# Patient Record
Sex: FEMALE | Race: BLACK OR AFRICAN AMERICAN | NOT HISPANIC OR LATINO | Employment: OTHER | URBAN - METROPOLITAN AREA
[De-identification: names, ages, dates, MRNs, and addresses within clinical notes are randomized per-mention and may not be internally consistent; named-entity substitution may affect disease eponyms.]

---

## 2017-02-12 DIAGNOSIS — I10 HYPERTENSION: ICD-10-CM

## 2017-02-12 RX ORDER — LISINOPRIL AND HYDROCHLOROTHIAZIDE 12.5; 2 MG/1; MG/1
TABLET ORAL
Qty: 30 TABLET | Refills: 2 | Status: SHIPPED | OUTPATIENT
Start: 2017-02-12 | End: 2017-05-22

## 2017-05-04 ENCOUNTER — PATIENT MESSAGE (OUTPATIENT)
Dept: INTERNAL MEDICINE | Facility: CLINIC | Age: 62
End: 2017-05-04

## 2017-05-04 DIAGNOSIS — Z91.89 AT HIGH RISK FOR RESPIRATORY INFECTION: ICD-10-CM

## 2017-05-04 RX ORDER — IPRATROPIUM BROMIDE AND ALBUTEROL SULFATE 2.5; .5 MG/3ML; MG/3ML
3 SOLUTION RESPIRATORY (INHALATION) EVERY 4 HOURS PRN
Qty: 60 VIAL | Refills: 6 | Status: SHIPPED | OUTPATIENT
Start: 2017-05-04 | End: 2018-07-01 | Stop reason: SDUPTHER

## 2017-05-05 RX ORDER — IPRATROPIUM BROMIDE AND ALBUTEROL SULFATE 2.5; .5 MG/3ML; MG/3ML
3 SOLUTION RESPIRATORY (INHALATION) EVERY 4 HOURS PRN
Qty: 60 VIAL | Refills: 6 | OUTPATIENT
Start: 2017-05-05

## 2017-05-15 ENCOUNTER — PATIENT MESSAGE (OUTPATIENT)
Dept: INTERNAL MEDICINE | Facility: CLINIC | Age: 62
End: 2017-05-15

## 2017-05-20 DIAGNOSIS — I10 HYPERTENSION: ICD-10-CM

## 2017-05-22 RX ORDER — LISINOPRIL AND HYDROCHLOROTHIAZIDE 12.5; 2 MG/1; MG/1
TABLET ORAL
Qty: 30 TABLET | Refills: 9 | Status: SHIPPED | OUTPATIENT
Start: 2017-05-22 | End: 2018-04-10 | Stop reason: SDUPTHER

## 2017-06-27 ENCOUNTER — OFFICE VISIT (OUTPATIENT)
Dept: OBSTETRICS AND GYNECOLOGY | Facility: CLINIC | Age: 62
End: 2017-06-27
Payer: COMMERCIAL

## 2017-06-27 VITALS
HEIGHT: 63 IN | WEIGHT: 121.25 LBS | DIASTOLIC BLOOD PRESSURE: 68 MMHG | SYSTOLIC BLOOD PRESSURE: 110 MMHG | BODY MASS INDEX: 21.48 KG/M2

## 2017-06-27 DIAGNOSIS — Z01.419 VISIT FOR GYNECOLOGIC EXAMINATION: Primary | ICD-10-CM

## 2017-06-27 DIAGNOSIS — N95.9 MENOPAUSAL AND PERIMENOPAUSAL DISORDER: ICD-10-CM

## 2017-06-27 DIAGNOSIS — Z12.39 BREAST CANCER SCREENING: ICD-10-CM

## 2017-06-27 PROCEDURE — 99999 PR PBB SHADOW E&M-EST. PATIENT-LVL III: CPT | Mod: PBBFAC,,, | Performed by: OBSTETRICS & GYNECOLOGY

## 2017-06-27 PROCEDURE — 88175 CYTOPATH C/V AUTO FLUID REDO: CPT

## 2017-06-27 PROCEDURE — 99396 PREV VISIT EST AGE 40-64: CPT | Mod: S$GLB,,, | Performed by: OBSTETRICS & GYNECOLOGY

## 2017-06-27 NOTE — PROGRESS NOTES
HISTORY OF PRESENT ILLNESS:    Rochelle Espinoza is a 61 y.o. female , presents for a routine exam and has no complaints.  PAP DUE AND SUBMITTED.  MAMMO ORDERED.  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  RECENT STAYCATION AT HOTEL IN [place] WITH GRANDKIDS . .     LAST VISIT :  NEW PATIENT TO ME.  PAP SUBMITTED AND DISCUSSED 3YR SCREENING INTERVAL.  MAMMO IS UP TO DATE.  C/O HOT FLUSHES, BUT BETTER THAN PREVIOUSLY.  NO SIGNIF VAGINAL DRYNESS AND NO SANDRA  HX TRICH IN THE PAST WITH LUIS.  NOT SA OVER THE PAST 4 YEARS . .     Past Medical History:   Diagnosis Date    COPD (chronic obstructive pulmonary disease)     Hypertension        Past Surgical History:   Procedure Laterality Date     SECTION          MEDICATIONS AND ALLERGIES:      Current Outpatient Prescriptions:     albuterol (PROAIR HFA) 90 mcg/actuation inhaler, Inhale 2 puffs into the lungs every 6 (six) hours., Disp: 8.5 g, Rfl: 9    albuterol-ipratropium 2.5mg-0.5mg/3mL (DUO-NEB) 0.5 mg-3 mg(2.5 mg base)/3 mL nebulizer solution, Take 3 mLs by nebulization every 4 (four) hours as needed for Wheezing or Shortness of Breath., Disp: 60 vial, Rfl: 6    citalopram (CELEXA) 10 MG tablet, Take 1 tablet (10 mg total) by mouth once daily., Disp: 30 tablet, Rfl: 11    gabapentin (NEURONTIN) 100 MG capsule, 1 tab 3 x a day, increase dose by 1 tab per dose every 3 days to max of 3 tabs, Disp: 120 capsule, Rfl: 0    lisinopril-hydrochlorothiazide (PRINZIDE,ZESTORETIC) 20-12.5 mg per tablet, TAKE 1 TABLET BY MOUTH DAILY, Disp: 30 tablet, Rfl: 9    mometasone-formoterol (DULERA) 200-5 mcg/actuation inhaler, Inhale 2 puffs into the lungs 2 (two) times daily., Disp: 13 g, Rfl: 9    nicotine (NICODERM CQ) 21 mg/24 hr, Place 1 patch onto the skin once daily. NAME BRAND ONLY, Disp: 28 patch, Rfl: 1    nicotine polacrilex (COMMIT) 4 MG Lo, Take 1 lozenge (4 mg total)  by mouth as needed (6-16 pieces daily as needed). Offer patient flavor options, Disp: 81 lozenge, Rfl: 1    nicotine polacrilex (NICORETTE) 4 MG Gum, Take 1 each (4 mg total) by mouth as needed (6-16 pieces daily as needed.). Offer patient flavor options, Disp: 100 each, Rfl: 1    OMEPRAZOLE (PRILOSEC ORAL), Take by mouth., Disp: , Rfl:     triamcinolone acetonide 0.5% (KENALOG) 0.5 % Crea, Apply topically 2 (two) times daily., Disp: 15 g, Rfl: 0    Review of patient's allergies indicates:   Allergen Reactions    No known drug allergies        History reviewed. No pertinent family history.    Social History     Social History    Marital status:      Spouse name: N/A    Number of children: N/A    Years of education: N/A     Occupational History    Not on file.     Social History Main Topics    Smoking status: Current Every Day Smoker     Packs/day: 1.00     Years: 40.00     Types: Cigarettes    Smokeless tobacco: Never Used    Alcohol use Yes      Comment: occ beer    Drug use: No    Sexual activity: Not Currently     Birth control/ protection: None     Other Topics Concern    Not on file     Social History Narrative    No narrative on file       COMPREHENSIVE GYN HISTORY:  PAP History:  Denies abnormal Paps except a noted above.  Infection History: Denies STDs. Denies PID.  Benign History: Denies uterine fibroids. Denies ovarian cysts. Denies endometriosis.  Denies other conditions.  Cancer History: Denies cervical cancer. Denies uterine cancer or hyperplasia. Denies ovarian cancer. Denies vulvar cancer or pre-cancer. Denies vaginal cancer or pre-cancer. Denies breast cancer. Denies colon cancer.    ROS:  GENERAL: No weight changes. No swelling. No fatigue. No fever.  CARDIOVASCULAR: No chest pain. No shortness of breath. No leg cramps.   NEUROLOGICAL: No headaches. No vision changes.  BREASTS: No pain. No lumps. No discharge.  ABDOMEN: No pain. No nausea. No vomiting. No diarrhea. No  "constipation.  REPRODUCTIVE: No abnormal bleeding.   VULVA: No pain. No lesions. No itching.  VAGINA: No relaxation. No itching. No odor. No discharge. No lesions.  URINARY: No incontinence. No nocturia. No frequency. No dysuria.    /68   Ht 5' 3" (1.6 m)   Wt 55 kg (121 lb 4.1 oz)   LMP 11/21/2013   BMI 21.48 kg/m²     PE:  APPEARANCE: Well nourished, well developed, in no acute distress.  AFFECT: WNL, alert and oriented x 3.  SKIN: No hirsutism or acne.  NECK: Neck symmetric without masses or thyromegaly.  NODES: No inguinal, cervical, axillary or femoral lymph node enlargement.  CHEST: Good respiratory effort.   ABDOMEN: Soft. No tenderness or masses. No hepatosplenomegaly. No hernias.  BREASTS: Symmetrical, no skin changes or visible lesions. No palpable masses, nipple discharge bilaterally.  PELVIC: ATROPHIC EXTERNAL FEMALE GENITALIA without lesions. Normal hair distribution. Adequate perineal body, normal urethral meatus. VAGINA DRY without lesions or discharge. CERVIX STENOTIC without lesions, discharge or tenderness. No significant cystocele or rectocele. Bimanual exam shows uterus to be normal size, regular, mobile and nontender. Adnexa without masses or tenderness.  EXTREMITIES: No edema.    PROCEDURES:  Pap    DIAGNOSIS:  1. Visit for gynecologic examination  Liquid-based pap smear, screening   2. Breast cancer screening  Mammo Digital Screening Bilat with Tomosynthesis CAD   3. Menopausal and perimenopausal disorder         PLAN:    LABS AND TESTS ORDERED:  Mammogram    COUNSELING:  The patient was counseled today on osteoporosis prevention, calcium supplementation, and regular weight bearing exercise. The patient was also counseled today on ACS PAP guidelines, with recommendations for yearly pelvic exams unless their uterus, cervix, and ovaries were removed for benign reasons; in that case, examinations every 3-5 years are recommended.  The patient was also counseled regarding monthly breast " self-examination, routine STD screening for at-risk populations, prophylactic immunizations for transmitted infections such as  HPV, Pertussis, or Influenza as appropriate, and yearly mammograms when indicated by ACS guidelines.  She was advised to see her primary care physician for all other health maintenance.    FOLLOW-UP with me annually.

## 2017-06-28 ENCOUNTER — CLINICAL SUPPORT (OUTPATIENT)
Dept: SMOKING CESSATION | Facility: CLINIC | Age: 62
End: 2017-06-28
Payer: COMMERCIAL

## 2017-06-28 DIAGNOSIS — F17.200 NICOTINE DEPENDENCE: Primary | ICD-10-CM

## 2017-06-28 PROCEDURE — 99407 BEHAV CHNG SMOKING > 10 MIN: CPT | Mod: S$GLB,,,

## 2017-07-02 ENCOUNTER — PATIENT MESSAGE (OUTPATIENT)
Dept: OBSTETRICS AND GYNECOLOGY | Facility: HOSPITAL | Age: 62
End: 2017-07-02

## 2017-07-17 DIAGNOSIS — J20.9 ACUTE BRONCHITIS, UNSPECIFIED ORGANISM: ICD-10-CM

## 2017-07-17 RX ORDER — ALBUTEROL SULFATE 90 UG/1
AEROSOL, METERED RESPIRATORY (INHALATION)
Qty: 8.5 G | Refills: 9 | Status: SHIPPED | OUTPATIENT
Start: 2017-07-17 | End: 2017-12-19 | Stop reason: SDUPTHER

## 2017-09-13 ENCOUNTER — TELEPHONE (OUTPATIENT)
Dept: ORTHOPEDICS | Facility: CLINIC | Age: 62
End: 2017-09-13

## 2017-09-13 DIAGNOSIS — M54.2 CERVICAL SPINE PAIN: Primary | ICD-10-CM

## 2017-09-17 ENCOUNTER — PATIENT MESSAGE (OUTPATIENT)
Dept: INTERNAL MEDICINE | Facility: CLINIC | Age: 62
End: 2017-09-17

## 2017-09-21 ENCOUNTER — TELEPHONE (OUTPATIENT)
Dept: INTERNAL MEDICINE | Facility: CLINIC | Age: 62
End: 2017-09-21

## 2017-09-21 NOTE — TELEPHONE ENCOUNTER
----- Message from Jackeline Nixon MA sent at 9/21/2017  4:12 PM CDT -----  Contact: self - [phone]  Type: Returning a call    Who left a message? Chelsea     When did the practice call? 9/21/17    Comments: patient stated she will keep the appt on 10/2/17. Thanks!

## 2017-09-27 ENCOUNTER — TELEPHONE (OUTPATIENT)
Dept: INTERNAL MEDICINE | Facility: CLINIC | Age: 62
End: 2017-09-27

## 2017-09-27 ENCOUNTER — HOSPITAL ENCOUNTER (OUTPATIENT)
Dept: RADIOLOGY | Facility: HOSPITAL | Age: 62
Discharge: HOME | End: 2017-09-27
Attending: ORTHOPAEDIC SURGERY
Payer: COMMERCIAL

## 2017-09-27 ENCOUNTER — OFFICE VISIT (OUTPATIENT)
Dept: ORTHOPEDICS | Facility: CLINIC | Age: 62
End: 2017-09-27
Payer: COMMERCIAL

## 2017-09-27 VITALS — HEIGHT: 63 IN | BODY MASS INDEX: 21.26 KG/M2 | WEIGHT: 120 LBS

## 2017-09-27 DIAGNOSIS — M54.2 CERVICAL SPINE PAIN: ICD-10-CM

## 2017-09-27 DIAGNOSIS — G95.9 CERVICAL MYELOPATHY: ICD-10-CM

## 2017-09-27 DIAGNOSIS — M54.12 CERVICAL RADICULOPATHY: Primary | ICD-10-CM

## 2017-09-27 PROCEDURE — 3008F BODY MASS INDEX DOCD: CPT | Mod: S$GLB,,, | Performed by: ORTHOPAEDIC SURGERY

## 2017-09-27 PROCEDURE — 72050 X-RAY EXAM NECK SPINE 4/5VWS: CPT | Mod: TC

## 2017-09-27 PROCEDURE — 72050 X-RAY EXAM NECK SPINE 4/5VWS: CPT | Mod: 26,,, | Performed by: RADIOLOGY

## 2017-09-27 PROCEDURE — 99999 PR PBB SHADOW E&M-EST. PATIENT-LVL III: CPT | Mod: PBBFAC,,, | Performed by: ORTHOPAEDIC SURGERY

## 2017-09-27 PROCEDURE — 99213 OFFICE O/P EST LOW 20 MIN: CPT | Mod: S$GLB,,, | Performed by: ORTHOPAEDIC SURGERY

## 2017-09-29 ENCOUNTER — OFFICE VISIT (OUTPATIENT)
Dept: INTERNAL MEDICINE | Facility: CLINIC | Age: 62
End: 2017-09-29
Payer: COMMERCIAL

## 2017-09-29 ENCOUNTER — HOSPITAL ENCOUNTER (OUTPATIENT)
Dept: RADIOLOGY | Facility: HOSPITAL | Age: 62
Discharge: HOME | End: 2017-09-29
Attending: INTERNAL MEDICINE
Payer: COMMERCIAL

## 2017-09-29 VITALS — HEIGHT: 63 IN | WEIGHT: 121 LBS | BODY MASS INDEX: 21.44 KG/M2

## 2017-09-29 VITALS
BODY MASS INDEX: 21.56 KG/M2 | DIASTOLIC BLOOD PRESSURE: 80 MMHG | HEART RATE: 87 BPM | SYSTOLIC BLOOD PRESSURE: 114 MMHG | WEIGHT: 121.69 LBS | HEIGHT: 63 IN

## 2017-09-29 DIAGNOSIS — Z12.31 ENCOUNTER FOR SCREENING MAMMOGRAM FOR BREAST CANCER: ICD-10-CM

## 2017-09-29 DIAGNOSIS — R63.4 WEIGHT LOSS: ICD-10-CM

## 2017-09-29 DIAGNOSIS — J20.9 ACUTE BRONCHITIS, UNSPECIFIED ORGANISM: ICD-10-CM

## 2017-09-29 DIAGNOSIS — R63.4 WEIGHT LOSS: Primary | ICD-10-CM

## 2017-09-29 PROCEDURE — 3074F SYST BP LT 130 MM HG: CPT | Mod: S$GLB,,, | Performed by: INTERNAL MEDICINE

## 2017-09-29 PROCEDURE — 77067 SCR MAMMO BI INCL CAD: CPT | Mod: 26,,, | Performed by: RADIOLOGY

## 2017-09-29 PROCEDURE — 3008F BODY MASS INDEX DOCD: CPT | Mod: S$GLB,,, | Performed by: INTERNAL MEDICINE

## 2017-09-29 PROCEDURE — 99999 PR PBB SHADOW E&M-EST. PATIENT-LVL IV: CPT | Mod: PBBFAC,,, | Performed by: INTERNAL MEDICINE

## 2017-09-29 PROCEDURE — 71020 XR CHEST PA AND LATERAL: CPT | Mod: TC

## 2017-09-29 PROCEDURE — 99214 OFFICE O/P EST MOD 30 MIN: CPT | Mod: S$GLB,,, | Performed by: INTERNAL MEDICINE

## 2017-09-29 PROCEDURE — 71020 XR CHEST PA AND LATERAL: CPT | Mod: 26,,, | Performed by: RADIOLOGY

## 2017-09-29 PROCEDURE — 3079F DIAST BP 80-89 MM HG: CPT | Mod: S$GLB,,, | Performed by: INTERNAL MEDICINE

## 2017-09-29 PROCEDURE — 77067 SCR MAMMO BI INCL CAD: CPT | Mod: TC

## 2017-09-29 NOTE — PROGRESS NOTES
She is a 62-year-old female coming in today for followup.            She was seen  in the hospital February 7th through February 13th for COPD exacerbation. She    has had this happen after a three day [place] stay. She developed shortness    of breath and her pulse ox was low. She was put on oxygen. She was sent home    on oxygen. Pulse ox at baseline was 88%, during activity it got down to 82%.    Today her pulse ox was 91%.   She uses her oxygen at night. She    has a history of COPD. She was seen by Dr. Maine Whitney since her last visit with me.  She has  severe COPD,             Answers for HPI/ROS submitted by the patient on 9/28/2017   activity change: Yes  unexpected weight change: Yes  neck pain: No  hearing loss: No  rhinorrhea: Yes  trouble swallowing: No  eye discharge: No  visual disturbance: Yes  chest tightness: Yes  wheezing: Yes  chest pain: No  palpitations: Yes  blood in stool: No  constipation: No  vomiting: No  diarrhea: No  polydipsia: No  polyuria: No  difficulty urinating: No  hematuria: No  menstrual problem: No  dysuria: No  joint swelling: Yes  arthralgias: Yes  headaches: Yes  weakness: Yes  confusion: No  dysphoric mood: Yes       PHYSICAL EXAMINATION:  GENERAL: This is a well-appearing 6`-year-old  female, in no    acute distress.  NECK: Supple. She has no JVD. Thyroid is not enlarged.  CARDIOVASCULAR: S1 and S2, regular rate and rhythm without murmur, gallop or    rub.  ABDOMEN: Soft, nontender, no hepatosplenomegaly, no guarding or rebound    tenderness.  LOWER EXTREMITIES: No edema.     ASSESSMENT: 1.  COPD,   2. hypoxia,   3.hyperglycemia,  4. She has tobacco abuse, which  she is undergoing smoking cessation. She says she has cut down a good bit on    her tobacco,  5. anxiety with withdrawal from this, se tried  Wellbutrin 150 mg twice a day f but this made her shakey-- will try some Celexa.

## 2017-09-29 NOTE — PROGRESS NOTES
The patient returns for follow up.    She is a 62 RHD F who presents for evaluation of R radial FA and Hand paresthesias over the past 6 months.    There was no inciting event and she has never had anything like this before.    This problem is getting worse and is not improved with NSAIDs.    She also endorses difficulty with buttons and small objects.    On examination there are no focal motor or sensory deficits in the BUE/LE, but she does have notable bilateral inverted radial reflexes. Negative Paniagua's bilaterally.    Cervical spine radiographs demonstrate severe C5/6 DDD.    Today we discussed options, I am concerned about cervical myelopathy, I will order a cervical MRI and she will return for f/u.    I spent 15 minutes with the patient of which greater than 1/2 the time was devoted to counciling the patient regarding treatment options.

## 2017-10-02 ENCOUNTER — LAB VISIT (OUTPATIENT)
Dept: LAB | Facility: HOSPITAL | Age: 62
End: 2017-10-02
Attending: INTERNAL MEDICINE
Payer: COMMERCIAL

## 2017-10-02 ENCOUNTER — TELEPHONE (OUTPATIENT)
Dept: INTERNAL MEDICINE | Facility: CLINIC | Age: 62
End: 2017-10-02

## 2017-10-02 DIAGNOSIS — R63.4 WEIGHT LOSS: ICD-10-CM

## 2017-10-02 LAB
ALBUMIN SERPL BCP-MCNC: 3.7 G/DL
ALP SERPL-CCNC: 51 U/L
ALT SERPL W/O P-5'-P-CCNC: 25 U/L
ANION GAP SERPL CALC-SCNC: 11 MMOL/L
AST SERPL-CCNC: 26 U/L
BASOPHILS # BLD AUTO: 0.01 K/UL
BASOPHILS NFR BLD: 0.1 %
BILIRUB SERPL-MCNC: 0.6 MG/DL
BUN SERPL-MCNC: 10 MG/DL
CALCIUM SERPL-MCNC: 9.1 MG/DL
CHLORIDE SERPL-SCNC: 103 MMOL/L
CO2 SERPL-SCNC: 28 MMOL/L
CREAT SERPL-MCNC: 0.7 MG/DL
DIFFERENTIAL METHOD: ABNORMAL
EOSINOPHIL # BLD AUTO: 0 K/UL
EOSINOPHIL NFR BLD: 0.5 %
ERYTHROCYTE [DISTWIDTH] IN BLOOD BY AUTOMATED COUNT: 15.8 %
EST. GFR  (AFRICAN AMERICAN): >60 ML/MIN/1.73 M^2
EST. GFR  (NON AFRICAN AMERICAN): >60 ML/MIN/1.73 M^2
GLUCOSE SERPL-MCNC: 96 MG/DL
HCT VFR BLD AUTO: 49.3 %
HGB BLD-MCNC: 17 G/DL
LYMPHOCYTES # BLD AUTO: 2.3 K/UL
LYMPHOCYTES NFR BLD: 28.3 %
MCH RBC QN AUTO: 29.9 PG
MCHC RBC AUTO-ENTMCNC: 34.5 G/DL
MCV RBC AUTO: 87 FL
MONOCYTES # BLD AUTO: 0.6 K/UL
MONOCYTES NFR BLD: 7.6 %
NEUTROPHILS # BLD AUTO: 5 K/UL
NEUTROPHILS NFR BLD: 63.2 %
PLATELET # BLD AUTO: 199 K/UL
PMV BLD AUTO: 11.8 FL
POTASSIUM SERPL-SCNC: 4.5 MMOL/L
PROT SERPL-MCNC: 6.9 G/DL
RBC # BLD AUTO: 5.68 M/UL
SODIUM SERPL-SCNC: 142 MMOL/L
TSH SERPL DL<=0.005 MIU/L-ACNC: 1.28 UIU/ML
WBC # BLD AUTO: 7.94 K/UL

## 2017-10-02 PROCEDURE — 85025 COMPLETE CBC W/AUTO DIFF WBC: CPT

## 2017-10-02 PROCEDURE — 84165 PROTEIN E-PHORESIS SERUM: CPT | Mod: 26,,, | Performed by: PATHOLOGY

## 2017-10-02 PROCEDURE — 84443 ASSAY THYROID STIM HORMONE: CPT

## 2017-10-02 PROCEDURE — 36415 COLL VENOUS BLD VENIPUNCTURE: CPT

## 2017-10-02 PROCEDURE — 84165 PROTEIN E-PHORESIS SERUM: CPT

## 2017-10-02 PROCEDURE — 80053 COMPREHEN METABOLIC PANEL: CPT

## 2017-10-02 NOTE — TELEPHONE ENCOUNTER
Please call-- recent labs , cxr and mmg look ok.  Have her try to increase calorie intake and I will follow up

## 2017-10-03 LAB
ALBUMIN SERPL ELPH-MCNC: 3.84 G/DL
ALPHA1 GLOB SERPL ELPH-MCNC: 0.29 G/DL
ALPHA2 GLOB SERPL ELPH-MCNC: 0.59 G/DL
B-GLOBULIN SERPL ELPH-MCNC: 0.72 G/DL
GAMMA GLOB SERPL ELPH-MCNC: 0.86 G/DL
PATHOLOGIST INTERPRETATION SPE: NORMAL
PROT SERPL-MCNC: 6.3 G/DL

## 2017-10-05 ENCOUNTER — TELEPHONE (OUTPATIENT)
Dept: INTERNAL MEDICINE | Facility: CLINIC | Age: 62
End: 2017-10-05

## 2017-10-09 ENCOUNTER — OFFICE VISIT (OUTPATIENT)
Dept: ORTHOPEDICS | Facility: CLINIC | Age: 62
End: 2017-10-09
Payer: COMMERCIAL

## 2017-10-09 ENCOUNTER — HOSPITAL ENCOUNTER (OUTPATIENT)
Dept: RADIOLOGY | Facility: HOSPITAL | Age: 62
Discharge: HOME | End: 2017-10-09
Attending: ORTHOPAEDIC SURGERY
Payer: COMMERCIAL

## 2017-10-09 VITALS — BODY MASS INDEX: 21.62 KG/M2 | HEIGHT: 63 IN | WEIGHT: 122 LBS

## 2017-10-09 DIAGNOSIS — M54.12 CERVICAL RADICULOPATHY: ICD-10-CM

## 2017-10-09 DIAGNOSIS — M54.12 CERVICAL RADICULOPATHY: Primary | ICD-10-CM

## 2017-10-09 DIAGNOSIS — G95.9 CERVICAL MYELOPATHY: ICD-10-CM

## 2017-10-09 PROCEDURE — 99999 PR PBB SHADOW E&M-EST. PATIENT-LVL III: CPT | Mod: PBBFAC,,, | Performed by: PHYSICIAN ASSISTANT

## 2017-10-09 PROCEDURE — 72141 MRI NECK SPINE W/O DYE: CPT | Mod: 26,,, | Performed by: RADIOLOGY

## 2017-10-09 PROCEDURE — 72141 MRI NECK SPINE W/O DYE: CPT | Mod: TC

## 2017-10-09 PROCEDURE — 99213 OFFICE O/P EST LOW 20 MIN: CPT | Mod: S$GLB,,, | Performed by: PHYSICIAN ASSISTANT

## 2017-10-09 RX ORDER — DICLOFENAC SODIUM 75 MG/1
75 TABLET, DELAYED RELEASE ORAL 2 TIMES DAILY
Qty: 60 TABLET | Refills: 0 | Status: SHIPPED | OUTPATIENT
Start: 2017-10-09 | End: 2017-11-08 | Stop reason: SDUPTHER

## 2017-10-09 RX ORDER — METHOCARBAMOL 750 MG/1
750 TABLET, FILM COATED ORAL NIGHTLY PRN
Qty: 30 TABLET | Refills: 0 | Status: SHIPPED | OUTPATIENT
Start: 2017-10-09 | End: 2018-02-22

## 2017-10-09 NOTE — PROGRESS NOTES
Ms. Espinoza returns for MRI results. She was seen by Dr. Gu 9/29/2017.  She has right radial forearm and hand paresthesias over the last 6 months.  She reports difficulty with small objections and buttons.      There are no focal motor or sensory deficits in the bilateral upper or lower extremities.  There is notable bilateral invertedd radial reflexes.  Negative gupta's bilaterally.     MRI cervical spine shows mild spinal stenosis at C5/6 and C6/7.     A/P:  Discussed with Dr. Gu.  There is no surgical intervention indicated at this time.  Will obtain an EMG.  Referral for PT placed today.

## 2017-10-25 ENCOUNTER — TELEPHONE (OUTPATIENT)
Dept: NEUROLOGY | Facility: CLINIC | Age: 62
End: 2017-10-25

## 2017-10-25 NOTE — TELEPHONE ENCOUNTER
Left  for pt letting her know that her EMG appt was moved from January to November. I left her with my call back number incase she needed to r/s

## 2017-11-08 RX ORDER — DICLOFENAC SODIUM 75 MG/1
75 TABLET, DELAYED RELEASE ORAL 2 TIMES DAILY
Qty: 60 TABLET | Refills: 0 | Status: SHIPPED | OUTPATIENT
Start: 2017-11-08 | End: 2017-12-08

## 2017-11-27 ENCOUNTER — PROCEDURE VISIT (OUTPATIENT)
Dept: NEUROLOGY | Facility: CLINIC | Age: 62
End: 2017-11-27
Payer: COMMERCIAL

## 2017-11-27 DIAGNOSIS — M54.12 CERVICAL RADICULOPATHY: ICD-10-CM

## 2017-11-27 PROCEDURE — 95886 MUSC TEST DONE W/N TEST COMP: CPT | Mod: S$GLB,,, | Performed by: PSYCHIATRY & NEUROLOGY

## 2017-11-27 PROCEDURE — 95912 NRV CNDJ TEST 11-12 STUDIES: CPT | Mod: S$GLB,,, | Performed by: PSYCHIATRY & NEUROLOGY

## 2017-12-09 ENCOUNTER — PATIENT MESSAGE (OUTPATIENT)
Dept: INTERNAL MEDICINE | Facility: CLINIC | Age: 62
End: 2017-12-09

## 2017-12-09 DIAGNOSIS — M10.9 GOUT, ARTHROPATHY: ICD-10-CM

## 2017-12-11 RX ORDER — COLCHICINE 0.6 MG/1
TABLET ORAL
Qty: 3 TABLET | Refills: 3 | Status: SHIPPED | OUTPATIENT
Start: 2017-12-11 | End: 2018-03-05

## 2017-12-11 NOTE — TELEPHONE ENCOUNTER
Patient states she was prescribed this for gout at urgent care and is having a flare up and would like a refill sent to her pharmacy please advise   
Hemiplegia

## 2017-12-19 ENCOUNTER — OFFICE VISIT (OUTPATIENT)
Dept: URGENT CARE | Facility: CLINIC | Age: 62
End: 2017-12-19
Payer: COMMERCIAL

## 2017-12-19 VITALS
HEIGHT: 63 IN | WEIGHT: 122 LBS | SYSTOLIC BLOOD PRESSURE: 148 MMHG | RESPIRATION RATE: 18 BRPM | TEMPERATURE: 99 F | OXYGEN SATURATION: 83 % | HEART RATE: 85 BPM | DIASTOLIC BLOOD PRESSURE: 96 MMHG | BODY MASS INDEX: 21.62 KG/M2

## 2017-12-19 DIAGNOSIS — J40 BRONCHITIS: Primary | ICD-10-CM

## 2017-12-19 DIAGNOSIS — J98.01 BRONCHOSPASM, ACUTE: ICD-10-CM

## 2017-12-19 DIAGNOSIS — J44.9 CHRONIC OBSTRUCTIVE PULMONARY DISEASE WITH HYPOXIA: ICD-10-CM

## 2017-12-19 PROBLEM — M10.9 GOUT INVOLVING TOE OF LEFT FOOT: Status: ACTIVE | Noted: 2017-12-09

## 2017-12-19 LAB
CTP QC/QA: YES
FLUAV AG NPH QL: NEGATIVE
FLUBV AG NPH QL: NEGATIVE

## 2017-12-19 PROCEDURE — 99215 OFFICE O/P EST HI 40 MIN: CPT | Mod: 25,S$GLB,, | Performed by: EMERGENCY MEDICINE

## 2017-12-19 PROCEDURE — 87804 INFLUENZA ASSAY W/OPTIC: CPT | Mod: QW,S$GLB,, | Performed by: EMERGENCY MEDICINE

## 2017-12-19 RX ORDER — DOXYCYCLINE 100 MG/1
100 CAPSULE ORAL 2 TIMES DAILY
Qty: 14 CAPSULE | Refills: 0 | Status: SHIPPED | OUTPATIENT
Start: 2017-12-19 | End: 2017-12-26

## 2017-12-19 RX ORDER — ALBUTEROL SULFATE 90 UG/1
AEROSOL, METERED RESPIRATORY (INHALATION)
Qty: 8.5 G | Refills: 9 | Status: SHIPPED | OUTPATIENT
Start: 2017-12-19 | End: 2018-11-26 | Stop reason: SDUPTHER

## 2017-12-19 RX ORDER — PREDNISONE 20 MG/1
40 TABLET ORAL DAILY
Qty: 10 TABLET | Refills: 0 | Status: SHIPPED | OUTPATIENT
Start: 2017-12-19 | End: 2017-12-24

## 2017-12-19 NOTE — PROGRESS NOTES
"Subjective:       Patient ID: Rochelle Espinoza is a 62 y.o. female.    Vitals:  height is 5' 3" (1.6 m) and weight is 55.3 kg (122 lb). Her tympanic temperature is 99.1 °F (37.3 °C). Her blood pressure is 148/96 (abnormal) and her pulse is 85. Her respiration is 18 and oxygen saturation is 83% (abnormal).     Chief Complaint: Cough (headache body aches chills watery eyes since yesterday)    Present with cough since yesterday.        Cough   This is a new problem. The current episode started yesterday. The problem has been gradually worsening. The problem occurs hourly. The cough is productive of sputum. Associated symptoms include chills, headaches, myalgias, shortness of breath and wheezing. Pertinent negatives include no chest pain, ear pain, eye redness, fever or sore throat. Nothing aggravates the symptoms. Risk factors for lung disease include smoking/tobacco exposure. Treatments tried: alkaseltzer plus  The treatment provided no relief. Her past medical history is significant for COPD.     Review of Systems   Constitution: Positive for chills. Negative for fever and malaise/fatigue.   HENT: Positive for congestion. Negative for ear pain, hoarse voice and sore throat.    Eyes: Positive for discharge. Negative for redness.   Cardiovascular: Negative for chest pain, dyspnea on exertion and leg swelling.   Respiratory: Positive for cough, shortness of breath, sputum production and wheezing.    Musculoskeletal: Positive for myalgias.   Gastrointestinal: Negative for abdominal pain and nausea.   Neurological: Positive for headaches.       Objective:      Physical Exam   Constitutional: She is oriented to person, place, and time. She appears well-developed and well-nourished. She is cooperative.  Non-toxic appearance. She does not appear ill. No distress.   HENT:   Head: Normocephalic and atraumatic.   Right Ear: Hearing, tympanic membrane, external ear and ear canal normal.   Left Ear: Hearing, tympanic membrane, " external ear and ear canal normal.   Nose: Nose normal. No mucosal edema, rhinorrhea or nasal deformity. No epistaxis. Right sinus exhibits no maxillary sinus tenderness and no frontal sinus tenderness. Left sinus exhibits no maxillary sinus tenderness and no frontal sinus tenderness.   Mouth/Throat: Uvula is midline, oropharynx is clear and moist and mucous membranes are normal. No trismus in the jaw. Normal dentition. No uvula swelling. No posterior oropharyngeal erythema.   Eyes: Conjunctivae and lids are normal. No scleral icterus.   Sclera clear bilat   Neck: Trachea normal, full passive range of motion without pain and phonation normal. Neck supple.   Cardiovascular: Normal rate, regular rhythm, normal heart sounds, intact distal pulses and normal pulses.    Pulmonary/Chest: Effort normal. No respiratory distress. She has wheezes in the right lower field and the left lower field. She has rhonchi in the right lower field and the left lower field.   Abdominal: Soft. Normal appearance and bowel sounds are normal. She exhibits no distension. There is no tenderness.   Musculoskeletal: Normal range of motion. She exhibits no edema or deformity.   Neurological: She is alert and oriented to person, place, and time. She exhibits normal muscle tone. Coordination normal.   Skin: Skin is warm, dry and intact. She is not diaphoretic. No pallor.   Psychiatric: She has a normal mood and affect. Her speech is normal and behavior is normal. Judgment and thought content normal. Cognition and memory are normal.   Nursing note and vitals reviewed.      Assessment:       1. Bronchitis    2. Bronchospasm, acute    3. Chronic obstructive pulmonary disease with hypoxia        Plan:         Bronchitis  -     POCT Influenza A/B  -     doxycycline (VIBRAMYCIN) 100 MG Cap; Take 1 capsule (100 mg total) by mouth 2 (two) times daily.  Dispense: 14 capsule; Refill: 0    Bronchospasm, acute  -     predniSONE (DELTASONE) 20 MG tablet; Take 2  tablets (40 mg total) by mouth once daily.  Dispense: 10 tablet; Refill: 0  -     ipratropium-albuterol (COMBIVENT)  mcg/actuation inhaler; Inhale 1 puff into the lungs 4 (four) times daily. Rescue  Dispense: 1 Package; Refill: 6  -     albuterol (PROAIR HFA) 90 mcg/actuation inhaler; TAKE 2 PUFFS BY MOUTH EVERY 6 HOURS AS NEEDED  Dispense: 8.5 g; Refill: 9    Chronic obstructive pulmonary disease with hypoxia  -     ipratropium-albuterol (COMBIVENT)  mcg/actuation inhaler; Inhale 1 puff into the lungs 4 (four) times daily. Rescue  Dispense: 1 Package; Refill: 6  -     albuterol (PROAIR HFA) 90 mcg/actuation inhaler; TAKE 2 PUFFS BY MOUTH EVERY 6 HOURS AS NEEDED  Dispense: 8.5 g; Refill: 9  -     Ambulatory referral to Pulmonology

## 2017-12-19 NOTE — LETTER
December 19, 2017      [facility]  [address]  [facility] [address]  Phone: [phone]  Fax: [phone]       Patient: Rochelle Espinoza   YOB: 1955  Date of Visit: 12/19/2017    To Whom It May Concern:    KIARA Espinoza  was at [facility] on 12/19/2017. She may return to work/school on 12/21/17 with no restrictions. If you have any questions or concerns, or if I can be of further assistance, please do not hesitate to contact me.    Sincerely,    Laisha Brownlee, RT

## 2017-12-19 NOTE — PATIENT INSTRUCTIONS
Please return here or go to the Emergency Department for any concerns or worsening of condition.  If you were prescribed antibiotics, please take them to completion.  If you were prescribed a narcotic medication, do not drive or operate heavy equipment or machinery while taking these medications.  Please follow up with your primary care doctor or specialist as needed.  If you  smoke, please stop smoking.      Bronchospasm (Adult)    Bronchospasm occurs when the airways (bronchial tubes) go into spasm and contract. This makes it hard to breathe and causes wheezing (a high-pitched whistling sound). Bronchospasm can also cause frequent coughing without wheezing.  Bronchospasm is due to irritation, inflammation, or allergic reaction of the airways. People with asthma get bronchospasm. However, not everyone with bronchospasm has asthma.  Being exposed to harmful fumes, a recent case of bronchitis, exercise, or a flare-up of chronic obstructive pulmonary disease (COPD) may cause the airways to spasm. An episode of bronchospasm may last 7 to 14 days. Medicine may be prescribed to relax the airways and prevent wheezing. Antibiotics will be prescribed only if your healthcare provider thinks there is a bacterial infection. Antibiotics do not help a viral infection.  Home care  · Drink lots of water or other fluids (at least 10 glasses a day) during an attack. This will loosen lung secretions and make it easier to breathe. If you have heart or kidney disease, check with your doctor before you drink extra fluids.  · Take prescribed medicine exactly at the times advised. If you take an inhaled medicine to help with breathing, do not use it more than once every 4 hours, unless told to do so. If prescribed an antibiotic or prednisone, take all of the medicine, even if you are feeling better after a few days.  · Do not smoke. Also avoid being exposed to secondhand smoke.  · If you were given an inhaler, use it exactly as directed.  If you need to use it more often than prescribed, your condition may be getting worse. Contact your healthcare provider.  Follow-up care  Follow up with your healthcare provider, or as advised.  Note: If you are age 65 or older, have a chronic lung disease or condition that affects your immune system, or you smoke, we recommend getting pneumococcal vaccinations, as well as an influenza vaccination (flu shot) every autumn. Ask your healthcare provider about this.  When to seek medical advice  Call your healthcare provider right away if any of these occur:  · You need to use your inhalers more often than usual.  · You develop a fever of 100.4°F (38°C) or higher.  · You are coughing up lots of dark-colored sputum (mucus).  · You do not start to improve within 24 hours.  Call 911, or get immediate medical care  Contact emergency services if any of these occur:  · Coughing up bloody sputum (mucus)  · Chest pain with each breath  · Increased wheezing or shortness of breath  Date Last Reviewed: 9/13/2015  © 9913-7834 [employer] [address] [place] [address], [place] [address]. All rights reserved. This information is not intended as a substitute for professional medical care. Always follow your healthcare professional's instructions.

## 2018-01-02 ENCOUNTER — PATIENT MESSAGE (OUTPATIENT)
Dept: INTERNAL MEDICINE | Facility: CLINIC | Age: 63
End: 2018-01-02

## 2018-01-16 ENCOUNTER — OFFICE VISIT (OUTPATIENT)
Dept: URGENT CARE | Facility: CLINIC | Age: 63
End: 2018-01-16
Payer: COMMERCIAL

## 2018-01-16 VITALS
WEIGHT: 120 LBS | OXYGEN SATURATION: 83 % | HEIGHT: 63 IN | SYSTOLIC BLOOD PRESSURE: 170 MMHG | HEART RATE: 102 BPM | TEMPERATURE: 97 F | BODY MASS INDEX: 21.26 KG/M2 | RESPIRATION RATE: 18 BRPM | DIASTOLIC BLOOD PRESSURE: 101 MMHG

## 2018-01-16 DIAGNOSIS — Z87.09 HISTORY OF COPD: ICD-10-CM

## 2018-01-16 DIAGNOSIS — J02.9 SORE THROAT: ICD-10-CM

## 2018-01-16 DIAGNOSIS — R03.0 ELEVATED BLOOD PRESSURE READING: ICD-10-CM

## 2018-01-16 DIAGNOSIS — J40 BRONCHITIS: Primary | ICD-10-CM

## 2018-01-16 DIAGNOSIS — R09.82 POST-NASAL DRIP: ICD-10-CM

## 2018-01-16 LAB
CTP QC/QA: YES
S PYO RRNA THROAT QL PROBE: NEGATIVE

## 2018-01-16 PROCEDURE — 87880 STREP A ASSAY W/OPTIC: CPT | Mod: QW,S$GLB,, | Performed by: NURSE PRACTITIONER

## 2018-01-16 PROCEDURE — 99214 OFFICE O/P EST MOD 30 MIN: CPT | Mod: S$GLB,,, | Performed by: NURSE PRACTITIONER

## 2018-01-16 RX ORDER — FLUTICASONE PROPIONATE 50 MCG
1 SPRAY, SUSPENSION (ML) NASAL 2 TIMES DAILY
Qty: 1 BOTTLE | Refills: 0 | Status: SHIPPED | OUTPATIENT
Start: 2018-01-16 | End: 2018-02-22

## 2018-01-16 RX ORDER — CODEINE PHOSPHATE AND GUAIFENESIN 10; 100 MG/5ML; MG/5ML
5 SOLUTION ORAL 3 TIMES DAILY PRN
Qty: 118 ML | Refills: 0 | Status: SHIPPED | OUTPATIENT
Start: 2018-01-16 | End: 2018-01-26

## 2018-01-16 NOTE — PATIENT INSTRUCTIONS
Please continue using your rescue inhaler and nebulizer as needed.    What Is Acute Bronchitis?  Acute bronchitis is when the airways in your lungs (bronchial tubes) become red and swollen (inflamed). It is usually caused by a viral infection. But it can also occur because of a bacteria or allergen. Symptoms include a cough that produces yellow or greenish mucus and can last for days or sometimes weeks.  Inside healthy lungs    Air travels in and out of the lungs through the airways. The linings of these airways produce sticky mucus. This mucus traps particles that enter the lungs. Tiny structures called cilia then sweep the particles out of the airways.     Healthy airway: Airways are normally open. Air moves in and out easily.      Healthy cilia: Tiny, hairlike cilia sweep mucus and particles up and out of the airways.   Lungs with bronchitis  Bronchitis often occurs with a cold or the flu virus. The airways become inflamed (red and swollen). There is a deep hacking cough from the extra mucus. Other symptoms may include:  · Wheezing  · Chest discomfort  · Shortness of breath  · Mild fever  A second infection, this time due to bacteria, may then occur. And airways irritated by allergens or smoke are more likely to get infected.        Inflamed airway: Inflammation and extra mucus narrow the airway, causing shortness of breath.      Impaired cilia: Extra mucus impairs cilia, causing congestion and wheezing. Smoking makes the problem worse.   Making a diagnosis  A physical exam, health history, and certain tests help your healthcare provider make the diagnosis.  Health history  Your healthcare provider will ask you about your symptoms.  The exam  Your provider listens to your chest for signs of congestion. He or she may also check your ears, nose, and throat.  Possible tests  · A sputum test for bacteria. This requires a sample of mucus from your lungs.  · A nasal or throat swab. This tests to see if you have a  bacterial infection.  · A chest X-ray. This is done if your healthcare provider thinks you have pneumonia.  · Tests to check for an underlying condition. Other tests may be done to check for things such as allergies, asthma, or COPD (chronic obstructive pulmonary disease). You may need to see a specialist for more lung function testing.  Treating a cough  The main treatment for bronchitis is easing symptoms. Avoiding smoke, allergens, and other things that trigger coughing can often help. If the infection is bacterial, you may be given antibiotics. During the illness, it's important to get plenty of sleep. To ease symptoms:  · Dont smoke. Also avoid secondhand smoke.  · Use a humidifier. Or try breathing in steam from a hot shower. This may help loosen mucus.  · Drink a lot of water and juice. They can soothe the throat and may help thin mucus.  · Sit up or use extra pillows when in bed. This helps to lessen coughing and congestion.  · Ask your provider about using medicine. Ask about using cough medicine, pain and fever medicine, or a decongestant.  Antibiotics  Most cases of bronchitis are caused by cold or flu viruses. They dont need antibiotics to treat them, even if your mucus is thick and green or yellow. Antibiotics dont treat viral illness and antibiotics have not been shown to have any benefit in cases of acute bronchitis. Taking antibiotics when they are not needed increases your risk of getting an infection later that is antibiotic-resistant. Antibiotics can also cause severe cases of diarrhea that require other antibiotics to treat.  It is important that you accept your healthcare provider's opinion to not use antibiotics. Your provider will prescribe antibiotics if the infection is caused by bacteria. If they are prescribed:  · Take all of the medicine. Take the medicine until it is used up, even if symptoms have improved. If you dont, the bronchitis may come back.  · Take the medicines as directed.  For instance, some medicines should be taken with food.  · Ask about side effects. Ask your provider or pharmacist what side effects are common, and what to do about them.  Follow-up care  You should see your provider again in 2 to 3 weeks. By this time, symptoms should have improved. An infection that lasts longer may mean you have a more serious problem.  Prevention  · Avoid tobacco smoke. If you smoke, quit. Stay away from smoky places. Ask friends and family not to smoke around you, or in your home or car.  · Get checked for allergies.  · Ask your provider about getting a yearly flu shot. Also ask about pneumococcal or pneumonia shots.  · Wash your hands often. This helps reduce the chance of picking up viruses that cause colds and flu.  Call your healthcare provider if:  · Symptoms worsen, or you have new symptoms  · Breathing problems worsen or  become severe  · Symptoms dont get better within a week, or within 3 days of taking antibiotics   Date Last Reviewed: 2/1/2017  © 2066-7465 [employer]. [address] [place] [address], [place] [address]. All rights reserved. This information is not intended as a substitute for professional medical care. Always follow your healthcare professional's instructions.

## 2018-01-16 NOTE — PROGRESS NOTES
"Subjective:       Patient ID: Rochelle Espinoza is a 62 y.o. female.    Vitals:  height is 5' 3" (1.6 m) and weight is 54.4 kg (120 lb). Her tympanic temperature is 97.4 °F (36.3 °C). Her blood pressure is 170/101 (abnormal) and her pulse is 102. Her respiration is 18 and oxygen saturation is 83% (abnormal).     Chief Complaint: Sore Throat    Sore throat and cough x 2 days.  Tried NSAID's with limited relief.  No fever or N/V/D.     Has COPD w/ hypoxia.  Rarely has SPO2 of greater than 83%. No respiratory distress, wheezing, coughing or SOB on presentation.      Sore Throat    This is a new problem. The current episode started 1 to 4 weeks ago. The problem has been gradually worsening. Sore throat worse side: both. There has been no fever. Associated symptoms include coughing, shortness of breath and swollen glands. Pertinent negatives include no abdominal pain, congestion, ear pain, headaches or hoarse voice. She has tried gargles and NSAIDs for the symptoms. The treatment provided mild relief.     Review of Systems   Constitution: Negative for chills, fever and malaise/fatigue.   HENT: Positive for sore throat. Negative for congestion, ear pain and hoarse voice.    Eyes: Negative for discharge and redness.   Cardiovascular: Negative for chest pain, dyspnea on exertion and leg swelling.   Respiratory: Positive for cough, shortness of breath and sputum production. Negative for wheezing.    Musculoskeletal: Negative for myalgias.   Gastrointestinal: Negative for abdominal pain and nausea.   Neurological: Negative for headaches.       Objective:      Physical Exam   Constitutional: She is oriented to person, place, and time. She appears well-developed and well-nourished. She is cooperative.  Non-toxic appearance. She does not appear ill. No distress.   HENT:   Head: Normocephalic and atraumatic.   Right Ear: Hearing, tympanic membrane, external ear and ear canal normal.   Left Ear: Hearing, tympanic membrane, external " ear and ear canal normal.   Nose: Rhinorrhea present. No mucosal edema or nasal deformity. No epistaxis. Right sinus exhibits no maxillary sinus tenderness and no frontal sinus tenderness. Left sinus exhibits no maxillary sinus tenderness and no frontal sinus tenderness.   Mouth/Throat: Uvula is midline and mucous membranes are normal. No trismus in the jaw. Normal dentition. No uvula swelling. Posterior oropharyngeal erythema present.   Eyes: Conjunctivae and lids are normal. No scleral icterus.   Sclera clear bilat   Neck: Trachea normal, full passive range of motion without pain and phonation normal. Neck supple.   Cardiovascular: Normal rate, regular rhythm, normal heart sounds, intact distal pulses and normal pulses.    Pulmonary/Chest: Effort normal. No respiratory distress. She has decreased breath sounds. She has no wheezes. She has no rhonchi. She has no rales.   Abdominal: Soft. Normal appearance and bowel sounds are normal. She exhibits no distension. There is no tenderness.   Musculoskeletal: Normal range of motion. She exhibits no edema or deformity.   Neurological: She is alert and oriented to person, place, and time. She exhibits normal muscle tone. Coordination normal.   Skin: Skin is warm, dry and intact. She is not diaphoretic. No pallor.   Psychiatric: She has a normal mood and affect. Her speech is normal and behavior is normal. Judgment and thought content normal. Cognition and memory are normal.   Nursing note and vitals reviewed.      Assessment:       1. Bronchitis    2. Post-nasal drip    3. Sore throat    4. History of COPD    5. Elevated blood pressure reading        Plan:         Bronchitis  -     guaifenesin-codeine 100-10 mg/5 ml (CHERATUSSIN AC)  mg/5 mL syrup; Take 5 mLs by mouth 3 (three) times daily as needed.  Dispense: 118 mL; Refill: 0    Post-nasal drip  -     fluticasone (FLONASE) 50 mcg/actuation nasal spray; 1 spray (50 mcg total) by Each Nare route 2 (two) times daily.   Dispense: 1 Bottle; Refill: 0    Sore throat  -     POCT rapid strep A  -     guaifenesin-codeine 100-10 mg/5 ml (CHERATUSSIN AC)  mg/5 mL syrup; Take 5 mLs by mouth 3 (three) times daily as needed.  Dispense: 118 mL; Refill: 0    History of COPD    Elevated blood pressure reading      Patient Instructions       Please continue using your rescue inhaler and nebulizer as needed.    What Is Acute Bronchitis?  Acute bronchitis is when the airways in your lungs (bronchial tubes) become red and swollen (inflamed). It is usually caused by a viral infection. But it can also occur because of a bacteria or allergen. Symptoms include a cough that produces yellow or greenish mucus and can last for days or sometimes weeks.  Inside healthy lungs    Air travels in and out of the lungs through the airways. The linings of these airways produce sticky mucus. This mucus traps particles that enter the lungs. Tiny structures called cilia then sweep the particles out of the airways.     Healthy airway: Airways are normally open. Air moves in and out easily.      Healthy cilia: Tiny, hairlike cilia sweep mucus and particles up and out of the airways.   Lungs with bronchitis  Bronchitis often occurs with a cold or the flu virus. The airways become inflamed (red and swollen). There is a deep hacking cough from the extra mucus. Other symptoms may include:  · Wheezing  · Chest discomfort  · Shortness of breath  · Mild fever  A second infection, this time due to bacteria, may then occur. And airways irritated by allergens or smoke are more likely to get infected.        Inflamed airway: Inflammation and extra mucus narrow the airway, causing shortness of breath.      Impaired cilia: Extra mucus impairs cilia, causing congestion and wheezing. Smoking makes the problem worse.   Making a diagnosis  A physical exam, health history, and certain tests help your healthcare provider make the diagnosis.  Health history  Your healthcare  provider will ask you about your symptoms.  The exam  Your provider listens to your chest for signs of congestion. He or she may also check your ears, nose, and throat.  Possible tests  · A sputum test for bacteria. This requires a sample of mucus from your lungs.  · A nasal or throat swab. This tests to see if you have a bacterial infection.  · A chest X-ray. This is done if your healthcare provider thinks you have pneumonia.  · Tests to check for an underlying condition. Other tests may be done to check for things such as allergies, asthma, or COPD (chronic obstructive pulmonary disease). You may need to see a specialist for more lung function testing.  Treating a cough  The main treatment for bronchitis is easing symptoms. Avoiding smoke, allergens, and other things that trigger coughing can often help. If the infection is bacterial, you may be given antibiotics. During the illness, it's important to get plenty of sleep. To ease symptoms:  · Dont smoke. Also avoid secondhand smoke.  · Use a humidifier. Or try breathing in steam from a hot shower. This may help loosen mucus.  · Drink a lot of water and juice. They can soothe the throat and may help thin mucus.  · Sit up or use extra pillows when in bed. This helps to lessen coughing and congestion.  · Ask your provider about using medicine. Ask about using cough medicine, pain and fever medicine, or a decongestant.  Antibiotics  Most cases of bronchitis are caused by cold or flu viruses. They dont need antibiotics to treat them, even if your mucus is thick and green or yellow. Antibiotics dont treat viral illness and antibiotics have not been shown to have any benefit in cases of acute bronchitis. Taking antibiotics when they are not needed increases your risk of getting an infection later that is antibiotic-resistant. Antibiotics can also cause severe cases of diarrhea that require other antibiotics to treat.  It is important that you accept your healthcare  provider's opinion to not use antibiotics. Your provider will prescribe antibiotics if the infection is caused by bacteria. If they are prescribed:  · Take all of the medicine. Take the medicine until it is used up, even if symptoms have improved. If you dont, the bronchitis may come back.  · Take the medicines as directed. For instance, some medicines should be taken with food.  · Ask about side effects. Ask your provider or pharmacist what side effects are common, and what to do about them.  Follow-up care  You should see your provider again in 2 to 3 weeks. By this time, symptoms should have improved. An infection that lasts longer may mean you have a more serious problem.  Prevention  · Avoid tobacco smoke. If you smoke, quit. Stay away from smoky places. Ask friends and family not to smoke around you, or in your home or car.  · Get checked for allergies.  · Ask your provider about getting a yearly flu shot. Also ask about pneumococcal or pneumonia shots.  · Wash your hands often. This helps reduce the chance of picking up viruses that cause colds and flu.  Call your healthcare provider if:  · Symptoms worsen, or you have new symptoms  · Breathing problems worsen or  become severe  · Symptoms dont get better within a week, or within 3 days of taking antibiotics   Date Last Reviewed: 2/1/2017  © 5343-7633 [employer]. [address] [place] [address], [place] [address]. All rights reserved. This information is not intended as a substitute for professional medical care. Always follow your healthcare professional's instructions.

## 2018-01-16 NOTE — LETTER
January 16, 2018      [facility]  [address]  [facility] [address]  Phone: [phone]  Fax: [phone]       Patient: Rochelle Espinoza   YOB: 1955  Date of Visit: 01/16/2018    To Whom It May Concern:    KIARA Espinoza  was at [facility] on 01/16/2018. She may return to work/school on 1/17/2018 with no restrictions. If you have any questions or concerns, or if I can be of further assistance, please do not hesitate to contact me.    Sincerely,          Aston Hubbard, NP

## 2018-02-14 ENCOUNTER — HOSPITAL ENCOUNTER (OUTPATIENT)
Dept: RADIOLOGY | Facility: HOSPITAL | Age: 63
Discharge: HOME | End: 2018-02-14
Attending: INTERNAL MEDICINE
Payer: COMMERCIAL

## 2018-02-14 ENCOUNTER — TELEPHONE (OUTPATIENT)
Dept: INTERNAL MEDICINE | Facility: CLINIC | Age: 63
End: 2018-02-14

## 2018-02-14 ENCOUNTER — OFFICE VISIT (OUTPATIENT)
Dept: INTERNAL MEDICINE | Facility: CLINIC | Age: 63
End: 2018-02-14
Payer: COMMERCIAL

## 2018-02-14 VITALS
HEART RATE: 92 BPM | DIASTOLIC BLOOD PRESSURE: 80 MMHG | SYSTOLIC BLOOD PRESSURE: 140 MMHG | HEIGHT: 63 IN | TEMPERATURE: 99 F | BODY MASS INDEX: 23.25 KG/M2 | WEIGHT: 131.19 LBS | OXYGEN SATURATION: 79 %

## 2018-02-14 DIAGNOSIS — R60.9 FLUID RETENTION: ICD-10-CM

## 2018-02-14 DIAGNOSIS — J44.9 CHRONIC OBSTRUCTIVE PULMONARY DISEASE, UNSPECIFIED COPD TYPE: ICD-10-CM

## 2018-02-14 DIAGNOSIS — R16.0 HEPATOMEGALY: ICD-10-CM

## 2018-02-14 DIAGNOSIS — R94.31 ABNORMAL EKG: ICD-10-CM

## 2018-02-14 DIAGNOSIS — I50.9 CONGESTIVE HEART FAILURE, UNSPECIFIED CONGESTIVE HEART FAILURE CHRONICITY, UNSPECIFIED CONGESTIVE HEART FAILURE TYPE: ICD-10-CM

## 2018-02-14 DIAGNOSIS — R60.9 EDEMA, UNSPECIFIED TYPE: Primary | ICD-10-CM

## 2018-02-14 DIAGNOSIS — R60.9 EDEMA, UNSPECIFIED TYPE: ICD-10-CM

## 2018-02-14 LAB
BILIRUB SERPL-MCNC: NORMAL MG/DL
BLOOD URINE, POC: NORMAL
COLOR, POC UA: NORMAL
GLUCOSE UR QL STRIP: NORMAL
KETONES UR QL STRIP: NORMAL
LEUKOCYTE ESTERASE URINE, POC: NORMAL
NITRITE, POC UA: NORMAL
PH, POC UA: 5
PROTEIN, POC: NORMAL
SPECIFIC GRAVITY, POC UA: 1
UROBILINOGEN, POC UA: NORMAL

## 2018-02-14 PROCEDURE — 99214 OFFICE O/P EST MOD 30 MIN: CPT | Mod: 25,S$GLB,, | Performed by: INTERNAL MEDICINE

## 2018-02-14 PROCEDURE — 93010 ELECTROCARDIOGRAM REPORT: CPT | Mod: S$GLB,,, | Performed by: INTERNAL MEDICINE

## 2018-02-14 PROCEDURE — 71046 X-RAY EXAM CHEST 2 VIEWS: CPT | Mod: TC

## 2018-02-14 PROCEDURE — 81002 URINALYSIS NONAUTO W/O SCOPE: CPT | Mod: S$GLB,,, | Performed by: INTERNAL MEDICINE

## 2018-02-14 PROCEDURE — 71046 X-RAY EXAM CHEST 2 VIEWS: CPT | Mod: 26,,, | Performed by: RADIOLOGY

## 2018-02-14 PROCEDURE — 99999 PR PBB SHADOW E&M-EST. PATIENT-LVL IV: CPT | Mod: PBBFAC,,, | Performed by: INTERNAL MEDICINE

## 2018-02-14 PROCEDURE — 93005 ELECTROCARDIOGRAM TRACING: CPT | Mod: S$GLB,,, | Performed by: INTERNAL MEDICINE

## 2018-02-14 PROCEDURE — 94640 AIRWAY INHALATION TREATMENT: CPT | Mod: 59,S$GLB,, | Performed by: INTERNAL MEDICINE

## 2018-02-14 PROCEDURE — 3008F BODY MASS INDEX DOCD: CPT | Mod: S$GLB,,, | Performed by: INTERNAL MEDICINE

## 2018-02-14 RX ORDER — ALBUTEROL SULFATE 0.83 MG/ML
2.5 SOLUTION RESPIRATORY (INHALATION)
Status: COMPLETED | OUTPATIENT
Start: 2018-02-14 | End: 2018-02-14

## 2018-02-14 RX ORDER — FUROSEMIDE 20 MG/1
20 TABLET ORAL 2 TIMES DAILY
Qty: 60 TABLET | Refills: 11 | Status: ON HOLD | OUTPATIENT
Start: 2018-02-14 | End: 2018-04-12

## 2018-02-14 RX ORDER — POTASSIUM CHLORIDE 20 MEQ/1
20 TABLET, EXTENDED RELEASE ORAL DAILY
Qty: 30 TABLET | Refills: 11 | Status: SHIPPED | OUTPATIENT
Start: 2018-02-14 | End: 2019-02-15 | Stop reason: SDUPTHER

## 2018-02-14 RX ADMIN — ALBUTEROL SULFATE 2.5 MG: 0.83 SOLUTION RESPIRATORY (INHALATION) at 01:02

## 2018-02-14 NOTE — PROGRESS NOTES
Subjective:       Patient ID: Rochelle Espinoza is a 62 y.o. female.    Chief Complaint: Joint Swelling    Patient complains of swelling in feel and legs to mid thigh and also increased abdominal girth.  Has gained 11 pounds since 1/16/18.  Has COPD on home O2, but is more SOB than usual      Review of Systems   Constitutional: Positive for unexpected weight change. Negative for activity change, chills, fatigue and fever.   HENT: Negative for congestion, ear pain, nosebleeds, postnasal drip, sinus pressure and sore throat.    Eyes: Negative.  Negative for visual disturbance.   Respiratory: Negative for cough, chest tightness, shortness of breath and wheezing.    Cardiovascular: Negative for chest pain.   Gastrointestinal: Negative for abdominal pain, diarrhea, nausea and vomiting.   Genitourinary: Negative for difficulty urinating, dysuria, frequency and urgency.   Musculoskeletal: Negative for arthralgias and neck stiffness.   Skin: Negative for rash.   Neurological: Negative for dizziness, weakness and headaches.   Psychiatric/Behavioral: Negative for sleep disturbance. The patient is not nervous/anxious.        Objective:      Physical Exam   Constitutional: She is oriented to person, place, and time. She appears well-developed and well-nourished.  Non-toxic appearance. No distress.   HENT:   Head: Normocephalic and atraumatic.   Right Ear: Tympanic membrane, external ear and ear canal normal.   Left Ear: Tympanic membrane, external ear and ear canal normal.   Eyes: EOM are normal. Pupils are equal, round, and reactive to light. No scleral icterus.   Neck: Normal range of motion. Neck supple. JVD present. No thyromegaly present.   Cardiovascular: Normal rate, regular rhythm and normal heart sounds.    Pulses:       Dorsalis pedis pulses are 1+ on the right side, and 1+ on the left side.   2-3+ pitting edema of both feet.  Edema extends to just above knees where it gradually tapers off   Pulmonary/Chest: Effort  normal. She has decreased breath sounds in the right upper field, the right middle field, the right lower field, the left upper field, the left middle field and the left lower field. She has no wheezes. She has no rhonchi. She has no rales.   Abdominal: Soft. Bowel sounds are normal. She exhibits distension. She exhibits no mass. There is hepatomegaly. There is tenderness in the right upper quadrant. There is no rebound. No hernia.   Musculoskeletal: Normal range of motion.   Lymphadenopathy:     She has no cervical adenopathy.   Neurological: She is alert and oriented to person, place, and time. She has normal reflexes. She displays normal reflexes. No cranial nerve deficit. She exhibits normal muscle tone. Coordination normal.   Skin: Skin is warm and dry.   Psychiatric: She has a normal mood and affect. Her behavior is normal.       Assessment:       1. Edema, unspecified type    2. Chronic obstructive pulmonary disease, unspecified COPD type    3. Fluid retention        Plan:   Rochelle was seen today for joint swelling.    Diagnoses and all orders for this visit:    Edema, unspecified type  -     X-Ray Chest PA And Lateral; Future  -     CBC auto differential; Future  -     Comprehensive metabolic panel; Future  -     POCT urine dipstick without microscope    Chronic obstructive pulmonary disease, unspecified COPD type  -     X-Ray Chest PA And Lateral; Future    Fluid retention  -     X-Ray Chest PA And Lateral; Future  -     CBC auto differential; Future  -     Comprehensive metabolic panel; Future  -     POCT urine dipstick without microscope    Other orders  -     albuterol nebulizer solution 2.5 mg; Take 3 mLs (2.5 mg total) by nebulization one time.

## 2018-02-14 NOTE — LETTER
February 14, 2018      [facility]  [address]  [facility] [address]  Phone: [phone]  Fax: [phone]       Patient: Rochelle Espinoza   YOB: 1955  Date of Visit: 02/14/2018    To Whom It May Concern:    KIARA Espinoza  was at [facility] on 02/14/2018. She may return to work on 2/17/2018 with no restrictions. If you have any questions or concerns, or if I can be of further assistance, please do not hesitate to contact me.    Sincerely,        Carmen Colindres M.D.

## 2018-02-14 NOTE — TELEPHONE ENCOUNTER
----- Message from Jeanna Penaloza sent at 2/14/2018  8:15 AM CST -----  Contact: iubenda request  Message     Appointment Request From: KATRIN Espinoza    With Provider: Yosi Samuels MD [-Primary Care Physician-]    Would Accept With:Only the person I've selected    Preferred Date Range: From 2/14/2018 To 2/14/2018    Preferred Times: Wednesday Afternoon    Reason for visit: Request an Appt    Comments:  my legs and feet are swelling I can not wear shoes or barely walk

## 2018-02-15 ENCOUNTER — PATIENT MESSAGE (OUTPATIENT)
Dept: INTERNAL MEDICINE | Facility: CLINIC | Age: 63
End: 2018-02-15

## 2018-02-16 ENCOUNTER — PATIENT MESSAGE (OUTPATIENT)
Dept: INTERNAL MEDICINE | Facility: CLINIC | Age: 63
End: 2018-02-16

## 2018-02-22 ENCOUNTER — OFFICE VISIT (OUTPATIENT)
Dept: CARDIOLOGY | Facility: CLINIC | Age: 63
End: 2018-02-22
Payer: COMMERCIAL

## 2018-02-22 VITALS
HEART RATE: 95 BPM | OXYGEN SATURATION: 77 % | HEIGHT: 63 IN | BODY MASS INDEX: 21.21 KG/M2 | WEIGHT: 119.69 LBS | DIASTOLIC BLOOD PRESSURE: 66 MMHG | SYSTOLIC BLOOD PRESSURE: 117 MMHG

## 2018-02-22 DIAGNOSIS — R60.0 BILATERAL LOWER EXTREMITY EDEMA: Primary | ICD-10-CM

## 2018-02-22 DIAGNOSIS — Z72.0 TOBACCO ABUSE: ICD-10-CM

## 2018-02-22 PROCEDURE — 99999 PR PBB SHADOW E&M-EST. PATIENT-LVL IV: CPT | Mod: PBBFAC,,, | Performed by: STUDENT IN AN ORGANIZED HEALTH CARE EDUCATION/TRAINING PROGRAM

## 2018-02-22 PROCEDURE — 3008F BODY MASS INDEX DOCD: CPT | Mod: S$GLB,,, | Performed by: STUDENT IN AN ORGANIZED HEALTH CARE EDUCATION/TRAINING PROGRAM

## 2018-02-22 PROCEDURE — 99203 OFFICE O/P NEW LOW 30 MIN: CPT | Mod: S$GLB,,, | Performed by: STUDENT IN AN ORGANIZED HEALTH CARE EDUCATION/TRAINING PROGRAM

## 2018-02-22 NOTE — PROGRESS NOTES
"    Cardiology Clinic Note  Reason for Visit: B/L Lower extremity Edema     HPI:   63 y/o F, referred to cardiology clinic for new onset b/l lower extremity edema.  Underlying pertinent pmhx is COPD (on home O2 at night only) and HTN.  Onset 8 days ago, b/l LE edema, extending above the knees and associated with RUQ tenderness on palpation at the time. Denies orthopnea, states that she gets dyspneic at baseline walking one block. States that over the past week her dyspnea "might have been a little worse". Checks weight daily, baseline weight is 121lbs, and with edema it was 131lbs. Was prescribed lasix 20mg PO BID by PMD one week ago and now LE edema resolved, weight down to 119lbs. Denies chest discomfort on ambulation, occupation involves working at a desk. Previous TSH w/in 4 months is normal, recent CMP/CBC normal, not anemic and LFTs unimpressive.  NO previous cardiac hx, no CHF, TTE, ACS/CAD. Family hx, MI in mother.     Atleast 50 pack year hx of smoking and currently smokes 1ppd.  EKG: Left atrial enlargement, normal sinus rhythm.  CXR: mild pulmonary edema     ROS:    Review of Systems   All other systems reviewed and are negative.    PMH:     Past Medical History:   Diagnosis Date    COPD (chronic obstructive pulmonary disease)     Hypertension      Past Surgical History:   Procedure Laterality Date    BREAST BIOPSY Right     core     SECTION       Allergies:     Review of patient's allergies indicates:   Allergen Reactions    No known drug allergies      Medications:     Current Outpatient Prescriptions on File Prior to Visit   Medication Sig Dispense Refill    albuterol (PROAIR HFA) 90 mcg/actuation inhaler TAKE 2 PUFFS BY MOUTH EVERY 6 HOURS AS NEEDED 8.5 g 9    albuterol-ipratropium 2.5mg-0.5mg/3mL (DUO-NEB) 0.5 mg-3 mg(2.5 mg base)/3 mL nebulizer solution Take 3 mLs by nebulization every 4 (four) hours as needed for Wheezing or Shortness of Breath. 60 vial 6    colchicine 0.6 mg tablet " "2 tabs when you get the pills then 1 one hour later 3 tablet 3    furosemide (LASIX) 20 MG tablet Take 1 tablet (20 mg total) by mouth 2 (two) times daily. 60 tablet 11    ipratropium-albuterol (COMBIVENT)  mcg/actuation inhaler Inhale 1 puff into the lungs 4 (four) times daily. Rescue 1 Package 6    lisinopril-hydrochlorothiazide (PRINZIDE,ZESTORETIC) 20-12.5 mg per tablet TAKE 1 TABLET BY MOUTH DAILY 30 tablet 9    OMEPRAZOLE (PRILOSEC ORAL) Take by mouth.      potassium chloride SA (K-DUR,KLOR-CON) 20 MEQ tablet Take 1 tablet (20 mEq total) by mouth once daily. 30 tablet 11    triamcinolone acetonide 0.5% (KENALOG) 0.5 % Crea Apply topically 2 (two) times daily. 15 g 0    [DISCONTINUED] citalopram (CELEXA) 10 MG tablet Take 1 tablet (10 mg total) by mouth once daily. 30 tablet 11    [DISCONTINUED] fluticasone (FLONASE) 50 mcg/actuation nasal spray 1 spray (50 mcg total) by Each Nare route 2 (two) times daily. 1 Bottle 0    [DISCONTINUED] methocarbamol (ROBAXIN) 750 MG Tab Take 1 tablet (750 mg total) by mouth nightly as needed. As needed for muscle spasms 30 tablet 0     No current facility-administered medications on file prior to visit.      Social History:     Social History   Substance Use Topics    Smoking status: Current Every Day Smoker     Packs/day: 1.00     Years: 40.00     Types: Cigarettes    Smokeless tobacco: Current User    Alcohol use Yes      Comment: rosibel lamas     Family History:     Family History   Problem Relation Age of Onset    Heart disease Mother     Heart disease Paternal Uncle      Physical Exam:   /66 (BP Location: Left arm, Patient Position: Sitting, BP Method: Small (Automatic))   Pulse 95   Ht 5' 3" (1.6 m)   Wt 54.3 kg (119 lb 11.4 oz)   LMP 11/21/2013   SpO2 (!) 77%   BMI 21.21 kg/m²      Physical Exam   Constitutional: She is oriented to person, place, and time. She appears well-developed and well-nourished. No distress.   HENT:   Head: " Normocephalic and atraumatic.   Eyes: Right eye exhibits no discharge. Left eye exhibits no discharge. No scleral icterus.   Neck: JVD present.   Cardiovascular: Normal rate, regular rhythm and normal heart sounds.    No murmur heard.  No S3   JVD 10-12cm  Hepatojugular reflux positive   No LE edema      Pulmonary/Chest: She has no wheezes. She has no rales.   Decreased air movement in all lung fields, no rales, no wheezing    Abdominal: Soft. There is no tenderness.   Musculoskeletal: She exhibits no edema.   Neurological: She is alert and oriented to person, place, and time.   Skin: Skin is warm.       Labs:     Lab Results   Component Value Date     02/14/2018    K 4.4 02/14/2018    CL 98 02/14/2018    CO2 36 (H) 02/14/2018    BUN 9 02/14/2018    CREATININE 0.7 02/14/2018    ANIONGAP 8 02/14/2018     Lab Results   Component Value Date    HGBA1C 6.1 02/22/2016     Lab Results   Component Value Date    BNP 18 01/03/2011    Lab Results   Component Value Date    WBC 9.08 02/14/2018    HGB 16.6 (H) 02/14/2018    HCT 49.9 (H) 02/14/2018     02/14/2018    GRAN 5.4 02/14/2018    GRAN 59.6 02/14/2018     Lab Results   Component Value Date    CHOL 178 01/05/2016    HDL 80 (H) 01/05/2016    LDLCALC 73.6 01/05/2016    TRIG 122 01/05/2016          Imaging:         No results found for: EF    EKG: NSR, with left atrial enlargement   Assessment:      1. Bilateral lower extremity edema    2. Tobacco abuse      61 y/o F referred to cardiology to evaluate for heart failure. Symptoms suggestive of heart failure. JVD distention, longstanding HTN and COPD, left atrial enlargement on EKG suggestive of Hf. Differentials include HFpEF, HFrEF, pulmonary HTN/Rv failure. Will get an echo and pursue additional work up thereafter if warranted. If there is depressed EF would pursue ischemia evaluation. Currently appears euvolemic with weight back to baseline.    Plan:   Rochelle was seen today for congestive heart failure, irregular  heart beat and medication management.    Diagnoses and all orders for this visit:    Bilateral lower extremity edema  -     2D Echo w/ Color Flow Doppler; Future  -     B-TYPE NATRIURETIC PEPTIDE; Future    Tobacco abuse    Counselled on tobacco cessation  Continue current dose of Lasix     Follow up in 6 weeks, will bring back at an earlier date if echo findings are concerning.      Signed:  Caridad Rose DO  Cardiology Fellow

## 2018-02-28 ENCOUNTER — HOSPITAL ENCOUNTER (OUTPATIENT)
Dept: CARDIOLOGY | Facility: CLINIC | Age: 63
Discharge: HOME | End: 2018-02-28
Attending: STUDENT IN AN ORGANIZED HEALTH CARE EDUCATION/TRAINING PROGRAM
Payer: COMMERCIAL

## 2018-02-28 DIAGNOSIS — I36.9 NONRHEUMATIC TRICUSPID VALVE DISORDER: ICD-10-CM

## 2018-02-28 DIAGNOSIS — R60.0 BILATERAL LOWER EXTREMITY EDEMA: ICD-10-CM

## 2018-02-28 LAB
DIASTOLIC DYSFUNCTION: YES
ESTIMATED PA SYSTOLIC PRESSURE: 40.72
RETIRED EF AND QEF - SEE NOTES: 60 (ref 55–65)
TRICUSPID VALVE REGURGITATION: ABNORMAL

## 2018-02-28 PROCEDURE — 93306 TTE W/DOPPLER COMPLETE: CPT | Mod: S$GLB,,, | Performed by: INTERNAL MEDICINE

## 2018-02-28 PROCEDURE — 0399T 2D ECHO WITH COLOR FLOW DOPPLER: CPT | Mod: S$GLB,,, | Performed by: INTERNAL MEDICINE

## 2018-03-01 ENCOUNTER — TELEPHONE (OUTPATIENT)
Dept: TRANSPLANT | Facility: CLINIC | Age: 63
End: 2018-03-01

## 2018-03-01 DIAGNOSIS — I50.22 CHRONIC SYSTOLIC CONGESTIVE HEART FAILURE: Primary | ICD-10-CM

## 2018-03-01 DIAGNOSIS — I50.9 CHRONIC HEART FAILURE, UNSPECIFIED HEART FAILURE TYPE: Primary | ICD-10-CM

## 2018-03-01 DIAGNOSIS — I50.9 CHRONIC CONGESTIVE HEART FAILURE, UNSPECIFIED CONGESTIVE HEART FAILURE TYPE: Primary | ICD-10-CM

## 2018-03-05 ENCOUNTER — INITIAL CONSULT (OUTPATIENT)
Dept: TRANSPLANT | Facility: CLINIC | Age: 63
End: 2018-03-05
Payer: COMMERCIAL

## 2018-03-05 VITALS
DIASTOLIC BLOOD PRESSURE: 71 MMHG | HEART RATE: 100 BPM | WEIGHT: 121.69 LBS | SYSTOLIC BLOOD PRESSURE: 106 MMHG | BODY MASS INDEX: 21.56 KG/M2 | HEIGHT: 63 IN

## 2018-03-05 DIAGNOSIS — I27.20 PULMONARY HYPERTENSION: ICD-10-CM

## 2018-03-05 DIAGNOSIS — I10 ESSENTIAL HYPERTENSION: Primary | ICD-10-CM

## 2018-03-05 DIAGNOSIS — Z72.0 TOBACCO ABUSE: ICD-10-CM

## 2018-03-05 PROCEDURE — 99244 OFF/OP CNSLTJ NEW/EST MOD 40: CPT | Mod: S$GLB,,, | Performed by: INTERNAL MEDICINE

## 2018-03-05 PROCEDURE — 99999 PR PBB SHADOW E&M-EST. PATIENT-LVL III: CPT | Mod: PBBFAC,,, | Performed by: INTERNAL MEDICINE

## 2018-03-05 NOTE — PROGRESS NOTES
"Subjective: class 3   Initial evaluation of pulmonary hypertension   HPI:  Ms. Espinoza is a 62 y.o. with a past medical history of COPD ( on home O2 , nightly ) , HTN , Tobacco abuse ( on patch but continues to smoke) here for evaluation . She was seen by the general cardiology clinic due to lower extemity swelling. She states that since starting lasix her lower extremity swelling has improved.  She states that her breathing has improved somewhat . She states that she can walk about a block or two before feeling short of breath . She denies syncope. She sleeps with two pillows , which is stable . She denies PND.         Past Medical History:   Diagnosis Date    COPD (chronic obstructive pulmonary disease)     Hypertension      Past Surgical History:   Procedure Laterality Date    BREAST BIOPSY Right     core     SECTION         Family History   Problem Relation Age of Onset    Heart disease Mother     Heart disease Paternal Uncle        Review of Systems   Constitution: Positive for weight loss. Negative for chills, decreased appetite, fever and weakness.   Cardiovascular: Positive for dyspnea on exertion and leg swelling. Negative for chest pain, irregular heartbeat, orthopnea, palpitations, paroxysmal nocturnal dyspnea and syncope.   Respiratory: Positive for shortness of breath. Negative for cough, sputum production and wheezing.    Skin: Negative for poor wound healing.   Gastrointestinal: Negative for bloating, abdominal pain, constipation, diarrhea, nausea and vomiting.   Psychiatric/Behavioral: Negative for altered mental status.       Objective:   Blood pressure 106/71, pulse 100, height 5' 3" (1.6 m), weight 55.2 kg (121 lb 11.1 oz), last menstrual period 2013.body mass index is 21.56 kg/m².    Physical Exam   Constitutional: She is oriented to person, place, and time. She appears well-developed and well-nourished. No distress.   HENT:   Head: Normocephalic and atraumatic.   Neck: Normal " range of motion. Neck supple. JVD present. No thyromegaly present.   Cardiovascular: Normal rate, regular rhythm and intact distal pulses.  Exam reveals gallop (soft s4). Exam reveals no friction rub.    No murmur heard.  Pulmonary/Chest: Effort normal and breath sounds normal. No respiratory distress. She has no wheezes. She has no rales. She exhibits no tenderness.   Abdominal: Soft. Bowel sounds are normal. She exhibits no distension. There is no tenderness. There is no rebound and no guarding.   Musculoskeletal: She exhibits no edema.   Neurological: She is alert and oriented to person, place, and time. She has normal reflexes.   Skin: Skin is warm and dry. She is not diaphoretic.       Labs:      Chemistry        Component Value Date/Time     02/14/2018 1255    K 4.4 02/14/2018 1255    CL 98 02/14/2018 1255    CO2 36 (H) 02/14/2018 1255    BUN 9 02/14/2018 1255    CREATININE 0.7 02/14/2018 1255    GLU 85 02/14/2018 1255        Component Value Date/Time    CALCIUM 8.6 (L) 02/14/2018 1255    ALKPHOS 51 (L) 02/14/2018 1255    AST 34 02/14/2018 1255    ALT 38 02/14/2018 1255    BILITOT 0.5 02/14/2018 1255    ESTGFRAFRICA >60 02/14/2018 1255    EGFRNONAA >60 02/14/2018 1255          Magnesium   Date Value Ref Range Status   02/13/2016 2.1 1.6 - 2.6 mg/dL Final     Lab Results   Component Value Date    WBC 9.08 02/14/2018    HGB 16.6 (H) 02/14/2018    HCT 49.9 (H) 02/14/2018    MCV 90 02/14/2018     02/14/2018     BNP   Date Value Ref Range Status   02/28/2018 205 (H) 0 - 99 pg/mL Final     Comment:     Values of less than 100 pg/ml are consistent with non-CHF populations.   01/03/2011 18 0 - 99 pg/mL Final     Comment:     Values of less than 100 pg/ml are consistent with non-CHF populations.     No results found for this or any previous visit.    Labs were reviewed with the patient.        TTE:   1 - Right ventricular enlargement with moderately to severely depressed systolic function.     2 - The  quantitative measurements of RV do not reflect the visualized poor RV dysfunction.     3 - Normal left ventricular systolic function (EF 60-65%).     4 - Biatrial enlargement; right >> left.     5 - Mild tricuspid regurgitation.     6 - Pulmonary hypertension. The estimated PA systolic pressure is 41 mmHg.     7 - Intermediate central venous pressure.   Assessment:      1. Essential hypertension    2. Pulmonary hypertension      Plan:     Warm and Dry on exam   Likely WHO Group II , maybe III   PFT  Continue diuresis   6MWT  Labs     RHC with exercise after trip in April if dyspnea persists   SMOKING cessation consult   Follow up in 4 weeks        I have personally taken the history and examined this patient and agree with the resident's note as stated above.    Naomy [email or web address]. MD

## 2018-03-07 ENCOUNTER — HOSPITAL ENCOUNTER (OUTPATIENT)
Dept: PULMONOLOGY | Facility: CLINIC | Age: 63
Discharge: HOME | End: 2018-03-07
Payer: COMMERCIAL

## 2018-03-07 VITALS — HEIGHT: 63 IN | BODY MASS INDEX: 21.26 KG/M2 | WEIGHT: 120 LBS

## 2018-03-07 DIAGNOSIS — I27.20 PULMONARY HYPERTENSION: ICD-10-CM

## 2018-03-07 PROCEDURE — 94618 PULMONARY STRESS TESTING: CPT | Mod: S$GLB,,, | Performed by: INTERNAL MEDICINE

## 2018-03-09 NOTE — PROCEDURES
Rochelle Espinoza is a 62 y.o.  female patient, who presents for a 6 minute walk test ordered by Dhruv Cordova MD.  The diagnosis is Pulmonary Hypertension.  The patient's BMI is 21.3kg/m2. Predicted distance (lower limit of normal) is 383.63 meters.    Test Results:    The test was completed without stopping.  The total time walked was 360 seconds.  During walking, the patient reported:  Dyspnea, Leg pain; Chest tightness.  The patient used supplemental oxygen during repeat testing.     03/07/2018---------Distance: 426.72 meters (1400 feet)     O2 Sat % Supplemental Oxygen Heart Rate Blood Pressure Molina Scale   Pre-exercise  (Resting) 81 % Room Air 98 bpm 107/65 mmHg 0   During Exercise 65 % Room Air 127 bpm 160/84 mmHg 5-6   Post-exercise   82 % Room Air  100 bpm 148/77 mmHg      Recovery Time: 240 seconds    Oxygen Qualification:     O2 Sat % Supplemental Oxygen Heart Rate Blood Pressure Molina Scale   Pre-exercise  (Resting) 93 % 3 L/M  95 bpm  108/78 mmHg 0    During Exercise 93 %  3 L/M  96 bpm  122/76 mmHg 0    Post-exercise   93 %  3 L/M  88 bpm        Performing nurse/tech:  SAKSHI Johnson RRT.      PREVIOUS STUDY:   03/01/2016---------Distance: 426.72 meters (1400 feet)       O2 Sat % Supplemental Oxygen Heart Rate Blood Pressure Molina Scale   Pre-exercise  (Resting) 94 % Room Air 73 bpm 98/67 mmHg 1   During Exercise 93 % Room Air 80 bpm 113/68 mmHg 3   Post-exercise  (Recovery) 95 % Room Air  77 bpm         CLINICAL INTERPRETATION:  Six minute walk distance is 426.72 meters (1400 feet) with heavy dyspnea.  At rest and during exercise, there was significant desaturation while breathing room air.  Both blood pressure and heart rate increased significantly with walking.  The patient reported non-pulmonary symptoms during exercise.  The patient may benefit from using supplemental oxygen.  Since the previous study in March 2016, exercise capacity is unchanged.  Based upon age and body mass index,  exercise capacity is normal.   Oxygen saturation did improve while breathing supplemental oxygen.

## 2018-03-20 ENCOUNTER — CLINICAL SUPPORT (OUTPATIENT)
Dept: SMOKING CESSATION | Facility: CLINIC | Age: 63
End: 2018-03-20
Payer: COMMERCIAL

## 2018-03-20 DIAGNOSIS — F17.210 MODERATE CIGARETTE SMOKER (10-19 PER DAY): Primary | ICD-10-CM

## 2018-03-20 PROCEDURE — 99999 PR PBB SHADOW E&M-EST. PATIENT-LVL I: CPT | Mod: PBBFAC,,,

## 2018-03-20 PROCEDURE — 99404 PREV MED CNSL INDIV APPRX 60: CPT | Mod: S$GLB,,,

## 2018-03-20 RX ORDER — IBUPROFEN 200 MG
1 TABLET ORAL DAILY
Qty: 14 PATCH | Refills: 0 | Status: SHIPPED | OUTPATIENT
Start: 2018-03-20 | End: 2019-01-24

## 2018-03-21 DIAGNOSIS — J20.9 ACUTE BRONCHITIS, UNSPECIFIED ORGANISM: ICD-10-CM

## 2018-03-21 RX ORDER — ALBUTEROL SULFATE 90 UG/1
AEROSOL, METERED RESPIRATORY (INHALATION)
Qty: 8.5 G | Refills: 0 | Status: SHIPPED | OUTPATIENT
Start: 2018-03-21 | End: 2018-04-09 | Stop reason: SDUPTHER

## 2018-04-02 ENCOUNTER — TELEPHONE (OUTPATIENT)
Dept: SMOKING CESSATION | Facility: CLINIC | Age: 63
End: 2018-04-02

## 2018-04-02 NOTE — TELEPHONE ENCOUNTER
Left message about missed group session and about medication regimen. Left my name Argelia Norman and phone number [phone].

## 2018-04-04 ENCOUNTER — OFFICE VISIT (OUTPATIENT)
Dept: CARDIOLOGY | Facility: CLINIC | Age: 63
End: 2018-04-04
Payer: COMMERCIAL

## 2018-04-04 VITALS
HEART RATE: 95 BPM | DIASTOLIC BLOOD PRESSURE: 80 MMHG | BODY MASS INDEX: 21.41 KG/M2 | SYSTOLIC BLOOD PRESSURE: 154 MMHG | HEIGHT: 63 IN | WEIGHT: 120.81 LBS

## 2018-04-04 DIAGNOSIS — I27.20 PULMONARY HYPERTENSION: Primary | ICD-10-CM

## 2018-04-04 DIAGNOSIS — I10 ESSENTIAL HYPERTENSION: ICD-10-CM

## 2018-04-04 PROCEDURE — 99999 PR PBB SHADOW E&M-EST. PATIENT-LVL IV: CPT | Mod: PBBFAC,,, | Performed by: INTERNAL MEDICINE

## 2018-04-04 PROCEDURE — 3079F DIAST BP 80-89 MM HG: CPT | Mod: CPTII,S$GLB,, | Performed by: INTERNAL MEDICINE

## 2018-04-04 PROCEDURE — 99214 OFFICE O/P EST MOD 30 MIN: CPT | Mod: S$GLB,,, | Performed by: INTERNAL MEDICINE

## 2018-04-04 PROCEDURE — 3077F SYST BP >= 140 MM HG: CPT | Mod: CPTII,S$GLB,, | Performed by: INTERNAL MEDICINE

## 2018-04-04 NOTE — PROGRESS NOTES
Cardiology Clinic Note  Reason for Visit: Pulmonary HTN    HPI:   62 year old female with COPD on home O2 at night, recently diagnosed pulmonary hypertension with R heart failure being evaluated by heart failure/PH clinic presents today for follow up. Seen in February with lower extremity swelling, diuresed at home with improved symptoms and had a TTE showing severe RV failure. Referred to PH clinic and is undergoing evaluation including RHC next week. Still feels tired but feels better than before. Has some IRENE but improved. Not taking lasix as she is already on lisinopril-HCTZ. Weight stable.     ROS:    Constitution: Negative for fever, chills, weight loss or gain.   HENT: Negative for sore throat, rhinorrhea, or headache.  Eyes: Negative for blurred or double vision.   Cardiovascular: See above  Pulmonary: Positive for SOB   Gastrointestinal: Negative for abdominal pain, nausea, vomiting, or diarrhea.   : Negative for dysuria.   Neurological: Negative for focal weakness or sensory changes.  PMH:     Past Medical History:   Diagnosis Date    COPD (chronic obstructive pulmonary disease)     Hypertension      Past Surgical History:   Procedure Laterality Date    BREAST BIOPSY Right     core     SECTION       Allergies:     Review of patient's allergies indicates:   Allergen Reactions    No known drug allergies      Medications:     Current Outpatient Prescriptions on File Prior to Visit   Medication Sig Dispense Refill    albuterol (PROAIR HFA) 90 mcg/actuation inhaler TAKE 2 PUFFS BY MOUTH EVERY 6 HOURS AS NEEDED 8.5 g 9    albuterol-ipratropium 2.5mg-0.5mg/3mL (DUO-NEB) 0.5 mg-3 mg(2.5 mg base)/3 mL nebulizer solution Take 3 mLs by nebulization every 4 (four) hours as needed for Wheezing or Shortness of Breath. 60 vial 6    ipratropium-albuterol (COMBIVENT)  mcg/actuation inhaler Inhale 1 puff into the lungs 4 (four) times daily. Rescue 1 Package 6    lisinopril-hydrochlorothiazide  "(PRINZIDE,ZESTORETIC) 20-12.5 mg per tablet TAKE 1 TABLET BY MOUTH DAILY 30 tablet 9    nicotine (NICODERM CQ) 21 mg/24 hr Place 1 patch onto the skin once daily. 14 patch 0    PROAIR HFA 90 mcg/actuation inhaler TAKE 2 PUFFS BY MOUTH EVERY 6 HOURS AS NEEDED 8.5 g 0    furosemide (LASIX) 20 MG tablet Take 1 tablet (20 mg total) by mouth 2 (two) times daily. 60 tablet 11     No current facility-administered medications on file prior to visit.      Social History:     Social History   Substance Use Topics    Smoking status: Current Every Day Smoker     Packs/day: 1.00     Years: 40.00     Types: Cigarettes    Smokeless tobacco: Current User    Alcohol use Yes      Comment: occ beer     Family History:     Family History   Problem Relation Age of Onset    Heart disease Mother     Heart disease Paternal Uncle      Physical Exam:   BP (!) 154/80   Pulse 95   Ht 5' 3" (1.6 m)   Wt 54.8 kg (120 lb 13 oz)   LMP 11/21/2013   BMI 21.40 kg/m²      Constitutional: NAD, conversant  HEENT: Sclera anicteric, PERRLA, EOMI  Neck: + JVD, no carotid bruits  CV: RRR, no murmur, S1/S2 with loud P2 component (+PHTN)  Pulm: CTAB, no wheezes, rales, or ronchi  GI: Abdomen soft, NTND, +BS  Extremities: Trace LE edema, warm and well perfused  Skin: No ecchymosis, erythema, or ulcers  Psych: AOx3, appropriate affect  Neuro: CNII-XII intact, no focal deficits    Labs:     Lab Results   Component Value Date     03/05/2018    K 3.8 03/05/2018    CL 96 03/05/2018    CO2 36 (H) 03/05/2018    BUN 17 03/05/2018    CREATININE 0.8 03/05/2018    ANIONGAP 9 03/05/2018     Lab Results   Component Value Date    HGBA1C 6.1 02/22/2016     Lab Results   Component Value Date     (H) 03/05/2018     (H) 02/28/2018    BNP 18 01/03/2011    Lab Results   Component Value Date    WBC 9.08 02/14/2018    HGB 16.6 (H) 02/14/2018    HCT 49.9 (H) 02/14/2018     02/14/2018    GRAN 5.4 02/14/2018    GRAN 59.6 02/14/2018     Lab " Results   Component Value Date    CHOL 178 01/05/2016    HDL 80 (H) 01/05/2016    LDLCALC 73.6 01/05/2016    TRIG 122 01/05/2016          Imaging:    TTE     1 - Right ventricular enlargement with moderately to severely depressed systolic function.     2 - The quantitative measurements of RV do not reflect the visualized poor RV dysfunction.     3 - Normal left ventricular systolic function (EF 60-65%).     4 - Biatrial enlargement; right >> left.     5 - Mild tricuspid regurgitation.     6 - Pulmonary hypertension. The estimated PA systolic pressure is 41 mmHg.     7 - Intermediate central venous pressure.     EF   Date Value Ref Range Status   02/28/2018 60 55 - 65        Assessment:    Ms. Espinoza is a 62 year old female with COPD on home O2 and newly diagnosed pulmonary HTN being worked up by HTS/PH clinic for advanced therapies who presents today for follow up for her pulmonary hypertension. She is euvolemic on exam today and is due to see PH clinic next week for further workup.      Plan:   #Pulmonary HTN  --continue current therapy  --f/u with HTS/PH clinic next week  --educated to weigh herself daily, if notices weight gain, take lasix until weight returns to normal    #Tobacco abuse  --counseled on cessation  --continue nicotine patch    #HTN  --normally well controlled  --no change today    RTC prn; will undergo RHC next week    Signed:  Aston Ramirez MD  Cardiology Fellow, PGY-5  Pager: [phone]  4/4/2018 2:02 PM

## 2018-04-04 NOTE — PROGRESS NOTES
I have personally interviewed and examined the patient. I have reviewed the notes, assessments and plans performed by Dr Ramirez and I CONCUR with his documentation of Rochelle Espinoza.

## 2018-04-09 ENCOUNTER — OFFICE VISIT (OUTPATIENT)
Dept: TRANSPLANT | Facility: CLINIC | Age: 63
DRG: 190 | End: 2018-04-09
Payer: COMMERCIAL

## 2018-04-09 ENCOUNTER — HOSPITAL ENCOUNTER (OUTPATIENT)
Dept: PULMONOLOGY | Facility: CLINIC | Age: 63
Discharge: HOME | DRG: 190 | End: 2018-04-09
Payer: COMMERCIAL

## 2018-04-09 VITALS
HEIGHT: 63 IN | WEIGHT: 122.81 LBS | SYSTOLIC BLOOD PRESSURE: 179 MMHG | DIASTOLIC BLOOD PRESSURE: 100 MMHG | HEART RATE: 92 BPM | BODY MASS INDEX: 21.76 KG/M2

## 2018-04-09 DIAGNOSIS — I27.20 PULMONARY HYPERTENSION: ICD-10-CM

## 2018-04-09 DIAGNOSIS — J43.1 PANLOBULAR EMPHYSEMA: Primary | ICD-10-CM

## 2018-04-09 DIAGNOSIS — R07.1 CHEST PAIN ON BREATHING: ICD-10-CM

## 2018-04-09 PROBLEM — R60.0 BILATERAL LOWER EXTREMITY EDEMA: Status: RESOLVED | Noted: 2018-02-22 | Resolved: 2018-04-09

## 2018-04-09 LAB
POST FEV1 FVC: 0.57
POST FEV1: 0.54
POST FVC: 0.94
PRE FEV1 FVC: 58
PRE FEV1: 0.52
PRE FVC: 0.89
PREDICTED FEV1 FVC: 80
PREDICTED FEV1: 2.3
PREDICTED FVC: 2.88

## 2018-04-09 PROCEDURE — 94729 DIFFUSING CAPACITY: CPT | Mod: 53,S$GLB,, | Performed by: INTERNAL MEDICINE

## 2018-04-09 PROCEDURE — 3080F DIAST BP >= 90 MM HG: CPT | Mod: CPTII,S$GLB,, | Performed by: INTERNAL MEDICINE

## 2018-04-09 PROCEDURE — 3077F SYST BP >= 140 MM HG: CPT | Mod: CPTII,S$GLB,, | Performed by: INTERNAL MEDICINE

## 2018-04-09 PROCEDURE — 94060 EVALUATION OF WHEEZING: CPT | Mod: S$GLB,,, | Performed by: INTERNAL MEDICINE

## 2018-04-09 PROCEDURE — 99999 PR PBB SHADOW E&M-EST. PATIENT-LVL III: CPT | Mod: PBBFAC,,, | Performed by: INTERNAL MEDICINE

## 2018-04-09 PROCEDURE — 94727 GAS DIL/WSHOT DETER LNG VOL: CPT | Mod: 53,S$GLB,, | Performed by: INTERNAL MEDICINE

## 2018-04-09 PROCEDURE — 99214 OFFICE O/P EST MOD 30 MIN: CPT | Mod: S$GLB,,, | Performed by: INTERNAL MEDICINE

## 2018-04-09 RX ORDER — OMEPRAZOLE 20 MG/1
20 CAPSULE, DELAYED RELEASE ORAL DAILY
Qty: 30 CAPSULE | Refills: 11 | Status: SHIPPED | OUTPATIENT
Start: 2018-04-09 | End: 2018-08-22

## 2018-04-09 NOTE — Clinical Note
HUY reyes mind calling her on Thursday to see if she is any better - plan as follows  Has upcoming trip to [place] next week - would discourage travel if she continues to have chest pain / SOB Consider inpatient evaluation on medicine service  if SOB does not improve in next few days / treatment of COPD

## 2018-04-09 NOTE — PATIENT INSTRUCTIONS
See if resuming zesterotic and using nebulizer as needed help shortness of breath (and chest pain)  IF chest pain occurs at rest or increasing severity / frequency come to ER

## 2018-04-10 ENCOUNTER — HOSPITAL ENCOUNTER (INPATIENT)
Facility: HOSPITAL | Age: 63
LOS: 2 days | Discharge: HOME | DRG: 190 | End: 2018-04-12
Attending: EMERGENCY MEDICINE | Admitting: HOSPITALIST
Payer: COMMERCIAL

## 2018-04-10 ENCOUNTER — TELEPHONE (OUTPATIENT)
Dept: INTERNAL MEDICINE | Facility: CLINIC | Age: 63
End: 2018-04-10

## 2018-04-10 ENCOUNTER — TELEPHONE (OUTPATIENT)
Dept: TRANSPLANT | Facility: CLINIC | Age: 63
End: 2018-04-10

## 2018-04-10 DIAGNOSIS — R09.02 HYPOXIA: ICD-10-CM

## 2018-04-10 DIAGNOSIS — J44.1 OBSTRUCTIVE CHRONIC BRONCHITIS WITH EXACERBATION: ICD-10-CM

## 2018-04-10 DIAGNOSIS — I27.20 PULMONARY HYPERTENSION: ICD-10-CM

## 2018-04-10 DIAGNOSIS — I10 HYPERTENSION: ICD-10-CM

## 2018-04-10 DIAGNOSIS — J44.1 COPD EXACERBATION: Primary | ICD-10-CM

## 2018-04-10 DIAGNOSIS — R07.9 CHEST PAIN: ICD-10-CM

## 2018-04-10 DIAGNOSIS — J42 CHRONIC BRONCHITIS, UNSPECIFIED CHRONIC BRONCHITIS TYPE: ICD-10-CM

## 2018-04-10 DIAGNOSIS — R94.31 PROLONGED Q-T INTERVAL ON ECG: ICD-10-CM

## 2018-04-10 DIAGNOSIS — R06.00 DYSPNEA: ICD-10-CM

## 2018-04-10 PROBLEM — R06.02 SHORTNESS OF BREATH: Status: ACTIVE | Noted: 2018-04-10

## 2018-04-10 LAB
ALBUMIN SERPL BCP-MCNC: 3.3 G/DL
ALLENS TEST: ABNORMAL
ALP SERPL-CCNC: 55 U/L
ALT SERPL W/O P-5'-P-CCNC: 20 U/L
ANION GAP SERPL CALC-SCNC: 8 MMOL/L
AST SERPL-CCNC: 20 U/L
BASOPHILS # BLD AUTO: 0.02 K/UL
BASOPHILS NFR BLD: 0.2 %
BILIRUB SERPL-MCNC: 0.4 MG/DL
BNP SERPL-MCNC: 118 PG/ML
BUN SERPL-MCNC: 8 MG/DL
CALCIUM SERPL-MCNC: 8.9 MG/DL
CHLORIDE SERPL-SCNC: 97 MMOL/L
CO2 SERPL-SCNC: 34 MMOL/L
CREAT SERPL-MCNC: 0.7 MG/DL
DELSYS: ABNORMAL
DIFFERENTIAL METHOD: ABNORMAL
EOSINOPHIL # BLD AUTO: 0.1 K/UL
EOSINOPHIL NFR BLD: 0.7 %
ERYTHROCYTE [DISTWIDTH] IN BLOOD BY AUTOMATED COUNT: 17.7 %
EST. GFR  (AFRICAN AMERICAN): >60 ML/MIN/1.73 M^2
EST. GFR  (NON AFRICAN AMERICAN): >60 ML/MIN/1.73 M^2
GLUCOSE SERPL-MCNC: 93 MG/DL
HCO3 UR-SCNC: 35.1 MMOL/L (ref 24–28)
HCT VFR BLD AUTO: 50.3 %
HGB BLD-MCNC: 16.6 G/DL
IMM GRANULOCYTES # BLD AUTO: 0.02 K/UL
IMM GRANULOCYTES NFR BLD AUTO: 0.2 %
INR PPP: 1.1
LDH SERPL L TO P-CCNC: 0.59 MMOL/L (ref 0.36–1.25)
LYMPHOCYTES # BLD AUTO: 2.6 K/UL
LYMPHOCYTES NFR BLD: 27.8 %
MAGNESIUM SERPL-MCNC: 1.7 MG/DL
MCH RBC QN AUTO: 30.1 PG
MCHC RBC AUTO-ENTMCNC: 33 G/DL
MCV RBC AUTO: 91 FL
MODE: ABNORMAL
MONOCYTES # BLD AUTO: 0.8 K/UL
MONOCYTES NFR BLD: 9 %
NEUTROPHILS # BLD AUTO: 5.7 K/UL
NEUTROPHILS NFR BLD: 62.1 %
NRBC BLD-RTO: 0 /100 WBC
PCO2 BLDA: 62 MMHG (ref 35–45)
PH SMN: 7.36 [PH] (ref 7.35–7.45)
PHOSPHATE SERPL-MCNC: 4.5 MG/DL
PLATELET # BLD AUTO: 171 K/UL
PMV BLD AUTO: 11.7 FL
PO2 BLDA: 43 MMHG (ref 80–100)
POC BE: 10 MMOL/L
POC SATURATED O2: 74 % (ref 95–100)
POC TCO2: 37 MMOL/L (ref 23–27)
POTASSIUM SERPL-SCNC: 3.6 MMOL/L
PROT SERPL-MCNC: 6.1 G/DL
PROTHROMBIN TIME: 11.2 SEC
RBC # BLD AUTO: 5.51 M/UL
SAMPLE: ABNORMAL
SITE: ABNORMAL
SODIUM SERPL-SCNC: 139 MMOL/L
TIME NOTIFIED: 2055
TROPONIN I SERPL DL<=0.01 NG/ML-MCNC: <0.006 NG/ML
WBC # BLD AUTO: 9.21 K/UL

## 2018-04-10 PROCEDURE — 36600 WITHDRAWAL OF ARTERIAL BLOOD: CPT

## 2018-04-10 PROCEDURE — 63600175 PHARM REV CODE 636 W HCPCS: Performed by: EMERGENCY MEDICINE

## 2018-04-10 PROCEDURE — 83880 ASSAY OF NATRIURETIC PEPTIDE: CPT

## 2018-04-10 PROCEDURE — 99900035 HC TECH TIME PER 15 MIN (STAT)

## 2018-04-10 PROCEDURE — 84100 ASSAY OF PHOSPHORUS: CPT

## 2018-04-10 PROCEDURE — 80053 COMPREHEN METABOLIC PANEL: CPT

## 2018-04-10 PROCEDURE — 25000242 PHARM REV CODE 250 ALT 637 W/ HCPCS: Performed by: EMERGENCY MEDICINE

## 2018-04-10 PROCEDURE — 85610 PROTHROMBIN TIME: CPT

## 2018-04-10 PROCEDURE — 94640 AIRWAY INHALATION TREATMENT: CPT

## 2018-04-10 PROCEDURE — 99285 EMERGENCY DEPT VISIT HI MDM: CPT | Mod: 25

## 2018-04-10 PROCEDURE — 84550 ASSAY OF BLOOD/URIC ACID: CPT

## 2018-04-10 PROCEDURE — 84484 ASSAY OF TROPONIN QUANT: CPT

## 2018-04-10 PROCEDURE — 93005 ELECTROCARDIOGRAM TRACING: CPT

## 2018-04-10 PROCEDURE — 96374 THER/PROPH/DIAG INJ IV PUSH: CPT

## 2018-04-10 PROCEDURE — 83605 ASSAY OF LACTIC ACID: CPT

## 2018-04-10 PROCEDURE — 85025 COMPLETE CBC W/AUTO DIFF WBC: CPT

## 2018-04-10 PROCEDURE — 12000002 HC ACUTE/MED SURGE SEMI-PRIVATE ROOM

## 2018-04-10 PROCEDURE — 82803 BLOOD GASES ANY COMBINATION: CPT

## 2018-04-10 PROCEDURE — 93010 ELECTROCARDIOGRAM REPORT: CPT | Mod: ,,, | Performed by: INTERNAL MEDICINE

## 2018-04-10 PROCEDURE — 83735 ASSAY OF MAGNESIUM: CPT

## 2018-04-10 RX ORDER — LISINOPRIL AND HYDROCHLOROTHIAZIDE 12.5; 2 MG/1; MG/1
1 TABLET ORAL DAILY
Qty: 30 TABLET | Refills: 9 | Status: SHIPPED | OUTPATIENT
Start: 2018-04-10 | End: 2018-08-22

## 2018-04-10 RX ORDER — IPRATROPIUM BROMIDE AND ALBUTEROL SULFATE 2.5; .5 MG/3ML; MG/3ML
3 SOLUTION RESPIRATORY (INHALATION)
Status: COMPLETED | OUTPATIENT
Start: 2018-04-10 | End: 2018-04-10

## 2018-04-10 RX ORDER — METHYLPREDNISOLONE SOD SUCC 125 MG
125 VIAL (EA) INJECTION
Status: COMPLETED | OUTPATIENT
Start: 2018-04-10 | End: 2018-04-10

## 2018-04-10 RX ADMIN — IPRATROPIUM BROMIDE AND ALBUTEROL SULFATE 3 ML: .5; 3 SOLUTION RESPIRATORY (INHALATION) at 10:04

## 2018-04-10 RX ADMIN — METHYLPREDNISOLONE SODIUM SUCCINATE 125 MG: 125 INJECTION, POWDER, FOR SOLUTION INTRAMUSCULAR; INTRAVENOUS at 10:04

## 2018-04-10 NOTE — ASSESSMENT & PLAN NOTE
Based on today's exam feel that COPD maybe playing a role  Sounds like she has responded to oral steroids before.  Asked her to see PCP this week (will message and route today's note /  left message with PCP MA)     Curious if she need inhaled steroids

## 2018-04-10 NOTE — PROGRESS NOTES
Subjective: increased SOB since stopping lasix / zestorectic    Patient ID:  Rochelle Espinoza is a 62 y.o. female who presents for follow-up of Pulmonary Hypertension.    HPI  Ms. Espinoza is a 62 y.o. with a past medical history of COPD ( on home O2 , nightly ) , HTN , Tobacco abuse ( on patch but continues to smoke) her for followup on PH workup.  Has had nonchest pain since Jan 2018  Seems to resolve when she uses her nebulizer.   Chest pain is consistently preceded by SOB.  Chest pain never occurs at rest / wakes her up.   Has had some acid reflux of late.  She stopped her lasix late March 2018 and stopped talking her zestorectic last Friday ( about three days ago) Thought she was doing better in terms of [place] last month when I first saw her and she was taking those meds   No edema today with stable clinic weight of 55 kg    CXR Feb 2018  Findings suggesting mild pulmonary edema/CHF pattern, without focal consolidation. Interstitial pneumonia not excluded.    Pulmonary hyperinflation suggesting sequela of underlying COPD.      03/07/2018---------Distance: 426.72 meters (1400 feet)    Will repeat  once chest pain / SOB treated  (See below)     O2 Sat % Supplemental Oxygen Heart Rate Blood Pressure [place]   Scale   Pre-exercise  (Resting) 81 % Room Air 98 bpm 107/65 mmHg 0   During Exercise 65 % Room Air 127 bpm 160/84 mmHg 5-6   Post-exercise   82 % Room Air  100 bpm 148/77 mmHg      PFT's    FEV1 0.52 (23 % predicted)    FVC 0.89 (31% predicted)  FEV1 / FVC 59%   Unable to do lung volumes / DLCO presumable due to dyspnea    Review of Systems   Constitution: Negative for decreased appetite, weight gain and weight loss.   Cardiovascular: Positive for dyspnea on exertion. Negative for chest pain, leg swelling, near-syncope, orthopnea and palpitations.   Respiratory: Positive for shortness of breath. Negative for cough.    Musculoskeletal: Negative for myalgias.   Gastrointestinal: Negative for jaundice.        Objective:   "   Physical Exam   Constitutional: She appears well-developed and well-nourished. No distress.   BP (!) 179/100 (BP Location: Left arm, Patient Position: Sitting, BP Method: Medium (Automatic))   Pulse 92   Ht 5' 3" (1.6 m)   Wt 55.7 kg (122 lb 12.7 oz)   LMP 11/21/2013   BMI 21.75 kg/m²      HENT:   Head: Normocephalic and atraumatic. Head is without abrasion and without contusion.   Right Ear: External ear normal.   Left Ear: External ear normal.   Nose: Nose normal. No epistaxis.   Mouth/Throat: Oropharynx is clear and moist. Mucous membranes are not cyanotic.   Eyes: Conjunctivae and EOM are normal. Pupils are equal, round, and reactive to light.   Neck: Normal range of motion. Neck supple. No tracheal deviation present.   Cardiovascular: Normal rate, regular rhythm, normal heart sounds and normal pulses.  Exam reveals no gallop.    No murmur heard.  Pulmonary/Chest: Effort normal. No stridor. No respiratory distress. She has wheezes (end expiratory wheezing ).           Prolonged expiratory phase   Abdominal: Soft. Normal appearance, normal aorta and bowel sounds are normal. She exhibits no distension. There is no tenderness.   Musculoskeletal: She exhibits no edema or tenderness.   Neurological: She is alert. She has normal strength and normal reflexes. She exhibits normal muscle tone.   Skin: Skin is warm. No rash noted. No erythema.   Psychiatric: She has a normal mood and affect. Her speech is normal and behavior is normal. Judgment and thought content normal. Cognition and memory are normal.           Chemistry        Component Value Date/Time     03/05/2018 1357    K 3.8 03/05/2018 1357    CL 96 03/05/2018 1357    CO2 36 (H) 03/05/2018 1357    BUN 17 03/05/2018 1357    CREATININE 0.8 03/05/2018 1357    GLU 93 03/05/2018 1357        Component Value Date/Time    CALCIUM 9.1 03/05/2018 1357    ALKPHOS 51 (L) 02/14/2018 1255    AST 34 02/14/2018 1255    ALT 38 02/14/2018 1255    BILITOT 0.5 " 02/14/2018 1255    ESTGFRAFRICA >60.0 03/05/2018 1357    EGFRNONAA >60.0 03/05/2018 1357            Magnesium   Date Value Ref Range Status   02/13/2016 2.1 1.6 - 2.6 mg/dL Final       Lab Results   Component Value Date    WBC 9.08 02/14/2018    HGB 16.6 (H) 02/14/2018    HCT 49.9 (H) 02/14/2018    MCV 90 02/14/2018     02/14/2018       No results found for: INR, PROTIME    BNP   Date Value Ref Range Status   03/05/2018 141 (H) 0 - 99 pg/mL Final     Comment:     Values of less than 100 pg/ml are consistent with non-CHF populations.   02/28/2018 205 (H) 0 - 99 pg/mL Final     Comment:     Values of less than 100 pg/ml are consistent with non-CHF populations.   01/03/2011 18 0 - 99 pg/mL Final     Comment:     Values of less than 100 pg/ml are consistent with non-CHF populations.       No results found for: LDH          Assessment:       1. Panlobular emphysema    2. Chest pain on breathing    3. Pulmonary hypertension        WHO Group 3   Functional Class 3     Plan:     Chest pain on breathing  See if resuming zesterotic and using nebulizer as needed help shortness of breath (and chest pain)  IF chest pain occurs at rest or increasing severity / frequency come to ER     Pulmonary hypertension  Will repeat echo after COPD evaluated / treated      COPD (chronic obstructive pulmonary disease)  Based on today's exam feel that COPD maybe playing a role  Sounds like she has responded to oral steroids before.  Asked her to see PCP this week (will message and route today's note /  left message with PCP MA)     Curious if she need inhaled steroids     Has upcoming trip to [place] would discourage travel if she continues to have chest pain / SOB  Consider inpatient evaluation if SOB does not improve in next few days / treatment of COPD       Follow-up in about 3 weeks (around 4/30/2018).  Orders Placed This Encounter    2D echo with color flow doppler    omeprazole (PRILOSEC) 20 MG capsule

## 2018-04-10 NOTE — TELEPHONE ENCOUNTER
----- Message from Maxine Baxter sent at 4/9/2018  4:34 PM CDT -----  Contact: self/[phone]  Patient called in regards needing an early appointment (scheduled for 04/18/18). Please call and advise.       Thank you!!!

## 2018-04-11 ENCOUNTER — TELEPHONE (OUTPATIENT)
Dept: INTERNAL MEDICINE | Facility: CLINIC | Age: 63
End: 2018-04-11

## 2018-04-11 PROBLEM — I27.81 COR PULMONALE, CHRONIC: Status: ACTIVE | Noted: 2018-04-10

## 2018-04-11 LAB
ALBUMIN SERPL BCP-MCNC: 3.3 G/DL
ALP SERPL-CCNC: 56 U/L
ALT SERPL W/O P-5'-P-CCNC: 19 U/L
ANION GAP SERPL CALC-SCNC: 7 MMOL/L
APTT BLDCRRT: 25.7 SEC
AST SERPL-CCNC: 17 U/L
BASOPHILS # BLD AUTO: 0 K/UL
BASOPHILS NFR BLD: 0 %
BILIRUB SERPL-MCNC: 0.4 MG/DL
BUN SERPL-MCNC: 8 MG/DL
CALCIUM SERPL-MCNC: 9 MG/DL
CHLORIDE SERPL-SCNC: 98 MMOL/L
CO2 SERPL-SCNC: 31 MMOL/L
CREAT SERPL-MCNC: 0.7 MG/DL
DIFFERENTIAL METHOD: ABNORMAL
EOSINOPHIL # BLD AUTO: 0 K/UL
EOSINOPHIL NFR BLD: 0 %
ERYTHROCYTE [DISTWIDTH] IN BLOOD BY AUTOMATED COUNT: 17.6 %
EST. GFR  (AFRICAN AMERICAN): >60 ML/MIN/1.73 M^2
EST. GFR  (NON AFRICAN AMERICAN): >60 ML/MIN/1.73 M^2
ESTIMATED AVG GLUCOSE: 103 MG/DL
ESTIMATED PA SYSTOLIC PRESSURE: 36.09
GLUCOSE SERPL-MCNC: 191 MG/DL
HBA1C MFR BLD HPLC: 5.2 %
HCT VFR BLD AUTO: 51.7 %
HGB BLD-MCNC: 16.4 G/DL
IMM GRANULOCYTES # BLD AUTO: 0.02 K/UL
IMM GRANULOCYTES NFR BLD AUTO: 0.3 %
INR PPP: 1
LACTATE SERPL-SCNC: 0.6 MMOL/L
LYMPHOCYTES # BLD AUTO: 0.7 K/UL
LYMPHOCYTES NFR BLD: 10.6 %
MAGNESIUM SERPL-MCNC: 1.7 MG/DL
MCH RBC QN AUTO: 28.8 PG
MCHC RBC AUTO-ENTMCNC: 31.7 G/DL
MCV RBC AUTO: 91 FL
MITRAL VALVE MOBILITY: NORMAL
MITRAL VALVE REGURGITATION: NORMAL
MONOCYTES # BLD AUTO: 0.2 K/UL
MONOCYTES NFR BLD: 2.3 %
NEUTROPHILS # BLD AUTO: 5.7 K/UL
NEUTROPHILS NFR BLD: 86.8 %
NRBC BLD-RTO: 0 /100 WBC
PHOSPHATE SERPL-MCNC: 3.3 MG/DL
PLATELET # BLD AUTO: 193 K/UL
PMV BLD AUTO: 11.1 FL
POTASSIUM SERPL-SCNC: 4.2 MMOL/L
PROCALCITONIN SERPL IA-MCNC: <0.02 NG/ML
PROT SERPL-MCNC: 6.3 G/DL
PROTHROMBIN TIME: 10.6 SEC
RBC # BLD AUTO: 5.69 M/UL
RETIRED EF AND QEF - SEE NOTES: 55 (ref 55–65)
SODIUM SERPL-SCNC: 136 MMOL/L
TRICUSPID VALVE REGURGITATION: NORMAL
URATE SERPL-MCNC: 4.6 MG/DL
WBC # BLD AUTO: 6.58 K/UL

## 2018-04-11 PROCEDURE — 25000003 PHARM REV CODE 250: Performed by: STUDENT IN AN ORGANIZED HEALTH CARE EDUCATION/TRAINING PROGRAM

## 2018-04-11 PROCEDURE — 85730 THROMBOPLASTIN TIME PARTIAL: CPT

## 2018-04-11 PROCEDURE — S4991 NICOTINE PATCH NONLEGEND: HCPCS | Performed by: STUDENT IN AN ORGANIZED HEALTH CARE EDUCATION/TRAINING PROGRAM

## 2018-04-11 PROCEDURE — 99223 1ST HOSP IP/OBS HIGH 75: CPT | Mod: ,,, | Performed by: HOSPITALIST

## 2018-04-11 PROCEDURE — 84145 PROCALCITONIN (PCT): CPT

## 2018-04-11 PROCEDURE — 85025 COMPLETE CBC W/AUTO DIFF WBC: CPT

## 2018-04-11 PROCEDURE — 25000242 PHARM REV CODE 250 ALT 637 W/ HCPCS: Performed by: STUDENT IN AN ORGANIZED HEALTH CARE EDUCATION/TRAINING PROGRAM

## 2018-04-11 PROCEDURE — 83735 ASSAY OF MAGNESIUM: CPT

## 2018-04-11 PROCEDURE — 93306 TTE W/DOPPLER COMPLETE: CPT

## 2018-04-11 PROCEDURE — 36415 COLL VENOUS BLD VENIPUNCTURE: CPT

## 2018-04-11 PROCEDURE — 27000221 HC OXYGEN, UP TO 24 HOURS

## 2018-04-11 PROCEDURE — 94640 AIRWAY INHALATION TREATMENT: CPT

## 2018-04-11 PROCEDURE — 83036 HEMOGLOBIN GLYCOSYLATED A1C: CPT

## 2018-04-11 PROCEDURE — 80053 COMPREHEN METABOLIC PANEL: CPT

## 2018-04-11 PROCEDURE — 83605 ASSAY OF LACTIC ACID: CPT

## 2018-04-11 PROCEDURE — 63600175 PHARM REV CODE 636 W HCPCS: Performed by: STUDENT IN AN ORGANIZED HEALTH CARE EDUCATION/TRAINING PROGRAM

## 2018-04-11 PROCEDURE — 93010 ELECTROCARDIOGRAM REPORT: CPT | Mod: ,,, | Performed by: INTERNAL MEDICINE

## 2018-04-11 PROCEDURE — 85610 PROTHROMBIN TIME: CPT

## 2018-04-11 PROCEDURE — 25000242 PHARM REV CODE 250 ALT 637 W/ HCPCS: Performed by: INTERNAL MEDICINE

## 2018-04-11 PROCEDURE — 94761 N-INVAS EAR/PLS OXIMETRY MLT: CPT

## 2018-04-11 PROCEDURE — 87040 BLOOD CULTURE FOR BACTERIA: CPT | Mod: 59

## 2018-04-11 PROCEDURE — 93306 TTE W/DOPPLER COMPLETE: CPT | Mod: 26,,, | Performed by: INTERNAL MEDICINE

## 2018-04-11 PROCEDURE — 93005 ELECTROCARDIOGRAM TRACING: CPT

## 2018-04-11 PROCEDURE — 11000001 HC ACUTE MED/SURG PRIVATE ROOM

## 2018-04-11 PROCEDURE — 84100 ASSAY OF PHOSPHORUS: CPT

## 2018-04-11 RX ORDER — IBUPROFEN 200 MG
24 TABLET ORAL
Status: DISCONTINUED | OUTPATIENT
Start: 2018-04-11 | End: 2018-04-12 | Stop reason: HOSPADM

## 2018-04-11 RX ORDER — LISINOPRIL AND HYDROCHLOROTHIAZIDE 10; 12.5 MG/1; MG/1
1 TABLET ORAL DAILY
Status: DISCONTINUED | OUTPATIENT
Start: 2018-04-11 | End: 2018-04-12 | Stop reason: HOSPADM

## 2018-04-11 RX ORDER — SODIUM CHLORIDE 0.9 % (FLUSH) 0.9 %
5 SYRINGE (ML) INJECTION
Status: DISCONTINUED | OUTPATIENT
Start: 2018-04-11 | End: 2018-04-12 | Stop reason: HOSPADM

## 2018-04-11 RX ORDER — MOXIFLOXACIN HYDROCHLORIDE 400 MG/1
400 TABLET ORAL DAILY
Status: DISCONTINUED | OUTPATIENT
Start: 2018-04-11 | End: 2018-04-12

## 2018-04-11 RX ORDER — GLUCAGON 1 MG
1 KIT INJECTION
Status: DISCONTINUED | OUTPATIENT
Start: 2018-04-11 | End: 2018-04-12 | Stop reason: HOSPADM

## 2018-04-11 RX ORDER — FLUTICASONE FUROATE AND VILANTEROL 100; 25 UG/1; UG/1
1 POWDER RESPIRATORY (INHALATION) DAILY
Status: DISCONTINUED | OUTPATIENT
Start: 2018-04-11 | End: 2018-04-12 | Stop reason: HOSPADM

## 2018-04-11 RX ORDER — AZITHROMYCIN 250 MG/1
250 TABLET, FILM COATED ORAL DAILY
Status: DISCONTINUED | OUTPATIENT
Start: 2018-04-12 | End: 2018-04-12

## 2018-04-11 RX ORDER — PREDNISONE 20 MG/1
40 TABLET ORAL DAILY
Status: DISCONTINUED | OUTPATIENT
Start: 2018-04-11 | End: 2018-04-12 | Stop reason: HOSPADM

## 2018-04-11 RX ORDER — IPRATROPIUM BROMIDE AND ALBUTEROL SULFATE 2.5; .5 MG/3ML; MG/3ML
3 SOLUTION RESPIRATORY (INHALATION) EVERY 6 HOURS PRN
Status: DISCONTINUED | OUTPATIENT
Start: 2018-04-11 | End: 2018-04-12 | Stop reason: HOSPADM

## 2018-04-11 RX ORDER — AZITHROMYCIN 250 MG/1
500 TABLET, FILM COATED ORAL ONCE
Status: COMPLETED | OUTPATIENT
Start: 2018-04-11 | End: 2018-04-11

## 2018-04-11 RX ORDER — IBUPROFEN 200 MG
1 TABLET ORAL DAILY
Status: DISCONTINUED | OUTPATIENT
Start: 2018-04-11 | End: 2018-04-12 | Stop reason: HOSPADM

## 2018-04-11 RX ORDER — ENOXAPARIN SODIUM 100 MG/ML
40 INJECTION SUBCUTANEOUS EVERY 24 HOURS
Status: DISCONTINUED | OUTPATIENT
Start: 2018-04-11 | End: 2018-04-12 | Stop reason: HOSPADM

## 2018-04-11 RX ORDER — PANTOPRAZOLE SODIUM 40 MG/1
40 TABLET, DELAYED RELEASE ORAL DAILY
Status: DISCONTINUED | OUTPATIENT
Start: 2018-04-11 | End: 2018-04-12 | Stop reason: HOSPADM

## 2018-04-11 RX ORDER — ACETAMINOPHEN 325 MG/1
650 TABLET ORAL EVERY 4 HOURS PRN
Status: DISCONTINUED | OUTPATIENT
Start: 2018-04-11 | End: 2018-04-12 | Stop reason: HOSPADM

## 2018-04-11 RX ORDER — IPRATROPIUM BROMIDE AND ALBUTEROL SULFATE 2.5; .5 MG/3ML; MG/3ML
3 SOLUTION RESPIRATORY (INHALATION)
Status: DISCONTINUED | OUTPATIENT
Start: 2018-04-11 | End: 2018-04-12 | Stop reason: HOSPADM

## 2018-04-11 RX ORDER — IBUPROFEN 200 MG
16 TABLET ORAL
Status: DISCONTINUED | OUTPATIENT
Start: 2018-04-11 | End: 2018-04-12 | Stop reason: HOSPADM

## 2018-04-11 RX ADMIN — PANTOPRAZOLE SODIUM 40 MG: 40 TABLET, DELAYED RELEASE ORAL at 09:04

## 2018-04-11 RX ADMIN — IPRATROPIUM BROMIDE AND ALBUTEROL SULFATE 3 ML: .5; 3 SOLUTION RESPIRATORY (INHALATION) at 03:04

## 2018-04-11 RX ADMIN — AZITHROMYCIN 500 MG: 250 TABLET, FILM COATED ORAL at 03:04

## 2018-04-11 RX ADMIN — PREDNISONE 40 MG: 20 TABLET ORAL at 09:04

## 2018-04-11 RX ADMIN — NICOTINE 1 PATCH: 21 PATCH, EXTENDED RELEASE TRANSDERMAL at 09:04

## 2018-04-11 RX ADMIN — ENOXAPARIN SODIUM 40 MG: 100 INJECTION SUBCUTANEOUS at 09:04

## 2018-04-11 RX ADMIN — FLUTICASONE FUROATE AND VILANTEROL TRIFENATATE 1 PUFF: 100; 25 POWDER RESPIRATORY (INHALATION) at 12:04

## 2018-04-11 RX ADMIN — IPRATROPIUM BROMIDE AND ALBUTEROL SULFATE 3 ML: .5; 3 SOLUTION RESPIRATORY (INHALATION) at 08:04

## 2018-04-11 RX ADMIN — LISINOPRIL AND HYDROCHLOROTHIAZIDE 1 TABLET: 12.5; 1 TABLET ORAL at 09:04

## 2018-04-11 RX ADMIN — IPRATROPIUM BROMIDE AND ALBUTEROL SULFATE 3 ML: .5; 3 SOLUTION RESPIRATORY (INHALATION) at 10:04

## 2018-04-11 NOTE — SUBJECTIVE & OBJECTIVE
Past Medical History:   Diagnosis Date    CHF (congestive heart failure)     COPD (chronic obstructive pulmonary disease)     Hypertension     Leg edema     Pulmonary hypertension        Past Surgical History:   Procedure Laterality Date    BREAST BIOPSY Right     core     SECTION         Review of patient's allergies indicates:   Allergen Reactions    No known drug allergies        No current facility-administered medications on file prior to encounter.      Current Outpatient Prescriptions on File Prior to Encounter   Medication Sig    albuterol (PROAIR HFA) 90 mcg/actuation inhaler TAKE 2 PUFFS BY MOUTH EVERY 6 HOURS AS NEEDED    albuterol-ipratropium 2.5mg-0.5mg/3mL (DUO-NEB) 0.5 mg-3 mg(2.5 mg base)/3 mL nebulizer solution Take 3 mLs by nebulization every 4 (four) hours as needed for Wheezing or Shortness of Breath.    furosemide (LASIX) 20 MG tablet Take 1 tablet (20 mg total) by mouth 2 (two) times daily.    lisinopril-hydrochlorothiazide (PRINZIDE,ZESTORETIC) 20-12.5 mg per tablet Take 1 tablet by mouth once daily.    ipratropium-albuterol (COMBIVENT)  mcg/actuation inhaler Inhale 1 puff into the lungs 4 (four) times daily. Rescue    nicotine (NICODERM CQ) 21 mg/24 hr Place 1 patch onto the skin once daily.    omeprazole (PRILOSEC) 20 MG capsule Take 1 capsule (20 mg total) by mouth once daily.     Family History     Problem Relation (Age of Onset)    Heart disease Mother, Paternal Uncle        Social History Main Topics    Smoking status: Current Every Day Smoker     Packs/day: 1.00     Years: 40.00     Types: Cigarettes    Smokeless tobacco: Current User    Alcohol use Yes      Comment: beer daily 2-3 daily, none today    Drug use: No    Sexual activity: Not Currently     Birth control/ protection: None     Review of Systems   Constitutional: Negative for activity change and appetite change.   HENT: Negative for dental problem.    Respiratory: Positive for cough, shortness  of breath and wheezing. Negative for apnea.    Cardiovascular: Negative for chest pain, palpitations and leg swelling.   Gastrointestinal: Negative for abdominal pain, constipation and diarrhea.   Genitourinary: Negative for difficulty urinating.   Musculoskeletal: Negative for arthralgias and back pain.   Neurological: Negative for weakness.   Psychiatric/Behavioral: Negative for agitation.     Objective:     Vital Signs (Most Recent):  Temp: 98.5 °F (36.9 °C) (04/10/18 1901)  Pulse: 85 (04/10/18 2301)  Resp: 20 (04/10/18 2230)  BP: (!) 152/88 (04/10/18 2301)  SpO2: (!) 92 % (04/10/18 2302) Vital Signs (24h Range):  Temp:  [98.5 °F (36.9 °C)] 98.5 °F (36.9 °C)  Pulse:  [83-93] 85  Resp:  [18-29] 20  SpO2:  [80 %-96 %] 92 %  BP: (152-169)/(87-92) 152/88     Weight: 54.4 kg (120 lb)  Body mass index is 21.26 kg/m².    Physical Exam   Constitutional: She is oriented to person, place, and time. No distress. She is not intubated.   HENT:   Head: Normocephalic.   Eyes: Right eye exhibits no discharge. Left eye exhibits no discharge.   Neck: Normal range of motion. No JVD present.   Cardiovascular: Normal rate and regular rhythm.    Pulmonary/Chest: No accessory muscle usage. No apnea and no tachypnea. She is not intubated. No respiratory distress.           Decrease air entry in bilateral lower lung fields    Abdominal: Soft. She exhibits no distension. There is no tenderness.   Musculoskeletal: Normal range of motion.   Neurological: She is alert and oriented to person, place, and time.   Skin: Skin is warm. She is not diaphoretic.   Vitals reviewed.          Significant Labs:   CBC:   Recent Labs  Lab 04/10/18  2039   WBC 9.21   HGB 16.6*   HCT 50.3*        CMP:   Recent Labs  Lab 04/10/18  2039      K 3.6   CL 97   CO2 34*   GLU 93   BUN 8   CREATININE 0.7   CALCIUM 8.9   PROT 6.1   ALBUMIN 3.3*   BILITOT 0.4   ALKPHOS 55   AST 20   ALT 20   ANIONGAP 8   EGFRNONAA >60.0     Cardiac Markers:   Recent  Labs  Lab 04/10/18  2039   *       Significant Imaging: I have reviewed all pertinent imaging results/findings within the past 24 hours.

## 2018-04-11 NOTE — TELEPHONE ENCOUNTER
----- Message from Naomy Saini MD sent at 4/10/2018  5:52 PM CDT -----  Thanks for helping with her   Looks like u are seeing her 8 days from now on 4/18  Any way u can see her this week? If not can some else see her ?  See my note from this week

## 2018-04-11 NOTE — NURSING
Received call from telemetry that stated pt sats were sustained at 82%  Told them pt was in echo, staff stated that I was responsible to notify echo.  Ingrid in echo notified

## 2018-04-11 NOTE — PLAN OF CARE
Problem: Fall Risk (Adult)  Goal: Identify Related Risk Factors and Signs and Symptoms  Related risk factors and signs and symptoms are identified upon initiation of Human Response Clinical Practice Guideline (CPG)   Outcome: Ongoing (interventions implemented as appropriate)   04/11/18 1401   Fall Risk   Related Risk Factors (Fall Risk) depression/anxiety;fear of falling;objects hard to reach;inadequate lighting;environment unfamiliar   Signs and Symptoms (Fall Risk) presence of risk factors

## 2018-04-11 NOTE — ED NOTES
Patient identifiers verified and correct for MS Espinoza  C/C: SOB CP  APPEARANCE: awake and alert in NAD.  SKIN: warm, dry and intact. No breakdown or bruising.  MUSCULOSKELETAL: Patient moving all extremities spontaneously, no obvious swelling or deformities noted. Ambulates with assist  RESPIRATORY: Positive SOB, positive cough with clear secretions, on 3 lpm nc, Breaths Sounds decr t/o coarse right  CARDIAC: Positive mid sternal  CP, 2+ distal pulses;1-2+ pedal edema  ABDOMEN: S/ND/NT, Denies nausea  : voids spontaneously, denies difficulty  Neurologic: AAO x 4; follows commands equal strength in all extremities; denies numbness/tingling. Positive lightheaded roslyn upon bending over, positive weakness

## 2018-04-11 NOTE — ED TRIAGE NOTES
Patient states she went to cardiologist yesterday with CP and SOB, states h/o COPD, on home O2 at 3 Lpm, new diagnosis of CHF. Concerned today about SOB. Denies fever, positive cough. Has not taken Lasix in 1 week because she is on HCTZ

## 2018-04-11 NOTE — PROVIDER PROGRESS NOTES - EMERGENCY DEPT.
Encounter Date: 4/10/2018    ED Physician Progress Notes        Physician Note:   62 y.o. female presents because of increasing shortness of breath to the point today of being bed bound on oxygen. Pt attempts to work but has been having more difficulty recently. Pt is followed by Dr. Saini in the heart failure clinic and was seen by him yesterday. Pt is concerned about her increasing need for oxygen. She does smoke and has diagnosis of COPD but also has diagnosis of pulmonary HTN. Pt will need evaluation in the ED with blood work and ABG on room air and will need cardiology to see the pt.     I initially evaluated this patient and ordered workup while in intake.  The patient will receive a full evaluation in an ED pod when space is available.  All results from tests ordered in intake will not be followed by the intake team, including myself. All results will be followed by the ED Pod team.    I, Ryan Green, am scribing for, and in the presence of, Dr. Reynolds. I performed the above scribed service and the documentation accurately describes the services I performed. I attest to the accuracy of the note.

## 2018-04-11 NOTE — ASSESSMENT & PLAN NOTE
- She is hypertensive currently, and she is taking lisinopril-hydrochlorothiazide at home   - Stable problem, no acute change, continue current medication.

## 2018-04-11 NOTE — ASSESSMENT & PLAN NOTE
- Known to have chronic history of COPD/Chronic bronchitis and she chronic CO2 retainer (her last pCO2 before this admission 51)  - pH is normal 7.36 and she is compensating appropriately with HCO3 elevation, no indication for advancing breathing for oxygen delivery   - Stable on 3 L Nasal canula and O2 sat on target (>88% for known long standing COPD)

## 2018-04-11 NOTE — ED NOTES
Patient placed on portable telemetry and pulse oximetry monitoring. [facility] room notified and update on room destination. [facility] room verified they can see pt on monitor.

## 2018-04-11 NOTE — ED PROVIDER NOTES
Encounter Date: 4/10/2018    SCRIBE #1 NOTE: I, Mini Gibbs, am scribing for, and in the presence of,  Dr. Henley. I have scribed the following portions of the note - Other sections scribed: HPI, ROS, PE.       History     Chief Complaint   Patient presents with    Shortness of Breath     Pt states that she has a hx of COPD and has been SOB for the past week with productive cough with clear sputum and congestion. Denies fevers.     Chest Pain     Pt states intermittent chest pain starting last week also. She states she seen her cardiologist yesterday. Pt has hx of heart failure. Pt also c/o generalized weakness.      Time seen by provider: 9:15 PM    This is a 62 y.o. female with medical problems including HTN, COPD, CHF, and HTN who presents with complaint of progressively worsening SOB for 1 month. SOB has been getting worse after being diagnosed with CHF 1 month ago and became more severe over the past week. She also reports leg swelling up into the abdomen for which she has already been given diuretics. No abdominal pain. She admits to eating processed and fried foods in the past couple of days.       The history is provided by the patient and medical records.     Review of patient's allergies indicates:   Allergen Reactions    No known drug allergies      Past Medical History:   Diagnosis Date    CHF (congestive heart failure)     COPD (chronic obstructive pulmonary disease)     Hypertension     Leg edema     Pulmonary hypertension      Past Surgical History:   Procedure Laterality Date    BREAST BIOPSY Right     core     SECTION       Family History   Problem Relation Age of Onset    Heart disease Mother     Heart disease Paternal Uncle      Social History   Substance Use Topics    Smoking status: Current Every Day Smoker     Packs/day: 1.00     Years: 40.00     Types: Cigarettes    Smokeless tobacco: Current User    Alcohol use Yes      Comment: beer daily 2-3 daily, none today      Review of Systems   Respiratory: Positive for shortness of breath.    Cardiovascular: Positive for leg swelling.   Gastrointestinal: Negative for abdominal pain.   All other systems reviewed and are negative.      Physical Exam     Initial Vitals [04/10/18 1901]   BP Pulse Resp Temp SpO2   (!) 156/92 93 (!) 22 98.5 °F (36.9 °C) (!) 80 %      MAP       113.33         Physical Exam    Nursing note and vitals reviewed.  Constitutional: She appears well-developed and well-nourished.   HENT:   Head: Normocephalic and atraumatic.   Mouth/Throat: Oropharynx is clear and moist.   Eyes: EOM are normal.   Neck: Normal range of motion. Neck supple. No JVD present.   Cardiovascular: Regular rhythm and normal heart sounds.   Trace le edema.    Pulmonary/Chest:   Wheezes and rales bilaterally.    Abdominal: Soft. Bowel sounds are normal.   Musculoskeletal: Normal range of motion.   Neurological: She is alert and oriented to person, place, and time. She has normal strength.   Skin: Skin is warm.         ED Course   Procedures  Labs Reviewed   CBC W/ AUTO DIFFERENTIAL - Abnormal; Notable for the following:        Result Value    RBC 5.51 (*)     Hemoglobin 16.6 (*)     Hematocrit 50.3 (*)     RDW 17.7 (*)     All other components within normal limits   COMPREHENSIVE METABOLIC PANEL - Abnormal; Notable for the following:     CO2 34 (*)     Albumin 3.3 (*)     All other components within normal limits   B-TYPE NATRIURETIC PEPTIDE - Abnormal; Notable for the following:      (*)     All other components within normal limits   ISTAT PROCEDURE - Abnormal; Notable for the following:     POC PCO2 62.0 (*)     POC PO2 43 (*)     POC HCO3 35.1 (*)     POC SATURATED O2 74 (*)     POC TCO2 37 (*)     All other components within normal limits   TROPONIN I   PROTIME-INR   MAGNESIUM   PHOSPHORUS             Medical Decision Making:   History:   Old Medical Records: I decided to obtain old medical records.  Clinical Tests:   Lab Tests:  Ordered and Reviewed  Radiological Study: Ordered and Reviewed  Medical Tests: Reviewed and Ordered  Other:   I have discussed this case with another health care provider.       <> Summary of the Discussion: Cardiology   Suspect dietary noncompliance. Had been eating hot dogs and fried foods - hidden salt. Had normal bnp. Will check cxr.     Eval by cardiology. Less c/w chf, more likely copd exacerbation. Will treat with nebs.     Saw pulmonary yesterday and given nebs, but failed outpatient therapy. Had been using o2 during day. Will admit.             Scribe Attestation:   Scribe #1: I performed the above scribed service and the documentation accurately describes the services I performed. I attest to the accuracy of the note.               Clinical Impression:   Diagnoses of Chest pain and Dyspnea were pertinent to this visit.                           Zeb Henley MD  04/10/18 5969

## 2018-04-11 NOTE — PLAN OF CARE
Problem: Patient Care Overview  Goal: Plan of Care Review  Outcome: Ongoing (interventions implemented as appropriate)  Reviewed plan of care with patient, patient with O2 sats 88-94% on 5LPM O2. When patient off oxygen, patient quickly desats to 77%. Provider aware, PRN breathing treatments ordered, minimally effective. Otherwise no acute events thus far, see flowsheets for detailed assessment information, will continue to monitor.

## 2018-04-11 NOTE — ASSESSMENT & PLAN NOTE
Based on history and physical, appears most consistent with COPD exacerbation, ABG with chronic CO2 retention  Not significantly overloaded on exam   HTN(+) may also be responsible for some of her symptoms    Admit to Internal Medicine

## 2018-04-11 NOTE — ASSESSMENT & PLAN NOTE
RV dysfunction is known and currently undergoing PH workup  Does not look overloaded on exam  Once respiratory status has improved and COPD exacerbation treated, consider repeat echo and HTS consult as needed

## 2018-04-11 NOTE — HPI
This is Ms. Rochelle Espinoza, 62 year old female with PMHx significant for Hypertension, COPD Chronic Bronchitis on home oxygen, active smoking status, Gout involving toe of left foot who presented with shortness of breath and acute on chronic respiratory failure with hypoxia and hypercapnia. Her shortness of breath was long standing; however for the last two weeks her SOB was progressively worsen. Her baseline WHO Functional Assessment for Pulmonary Hypertension is around Class II (Patients with pulmonary hypertension resulting in a slight limitation of physical activity). However for the last couple of weeks, her functional status worsen to Class III and occasionally class IV, she also endorsed Orthopnea (two pillows minimum) and occasional PND once or twice weekly. Prior her presentation, she had complain of chest pain, located in the center of her heart, 4/10 and was aching in nature, and the pain resolved after she took her Nebs (albuterol). She also has chronic long standing cough, mostly dry in nature, however, since her SOB worsen, her cough became productive and associated with whitish sputum. She denies fever, sick contact, recent travel (she plan to go to [facility] for her job, but she did not go). Of note, no previous right heart cath to identify the etiology of her pulmonary hypertension and suppose to have repeated 2D echo per Dr. Parveen khan.

## 2018-04-11 NOTE — ASSESSMENT & PLAN NOTE
- Long standing COPD/Chronic Bronchitis most likey due to her long history of smoking and she still actively smoking   - PFT's showed FEV1 / FVC 59% / FEV1 0.52 (23 % predicted) which reflect severe disease   - On home oxygen use it mostly on night, and her presentation is worsening SOB and found to be profoundly hypoxic on presentation.   - ABG was done during ambulation and that's why her pO2 is low, currently she endorsed better symptomatology  - Continue Duo Nebs, Prednisone 40 mg PO for the next 4 days and no acute indication for antibiotics at his point   - She needs a lokesh step for smoking cessation otherwise her underlying COPD/Chronic bronchitis and pulmonary hypertension will worsened

## 2018-04-11 NOTE — SUBJECTIVE & OBJECTIVE
Past Medical History:   Diagnosis Date    CHF (congestive heart failure)     COPD (chronic obstructive pulmonary disease)     Hypertension     Leg edema     Pulmonary hypertension        Past Surgical History:   Procedure Laterality Date    BREAST BIOPSY Right     core     SECTION         Review of patient's allergies indicates:   Allergen Reactions    No known drug allergies        No current facility-administered medications on file prior to encounter.      Current Outpatient Prescriptions on File Prior to Encounter   Medication Sig    albuterol (PROAIR HFA) 90 mcg/actuation inhaler TAKE 2 PUFFS BY MOUTH EVERY 6 HOURS AS NEEDED    albuterol-ipratropium 2.5mg-0.5mg/3mL (DUO-NEB) 0.5 mg-3 mg(2.5 mg base)/3 mL nebulizer solution Take 3 mLs by nebulization every 4 (four) hours as needed for Wheezing or Shortness of Breath.    furosemide (LASIX) 20 MG tablet Take 1 tablet (20 mg total) by mouth 2 (two) times daily.    lisinopril-hydrochlorothiazide (PRINZIDE,ZESTORETIC) 20-12.5 mg per tablet Take 1 tablet by mouth once daily.    ipratropium-albuterol (COMBIVENT)  mcg/actuation inhaler Inhale 1 puff into the lungs 4 (four) times daily. Rescue    nicotine (NICODERM CQ) 21 mg/24 hr Place 1 patch onto the skin once daily.    omeprazole (PRILOSEC) 20 MG capsule Take 1 capsule (20 mg total) by mouth once daily.    [DISCONTINUED] lisinopril-hydrochlorothiazide (PRINZIDE,ZESTORETIC) 20-12.5 mg per tablet TAKE 1 TABLET BY MOUTH DAILY     Family History     Problem Relation (Age of Onset)    Heart disease Mother, Paternal Uncle        Social History Main Topics    Smoking status: Current Every Day Smoker     Packs/day: 1.00     Years: 40.00     Types: Cigarettes    Smokeless tobacco: Current User    Alcohol use Yes      Comment: beer daily 2-3 daily, none today    Drug use: No    Sexual activity: Not Currently     Birth control/ protection: None     Review of Systems   Constitution: Negative  for chills, diaphoresis, fever and weight loss.   HENT: Negative for congestion, hoarse voice and sore throat.    Eyes: Negative for blurred vision and double vision.   Cardiovascular: Positive for chest pain, dyspnea on exertion and orthopnea. Negative for leg swelling.   Respiratory: Positive for cough and shortness of breath.    Endocrine: Negative for cold intolerance and heat intolerance.   Hematologic/Lymphatic: Does not bruise/bleed easily.   Gastrointestinal: Negative for abdominal pain, constipation, diarrhea, nausea and vomiting.   Genitourinary: Negative for dysuria.   Neurological: Negative for focal weakness and sensory change.     Objective:     Vital Signs (Most Recent):  Temp: 98.5 °F (36.9 °C) (04/10/18 1901)  Pulse: 85 (04/10/18 2102)  Resp: 20 (04/10/18 2125)  BP: (!) 163/89 (04/10/18 2102)  SpO2: 95 % (04/10/18 2102) Vital Signs (24h Range):  Temp:  [98.5 °F (36.9 °C)] 98.5 °F (36.9 °C)  Pulse:  [83-93] 85  Resp:  [20-29] 20  SpO2:  [80 %-95 %] 95 %  BP: (156-169)/(89-92) 163/89     Weight: 54.4 kg (120 lb)  Body mass index is 21.26 kg/m².    SpO2: 95 %  O2 Device (Oxygen Therapy): nasal cannula    No intake or output data in the 24 hours ending 04/10/18 2200    Lines/Drains/Airways     Peripheral Intravenous Line                 Peripheral IV - Single Lumen 04/10/18 1959 Left Antecubital less than 1 day                Physical Exam   Constitutional: She is oriented to person, place, and time. She appears well-developed and well-nourished.   HENT:   Head: Normocephalic and atraumatic.   Eyes: EOM are normal.   Neck: Hepatojugular reflux present. No JVD present.   Cardiovascular: Normal rate, regular rhythm and normal heart sounds.  Exam reveals no gallop and no friction rub.    No murmur heard.  Pulses:       Radial pulses are 2+ on the right side, and 2+ on the left side.        Femoral pulses are 2+ on the right side, and 2+ on the left side.       Popliteal pulses are 2+ on the right side, and  2+ on the left side.        Dorsalis pedis pulses are 2+ on the right side, and 2+ on the left side.        Posterior tibial pulses are 2+ on the right side, and 2+ on the left side.   Pulmonary/Chest: Effort normal. No respiratory distress. She has wheezes in the right upper field, the right lower field, the left upper field and the left lower field.   Abdominal: Soft. Bowel sounds are normal. There is no tenderness.   Musculoskeletal: Normal range of motion. She exhibits no edema.   Neurological: She is oriented to person, place, and time.   Skin: Skin is warm and dry.       Significant Labs:   Recent Lab Results       04/10/18  2057 04/10/18  2039      Immature Granulocytes  0.2     Immature Grans (Abs)  0.02  Comment:  Mild elevation in immature granulocytes is non specific and   can be seen in a variety of conditions including stress response,   acute inflammation, trauma and pregnancy. Correlation with other   laboratory and clinical findings is essential.       Time Notifed: 2055      Albumin  3.3(L)     Alkaline Phosphatase  55     Allens Test Pass      ALT  20     Anion Gap  8     AST  20     Baso #  0.02     Basophil%  0.2     Total Bilirubin  0.4  Comment:  For infants and newborns, interpretation of results should be based  on gestational age, weight and in agreement with clinical  observations.  Premature Infant recommended reference ranges:  Up to 24 hours.............<8.0 mg/dL  Up to 48 hours............<12.0 mg/dL  3-5 days..................<15.0 mg/dL  6-29 days.................<15.0 mg/dL       BNP  118  Comment:  Values of less than 100 pg/ml are consistent with non-CHF populations.(H)     Site LR      BUN, Bld  8     Calcium  8.9     Chloride  97     CO2  34(H)     Creatinine  0.7     DelSys Room Air      Differential Method  Automated     eGFR if African American  >60.0     eGFR if non   >60.0  Comment:  Calculation used to obtain the estimated glomerular filtration  rate (eGFR)  is the CKD-EPI equation.        Eos #  0.1     Eosinophil%  0.7     Glucose  93     Gran # (ANC)  5.7     Gran%  62.1     Hematocrit  50.3(H)     Hemoglobin  16.6(H)     Coumadin Monitoring INR  1.1  Comment:  Coumadin Therapy:  2.0 - 3.0 for INR for all indicators except mechanical heart valves  and antiphospholipid syndromes which should use 2.5 - 3.5.       Lymph #  2.6     Lymph%  27.8     Magnesium  1.7     MCH  30.1     MCHC  33.0     MCV  91     Mode SPONT      Mono #  0.8     Mono%  9.0     MPV  11.7     nRBC  0     Phosphorus  4.5     Platelets  171     POC BE 10      POC HCO3 35.1(H)      POC Lactate 0.59      POC PCO2 62.0(HH)      POC PH 7.362      POC PO2 43(LL)      POC SATURATED O2 74(L)      POC TCO2 37(H)      Potassium  3.6     Total Protein  6.1     Protime  11.2     RBC  5.51(H)     RDW  17.7(H)     Sample ARTERIAL      Sodium  139     Troponin I  <0.006  Comment:  The reference interval for Troponin I represents the 99th percentile   cutoff   for our facility and is consistent with 3rd generation assay   performance.       WBC  9.21           Significant Imaging: Echocardiogram:   2D echo with color flow doppler:   Results for orders placed or performed during the hospital encounter of 02/28/18   2D Echo w/ Color Flow Doppler   Result Value Ref Range    EF 60 55 - 65    Diastolic Dysfunction Yes (A)     Est. PA Systolic Pressure 40.72 (A)     Tricuspid Valve Regurgitation MILD

## 2018-04-11 NOTE — H&P
[facility] Hospital Medicine  History & Physical    Patient Name: Rochelle Espinoza  MRN: [id number]  Admission Date: 4/10/2018  Attending Physician: Zeb Henley MD   Primary Care Provider: Yosi Samuels MD    [facility] Medicine Team: [facility] HOSP MED 3 Shahid Bird MD     Patient information was obtained from patient, EMS personnel and ER records.     Subjective:     Principal Problem:Obstructive chronic bronchitis with exacerbation    Chief Complaint:   Chief Complaint   Patient presents with    Shortness of Breath     Pt states that she has a hx of COPD and has been SOB for the past week with productive cough with clear sputum and congestion. Denies fevers.     Chest Pain     Pt states intermittent chest pain starting last week also. She states she seen her cardiologist yesterday. Pt has hx of heart failure. Pt also c/o generalized weakness.         HPI: This is Ms. Rochelle Espinoza, 62 year old female with PMHx significant for Hypertension, COPD Chronic Bronchitis on home oxygen, active smoking status, Gout involving toe of left foot who presented with shortness of breath and acute on chronic respiratory failure with hypoxia and hypercapnia. Her shortness of breath was long standing; however for the last two weeks her SOB was progressively worsen. Her baseline WHO Functional Assessment for Pulmonary Hypertension is around Class II (Patients with pulmonary hypertension resulting in a slight limitation of physical activity). However for the last couple of weeks, her functional status worsen to Class III and occasionally class IV, she also endorsed Orthopnea (two pillows minimum) and occasional PND once or twice weekly. Prior her presentation, she had complain of chest pain, located in the center of her heart, 4/10 and was aching in nature, and the pain resolved after she took her Nebs (albuterol). She also has chronic long standing cough, mostly dry in nature, however, since her SOB worsen,  her cough became productive and associated with whitish sputum. She denies fever, sick contact, recent travel (she plan to go to [facility] for her job, but she did not go). Of note, no previous right heart cath to identify the etiology of her pulmonary hypertension and suppose to have repeated 2D echo per Dr. Saini plan.     Past Medical History:   Diagnosis Date    CHF (congestive heart failure)     COPD (chronic obstructive pulmonary disease)     Hypertension     Leg edema     Pulmonary hypertension        Past Surgical History:   Procedure Laterality Date    BREAST BIOPSY Right     core     SECTION         Review of patient's allergies indicates:   Allergen Reactions    No known drug allergies        No current facility-administered medications on file prior to encounter.      Current Outpatient Prescriptions on File Prior to Encounter   Medication Sig    albuterol (PROAIR HFA) 90 mcg/actuation inhaler TAKE 2 PUFFS BY MOUTH EVERY 6 HOURS AS NEEDED    albuterol-ipratropium 2.5mg-0.5mg/3mL (DUO-NEB) 0.5 mg-3 mg(2.5 mg base)/3 mL nebulizer solution Take 3 mLs by nebulization every 4 (four) hours as needed for Wheezing or Shortness of Breath.    furosemide (LASIX) 20 MG tablet Take 1 tablet (20 mg total) by mouth 2 (two) times daily.    lisinopril-hydrochlorothiazide (PRINZIDE,ZESTORETIC) 20-12.5 mg per tablet Take 1 tablet by mouth once daily.    ipratropium-albuterol (COMBIVENT)  mcg/actuation inhaler Inhale 1 puff into the lungs 4 (four) times daily. Rescue    nicotine (NICODERM CQ) 21 mg/24 hr Place 1 patch onto the skin once daily.    omeprazole (PRILOSEC) 20 MG capsule Take 1 capsule (20 mg total) by mouth once daily.     Family History     Problem Relation (Age of Onset)    Heart disease Mother, Paternal Uncle        Social History Main Topics    Smoking status: Current Every Day Smoker     Packs/day: 1.00     Years: 40.00     Types: Cigarettes    Smokeless tobacco: Current  User    Alcohol use Yes      Comment: beer daily 2-3 daily, none today    Drug use: No    Sexual activity: Not Currently     Birth control/ protection: None     Review of Systems   Constitutional: Negative for activity change and appetite change.   HENT: Negative for dental problem.    Respiratory: Positive for cough, shortness of breath and wheezing. Negative for apnea.    Cardiovascular: Negative for chest pain, palpitations and leg swelling.   Gastrointestinal: Negative for abdominal pain, constipation and diarrhea.   Genitourinary: Negative for difficulty urinating.   Musculoskeletal: Negative for arthralgias and back pain.   Neurological: Negative for weakness.   Psychiatric/Behavioral: Negative for agitation.     Objective:     Vital Signs (Most Recent):  Temp: 98.5 °F (36.9 °C) (04/10/18 1901)  Pulse: 85 (04/10/18 2301)  Resp: 20 (04/10/18 2230)  BP: (!) 152/88 (04/10/18 2301)  SpO2: (!) 92 % (04/10/18 2302) Vital Signs (24h Range):  Temp:  [98.5 °F (36.9 °C)] 98.5 °F (36.9 °C)  Pulse:  [83-93] 85  Resp:  [18-29] 20  SpO2:  [80 %-96 %] 92 %  BP: (152-169)/(87-92) 152/88     Weight: 54.4 kg (120 lb)  Body mass index is 21.26 kg/m².    Physical Exam   Constitutional: She is oriented to person, place, and time. No distress. She is not intubated.   HENT:   Head: Normocephalic.   Eyes: Right eye exhibits no discharge. Left eye exhibits no discharge.   Neck: Normal range of motion. No JVD present.   Cardiovascular: Normal rate and regular rhythm.    Pulmonary/Chest: No accessory muscle usage. No apnea and no tachypnea. She is not intubated. No respiratory distress.           Decrease air entry in bilateral lower lung fields    Abdominal: Soft. She exhibits no distension. There is no tenderness.   Musculoskeletal: Normal range of motion.   Neurological: She is alert and oriented to person, place, and time.   Skin: Skin is warm. She is not diaphoretic.   Vitals reviewed.          Significant Labs:   CBC:   Recent  Labs  Lab 04/10/18  2039   WBC 9.21   HGB 16.6*   HCT 50.3*        CMP:   Recent Labs  Lab 04/10/18  2039      K 3.6   CL 97   CO2 34*   GLU 93   BUN 8   CREATININE 0.7   CALCIUM 8.9   PROT 6.1   ALBUMIN 3.3*   BILITOT 0.4   ALKPHOS 55   AST 20   ALT 20   ANIONGAP 8   EGFRNONAA >60.0     Cardiac Markers:   Recent Labs  Lab 04/10/18  2039   *       Significant Imaging: I have reviewed all pertinent imaging results/findings within the past 24 hours.    Assessment/Plan:     * Obstructive chronic bronchitis with exacerbation    - Long standing COPD/Chronic Bronchitis most likey due to her long history of smoking and she still actively smoking   - PFT's showed FEV1 / FVC 59% / FEV1 0.52 (23 % predicted) which reflect severe disease   - On home oxygen use it mostly on night, and her presentation is worsening SOB and found to be profoundly hypoxic on presentation.   - ABG was done during ambulation and that's why her pO2 is low, currently she endorsed better symptomatology  - Continue Duo Nebs, Prednisone 40 mg PO for the next 4 days   - Moxiflocacin PO for the next 5 days (meet two of three criteria to start ABx).    - She needs a lokesh step for smoking cessation otherwise her underlying COPD/Chronic bronchitis and pulmonary hypertension will worsened         Pulmonary hypertension    - Most likely Pulmonary Hypertension WHO Class III (secoandry to pulmonary disease)   - Patient of Dr. Saini with last visit coupe of days prior this presentation   - TTE on 2/2018 showed severe RV enlargement and severely depressed systolic function with PA pressure 51 mmHg.  - This reflect advance disease, and she never had Right heart cath to identify the etiology of her Pulmonary HTN (most likely Class III).  - Stable problem, ensure good oxygenation and treat underlying COPD exacerbation   - Repeat 2D echo with color flow doppler to ascertain any change in her PA pressure       Acute on chronic respiratory failure  with hypoxia and hypercapnia    - Known to have chronic history of COPD/Chronic bronchitis and she chronic CO2 retainer (her last pCO2 before this admission 51)  - pH is normal 7.36 and she is compensating appropriately with HCO3 elevation, no indication for advancing breathing for oxygen delivery   - Stable on 3 L Nasal canula and O2 sat on target (>88% for known long standing COPD)         Hypertension    - She is hypertensive currently, and she is taking lisinopril-hydrochlorothiazide at home   - Stable problem, no acute change, continue current medication.         SOB (shortness of breath)    - See principal problem for more elaboration.           VTE Risk Mitigation         Ordered     enoxaparin injection 40 mg  Daily     Route:  Subcutaneous        04/11/18 0014     IP VTE HIGH RISK PATIENT  Once      04/11/18 0014             Shahid Bird MD  Department of Hospital Medicine   [facility]

## 2018-04-11 NOTE — CONSULTS
[facility]  Cardiology  Consult Note    Patient Name: Rochelle Espinoza  MRN: [id number]  Admission Date: 4/10/2018  Hospital Length of Stay: 0 days  Code Status: Prior   Attending Provider: Zeb Henley MD   Consulting Provider: Bryon Chadwick MD  Primary Care Physician: Yosi Samuels MD  Principal Problem:Shortness of breath    Patient information was obtained from patient and ER records.     Inpatient consult to Cardiology  Consult performed by: BRYON CHADWICK  Consult ordered by: PRASHANT GERARDO        Subjective:     Chief Complaint:  Shortness of breath and Chest Pain    HPI:   62 y.o. 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 She was recently seen by Dr. Saini in HTS clinic with similar symptoms that were thought to be related to her underlying COPD due to improvement with nebs. 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 Over the last 2 weeks she notes intermittent chest pain that improves with nebulizer treatment, productive cough and increased mucus production. She denies fever. From a volume standpoint, she notes she has not been taking her combination blood pressure/diuretic pill. Not good about daily weights however notes her baseline two pillow orthopnea to three pillows. She denies leg swelling. She has been admitted for COPD exacerbations in the past however states this feels different some how.    Past Medical History:   Diagnosis Date    CHF (congestive heart failure)     COPD (chronic obstructive pulmonary disease)     Hypertension     Leg edema     Pulmonary hypertension        Past Surgical History:   Procedure Laterality Date    BREAST BIOPSY Right     core     SECTION          Review of patient's allergies indicates:   Allergen Reactions    No known drug allergies        No current facility-administered medications on file prior to encounter.      Current Outpatient Prescriptions on File Prior to Encounter   Medication Sig    albuterol (PROAIR HFA) 90 mcg/actuation inhaler TAKE 2 PUFFS BY MOUTH EVERY 6 HOURS AS NEEDED    albuterol-ipratropium 2.5mg-0.5mg/3mL (DUO-NEB) 0.5 mg-3 mg(2.5 mg base)/3 mL nebulizer solution Take 3 mLs by nebulization every 4 (four) hours as needed for Wheezing or Shortness of Breath.    furosemide (LASIX) 20 MG tablet Take 1 tablet (20 mg total) by mouth 2 (two) times daily.    lisinopril-hydrochlorothiazide (PRINZIDE,ZESTORETIC) 20-12.5 mg per tablet Take 1 tablet by mouth once daily.    ipratropium-albuterol (COMBIVENT)  mcg/actuation inhaler Inhale 1 puff into the lungs 4 (four) times daily. Rescue    nicotine (NICODERM CQ) 21 mg/24 hr Place 1 patch onto the skin once daily.    omeprazole (PRILOSEC) 20 MG capsule Take 1 capsule (20 mg total) by mouth once daily.    [DISCONTINUED] lisinopril-hydrochlorothiazide (PRINZIDE,ZESTORETIC) 20-12.5 mg per tablet TAKE 1 TABLET BY MOUTH DAILY     Family History     Problem Relation (Age of Onset)    Heart disease Mother, Paternal Uncle        Social History Main Topics    Smoking status: Current Every Day Smoker     Packs/day: 1.00     Years: 40.00     Types: Cigarettes    Smokeless tobacco: Current User    Alcohol use Yes      Comment: beer daily 2-3 daily, none today    Drug use: No    Sexual activity: Not Currently     Birth control/ protection: None     Review of Systems   Constitution: Negative for chills, diaphoresis, fever and weight loss.   HENT: Negative for congestion, hoarse voice and sore throat.    Eyes: Negative for blurred vision and double vision.   Cardiovascular: Positive for chest pain, dyspnea on exertion and orthopnea. Negative for leg swelling.   Respiratory: Positive for  cough and shortness of breath.    Endocrine: Negative for cold intolerance and heat intolerance.   Hematologic/Lymphatic: Does not bruise/bleed easily.   Gastrointestinal: Negative for abdominal pain, constipation, diarrhea, nausea and vomiting.   Genitourinary: Negative for dysuria.   Neurological: Negative for focal weakness and sensory change.     Objective:     Vital Signs (Most Recent):  Temp: 98.5 °F (36.9 °C) (04/10/18 1901)  Pulse: 85 (04/10/18 2102)  Resp: 20 (04/10/18 2125)  BP: (!) 163/89 (04/10/18 2102)  SpO2: 95 % (04/10/18 2102) Vital Signs (24h Range):  Temp:  [98.5 °F (36.9 °C)] 98.5 °F (36.9 °C)  Pulse:  [83-93] 85  Resp:  [20-29] 20  SpO2:  [80 %-95 %] 95 %  BP: (156-169)/(89-92) 163/89     Weight: 54.4 kg (120 lb)  Body mass index is 21.26 kg/m².    SpO2: 95 %  O2 Device (Oxygen Therapy): nasal cannula    No intake or output data in the 24 hours ending 04/10/18 2200    Lines/Drains/Airways     Peripheral Intravenous Line                 Peripheral IV - Single Lumen 04/10/18 1959 Left Antecubital less than 1 day                Physical Exam   Constitutional: She is oriented to person, place, and time. She appears well-developed and well-nourished.   HENT:   Head: Normocephalic and atraumatic.   Eyes: EOM are normal.   Neck: Hepatojugular reflux present. No JVD present.   Cardiovascular: Normal rate, regular rhythm and normal heart sounds.  Exam reveals no gallop and no friction rub.    No murmur heard.  Pulses:       Radial pulses are 2+ on the right side, and 2+ on the left side.        Femoral pulses are 2+ on the right side, and 2+ on the left side.       Popliteal pulses are 2+ on the right side, and 2+ on the left side.        Dorsalis pedis pulses are 2+ on the right side, and 2+ on the left side.        Posterior tibial pulses are 2+ on the right side, and 2+ on the left side.   Pulmonary/Chest: Effort normal. No respiratory distress. She has wheezes in the right upper field, the right lower  field, the left upper field and the left lower field.   Abdominal: Soft. Bowel sounds are normal. There is no tenderness.   Musculoskeletal: Normal range of motion. She exhibits no edema.   Neurological: She is oriented to person, place, and time.   Skin: Skin is warm and dry.       Significant Labs:   Recent Lab Results       04/10/18  2057 04/10/18  2039      Immature Granulocytes  0.2     Immature Grans (Abs)  0.02  Comment:  Mild elevation in immature granulocytes is non specific and   can be seen in a variety of conditions including stress response,   acute inflammation, trauma and pregnancy. Correlation with other   laboratory and clinical findings is essential.       Time Notifed: 2055      Albumin  3.3(L)     Alkaline Phosphatase  55     Allens Test Pass      ALT  20     Anion Gap  8     AST  20     Baso #  0.02     Basophil%  0.2     Total Bilirubin  0.4  Comment:  For infants and newborns, interpretation of results should be based  on gestational age, weight and in agreement with clinical  observations.  Premature Infant recommended reference ranges:  Up to 24 hours.............<8.0 mg/dL  Up to 48 hours............<12.0 mg/dL  3-5 days..................<15.0 mg/dL  6-29 days.................<15.0 mg/dL       BNP  118  Comment:  Values of less than 100 pg/ml are consistent with non-CHF populations.(H)     Site LR      BUN, Bld  8     Calcium  8.9     Chloride  97     CO2  34(H)     Creatinine  0.7     Dels Room Air      Differential Method  Automated     eGFR if African American  >60.0     eGFR if non   >60.0  Comment:  Calculation used to obtain the estimated glomerular filtration  rate (eGFR) is the CKD-EPI equation.        Eos #  0.1     Eosinophil%  0.7     Glucose  93     Gran # (ANC)  5.7     Gran%  62.1     Hematocrit  50.3(H)     Hemoglobin  16.6(H)     Coumadin Monitoring INR  1.1  Comment:  Coumadin Therapy:  2.0 - 3.0 for INR for all indicators except mechanical heart  valves  and antiphospholipid syndromes which should use 2.5 - 3.5.       Lymph #  2.6     Lymph%  27.8     Magnesium  1.7     MCH  30.1     MCHC  33.0     MCV  91     Mode SPONT      Mono #  0.8     Mono%  9.0     MPV  11.7     nRBC  0     Phosphorus  4.5     Platelets  171     POC BE 10      POC HCO3 35.1(H)      POC Lactate 0.59      POC PCO2 62.0(HH)      POC PH 7.362      POC PO2 43(LL)      POC SATURATED O2 74(L)      POC TCO2 37(H)      Potassium  3.6     Total Protein  6.1     Protime  11.2     RBC  5.51(H)     RDW  17.7(H)     Sample ARTERIAL      Sodium  139     Troponin I  <0.006  Comment:  The reference interval for Troponin I represents the 99th percentile   cutoff   for our facility and is consistent with 3rd generation assay   performance.       WBC  9.21           Significant Imaging: Echocardiogram:   2D echo with color flow doppler:   Results for orders placed or performed during the hospital encounter of 02/28/18   2D Echo w/ Color Flow Doppler   Result Value Ref Range    EF 60 55 - 65    Diastolic Dysfunction Yes (A)     Est. PA Systolic Pressure 40.72 (A)     Tricuspid Valve Regurgitation MILD      Assessment and Plan:     * Shortness of breath    Based on history and physical, appears most consistent with COPD exacerbation, ABG with chronic CO2 retention  Not significantly overloaded on exam   HTN(+) may also be responsible for some of her symptoms    Admit to Internal Medicine        Pulmonary hypertension    RV dysfunction is known and currently undergoing PH workup  Does not look overloaded on exam  Once respiratory status has improved and COPD exacerbation treated, consider repeat echo and HTS consult as needed        Hypertension    Would re-instate BP meds for goal BP of SBP<130          Admit to Internal Medicine, does not require Co-Managed Service    VTE Risk Mitigation     None          Thank you for your consult.     Bryon Chadwick DO  Cardiology Fellow    Cardiology   [facility]

## 2018-04-11 NOTE — PLAN OF CARE
04/11/18 1047   Discharge Assessment   Assessment Type Discharge Planning Assessment   Confirmed/corrected address and phone number on facesheet? Yes   Assessment information obtained from? Patient;Medical Record   Expected Length of Stay (days) 3   Prior to hospitilization cognitive status: Alert/Oriented   Prior to hospitalization functional status: Assistive Equipment   Current cognitive status: Alert/Oriented   Current Functional Status: Assistive Equipment   Lives With child(marylu), adult  (adult son and daughter have moved back in with patient )   Able to Return to Prior Arrangements yes   Is patient able to care for self after discharge? Yes   Who are your caregiver(s) and their phone number(s)? self care   Patient's perception of discharge disposition home or selfcare   Readmission Within The Last 30 Days no previous admission in last 30 days   Patient currently being followed by outpatient case management? No   Patient currently receives any other outside agency services? No   Equipment Currently Used at Home cane, straight;oxygen  (just has O2 concentrator (no portable))   Do you have any problems affording any of your prescribed medications? No   Is the patient taking medications as prescribed? yes   Does the patient have transportation home? Yes   Transportation Available family or friend will provide   Does the patient receive services at the Coumadin Clinic? No   Discharge Plan A Home with family   Discharge Plan B Home Health   Patient/Family In Agreement With Plan yes

## 2018-04-11 NOTE — HPI
62 y.o. 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 She was recently seen by Dr. Saini in HTS clinic with similar symptoms that were thought to be related to her underlying COPD due to improvement with nebs. 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 Over the last 2 weeks she notes intermittent chest pain that improves with nebulizer treatment, productive cough and increased mucus production. She denies fever. From a volume standpoint, she notes she has not been taking her combination blood pressure/diuretic pill. Not good about daily weights however notes her baseline two pillow orthopnea to three pillows. She denies leg swelling. She has been admitted for COPD exacerbations in the past however states this feels different some how.

## 2018-04-11 NOTE — ASSESSMENT & PLAN NOTE
- Most likely Pulmonary Hypertension WHO Class III (secoandry to pulmonary disease)   - Patient of Dr. Saini with last visit coupe of days prior this presentation   - TTE on 2/2018 showed severe RV enlargement and severely depressed systolic function with PA pressure 51 mmHg.  - This reflect advance disease, and she never had Right heart cath to identify the etiology of her Pulmonary HTN (most likely Class III).  - Stable problem, ensure good oxygenation and treat underlying COPD exacerbation

## 2018-04-11 NOTE — PLAN OF CARE
Problem: Patient Care Overview  Goal: Plan of Care Review  Outcome: Ongoing (interventions implemented as appropriate)   04/11/18 4594   Coping/Psychosocial   Plan Of Care Reviewed With patient       Comments: AA&Ox4 updated on plan of care, remains free from falls, no significant events.  Pt does report dyspnea on exertion, O2 sat do drop if she takes off supplemental O2.  Denies pain,  Progressing towards goals, will monitor

## 2018-04-12 ENCOUNTER — TELEPHONE (OUTPATIENT)
Dept: SMOKING CESSATION | Facility: CLINIC | Age: 63
End: 2018-04-12

## 2018-04-12 VITALS
OXYGEN SATURATION: 92 % | WEIGHT: 122.13 LBS | RESPIRATION RATE: 18 BRPM | DIASTOLIC BLOOD PRESSURE: 68 MMHG | TEMPERATURE: 98 F | HEART RATE: 72 BPM | SYSTOLIC BLOOD PRESSURE: 113 MMHG | HEIGHT: 63 IN | BODY MASS INDEX: 21.64 KG/M2

## 2018-04-12 LAB
ALBUMIN SERPL BCP-MCNC: 3.1 G/DL
ALP SERPL-CCNC: 49 U/L
ALT SERPL W/O P-5'-P-CCNC: 15 U/L
ANION GAP SERPL CALC-SCNC: 7 MMOL/L
AST SERPL-CCNC: 13 U/L
BASOPHILS # BLD AUTO: 0.01 K/UL
BASOPHILS NFR BLD: 0.1 %
BILIRUB SERPL-MCNC: 0.4 MG/DL
BUN SERPL-MCNC: 9 MG/DL
CALCIUM SERPL-MCNC: 8.5 MG/DL
CHLORIDE SERPL-SCNC: 99 MMOL/L
CO2 SERPL-SCNC: 35 MMOL/L
CREAT SERPL-MCNC: 0.6 MG/DL
DIFFERENTIAL METHOD: ABNORMAL
EOSINOPHIL # BLD AUTO: 0 K/UL
EOSINOPHIL NFR BLD: 0.2 %
ERYTHROCYTE [DISTWIDTH] IN BLOOD BY AUTOMATED COUNT: 17 %
EST. GFR  (AFRICAN AMERICAN): >60 ML/MIN/1.73 M^2
EST. GFR  (NON AFRICAN AMERICAN): >60 ML/MIN/1.73 M^2
GLUCOSE SERPL-MCNC: 83 MG/DL
HCT VFR BLD AUTO: 48.7 %
HGB BLD-MCNC: 15.9 G/DL
IMM GRANULOCYTES # BLD AUTO: 0.03 K/UL
IMM GRANULOCYTES NFR BLD AUTO: 0.2 %
LYMPHOCYTES # BLD AUTO: 2.6 K/UL
LYMPHOCYTES NFR BLD: 20.6 %
MAGNESIUM SERPL-MCNC: 1.8 MG/DL
MCH RBC QN AUTO: 29.4 PG
MCHC RBC AUTO-ENTMCNC: 32.6 G/DL
MCV RBC AUTO: 90 FL
MONOCYTES # BLD AUTO: 1.1 K/UL
MONOCYTES NFR BLD: 9.1 %
NEUTROPHILS # BLD AUTO: 8.7 K/UL
NEUTROPHILS NFR BLD: 69.8 %
NRBC BLD-RTO: 0 /100 WBC
PHOSPHATE SERPL-MCNC: 4.3 MG/DL
PLATELET # BLD AUTO: 187 K/UL
PMV BLD AUTO: 11.9 FL
POTASSIUM SERPL-SCNC: 3.7 MMOL/L
PROT SERPL-MCNC: 5.8 G/DL
RBC # BLD AUTO: 5.4 M/UL
SODIUM SERPL-SCNC: 141 MMOL/L
WBC # BLD AUTO: 12.4 K/UL

## 2018-04-12 PROCEDURE — 99239 HOSP IP/OBS DSCHRG MGMT >30: CPT | Mod: ,,, | Performed by: HOSPITALIST

## 2018-04-12 PROCEDURE — 83735 ASSAY OF MAGNESIUM: CPT

## 2018-04-12 PROCEDURE — 94761 N-INVAS EAR/PLS OXIMETRY MLT: CPT

## 2018-04-12 PROCEDURE — 80053 COMPREHEN METABOLIC PANEL: CPT

## 2018-04-12 PROCEDURE — 25000242 PHARM REV CODE 250 ALT 637 W/ HCPCS: Performed by: STUDENT IN AN ORGANIZED HEALTH CARE EDUCATION/TRAINING PROGRAM

## 2018-04-12 PROCEDURE — 94640 AIRWAY INHALATION TREATMENT: CPT

## 2018-04-12 PROCEDURE — 84100 ASSAY OF PHOSPHORUS: CPT

## 2018-04-12 PROCEDURE — 85025 COMPLETE CBC W/AUTO DIFF WBC: CPT

## 2018-04-12 PROCEDURE — 25000003 PHARM REV CODE 250: Performed by: HOSPITALIST

## 2018-04-12 PROCEDURE — S4991 NICOTINE PATCH NONLEGEND: HCPCS | Performed by: STUDENT IN AN ORGANIZED HEALTH CARE EDUCATION/TRAINING PROGRAM

## 2018-04-12 PROCEDURE — 25000003 PHARM REV CODE 250: Performed by: STUDENT IN AN ORGANIZED HEALTH CARE EDUCATION/TRAINING PROGRAM

## 2018-04-12 PROCEDURE — 27000221 HC OXYGEN, UP TO 24 HOURS

## 2018-04-12 PROCEDURE — 36415 COLL VENOUS BLD VENIPUNCTURE: CPT

## 2018-04-12 PROCEDURE — 63600175 PHARM REV CODE 636 W HCPCS: Performed by: STUDENT IN AN ORGANIZED HEALTH CARE EDUCATION/TRAINING PROGRAM

## 2018-04-12 RX ORDER — GLYCERIN 1 G/1
1 SUPPOSITORY RECTAL
COMMUNITY

## 2018-04-12 RX ORDER — FUROSEMIDE 20 MG/1
20 TABLET ORAL DAILY PRN
Qty: 60 TABLET | Refills: 11 | Status: SHIPPED | OUTPATIENT
Start: 2018-04-12 | End: 2018-05-12

## 2018-04-12 RX ORDER — IBUPROFEN 600 MG/1
600 TABLET ORAL DAILY PRN
COMMUNITY
End: 2018-04-30

## 2018-04-12 RX ORDER — AZITHROMYCIN 500 MG/1
500 TABLET, FILM COATED ORAL DAILY
Qty: 3 TABLET | Refills: 0 | Status: SHIPPED | OUTPATIENT
Start: 2018-04-13 | End: 2018-04-17

## 2018-04-12 RX ORDER — FUROSEMIDE 20 MG/1
20 TABLET ORAL 2 TIMES DAILY
Status: DISCONTINUED | OUTPATIENT
Start: 2018-04-12 | End: 2018-04-12 | Stop reason: HOSPADM

## 2018-04-12 RX ORDER — PREDNISONE 20 MG/1
40 TABLET ORAL DAILY
Qty: 6 TABLET | Refills: 0 | Status: SHIPPED | OUTPATIENT
Start: 2018-04-13 | End: 2018-04-16

## 2018-04-12 RX ORDER — AZITHROMYCIN 250 MG/1
500 TABLET, FILM COATED ORAL DAILY
Status: DISCONTINUED | OUTPATIENT
Start: 2018-04-12 | End: 2018-04-12 | Stop reason: HOSPADM

## 2018-04-12 RX ORDER — FLUTICASONE FUROATE AND VILANTEROL 100; 25 UG/1; UG/1
1 POWDER RESPIRATORY (INHALATION) DAILY
Qty: 1 EACH | Refills: 3 | Status: SHIPPED | OUTPATIENT
Start: 2018-04-13 | End: 2019-01-24

## 2018-04-12 RX ADMIN — IPRATROPIUM BROMIDE AND ALBUTEROL SULFATE 3 ML: .5; 3 SOLUTION RESPIRATORY (INHALATION) at 08:04

## 2018-04-12 RX ADMIN — PREDNISONE 40 MG: 20 TABLET ORAL at 09:04

## 2018-04-12 RX ADMIN — LISINOPRIL AND HYDROCHLOROTHIAZIDE 1 TABLET: 12.5; 1 TABLET ORAL at 09:04

## 2018-04-12 RX ADMIN — FUROSEMIDE 20 MG: 20 TABLET ORAL at 07:04

## 2018-04-12 RX ADMIN — PANTOPRAZOLE SODIUM 40 MG: 40 TABLET, DELAYED RELEASE ORAL at 09:04

## 2018-04-12 RX ADMIN — NICOTINE 1 PATCH: 21 PATCH, EXTENDED RELEASE TRANSDERMAL at 09:04

## 2018-04-12 RX ADMIN — ENOXAPARIN SODIUM 40 MG: 100 INJECTION SUBCUTANEOUS at 09:04

## 2018-04-12 RX ADMIN — IPRATROPIUM BROMIDE AND ALBUTEROL SULFATE 3 ML: .5; 3 SOLUTION RESPIRATORY (INHALATION) at 04:04

## 2018-04-12 RX ADMIN — FUROSEMIDE 20 MG: 20 TABLET ORAL at 10:04

## 2018-04-12 RX ADMIN — AZITHROMYCIN 500 MG: 250 TABLET, FILM COATED ORAL at 09:04

## 2018-04-12 RX ADMIN — FLUTICASONE FUROATE AND VILANTEROL TRIFENATATE 1 PUFF: 100; 25 POWDER RESPIRATORY (INHALATION) at 09:04

## 2018-04-12 NOTE — PLAN OF CARE
Problem: Patient Care Overview  Goal: Plan of Care Review  Outcome: Ongoing (interventions implemented as appropriate)  Pt remains free of falls and injury. Pt O2 sat by dynomap was 88 at times when cont pulsox was 92%. Pt makes statement of no pain. Bed low and locked, Call light within reach.

## 2018-04-12 NOTE — PROGRESS NOTES
AVS d/c instructions given to patient, voices understanding. Telemetry d/emeka, PIV d/emeka. Waiting on portable home oxygen delivery.

## 2018-04-12 NOTE — PLAN OF CARE
04/12/18 1412   Final Note   Assessment Type Final Discharge Note   Discharge Disposition Home   Hospital Follow Up  Appt(s) scheduled? Yes

## 2018-04-12 NOTE — PLAN OF CARE
Home Oxygen Evaluation    Date Performed: 4/12/2018    1) Patient's Home O2 Sat on room air, while at rest: 82%        If O2 sats on room air at rest are 88% or below, patient qualifies. No additional testing needed. Document N/A in steps 2 and 3. If 89% or above, complete steps 2.      2) Patient's O2 Sat on room air while exercising: na        If O2 sats on room air while exercising remain 89% or above patient does not qualify, no further testing needed Document N/A in step 3. If O2 sats on room air while exercising are 88% or below, continue to step 3.      3) Patient's O2 Sat while exercising on O2: na at na LPM         (Must show improvement from #2 for patients to qualify)    If O2 sats improve on oxygen, patient qualifies for portable oxygen. If not, the patient does not qualify.

## 2018-04-12 NOTE — DISCHARGE SUMMARY
[facility] Hospital Medicine  Discharge Summary      Patient Name: Rochelle Espinoza  MRN: [id number]  Admission Date: 4/10/2018  Hospital Length of Stay: 2 days  Discharge Date and Time:  04/12/2018 3:03 PM  Attending Physician: Syl Fitzpatrick MD   Discharging Provider: Ralph Reid MD  Primary Care Provider: Yosi Samuels MD  [facility] Medicine Team: [facility] [place] HOSP MED 3 Ralph Reid MD    HPI:   This is MsAntonio Espinoza, 62 year old female with PMHx significant for Hypertension, COPD Chronic Bronchitis on home oxygen, active smoking status, Gout involving toe of left foot who presented with shortness of breath and acute on chronic respiratory failure with hypoxia and hypercapnia. Her shortness of breath was long standing; however for the last two weeks her SOB was progressively worsen. Her baseline WHO Functional Assessment for Pulmonary Hypertension is around Class II (Patients with pulmonary hypertension resulting in a slight limitation of physical activity). However for the last couple of weeks, her functional status worsen to Class III and occasionally class IV, she also endorsed Orthopnea (two pillows minimum) and occasional PND once or twice weekly. Prior her presentation, she had complain of chest pain, located in the center of her heart, 4/10 and was aching in nature, and the pain resolved after she took her Nebs (albuterol). She also has chronic long standing cough, mostly dry in nature, however, since her SOB worsen, her cough became productive and associated with whitish sputum. She denies fever, sick contact, recent travel (she plan to go to [facility] for her job, but she did not go). Of note, no previous right heart cath to identify the etiology of her pulmonary hypertension and suppose to have repeated 2D echo per Dr. Saini plan.     * No surgery found *      Hospital Course:   Pt admitted for acute on chronic mixed respiratory failure due to COPD exacerbation and cor  "pulmonale.   She improved with nebs, steroids, and PO Abx. Pt has been medically optimized and is ready for discharge home. She will need home O2 throughout the day, as her resting SpO2 is 82%. Additionally, she will finish her course of PO antibiotics.     Consults:   Consults         Status Ordering Provider     Inpatient consult to Cardiology  Once     Provider:  (Not yet assigned)    Completed PRASHANT GERARDO          No new Assessment & Plan notes have been filed under this hospital service since the last note was generated.  Service: Hospital Medicine    Final Active Diagnoses:    Diagnosis Date Noted POA    PRINCIPAL PROBLEM:  Obstructive chronic bronchitis with exacerbation [J44.1] 01/02/2011 Yes    Cor pulmonale, chronic [I27.81] 04/10/2018 Yes    Pulmonary hypertension [I27.20] 03/05/2018 Yes    Acute on chronic respiratory failure with hypoxia and hypercapnia [J96.21, J96.22] 02/07/2016 Yes    Essential hypertension [I10]  Yes      Problems Resolved During this Admission:    Diagnosis Date Noted Date Resolved POA       Discharged Condition: good    Disposition: Home or Self Care    Follow Up:  Follow-up Information     Yosi Samuels MD On 4/17/2018.    Specialty:  Internal Medicine  Why:  9:00am  Contact information:  [address]ER[address]  [facility] [id number] [phone]                 Patient Instructions:     OXYGEN FOR HOME USE   Order Specific Question Answer Comments   Liter Flow 2    Duration Continuous    Qualifying SpO2: 82% RA   Testing done at: Rest    Route nasal cannula    Portable mode: pulse dose acceptable    Device home concentrator with portable unit    Length of need (in months): 99 mos    Patient condition with qualifying saturation COPD    Height: 5' 3" (1.6 m)    Weight: 55.4 kg (122 lb 2.2 oz)    Does patient have medical equipment at home? cane, straight just has O2 concentrator (no portable) / just has O2 concentrator (no portable)   Does patient have medical " equipment at home? oxygen    Alternative treatment measures have been tried or considered and deemed clinically ineffective. Yes      Activity as tolerated     Notify your health care provider if you experience any of the following:  temperature >100.4     Notify your health care provider if you experience any of the following:  persistent nausea and vomiting or diarrhea     Notify your health care provider if you experience any of the following:  severe uncontrolled pain     Notify your health care provider if you experience any of the following:  redness, tenderness, or signs of infection (pain, swelling, redness, odor or green/yellow discharge around incision site)     Notify your health care provider if you experience any of the following:  difficulty breathing or increased cough     Notify your health care provider if you experience any of the following:  severe persistent headache     Notify your health care provider if you experience any of the following:  worsening rash     Notify your health care provider if you experience any of the following:  persistent dizziness, light-headedness, or visual disturbances     Notify your health care provider if you experience any of the following:  increased confusion or weakness     Notify your health care provider if you experience any of the following:       Vitals:    04/12/18 1144   BP:    Pulse: 89   Resp:    Temp:      Physical Exam  Constitutional: She is oriented to person, place, and time. No distress. She is not intubated.   HENT:   Head: Normocephalic.   Eyes: Right eye exhibits no discharge. Left eye exhibits no discharge.   Neck: Normal range of motion. No JVD present.   Cardiovascular: Normal rate and regular rhythm.    Pulmonary/Chest: Decreased breath sounds throughout. No accessory muscle usage. No apnea and no tachypnea. She is not intubated. No respiratory distress  Abdominal: Soft. She exhibits no distension. There is no tenderness.   Musculoskeletal:  Normal range of motion.   Neurological: She is alert and oriented to person, place, and time.   Skin: Trace pitting edema bilateral lower extremities. Skin is warm. She is not diaphoretic.     Significant Diagnostic Studies:     CBC:   Recent Labs  Lab 04/10/18  2039 04/11/18  0706 04/12/18  0405   WBC 9.21 6.58 12.40   RBC 5.51* 5.69* 5.40   HGB 16.6* 16.4* 15.9   HCT 50.3* 51.7* 48.7*    193 187   MCV 91 91 90   MCH 30.1 28.8 29.4   MCHC 33.0 31.7* 32.6     BMP:   Recent Labs  Lab 04/10/18  2039 04/11/18  0706 04/12/18  0405   GLU 93 191* 83    136 141   K 3.6 4.2 3.7   CL 97 98 99   CO2 34* 31* 35*   BUN 8 8 9   CREATININE 0.7 0.7 0.6   CALCIUM 8.9 9.0 8.5*   MG 1.7 1.7 1.8     Coagulation:   Recent Labs  Lab 04/11/18 0041   INR 1.0   APTT 25.7     Microbiology Results (last 7 days)     Procedure Component Value Units Date/Time    Blood culture (site 1) [[id number]] Collected:  04/11/18 0041    Order Status:  Completed Specimen:  Blood from Peripheral, Antecubital, Left Updated:  04/12/18 0613     Blood Culture, Routine No Growth to date     Blood Culture, Routine No Growth to date    Narrative:       Site # 1, aerobic and anaerobic    Blood culture (site 2) [[id number]] Collected:  04/11/18 0041    Order Status:  Completed Specimen:  Blood from Peripheral, Forearm, Left Updated:  04/12/18 0613     Blood Culture, Routine No Growth to date     Blood Culture, Routine No Growth to date    Narrative:       Site # 2, aerobic only    Culture, Respiratory with Gram Stain [[id number]]     Order Status:  No result Specimen:  Respiratory from Sputum, Expectorated     Culture, Respiratory with Gram Stain [[id number]]     Order Status:  Canceled Specimen:  Respiratory from Sputum, Expectorated             Pending Diagnostic Studies:     None         Medications:  Reconciled Home Medications:      Medication List      START taking these medications    azithromycin 500 MG tablet  Commonly known as:  ZITHROMAX  Take 1  tablet (500 mg total) by mouth once daily.  Start taking on:  4/13/2018     fluticasone-vilanterol 100-25 mcg/dose diskus inhaler  Commonly known as:  BREO  Inhale 1 puff into the lungs once daily. Controller  Start taking on:  4/13/2018     predniSONE 20 MG tablet  Commonly known as:  DELTASONE  Take 2 tablets (40 mg total) by mouth once daily. Take with food or snack.  Start taking on:  4/13/2018        CHANGE how you take these medications    furosemide 20 MG tablet  Commonly known as:  LASIX  Take 1 tablet (20 mg total) by mouth daily as needed. For weight gain of 2-3 pounds over 24 hours or 5 pounds in one week.  What changed:  · when to take this  · reasons to take this  · additional instructions        CONTINUE taking these medications    albuterol 90 mcg/actuation inhaler  Commonly known as:  PROAIR HFA  TAKE 2 PUFFS BY MOUTH EVERY 6 HOURS AS NEEDED     * albuterol-ipratropium 2.5mg-0.5mg/3mL 0.5 mg-3 mg(2.5 mg base)/3 mL nebulizer solution  Commonly known as:  DUO-NEB  Take 3 mLs by nebulization every 4 (four) hours as needed for Wheezing or Shortness of Breath.     * ipratropium-albuterol  mcg/actuation inhaler  Commonly known as:  COMBIVENT  Inhale 1 puff into the lungs 4 (four) times daily. Rescue     glycerin adult suppository  Place 1 suppository rectally as needed for Constipation.     ibuprofen 600 MG tablet  Commonly known as:  ADVIL,MOTRIN  Take 600 mg by mouth daily as needed (headache).     lisinopril-hydrochlorothiazide 20-12.5 mg per tablet  Commonly known as:  PRINZIDE,ZESTORETIC  Take 1 tablet by mouth once daily.     multivitamin capsule  Take 1 capsule by mouth once daily.     nicotine 21 mg/24 hr  Commonly known as:  NICODERM CQ  Place 1 patch onto the skin once daily.     omeprazole 20 MG capsule  Commonly known as:  PRILOSEC  Take 1 capsule (20 mg total) by mouth once daily.        * This list has 2 medication(s) that are the same as other medications prescribed for you. Read the  directions carefully, and ask your doctor or other care provider to review them with you.                Indwelling Lines/Drains at time of discharge:   Lines/Drains/Airways          No matching active lines, drains, or airways          Time spent on the discharge of patient: 30 minutes  Patient was seen and examined on the date of discharge and determined to be suitable for discharge.         Ralph Reid MD  Department of Hospital Medicine  [facility]

## 2018-04-12 NOTE — HOSPITAL COURSE
Pt admitted for acute on chronic mixed respiratory failure due to COPD exacerbation and cor pulmonale.  She improved with nebs, steroids, and PO Abx. Pt has been medically optimized and is ready for discharge home. She will need home O2 throughout the day, as her resting SpO2 is 82%. Additionally, she will finish her course of PO antibiotics.

## 2018-04-12 NOTE — TELEPHONE ENCOUNTER
Patient is currently in the hospital and left message about wishing the patient feels better with my name Argelia Norman and phone number of [phone].

## 2018-04-12 NOTE — PLAN OF CARE
On Call SW:    Pg'd by nurse Tracy [phone], regarding status of pt's portable oxygen tank. SUSANA contacted [facility], as pt reports this is the co who provides her regular home O2.  Per Fam, pt is not on list for portable tank delivery but will check w/office personnel and call SW back.     7:02pm - Received a return call from [facility] ph# [phone], who   states that they are no orders found.      7:10pm -  SUSANA faxed Order along w/documented sats and clinicals to [phone].      SUSANA has notified Tracy nurse, of above.    Markos Garcia, [facility]  Ext. [phone]

## 2018-04-13 NOTE — PROGRESS NOTES
[facility] to deliver portable oxygen tank in about 10 minutes. Once tank delivered, pt cleared for discharge. Pt to be connected to home o2 and will re-contact daughter for transportation.

## 2018-04-13 NOTE — PHYSICIAN QUERY
"PT Name: Rochelle Espinoza  MR #: [id number]    Physician Query Form - Heart  Condition Clarification     CDS/: Sully Reed RN CCDS           Contact information: [email or web address]  This form is a permanent document in the medical record.     Query Date: April 13, 2018    By submitting this query, we are merely seeking further clarification of documentation. Please utilize your independent clinical judgment when addressing the question(s) below.    The medical record contains the following   Indicators     Supporting Clinical Findings Location in Medical Record   x BNP IBC=676 Lab 4/10   x EF EF 60 Echo 4/11   x Radiology findings Linear platelike atelectasis lateral left base.    No significant change from prior study. CXR 4/9   x Echo Results CONCLUSIONS     1 - Normal left ventricular systolic function (EF 55-60%).     2 - No wall motion abnormalities.     3 - Indeterminate LV diastolic function.     4 - Right atrial enlargement.     5 - Mildly depressed right ventricular systolic function .     6 - The estimated PA systolic pressure is 36 mmHg.     7 - Intermediate central venous pressure.     8 - Mild tricuspid regurgitation.  Echo 4/11    "Ascites" documented      "SOB" or "JACKSON" documented      "Hypoxia" documented     x Heart Failure documented CHF H&P   x "Edema" documented Trace pitting edema bilateral lower extremities DS   x Diuretics/Meds Lasix 20mg BID QD MAR    Treatment:     x Other:  Severe pulmonary hypertension DS       Please further clarify the type and acuity of CHF. Thank you.                                 [  ] Acute Systolic Heart Failure ( EF < 40)*  [  ] Acute on Chronic Systolic Heart Failure ( EF < 40)  [  ] Chronic Systolic Heart Failure (EF < 40)*    [  ] Acute Diastolic Heart Failure ( EF > 40)*  [  ] Acute on Chronic Diastolic Heart Failure( EF > 40)*  [  ] Chronic Diastolic Heart Failure (EF > 40)*    [  ] Acute Combined Systolic and Diastolic Heart Failure  [  ] Acute on " Chronic Combined Systolic and Diastolic Heart Failure  [  ] Chronic Combined Systolic and Diastolic Heart Failure    [  ] Other Type of Heart Failure (please specify type): _________________________    [x  ] Heart Failure Ruled Out    [  ] Other (please specify): ___________________________________  [  ] Clinically Undetermined            *American Heart Association                                                                                                          Please document in your progress notes daily for the duration of treatment until resolved and include in your discharge summary.

## 2018-04-16 ENCOUNTER — PATIENT OUTREACH (OUTPATIENT)
Dept: ADMINISTRATIVE | Facility: CLINIC | Age: 63
End: 2018-04-16

## 2018-04-16 LAB
BACTERIA BLD CULT: NORMAL
BACTERIA BLD CULT: NORMAL

## 2018-04-16 NOTE — PATIENT INSTRUCTIONS
COPD Flare    You have had a flare-up of your COPD.  COPD, or chronic obstructive pulmonary disease, is a common lung disease. It causes your airways to become irritated and narrower. This makes it harder for you to breathe. Emphysema and chronic bronchitis are both types of COPD. This is a chronic condition, which means you always have it. Sometimes it gets worse. When this happens, it is called a flare-up.  Symptoms of COPD  People with COPD may have symptoms most of the time. In a flare-up, your symptoms get worse. These symptoms may mean you are having a flare-up:  · Shortness of breath, shallow or rapid breathing, or wheezing that gets worse  · Lung infection  · Cough that gets worse  · More mucus, thicker mucus or mucus of a different color  · Tiredness, decreased energy, or trouble doing your usual activities  · Fever  · Chest tightness  · Your symptoms dont get better even when you use your usual medicines, inhalers, and nebulizer  · Trouble talking  · You feel confused  Causes of flare-ups  Unfortunately, a flare-up can happen even though you did everything right, and you followed your doctors instructions. Some causes of flare-ups are:  · Smoking or secondhand smoke  · Colds, the flu, or respiratory infections  · Air pollution  · Sudden change in the weather  · Dust, irritating chemicals, or strong fumes  · Not taking your medicines as prescribed  Home care  Here are some things you can do at home to treat a flare-up:  · Try not to panic. This makes it harder to breathe, and keeps you from doing the right things.  · Dont smoke or be around others who are smoking.  · Try to drink more fluids than usual during a flare-up, unless your doctor has told you not to because of heart and kidney problems. More fluids can help loosen the mucus.  · Use your inhalers and nebulizer, if you have one, as you have been told to.  · If you were given antibiotics, take them until they are used up or your doctor tells you  to stop. Its important to finish the antibiotics, even though you feel better. This will make sure the infection has cleared.  · If you were given prednisone or another steroid, finish it even if you feel better.  Preventing a flare-up  Even though flare-ups happen, the best way to treat one is to prevent it before it starts. Here are some pointers:  · Dont smoke or be around others who are smoking.  · Take your medicines as you have been told.  · Talk with your doctor about getting a flu shot every year. Also find out if you need a pneumonia shot.  · If there is a weather advisory warning to stay indoors, try to stay inside when possible.  · Try to eat healthy and get plenty of sleep.  · Try to avoid things that usually set you off, like dust, chemical fumes, hairsprays, or strong perfumes.  Follow-up care  Follow up with your healthcare provider, or as advised.  If a culture was done, you will be told if your treatment needs to be changed. You can call as directed for the results.  If X-rays were done, you will be notified of any new findings that may affect your care.  Call 911  Call 911 if any of these occur:  · You have trouble breathing  · You feel confused or its difficult to wake you up  · You faint or lose consciousness  · You have a rapid heart rate  · You have new pain in your chest, arm, shoulder, neck or upper back  When to seek medical advice  Call your healthcare provider right away if any of these occur:  · Wheezing or shortness of breath gets worse  · You need to use your inhalers more often than usual without relief  · Fever of 100.4°F (38ºC) or higher, or as directed by your healthcare provider  · Coughing up lots of dark-colored or bloody mucus (sputum)  · Chest pain with each breath  · You do not start to get better within 24 hours  · Swelling of your ankles gets worse  · Dizziness or weakness  Date Last Reviewed: 9/1/2016  © 9493-4367 [employer]. [address],  [place] [address]. All rights reserved. This information is not intended as a substitute for professional medical care. Always follow your healthcare professional's instructions.

## 2018-04-17 ENCOUNTER — OFFICE VISIT (OUTPATIENT)
Dept: INTERNAL MEDICINE | Facility: CLINIC | Age: 63
End: 2018-04-17
Payer: COMMERCIAL

## 2018-04-17 VITALS
HEIGHT: 63 IN | WEIGHT: 119.5 LBS | SYSTOLIC BLOOD PRESSURE: 100 MMHG | BODY MASS INDEX: 21.17 KG/M2 | DIASTOLIC BLOOD PRESSURE: 76 MMHG | HEART RATE: 89 BPM

## 2018-04-17 DIAGNOSIS — J44.1 OBSTRUCTIVE CHRONIC BRONCHITIS WITH EXACERBATION: ICD-10-CM

## 2018-04-17 DIAGNOSIS — I27.81 COR PULMONALE, CHRONIC: ICD-10-CM

## 2018-04-17 DIAGNOSIS — I27.20 PULMONARY HYPERTENSION: ICD-10-CM

## 2018-04-17 DIAGNOSIS — I10 ESSENTIAL HYPERTENSION: ICD-10-CM

## 2018-04-17 DIAGNOSIS — J42 CHRONIC BRONCHITIS, UNSPECIFIED CHRONIC BRONCHITIS TYPE: ICD-10-CM

## 2018-04-17 DIAGNOSIS — Z72.0 TOBACCO ABUSE: Primary | ICD-10-CM

## 2018-04-17 PROBLEM — R07.1 CHEST PAIN ON BREATHING: Status: RESOLVED | Noted: 2018-04-09 | Resolved: 2018-04-17

## 2018-04-17 PROCEDURE — 3078F DIAST BP <80 MM HG: CPT | Mod: CPTII,S$GLB,, | Performed by: INTERNAL MEDICINE

## 2018-04-17 PROCEDURE — 99214 OFFICE O/P EST MOD 30 MIN: CPT | Mod: S$GLB,,, | Performed by: INTERNAL MEDICINE

## 2018-04-17 PROCEDURE — 3074F SYST BP LT 130 MM HG: CPT | Mod: CPTII,S$GLB,, | Performed by: INTERNAL MEDICINE

## 2018-04-17 PROCEDURE — 99999 PR PBB SHADOW E&M-EST. PATIENT-LVL III: CPT | Mod: PBBFAC,,, | Performed by: INTERNAL MEDICINE

## 2018-04-17 NOTE — PROGRESS NOTES
Transitional Care Note  Subjective:       Patient ID: Rochelle Espinoza is a 62 y.o. female.  Chief Complaint: Hospital Follow Up    Family and/or Caretaker present at visit?  No.  Diagnostic tests reviewed/disposition: I have reviewed all completed as well as pending diagnostic tests at the time of discharge.  Disease/illness education: COPD and Pulmonary HTN-- discussed needing to wear oxygen 24/7  Home health/community services discussion/referrals: Patient does not have home health established from hospital visit.  They do not need home health.  If needed, we will set up home health for the patient.   Establishment or re-establishment of referral orders for community resources: No other necessary community resources.   Discussion with other health care providers: No discussion with other health care providers necessary.   HPI  Review of Systems    Objective:      Physical Exam    Assessment:       No diagnosis found.    Plan:

## 2018-04-17 NOTE — PROGRESS NOTES
"She is a 62-year-old female coming in today for followup. She was hospitalized last week for COPD/ Cor pulmonale, and Pulmonary htn.  She has been seen by Dr Saini in Heart failure recently.  She was released on Azithromycin, Breo, she has been on steroids and she was diuresis ed.  Since discharge she has been feeling very good- she has been using her oxygen 24/7 2 liters a day.  Her fingers are no longer blue and she is not longer tired all the time.  Lat year in Feb she was seen for COPD exacerbation.      She  has had this happen after a three day [place] stay. She developed shortness    of breath and her pulse ox was low. She was put on oxygen. She was sent home    on oxygen. Pulse ox at baseline was 88%, during activity it got down to 82%.    Today her pulse ox was 92%- on oxygen .   She uses her oxygen at night. She    has a history of COPD.   She has  severe COPD with a peak flow in the hospital of about 23 % of predicted.   She has smoke about 5 cigarettes since discharge and is working on stopping.                PHYSICAL EXAMINATION: /76 (BP Location: Right arm, Patient Position: Sitting, BP Method: Large (Manual))   Pulse 89   Ht 5' 3" (1.6 m)   Wt 54.2 kg (119 lb 7.8 oz)   LMP 11/21/2013   BMI 21.17 kg/m²  pulse ox on 2L/min via NC is 92 %     GENERAL: This is a well-appearing 62-year-old  female, in no    acute distress. Wearing oxygen- talking without getting out of breath.  Walking without assistance.    NECK: Supple. She has no JVD. Thyroid is not enlarged.  CARDIOVASCULAR: S1 and S2, regular rate and rhythm without murmur, gallop or    rub.  ABDOMEN: Soft, nontender, no hepatosplenomegaly, no guarding or rebound    tenderness.  LOWER EXTREMITIES: No edema.( she had a little int  hospital)      ASSESSMENT: 1.  COPD,   2. hypoxia,   3.Pulmonary HTN / Cor pulmonale   4. She has tobacco abuse, which  she is undergoing smoking cessation. She says she has cut down a good " bit on    her tobacco,      Doing well on O2- and Breo-- has not had to use other inhalers.    Discussed importance of using O2 24/7 even if it means she needs to go on disability if she can't work with O 2 on.

## 2018-04-24 ENCOUNTER — PATIENT MESSAGE (OUTPATIENT)
Dept: INTERNAL MEDICINE | Facility: CLINIC | Age: 63
End: 2018-04-24

## 2018-04-26 ENCOUNTER — TELEPHONE (OUTPATIENT)
Dept: INTERNAL MEDICINE | Facility: CLINIC | Age: 63
End: 2018-04-26

## 2018-04-26 NOTE — TELEPHONE ENCOUNTER
Spoke with pt informed her that we do have the paperwork but he hasn't been signed just yet, pt says he needs paperwork turned in by Thursday May 3/2018.

## 2018-04-26 NOTE — TELEPHONE ENCOUNTER
----- Message from Aleena Bermudez sent at 4/26/2018  2:27 PM CDT -----  Contact: Patient [phone]  Patient is returning a phone call.  Who left a message for the patient: She don't know   Does patient know what this is regarding:  She thinks it's about paper work she dropped off

## 2018-04-30 ENCOUNTER — HOSPITAL ENCOUNTER (OUTPATIENT)
Dept: CARDIOLOGY | Facility: CLINIC | Age: 63
Discharge: HOME | End: 2018-04-30
Attending: INTERNAL MEDICINE
Payer: COMMERCIAL

## 2018-04-30 ENCOUNTER — TELEPHONE (OUTPATIENT)
Dept: INTERNAL MEDICINE | Facility: CLINIC | Age: 63
End: 2018-04-30

## 2018-04-30 ENCOUNTER — OFFICE VISIT (OUTPATIENT)
Dept: TRANSPLANT | Facility: CLINIC | Age: 63
End: 2018-04-30
Payer: COMMERCIAL

## 2018-04-30 VITALS
DIASTOLIC BLOOD PRESSURE: 67 MMHG | HEART RATE: 66 BPM | HEIGHT: 63 IN | OXYGEN SATURATION: 93 % | WEIGHT: 116.88 LBS | BODY MASS INDEX: 20.71 KG/M2 | SYSTOLIC BLOOD PRESSURE: 103 MMHG

## 2018-04-30 DIAGNOSIS — J43.1 PANLOBULAR EMPHYSEMA: ICD-10-CM

## 2018-04-30 DIAGNOSIS — I27.81 COR PULMONALE, CHRONIC: Primary | ICD-10-CM

## 2018-04-30 DIAGNOSIS — I27.20 PULMONARY HYPERTENSION: ICD-10-CM

## 2018-04-30 DIAGNOSIS — J42 CHRONIC BRONCHITIS, UNSPECIFIED CHRONIC BRONCHITIS TYPE: ICD-10-CM

## 2018-04-30 DIAGNOSIS — I51.7 CARDIOMEGALY: ICD-10-CM

## 2018-04-30 LAB
DIASTOLIC DYSFUNCTION: NO
RETIRED EF AND QEF - SEE NOTES: 50 (ref 55–65)

## 2018-04-30 PROCEDURE — 3078F DIAST BP <80 MM HG: CPT | Mod: CPTII,S$GLB,, | Performed by: INTERNAL MEDICINE

## 2018-04-30 PROCEDURE — 99999 PR PBB SHADOW E&M-EST. PATIENT-LVL III: CPT | Mod: PBBFAC,,, | Performed by: INTERNAL MEDICINE

## 2018-04-30 PROCEDURE — 99213 OFFICE O/P EST LOW 20 MIN: CPT | Mod: S$GLB,,, | Performed by: INTERNAL MEDICINE

## 2018-04-30 PROCEDURE — 3074F SYST BP LT 130 MM HG: CPT | Mod: CPTII,S$GLB,, | Performed by: INTERNAL MEDICINE

## 2018-04-30 PROCEDURE — 93306 TTE W/DOPPLER COMPLETE: CPT | Mod: S$GLB,,, | Performed by: INTERNAL MEDICINE

## 2018-04-30 NOTE — TELEPHONE ENCOUNTER
----- Message from Batsheva Miles sent at 4/30/2018  3:28 PM CDT -----  Good afternoon,            Pt is requesting to have her PFT and other testing scheduled before her visit. Please contact her at ph [phone].                                                                 Thank you

## 2018-04-30 NOTE — TELEPHONE ENCOUNTER
Paperwork done- in duplicate. Ready for -- PFT's ordered and I put a referral in for her to follow up with a pulmonary doctor after her PFT's-- I trd to call but got her answering machine-- please help her get the pFT's and Pulmonary appointment set up.

## 2018-04-30 NOTE — ASSESSMENT & PLAN NOTE
As noted above I believe her PA pressure elevated is related to her lung disease  PFt's and a pulmonary consult would be helpful

## 2018-04-30 NOTE — PROGRESS NOTES
"Subjective: SOB  improving     Patient ID:  Rochelle Espinoza is a 62 y.o. female who presents for post discharge follow-up of Pulmonary Hypertension.    HPI   past medical history of COPD ( on home O2 , nightly ) , HTN , Tobacco abuse ( on patch but continues to smoke) her for followup on PH workup At her last visit, she was using nebulizer few times a day for SOB.  Additionally she was having chest pain. Few days after my visit, she was admitted in mid April 2018 for COPD exacerbation and cor pulmonale (See discharge summary)  Today feels that using oxygen 24 hrs a day has made a significant difference.  She uses her PRN albuterol once a week now.  Chest pain has improved after she started using antiacids.  No edema with stable home weight of 120 lbs.      Six Minute Walk Test:  Will do next month   Echo today    1 - Concentric remodeling.     2 - Low normal to mildly depressed left ventricular systolic function (EF 50-55%).     3 - Right ventricular enlargement with normal systolic function.     4 - Normal left ventricular diastolic function.     5 - Right atrial enlargement.     6 - There is not sufficient tricuspid regurgitation to provide an estimation of RV/PA systolic pressure.  The RVOT acceleration time, however, is > 100 msec, which is inconsistent with signficant pulmonary hypertension.       Review of Systems   Constitution: Negative for decreased appetite, weight gain and weight loss.   Cardiovascular: Negative for chest pain, dyspnea on exertion, leg swelling, near-syncope, orthopnea and palpitations.   Respiratory: Negative for cough and shortness of breath.    Musculoskeletal: Negative for myalgias.   Gastrointestinal: Negative for jaundice.        Objective:      Physical Exam   Constitutional: She appears well-developed and well-nourished. No distress.   /67 (BP Location: Right arm, Patient Position: Sitting, BP Method: Large (Automatic))   Pulse 66   Ht 5' 3" (1.6 m)   Wt 53 kg (116 lb 13.5 " oz)   LMP 11/21/2013   SpO2 (!) 93%   BMI 20.70 kg/m²      HENT:   Head: Normocephalic and atraumatic. Head is without abrasion and without contusion.   Right Ear: External ear normal.   Left Ear: External ear normal.   Nose: Nose normal. No epistaxis.   Mouth/Throat: Oropharynx is clear and moist. Mucous membranes are not cyanotic.   Eyes: Conjunctivae and EOM are normal. Pupils are equal, round, and reactive to light.   Neck: Normal range of motion. Neck supple. No tracheal deviation present.   Cardiovascular: Normal rate, regular rhythm, normal heart sounds and normal pulses.  Exam reveals no gallop.    No murmur heard.  Pulmonary/Chest: Effort normal and breath sounds normal. No stridor. No respiratory distress. She has no wheezes.   Abdominal: Soft. Normal appearance, normal aorta and bowel sounds are normal. She exhibits no distension. There is no tenderness.   Musculoskeletal: She exhibits no edema or tenderness.   Neurological: She is alert. She has normal strength and normal reflexes. She exhibits normal muscle tone.   Skin: Skin is warm. No rash noted. No erythema.   Psychiatric: She has a normal mood and affect. Her speech is normal and behavior is normal. Judgment and thought content normal. Cognition and memory are normal.           Chemistry        Component Value Date/Time     04/12/2018 0405    K 3.7 04/12/2018 0405    CL 99 04/12/2018 0405    CO2 35 (H) 04/12/2018 0405    BUN 9 04/12/2018 0405    CREATININE 0.6 04/12/2018 0405    GLU 83 04/12/2018 0405        Component Value Date/Time    CALCIUM 8.5 (L) 04/12/2018 0405    ALKPHOS 49 (L) 04/12/2018 0405    AST 13 04/12/2018 0405    ALT 15 04/12/2018 0405    BILITOT 0.4 04/12/2018 0405    ESTGFRAFRICA >60.0 04/12/2018 0405    EGFRNONAA >60.0 04/12/2018 0405            Magnesium   Date Value Ref Range Status   04/12/2018 1.8 1.6 - 2.6 mg/dL Final       Lab Results   Component Value Date    WBC 12.40 04/12/2018    HGB 15.9 04/12/2018    HCT  48.7 (H) 04/12/2018    MCV 90 04/12/2018     04/12/2018       Lab Results   Component Value Date    INR 1.0 04/11/2018    INR 1.1 04/10/2018       BNP   Date Value Ref Range Status   04/10/2018 118 (H) 0 - 99 pg/mL Final     Comment:     Values of less than 100 pg/ml are consistent with non-CHF populations.   03/05/2018 141 (H) 0 - 99 pg/mL Final     Comment:     Values of less than 100 pg/ml are consistent with non-CHF populations.   02/28/2018 205 (H) 0 - 99 pg/mL Final     Comment:     Values of less than 100 pg/ml are consistent with non-CHF populations.         Assessment:       1. Pulmonary hypertension        WHO Group 3   Functional Class 3     Plan:     Pulmonary hypertension  As noted above I believe her PA pressure elevated is related to her lung disease  PFt's and a pulmonary consult would be helpful        Follow-up if symptoms worsen or fail to improve.

## 2018-05-02 NOTE — TELEPHONE ENCOUNTER
Paperwork picked up faxed over yesterday. Pulmonary appointment scheduled at check out when she came and picked up paperwork.

## 2018-05-03 ENCOUNTER — TELEPHONE (OUTPATIENT)
Dept: TRANSPLANT | Facility: CLINIC | Age: 63
End: 2018-05-03

## 2018-05-03 ENCOUNTER — HOSPITAL ENCOUNTER (OUTPATIENT)
Dept: PULMONOLOGY | Facility: CLINIC | Age: 63
Discharge: HOME | End: 2018-05-03
Payer: COMMERCIAL

## 2018-05-03 DIAGNOSIS — I27.81 COR PULMONALE, CHRONIC: ICD-10-CM

## 2018-05-03 DIAGNOSIS — J42 CHRONIC BRONCHITIS, UNSPECIFIED CHRONIC BRONCHITIS TYPE: ICD-10-CM

## 2018-05-03 LAB
POST FEV1 FVC: 0.56
POST FEV1: 0.71
POST FVC: 1.26
PRE FEV1 FVC: 57
PRE FEV1: 0.76
PRE FVC: 1.33
PREDICTED FEV1 FVC: 80
PREDICTED FEV1: 2.3
PREDICTED FVC: 2.88

## 2018-05-03 PROCEDURE — 94729 DIFFUSING CAPACITY: CPT | Mod: S$GLB,,, | Performed by: INTERNAL MEDICINE

## 2018-05-03 PROCEDURE — 94060 EVALUATION OF WHEEZING: CPT | Mod: S$GLB,,, | Performed by: INTERNAL MEDICINE

## 2018-05-03 NOTE — TELEPHONE ENCOUNTER
----- Message from Brigitte Capellan sent at 5/3/2018 10:18 AM CDT -----  Contact: Nidia with [facility] [phone] ext. [phone]  Nidia says she will now be the  for this pt and she would like a call from her nurse at the number listed.    Thanks

## 2018-05-04 ENCOUNTER — OFFICE VISIT (OUTPATIENT)
Dept: PULMONOLOGY | Facility: CLINIC | Age: 63
End: 2018-05-04
Payer: COMMERCIAL

## 2018-05-04 VITALS
DIASTOLIC BLOOD PRESSURE: 68 MMHG | OXYGEN SATURATION: 94 % | HEIGHT: 63 IN | WEIGHT: 116 LBS | HEART RATE: 64 BPM | BODY MASS INDEX: 20.55 KG/M2 | SYSTOLIC BLOOD PRESSURE: 128 MMHG

## 2018-05-04 DIAGNOSIS — J44.9 CHRONIC OBSTRUCTIVE PULMONARY DISEASE, UNSPECIFIED COPD TYPE: Primary | ICD-10-CM

## 2018-05-04 DIAGNOSIS — Z12.9 SCREENING FOR CANCER: ICD-10-CM

## 2018-05-04 PROCEDURE — 99999 PR PBB SHADOW E&M-EST. PATIENT-LVL IV: CPT | Mod: PBBFAC,,, | Performed by: STUDENT IN AN ORGANIZED HEALTH CARE EDUCATION/TRAINING PROGRAM

## 2018-05-04 PROCEDURE — 3074F SYST BP LT 130 MM HG: CPT | Mod: CPTII,S$GLB,, | Performed by: STUDENT IN AN ORGANIZED HEALTH CARE EDUCATION/TRAINING PROGRAM

## 2018-05-04 PROCEDURE — 3078F DIAST BP <80 MM HG: CPT | Mod: CPTII,S$GLB,, | Performed by: STUDENT IN AN ORGANIZED HEALTH CARE EDUCATION/TRAINING PROGRAM

## 2018-05-04 PROCEDURE — 3008F BODY MASS INDEX DOCD: CPT | Mod: CPTII,S$GLB,, | Performed by: STUDENT IN AN ORGANIZED HEALTH CARE EDUCATION/TRAINING PROGRAM

## 2018-05-04 PROCEDURE — 99213 OFFICE O/P EST LOW 20 MIN: CPT | Mod: S$GLB,,, | Performed by: STUDENT IN AN ORGANIZED HEALTH CARE EDUCATION/TRAINING PROGRAM

## 2018-05-04 NOTE — PROGRESS NOTES
Pulmonary Clinic: Initial Consultation        HPI     63 y/o female with a 100 pack year history of tobacco (actively smoking)/COPD (on 2L of oxygen at baseline), Diastolic Heart Failure (EF-50%), HTN, and GERD who presents as a referral by her PCP + HF Specialist for optimization of COPD in the setting of a recent ECHO showing evidence of pulmonary hypertension.    Of note, the patient was previously seen by Dr. Patterson several years ago and in [facility] by Dr. Newsome/Dr. Cespedes back in 2016. She recently had an ECHO in February of this year with a PASP-41 + RV enlargement/systolic dysfunction.  She was subsequently seen by HF/PHTN Specialist (Dr. Saini), who was considering WHO GROUP 2 vs. GROUP 3 PH.  She was labeled as having heart failure (started on lasix/lisinopril).  There were talks of proceedings with a RHC for further elucidation.  In the interim, she was admitted for a COPD exacerbation in April: received antibiotics/steroids and discharged on ICS/LABA (BREO) + RACHEL (PRN) + Continuous Oxygen (prior to this only using at night).  Prior to this hosiptializaiton she was only taking Combivent/Albuterol.  She had a repeat ECHO 4/30/18 with RV enlargement/nmormal systolic function and EF-50%.  PFTS with obstruction, restriction and decreased DLCO (5/3/18).  6MW from 3/18: with significant desaturations during rests and with exercise). Last X-Ray 4/18: hyperlucent lungs with flattened diaphragms.  She was seen shortly after by Dr. Saini who believed her PH was most likely 2/2 WHO GROUP 3 with decision to hold off on proceeding with a RHC.  He recommended focus on COPD with a pulmonary consult.    She is in clinic today not endorsing any fevers, chills, cough, sputum production or wheezing.  Currently, she is using BREO (ICS/LABA) once/day + Albuterol inhaler PRN (1-2x/week).  She received her pneumonia vaccine, didn't get influenza shot this year.  She has been wearing oxygen continuously since hospital  "discharge.  In regards to her heart failure regimen, she is down four pounds and taking lasix/lisionpril daily.    Past Surgical History:   Procedure Laterality Date    BREAST BIOPSY Right     core     SECTION       Review of patient's allergies indicates:   Allergen Reactions    No known drug allergies        Review of Systems      Negative, except per HPI    Objective:       Vitals:    18 1411   BP: 128/68   Pulse: 64   SpO2: (!) 94%  Comment: 2 liters   Weight: 52.6 kg (116 lb)   Height: 5' 3" (1.6 m)       Physical Exam     Gen: AAOx3, NAD  Head: NC/AT  Eyes: PERRLA, EOMI  Ears: no discharge  Nose: no nasal polyps  Throat: no tonsillar exudate  Neck: no masses  Lungs: decreased breath sounds bilaterally, no wheezing  Cardiac: S1/S2=normal, no murmurs, rubs or gallops  Abdomen: soft, nontender, nondistended, BS+  Extremities: no edema  Skin: no rashes        Assessment/Plan:     61 y/o female with a 100 pack year history of tobacco (actively smoking)/COPD (on 2L of oxygen at baseline), Diastolic Heart Failure (EF-50%), HTN, and GERD who presents as a referral by her PCP + HF Specialist for optimization of COPD in the setting of a recent ECHO showing evidence of pulmonary hypertension.    COPD, in the setting of elevated PASP on ECHO with new onset heart failure    -Of note, the patient was previously seen by Dr. Patterson several years ago and in [facility] by Dr. Newsome/Dr. Coy pate in 2016. She recently had an ECHO in February of this year with a PASP-41 + RV enlargement/systolic dysfunction.  She was subsequently seen by HF/PHTN Specialist (Dr. Saini), who was considering WHO GROUP 2 vs. GROUP 3 PH.  She was labeled as having heart failure (started on lasix/lisinopril).  There were talks of proceedings with a RHC for further elucidation.  In the interim, she was admitted for a COPD exacerbation in April: received antibiotics/steroids and discharged on ICS/LABA (BREO) + RACHEL (PRN) + " Continuous Oxygen (prior to this only using at night).  Prior to this hosiptializaiton she was only taking Combivent/Albuterol.  She had a repeat ECHO 4/30/18 with RV enlargement/nmormal systolic function and EF-50%.  PFTS with obstruction, restriction and decreased DLCO (5/3/18).  6MW from 3/18: with significant desaturations during rests and with exercise. Last X-Ray 4/18: hyperlucent lungs with flattened diaphragms.  She was seen shortly after by Dr. Saini who believed her PH was most likely 2/2 WHO GROUP 3 with decision to hold off on proceeding with a RHC.  He recommended focus on COPD with a pulmonary consult.    -Not in a COPD or Heart Failure exascerbation in clinic.  Resting Pulse Oxmietry with SpO2 of 89% (on room air), with exercise down to 86%.      Compliant with ICS/LABA (BREO)+ Albuterol Inhaler PRN.  Up to date on pneumonia vaccine.      -Agree with holding off on RHC as PH most likely combination of heart failure and COPD (has made drastic improvements with diuresis/afterload reduction and current inhaler regimen)    -Encouraged patient to stop smoking (enrolled in smoking cessation class, hasn't attended) and placed orders for Pulmonary Rehab and Low Dose CT      RTC in 3 months with repeat PFTs and 6MW Test (possibility of lung transplant referral discussed with patient, needs to STOP smoking)      Boogie Abdullahi MD, MSc  Pulmonary/Critical Care Fellow

## 2018-05-07 ENCOUNTER — TELEPHONE (OUTPATIENT)
Dept: SMOKING CESSATION | Facility: CLINIC | Age: 63
End: 2018-05-07

## 2018-05-07 NOTE — TELEPHONE ENCOUNTER
Left a message with the patient that this is a follow up on reduction strategy and medication use. Left my name Argelia Norman and phone number [phone].

## 2018-06-19 ENCOUNTER — OFFICE VISIT (OUTPATIENT)
Dept: INTERNAL MEDICINE | Facility: CLINIC | Age: 63
End: 2018-06-19
Payer: COMMERCIAL

## 2018-06-19 VITALS
WEIGHT: 121.06 LBS | HEIGHT: 63 IN | SYSTOLIC BLOOD PRESSURE: 90 MMHG | BODY MASS INDEX: 21.45 KG/M2 | HEART RATE: 73 BPM | DIASTOLIC BLOOD PRESSURE: 64 MMHG | OXYGEN SATURATION: 92 %

## 2018-06-19 DIAGNOSIS — K63.5 POLYP OF COLON, UNSPECIFIED PART OF COLON, UNSPECIFIED TYPE: ICD-10-CM

## 2018-06-19 DIAGNOSIS — I27.20 PULMONARY HYPERTENSION: ICD-10-CM

## 2018-06-19 DIAGNOSIS — J42 CHRONIC BRONCHITIS, UNSPECIFIED CHRONIC BRONCHITIS TYPE: ICD-10-CM

## 2018-06-19 DIAGNOSIS — Z72.0 TOBACCO ABUSE: Primary | ICD-10-CM

## 2018-06-19 DIAGNOSIS — I27.81 COR PULMONALE, CHRONIC: ICD-10-CM

## 2018-06-19 PROCEDURE — 99214 OFFICE O/P EST MOD 30 MIN: CPT | Mod: S$GLB,,, | Performed by: INTERNAL MEDICINE

## 2018-06-19 PROCEDURE — 99999 PR PBB SHADOW E&M-EST. PATIENT-LVL III: CPT | Mod: PBBFAC,,, | Performed by: INTERNAL MEDICINE

## 2018-06-19 PROCEDURE — 3074F SYST BP LT 130 MM HG: CPT | Mod: CPTII,S$GLB,, | Performed by: INTERNAL MEDICINE

## 2018-06-19 PROCEDURE — 3078F DIAST BP <80 MM HG: CPT | Mod: CPTII,S$GLB,, | Performed by: INTERNAL MEDICINE

## 2018-06-19 NOTE — PROGRESS NOTES
Answers for HPI/ROS submitted by the patient on 6/18/2018   activity change: No  unexpected weight change: No  neck pain: No  hearing loss: No  rhinorrhea: No  trouble swallowing: No  eye discharge: No  visual disturbance: No  chest tightness: Yes  wheezing: No  chest pain: No  palpitations: Yes  blood in stool: No  constipation: No  vomiting: No  diarrhea: No  polydipsia: No  polyuria: No  difficulty urinating: No  hematuria: No  menstrual problem: No  dysuria: No  joint swelling: Yes  arthralgias: No  headaches: No  weakness: No  confusion: No  dysphoric mood: Yes

## 2018-06-20 NOTE — PROGRESS NOTES
HISTORY OF PRESENT ILLNESS:  She is a 62-year-old female with pulmonary   hypertension and COPD and unfortunately continued tobacco abuse.  She was   hospitalized in April for COPD exacerbation.  The echo show significant   pulmonary hypertension with PA pressure over 41.  She has been seen by both   Pulmonary Hypertension and Pulmonary Clinic.  She is on a LABA, an inhaled   corticosteroid and RACHEL rescue inhaler.  She is tolerating it well.  She is on   24-hour oxygen 2 liters per nasal cannula.  She also has Lasix on a p.r.n. basis   depending on her weight gain and she is doing pretty well.  She has some   paperwork for me to fill out for her disability.  She still gets short of breath   when she goes downstairs.  She is wearing oxygen today.  Pulse ox 93% on 2 L.    She does report that she is still occasionally smoking.  She is working with   smoking cessation clinic and she needs to try to quit smoking.  ____ last in   May, but she is working to stop this.  She has colon polyps, had a colonoscopy   in 2011 showing one polyp, which was adenomatous.  She is due for a colonoscopy   in 2016, got pushed back and she has not had a colonoscopy yet.  No GI   complaints today.    PHYSICAL EXAMINATION:  GENERAL:  She is a well-appearing, but looks older than stated age, 62-year-old   female.  She has a granddaughter in room today.  NECK:  Supple.  She has no JVD.  Thyroid is not enlarged.  CARDIOVASCULAR:  S1 and S2, regular rate and rhythm.  LUNGS:  Sounds are decreased bilaterally.  ABDOMEN:  Soft, nontender.  LOWER EXTREMITIES:  No edema.    On her review of systems she mentioned chest tightness.  We asked about this and   just when she climbs stairs, she gets a little short of breath.  She does not   wear oxygen, and at times she does feel a little depressed, but that is getting   better.  No actual chest pain.  No PND.    ASSESSMENT:  Tobacco abuse, pulmonary hypertension, cor pulmonale, chronic   bronchitis, COPD  and colon polyp.    PLAN:  We are going to go ahead and get her set up for colonoscopy.  Her forms   were filled out.  She is going to continue on albuterol p.r.n., her Breo and   DuoNeb as needed.  We will set her up for colonoscopy.  Her blood pressure is   90/64, but she is doing well.  Watch out for dizziness.  Continue lisinopril,   hydrochlorothiazide and Lasix on a p.r.n. depending on her weight.  We discussed   smoking that needs to be stopped and what else we can do to help stop that.  I   will see her back in four months otherwise.      MIKE/NICKY  dd: 06/19/2018 08:59:18 (CDT)  td: 06/20/2018 00:47:13 (CDT)  Doc ID   #[id number]  Job ID #[id number]    CC:

## 2018-06-28 ENCOUNTER — PATIENT MESSAGE (OUTPATIENT)
Dept: INTERNAL MEDICINE | Facility: CLINIC | Age: 63
End: 2018-06-28

## 2018-06-29 ENCOUNTER — OFFICE VISIT (OUTPATIENT)
Dept: INTERNAL MEDICINE | Facility: CLINIC | Age: 63
End: 2018-06-29
Payer: COMMERCIAL

## 2018-06-29 VITALS
HEART RATE: 86 BPM | DIASTOLIC BLOOD PRESSURE: 86 MMHG | BODY MASS INDEX: 21.71 KG/M2 | SYSTOLIC BLOOD PRESSURE: 120 MMHG | WEIGHT: 122.56 LBS | HEIGHT: 63 IN | OXYGEN SATURATION: 88 %

## 2018-06-29 DIAGNOSIS — I27.20 PULMONARY HYPERTENSION: ICD-10-CM

## 2018-06-29 DIAGNOSIS — J42 CHRONIC BRONCHITIS, UNSPECIFIED CHRONIC BRONCHITIS TYPE: ICD-10-CM

## 2018-06-29 DIAGNOSIS — J44.89 OBSTRUCTIVE CHRONIC BRONCHITIS WITHOUT EXACERBATION: ICD-10-CM

## 2018-06-29 DIAGNOSIS — J44.1 OBSTRUCTIVE CHRONIC BRONCHITIS WITH EXACERBATION: ICD-10-CM

## 2018-06-29 DIAGNOSIS — Z72.0 TOBACCO ABUSE: Primary | ICD-10-CM

## 2018-06-29 PROCEDURE — 3074F SYST BP LT 130 MM HG: CPT | Mod: CPTII,S$GLB,, | Performed by: INTERNAL MEDICINE

## 2018-06-29 PROCEDURE — 99213 OFFICE O/P EST LOW 20 MIN: CPT | Mod: S$GLB,,, | Performed by: INTERNAL MEDICINE

## 2018-06-29 PROCEDURE — 3079F DIAST BP 80-89 MM HG: CPT | Mod: CPTII,S$GLB,, | Performed by: INTERNAL MEDICINE

## 2018-06-29 PROCEDURE — 99999 PR PBB SHADOW E&M-EST. PATIENT-LVL III: CPT | Mod: PBBFAC,,, | Performed by: INTERNAL MEDICINE

## 2018-06-29 PROCEDURE — 3008F BODY MASS INDEX DOCD: CPT | Mod: CPTII,S$GLB,, | Performed by: INTERNAL MEDICINE

## 2018-07-01 DIAGNOSIS — Z91.89 AT HIGH RISK FOR RESPIRATORY INFECTION: ICD-10-CM

## 2018-07-02 RX ORDER — IPRATROPIUM BROMIDE AND ALBUTEROL SULFATE 2.5; .5 MG/3ML; MG/3ML
SOLUTION RESPIRATORY (INHALATION)
Qty: 180 ML | Refills: 9 | Status: SHIPPED | OUTPATIENT
Start: 2018-07-02 | End: 2019-01-24 | Stop reason: SDUPTHER

## 2018-07-05 NOTE — PROGRESS NOTES
HISTORY OF PRESENT ILLNESS:  The notes says preop, which ___, but Ms. Byrd is   not getting surgery.  She has tobacco abuse issues and COPD with some pulmonary   hypertensions.  PA pressure is over 41.  She was hospitalized and being followed   in Pulmonary Clinic.  She is on LABA inhaled corticosteroid and her RACHEL rescue   inhaler.  She has been doing pretty well.  She is on 2 liters of oxygen 24/7   and she actually brings this more paperwork for me to fill out for her job.  She   was hospitalized in April with acute respiratory failure.  Other medical   problems include colon polyps.    PHYSICAL EXAMINATION:  GENERAL:  Today, she looks well.  VITAL SIGNS:  Blood pressure is stable.  NECK:  Supple.  She has no JVD.  Thyroid is not enlarged.  CARDIOVASCULAR:  S1, S2.  ABDOMEN:  Soft.  LUNGS:  Clear, decreased breath sounds bilaterally.  LOWER EXTREMITIES:  Trace edema.    She is still smoking.  We discussed this.  She is trying to quit.  She cut down   to about 3 cigarettes a day and never smoking ___ and she understands that this   may cause a very painful horrible death.    On her review of systems she mentioned chest tightness.  We asked about this and   just when she climbs stairs, she gets a little short of breath.  She does not   wear oxygen, and at times she does feel a little depressed, but that is getting   better.  No actual chest pain.  No PND.    ASSESSMENT:  Tobacco use, pulmonary hypertension, COPD, cor pulmonale and colon   polyps.    PLAN:  Forms are filled out today for her.  We are going to try to get her back   into the Smoking Cessation Clinic to stop smoking and will have her follow up   again in about four months or sooner if she needs anything.      MIKE/IN  dd: 07/04/2018 22:30:40 (CDT)  td: 07/05/2018 13:31:53 (CDT)  Doc ID   #[id number]  Job ID #[id number]    CC:       Answers for HPI/ROS submitted by the patient on 6/28/2018   activity change: No  unexpected weight change: No  neck pain:  No  hearing loss: No  rhinorrhea: No  trouble swallowing: No  eye discharge: Yes  visual disturbance: No  chest tightness: Yes  wheezing: No  chest pain: No  palpitations: Yes  blood in stool: No  constipation: No  vomiting: No  diarrhea: No  polydipsia: No  polyuria: No  difficulty urinating: No  hematuria: No  menstrual problem: No  dysuria: No  joint swelling: No  arthralgias: No  headaches: No  weakness: No  confusion: No  dysphoric mood: Yes

## 2018-07-09 ENCOUNTER — TELEPHONE (OUTPATIENT)
Dept: SMOKING CESSATION | Facility: CLINIC | Age: 63
End: 2018-07-09

## 2018-07-11 ENCOUNTER — PATIENT MESSAGE (OUTPATIENT)
Dept: INTERNAL MEDICINE | Facility: CLINIC | Age: 63
End: 2018-07-11

## 2018-07-11 ENCOUNTER — TELEPHONE (OUTPATIENT)
Dept: ENDOSCOPY | Facility: HOSPITAL | Age: 63
End: 2018-07-11

## 2018-07-11 NOTE — TELEPHONE ENCOUNTER
Contacted Pt to schedule Colonoscopy, no answer, left message for Pt to call back to schedule, number provided [phone].

## 2018-07-12 ENCOUNTER — TELEPHONE (OUTPATIENT)
Dept: SMOKING CESSATION | Facility: CLINIC | Age: 63
End: 2018-07-12

## 2018-07-16 ENCOUNTER — PATIENT MESSAGE (OUTPATIENT)
Dept: ENDOSCOPY | Facility: HOSPITAL | Age: 63
End: 2018-07-16

## 2018-07-17 ENCOUNTER — TELEPHONE (OUTPATIENT)
Dept: INTERNAL MEDICINE | Facility: CLINIC | Age: 63
End: 2018-07-17

## 2018-07-17 NOTE — TELEPHONE ENCOUNTER
Message left for pt to call office SHAQUILLE Rowe got a noticed that patient cancelled her appointment.  Please see if she is feeling better or if she needs to reschedule.

## 2018-07-17 NOTE — TELEPHONE ENCOUNTER
I got a noticed that patient cancelled her appointment.  Please call to see if she is feeling better or if she needs to reschedule.

## 2018-08-09 ENCOUNTER — PATIENT MESSAGE (OUTPATIENT)
Dept: INTERNAL MEDICINE | Facility: CLINIC | Age: 63
End: 2018-08-09

## 2018-08-15 ENCOUNTER — TELEPHONE (OUTPATIENT)
Dept: INTERNAL MEDICINE | Facility: CLINIC | Age: 63
End: 2018-08-15

## 2018-08-15 NOTE — TELEPHONE ENCOUNTER
----- Message from Chantale Fischer sent at 8/15/2018  2:06 PM CDT -----  Contact: [facility] [phone]  Prior Authorization Needed    Medication: ipratropium-albuterol (COMBIVENT)  mcg/actuation inhaler    Pharmacy Info: [facility] [address] [place] [address] [facility] AT [facility]    Plan does not cover this medication. Please call plan at [phone] to initiate prior authorization or call/fax pharmacy to change medication. Patient ID#[id number]    Note chart when prior authorization has been submitted.    Please notify pharmacy when prior authorization has been approved.    Thank You

## 2018-08-16 ENCOUNTER — TELEPHONE (OUTPATIENT)
Dept: SMOKING CESSATION | Facility: CLINIC | Age: 63
End: 2018-08-16

## 2018-08-16 NOTE — TELEPHONE ENCOUNTER
3rd attempt left message regarding smoking cessation quit 2 episode phone follow up. Will complete and resolve smart form for 3 attempts.

## 2018-08-21 ENCOUNTER — TELEPHONE (OUTPATIENT)
Dept: INTERNAL MEDICINE | Facility: CLINIC | Age: 63
End: 2018-08-21

## 2018-08-21 NOTE — TELEPHONE ENCOUNTER
----- Message from Rosamaria KAYLEEN Bashir sent at 8/20/2018  4:39 PM CDT -----  Contact: PT Portal Request  Appointment Request From: Rochelle Espinoza    With Provider: Yosi Samuels MD [[facility] - Internal Medicine]    Preferred Date Range: Any    Preferred Times: Any time    Reason for visit: Existing Patient    Comments:  I'm having pains in my chest and under my arms.

## 2018-08-22 ENCOUNTER — OFFICE VISIT (OUTPATIENT)
Dept: INTERNAL MEDICINE | Facility: CLINIC | Age: 63
End: 2018-08-22
Payer: MEDICAID

## 2018-08-22 VITALS
OXYGEN SATURATION: 94 % | HEART RATE: 91 BPM | BODY MASS INDEX: 22.07 KG/M2 | HEIGHT: 63 IN | WEIGHT: 124.56 LBS | DIASTOLIC BLOOD PRESSURE: 86 MMHG | SYSTOLIC BLOOD PRESSURE: 120 MMHG

## 2018-08-22 DIAGNOSIS — Z72.0 TOBACCO ABUSE: Primary | ICD-10-CM

## 2018-08-22 PROCEDURE — 99214 OFFICE O/P EST MOD 30 MIN: CPT | Mod: S$PBB,,, | Performed by: INTERNAL MEDICINE

## 2018-08-22 PROCEDURE — 99214 OFFICE O/P EST MOD 30 MIN: CPT | Mod: PBBFAC | Performed by: INTERNAL MEDICINE

## 2018-08-22 PROCEDURE — 99999 PR PBB SHADOW E&M-EST. PATIENT-LVL IV: CPT | Mod: PBBFAC,,, | Performed by: INTERNAL MEDICINE

## 2018-08-22 RX ORDER — LOSARTAN POTASSIUM AND HYDROCHLOROTHIAZIDE 12.5; 5 MG/1; MG/1
1 TABLET ORAL DAILY
Qty: 90 TABLET | Refills: 3 | Status: SHIPPED | OUTPATIENT
Start: 2018-08-22 | End: 2019-03-16 | Stop reason: SDUPTHER

## 2018-08-22 RX ORDER — OMEPRAZOLE 40 MG/1
40 CAPSULE, DELAYED RELEASE ORAL DAILY
Qty: 90 CAPSULE | Refills: 3 | Status: SHIPPED | OUTPATIENT
Start: 2018-08-22 | End: 2019-03-16 | Stop reason: SDUPTHER

## 2018-08-22 NOTE — PROGRESS NOTES
Rochelle Espinoza  1955        Subjective     Chief Complaint: Chest Pain    History of Present Illness:  Ms. Rochelle Espinoza is a 63 y.o. female with a history of CHF, COPD (on home oxygen), GERD, and HTN who presents to clinic today for evaluation of chest pain. The patient states that she first noticed chest pain 1 week ago while laying in bed. She characterized it as a chest tightness over her left anterior chest and a twinging, burning sensation below her left breast, laterally. The pain subsided on it's own within minutes. The pain is not reproducible and is not associated with exercise. She is able to walk up and down stairs without CP or acute onset of shortness of breath. Today she was able to walk from the parking lot to the clinic today without any chest discomfort or SOB. The pain is not associated with inspiration. She is endorsing frequent belching which improves the pain. She has been experiencing the pain 1x per day for the past week. She is on prilosec 20mg per day for reflux and she began taking it twice a day since the pain started and she reports the pain goes away with the increased reflux medication. When she takes more reflux medication she states she does not have the pain any more that day. She has recently been going off her diet, last week having beef for the first time in years and having some heavy meals of pasta lately as well. The pain is associated with laying flat in bed. She denies metallic taste in her mouth and she has a chronic cough productive of clear sputum 2/2 COPD which has not acutely changed.    She is also endorsing some minor lower lip swelling and believes it to be associated with her lisinopril. She denies wheezing, itching, or acute shortness of breath outside of her baseline COPD requiring O2    Review of Systems   Constitutional: Negative for chills, diaphoresis, fever and weight loss.   HENT: Negative for hearing loss and sore throat.    Eyes: Negative for blurred  vision and discharge.   Respiratory: Negative for cough, shortness of breath, wheezing and stridor.    Cardiovascular: Positive for chest pain and palpitations. Negative for leg swelling.   Gastrointestinal: Negative for abdominal pain, blood in stool, constipation, diarrhea and vomiting.   Genitourinary: Negative for dysuria, flank pain, hematuria and urgency.   Musculoskeletal: Negative for myalgias and neck pain.   Skin: Negative for itching and rash.   Neurological: Negative for dizziness, weakness and headaches.   Endo/Heme/Allergies: Negative for polydipsia. Does not bruise/bleed easily.   Psychiatric/Behavioral: Negative for depression.       PAST HISTORY:     Past Medical History:   Diagnosis Date    CHF (congestive heart failure)     COPD (chronic obstructive pulmonary disease)     Herpes zoster without mention of complication 2011    Hypertension     Leg edema     Pulmonary hypertension        Past Surgical History:   Procedure Laterality Date    BREAST BIOPSY Right     core     SECTION         Family History   Problem Relation Age of Onset    Heart disease Mother     Heart disease Paternal Uncle        Social History     Socioeconomic History    Marital status:      Spouse name: None    Number of children: None    Years of education: None    Highest education level: None   Social Needs    Financial resource strain: None    Food insecurity - worry: None    Food insecurity - inability: None    Transportation needs - medical: None    Transportation needs - non-medical: None   Occupational History    Occupation:    Tobacco Use    Smoking status: Current Every Day Smoker     Packs/day: 1.00     Years: 40.00     Pack years: 40.00     Types: Cigarettes    Smokeless tobacco: Never Used   Substance and Sexual Activity    Alcohol use: Yes     Comment: beer daily 2-3 daily, none today    Drug use: No    Sexual activity: Not Currently     Birth  "control/protection: None   Other Topics Concern    None   Social History Narrative    None       MEDICATIONS & ALLERGIES:     Current Outpatient Medications on File Prior to Visit   Medication Sig    albuterol (PROAIR HFA) 90 mcg/actuation inhaler TAKE 2 PUFFS BY MOUTH EVERY 6 HOURS AS NEEDED    albuterol-ipratropium (DUO-NEB) 2.5 mg-0.5 mg/3 mL nebulizer solution USE 1 VIAL VIA NEBULIZER EVERY 4 HOURS AS NEEDED FOR WHEEZING OR SHORTNESS OF BREATH    fluticasone-vilanterol (BREO) 100-25 mcg/dose diskus inhaler Inhale 1 puff into the lungs once daily. Controller    glycerin adult suppository Place 1 suppository rectally as needed for Constipation.    ipratropium-albuterol (COMBIVENT)  mcg/actuation inhaler Inhale 1 puff into the lungs 4 (four) times daily. Rescue    multivitamin capsule Take 1 capsule by mouth once daily.    nicotine (NICODERM CQ) 21 mg/24 hr Place 1 patch onto the skin once daily.    [DISCONTINUED] lisinopril-hydrochlorothiazide (PRINZIDE,ZESTORETIC) 20-12.5 mg per tablet Take 1 tablet by mouth once daily.    [DISCONTINUED] omeprazole (PRILOSEC) 20 MG capsule Take 1 capsule (20 mg total) by mouth once daily.    furosemide (LASIX) 20 MG tablet Take 1 tablet (20 mg total) by mouth daily as needed. For weight gain of 2-3 pounds over 24 hours or 5 pounds in one week.     No current facility-administered medications on file prior to visit.        Review of patient's allergies indicates:   Allergen Reactions    No known drug allergies        OBJECTIVE:     Vital Signs:  Vitals:    08/22/18 1058   BP: 120/86   BP Location: Left arm   Patient Position: Sitting   BP Method: Large (Manual)   Pulse: 91   SpO2: (!) 94%   Weight: 56.5 kg (124 lb 9 oz)   Height: 5' 3" (1.6 m)       Body mass index is 22.06 kg/m².     Physical Exam:  General:  Well developed, well nourished, no acute distress  Head: Normocephalic, atraumatic  Eyes: PERRL, EOMI, clear sclera  Neck: supple, normal ROM, no " thyromegaly   CVS:  RRR, S1 and S2 normal, no murmurs, rubs, gallops, 2+ peripheral pulses  Resp:  Lungs clear to auscultation, no wheezes, rales, rhonchi, diffusely decreased breath sounds  GI:  Abdomen soft, non-tender, non-distended, normoactive bowel sounds  MSK:  No muscle atrophy, cyanosis, peripheral edema   Skin:  No rashes, ulcers, erythema      Laboratory  Lab Results   Component Value Date    WBC 12.40 04/12/2018    HGB 15.9 04/12/2018    HCT 48.7 (H) 04/12/2018    MCV 90 04/12/2018     04/12/2018     Lab Results   Component Value Date    GLU 83 04/12/2018     04/12/2018    K 3.7 04/12/2018    CL 99 04/12/2018    CO2 35 (H) 04/12/2018    BUN 9 04/12/2018    CREATININE 0.6 04/12/2018    CALCIUM 8.5 (L) 04/12/2018    MG 1.8 04/12/2018     Lab Results   Component Value Date    INR 1.0 04/11/2018    INR 1.1 04/10/2018     Lab Results   Component Value Date    HGBA1C 5.2 04/11/2018     No results for input(s): POCTGLUCOSE in the last 72 hours.    Diagnostic Results:  Labs: Reviewed    ASSESSMENT & PLAN:   Ms. Rochelle Espinoza is a 63 y.o. female with a history of reflux, CHF, and COPD requiring home oxygen that presents to clinic today for evaluation of chest pain.    Chest Pain  The patient's chest pain is atypical. It is non-exertional and nonreproducible. Further it is not pleuritic. Per history it is worst at night and improved with prilosec. Given patient's recent noncompliance with GERD diet and history the patient's pain is most likely noncardio-pulmonary and is 2/2 reflux.        - Increased omeprazole to 40mg once daily, will reassess if symptoms do not improve        - Lisinopril-HCTZ switched to losartan-HCTZ 50-12.5mg given patient's concern about adverse affect of ACEi.    Tobacco abuse        - Patient is currently still smoking despite previous attempts to quit and counseling. Today asking for referral to smoking cessation class.  -     Ambulatory referral to Smoking Cessation  Program      RTC as needed    Blaine Villanueva MD  Internal Medicine PGY1  [address] [place], [place] [id number] [phone]  Attending Physician: Dr. Samuels

## 2018-08-23 NOTE — PROGRESS NOTES
"I have personally taken the history and examined this patient and agree with the resident's note as stated above with the following thoughts:  /86 (BP Location: Left arm, Patient Position: Sitting, BP Method: Large (Manual))   Pulse 91   Ht 5' 3" (1.6 m)   Wt 56.5 kg (124 lb 9 oz)   LMP 11/21/2013   SpO2 (!) 94%   BMI 22.06 kg/m²     Discussed changing diet to avoid foods that cause reflux. CP is very atypical.  WIll monitor and we discussed symptoms that would be more worrisome and that she should let us know if she has them      Answers for HPI/ROS submitted by the patient on 8/21/2018   activity change: No  unexpected weight change: Yes  neck pain: No  hearing loss: No  rhinorrhea: No  trouble swallowing: No  eye discharge: No  visual disturbance: No  chest tightness: Yes  wheezing: No  chest pain: Yes  palpitations: Yes  blood in stool: No  constipation: No  vomiting: No  diarrhea: No  polydipsia: No  polyuria: No  difficulty urinating: No  hematuria: No  menstrual problem: No  dysuria: No  joint swelling: Yes  arthralgias: No  headaches: No  weakness: No  confusion: No  dysphoric mood: Yes    "

## 2018-09-04 ENCOUNTER — TELEPHONE (OUTPATIENT)
Dept: INTERNAL MEDICINE | Facility: CLINIC | Age: 63
End: 2018-09-04

## 2018-09-04 NOTE — TELEPHONE ENCOUNTER
----- Message from Chantale Fischer sent at 9/4/2018 11:43 AM CDT -----  Contact: Ke [phone]  Prior Authorization Needed    Medication: colchicine 0.6 mg tablet     Pharmacy Info: [facility] [address] [place] [address] [address] [facility] [address]    Plan does not cover this medication. Please call plan at [phone] to initiate prior authorization or call/fax pharmacy to change medication. Patient ID#[id number]    Note chart when prior authorization has been submitted.    Please notify pharmacy when prior authorization has been approved.    Thank You

## 2018-09-10 ENCOUNTER — TELEPHONE (OUTPATIENT)
Dept: SMOKING CESSATION | Facility: CLINIC | Age: 63
End: 2018-09-10

## 2018-09-10 NOTE — TELEPHONE ENCOUNTER
Left message with my name Argelia Norman and phone number [phone] about missed appointment to start the tobacco cessation program.

## 2018-09-26 ENCOUNTER — TELEPHONE (OUTPATIENT)
Dept: SMOKING CESSATION | Facility: CLINIC | Age: 63
End: 2018-09-26

## 2018-09-26 NOTE — TELEPHONE ENCOUNTER
Left message with my name Argelia Norman and phone number [phone] about missed appointment to start the tobacco cessation program.

## 2018-10-10 ENCOUNTER — TELEPHONE (OUTPATIENT)
Dept: SMOKING CESSATION | Facility: CLINIC | Age: 63
End: 2018-10-10

## 2018-10-10 NOTE — TELEPHONE ENCOUNTER
Left message with my name Argelia Norman and phone number [phone] about missed appointment to start the tobacco cessation program.

## 2018-10-18 RX ORDER — IPRATROPIUM BROMIDE AND ALBUTEROL 20; 100 UG/1; UG/1
SPRAY, METERED RESPIRATORY (INHALATION)
Qty: 4 G | Refills: 0 | Status: SHIPPED | OUTPATIENT
Start: 2018-10-18 | End: 2018-11-10 | Stop reason: SDUPTHER

## 2018-10-21 DIAGNOSIS — M10.9 GOUT, ARTHROPATHY: ICD-10-CM

## 2018-10-21 RX ORDER — COLCHICINE 0.6 MG/1
TABLET ORAL
Qty: 3 TABLET | Refills: 0 | Status: SHIPPED | OUTPATIENT
Start: 2018-10-21 | End: 2018-10-22 | Stop reason: SDUPTHER

## 2018-10-22 DIAGNOSIS — M10.9 GOUT, ARTHROPATHY: ICD-10-CM

## 2018-10-22 RX ORDER — COLCHICINE 0.6 MG/1
TABLET ORAL
Qty: 12 TABLET | Refills: 2 | Status: SHIPPED | OUTPATIENT
Start: 2018-10-22 | End: 2019-03-16 | Stop reason: SDUPTHER

## 2018-10-26 DIAGNOSIS — J20.9 ACUTE BRONCHITIS, UNSPECIFIED ORGANISM: ICD-10-CM

## 2018-10-26 RX ORDER — ALBUTEROL SULFATE 90 UG/1
AEROSOL, METERED RESPIRATORY (INHALATION)
Qty: 18 G | Refills: 0 | Status: SHIPPED | OUTPATIENT
Start: 2018-10-26 | End: 2019-01-24

## 2018-11-07 ENCOUNTER — TELEPHONE (OUTPATIENT)
Dept: SMOKING CESSATION | Facility: CLINIC | Age: 63
End: 2018-11-07

## 2018-11-11 RX ORDER — IPRATROPIUM BROMIDE AND ALBUTEROL 20; 100 UG/1; UG/1
SPRAY, METERED RESPIRATORY (INHALATION)
Qty: 4 G | Refills: 0 | Status: SHIPPED | OUTPATIENT
Start: 2018-11-11 | End: 2018-12-17 | Stop reason: SDUPTHER

## 2018-11-21 DIAGNOSIS — J20.9 ACUTE BRONCHITIS, UNSPECIFIED ORGANISM: ICD-10-CM

## 2018-11-21 RX ORDER — ALBUTEROL SULFATE 90 UG/1
AEROSOL, METERED RESPIRATORY (INHALATION)
Qty: 18 G | Refills: 0 | OUTPATIENT
Start: 2018-11-21

## 2018-11-21 NOTE — TELEPHONE ENCOUNTER
Not a patient of [facility]; will forward to Dr. Samuels

## 2018-11-24 DIAGNOSIS — J98.01 BRONCHOSPASM, ACUTE: ICD-10-CM

## 2018-11-24 DIAGNOSIS — J44.9 CHRONIC OBSTRUCTIVE PULMONARY DISEASE WITH HYPOXIA: ICD-10-CM

## 2018-11-24 DIAGNOSIS — J20.9 ACUTE BRONCHITIS, UNSPECIFIED ORGANISM: ICD-10-CM

## 2018-11-25 RX ORDER — ALBUTEROL SULFATE 90 UG/1
AEROSOL, METERED RESPIRATORY (INHALATION)
Qty: 18 G | Refills: 0 | OUTPATIENT
Start: 2018-11-25

## 2018-11-26 RX ORDER — ALBUTEROL SULFATE 90 UG/1
AEROSOL, METERED RESPIRATORY (INHALATION)
Qty: 8.5 G | Refills: 9 | Status: SHIPPED | OUTPATIENT
Start: 2018-11-26 | End: 2019-03-16 | Stop reason: SDUPTHER

## 2018-11-28 ENCOUNTER — TELEPHONE (OUTPATIENT)
Dept: SMOKING CESSATION | Facility: CLINIC | Age: 63
End: 2018-11-28

## 2018-12-11 ENCOUNTER — TELEPHONE (OUTPATIENT)
Dept: SMOKING CESSATION | Facility: CLINIC | Age: 63
End: 2018-12-11

## 2018-12-17 RX ORDER — IPRATROPIUM BROMIDE AND ALBUTEROL 20; 100 UG/1; UG/1
SPRAY, METERED RESPIRATORY (INHALATION)
Qty: 4 G | Refills: 4 | Status: SHIPPED | OUTPATIENT
Start: 2018-12-17 | End: 2019-03-16 | Stop reason: SDUPTHER

## 2019-01-24 ENCOUNTER — OFFICE VISIT (OUTPATIENT)
Dept: URGENT CARE | Facility: CLINIC | Age: 64
End: 2019-01-24

## 2019-01-24 VITALS
HEIGHT: 63 IN | HEART RATE: 92 BPM | BODY MASS INDEX: 21.26 KG/M2 | WEIGHT: 120 LBS | OXYGEN SATURATION: 90 % | TEMPERATURE: 99 F | SYSTOLIC BLOOD PRESSURE: 120 MMHG | DIASTOLIC BLOOD PRESSURE: 82 MMHG | RESPIRATION RATE: 18 BRPM

## 2019-01-24 DIAGNOSIS — J44.9 CHRONIC OBSTRUCTIVE PULMONARY DISEASE, UNSPECIFIED COPD TYPE: Primary | ICD-10-CM

## 2019-01-24 PROCEDURE — 94640 AIRWAY INHALATION TREATMENT: CPT | Mod: 59,S$GLB,, | Performed by: FAMILY MEDICINE

## 2019-01-24 PROCEDURE — 96372 PR INJECTION,THERAP/PROPH/DIAG2ST, IM OR SUBCUT: ICD-10-PCS | Mod: 59,S$GLB,, | Performed by: FAMILY MEDICINE

## 2019-01-24 PROCEDURE — 99214 OFFICE O/P EST MOD 30 MIN: CPT | Mod: 25,S$GLB,, | Performed by: FAMILY MEDICINE

## 2019-01-24 PROCEDURE — 94640 PR INHAL RX, AIRWAY OBST/DX SPUTUM INDUCT: ICD-10-PCS | Mod: 59,S$GLB,, | Performed by: FAMILY MEDICINE

## 2019-01-24 PROCEDURE — 90471 FLU VACCINE (QUAD) GREATER THAN OR EQUAL TO 3YO PRESERVATIVE FREE IM: ICD-10-PCS | Mod: 59,S$GLB,VFC, | Performed by: EMERGENCY MEDICINE

## 2019-01-24 PROCEDURE — 90471 IMMUNIZATION ADMIN: CPT | Mod: 59,S$GLB,VFC, | Performed by: EMERGENCY MEDICINE

## 2019-01-24 PROCEDURE — 99214 PR OFFICE/OUTPT VISIT, EST, LEVL IV, 30-39 MIN: ICD-10-PCS | Mod: 25,S$GLB,, | Performed by: FAMILY MEDICINE

## 2019-01-24 PROCEDURE — 90686 FLU VACCINE (QUAD) GREATER THAN OR EQUAL TO 3YO PRESERVATIVE FREE IM: ICD-10-PCS | Mod: SL,S$GLB,, | Performed by: EMERGENCY MEDICINE

## 2019-01-24 PROCEDURE — 90686 IIV4 VACC NO PRSV 0.5 ML IM: CPT | Mod: SL,S$GLB,, | Performed by: EMERGENCY MEDICINE

## 2019-01-24 PROCEDURE — 96372 THER/PROPH/DIAG INJ SC/IM: CPT | Mod: 59,S$GLB,, | Performed by: FAMILY MEDICINE

## 2019-01-24 RX ORDER — POTASSIUM CHLORIDE 20 MEQ/1
TABLET, EXTENDED RELEASE ORAL
Refills: 11 | COMMUNITY
Start: 2019-01-17 | End: 2019-03-16

## 2019-01-24 RX ORDER — ALBUTEROL SULFATE 0.83 MG/ML
2.5 SOLUTION RESPIRATORY (INHALATION)
Status: COMPLETED | OUTPATIENT
Start: 2019-01-24 | End: 2019-01-24

## 2019-01-24 RX ORDER — PREDNISONE 20 MG/1
40 TABLET ORAL DAILY
Qty: 10 TABLET | Refills: 0 | Status: SHIPPED | OUTPATIENT
Start: 2019-01-24 | End: 2019-01-29

## 2019-01-24 RX ORDER — IPRATROPIUM BROMIDE 0.5 MG/2.5ML
0.5 SOLUTION RESPIRATORY (INHALATION)
Status: COMPLETED | OUTPATIENT
Start: 2019-01-24 | End: 2019-01-24

## 2019-01-24 RX ORDER — DOXYCYCLINE 100 MG/1
100 CAPSULE ORAL 2 TIMES DAILY
Qty: 14 CAPSULE | Refills: 0 | Status: SHIPPED | OUTPATIENT
Start: 2019-01-24 | End: 2019-01-31

## 2019-01-24 RX ORDER — BETAMETHASONE SODIUM PHOSPHATE AND BETAMETHASONE ACETATE 3; 3 MG/ML; MG/ML
6 INJECTION, SUSPENSION INTRA-ARTICULAR; INTRALESIONAL; INTRAMUSCULAR; SOFT TISSUE
Status: COMPLETED | OUTPATIENT
Start: 2019-01-24 | End: 2019-01-24

## 2019-01-24 RX ADMIN — ALBUTEROL SULFATE 2.5 MG: 0.83 SOLUTION RESPIRATORY (INHALATION) at 08:01

## 2019-01-24 RX ADMIN — IPRATROPIUM BROMIDE 0.5 MG: 0.5 SOLUTION RESPIRATORY (INHALATION) at 08:01

## 2019-01-24 RX ADMIN — BETAMETHASONE SODIUM PHOSPHATE AND BETAMETHASONE ACETATE 6 MG: 3; 3 INJECTION, SUSPENSION INTRA-ARTICULAR; INTRALESIONAL; INTRAMUSCULAR; SOFT TISSUE at 08:01

## 2019-01-24 NOTE — PROGRESS NOTES
"Subjective:       Patient ID: Rochelle Espinoza is a 63 y.o. female.    Vitals:  height is 5' 3" (1.6 m) and weight is 54.4 kg (120 lb). Her oral temperature is 98.8 °F (37.1 °C). Her blood pressure is 120/82 and her pulse is 92. Her respiration is 18 and oxygen saturation is 90 (abnormal)%.     Chief Complaint: Cough    This is a 63 y.o. female who presents today with a chief complaint of   Cough since Tuesday. Chest tightness, back  Pain, hx of COPD. Pt took one pill of Doxycycline early this morning. And has been on Robitussin DM for cough with no relief       Cough   This is a new problem. The current episode started in the past 7 days. The problem has been gradually worsening. The problem occurs every few minutes. The cough is productive of sputum. Associated symptoms include headaches, postnasal drip, shortness of breath and wheezing. Pertinent negatives include no chills, ear pain, eye redness, fever, hemoptysis, myalgias, rash or sore throat. She has tried OTC cough suppressant (robituss dm) for the symptoms. The treatment provided no relief.       Constitution: Negative for chills, sweating, fatigue and fever.   HENT: Positive for congestion and postnasal drip. Negative for ear pain, sinus pain, sore throat and voice change.    Neck: Negative for painful lymph nodes.   Eyes: Negative for eye redness.   Respiratory: Positive for chest tightness, cough, sputum production, COPD, shortness of breath and wheezing. Negative for bloody sputum, stridor and asthma.    Gastrointestinal: Negative for nausea and vomiting.   Musculoskeletal: Negative for muscle ache.   Skin: Negative for rash.   Allergic/Immunologic: Negative for seasonal allergies and asthma.   Neurological: Positive for headaches.   Hematologic/Lymphatic: Negative for swollen lymph nodes.       Objective:      Physical Exam   Constitutional: She appears well-developed and well-nourished.   HENT:   Head: Normocephalic and atraumatic.   Eyes: EOM are " normal. Pupils are equal, round, and reactive to light.   Neck: Normal range of motion. Neck supple.   Cardiovascular: Normal rate, regular rhythm and normal heart sounds.   Pulmonary/Chest: Effort normal. No respiratory distress. She has no wheezes. She has no rales (poor airway sounds). She exhibits no tenderness.   Abdominal: Soft.   Nursing note and vitals reviewed.      Assessment:       1. Chronic obstructive pulmonary disease, unspecified COPD type        Plan:         Chronic obstructive pulmonary disease, unspecified COPD type  -     albuterol nebulizer solution 2.5 mg  -     ipratropium 0.02 % nebulizer solution 0.5 mg  -     Influenza - Quadrivalent (3 years & older) (PF)  -     betamethasone acetate-betamethasone sodium phosphate injection 6 mg  -     doxycycline (VIBRAMYCIN) 100 MG Cap; Take 1 capsule (100 mg total) by mouth 2 (two) times daily. for 7 days  Dispense: 14 capsule; Refill: 0  -     Ambulatory referral to Smoking Cessation Program  -     predniSONE (DELTASONE) 20 MG tablet; Take 2 tablets (40 mg total) by mouth once daily. for 5 days  Dispense: 10 tablet; Refill: 0    To continue albuterol neb treatment at home.   Prednisone

## 2019-01-24 NOTE — PATIENT INSTRUCTIONS
COPD Flare    You have had a flare-up of your COPD.  COPD, or chronic obstructive pulmonary disease, is a common lung disease. It causes your airways to become irritated and narrower. This makes it harder for you to breathe. Emphysema and chronic bronchitis are both types of COPD. This is a chronic condition, which means you always have it. Sometimes it gets worse. When this happens, it is called a flare-up.  Symptoms of COPD  People with COPD may have symptoms most of the time. In a flare-up, your symptoms get worse. These symptoms may mean you are having a flare-up:  · Shortness of breath, shallow or rapid breathing, or wheezing that gets worse  · Lung infection  · Cough that gets worse  · More mucus, thicker mucus or mucus of a different color  · Tiredness, decreased energy, or trouble doing your usual activities  · Fever  · Chest tightness  · Your symptoms dont get better even when you use your usual medicines, inhalers, and nebulizer  · Trouble talking  · You feel confused  Causes of flare-ups  Unfortunately, a flare-up can happen even though you did everything right, and you followed your doctors instructions. Some causes of flare-ups are:  · Smoking or secondhand smoke  · Colds, the flu, or respiratory infections  · Air pollution  · Sudden change in the weather  · Dust, irritating chemicals, or strong fumes  · Not taking your medicines as prescribed  Home care  Here are some things you can do at home to treat a flare-up:  · Try not to panic. This makes it harder to breathe, and keeps you from doing the right things.  · Dont smoke or be around others who are smoking.  · Try to drink more fluids than usual during a flare-up, unless your doctor has told you not to because of heart and kidney problems. More fluids can help loosen the mucus.  · Use your inhalers and nebulizer, if you have one, as you have been told to.  · If you were given antibiotics, take them until they are used up or your doctor tells you  to stop. Its important to finish the antibiotics, even though you feel better. This will make sure the infection has cleared.  · If you were given prednisone or another steroid, finish it even if you feel better.  Preventing a flare-up  Even though flare-ups happen, the best way to treat one is to prevent it before it starts. Here are some pointers:  · Dont smoke or be around others who are smoking.  · Take your medicines as you have been told.  · Talk with your doctor about getting a flu shot every year. Also find out if you need a pneumonia shot.  · If there is a weather advisory warning to stay indoors, try to stay inside when possible.  · Try to eat healthy and get plenty of sleep.  · Try to avoid things that usually set you off, like dust, chemical fumes, hairsprays, or strong perfumes.  Follow-up care  Follow up with your healthcare provider, or as advised.  If a culture was done, you will be told if your treatment needs to be changed. You can call as directed for the results.  If X-rays were done, you will be notified of any new findings that may affect your care.  Call 911  Call 911 if any of these occur:  · You have trouble breathing  · You feel confused or its difficult to wake you up  · You faint or lose consciousness  · You have a rapid heart rate  · You have new pain in your chest, arm, shoulder, neck or upper back  When to seek medical advice  Call your healthcare provider right away if any of these occur:  · Wheezing or shortness of breath gets worse  · You need to use your inhalers more often than usual without relief  · Fever of 100.4°F (38ºC) or higher, or as directed by your healthcare provider  · Coughing up lots of dark-colored or bloody mucus (sputum)  · Chest pain with each breath  · You do not start to get better within 24 hours  · Swelling of your ankles gets worse  · Dizziness or weakness  Date Last Reviewed: 9/1/2016  © 9187-6672 [employer]. [address] [facility],  [place] [address]. All rights reserved. This information is not intended as a substitute for professional medical care. Always follow your healthcare professional's instructions.

## 2019-02-05 ENCOUNTER — PATIENT MESSAGE (OUTPATIENT)
Dept: INTERNAL MEDICINE | Facility: CLINIC | Age: 64
End: 2019-02-05

## 2019-02-17 RX ORDER — POTASSIUM CHLORIDE 20 MEQ/1
TABLET, EXTENDED RELEASE ORAL
Qty: 30 TABLET | Refills: 7 | Status: SHIPPED | OUTPATIENT
Start: 2019-02-17 | End: 2019-03-16 | Stop reason: SDUPTHER

## 2019-02-17 RX ORDER — FUROSEMIDE 20 MG/1
TABLET ORAL
Qty: 60 TABLET | Refills: 7 | Status: SHIPPED | OUTPATIENT
Start: 2019-02-17 | End: 2019-03-16 | Stop reason: SDUPTHER

## 2019-02-19 ENCOUNTER — TELEPHONE (OUTPATIENT)
Dept: INTERNAL MEDICINE | Facility: CLINIC | Age: 64
End: 2019-02-19

## 2019-02-19 NOTE — TELEPHONE ENCOUNTER
I have a fax over in our practice for a prescription refill for this Pt. Dr. Colindres has filled the prescription but only once ad had advised to Pt to contact PCP once another refill is needed. Is there anyway you can provide a fax number.

## 2019-02-22 ENCOUNTER — TELEPHONE (OUTPATIENT)
Dept: INTERNAL MEDICINE | Facility: CLINIC | Age: 64
End: 2019-02-22

## 2019-02-22 NOTE — TELEPHONE ENCOUNTER
----- Message from Mela Rodrigues sent at 2019 12:07 PM CST -----  Contact: my chart  Rochelle Espinoza, 63 yrs, None  MRN:   [id number]  ::   1955  Lang:   English  Last BMI:   None  Pt Alan Status:   Alan  Prim Cvg::   [facility]  Cvg Last Verified Date:   2018  Patient Types:   None  PCP:   Yosi Samuels Jr., MD  Care Team:     Allergies:   No Known Drug Allergies (Reaction: Not Documented)  Patient Portal:   Active  FYI  General  SC Verified 2018  Smoking Cessation Trust Member   More Detail >>  Appointment Request  Rochelle P Avrilmacy Espinoza  MRN: [id number] : 1955  Pt Home: [phone]    Entered: [phone]      Sent: Thu 2019 11:11 PM  To: [facility]  Message     ----- Message from [facility], System Message sent at 2019 11:11 PM CST -----    Appointment Request From: Rochelle Espinoza    With Provider: Yosi Samuels MD [[facility] - Internal Medicine]    Preferred Date Range: 2019 - 3/1/2019    Preferred Times: Any time    Reason for visit: Existing Patient    Comments:  I'm in need of an annual check up

## 2019-02-22 NOTE — TELEPHONE ENCOUNTER
----- Message from Laura Santa sent at 2/22/2019  4:18 PM CST -----  Contact: Patient [phone]  Type: Returning a call    Who left a message?Xiomara Cabrales MA    When did the practice call?Today    Comments:Please call back.      Thanks

## 2019-03-16 ENCOUNTER — LAB VISIT (OUTPATIENT)
Dept: LAB | Facility: HOSPITAL | Age: 64
End: 2019-03-16
Attending: INTERNAL MEDICINE
Payer: MEDICAID

## 2019-03-16 ENCOUNTER — OFFICE VISIT (OUTPATIENT)
Dept: INTERNAL MEDICINE | Facility: CLINIC | Age: 64
End: 2019-03-16
Payer: MEDICAID

## 2019-03-16 VITALS
WEIGHT: 127.19 LBS | HEART RATE: 87 BPM | DIASTOLIC BLOOD PRESSURE: 88 MMHG | OXYGEN SATURATION: 96 % | TEMPERATURE: 99 F | HEIGHT: 63 IN | SYSTOLIC BLOOD PRESSURE: 138 MMHG | BODY MASS INDEX: 22.54 KG/M2

## 2019-03-16 DIAGNOSIS — I27.20 PULMONARY HYPERTENSION: ICD-10-CM

## 2019-03-16 DIAGNOSIS — Z99.81 OXYGEN DEPENDENT: ICD-10-CM

## 2019-03-16 DIAGNOSIS — Z72.0 TOBACCO ABUSE: ICD-10-CM

## 2019-03-16 DIAGNOSIS — M10.9 GOUT, ARTHROPATHY: ICD-10-CM

## 2019-03-16 DIAGNOSIS — Z12.31 ENCOUNTER FOR SCREENING MAMMOGRAM FOR BREAST CANCER: ICD-10-CM

## 2019-03-16 DIAGNOSIS — J44.9 CHRONIC OBSTRUCTIVE PULMONARY DISEASE WITH HYPOXIA: ICD-10-CM

## 2019-03-16 DIAGNOSIS — Z12.9 SCREENING FOR CANCER: ICD-10-CM

## 2019-03-16 DIAGNOSIS — J44.89 OBSTRUCTIVE CHRONIC BRONCHITIS WITHOUT EXACERBATION: ICD-10-CM

## 2019-03-16 DIAGNOSIS — D12.6 ADENOMATOUS POLYP OF COLON, UNSPECIFIED PART OF COLON: ICD-10-CM

## 2019-03-16 DIAGNOSIS — I10 ESSENTIAL HYPERTENSION: ICD-10-CM

## 2019-03-16 DIAGNOSIS — J42 CHRONIC BRONCHITIS, UNSPECIFIED CHRONIC BRONCHITIS TYPE: ICD-10-CM

## 2019-03-16 DIAGNOSIS — I10 ESSENTIAL HYPERTENSION: Primary | ICD-10-CM

## 2019-03-16 DIAGNOSIS — J98.01 BRONCHOSPASM, ACUTE: ICD-10-CM

## 2019-03-16 LAB
ALBUMIN SERPL BCP-MCNC: 4.2 G/DL
ALP SERPL-CCNC: 42 U/L
ALT SERPL W/O P-5'-P-CCNC: 16 U/L
ANION GAP SERPL CALC-SCNC: 10 MMOL/L
AST SERPL-CCNC: 20 U/L
BASOPHILS # BLD AUTO: 0.02 K/UL
BASOPHILS NFR BLD: 0.2 %
BILIRUB SERPL-MCNC: 0.3 MG/DL
BUN SERPL-MCNC: 14 MG/DL
CALCIUM SERPL-MCNC: 9.7 MG/DL
CHLORIDE SERPL-SCNC: 102 MMOL/L
CHOLEST SERPL-MCNC: 189 MG/DL
CHOLEST/HDLC SERPL: 2.3 {RATIO}
CO2 SERPL-SCNC: 30 MMOL/L
CREAT SERPL-MCNC: 0.7 MG/DL
DIFFERENTIAL METHOD: ABNORMAL
EOSINOPHIL # BLD AUTO: 0 K/UL
EOSINOPHIL NFR BLD: 0.4 %
ERYTHROCYTE [DISTWIDTH] IN BLOOD BY AUTOMATED COUNT: 14.9 %
EST. GFR  (AFRICAN AMERICAN): >60 ML/MIN/1.73 M^2
EST. GFR  (NON AFRICAN AMERICAN): >60 ML/MIN/1.73 M^2
GLUCOSE SERPL-MCNC: 80 MG/DL
HCT VFR BLD AUTO: 44.9 %
HDLC SERPL-MCNC: 84 MG/DL
HDLC SERPL: 44.4 %
HGB BLD-MCNC: 14.3 G/DL
LDLC SERPL CALC-MCNC: 94.4 MG/DL
LYMPHOCYTES # BLD AUTO: 2.6 K/UL
LYMPHOCYTES NFR BLD: 29.1 %
MCH RBC QN AUTO: 28 PG
MCHC RBC AUTO-ENTMCNC: 31.8 G/DL
MCV RBC AUTO: 88 FL
MONOCYTES # BLD AUTO: 0.6 K/UL
MONOCYTES NFR BLD: 6.6 %
NEUTROPHILS # BLD AUTO: 5.7 K/UL
NEUTROPHILS NFR BLD: 63.6 %
NONHDLC SERPL-MCNC: 105 MG/DL
PLATELET # BLD AUTO: 195 K/UL
PMV BLD AUTO: 12.2 FL
POTASSIUM SERPL-SCNC: 4.2 MMOL/L
PROT SERPL-MCNC: 7.2 G/DL
RBC # BLD AUTO: 5.11 M/UL
SODIUM SERPL-SCNC: 142 MMOL/L
TRIGL SERPL-MCNC: 53 MG/DL
WBC # BLD AUTO: 8.92 K/UL

## 2019-03-16 PROCEDURE — 99214 PR OFFICE/OUTPT VISIT, EST, LEVL IV, 30-39 MIN: ICD-10-PCS | Mod: S$PBB,,, | Performed by: INTERNAL MEDICINE

## 2019-03-16 PROCEDURE — 36415 COLL VENOUS BLD VENIPUNCTURE: CPT

## 2019-03-16 PROCEDURE — 85025 COMPLETE CBC W/AUTO DIFF WBC: CPT

## 2019-03-16 PROCEDURE — 80061 LIPID PANEL: CPT

## 2019-03-16 PROCEDURE — 99999 PR PBB SHADOW E&M-EST. PATIENT-LVL IV: ICD-10-PCS | Mod: PBBFAC,,, | Performed by: INTERNAL MEDICINE

## 2019-03-16 PROCEDURE — 80053 COMPREHEN METABOLIC PANEL: CPT

## 2019-03-16 PROCEDURE — 99999 PR PBB SHADOW E&M-EST. PATIENT-LVL IV: CPT | Mod: PBBFAC,,, | Performed by: INTERNAL MEDICINE

## 2019-03-16 PROCEDURE — 99214 OFFICE O/P EST MOD 30 MIN: CPT | Mod: S$PBB,,, | Performed by: INTERNAL MEDICINE

## 2019-03-16 PROCEDURE — 90471 IMMUNIZATION ADMIN: CPT | Mod: PBBFAC

## 2019-03-16 PROCEDURE — 99214 OFFICE O/P EST MOD 30 MIN: CPT | Mod: PBBFAC | Performed by: INTERNAL MEDICINE

## 2019-03-16 RX ORDER — ALBUTEROL SULFATE 90 UG/1
AEROSOL, METERED RESPIRATORY (INHALATION)
Qty: 8.5 G | Refills: 9 | Status: SHIPPED | OUTPATIENT
Start: 2019-03-16 | End: 2019-12-11 | Stop reason: SDUPTHER

## 2019-03-16 RX ORDER — POTASSIUM CHLORIDE 20 MEQ/1
TABLET, EXTENDED RELEASE ORAL
Qty: 30 TABLET | Refills: 7 | Status: SHIPPED | OUTPATIENT
Start: 2019-03-16 | End: 2020-07-14 | Stop reason: SDUPTHER

## 2019-03-16 RX ORDER — OMEPRAZOLE 40 MG/1
40 CAPSULE, DELAYED RELEASE ORAL DAILY
Qty: 90 CAPSULE | Refills: 3 | Status: SHIPPED | OUTPATIENT
Start: 2019-03-16 | End: 2020-02-04 | Stop reason: SDUPTHER

## 2019-03-16 RX ORDER — LOSARTAN POTASSIUM AND HYDROCHLOROTHIAZIDE 12.5; 5 MG/1; MG/1
1 TABLET ORAL DAILY
Qty: 90 TABLET | Refills: 3 | Status: SHIPPED | OUTPATIENT
Start: 2019-03-16 | End: 2019-09-12

## 2019-03-16 RX ORDER — COLCHICINE 0.6 MG/1
TABLET ORAL
Qty: 30 TABLET | Refills: 2 | Status: SHIPPED | OUTPATIENT
Start: 2019-03-16 | End: 2021-03-22

## 2019-03-16 RX ORDER — FUROSEMIDE 20 MG/1
TABLET ORAL
Qty: 60 TABLET | Refills: 7 | Status: SHIPPED | OUTPATIENT
Start: 2019-03-16 | End: 2020-07-18 | Stop reason: CLARIF

## 2019-03-19 ENCOUNTER — PATIENT MESSAGE (OUTPATIENT)
Dept: INTERNAL MEDICINE | Facility: CLINIC | Age: 64
End: 2019-03-19

## 2019-03-19 ENCOUNTER — HOSPITAL ENCOUNTER (OUTPATIENT)
Dept: RADIOLOGY | Facility: HOSPITAL | Age: 64
Discharge: HOME | End: 2019-03-19
Attending: INTERNAL MEDICINE
Payer: MEDICAID

## 2019-03-19 DIAGNOSIS — Z12.31 ENCOUNTER FOR SCREENING MAMMOGRAM FOR BREAST CANCER: ICD-10-CM

## 2019-03-19 PROCEDURE — 77067 SCR MAMMO BI INCL CAD: CPT | Mod: 26,,, | Performed by: RADIOLOGY

## 2019-03-19 PROCEDURE — 77067 SCR MAMMO BI INCL CAD: CPT | Mod: TC

## 2019-03-19 PROCEDURE — 77067 MAMMO DIGITAL SCREENING BILAT WITH CAD: ICD-10-PCS | Mod: 26,,, | Performed by: RADIOLOGY

## 2019-03-23 ENCOUNTER — HOSPITAL ENCOUNTER (OUTPATIENT)
Dept: RADIOLOGY | Facility: HOSPITAL | Age: 64
Discharge: HOME | End: 2019-03-23
Attending: INTERNAL MEDICINE
Payer: MEDICAID

## 2019-03-23 DIAGNOSIS — Z12.9 SCREENING FOR CANCER: ICD-10-CM

## 2019-03-23 PROCEDURE — G0297 LDCT FOR LUNG CA SCREEN: HCPCS | Mod: 26,,, | Performed by: RADIOLOGY

## 2019-03-23 PROCEDURE — G0297 CT CHEST LUNG SCREENING LOW DOSE: ICD-10-PCS | Mod: 26,,, | Performed by: RADIOLOGY

## 2019-03-23 PROCEDURE — G0297 LDCT FOR LUNG CA SCREEN: HCPCS | Mod: TC

## 2019-04-01 ENCOUNTER — TELEPHONE (OUTPATIENT)
Dept: INTERNAL MEDICINE | Facility: CLINIC | Age: 64
End: 2019-04-01

## 2019-04-01 NOTE — TELEPHONE ENCOUNTER
----- Message from Chantale Fischer sent at 4/1/2019  8:50 AM CDT -----  Contact: Ke [phone]  Prior Authorization Needed    Medication: ipratropium-albuterol (COMBIVENT RESPIMAT)  mcg/actuation inhaler    Pharmacy Info: [facility] [address] [place] [address] [address] [facility] [address]    Plan does not cover this medication. Please call plan at [phone] to initiate prior authorization or call/fax pharmacy to change medication. Patient ID#[id number]    Note chart when prior authorization has been submitted.    Please notify pharmacy when prior authorization has been approved.    Thank You

## 2019-04-04 ENCOUNTER — TELEPHONE (OUTPATIENT)
Dept: SMOKING CESSATION | Facility: CLINIC | Age: 64
End: 2019-04-04

## 2019-04-28 NOTE — PROGRESS NOTES
HISTORY OF PRESENT ILLNESS:  She is a 63-year-old lady coming in today for her   annual exam and follow up of several issues.  1.  She is a smoker.  She has COPD.  She has some pulmonary hypertension.  She   has been followed in Pulmonary Clinic.  She wishes to quit smoking.  She is   currently on LABA, an inhaled corticosteroid and a RACHEL, her rescue inhaler.    She is on 2 liters oxygen 24/7 and had to quit her job last year after having a   hospitalization in April for acute-on-chronic respiratory failure.  She says she   is doing better.  She has not had a screening CT.  She is down to about two   cigarettes a day, never smoking with oxygen on because she has been warned about   that.  She also has colon polyps.  We put off a colonoscopy last year, but she   is due for colonoscopy.  She has not had any bowel symptoms.  She has   hypertension for which is losartan and hydrochlorothiazide.  Blood pressure   today is 130/88 which he is on the high side for her, has been lower in the   past.  She has some chest pains in August when I saw her last time.  She is not   having any of those anymore.  She also has some history of hyperglycemia, which   we do not have recent labs on her.  She has a history of anxiety.  She was on   Wellbutrin in the past, which has been stopped and now she is not on anything.    She is also due for her mammogram and she is due for a Tdap vaccination.    REVIEW OF SYSTEMS:  As per chart.  She still has occasional chest tightness that   is very minor, rhinorrhea and dysphoric mood at times.    PHYSICAL EXAMINATION:  GENERAL:  She is a well-appearing 63-year-old -American female.  She is   wearing oxygen.  NECK:  Supple.  CHEST:  Clear without wheeze.  CARDIOVASCULAR:  S1 and S2, regular rate and rhythm.  No chest wall tenderness.    I do not hear any murmur.  ABDOMEN:  Soft, nonobese, nontender.  LOWER EXTREMITIES:  No edema.  She has not had any swelling in legs.  She has   Lasix  when she has swelling but has not had any of this recently.  She is   wearing oxygen.  Her mood is good.    ASSESSMENT:  A 63-year-old lady with over 40-pack-year history of smoking with   COPD, pulmonary hypertension, cor pulmonale, colon polyps, last one was   adenomas, unfortunately continued tobacco abuse.  She is oxygen-dependent.  She   has a history of gout also, need for a mammogram.    PLAN:  We will get a mammogram, colonoscopy, Tdap, low-dose screening CT of her   chest.  We are going to continue working on stopping smoking.  Blood pressure is   a little elevated today.  We will recheck that next time.  We are going to get   her labs including chemistry, CBC and lipid panel.  Refill her medications.  She   is once again warned about smoking, especially if she has oxygen on due to the   risk of fire, which as we told her before would be a horribly painful death and   we will follow her up again in six months.      RORY  dd: 04/28/2019 09:42:01 (CDT)  td: 04/29/2019 09:06:57 (CDT)  Doc ID   #[id number]  Job ID #[id number]    CC:       Answers for HPI/ROS submitted by the patient on 3/13/2019   activity change: No  unexpected weight change: No  neck pain: No  hearing loss: No  rhinorrhea: Yes  trouble swallowing: No  eye discharge: No  visual disturbance: No  chest tightness: Yes  wheezing: Yes  chest pain: No  palpitations: No  blood in stool: No  constipation: No  vomiting: No  diarrhea: No  polydipsia: No  polyuria: No  difficulty urinating: No  hematuria: No  menstrual problem: No  dysuria: No  joint swelling: No  arthralgias: No  headaches: No  weakness: No  confusion: No  dysphoric mood: Yes

## 2019-05-08 ENCOUNTER — TELEPHONE (OUTPATIENT)
Dept: SMOKING CESSATION | Facility: CLINIC | Age: 64
End: 2019-05-08

## 2019-05-09 ENCOUNTER — PATIENT MESSAGE (OUTPATIENT)
Dept: INTERNAL MEDICINE | Facility: CLINIC | Age: 64
End: 2019-05-09

## 2019-05-09 RX ORDER — IPRATROPIUM BROMIDE AND ALBUTEROL 20; 100 UG/1; UG/1
SPRAY, METERED RESPIRATORY (INHALATION)
Qty: 4 G | Refills: 0 | Status: SHIPPED | OUTPATIENT
Start: 2019-05-09 | End: 2020-08-02

## 2019-05-15 ENCOUNTER — PATIENT MESSAGE (OUTPATIENT)
Dept: INTERNAL MEDICINE | Facility: CLINIC | Age: 64
End: 2019-05-15

## 2019-05-15 RX ORDER — TRAZODONE HYDROCHLORIDE 50 MG/1
50 TABLET ORAL NIGHTLY PRN
Qty: 30 TABLET | Refills: 1 | Status: SHIPPED | OUTPATIENT
Start: 2019-05-15 | End: 2019-11-11 | Stop reason: SDUPTHER

## 2019-05-21 ENCOUNTER — TELEPHONE (OUTPATIENT)
Dept: INTERNAL MEDICINE | Facility: CLINIC | Age: 64
End: 2019-05-21

## 2019-05-21 NOTE — TELEPHONE ENCOUNTER
----- Message from Juvenal Pleitez MA sent at 5/21/2019 12:28 PM CDT -----  Contact: Pt  Appointment Request From: Rochelle Espinoza    With Provider: Carmen Colindres MD [[facility]]    Preferred Date Range: 5/21/2019 - 5/22/2019    Preferred Times: Any time    Reason for visit: Existing Patient    Comments:  Neck pain along with burning and tingling in my right arm, hand, and face.

## 2019-06-06 RX ORDER — IPRATROPIUM BROMIDE AND ALBUTEROL 20; 100 UG/1; UG/1
SPRAY, METERED RESPIRATORY (INHALATION)
Qty: 4 G | Refills: 0 | Status: SHIPPED | OUTPATIENT
Start: 2019-06-06 | End: 2020-04-13

## 2019-06-11 ENCOUNTER — CLINICAL SUPPORT (OUTPATIENT)
Dept: SMOKING CESSATION | Facility: CLINIC | Age: 64
End: 2019-06-11
Payer: COMMERCIAL

## 2019-06-11 DIAGNOSIS — F17.210 MODERATE CIGARETTE SMOKER (10-19 PER DAY): Primary | ICD-10-CM

## 2019-06-11 PROCEDURE — 99999 PR PBB SHADOW E&M-EST. PATIENT-LVL I: CPT | Mod: PBBFAC,,,

## 2019-06-11 PROCEDURE — 99999 PR PBB SHADOW E&M-EST. PATIENT-LVL I: ICD-10-PCS | Mod: PBBFAC,,,

## 2019-06-11 PROCEDURE — 90853 GROUP PSYCHOTHERAPY: CPT | Mod: S$GLB,,,

## 2019-06-11 PROCEDURE — 99404 PR PREVENT COUNSEL,INDIV,60 MIN: ICD-10-PCS | Mod: S$GLB,,,

## 2019-06-11 PROCEDURE — 90853 PR GROUP PSYCHOTHERAPY: ICD-10-PCS | Mod: S$GLB,,,

## 2019-06-11 PROCEDURE — 99999 PR PBB SHADOW E&M-EST. PATIENT-LVL II: CPT | Mod: PBBFAC,,,

## 2019-06-11 PROCEDURE — 99404 PREV MED CNSL INDIV APPRX 60: CPT | Mod: S$GLB,,,

## 2019-06-11 PROCEDURE — 99999 PR PBB SHADOW E&M-EST. PATIENT-LVL II: ICD-10-PCS | Mod: PBBFAC,,,

## 2019-06-11 RX ORDER — IBUPROFEN 200 MG
1 TABLET ORAL DAILY
Qty: 14 PATCH | Refills: 0 | Status: SHIPPED | OUTPATIENT
Start: 2019-06-11 | End: 2019-06-25 | Stop reason: SDUPTHER

## 2019-06-12 NOTE — PROGRESS NOTES
Site: [facility]  Date:  6/11/19  Clinical Status of Patient: Outpatient   Length of Service and Code: 60 minutes - 87111   Number in Attendance: 2  Group Activities/Focus of Group:  orientation, client introductions, completion of TCRS (Tobacco Cessation Rating Scale) learned addiction model, cues/triggers, personal reasons for quitting, medications, goals, quit date    Target symptoms:  withdrawal and medication side effects             The following were rated moderate (3) to severe (4) on TCRS:       Moderate 3: none     Severe 4:   none  Patient's Response to Intervention: The patient is smoking 20 cigarettes per day and will begin the habit modification techniques on 6/14/19. The patient will be using the prescribed tobacco cessation medication regimen of 21 mg nicotine patches on 6/12/19.   Progress Toward Goals and Other Mental Status Changes: The patient denies any abnormal behavioral or mental changes at this time.     Diagnosis: Z72.0  Plan: The patient will continue with group therapy sessions and medication monitoring by CTTS. Prescribed medication management will be by physician.   Return to Clinic: 1 week

## 2019-06-25 ENCOUNTER — CLINICAL SUPPORT (OUTPATIENT)
Dept: SMOKING CESSATION | Facility: CLINIC | Age: 64
End: 2019-06-25
Payer: COMMERCIAL

## 2019-06-25 DIAGNOSIS — F17.210 MODERATE CIGARETTE SMOKER (10-19 PER DAY): ICD-10-CM

## 2019-06-25 PROCEDURE — 90853 GROUP PSYCHOTHERAPY: CPT | Mod: S$GLB,,,

## 2019-06-25 PROCEDURE — 90853 PR GROUP PSYCHOTHERAPY: ICD-10-PCS | Mod: S$GLB,,,

## 2019-06-25 PROCEDURE — 99999 PR PBB SHADOW E&M-EST. PATIENT-LVL I: CPT | Mod: PBBFAC,,,

## 2019-06-25 PROCEDURE — 99999 PR PBB SHADOW E&M-EST. PATIENT-LVL I: ICD-10-PCS | Mod: PBBFAC,,,

## 2019-06-25 RX ORDER — IBUPROFEN 200 MG
1 TABLET ORAL DAILY
Qty: 14 PATCH | Refills: 0 | Status: SHIPPED | OUTPATIENT
Start: 2019-06-25 | End: 2020-07-18 | Stop reason: CLARIF

## 2019-06-27 ENCOUNTER — PATIENT MESSAGE (OUTPATIENT)
Dept: INTERNAL MEDICINE | Facility: CLINIC | Age: 64
End: 2019-06-27

## 2019-06-28 ENCOUNTER — PATIENT MESSAGE (OUTPATIENT)
Dept: INTERNAL MEDICINE | Facility: CLINIC | Age: 64
End: 2019-06-28

## 2019-07-03 DIAGNOSIS — Z91.89 AT HIGH RISK FOR RESPIRATORY INFECTION: ICD-10-CM

## 2019-07-05 RX ORDER — IPRATROPIUM BROMIDE AND ALBUTEROL SULFATE 2.5; .5 MG/3ML; MG/3ML
SOLUTION RESPIRATORY (INHALATION)
Qty: 180 ML | Refills: 3 | Status: SHIPPED | OUTPATIENT
Start: 2019-07-05 | End: 2020-04-12 | Stop reason: SDUPTHER

## 2019-07-09 ENCOUNTER — CLINICAL SUPPORT (OUTPATIENT)
Dept: SMOKING CESSATION | Facility: CLINIC | Age: 64
End: 2019-07-09
Payer: COMMERCIAL

## 2019-07-09 DIAGNOSIS — F17.210 MODERATE CIGARETTE SMOKER (10-19 PER DAY): Primary | ICD-10-CM

## 2019-07-09 PROCEDURE — 99999 PR PBB SHADOW E&M-EST. PATIENT-LVL II: ICD-10-PCS | Mod: PBBFAC,,,

## 2019-07-09 PROCEDURE — 99999 PR PBB SHADOW E&M-EST. PATIENT-LVL II: CPT | Mod: PBBFAC,,,

## 2019-07-09 RX ORDER — VARENICLINE TARTRATE 0.5 (11)-1
KIT ORAL
Qty: 53 TABLET | Refills: 0 | Status: SHIPPED | OUTPATIENT
Start: 2019-07-09 | End: 2019-09-23 | Stop reason: SDUPTHER

## 2019-07-09 NOTE — Clinical Note
Just a note to advise how the patient is progressing in the tobacco cessation program. The patient is smoking 15- 20 cigarettes per day and set a goal for 10-15 by next group session. Discussed with the patient adding the Chantix to aid the patient and reviewed all side effects, route, as well as dosage. The patient will remain on the prescribed tobacco cessation medication regimen of 21 mg nicotine patches for the next week to 2 weeks to allow for the Chantix to get to a therapeutic level to keep the patient from experiencing any additional nicotine withdrawal.

## 2019-07-10 NOTE — PROGRESS NOTES
Smoking Cessation Group Session #2    Site: [facility]  Date:  7/9/19  Clinical Status of Patient: Outpatient   Length of Service and Code: 90 minutes - 04711   Number in Attendance: 4  Group Activities/Focus of Group:  completion of TCRS (Tobacco Cessation Rating Scale) reviewed strategies, cues, and triggers. Introduced the negative impact of tobacco on health, the health advantages of discontinuing the use of tobacco, time line improved health changes after a quit, withdrawal issues to expect from nicotine and habit, and ways to achieve the goal of a quit.    Target symptoms:  withdrawal and medication side effects             The following were rated moderate (3) to severe (4) on TCRS:       Moderate 3: none     Severe 4:   none  Patient's Response to Intervention: The patient is smoking 15- 20 cigarettes per day and set a goal for 10-15 by next group session. Discussed with the patient adding the Chantix to aid the patient and reviewed all side effects, route, as well as dosage. The patient will remain on the prescribed tobacco cessation medication regimen of 21 mg nicotine patches for the next week to 2 weeks to allow for the Chantix to get to a therapeutic level to keep the patient from experiencing any additional nicotine withdrawal.   Progress Toward Goals and Other Mental Status Changes: The patient denies any abnormal behavioral or mental changes at this time.     Diagnosis: Z72.0  Plan: The patient will continue with group therapy sessions and medication monitoring by CTTS. Prescribed medication management will be by physician.   Return to Clinic: 1 week    Quit Date:    Planned Quit Date:

## 2019-07-11 ENCOUNTER — PATIENT MESSAGE (OUTPATIENT)
Dept: INTERNAL MEDICINE | Facility: CLINIC | Age: 64
End: 2019-07-11

## 2019-07-13 ENCOUNTER — HOSPITAL ENCOUNTER (EMERGENCY)
Facility: HOSPITAL | Age: 64
Discharge: HOME | End: 2019-07-13
Attending: EMERGENCY MEDICINE

## 2019-07-13 VITALS
RESPIRATION RATE: 24 BRPM | SYSTOLIC BLOOD PRESSURE: 130 MMHG | OXYGEN SATURATION: 100 % | BODY MASS INDEX: 22.08 KG/M2 | TEMPERATURE: 99 F | HEIGHT: 62 IN | WEIGHT: 120 LBS | DIASTOLIC BLOOD PRESSURE: 75 MMHG | HEART RATE: 72 BPM

## 2019-07-13 DIAGNOSIS — R05.9 COUGH: ICD-10-CM

## 2019-07-13 DIAGNOSIS — R59.0 LYMPHADENOPATHY, ANTERIOR CERVICAL: Primary | ICD-10-CM

## 2019-07-13 LAB
ALBUMIN SERPL BCP-MCNC: 3.8 G/DL (ref 3.5–5.2)
ALP SERPL-CCNC: 49 U/L (ref 55–135)
ALT SERPL W/O P-5'-P-CCNC: 15 U/L (ref 10–44)
ANION GAP SERPL CALC-SCNC: 12 MMOL/L (ref 8–16)
AST SERPL-CCNC: 22 U/L (ref 10–40)
BASOPHILS # BLD AUTO: 0.03 K/UL (ref 0–0.2)
BASOPHILS NFR BLD: 0.3 % (ref 0–1.9)
BILIRUB SERPL-MCNC: 0.3 MG/DL (ref 0.1–1)
BNP SERPL-MCNC: <10 PG/ML (ref 0–99)
BUN SERPL-MCNC: 14 MG/DL (ref 8–23)
CALCIUM SERPL-MCNC: 9.2 MG/DL (ref 8.7–10.5)
CHLORIDE SERPL-SCNC: 102 MMOL/L (ref 95–110)
CO2 SERPL-SCNC: 28 MMOL/L (ref 23–29)
CREAT SERPL-MCNC: 0.7 MG/DL (ref 0.5–1.4)
DEPRECATED S PYO AG THROAT QL EIA: NEGATIVE
DIFFERENTIAL METHOD: ABNORMAL
EOSINOPHIL # BLD AUTO: 0.1 K/UL (ref 0–0.5)
EOSINOPHIL NFR BLD: 1.1 % (ref 0–8)
ERYTHROCYTE [DISTWIDTH] IN BLOOD BY AUTOMATED COUNT: 15 % (ref 11.5–14.5)
EST. GFR  (AFRICAN AMERICAN): >60 ML/MIN/1.73 M^2
EST. GFR  (NON AFRICAN AMERICAN): >60 ML/MIN/1.73 M^2
GLUCOSE SERPL-MCNC: 93 MG/DL (ref 70–110)
HCT VFR BLD AUTO: 39.1 % (ref 37–48.5)
HETEROPH AB SERPL QL IA: NEGATIVE
HGB BLD-MCNC: 12.9 G/DL (ref 12–16)
IMM GRANULOCYTES # BLD AUTO: 0.02 K/UL (ref 0–0.04)
IMM GRANULOCYTES NFR BLD AUTO: 0.2 % (ref 0–0.5)
LYMPHOCYTES # BLD AUTO: 4.4 K/UL (ref 1–4.8)
LYMPHOCYTES NFR BLD: 43.1 % (ref 18–48)
MCH RBC QN AUTO: 28.7 PG (ref 27–31)
MCHC RBC AUTO-ENTMCNC: 33 G/DL (ref 32–36)
MCV RBC AUTO: 87 FL (ref 82–98)
MONOCYTES # BLD AUTO: 0.7 K/UL (ref 0.3–1)
MONOCYTES NFR BLD: 7.3 % (ref 4–15)
NEUTROPHILS # BLD AUTO: 4.9 K/UL (ref 1.8–7.7)
NEUTROPHILS NFR BLD: 48 % (ref 38–73)
NRBC BLD-RTO: 0 /100 WBC
PLATELET # BLD AUTO: 228 K/UL (ref 150–350)
PMV BLD AUTO: 11.1 FL (ref 9.2–12.9)
POTASSIUM SERPL-SCNC: 3.3 MMOL/L (ref 3.5–5.1)
PROT SERPL-MCNC: 6.6 G/DL (ref 6–8.4)
RBC # BLD AUTO: 4.5 M/UL (ref 4–5.4)
SODIUM SERPL-SCNC: 142 MMOL/L (ref 136–145)
WBC # BLD AUTO: 10.16 K/UL (ref 3.9–12.7)

## 2019-07-13 PROCEDURE — 80053 COMPREHEN METABOLIC PANEL: CPT

## 2019-07-13 PROCEDURE — 87880 STREP A ASSAY W/OPTIC: CPT

## 2019-07-13 PROCEDURE — 63600175 PHARM REV CODE 636 W HCPCS: Performed by: EMERGENCY MEDICINE

## 2019-07-13 PROCEDURE — 96375 TX/PRO/DX INJ NEW DRUG ADDON: CPT

## 2019-07-13 PROCEDURE — 25500020 PHARM REV CODE 255: Performed by: EMERGENCY MEDICINE

## 2019-07-13 PROCEDURE — 86308 HETEROPHILE ANTIBODY SCREEN: CPT

## 2019-07-13 PROCEDURE — 85025 COMPLETE CBC W/AUTO DIFF WBC: CPT

## 2019-07-13 PROCEDURE — 99284 EMERGENCY DEPT VISIT MOD MDM: CPT | Mod: 25

## 2019-07-13 PROCEDURE — 83880 ASSAY OF NATRIURETIC PEPTIDE: CPT

## 2019-07-13 PROCEDURE — 25000003 PHARM REV CODE 250: Performed by: EMERGENCY MEDICINE

## 2019-07-13 PROCEDURE — 99285 PR EMERGENCY DEPT VISIT,LEVEL V: ICD-10-PCS | Mod: ,,, | Performed by: EMERGENCY MEDICINE

## 2019-07-13 PROCEDURE — 99285 EMERGENCY DEPT VISIT HI MDM: CPT | Mod: ,,, | Performed by: EMERGENCY MEDICINE

## 2019-07-13 PROCEDURE — 96374 THER/PROPH/DIAG INJ IV PUSH: CPT

## 2019-07-13 PROCEDURE — 87081 CULTURE SCREEN ONLY: CPT

## 2019-07-13 RX ORDER — KETOROLAC TROMETHAMINE 30 MG/ML
15 INJECTION, SOLUTION INTRAMUSCULAR; INTRAVENOUS
Status: COMPLETED | OUTPATIENT
Start: 2019-07-13 | End: 2019-07-13

## 2019-07-13 RX ORDER — POTASSIUM CHLORIDE 20 MEQ/1
40 TABLET, EXTENDED RELEASE ORAL
Status: COMPLETED | OUTPATIENT
Start: 2019-07-13 | End: 2019-07-13

## 2019-07-13 RX ORDER — DIPHENHYDRAMINE HYDROCHLORIDE 50 MG/ML
12.5 INJECTION INTRAMUSCULAR; INTRAVENOUS
Status: COMPLETED | OUTPATIENT
Start: 2019-07-13 | End: 2019-07-13

## 2019-07-13 RX ORDER — METHYLPREDNISOLONE SOD SUCC 125 MG
125 VIAL (EA) INJECTION
Status: COMPLETED | OUTPATIENT
Start: 2019-07-13 | End: 2019-07-13

## 2019-07-13 RX ADMIN — DIPHENHYDRAMINE HYDROCHLORIDE 12.5 MG: 50 INJECTION, SOLUTION INTRAMUSCULAR; INTRAVENOUS at 07:07

## 2019-07-13 RX ADMIN — METHYLPREDNISOLONE SODIUM SUCCINATE 125 MG: 125 INJECTION, POWDER, FOR SOLUTION INTRAMUSCULAR; INTRAVENOUS at 07:07

## 2019-07-13 RX ADMIN — KETOROLAC TROMETHAMINE 15 MG: 30 INJECTION, SOLUTION INTRAMUSCULAR at 07:07

## 2019-07-13 RX ADMIN — POTASSIUM CHLORIDE 40 MEQ: 1500 TABLET, EXTENDED RELEASE ORAL at 09:07

## 2019-07-13 RX ADMIN — IOHEXOL 75 ML: 350 INJECTION, SOLUTION INTRAVENOUS at 07:07

## 2019-07-13 NOTE — ED PROVIDER NOTES
Encounter Date: 2019       History     Chief Complaint   Patient presents with    Shortness of Breath     Patient is a 63-year-old female with past medical history of hypertension, COPD on 2 L nasal cannula O2 at home, CHF, pumonary hypertension who presents with feeling like her throat is swollen for the past 3 days.  Patient states it is painful and feels like it is difficult to swallow.  She noticed more pain to the right side of her neck.  She reports she started taking doxycycline yesterday thinking maybe she had an infection on her tonsils.  She reports a dry cough as well.      Patient reports she has been off of lisinopril for the past 6 months and takes losartan instead. She is currently in smoking cessation and has not quit smoking yet but is cutting back.        Review of patient's allergies indicates:   Allergen Reactions    No known drug allergies      Past Medical History:   Diagnosis Date    CHF (congestive heart failure)     COPD (chronic obstructive pulmonary disease)     Herpes zoster without mention of complication 2011    Hypertension     Leg edema     Pulmonary hypertension      Past Surgical History:   Procedure Laterality Date    BREAST BIOPSY Right     core     SECTION      BLAKE-TRANSFORAMINAL Right 3/10/2015    Performed by Yajaira Hastings MD at [facility]     Family History   Problem Relation Age of Onset    Heart disease Mother     Heart disease Paternal Uncle      Social History     Tobacco Use    Smoking status: Current Every Day Smoker     Packs/day: 1.00     Years: 40.00     Pack years: 40.00     Types: Cigarettes    Smokeless tobacco: Never Used   Substance Use Topics    Alcohol use: Yes     Comment: beer daily 2-3 daily, none today    Drug use: No     Review of Systems   Constitutional: Negative for chills and fever.   HENT: Positive for sore throat and trouble swallowing. Negative for facial swelling, postnasal drip, sinus pain and voice  change.    Eyes: Negative.    Respiratory: Positive for cough. Negative for choking, chest tightness and shortness of breath.    Cardiovascular: Negative.    Gastrointestinal: Negative.    Genitourinary: Negative.    Musculoskeletal: Negative.    Skin: Negative.    Allergic/Immunologic: Negative.    Neurological: Negative.    Hematological: Negative.    Psychiatric/Behavioral: Negative.    All other systems reviewed and are negative.      Physical Exam     Initial Vitals [07/13/19 1758]   BP Pulse Resp Temp SpO2   127/60 92 18 98.9 °F (37.2 °C) (!) 89 %      MAP       --         Physical Exam    Vitals reviewed.  Constitutional: She appears well-developed and well-nourished. No distress.   HENT:   Head: Normocephalic and atraumatic.   Right Ear: External ear normal.   Left Ear: External ear normal.   Mouth/Throat: Oropharynx is clear and moist. No oropharyngeal exudate.   Eyes: EOM are normal. Pupils are equal, round, and reactive to light.   Neck: Normal range of motion. Neck supple.   Cardiovascular: Normal rate and regular rhythm.   Pulmonary/Chest: Breath sounds normal.   Abdominal: Soft. Bowel sounds are normal.   Musculoskeletal: Normal range of motion.   Lymphadenopathy:     She has cervical adenopathy (right ).   Neurological: She is alert and oriented to person, place, and time.   Skin: Skin is warm and dry. Capillary refill takes less than 2 seconds.   Psychiatric: She has a normal mood and affect.         ED Course   Procedures  Labs Reviewed   COMPREHENSIVE METABOLIC PANEL - Abnormal; Notable for the following components:       Result Value    Potassium 3.3 (*)     Alkaline Phosphatase 49 (*)     All other components within normal limits   CBC W/ AUTO DIFFERENTIAL - Abnormal; Notable for the following components:    RDW 15.0 (*)     All other components within normal limits   THROAT SCREEN, RAPID   CULTURE, STREP A,  THROAT   HETEROPHILE AB SCREEN   B-TYPE NATRIURETIC PEPTIDE     EKG Readings:  (Independently Interpreted)   Initial Reading: No STEMI. Previous EKG: Compared with most recent EKG Previous EKG Date: 4/11/8. Rhythm: Normal Sinus Rhythm. Heart Rate: 74. Ectopy: No Ectopy. Conduction: Normal. Axis: Normal. Q Waves: V1 and V2.   Nonspecific T wave abnormality       Imaging Results          CT Soft Tissue Neck With Contrast (Final result)  Result time 07/13/19 20:58:51    Final result by Mikal Boyle MD (07/13/19 20:58:51)                 Impression:      1. No acute abnormality identified.  2. Bilateral tonsillar hypertrophy without significant edema or organized fluid collection.  3. Right frontal sinus disease.  4. Additional findings as above.    Electronically signed by resident: Juan Carlos Meyers  Date:    07/13/2019  Time:    20:00    Electronically signed by: Mikal Boyle MD  Date:    07/13/2019  Time:    20:58             Narrative:    EXAMINATION:  CT SOFT TISSUE NECK WITH CONTRAST    CLINICAL HISTORY:  Shortness of breath a sore throat.    TECHNIQUE:  Low dose axial images as well as sagittal and coronal reconstructions were performed from the skull base to the clavicles after the administration of 75 cc Omnipaque 350.    COMPARISON:  MRI 10/09/2017 and CT chest 03/23/2019.    FINDINGS:  Skull Base and Brain (limited evaluation): Unremarkable.    Sinuses and Mastoid Air Cells: Patchy opacification of the right frontal air cells    Salivary Glands: Bilateral tonsillar hypertrophy.  No edema or organized fluid collection.  Bilateral parotid and submandibular glands are unremarkable..    Thyroid: Normal.    Cervical Lymph Nodes: Several normal-sized bilateral cervical nodes identified.    Pharynx/Larynx: Normal, with preserved fat planes.    Vasculature (limited evaluation): Calcific atherosclerosis of the right carotid bifurcation.  No aneurysm.  Prominent bilateral cervical venous drainage to the subclavian veins.  Large proximal right internal jugular vein with motion artifact limiting  evaluation.    Upper Airways and Lungs: Mild right apical paraseptal emphysema.    Bones: Age-appropriate degenerative changes of the spine.  No acute fracture or destructive osseous lesion.                               X-Ray Chest PA And Lateral (Final result)  Result time 07/13/19 18:53:00    Final result by Ben Dietz MD (07/13/19 18:53:00)                 Impression:      1. No acute cardiopulmonary process, interstitial findings are nonspecific, correlation with any history of COPD/emphysema.      Electronically signed by: Ben Dietz MD  Date:    07/13/2019  Time:    18:53             Narrative:    EXAMINATION:  XR CHEST PA AND LATERAL    CLINICAL HISTORY:  Cough    TECHNIQUE:  PA and lateral views of the chest were performed.    COMPARISON:  04/10/2018    FINDINGS:  The cardiomediastinal silhouette is not enlarged.  There is no pleural effusion.  The trachea is midline.  The lungs are symmetrically expanded bilaterally with mildly coarse interstitial attenuation.  There is mild left basilar subsegmental atelectasis..  No large focal consolidation seen.  There is no pneumothorax.  The osseous structures are remarkable for degenerative changes noting osteopenia..                                 Medical Decision Making:   Initial Assessment:   Tender anterior cervical lymph node  Sensation of swelling  Sore throat  Differential Diagnosis:   Retropharyngeal abscess  Strep    ED Management:  CT neg, pt feeling much better, strep is negative  No stridor, breathing well, no signs of airway compromise on CT  Think likely a reactive lymph node  No fever or leukocytosis  Will have pt follow up with pcp if not improved in several days, will return if worse and patient is in agreement with this plan                   ED Course as of Jul 14 1447   Sat Jul 13, 2019 2006 Comprehensive metabolic panel(!) [GS]      ED Course User Index  [GS] Tiffany Back MD     Clinical Impression:       ICD-10-CM  ICD-9-CM   1. Lymphadenopathy, anterior cervical R59.0 785.6   2. Cough R05 786.2         Disposition:   Disposition: Discharged  Condition: Stable                        Tiffany Back MD  07/14/19 1788

## 2019-07-13 NOTE — ED TRIAGE NOTES
Pt reports SOB starting today and sore throat since Wednesday, uses home O2 2L/NC has portable tank at bedside, is currently daily smoker, states she doing smoking cessation but not successful, denies weakness, fever, chills, N/V/D or chest pain.

## 2019-07-14 NOTE — DISCHARGE INSTRUCTIONS
You have swelling to a lymph node on your right neck.  This will likely improve on its own.  However, you should follow up with your primary doctor if you continue to have pain and swelling.      Your CT scan and labs including strep, complete blood count, comprehensive metabolic pain, mononucleosis were reassuring today.    Seek immediate medical attention if you have fever, worsening of your symptoms, difficulty breathing, sensation of worsening swelling, or for any other concern.

## 2019-07-14 NOTE — ED NOTES
LOC: The patient is awake, alert, and oriented to place, time, situation. Affect is appropriate.  Speech is appropriate and clear.     APPEARANCE: Patient resting comfortably in no acute distress.  Patient is clean and well groomed.    SKIN: The skin is warm and dry; color consistent with ethnicity.  Patient has normal skin turgor and moist mucus membranes.  Skin intact; no breakdown or bruising noted.     MUSCULOSKELETAL: Patient moving upper and lower extremities without difficulty.  Denies weakness.     RESPIRATORY: Airway is open and patent. Respirations spontaneous, even, easy, and non-labored.  Patient has a normal effort and rate.  No accessory muscle use noted. Denies cough, Lungs clear and diminished, O2 2L/NC.  CARDIAC:  Normal rhythm and rate noted.  No peripheral edema noted. No complaints of chest pain.      ABDOMEN: Soft and non tender to palpation.  No distention noted.     NEUROLOGIC: Eyes open spontaneously.  Behavior appropriate to situation.  Follows commands; facial expression symmetrical.  Purposeful motor response noted; normal sensation in all extremities.

## 2019-07-16 LAB — BACTERIA THROAT CULT: NORMAL

## 2019-07-24 ENCOUNTER — TELEPHONE (OUTPATIENT)
Dept: SMOKING CESSATION | Facility: CLINIC | Age: 64
End: 2019-07-24

## 2019-07-24 NOTE — TELEPHONE ENCOUNTER
Left a message that this is a follow up on reduction strategy and medication use. Left my name Argelia Norman and phone number [phone].

## 2019-08-13 ENCOUNTER — PATIENT MESSAGE (OUTPATIENT)
Dept: INTERNAL MEDICINE | Facility: CLINIC | Age: 64
End: 2019-08-13

## 2019-08-27 ENCOUNTER — HOSPITAL ENCOUNTER (EMERGENCY)
Facility: HOSPITAL | Age: 64
Discharge: HOME | End: 2019-08-27
Attending: EMERGENCY MEDICINE

## 2019-08-27 VITALS
DIASTOLIC BLOOD PRESSURE: 91 MMHG | TEMPERATURE: 98 F | BODY MASS INDEX: 21.26 KG/M2 | SYSTOLIC BLOOD PRESSURE: 129 MMHG | WEIGHT: 120 LBS | RESPIRATION RATE: 21 BRPM | OXYGEN SATURATION: 100 % | HEART RATE: 74 BPM | HEIGHT: 63 IN

## 2019-08-27 DIAGNOSIS — R19.8 ABDOMINAL FULLNESS: ICD-10-CM

## 2019-08-27 DIAGNOSIS — R74.8 ELEVATED LIPASE: ICD-10-CM

## 2019-08-27 DIAGNOSIS — J44.1 COPD EXACERBATION: Primary | ICD-10-CM

## 2019-08-27 LAB
ALBUMIN SERPL BCP-MCNC: 4.2 G/DL (ref 3.5–5.2)
ALP SERPL-CCNC: 46 U/L (ref 55–135)
ALT SERPL W/O P-5'-P-CCNC: 17 U/L (ref 10–44)
ANION GAP SERPL CALC-SCNC: 9 MMOL/L (ref 8–16)
AST SERPL-CCNC: 23 U/L (ref 10–40)
BASOPHILS # BLD AUTO: 0.03 K/UL (ref 0–0.2)
BASOPHILS NFR BLD: 0.3 % (ref 0–1.9)
BILIRUB SERPL-MCNC: 0.3 MG/DL (ref 0.1–1)
BNP SERPL-MCNC: <10 PG/ML (ref 0–99)
BUN SERPL-MCNC: 22 MG/DL (ref 8–23)
CALCIUM SERPL-MCNC: 9.5 MG/DL (ref 8.7–10.5)
CHLORIDE SERPL-SCNC: 98 MMOL/L (ref 95–110)
CO2 SERPL-SCNC: 29 MMOL/L (ref 23–29)
CREAT SERPL-MCNC: 1 MG/DL (ref 0.5–1.4)
DIFFERENTIAL METHOD: ABNORMAL
EOSINOPHIL # BLD AUTO: 0.1 K/UL (ref 0–0.5)
EOSINOPHIL NFR BLD: 1.3 % (ref 0–8)
ERYTHROCYTE [DISTWIDTH] IN BLOOD BY AUTOMATED COUNT: 15.1 % (ref 11.5–14.5)
EST. GFR  (AFRICAN AMERICAN): >60 ML/MIN/1.73 M^2
EST. GFR  (NON AFRICAN AMERICAN): 59.7 ML/MIN/1.73 M^2
GLUCOSE SERPL-MCNC: 80 MG/DL (ref 70–110)
HCT VFR BLD AUTO: 44.6 % (ref 37–48.5)
HGB BLD-MCNC: 14.6 G/DL (ref 12–16)
IMM GRANULOCYTES # BLD AUTO: 0.03 K/UL (ref 0–0.04)
IMM GRANULOCYTES NFR BLD AUTO: 0.3 % (ref 0–0.5)
LIPASE SERPL-CCNC: 65 U/L (ref 4–60)
LYMPHOCYTES # BLD AUTO: 4.2 K/UL (ref 1–4.8)
LYMPHOCYTES NFR BLD: 42.1 % (ref 18–48)
MCH RBC QN AUTO: 29.1 PG (ref 27–31)
MCHC RBC AUTO-ENTMCNC: 32.7 G/DL (ref 32–36)
MCV RBC AUTO: 89 FL (ref 82–98)
MONOCYTES # BLD AUTO: 0.8 K/UL (ref 0.3–1)
MONOCYTES NFR BLD: 8.1 % (ref 4–15)
NEUTROPHILS # BLD AUTO: 4.7 K/UL (ref 1.8–7.7)
NEUTROPHILS NFR BLD: 47.9 % (ref 38–73)
NRBC BLD-RTO: 0 /100 WBC
PLATELET # BLD AUTO: 266 K/UL (ref 150–350)
PMV BLD AUTO: 11.4 FL (ref 9.2–12.9)
POTASSIUM SERPL-SCNC: 4.1 MMOL/L (ref 3.5–5.1)
PROT SERPL-MCNC: 7.5 G/DL (ref 6–8.4)
RBC # BLD AUTO: 5.01 M/UL (ref 4–5.4)
SODIUM SERPL-SCNC: 136 MMOL/L (ref 136–145)
TROPONIN I SERPL DL<=0.01 NG/ML-MCNC: <0.006 NG/ML (ref 0–0.03)
WBC # BLD AUTO: 9.89 K/UL (ref 3.9–12.7)

## 2019-08-27 PROCEDURE — 93010 EKG 12-LEAD: ICD-10-PCS | Mod: ,,, | Performed by: INTERNAL MEDICINE

## 2019-08-27 PROCEDURE — 25000003 PHARM REV CODE 250: Performed by: PHYSICIAN ASSISTANT

## 2019-08-27 PROCEDURE — 93005 ELECTROCARDIOGRAM TRACING: CPT

## 2019-08-27 PROCEDURE — 99285 EMERGENCY DEPT VISIT HI MDM: CPT | Mod: ,,, | Performed by: PHYSICIAN ASSISTANT

## 2019-08-27 PROCEDURE — 84484 ASSAY OF TROPONIN QUANT: CPT

## 2019-08-27 PROCEDURE — 80053 COMPREHEN METABOLIC PANEL: CPT

## 2019-08-27 PROCEDURE — 94640 AIRWAY INHALATION TREATMENT: CPT

## 2019-08-27 PROCEDURE — 83880 ASSAY OF NATRIURETIC PEPTIDE: CPT

## 2019-08-27 PROCEDURE — 63600175 PHARM REV CODE 636 W HCPCS: Performed by: PHYSICIAN ASSISTANT

## 2019-08-27 PROCEDURE — 25000242 PHARM REV CODE 250 ALT 637 W/ HCPCS: Performed by: PHYSICIAN ASSISTANT

## 2019-08-27 PROCEDURE — 85025 COMPLETE CBC W/AUTO DIFF WBC: CPT

## 2019-08-27 PROCEDURE — 99285 PR EMERGENCY DEPT VISIT,LEVEL V: ICD-10-PCS | Mod: ,,, | Performed by: PHYSICIAN ASSISTANT

## 2019-08-27 PROCEDURE — 94761 N-INVAS EAR/PLS OXIMETRY MLT: CPT

## 2019-08-27 PROCEDURE — 83690 ASSAY OF LIPASE: CPT

## 2019-08-27 PROCEDURE — 93010 ELECTROCARDIOGRAM REPORT: CPT | Mod: ,,, | Performed by: INTERNAL MEDICINE

## 2019-08-27 PROCEDURE — 99285 EMERGENCY DEPT VISIT HI MDM: CPT | Mod: 25

## 2019-08-27 RX ORDER — PREDNISONE 20 MG/1
40 TABLET ORAL DAILY
Qty: 10 TABLET | Refills: 0 | Status: SHIPPED | OUTPATIENT
Start: 2019-08-27 | End: 2019-09-01

## 2019-08-27 RX ORDER — IPRATROPIUM BROMIDE AND ALBUTEROL SULFATE 2.5; .5 MG/3ML; MG/3ML
3 SOLUTION RESPIRATORY (INHALATION)
Status: COMPLETED | OUTPATIENT
Start: 2019-08-27 | End: 2019-08-27

## 2019-08-27 RX ORDER — PREDNISONE 20 MG/1
60 TABLET ORAL
Status: COMPLETED | OUTPATIENT
Start: 2019-08-27 | End: 2019-08-27

## 2019-08-27 RX ORDER — FAMOTIDINE 20 MG/1
20 TABLET, FILM COATED ORAL
Status: COMPLETED | OUTPATIENT
Start: 2019-08-27 | End: 2019-08-27

## 2019-08-27 RX ORDER — BENZONATATE 100 MG/1
100 CAPSULE ORAL
Status: COMPLETED | OUTPATIENT
Start: 2019-08-27 | End: 2019-08-27

## 2019-08-27 RX ORDER — IBUPROFEN 600 MG/1
600 TABLET ORAL
Status: COMPLETED | OUTPATIENT
Start: 2019-08-27 | End: 2019-08-27

## 2019-08-27 RX ADMIN — FAMOTIDINE 20 MG: 20 TABLET, FILM COATED ORAL at 05:08

## 2019-08-27 RX ADMIN — BENZONATATE 100 MG: 100 CAPSULE ORAL at 09:08

## 2019-08-27 RX ADMIN — IBUPROFEN 600 MG: 600 TABLET ORAL at 05:08

## 2019-08-27 RX ADMIN — IPRATROPIUM BROMIDE AND ALBUTEROL SULFATE 3 ML: .5; 3 SOLUTION RESPIRATORY (INHALATION) at 10:08

## 2019-08-27 RX ADMIN — PREDNISONE 60 MG: 20 TABLET ORAL at 10:08

## 2019-08-27 NOTE — ED PROVIDER NOTES
"Encounter Date: 2019       History     Chief Complaint   Patient presents with    Shortness of Breath     dizzy spells started since friday with sob, home oxygen 2l     Patient is a 64-yo -American female with a PMHx of CHF (EF 50-55%), COPD (2L [place] home O2), HTN, and pulmonary hypertension who presents to the ED for urgent evaluation of shortness of breath. Patient reports worsening SOB since Friday (4 days ago). She states she typically walks around her house without oxygen and her sats stay around 88%; however, over the past few days her sats have dropped as low as 75%. She notes new "dizzy spells" that occurred over the past 2 days. She states today around 1PM she was sitting down talking to a friend on the phone when the dizzy spell occurred and she felt like she was going to pass out. This prompted her ED visit. She denies loss of consciousness. She endorses a HA that started today. She also notes abdominal fullness and eructation that she feels may be causing the SOB. She recalls a mechanical fall last week onto her left side, noting a bruise overlying her left shin. She denies fever/chills, cough, chest pain, abdominal pain, nausea/vomiting, diarrhea, constipation, bloody stools, dysuria or focal weakness. Patient endorses cigarette use (<10 cigarettes per day).    The history is provided by the patient.     Review of patient's allergies indicates:   Allergen Reactions    No known drug allergies      Past Medical History:   Diagnosis Date    CHF (congestive heart failure)     COPD (chronic obstructive pulmonary disease)     Herpes zoster without mention of complication 2011    Hypertension     Leg edema     Pulmonary hypertension      Past Surgical History:   Procedure Laterality Date    BREAST BIOPSY Right     core     SECTION      BLAKE-TRANSFORAMINAL Right 3/10/2015    Performed by Yajaira Hastings MD at [facility]     Family History   Problem Relation Age of Onset "    Heart disease Mother     Heart disease Paternal Uncle      Social History     Tobacco Use    Smoking status: Current Every Day Smoker     Packs/day: 1.00     Years: 40.00     Pack years: 40.00     Types: Cigarettes    Smokeless tobacco: Never Used   Substance Use Topics    Alcohol use: Yes     Comment: beer daily 2-3 daily, none today    Drug use: No     Review of Systems   Constitutional: Negative for chills and fever.   HENT: Negative for sore throat.    Eyes: Negative for visual disturbance.   Respiratory: Positive for shortness of breath.    Cardiovascular: Negative for chest pain and leg swelling.   Gastrointestinal: Negative for abdominal pain, constipation, diarrhea, nausea and vomiting.   Genitourinary: Negative for dysuria.   Musculoskeletal: Negative for back pain.   Skin: Negative for wound.   Neurological: Positive for dizziness and headaches. Negative for numbness.   Psychiatric/Behavioral: Negative for dysphoric mood.       Physical Exam     Initial Vitals [08/27/19 1403]   BP Pulse Resp Temp SpO2   123/80 93 20 99.8 °F (37.7 °C) (!) 91 %      MAP       --         Physical Exam    Nursing note and vitals reviewed.  Constitutional: She appears well-developed and well-nourished. She is not diaphoretic. No distress.   HENT:   Head: Normocephalic and atraumatic.   Mouth/Throat: Oropharynx is clear and moist. No oropharyngeal exudate.   Eyes: Conjunctivae and EOM are normal. Pupils are equal, round, and reactive to light.   Neck: Normal range of motion. Neck supple.   Cardiovascular: Normal rate, regular rhythm, normal heart sounds and intact distal pulses.   Pulmonary/Chest: No respiratory distress. She has no wheezes. She has no rhonchi. She has no rales.   Diminished breath sounds throughout; no wheezes, rales, or rhonchi. She is speaking in clear and full sentences. No evidence of respiratory distress. +TTP overlying the left lower ribs without overlying contusion.   Abdominal: Soft. Bowel  sounds are normal. There is no tenderness. There is no rebound and no guarding.   Musculoskeletal: Normal range of motion.   +Small contusion noted to the left anterior lower leg.   Neurological: She is alert and oriented to person, place, and time.   Skin: Skin is warm and dry.   Psychiatric: She has a normal mood and affect. Thought content normal.         ED Course   Procedures  Labs Reviewed   CBC W/ AUTO DIFFERENTIAL - Abnormal; Notable for the following components:       Result Value    RDW 15.1 (*)     All other components within normal limits   COMPREHENSIVE METABOLIC PANEL - Abnormal; Notable for the following components:    Alkaline Phosphatase 46 (*)     eGFR if non  59.7 (*)     All other components within normal limits   LIPASE - Abnormal; Notable for the following components:    Lipase 65 (*)     All other components within normal limits   B-TYPE NATRIURETIC PEPTIDE   TROPONIN I        ECG Results          EKG 12-lead (Final result)  Result time 08/27/19 16:21:05    Final result by Interface, Lab In [facility] (08/27/19 16:21:05)                 Narrative:    Test Reason : R06.02,    Vent. Rate : 086 BPM     Atrial Rate : 086 BPM     P-R Int : 158 ms          QRS Dur : 072 ms      QT Int : 372 ms       P-R-T Axes : 077 044 034 degrees     QTc Int : 445 ms    Normal sinus rhythm  Possible Left atrial enlargement  Septal infarct (cited on or before 07-FEB-2016)  Abnormal ECG  When compared with ECG of 13-JUL-2019 18:11,  Nonspecific T wave abnormality no longer evident in Inferior leads  T wave inversion no longer evident in Anterior leads  Confirmed by MARIA ELENA JONES MD ([id number]) on 8/27/2019 4:20:57 PM    Referred By: [email or web address]   SELF           Confirmed By:MARIA ELENA JONES MD                            Imaging Results          US Abdomen Complete (Final result)  Result time 08/27/19 21:34:11    Final result by Tremaine Ram MD (08/27/19 21:34:11)                 Impression:      No  significant sonographic abnormality.    Electronically signed by resident: Edi Messina  Date:    08/27/2019  Time:    21:24    Electronically signed by: Tremaine Ram MD  Date:    08/27/2019  Time:    21:34             Narrative:    EXAMINATION:  US ABDOMEN COMPLETE    CLINICAL HISTORY:  Other specified symptoms and signs involving the digestive system and abdomen    TECHNIQUE:  Complete abdominal ultrasound (including pancreas, liver, gallbladder, common bile duct, spleen, aorta, IVC, and kidneys) was performed.    COMPARISON:  None.    FINDINGS:  Liver: Normal in size, measuring 15.9 cm. Homogeneous echotexture.  No focal hepatic lesions.    Gallbladder: No calculi, wall thickening, or pericholecystic fluid.  No sonographic Goldberg's sign.    Biliary system: The common duct is not dilated, measuring 4 mm.  No intrahepatic ductal dilatation.    Spleen: Normal in size with a homogeneous echotexture, measuring 6.0 x 2.5 cm.    Pancreas: The visualized portions of pancreas appear normal.    Right kidney: Normal in size with no hydronephrosis, measuring 9.7 cm.    Left kidney:  Normal in size with no hydronephrosis, measuring 10.2 cm.    Aorta: No aneurysm.    Inferior vena cava: Normal in appearance.    Miscellaneous: No ascites.                               X-Ray Chest PA And Lateral (Final result)  Result time 08/27/19 16:41:53    Final result by Erwin Cuevas MD (08/27/19 16:41:53)                 Impression:      Pulmonary hyperinflation with suggesting sequela of underlying COPD, without detrimental change or radiographic acute intrathoracic process seen.  Specifically, no focal consolidation.      Electronically signed by: Erwin Cuevas MD  Date:    08/27/2019  Time:    16:41             Narrative:    EXAMINATION:  XR CHEST PA AND LATERAL    CLINICAL HISTORY:  Shortness of breath    TECHNIQUE:  PA and lateral views of the chest were performed.    COMPARISON:  Chest radiograph most recent 07/13/2019 and CT  thorax 02/07/2017    FINDINGS:  Monitoring leads overlie the chest.  No detrimental change.  Cardiomediastinal silhouette is midline and stable without evidence of failure.  Pulmonary vasculature and hilar regions are within normal limits.  The lungs are symmetrically hyperexpanded with increased lucency of the upper zones suggesting sequela of underlying COPD.  Left basilar platelike scarring versus atelectasis.  No focal consolidation, pleural effusion or pneumothorax.  Osseous structures appear intact.                                 Medical Decision Making:   History:   Old Medical Records: I decided to obtain old medical records.  Initial Assessment:   Patient is a 64-yo -American female with a PMHx of CHF (EF 50-55%), COPD (2L [place] home O2), HTN, and pulmonary hypertension who presents to the ED for urgent evaluation of shortness of breath. Patient reports worsening SOB since Friday (4 days ago). PE reveals a non-toxic, afebrile, well-appearing female in NAD. Vital signs are stable. SpO2 96% on 2L NC.   Differential Diagnosis:   DDx includes but is not limited to: COPD exacerbation, oxygen noncompliance, PNA, PE, symptomatic anemia.  Clinical Tests:   Lab Tests: Ordered and Reviewed  Radiological Study: Ordered and Reviewed  Medical Tests: Ordered and Reviewed  ED Management:  Will obtain basic labs, cardiac markers, lipase, trop, CXR. Patient given Ibuprofen 600 mg for HA.    CBC without leukocytosis, normal H&H.  CMP unremarkable. Troponin BNP within normal limits.  Lipase slightly elevated at 65.  Chest x-ray reveals pulmonary hyperinflation consistent with underlying COPD with no acute process seen.    Given symptoms of abdominal fullness in setting of elevated lipase, will obtain an abdominal ultrasound.    Patient signed out to Yeni Avalos PA-C, who will determine final disposition pending abdominal ultrasound results. Anticipate discharge for this patient. There is no focal consolidation on CXR  to suggest PNA. Low suspicion for PE given Well's Criteria score of 0. Low suspicion for cardiac etiology as Trop & BNP WNL, no fluid overload on exam. Suspect dizzy spells brought on by hypoxia when pt goes without O2; she states she climbs stairs in her home to get to her O2 in her bedroom. Will give short-course of steroids for possible COPD exacerbation. Will defer mgmt of US results to oncoming PA. I have discussed patient case with my supervisory physician, who is in agreement with my assessment and plan.      Additional MDM:     Well's Criteria Score:  -Clinical symptoms of DVT (leg swelling, pain with palpation) = 0.0  -Other diagnosis less likely than pulmonary embolism =            0.0  -Heart Rate >100 =   0.0  -Immobilization (= or > than 3 days) or surgery in the previous 4 weeks = 0.0  -Previous DVT/PE = 0.0  -Hemoptysis =          0.0  -Malignancy =           0.0  Well's Probability Score =    0                 Attending Attestation:     Physician Attestation Statement for NP/PA:   I discussed this assessment and plan of this patient with the NP/PA, but I did not personally examine the patient. The face to face encounter was performed by the NP/PA.    Other NP/PA Attestation Additions:    History of Present Illness: 64-year-old woman with comorbidities of emphysema presents for evaluation of dizziness and hypoxia noted earlier today while off of her prescribed supplemental oxygen.                      Clinical Impression:       ICD-10-CM ICD-9-CM   1. Shortness of breath R06.02 786.05   2. Abdominal fullness R19.8 789.9                                KEITH ShanksC  08/27/19 [id number]

## 2019-08-27 NOTE — ED NOTES
Patient identifiers verified and correct for Rochelle Espinoza.    HEENT: Pt c/o a headache and dizziness.   LOC: The patient is awake, alert and aware of environment with an appropriate affect, the patient is oriented x3 and speaking appropriately.  APPEARANCE: Patient resting comfortably and in no acute distress, patient is clean and well groomed, patient's clothing is properly fastened.  SKIN: The skin is warm and dry, color consistent with ethnicity, patient has normal skin turgor and moist mucus membranes, skin intact, no breakdown or bruising noted.  MUSCULOSKELETAL: Patient moving all extremities spontaneously.  RESPIRATORY: Patient c/o SOB worsening with exertion starting Friday and now while resting she is still SOB. Pt wears 2L NC at home. o2 applied to pt.   CARDIAC: Patient has a normal rate, no periphreal edema noted, capillary refill < 3 seconds.  ABDOMEN: Soft and non tender to palpation.

## 2019-08-27 NOTE — PROVIDER PROGRESS NOTES - EMERGENCY DEPT.
Encounter Date: 8/27/2019    ED Physician Progress Notes         EKG - STEMI Decision  Initial Reading: No STEMI present.

## 2019-08-27 NOTE — ED NOTES
Patient back from xray. Resting in bed with side rails up x2, bed wheels locked and call light within reach. No needs at this time. VSS. Will continue to monitor.

## 2019-08-27 NOTE — ED NOTES
Patient sitting in bed at 90 degree angle. Call bell within reach. Patient instructed to call for assistance so portable O2 can be applied. Family at bedside. Will continue to monitor.

## 2019-08-27 NOTE — ED TRIAGE NOTES
Patient presents to the ED with SOB with exertion since Friday. She states her O2 sats dropped to 75% on RA. C/o HA and dizziness.

## 2019-08-27 NOTE — ED NOTES
Patient needed to use RR and I informed her I was going to grab a portable O2 tank. Pt didn't wait for RN to return and got up to the RR by herself. O2 sats decreased to 85% on RA. SHAQUILLE Singh notified.

## 2019-08-28 NOTE — DISCHARGE INSTRUCTIONS
Be sure you are wearing your oxygen at all times.  Avoid foods that cause bloating (ex: Broccoli, Cauliflower, [place] sprouts, Cabbage, Cooked dried beans, Fizzy (carbonated) drinks, such as sparkling water, soda, beer, and champagne.  Stop smoking.  Follow-up with your pulmonologist. Schedule an appt with GI clinic should your abdominal symptoms persist.  Return to the ED for any concerning symptoms.    Please return to the Emergency Department for any concerns or worsening of condition. If you were prescribed antibiotics, please take them to completion. If you were prescribed a narcotic medication, do not drive or operate heavy equipment or machinery, while taking these medications.  Please follow up with your primary care doctor or specialist as needed. If you smoke, please stop smoking.    Our goal in the emergency department is to always give you outstanding care and exceptional service. You may receive a survey by mail or e-mail in the next week regarding your experience in our ED. We would greatly appreciate your completing and returning the survey. Your feedback provides us with a way to recognize our staff who give very good care and it helps us learn how to improve when your experience was below our aspiration of excellence.

## 2019-08-28 NOTE — ED NOTES
Report received from ALEM Finnegan. Assume care of pt. Pt resting comfortably and independently repositioned in stretcher with bed locked in lowest position for safety. NAD noted at this time. Respirations even and unlabored and visible chest rise noted.  Patient offered bathroom assistance and denies need at this time. Pt instructed to call if assistance is needed. Pt on continuous cardiac, BP, and O2 monitoring. Call light within reach. Family at bedside. No needs at this time. Will continue to monitor.

## 2019-08-28 NOTE — PROVIDER PROGRESS NOTES - EMERGENCY DEPT.
Encounter Date: 8/27/2019 9:26 PM    ED Physician Progress Notes           ED Course: I, Leeanne Avalos PA-C, have assumed care of this patient from Samantha Rod PA-C. Patient is a 64 year old female with PMHx of HTN and COPD. She presents to the ED for SOB. She reports having increased SOB for approximately 6 days. Reports SOB worse with exertion. Reports non compliance with home oxygen of 2 L via NC. She denies fever,chills, nausea, vomiting, chest pain, abd pain, dysuria, diarrhea, or constipation. She is a current everyday smoker and reports alcohol use.     At the time of signout plan was pending US abdomen. Anticipate discharge.     Medications given in the ED:    Medications   ibuprofen tablet 600 mg (600 mg Oral Given 8/27/19 1712)   famotidine tablet 20 mg (20 mg Oral Given 8/27/19 1754)   benzonatate capsule 100 mg (100 mg Oral Given 8/27/19 2134)   albuterol-ipratropium 2.5 mg-0.5 mg/3 mL nebulizer solution 3 mL (3 mLs Nebulization Given 8/27/19 2212)   predniSONE tablet 60 mg (60 mg Oral Given 8/27/19 2208)     US abdomen found to have No significant sonographic abnormality.    Patient reassessed, reports symptoms improved with ED management. I have discussed emergency department findings, and plan with the patient. Will discharge home with steroid and F/U with PCP. Patient verbalizes understanding of plan and agrees. Return precautions given.     Disposition: Discharged.     Impression: COPD exacerbation.     I have discussed and reviewed with my supervising physician.

## 2019-08-29 ENCOUNTER — TELEPHONE (OUTPATIENT)
Dept: GASTROENTEROLOGY | Facility: CLINIC | Age: 64
End: 2019-08-29

## 2019-08-29 NOTE — TELEPHONE ENCOUNTER
----- Message from Dee Gongora sent at 8/29/2019  8:38 AM CDT -----  Contact: pt: [phone]  Pt called to get a appt in the dept for pancreatic pain, unable to schedule     Please contact pt to schedule pt: [phone]

## 2019-08-29 NOTE — TELEPHONE ENCOUNTER
Pt is requesting an appointment next week , pt states she went to ER and her labs came back elevated for her pancreas , pt states she had an ultrasound and did not find anything from ultrasound  Pt states she's having discomfort on left side along with gas and burning sensation in her stomach   Pt also states she has a history of COPD   Please advise pt on appt

## 2019-08-30 ENCOUNTER — TELEPHONE (OUTPATIENT)
Dept: PULMONOLOGY | Facility: CLINIC | Age: 64
End: 2019-08-30

## 2019-08-30 ENCOUNTER — TELEPHONE (OUTPATIENT)
Dept: ENDOSCOPY | Facility: HOSPITAL | Age: 64
End: 2019-08-30

## 2019-08-30 DIAGNOSIS — J44.9 CHRONIC OBSTRUCTIVE PULMONARY DISEASE, UNSPECIFIED COPD TYPE: Primary | ICD-10-CM

## 2019-08-30 NOTE — TELEPHONE ENCOUNTER
Bonny,  Please advise pt on appointment next week  I offered her an appt with Dr. Keys today at 2pm  Pt decline because she doesn't have transportation

## 2019-08-30 NOTE — TELEPHONE ENCOUNTER
Spoke with patient. Offered appointment for 9/10/19 at 1 pm with ANA MARIA Copeland. Patient accepted appointment and said that would give her to set up transportation.

## 2019-08-30 NOTE — TELEPHONE ENCOUNTER
----- Message from Janak Mosqueda MD sent at 8/30/2019 11:17 AM CDT -----  General GI.  R  ----- Message -----  From: Devorah Barry MA  Sent: 8/30/2019  10:12 AM  To: Janak Mosqueda MD    Please review and advise if AES or General GI

## 2019-09-04 ENCOUNTER — TELEPHONE (OUTPATIENT)
Dept: INTERNAL MEDICINE | Facility: CLINIC | Age: 64
End: 2019-09-04

## 2019-09-04 NOTE — TELEPHONE ENCOUNTER
----- Message from Carole Vail sent at 9/4/2019 11:43 AM CDT -----  Contact: [place] [phone]  Pharmacy is calling to clarify an RX.  RX name:  losartan-hydrochlorothiazide 50-12.5 mg (HYZAAR) 50-12.5 mg   What do they need to clarify:  Pharmacy is out of this medication.   Comments: Requesting to have medication .  New Rx or okay to separate.    Please call and advise  Thank you

## 2019-09-05 ENCOUNTER — TELEPHONE (OUTPATIENT)
Dept: INTERNAL MEDICINE | Facility: CLINIC | Age: 64
End: 2019-09-05

## 2019-09-05 NOTE — TELEPHONE ENCOUNTER
Spoke with pharmacy and medication losartan-hydro 50-12.5mg  Or alternative the combination is on back order.

## 2019-09-05 NOTE — TELEPHONE ENCOUNTER
----- Message from Maxine Baxter sent at 9/5/2019 10:57 AM CDT -----  Contact: Ke/[phone]  Message to the provider. Drug is on backorder, is it okay to separate if so please send new scripts thank you.

## 2019-09-06 ENCOUNTER — HOSPITAL ENCOUNTER (OUTPATIENT)
Dept: PULMONOLOGY | Facility: CLINIC | Age: 64
Discharge: HOME | End: 2019-09-06
Payer: COMMERCIAL

## 2019-09-06 ENCOUNTER — OFFICE VISIT (OUTPATIENT)
Dept: PULMONOLOGY | Facility: CLINIC | Age: 64
End: 2019-09-06
Payer: COMMERCIAL

## 2019-09-06 VITALS
DIASTOLIC BLOOD PRESSURE: 81 MMHG | HEIGHT: 63 IN | SYSTOLIC BLOOD PRESSURE: 132 MMHG | WEIGHT: 132 LBS | OXYGEN SATURATION: 93 % | BODY MASS INDEX: 23.39 KG/M2 | HEART RATE: 80 BPM

## 2019-09-06 DIAGNOSIS — R91.1 LUNG NODULE: ICD-10-CM

## 2019-09-06 DIAGNOSIS — R91.8 ABNORMAL FINDINGS ON DIAGNOSTIC IMAGING OF LUNG: ICD-10-CM

## 2019-09-06 DIAGNOSIS — J44.9 COPD, SEVERE: ICD-10-CM

## 2019-09-06 DIAGNOSIS — J44.9 CHRONIC OBSTRUCTIVE PULMONARY DISEASE, UNSPECIFIED COPD TYPE: ICD-10-CM

## 2019-09-06 DIAGNOSIS — J44.1 COPD WITH ACUTE EXACERBATION: Primary | ICD-10-CM

## 2019-09-06 PROCEDURE — 99999 PR PBB SHADOW E&M-EST. PATIENT-LVL IV: ICD-10-PCS | Mod: PBBFAC,,,

## 2019-09-06 PROCEDURE — 99214 PR OFFICE/OUTPT VISIT, EST, LEVL IV, 30-39 MIN: ICD-10-PCS | Mod: 25,S$GLB,,

## 2019-09-06 PROCEDURE — 99214 OFFICE O/P EST MOD 30 MIN: CPT | Mod: 25,S$GLB,,

## 2019-09-06 PROCEDURE — 94010 BREATHING CAPACITY TEST: CPT | Mod: S$GLB,,, | Performed by: INTERNAL MEDICINE

## 2019-09-06 PROCEDURE — 3075F SYST BP GE 130 - 139MM HG: CPT | Mod: CPTII,S$GLB,,

## 2019-09-06 PROCEDURE — 3008F PR BODY MASS INDEX (BMI) DOCUMENTED: ICD-10-PCS | Mod: CPTII,S$GLB,,

## 2019-09-06 PROCEDURE — 94010 BREATHING CAPACITY TEST: ICD-10-PCS | Mod: S$GLB,,, | Performed by: INTERNAL MEDICINE

## 2019-09-06 PROCEDURE — 3075F PR MOST RECENT SYSTOLIC BLOOD PRESS GE 130-139MM HG: ICD-10-PCS | Mod: CPTII,S$GLB,,

## 2019-09-06 PROCEDURE — 94727 GAS DIL/WSHOT DETER LNG VOL: CPT | Mod: S$GLB,,, | Performed by: INTERNAL MEDICINE

## 2019-09-06 PROCEDURE — 3008F BODY MASS INDEX DOCD: CPT | Mod: CPTII,S$GLB,,

## 2019-09-06 PROCEDURE — 99999 PR PBB SHADOW E&M-EST. PATIENT-LVL IV: CPT | Mod: PBBFAC,,,

## 2019-09-06 PROCEDURE — 3079F DIAST BP 80-89 MM HG: CPT | Mod: CPTII,S$GLB,,

## 2019-09-06 PROCEDURE — 94729 PR C02/MEMBANE DIFFUSE CAPACITY: ICD-10-PCS | Mod: S$GLB,,, | Performed by: INTERNAL MEDICINE

## 2019-09-06 PROCEDURE — 94729 DIFFUSING CAPACITY: CPT | Mod: S$GLB,,, | Performed by: INTERNAL MEDICINE

## 2019-09-06 PROCEDURE — 94727 PR PULM FUNCTION TEST BY GAS: ICD-10-PCS | Mod: S$GLB,,, | Performed by: INTERNAL MEDICINE

## 2019-09-06 PROCEDURE — 3079F PR MOST RECENT DIASTOLIC BLOOD PRESSURE 80-89 MM HG: ICD-10-PCS | Mod: CPTII,S$GLB,,

## 2019-09-06 RX ORDER — AZITHROMYCIN 250 MG/1
500 TABLET, FILM COATED ORAL DAILY
Qty: 5 TABLET | Refills: 0 | Status: SHIPPED | OUTPATIENT
Start: 2019-09-06 | End: 2019-09-23

## 2019-09-06 RX ORDER — PREDNISONE 20 MG/1
TABLET ORAL
Qty: 20 TABLET | Refills: 0 | Status: SHIPPED | OUTPATIENT
Start: 2019-09-06 | End: 2019-09-23

## 2019-09-06 NOTE — PROGRESS NOTES
64 year old woman with a history of severe COPD, currently on albuterol prn.  Current smoker; 100 pack year history.  Chronic hypoxemic respiratory failure on home O2.   CT Chest in March showed several pulmonary nodules that will require follow up.  Today she is reporting symptoms consistent with an AECOPD.  · Steroids, Azithromycin  · Will prescribe LABA-LAMA for maintenance.  · Repeat Chest CT this month.

## 2019-09-06 NOTE — PROGRESS NOTES
"Subjective:       Patient ID: Rochelle Espinoza is a 64 y.o. female.    Chief Complaint: COPD    HPI   64 year old female with past medical history of COPD (on 2L at baseline), tobacco abuse (active smoker with 100 pack year history), diastolic heart failure (EF 50%), HTN, GERD, pHTN, who presents to the clinic as a follow up for her COPD and symptoms of acute URI.  She recently visited the ED where she presented for shortness of breath.  She admitted to being non-compliant with her O2 use and her O2 sats dropped to the 70s at home.  She responded to oxygen therapy, and was discharged.  She was given a short course of steroids at the time for suspected component of COPD exacerbation.  She had a wells score of 0 at that time.      Current pulmonary meds:  PRN alubterol  combivent PRN    Today she presents with chills, increasing cough, sputum production, worsening shortness of breath, sore throat, and myalgias concerning for viral illness.  She denies fever, diarrhea.      Review of Systems   Constitutional: Positive for chills, activity change, fatigue and weakness. Negative for fever, weight loss and appetite change.   HENT: Positive for postnasal drip, rhinorrhea, sore throat and congestion.    Respiratory: Positive for cough, sputum production, shortness of breath, wheezing, previous hospitalization due to pulmonary problems, pleurisy, dyspnea on extertion and use of rescue inhaler. Negative for apnea, snoring, hemoptysis, choking, chest tightness, orthopnea, asthma nighttime symptoms, somnolence and Paroxysmal Nocturnal Dyspnea.    Musculoskeletal: Negative for arthralgias.   Gastrointestinal: Negative for abdominal pain and abdominal distention.   Neurological: Negative for dizziness.   Psychiatric/Behavioral: Negative for confusion.       Objective:       Vitals:    09/06/19 1606   BP: 132/81   BP Location: Right arm   Patient Position: Sitting   Pulse: 80   SpO2: (!) 93%   Weight: 59.9 kg (132 lb)   Height: 5' 3" " (1.6 m)         Physical Exam   Constitutional: She is oriented to person, place, and time. She appears well-developed and well-nourished. No distress.   HENT:   Head: Normocephalic.   Mouth/Throat: No oropharyngeal exudate. Mallampati Score: II.   Erythematous pharynx   Neck: Normal range of motion. No JVD present.   Pulmonary/Chest: Hyperinflation, symmetric chest wall expansion and effort normal. No respiratory distress. She has no decreased breath sounds. She has wheezes. She has no rhonchi. Chest wall is not dull to percussion. She exhibits no tenderness.   End expiratory wheezing L>R, scattered crackles throughout.     Abdominal: Soft. Bowel sounds are normal. She exhibits no distension. There is no hepatosplenomegaly. There is tenderness.   Musculoskeletal: Normal range of motion.   Lymphadenopathy:     She has no cervical adenopathy.   Neurological: She is alert and oriented to person, place, and time.   Skin: Skin is warm and dry.   Psychiatric: She has a normal mood and affect. Her behavior is normal. Judgment and thought content normal.     Personal Diagnostic Review  Spirometry 9/6/2019:   FEV1/FVC 58, FEV1 33, FVC 45, TLC 75, DLCO 34    No flowsheet data found.      Assessment:       No diagnosis found.    Outpatient Encounter Medications as of 9/6/2019   Medication Sig Dispense Refill    albuterol (PROAIR HFA) 90 mcg/actuation inhaler TAKE 2 PUFFS BY MOUTH EVERY 6 HOURS AS NEEDED 8.5 g 9    albuterol-ipratropium (DUO-NEB) 2.5 mg-0.5 mg/3 mL nebulizer solution USE 1 VIAL VIA NEBULIZER EVERY 4 HOURS AS NEEDED FOR WHEEZING OR SHORTNESS OF BREATH 180 mL 3    colchicine (COLCRYS) 0.6 mg tablet TAKE 2 TABLETS BY MOUTH NOW, TAKE 1 TABLET BY MOUTH 1 HOUR LATER 30 tablet 2    COMBIVENT RESPIMAT  mcg/actuation inhaler INHALE 1 PUFF BY MOUTH FOUR TIMES DAILY FOR RESCUE 4 g 0    COMBIVENT RESPIMAT  mcg/actuation inhaler INHALE 1 PUFF BY MOUTH INTO THE LUNGS FOUR TIMES DAILY FOR RESCUE 4 g 0     furosemide (LASIX) 20 MG tablet TAKE 1 TABLET(20 MG) BY MOUTH TWICE DAILY 60 tablet 7    glycerin adult suppository Place 1 suppository rectally as needed for Constipation.      losartan-hydrochlorothiazide 50-12.5 mg (HYZAAR) 50-12.5 mg per tablet Take 1 tablet by mouth once daily. 90 tablet 3    multivitamin capsule Take 1 capsule by mouth once daily.      nicotine (NICODERM CQ) 21 mg/24 hr Place 1 patch onto the skin once daily. 14 patch 0    omeprazole (PRILOSEC) 40 MG capsule Take 1 capsule (40 mg total) by mouth once daily. 90 capsule 3    potassium chloride SA (K-DUR,KLOR-CON) 20 MEQ tablet TAKE 1 TABLET(20 MEQ) BY MOUTH EVERY DAY 30 tablet 7    traZODone (DESYREL) 50 MG tablet Take 1 tablet (50 mg total) by mouth nightly as needed (anxiety). 30 tablet 1    varenicline (CHANTIX STARTING MONTH BOX) 0.5 mg (11)- 1 mg (42) tablet Take one 0.5mg tab by mouth once daily X3 days,then increase to one 0.5mg tab twice daily X4 days,then increase to one 1mg tab twice daily 53 tablet 0     No facility-administered encounter medications on file as of 9/6/2019.      No orders of the defined types were placed in this encounter.      Plan:       1) COPD (severe)  2) mild restrictive disease  3) severe reduction in DLCO  4) COPD exacerbation (likely viral)  5) active tobacco abuse  6) bilateral pulmonary nodules, last CT thorax 3/2019.    - will provide treatment of COPD exacerbation with prednisone taper and azithromycin 5 day therapy  - continue PRN albuterol, however this is all she takes.  She used to take Breo, but stopped when she ran out of medication about a year ago.  - will add anoro for LAMA/LABA therapy.  Consider addition of ICS if her symptoms remain uncontrolled.  - patient counseled extensively on tobacco abuse and encouragement was given for cessation.    - repeat CT thorax ordered today as it is now 6 months following previous study.  Will schedule when she has recovered from her illness.    Kal  MD Daniel  [facility] PGY5  [phone]

## 2019-09-09 LAB
DLCO ADJ PRE: 7.16 ML/(MIN*MMHG) (ref 15.8–27.27)
DLCO SINGLE BREATH LLN: 15.8
DLCO SINGLE BREATH PRE REF: 34.4 %
DLCO SINGLE BREATH REF: 21.54
DLCOC SBVA LLN: 2.99
DLCOC SBVA PRE REF: 64.2 %
DLCOC SBVA REF: 4.51
DLCOC SINGLE BREATH LLN: 15.8
DLCOC SINGLE BREATH PRE REF: 33.2 %
DLCOC SINGLE BREATH REF: 21.54
DLCOCSBVAULN: 6.04
DLCOCSINGLEBREATHULN: 27.27
DLCOSINGLEBREATHULN: 27.27
DLCOVA LLN: 2.99
DLCOVA PRE REF: 66.5 %
DLCOVA PRE: 3 ML/(MIN*MMHG*L) (ref 2.99–6.04)
DLCOVA REF: 4.51
DLCOVAULN: 6.04
DLVAADJ PRE: 2.9 ML/(MIN*MMHG*L) (ref 2.99–6.04)
ERV LLN: 0.73
ERV REF: 0.73
ERVN2 LLN: 0.73
ERVN2 PRE REF: 34.3 %
ERVN2 PRE: 0.25 L (ref 0.73–0.73)
ERVN2 REF: 0.73
ERVN2ULN: 0.73
ERVULN: 0.73
FEF 25 75 LLN: 0.75
FEF 25 75 PRE REF: 16 %
FEF 25 75 REF: 1.81
FEV05 LLN: 0.88
FEV05 REF: 1.74
FEV1 FVC LLN: 67
FEV1 FVC PRE REF: 72.6 %
FEV1 FVC REF: 79
FEV1 LLN: 1.42
FEV1 PRE REF: 32.6 %
FEV1 REF: 1.99
FRCN2 LLN: 1.83
FRCN2 PRE REF: 101.2 %
FRCN2 REF: 2.65
FRCN2ULN: 3.47
FRCPLETH LLN: 1.83
FRCPLETH REF: 2.65
FRCPLETHULN: 3.47
FVC LLN: 1.82
FVC PRE REF: 44.7 %
FVC REF: 2.51
IVC PRE: 0.93 L (ref 1.82–3.2)
IVC SINGLE BREATH LLN: 1.82
IVC SINGLE BREATH PRE REF: 36.8 %
IVC SINGLE BREATH REF: 2.51
IVCSINGLEBREATHULN: 3.2
MVV LLN: 72
MVV REF: 85
PEF LLN: 3.22
PEF PRE REF: 50.7 %
PEF REF: 5.16
PRE DLCO: 7.41 ML/(MIN*MMHG) (ref 15.8–27.27)
PRE FEF 25 75: 0.29 L/S (ref 0.75–2.88)
PRE FET 100: 6.71 SEC
PRE FEV05 REF: 27.8 %
PRE FEV1 FVC: 57.7 % (ref 67.26–91.6)
PRE FEV1: 0.65 L (ref 1.42–2.55)
PRE FEV5: 0.48 L (ref 0.88–2.59)
PRE FRC N2: 2.68 L
PRE FVC: 1.12 L (ref 1.82–3.2)
PRE PEF: 2.62 L/S (ref 3.22–7.1)
RV LLN: 1.34
RV REF: 1.92
RVN2 LLN: 1.34
RVN2 PRE REF: 126.6 %
RVN2 PRE: 2.43 L (ref 1.34–2.5)
RVN2 REF: 1.92
RVN2TLCN2 LLN: 31.13
RVN2TLCN2 PRE REF: 167.2 %
RVN2TLCN2 PRE: 68.07 % (ref 31.13–50.31)
RVN2TLCN2 REF: 40.72
RVN2TLCN2ULN: 50.31
RVN2ULN: 2.5
RVTLC LLN: 31
RVTLC REF: 41
RVTLCULN: 50
RVULN: 2.5
TLC LLN: 3.78
TLC REF: 4.77
TLC ULN: 5.76
TLCN2 LLN: 3.78
TLCN2 PRE REF: 74.8 %
TLCN2 PRE: 3.57 L (ref 3.78–5.76)
TLCN2 REF: 4.77
TLCN2ULN: 5.76
VA PRE: 2.47 L (ref 4.62–4.62)
VA SINGLE BREATH LLN: 4.62
VA SINGLE BREATH PRE REF: 53.4 %
VA SINGLE BREATH REF: 4.62
VASINGLEBREATHULN: 4.62
VC LLN: 1.82
VC REF: 2.51
VC ULN: 3.2
VCMAXN2 LLN: 1.82
VCMAXN2 PRE REF: 45.4 %
VCMAXN2 PRE: 1.14 L (ref 1.82–3.2)
VCMAXN2 REF: 2.51
VCMAXN2ULN: 3.2

## 2019-09-12 RX ORDER — LOSARTAN POTASSIUM 50 MG/1
50 TABLET ORAL DAILY
Qty: 90 TABLET | Refills: 3 | Status: SHIPPED | OUTPATIENT
Start: 2019-09-12 | End: 2019-11-11

## 2019-09-12 RX ORDER — HYDROCHLOROTHIAZIDE 12.5 MG/1
12.5 TABLET ORAL DAILY
Qty: 90 TABLET | Refills: 3 | Status: SHIPPED | OUTPATIENT
Start: 2019-09-12 | End: 2019-09-23

## 2019-09-23 ENCOUNTER — IMMUNIZATION (OUTPATIENT)
Dept: PHARMACY | Facility: CLINIC | Age: 64
End: 2019-09-23
Payer: COMMERCIAL

## 2019-09-23 ENCOUNTER — OFFICE VISIT (OUTPATIENT)
Dept: INTERNAL MEDICINE | Facility: CLINIC | Age: 64
End: 2019-09-23
Payer: COMMERCIAL

## 2019-09-23 ENCOUNTER — IMMUNIZATION (OUTPATIENT)
Dept: PHARMACY | Facility: CLINIC | Age: 64
End: 2019-09-23

## 2019-09-23 VITALS
SYSTOLIC BLOOD PRESSURE: 100 MMHG | DIASTOLIC BLOOD PRESSURE: 60 MMHG | WEIGHT: 128.5 LBS | HEIGHT: 63 IN | OXYGEN SATURATION: 98 % | HEART RATE: 75 BPM | BODY MASS INDEX: 22.77 KG/M2

## 2019-09-23 DIAGNOSIS — J42 CHRONIC BRONCHITIS, UNSPECIFIED CHRONIC BRONCHITIS TYPE: ICD-10-CM

## 2019-09-23 DIAGNOSIS — I27.81 COR PULMONALE, CHRONIC: ICD-10-CM

## 2019-09-23 DIAGNOSIS — J44.89 OBSTRUCTIVE CHRONIC BRONCHITIS WITHOUT EXACERBATION: ICD-10-CM

## 2019-09-23 DIAGNOSIS — I27.20 PULMONARY HYPERTENSION: ICD-10-CM

## 2019-09-23 DIAGNOSIS — Z99.81 OXYGEN DEPENDENT: ICD-10-CM

## 2019-09-23 DIAGNOSIS — F17.210 MODERATE CIGARETTE SMOKER (10-19 PER DAY): ICD-10-CM

## 2019-09-23 DIAGNOSIS — Z72.0 TOBACCO ABUSE: Primary | ICD-10-CM

## 2019-09-23 DIAGNOSIS — D12.6 ADENOMATOUS POLYP OF COLON, UNSPECIFIED PART OF COLON: ICD-10-CM

## 2019-09-23 DIAGNOSIS — I10 ESSENTIAL HYPERTENSION: ICD-10-CM

## 2019-09-23 PROCEDURE — 3074F SYST BP LT 130 MM HG: CPT | Mod: CPTII,S$GLB,, | Performed by: INTERNAL MEDICINE

## 2019-09-23 PROCEDURE — 99214 OFFICE O/P EST MOD 30 MIN: CPT | Mod: S$GLB,,, | Performed by: INTERNAL MEDICINE

## 2019-09-23 PROCEDURE — 3008F BODY MASS INDEX DOCD: CPT | Mod: CPTII,S$GLB,, | Performed by: INTERNAL MEDICINE

## 2019-09-23 PROCEDURE — 3008F PR BODY MASS INDEX (BMI) DOCUMENTED: ICD-10-PCS | Mod: CPTII,S$GLB,, | Performed by: INTERNAL MEDICINE

## 2019-09-23 PROCEDURE — 99999 PR PBB SHADOW E&M-EST. PATIENT-LVL IV: CPT | Mod: PBBFAC,,, | Performed by: INTERNAL MEDICINE

## 2019-09-23 PROCEDURE — 3074F PR MOST RECENT SYSTOLIC BLOOD PRESSURE < 130 MM HG: ICD-10-PCS | Mod: CPTII,S$GLB,, | Performed by: INTERNAL MEDICINE

## 2019-09-23 PROCEDURE — 3078F DIAST BP <80 MM HG: CPT | Mod: CPTII,S$GLB,, | Performed by: INTERNAL MEDICINE

## 2019-09-23 PROCEDURE — 99999 PR PBB SHADOW E&M-EST. PATIENT-LVL IV: ICD-10-PCS | Mod: PBBFAC,,, | Performed by: INTERNAL MEDICINE

## 2019-09-23 PROCEDURE — 3078F PR MOST RECENT DIASTOLIC BLOOD PRESSURE < 80 MM HG: ICD-10-PCS | Mod: CPTII,S$GLB,, | Performed by: INTERNAL MEDICINE

## 2019-09-23 PROCEDURE — 99214 PR OFFICE/OUTPT VISIT, EST, LEVL IV, 30-39 MIN: ICD-10-PCS | Mod: S$GLB,,, | Performed by: INTERNAL MEDICINE

## 2019-09-23 RX ORDER — VARENICLINE TARTRATE 0.5 (11)-1
KIT ORAL
Qty: 53 TABLET | Refills: 0 | Status: SHIPPED | OUTPATIENT
Start: 2019-09-23 | End: 2020-02-04

## 2019-09-23 NOTE — PROGRESS NOTES
"HISTORY OF PRESENT ILLNESS:  She is a 64-year-old lady coming in today for her     Several medical  issues.  1.  She is a smoker.  She has COPD.  She has some pulmonary hypertension.  She   has been followed in Pulmonary Clinic.  She wishes to quit smoking.  SHe had stopped   and then she restarted when her sister  of cancer.  She is   currently on LABA, an inhaled corticosteroid and a RACHEL, her rescue inhaler.    She is on 2 liters oxygen  and had to quit her job last year after having a   hospitalization in 2019 for acute-on-chronic respiratory failure.  She says she   is doing better.  She has   had a screening CT 3/2019 .  She is  Smoking 15  cigarettes a day, never smoking with oxygen on because she has been warned about   that.  last ER visit for COPD was in 2019.      She also has colon polyps.  We put off a colonoscopy last year, but she   is due for colonoscopy.  She has not had any bowel symptoms.        She has hypertension for which is losartan (daily) and hydrochlorothiazide ( 1 time a week) .  Blood pressure   today is 100/60 which he is on the high side for her, has been lower in the   past.          She also has some history of hyperglycemia,            REVIEW OF SYSTEMS:  As per chart.  She still has occasional chest tightness that   is very minor, rhinorrhea and dysphoric mood at times.     PHYSICAL EXAMINATION: /60 (BP Location: Right arm, Patient Position: Sitting, BP Method: Medium (Manual))   Pulse 75   Ht 5' 3" (1.6 m)   Wt 58.3 kg (128 lb 8.5 oz)   LMP 2013   SpO2 98%   BMI 22.77 kg/m²     GENERAL:  She is a well-appearing 64-year-old -American female.  She is   wearing oxygen.  NECK:  Supple.  CHEST:  Clear without wheeze.  CARDIOVASCULAR:  S1 and S2, regular rate and rhythm.  No chest wall tenderness.    I do not hear any murmur.  ABDOMEN:  Soft, nonobese, nontender.  LOWER EXTREMITIES:  No edema.  She has not had any swelling in legs.  She has "   Lasix when she has swelling but has not had any of this recently.  She is   wearing oxygen.  Her mood is good.     ASSESSMENT:  A 64-year-old lady with over 40-pack-year history of smoking with   COPD, pulmonary hypertension, cor pulmonale, colon polyps, last one was   adenomas, unfortunately continued tobacco abuse.  She is oxygen-dependent.  She   has a history of gout also, Recent ED visit- with mildly elevated lipase-          PLAN:  Needs a  Colonoscopy- but with ongoing medical issues she is a poor candidate --Discussed    We are going to continue working on stopping smoking-- chantix reordered. .  Blood pressure is    low  Today- will d/c the hctz.  We will recheck that next time.  She has a follow up with pulmonary.      She   is once again warned about smoking, especially if she has oxygen on due to the   risk of fire,  Will follow up in  six months.    Flu shot today   Answers for HPI/ROS submitted by the patient on 9/22/2019   activity change: No  unexpected weight change: No  neck pain: No  hearing loss: No  rhinorrhea: Yes  trouble swallowing: Yes  eye discharge: No  visual disturbance: No  chest tightness: Yes  wheezing: Yes  chest pain: No  palpitations: No  blood in stool: No  constipation: No  vomiting: No  diarrhea: No  polydipsia: No  polyuria: No  difficulty urinating: No  hematuria: No  menstrual problem: No  dysuria: No  joint swelling: Yes  arthralgias: No  headaches: Yes  weakness: Yes  confusion: No  dysphoric mood: Yes

## 2019-10-04 ENCOUNTER — CLINICAL SUPPORT (OUTPATIENT)
Dept: SMOKING CESSATION | Facility: CLINIC | Age: 64
End: 2019-10-04
Payer: COMMERCIAL

## 2019-10-04 DIAGNOSIS — F17.200 NICOTINE DEPENDENCE: Primary | ICD-10-CM

## 2019-10-04 PROCEDURE — 99407 PR TOBACCO USE CESSATION INTENSIVE >10 MINUTES: ICD-10-PCS | Mod: S$GLB,,,

## 2019-10-04 PROCEDURE — 99407 BEHAV CHNG SMOKING > 10 MIN: CPT | Mod: S$GLB,,,

## 2019-10-04 NOTE — PROGRESS NOTES
Called pt to f/u on her 3 month smoking cessation quit status. Pt stated she was tobacco free for a few weeks, but recently relapsed and is back on track. Stated she started taking Chantix again. Informed her she has benefits available and is able to rejoin. Pt not ready to make appointment, because she has no transportation at this time. She will call back when ready. Informed her of benefit period, phone follow ups, and contact information. Will complete smart form and resolve quit #2 episode. Completed 3 month for quit #3 and will continue to follow up on quit episode.

## 2019-10-30 ENCOUNTER — LAB VISIT (OUTPATIENT)
Dept: LAB | Facility: HOSPITAL | Age: 64
End: 2019-10-30
Payer: COMMERCIAL

## 2019-10-30 ENCOUNTER — OFFICE VISIT (OUTPATIENT)
Dept: GASTROENTEROLOGY | Facility: CLINIC | Age: 64
End: 2019-10-30
Payer: COMMERCIAL

## 2019-10-30 VITALS
HEIGHT: 63 IN | BODY MASS INDEX: 23.71 KG/M2 | SYSTOLIC BLOOD PRESSURE: 111 MMHG | HEART RATE: 64 BPM | DIASTOLIC BLOOD PRESSURE: 77 MMHG | WEIGHT: 133.81 LBS

## 2019-10-30 DIAGNOSIS — R10.12 LUQ ABDOMINAL PAIN: ICD-10-CM

## 2019-10-30 DIAGNOSIS — K21.9 GASTROESOPHAGEAL REFLUX DISEASE, ESOPHAGITIS PRESENCE NOT SPECIFIED: Primary | ICD-10-CM

## 2019-10-30 DIAGNOSIS — Z12.11 COLON CANCER SCREENING: ICD-10-CM

## 2019-10-30 DIAGNOSIS — Z86.010 HX OF ADENOMATOUS COLONIC POLYPS: ICD-10-CM

## 2019-10-30 PROCEDURE — 99205 PR OFFICE/OUTPT VISIT, NEW, LEVL V, 60-74 MIN: ICD-10-PCS | Mod: S$GLB,,, | Performed by: NURSE PRACTITIONER

## 2019-10-30 PROCEDURE — 3078F PR MOST RECENT DIASTOLIC BLOOD PRESSURE < 80 MM HG: ICD-10-PCS | Mod: CPTII,S$GLB,, | Performed by: NURSE PRACTITIONER

## 2019-10-30 PROCEDURE — 3078F DIAST BP <80 MM HG: CPT | Mod: CPTII,S$GLB,, | Performed by: NURSE PRACTITIONER

## 2019-10-30 PROCEDURE — 99999 PR PBB SHADOW E&M-EST. PATIENT-LVL III: ICD-10-PCS | Mod: PBBFAC,,, | Performed by: NURSE PRACTITIONER

## 2019-10-30 PROCEDURE — 3074F SYST BP LT 130 MM HG: CPT | Mod: CPTII,S$GLB,, | Performed by: NURSE PRACTITIONER

## 2019-10-30 PROCEDURE — 3008F BODY MASS INDEX DOCD: CPT | Mod: CPTII,S$GLB,, | Performed by: NURSE PRACTITIONER

## 2019-10-30 PROCEDURE — 99205 OFFICE O/P NEW HI 60 MIN: CPT | Mod: S$GLB,,, | Performed by: NURSE PRACTITIONER

## 2019-10-30 PROCEDURE — 3074F PR MOST RECENT SYSTOLIC BLOOD PRESSURE < 130 MM HG: ICD-10-PCS | Mod: CPTII,S$GLB,, | Performed by: NURSE PRACTITIONER

## 2019-10-30 PROCEDURE — 86677 HELICOBACTER PYLORI ANTIBODY: CPT

## 2019-10-30 PROCEDURE — 36415 COLL VENOUS BLD VENIPUNCTURE: CPT

## 2019-10-30 PROCEDURE — 3008F PR BODY MASS INDEX (BMI) DOCUMENTED: ICD-10-PCS | Mod: CPTII,S$GLB,, | Performed by: NURSE PRACTITIONER

## 2019-10-30 PROCEDURE — 99999 PR PBB SHADOW E&M-EST. PATIENT-LVL III: CPT | Mod: PBBFAC,,, | Performed by: NURSE PRACTITIONER

## 2019-10-30 NOTE — PROGRESS NOTES
"    [facility] Consultation Note    Reason for Consult:  The primary encounter diagnosis was Gastroesophageal reflux disease, esophagitis presence not specified. Diagnoses of LUQ abdominal pain, Hx of adenomatous colonic polyps, and Colon cancer screening were also pertinent to this visit.    PCP:   Yosi Samuels   No address on file    Referring MD:  Aaareferral Self  No address on file    HPI:  This is a 64 y.o. female here for evaluation of GERD and left upper quadrant abdominal pain.  GERD started about last year. Prescribe omeprazole, took PRN.  Now taking omeprazole daily since the summer. Not much relief with daily use. Takes baking soda mixed with water, with good relief, but has not needed recently.  Eats, feels like she digests, but then feels an "air pocket"  Symptoms are resolved right now, still taking omeprazole daily.  No dysphagia now, but had one episode in the summer. No melena.  Gets upper abd pain, maybe a couple times a week. Does not wake her up at night.  +COPD    Takes aleve for aleve "not that often". Could not give a numerical number of frequency.    ROS:  Constitutional: No fevers, chills, No weight loss  ENT: No allergies  CV: No chest pain  Pulm: +COPD  Ophtho: No vision changes  GI: see HPI  Derm: No rash  Heme:  No bruising  MSK: + arthritis  : No dysuria, No hematuria  Neuro: No syncope, No seizure  Psych: + anxiety, No depression    Medical History:  has a past medical history of CHF (congestive heart failure), COPD (chronic obstructive pulmonary disease), Herpes zoster without mention of complication (2011), Hypertension, Leg edema, and Pulmonary hypertension.    Surgical History:  has a past surgical history that includes  section and Breast biopsy (Right).    Family History: family history includes Heart disease in her mother and paternal uncle..     Social History:  reports that she has been smoking cigarettes. She has a 40.00 pack-year smoking " history. She has never used smokeless tobacco. She reports that she drinks alcohol. She reports that she does not use drugs.    Review of patient's allergies indicates:   Allergen Reactions    Orange juice      Swelling in pt tongue         Current Outpatient Medications on File Prior to Visit   Medication Sig Dispense Refill    albuterol (PROAIR HFA) 90 mcg/actuation inhaler TAKE 2 PUFFS BY MOUTH EVERY 6 HOURS AS NEEDED 8.5 g 9    albuterol-ipratropium (DUO-NEB) 2.5 mg-0.5 mg/3 mL nebulizer solution USE 1 VIAL VIA NEBULIZER EVERY 4 HOURS AS NEEDED FOR WHEEZING OR SHORTNESS OF BREATH 180 mL 3    colchicine (COLCRYS) 0.6 mg tablet TAKE 2 TABLETS BY MOUTH NOW, TAKE 1 TABLET BY MOUTH 1 HOUR LATER 30 tablet 2    flu vacc [id number] 6mos up,PF, 60 mcg (15 mcg x 4)/0.5 mL Syrg Inject 0.5 mLs into the muscle once. For one dose. for 1 dose 0.5 mL 0    glycerin adult suppository Place 1 suppository rectally as needed for Constipation.      losartan (COZAAR) 50 MG tablet Take 1 tablet (50 mg total) by mouth once daily. 90 tablet 3    multivitamin capsule Take 1 capsule by mouth once daily.      omeprazole (PRILOSEC) 40 MG capsule Take 1 capsule (40 mg total) by mouth once daily. 90 capsule 3    potassium chloride SA (K-DUR,KLOR-CON) 20 MEQ tablet TAKE 1 TABLET(20 MEQ) BY MOUTH EVERY DAY 30 tablet 7    umeclidinium-vilanterol (ANORO ELLIPTA) 62.5-25 mcg/actuation DsDv Inhale 1 puff into the lungs once daily. Controller 1 each 5    varenicline (CHANTIX STARTING MONTH BOX) 0.5 mg (11)- 1 mg (42) tablet Take one 0.5mg tab by mouth once daily X3 days,then increase to one 0.5mg tab twice daily X4 days,then increase to one 1mg tab twice daily 53 tablet 0    COMBIVENT RESPIMAT  mcg/actuation inhaler INHALE 1 PUFF BY MOUTH FOUR TIMES DAILY FOR RESCUE (Patient not taking: Reported on 10/30/2019) 4 g 0    COMBIVENT RESPIMAT  mcg/actuation inhaler INHALE 1 PUFF BY MOUTH INTO THE LUNGS FOUR TIMES DAILY FOR RESCUE  "(Patient not taking: Reported on 10/30/2019) 4 g 0    furosemide (LASIX) 20 MG tablet TAKE 1 TABLET(20 MG) BY MOUTH TWICE DAILY (Patient not taking: Reported on 10/30/2019) 60 tablet 7    nicotine (NICODERM CQ) 21 mg/24 hr Place 1 patch onto the skin once daily. (Patient not taking: Reported on 10/30/2019) 14 patch 0    traZODone (DESYREL) 50 MG tablet Take 1 tablet (50 mg total) by mouth nightly as needed (anxiety). (Patient not taking: Reported on 10/30/2019) 30 tablet 1     No current facility-administered medications on file prior to visit.          Objective Findings:    Vital Signs:  /77 (BP Location: Left arm)   Pulse 64   Ht 5' 3" (1.6 m)   Wt 60.7 kg (133 lb 13.1 oz)   LMP 2013   BMI 23.71 kg/m²   Body mass index is 23.71 kg/m².    Physical Exam:  General Appearance: Well appearing in no acute distress  Head:   Normocephalic, without obvious abnormality  Eyes:    No scleral icterus  ENT: Neck supple  Lungs: CTA bilaterally but mildly diminished.  Wearing at home continuous oxygen.  Heart:  Regular rate and rhythm, S1, S2 normal, no murmurs heard  Abdomen: Soft, non tender, non distended with positive bowel sounds in all four quadrants. No hepatosplenomegaly, ascites, or mass  Extremities: No edema  Skin: No rash  Neurologic: AAO x 3      Labs:  Lab Results   Component Value Date    WBC 9.89 2019    HGB 14.6 2019    HCT 44.6 2019     2019    CHOL 189 2019    TRIG 53 2019    HDL 84 (H) 2019    ALT 17 2019    AST 23 2019     2019    K 4.1 2019    CL 98 2019    CREATININE 1.0 2019    BUN 22 2019    CO2 29 2019    TSH 1.180 2018    INR 1.0 2018    HGBA1C 5.2 2018       Imagin2019 Abd US normal    Endoscopy:    Screening colonoscopy in .  Good prep.  8 mm polyp sigmoid colon removed (TA).  Recommend repeat in 5 years.    Assessment:  Ms. Espinoza is a 64 y.o. BF " with:    1. Gastroesophageal reflux disease, esophagitis presence not specified    2. LUQ abdominal pain    3. Hx of adenomatous colonic polyps    4. Colon cancer screening       She had a reflux flare this summer that has resolved on daily PPI therapy.  However she has continued to have a lingering upper abdominal pain.  Abdominal ultrasound normal.  Will get EGD along with her screening colonoscopy.  She is a high risk patient for anesthesia due to her COPD and continuous at home oxygen use, she should be scheduled with the 2nd floor.    Recommendations:  1.  Continue PPI therapy  2.  Labs  3.  EGD and colonoscopy    No follow-ups on file.      Order summary:  Orders Placed This Encounter    H. PYLORI ANTIBODY, IGG    Case request GI: EGD (ESOPHAGOGASTRODUODENOSCOPY), COLONOSCOPY         Thank you so much for allowing me to participate in the care of SERA Marquez

## 2019-10-31 LAB — H PYLORI IGG SERPL QL IA: NEGATIVE

## 2019-11-01 ENCOUNTER — TELEPHONE (OUTPATIENT)
Dept: GASTROENTEROLOGY | Facility: CLINIC | Age: 64
End: 2019-11-01

## 2019-11-01 DIAGNOSIS — Z12.11 SPECIAL SCREENING FOR MALIGNANT NEOPLASMS, COLON: Primary | ICD-10-CM

## 2019-11-01 RX ORDER — POLYETHYLENE GLYCOL 3350, SODIUM SULFATE ANHYDROUS, SODIUM BICARBONATE, SODIUM CHLORIDE, POTASSIUM CHLORIDE 236; 22.74; 6.74; 5.86; 2.97 G/4L; G/4L; G/4L; G/4L; G/4L
4 POWDER, FOR SOLUTION ORAL ONCE
Qty: 4000 ML | Refills: 0 | Status: SHIPPED | OUTPATIENT
Start: 2019-11-01 | End: 2019-11-01

## 2019-11-01 NOTE — TELEPHONE ENCOUNTER
MA contacted pt to give test per Nicole . Pt verbalzied understanding.         ----- Message from Nicole Copeland NP sent at 10/31/2019  6:44 PM CDT -----  H. Pylori Negative

## 2019-11-11 RX ORDER — LOSARTAN POTASSIUM AND HYDROCHLOROTHIAZIDE 12.5; 5 MG/1; MG/1
TABLET ORAL
Qty: 90 TABLET | Refills: 3 | Status: SHIPPED | OUTPATIENT
Start: 2019-11-11 | End: 2020-02-04

## 2019-11-11 RX ORDER — TRAZODONE HYDROCHLORIDE 50 MG/1
TABLET ORAL
Qty: 90 TABLET | Refills: 3 | Status: SHIPPED | OUTPATIENT
Start: 2019-11-11 | End: 2020-05-25

## 2019-11-14 ENCOUNTER — PATIENT MESSAGE (OUTPATIENT)
Dept: INTERNAL MEDICINE | Facility: CLINIC | Age: 64
End: 2019-11-14

## 2019-11-14 ENCOUNTER — OFFICE VISIT (OUTPATIENT)
Dept: INTERNAL MEDICINE | Facility: CLINIC | Age: 64
End: 2019-11-14
Payer: COMMERCIAL

## 2019-11-14 VITALS
SYSTOLIC BLOOD PRESSURE: 128 MMHG | OXYGEN SATURATION: 93 % | BODY MASS INDEX: 23.6 KG/M2 | HEART RATE: 86 BPM | WEIGHT: 133.19 LBS | DIASTOLIC BLOOD PRESSURE: 73 MMHG | HEIGHT: 63 IN

## 2019-11-14 DIAGNOSIS — M54.12 CERVICAL RADICULOPATHY: Primary | ICD-10-CM

## 2019-11-14 PROCEDURE — 96372 PR INJECTION,THERAP/PROPH/DIAG2ST, IM OR SUBCUT: ICD-10-PCS | Mod: S$GLB,,, | Performed by: INTERNAL MEDICINE

## 2019-11-14 PROCEDURE — 99999 PR PBB SHADOW E&M-EST. PATIENT-LVL IV: ICD-10-PCS | Mod: PBBFAC,,, | Performed by: INTERNAL MEDICINE

## 2019-11-14 PROCEDURE — 3008F BODY MASS INDEX DOCD: CPT | Mod: CPTII,S$GLB,, | Performed by: INTERNAL MEDICINE

## 2019-11-14 PROCEDURE — 3074F PR MOST RECENT SYSTOLIC BLOOD PRESSURE < 130 MM HG: ICD-10-PCS | Mod: CPTII,S$GLB,, | Performed by: INTERNAL MEDICINE

## 2019-11-14 PROCEDURE — 3078F PR MOST RECENT DIASTOLIC BLOOD PRESSURE < 80 MM HG: ICD-10-PCS | Mod: CPTII,S$GLB,, | Performed by: INTERNAL MEDICINE

## 2019-11-14 PROCEDURE — 99213 PR OFFICE/OUTPT VISIT, EST, LEVL III, 20-29 MIN: ICD-10-PCS | Mod: 25,S$GLB,, | Performed by: INTERNAL MEDICINE

## 2019-11-14 PROCEDURE — 99999 PR PBB SHADOW E&M-EST. PATIENT-LVL IV: CPT | Mod: PBBFAC,,, | Performed by: INTERNAL MEDICINE

## 2019-11-14 PROCEDURE — 99213 OFFICE O/P EST LOW 20 MIN: CPT | Mod: 25,S$GLB,, | Performed by: INTERNAL MEDICINE

## 2019-11-14 PROCEDURE — 3008F PR BODY MASS INDEX (BMI) DOCUMENTED: ICD-10-PCS | Mod: CPTII,S$GLB,, | Performed by: INTERNAL MEDICINE

## 2019-11-14 PROCEDURE — 3074F SYST BP LT 130 MM HG: CPT | Mod: CPTII,S$GLB,, | Performed by: INTERNAL MEDICINE

## 2019-11-14 PROCEDURE — 96372 THER/PROPH/DIAG INJ SC/IM: CPT | Mod: S$GLB,,, | Performed by: INTERNAL MEDICINE

## 2019-11-14 PROCEDURE — 3078F DIAST BP <80 MM HG: CPT | Mod: CPTII,S$GLB,, | Performed by: INTERNAL MEDICINE

## 2019-11-14 RX ORDER — KETOROLAC TROMETHAMINE 30 MG/ML
30 INJECTION, SOLUTION INTRAMUSCULAR; INTRAVENOUS
Status: COMPLETED | OUTPATIENT
Start: 2019-11-14 | End: 2019-11-14

## 2019-11-14 RX ORDER — METHYLPREDNISOLONE 4 MG/1
TABLET ORAL
Qty: 1 PACKAGE | Refills: 0 | Status: SHIPPED | OUTPATIENT
Start: 2019-11-14 | End: 2019-12-05

## 2019-11-14 RX ADMIN — KETOROLAC TROMETHAMINE 30 MG: 30 INJECTION, SOLUTION INTRAMUSCULAR; INTRAVENOUS at 05:11

## 2019-11-14 NOTE — PROGRESS NOTES
Subjective:       Patient ID: Rochelle Espinoza is a 64 y.o. female.    Chief Complaint: Arm Pain    64 year old lady complains of pain and tingling in right arm and hand..  Has been told she has a pinched nerve in her neck.    Review of Systems   Constitutional: Negative for activity change, chills, fatigue and fever.   HENT: Negative for congestion, ear pain, nosebleeds, postnasal drip, sinus pressure and sore throat.    Eyes: Negative.  Negative for visual disturbance.   Respiratory: Negative for cough, chest tightness, shortness of breath and wheezing.    Cardiovascular: Negative for chest pain.   Gastrointestinal: Negative for abdominal pain, diarrhea, nausea and vomiting.   Genitourinary: Negative for difficulty urinating, dysuria, frequency and urgency.   Musculoskeletal: Negative for arthralgias and neck stiffness.   Skin: Negative for rash.   Neurological: Negative for dizziness, weakness and headaches.   Psychiatric/Behavioral: Negative for sleep disturbance. The patient is not nervous/anxious.        Objective:      Physical Exam   Musculoskeletal:        Arms:      Assessment:       1. Cervical radiculopathy        Plan:   Rochelle was seen today for arm pain.    Diagnoses and all orders for this visit:    Cervical radiculopathy  -     Cancel: Ambulatory [facility]  -     Ambulatory referral to Orthopedics; Future    Other orders  -     methylPREDNISolone (MEDROL DOSEPACK) 4 mg tablet; use as directed  -     ketorolac injection 30 mg

## 2019-11-17 ENCOUNTER — PATIENT MESSAGE (OUTPATIENT)
Dept: INTERNAL MEDICINE | Facility: CLINIC | Age: 64
End: 2019-11-17

## 2019-11-19 RX ORDER — TRAMADOL HYDROCHLORIDE 50 MG/1
50 TABLET ORAL EVERY 6 HOURS
Qty: 30 TABLET | Refills: 0 | Status: SHIPPED | OUTPATIENT
Start: 2019-11-19 | End: 2020-05-25

## 2019-11-20 ENCOUNTER — PATIENT MESSAGE (OUTPATIENT)
Dept: PHARMACY | Facility: CLINIC | Age: 64
End: 2019-11-20

## 2019-11-20 ENCOUNTER — PATIENT MESSAGE (OUTPATIENT)
Dept: INTERNAL MEDICINE | Facility: CLINIC | Age: 64
End: 2019-11-20

## 2019-11-21 ENCOUNTER — TELEPHONE (OUTPATIENT)
Dept: ORTHOPEDICS | Facility: CLINIC | Age: 64
End: 2019-11-21

## 2019-11-21 DIAGNOSIS — M50.30 DDD (DEGENERATIVE DISC DISEASE), CERVICAL: Primary | ICD-10-CM

## 2019-11-29 ENCOUNTER — IMMUNIZATION (OUTPATIENT)
Dept: PHARMACY | Facility: CLINIC | Age: 64
End: 2019-11-29
Payer: COMMERCIAL

## 2019-11-29 ENCOUNTER — HOSPITAL ENCOUNTER (OUTPATIENT)
Dept: RADIOLOGY | Facility: HOSPITAL | Age: 64
Discharge: HOME | End: 2019-11-29
Payer: COMMERCIAL

## 2019-11-29 DIAGNOSIS — R91.1 LUNG NODULE: ICD-10-CM

## 2019-11-29 DIAGNOSIS — R91.8 ABNORMAL FINDINGS ON DIAGNOSTIC IMAGING OF LUNG: ICD-10-CM

## 2019-11-29 PROCEDURE — 71250 CT THORAX DX C-: CPT | Mod: 26,,, | Performed by: RADIOLOGY

## 2019-11-29 PROCEDURE — 71250 CT THORAX DX C-: CPT | Mod: TC

## 2019-11-29 PROCEDURE — 71250 CT CHEST WITHOUT CONTRAST: ICD-10-PCS | Mod: 26,,, | Performed by: RADIOLOGY

## 2019-12-03 ENCOUNTER — TELEPHONE (OUTPATIENT)
Dept: PULMONOLOGY | Facility: CLINIC | Age: 64
End: 2019-12-03

## 2019-12-03 DIAGNOSIS — R91.8 ABNORMAL FINDINGS ON DIAGNOSTIC IMAGING OF LUNG: ICD-10-CM

## 2019-12-03 NOTE — TELEPHONE ENCOUNTER
I called Ms. Espinoza today to inform her of her CT scan results.  She did not answer, but I will try again.  I have re-ordered a low dose annual screening exam for next November to monitor her pulmonary nodules.     Ryan Keen, MD [facility] PGY5  [phone]

## 2019-12-04 ENCOUNTER — OFFICE VISIT (OUTPATIENT)
Dept: ORTHOPEDICS | Facility: CLINIC | Age: 64
End: 2019-12-04
Payer: COMMERCIAL

## 2019-12-04 ENCOUNTER — HOSPITAL ENCOUNTER (OUTPATIENT)
Dept: RADIOLOGY | Facility: HOSPITAL | Age: 64
Discharge: HOME | End: 2019-12-04
Attending: PHYSICIAN ASSISTANT
Payer: COMMERCIAL

## 2019-12-04 VITALS — BODY MASS INDEX: 23.12 KG/M2 | HEIGHT: 63 IN | WEIGHT: 130.5 LBS

## 2019-12-04 DIAGNOSIS — M54.12 CERVICAL RADICULOPATHY: ICD-10-CM

## 2019-12-04 DIAGNOSIS — M50.30 DDD (DEGENERATIVE DISC DISEASE), CERVICAL: ICD-10-CM

## 2019-12-04 PROCEDURE — 72050 X-RAY EXAM NECK SPINE 4/5VWS: CPT | Mod: TC

## 2019-12-04 PROCEDURE — 3008F PR BODY MASS INDEX (BMI) DOCUMENTED: ICD-10-PCS | Mod: CPTII,S$GLB,, | Performed by: PHYSICIAN ASSISTANT

## 2019-12-04 PROCEDURE — 72050 X-RAY EXAM NECK SPINE 4/5VWS: CPT | Mod: 26,,, | Performed by: RADIOLOGY

## 2019-12-04 PROCEDURE — 3008F BODY MASS INDEX DOCD: CPT | Mod: CPTII,S$GLB,, | Performed by: PHYSICIAN ASSISTANT

## 2019-12-04 PROCEDURE — 99999 PR PBB SHADOW E&M-EST. PATIENT-LVL IV: CPT | Mod: PBBFAC,,, | Performed by: PHYSICIAN ASSISTANT

## 2019-12-04 PROCEDURE — 99999 PR PBB SHADOW E&M-EST. PATIENT-LVL IV: ICD-10-PCS | Mod: PBBFAC,,, | Performed by: PHYSICIAN ASSISTANT

## 2019-12-04 PROCEDURE — 99214 PR OFFICE/OUTPT VISIT, EST, LEVL IV, 30-39 MIN: ICD-10-PCS | Mod: S$GLB,,, | Performed by: PHYSICIAN ASSISTANT

## 2019-12-04 PROCEDURE — 99214 OFFICE O/P EST MOD 30 MIN: CPT | Mod: S$GLB,,, | Performed by: PHYSICIAN ASSISTANT

## 2019-12-04 PROCEDURE — 72050 XR CERVICAL SPINE AP LAT WITH FLEX EXTEN: ICD-10-PCS | Mod: 26,,, | Performed by: RADIOLOGY

## 2019-12-04 RX ORDER — GABAPENTIN 100 MG/1
100 CAPSULE ORAL 3 TIMES DAILY
Qty: 90 CAPSULE | Refills: 0 | Status: SHIPPED | OUTPATIENT
Start: 2019-12-04 | End: 2019-12-31

## 2019-12-04 NOTE — LETTER
December 4, 2019      Carmen Colindres MD  [address]  [place] [address]           [facility] [place]  [address]  [place] [address]  Phone: [phone]          Patient: Rochelle Espinoza   MR Number: [id number]   YOB: 1955   Date of Visit: 12/4/2019       Dear Dr. Carmen Colindres:    Thank you for referring Rochelle Espinoza to me for evaluation. Attached you will find relevant portions of my assessment and plan of care.    If you have questions, please do not hesitate to call me. I look forward to following Rochelle Espinoza along with you.    Sincerely,    Rosamaria Cantor PA-C    Enclosure  CC:  No Recipients    If you would like to receive this communication electronically, please contact [email or web address] or [phone] to request more information on [employer] access.    For providers and/or their staff who would like to refer a patient to [facility], please contact us through our one-stop-shop provider referral line, [facility], at [phone].    If you feel you have received this communication in error or would no longer like to receive these types of communications, please e-mail [email or web address]

## 2019-12-04 NOTE — PROGRESS NOTES
"DATE: 12/4/2019  PATIENT: Rochelle Espinoza    Attending Physician: Kam Gu M.D.    HISTORY:  Rochelle Espinoza is a 64 y.o. female who returns to me today for right arm pain.  She was last seen by me 10/9/2017.  Today she is doing well but notes she was going ok and then a few months ago she started having pain in the hand again.  About three weeks ago her pain significantly worsened and now it is radiating down her entire arm.  She went to her PCP who prescribed steroids and tramadol which provided little relief.     The Patient denies myelopathic symptoms such as handwriting changes or difficulty with buttons/coins/keys. Denies perineal paresthesias, bowel/bladder dysfunction.    PMH/PSH/FamHx/SocHx:  Unchanged from prior visit    ROS:  REVIEW OF SYSTEMS:  Constitution: Negative. Negative for chills, fever and night sweats.   HENT: Negative for congestion and headaches.    Eyes: Negative for blurred vision, left vision loss and right vision loss.   Cardiovascular: Negative for chest pain and syncope.   Respiratory: Negative for cough and shortness of breath.    Endocrine: Negative for polydipsia, polyphagia and polyuria.   Hematologic/Lymphatic: Negative for bleeding problem. Does not bruise/bleed easily.   Skin: Negative for dry skin, itching and rash.   Musculoskeletal: Negative for falls and muscle weakness.   Gastrointestinal: Negative for abdominal pain and bowel incontinence.   Allergic/Immunologic: Negative for hives and persistent infections.  Genitourinary: Negative for urinary retention/incontinence and nocturia.   Neurological: Negative for disturbances in coordination, no myelopathic symptoms such as handwriting changes or difficulty with buttons, coins, keys or small objects. No loss of balance and seizures.   Psychiatric/Behavioral: Negative for depression. The patient does not have insomnia.   Denies myelopathic symptoms, perineal paresthesias, bowel or bladder incontinence    EXAM:  Ht 5' 3" " (1.6 m)   Wt 59.2 kg (130 lb 8.2 oz)   LMP 11/21/2013   BMI 23.12 kg/m²     My physical examination was notable for the following findings:     Normal station and gait, no difficulty with toe or heel walk.   Cervical skin negative for rashes, lesions, hairy patches and surgical scars.   Cervical range of motion is acceptable.  There is mild tenderness to palpation.  No pain with range of motion of the bilateral shoulders and elbows.  Normal bulk and contour of the bilateral hands.    Bilateral hands warm and well perfused, radial pulses 2+ bilaterally.   Normal strength and tone in all major motor groups in the bilateral upper and lower extremities with the exception of decreased triceps strength on the right. Normal sensation to light touch in the C5-T1 and L2-S1 dermatomes bilaterally.   Deep tendon reflexes symmetric 2+ in the bilateral upper and lower extremities.  Positive Inverted Radial Reflex bilaterally and negative Paniagua's bilaterally. Negative Babinski bilaterally.     IMAGING:    Today I personally reviewed AP, Lat and Flex/Ex  upright C-spine films that demonstrate severe C5/6 disc degeneration and retrolisthesis.       Body mass index is 23.12 kg/m².    Hemoglobin A1C   Date Value Ref Range Status   04/11/2018 5.2 4.0 - 5.6 % Final     Comment:     According to ADA guidelines, hemoglobin A1c <7.0% represents  optimal control in non-pregnant diabetic patients. Different  metrics may apply to specific patient populations.   Standards of Medical Care in Diabetes-2016.  For the purpose of screening for the presence of diabetes:  <5.7%     Consistent with the absence of diabetes  5.7-6.4%  Consistent with increasing risk for diabetes   (prediabetes)  >or=6.5%  Consistent with diabetes  Currently, no consensus exists for use of hemoglobin A1c  for diagnosis of diabetes for children.  This Hemoglobin A1c assay has significant interference with fetal   hemoglobin   (HbF). The results are invalid for  patients with abnormal amounts of   HbF,   including those with known Hereditary Persistence   of Fetal Hemoglobin. Heterozygous hemoglobin variants (HbAS, HbAC,   HbAD, HbAE, HbA2) do not significantly interfere with this assay;   however, presence of multiple variants in a sample may impact the %   interference.     02/22/2016 6.1 4.5 - 6.2 % Final         ASSESSMENT/PLAN:    Rochelle was seen today for arm pain.    Diagnoses and all orders for this visit:    Cervical radiculopathy  -     Ambulatory referral to Orthopedics  -     gabapentin (NEURONTIN) 100 MG capsule; Take 1 capsule (100 mg total) by mouth 3 (three) times daily.  -     MRI Cervical Spine Without Contrast; Future      The patient is having right upper extremity radiculopathy.  This has been present for several years but recently acutely progressed.     Today we discussed at length all of the different treatment options including anti-inflammatories, acetaminophen, rest, ice, heat, physical therapy including strengthening and stretching exercises, home exercises, ROM, aerobic conditioning, aqua therapy, other modalities including ultrasound, massage, and dry needling, epidural steroid injections and finally surgical intervention.      We will get a new MRI cervical spine.  I will see her back after the MRI to discuss results and further treatment including possibly ESIs vs surgical intervention if indicated.     Follow up if symptoms worsen or fail to improve.

## 2019-12-05 ENCOUNTER — TELEPHONE (OUTPATIENT)
Dept: CRITICAL CARE MEDICINE | Facility: HOSPITAL | Age: 64
End: 2019-12-05

## 2019-12-05 NOTE — TELEPHONE ENCOUNTER
I spoke with Mrs. Espinoza this AM and discussed her CT results.  I told her we wanted to repeat the exam in 1 year.  All questions were answered.      Kal Sanchez MD  [facility] PGY5  [phone]

## 2019-12-11 DIAGNOSIS — J98.01 BRONCHOSPASM, ACUTE: ICD-10-CM

## 2019-12-11 DIAGNOSIS — J44.9 CHRONIC OBSTRUCTIVE PULMONARY DISEASE WITH HYPOXIA: ICD-10-CM

## 2019-12-11 RX ORDER — ALBUTEROL SULFATE 90 UG/1
AEROSOL, METERED RESPIRATORY (INHALATION)
Qty: 18 G | Refills: 4 | Status: SHIPPED | OUTPATIENT
Start: 2019-12-11 | End: 2020-02-04 | Stop reason: SDUPTHER

## 2019-12-13 ENCOUNTER — HOSPITAL ENCOUNTER (OUTPATIENT)
Dept: RADIOLOGY | Facility: HOSPITAL | Age: 64
Discharge: HOME | End: 2019-12-13
Attending: PHYSICIAN ASSISTANT
Payer: COMMERCIAL

## 2019-12-13 DIAGNOSIS — M54.12 CERVICAL RADICULOPATHY: ICD-10-CM

## 2019-12-13 PROCEDURE — 72141 MRI NECK SPINE W/O DYE: CPT | Mod: 26,,, | Performed by: RADIOLOGY

## 2019-12-13 PROCEDURE — 72141 MRI NECK SPINE W/O DYE: CPT | Mod: TC

## 2019-12-13 PROCEDURE — 72141 MRI CERVICAL SPINE WITHOUT CONTRAST: ICD-10-PCS | Mod: 26,,, | Performed by: RADIOLOGY

## 2019-12-16 ENCOUNTER — OFFICE VISIT (OUTPATIENT)
Dept: ORTHOPEDICS | Facility: CLINIC | Age: 64
End: 2019-12-16
Payer: COMMERCIAL

## 2019-12-16 VITALS — WEIGHT: 133.38 LBS | HEIGHT: 63 IN | BODY MASS INDEX: 23.63 KG/M2

## 2019-12-16 DIAGNOSIS — M54.12 CERVICAL RADICULOPATHY: Primary | ICD-10-CM

## 2019-12-16 PROCEDURE — 99213 PR OFFICE/OUTPT VISIT, EST, LEVL III, 20-29 MIN: ICD-10-PCS | Mod: S$GLB,,, | Performed by: PHYSICIAN ASSISTANT

## 2019-12-16 PROCEDURE — 99999 PR PBB SHADOW E&M-EST. PATIENT-LVL IV: CPT | Mod: PBBFAC,,, | Performed by: PHYSICIAN ASSISTANT

## 2019-12-16 PROCEDURE — 99999 PR PBB SHADOW E&M-EST. PATIENT-LVL IV: ICD-10-PCS | Mod: PBBFAC,,, | Performed by: PHYSICIAN ASSISTANT

## 2019-12-16 PROCEDURE — 99213 OFFICE O/P EST LOW 20 MIN: CPT | Mod: S$GLB,,, | Performed by: PHYSICIAN ASSISTANT

## 2019-12-16 PROCEDURE — 3008F BODY MASS INDEX DOCD: CPT | Mod: CPTII,S$GLB,, | Performed by: PHYSICIAN ASSISTANT

## 2019-12-16 PROCEDURE — 3008F PR BODY MASS INDEX (BMI) DOCUMENTED: ICD-10-PCS | Mod: CPTII,S$GLB,, | Performed by: PHYSICIAN ASSISTANT

## 2019-12-16 NOTE — PROGRESS NOTES
"DATE: 12/16/2019  PATIENT: Rochelle Espinoza    Attending Physician: Kam Gu M.D.    HISTORY:  Rochelle Espinoza is a 64 y.o. female who returns to me today for MRI results.  She was last seen by me 12/4/2019.  Today she is doing well but notes the gabapentin has helped some but she still has a lot of pain in the right hand.    The Patient denies myelopathic symptoms such as handwriting changes or difficulty with buttons/coins/keys. Denies perineal paresthesias, bowel/bladder dysfunction.    PMH/PSH/FamHx/SocHx:  Unchanged from prior visit    ROS:  REVIEW OF SYSTEMS:  Constitution: Negative. Negative for chills, fever and night sweats.   HENT: Negative for congestion and headaches.    Eyes: Negative for blurred vision, left vision loss and right vision loss.   Cardiovascular: Negative for chest pain and syncope.   Respiratory: Negative for cough and shortness of breath.    Endocrine: Negative for polydipsia, polyphagia and polyuria.   Hematologic/Lymphatic: Negative for bleeding problem. Does not bruise/bleed easily.   Skin: Negative for dry skin, itching and rash.   Musculoskeletal: Negative for falls and muscle weakness.   Gastrointestinal: Negative for abdominal pain and bowel incontinence.   Allergic/Immunologic: Negative for hives and persistent infections.  Genitourinary: Negative for urinary retention/incontinence and nocturia.   Neurological: Negative for disturbances in coordination, no myelopathic symptoms such as handwriting changes or difficulty with buttons, coins, keys or small objects. No loss of balance and seizures.   Psychiatric/Behavioral: Negative for depression. The patient does not have insomnia.   Denies myelopathic symptoms, perineal paresthesias, bowel or bladder incontinence    EXAM:  Ht 5' 3" (1.6 m)   Wt 60.5 kg (133 lb 6.1 oz)   LMP 11/21/2013   BMI 23.63 kg/m²     Physical exam stable. Neuro exam stable.       IMAGING:  No new imaging today.    Today I personally re-reviewed AP, " Lat and Flex/Ex  upright C-spine films that demonstrate severe C5/6 disc degeneration and retrolisthesis.     MRI lumbar spine demonstrates right neural foraminal narrowing at C4/5, C5/6 and C6/7.      Body mass index is 23.63 kg/m².    Hemoglobin A1C   Date Value Ref Range Status   04/11/2018 5.2 4.0 - 5.6 % Final     Comment:     According to ADA guidelines, hemoglobin A1c <7.0% represents  optimal control in non-pregnant diabetic patients. Different  metrics may apply to specific patient populations.   Standards of Medical Care in Diabetes-2016.  For the purpose of screening for the presence of diabetes:  <5.7%     Consistent with the absence of diabetes  5.7-6.4%  Consistent with increasing risk for diabetes   (prediabetes)  >or=6.5%  Consistent with diabetes  Currently, no consensus exists for use of hemoglobin A1c  for diagnosis of diabetes for children.  This Hemoglobin A1c assay has significant interference with fetal   hemoglobin   (HbF). The results are invalid for patients with abnormal amounts of   HbF,   including those with known Hereditary Persistence   of Fetal Hemoglobin. Heterozygous hemoglobin variants (HbAS, HbAC,   HbAD, HbAE, HbA2) do not significantly interfere with this assay;   however, presence of multiple variants in a sample may impact the %   interference.     02/22/2016 6.1 4.5 - 6.2 % Final         ASSESSMENT/PLAN:    Diagnoses and all orders for this visit:    Cervical radiculopathy  -     Procedure Order to [facility]; Future        Today we discussed at length all of the different treatment options including anti-inflammatories, acetaminophen, rest, ice, heat, physical therapy including strengthening and stretching exercises, home exercises, ROM, aerobic conditioning, aqua therapy, other modalities including ultrasound, massage, and dry needling, epidural steroid injections and finally surgical intervention.      We will get her scheduled for a cervical BLAKE.  Follow up  2 weeks after injection.    Follow up if symptoms worsen or fail to improve.

## 2019-12-17 ENCOUNTER — TELEPHONE (OUTPATIENT)
Dept: PAIN MEDICINE | Facility: CLINIC | Age: 64
End: 2019-12-17

## 2019-12-17 DIAGNOSIS — M54.12 CERVICAL RADICULOPATHY: Primary | ICD-10-CM

## 2019-12-19 ENCOUNTER — HOSPITAL ENCOUNTER (OUTPATIENT)
Facility: HOSPITAL | Age: 64
Discharge: HOME | End: 2019-12-19
Attending: INTERNAL MEDICINE | Admitting: INTERNAL MEDICINE
Payer: COMMERCIAL

## 2019-12-19 ENCOUNTER — ANESTHESIA EVENT (OUTPATIENT)
Dept: ENDOSCOPY | Facility: HOSPITAL | Age: 64
End: 2019-12-19
Payer: COMMERCIAL

## 2019-12-19 ENCOUNTER — ANESTHESIA (OUTPATIENT)
Dept: ENDOSCOPY | Facility: HOSPITAL | Age: 64
End: 2019-12-19
Payer: COMMERCIAL

## 2019-12-19 VITALS
OXYGEN SATURATION: 96 % | DIASTOLIC BLOOD PRESSURE: 88 MMHG | TEMPERATURE: 98 F | WEIGHT: 132 LBS | BODY MASS INDEX: 23.39 KG/M2 | HEART RATE: 92 BPM | HEIGHT: 63 IN | RESPIRATION RATE: 59 BRPM | SYSTOLIC BLOOD PRESSURE: 145 MMHG

## 2019-12-19 DIAGNOSIS — Z08 ENCOUNTER FOR FOLLOW-UP SURVEILLANCE OF COLON CANCER: ICD-10-CM

## 2019-12-19 DIAGNOSIS — Z85.038 ENCOUNTER FOR FOLLOW-UP SURVEILLANCE OF COLON CANCER: ICD-10-CM

## 2019-12-19 PROCEDURE — 25000003 PHARM REV CODE 250: Performed by: NURSE ANESTHETIST, CERTIFIED REGISTERED

## 2019-12-19 PROCEDURE — D9220A PRA ANESTHESIA: ICD-10-PCS | Mod: 33,ANES,, | Performed by: ANESTHESIOLOGY

## 2019-12-19 PROCEDURE — G0121 COLON CA SCRN NOT HI RSK IND: HCPCS | Performed by: INTERNAL MEDICINE

## 2019-12-19 PROCEDURE — 43235 PR EGD, FLEX, DIAGNOSTIC: ICD-10-PCS | Mod: 51,,, | Performed by: INTERNAL MEDICINE

## 2019-12-19 PROCEDURE — 63600175 PHARM REV CODE 636 W HCPCS: Performed by: NURSE ANESTHETIST, CERTIFIED REGISTERED

## 2019-12-19 PROCEDURE — 37000008 HC ANESTHESIA 1ST 15 MINUTES: Performed by: INTERNAL MEDICINE

## 2019-12-19 PROCEDURE — 63600175 PHARM REV CODE 636 W HCPCS: Performed by: INTERNAL MEDICINE

## 2019-12-19 PROCEDURE — 43235 EGD DIAGNOSTIC BRUSH WASH: CPT | Mod: 51,,, | Performed by: INTERNAL MEDICINE

## 2019-12-19 PROCEDURE — D9220A PRA ANESTHESIA: ICD-10-PCS | Mod: 33,CRNA,, | Performed by: NURSE ANESTHETIST, CERTIFIED REGISTERED

## 2019-12-19 PROCEDURE — G0121 COLON CA SCRN NOT HI RSK IND: HCPCS | Mod: ,,, | Performed by: INTERNAL MEDICINE

## 2019-12-19 PROCEDURE — 43235 EGD DIAGNOSTIC BRUSH WASH: CPT | Performed by: INTERNAL MEDICINE

## 2019-12-19 PROCEDURE — G0121 COLON CA SCRN NOT HI RSK IND: ICD-10-PCS | Mod: ,,, | Performed by: INTERNAL MEDICINE

## 2019-12-19 PROCEDURE — D9220A PRA ANESTHESIA: Mod: 33,ANES,, | Performed by: ANESTHESIOLOGY

## 2019-12-19 PROCEDURE — D9220A PRA ANESTHESIA: Mod: 33,CRNA,, | Performed by: NURSE ANESTHETIST, CERTIFIED REGISTERED

## 2019-12-19 PROCEDURE — 37000009 HC ANESTHESIA EA ADD 15 MINS: Performed by: INTERNAL MEDICINE

## 2019-12-19 RX ORDER — GLYCOPYRROLATE 0.2 MG/ML
INJECTION INTRAMUSCULAR; INTRAVENOUS
Status: DISCONTINUED | OUTPATIENT
Start: 2019-12-19 | End: 2019-12-19

## 2019-12-19 RX ORDER — KETAMINE HCL IN 0.9 % NACL 50 MG/5 ML
SYRINGE (ML) INTRAVENOUS
Status: DISCONTINUED | OUTPATIENT
Start: 2019-12-19 | End: 2019-12-19

## 2019-12-19 RX ORDER — MIDAZOLAM HYDROCHLORIDE 1 MG/ML
INJECTION, SOLUTION INTRAMUSCULAR; INTRAVENOUS
Status: DISCONTINUED | OUTPATIENT
Start: 2019-12-19 | End: 2019-12-19

## 2019-12-19 RX ORDER — PROPOFOL 10 MG/ML
VIAL (ML) INTRAVENOUS CONTINUOUS PRN
Status: DISCONTINUED | OUTPATIENT
Start: 2019-12-19 | End: 2019-12-19

## 2019-12-19 RX ORDER — IPRATROPIUM BROMIDE AND ALBUTEROL SULFATE 2.5; .5 MG/3ML; MG/3ML
3 SOLUTION RESPIRATORY (INHALATION) ONCE AS NEEDED
Status: DISCONTINUED | OUTPATIENT
Start: 2019-12-19 | End: 2019-12-19 | Stop reason: HOSPADM

## 2019-12-19 RX ORDER — LIDOCAINE HCL/PF 100 MG/5ML
SYRINGE (ML) INTRAVENOUS
Status: DISCONTINUED | OUTPATIENT
Start: 2019-12-19 | End: 2019-12-19

## 2019-12-19 RX ORDER — SODIUM CHLORIDE 9 MG/ML
INJECTION, SOLUTION INTRAVENOUS CONTINUOUS
Status: DISCONTINUED | OUTPATIENT
Start: 2019-12-19 | End: 2019-12-19 | Stop reason: HOSPADM

## 2019-12-19 RX ORDER — SODIUM CHLORIDE 0.9 % (FLUSH) 0.9 %
10 SYRINGE (ML) INJECTION
Status: DISCONTINUED | OUTPATIENT
Start: 2019-12-19 | End: 2019-12-19 | Stop reason: HOSPADM

## 2019-12-19 RX ORDER — PROPOFOL 10 MG/ML
VIAL (ML) INTRAVENOUS
Status: DISCONTINUED | OUTPATIENT
Start: 2019-12-19 | End: 2019-12-19

## 2019-12-19 RX ADMIN — Medication 10 MG: at 08:12

## 2019-12-19 RX ADMIN — MIDAZOLAM HYDROCHLORIDE 1 MG: 1 INJECTION, SOLUTION INTRAMUSCULAR; INTRAVENOUS at 08:12

## 2019-12-19 RX ADMIN — PROPOFOL 125 MCG/KG/MIN: 10 INJECTION, EMULSION INTRAVENOUS at 08:12

## 2019-12-19 RX ADMIN — PROPOFOL 50 MG: 10 INJECTION, EMULSION INTRAVENOUS at 08:12

## 2019-12-19 RX ADMIN — GLYCOPYRROLATE 0.2 MG: 0.2 INJECTION, SOLUTION INTRAMUSCULAR; INTRAVENOUS at 08:12

## 2019-12-19 RX ADMIN — SODIUM CHLORIDE: 0.9 INJECTION, SOLUTION INTRAVENOUS at 08:12

## 2019-12-19 RX ADMIN — LIDOCAINE HYDROCHLORIDE 100 MG: 20 INJECTION, SOLUTION INTRAVENOUS at 08:12

## 2019-12-19 NOTE — [employer] PATIENT INSTRUCTIONS
Discharge Summary/Instructions after an Endoscopic Procedure  Patient Name: Rochelle Espinoza  Patient MRN: [id number]  Patient YOB: 1955 Thursday, December 19, 2019  Rui Holder MD  RESTRICTIONS:  During your procedure today, you received medications for sedation.  These   medications may affect your judgment, balance and coordination.  Therefore,   for 24 hours, you have the following restrictions:   - DO NOT drive a car, operate machinery, make legal/financial decisions,   sign important papers or drink alcohol.    ACTIVITY:  Today: no heavy lifting, straining or running due to procedural   sedation/anesthesia.  The following day: return to full activity including work.  DIET:  Eat and drink normally unless instructed otherwise.     TREATMENT FOR COMMON SIDE EFFECTS:  - Mild abdominal pain, nausea, belching, bloating or excessive gas:  rest,   eat lightly and use a heating pad.  - Sore Throat: treat with throat lozenges and/or gargle with warm salt   water.  - Because air was used during the procedure, expelling large amounts of air   from your rectum or belching is normal.  - If a bowel prep was taken, you may not have a bowel movement for 1-3 days.    This is normal.  SYMPTOMS TO WATCH FOR AND REPORT TO YOUR PHYSICIAN:  1. Abdominal pain or bloating, other than gas cramps.  2. Chest pain.  3. Back pain.  4. Signs of infection such as: chills or fever occurring within 24 hours   after the procedure.  5. Rectal bleeding, which would show as bright red, maroon, or black stools.   (A tablespoon of blood from the rectum is not serious, especially if   hemorrhoids are present.)  6. Vomiting.  7. Weakness or dizziness.  GO DIRECTLY TO THE NEAREST EMERGENCY ROOM IF YOU HAVE ANY OF THE FOLLOWING:      Difficulty breathing              Chills and/or fever over 101 F   Persistent vomiting and/or vomiting blood   Severe abdominal pain   Severe chest pain   Black, tarry stools   Bleeding- more than one  tablespoon   Any other symptom or condition that you feel may need urgent attention  Your doctor recommends these additional instructions:  If any biopsies were taken, your doctors clinic will contact you in 1 to 2   weeks with any results.  - Discharge patient to home.   - Resume previous diet.   - Continue present medications.   - Return to primary care physician at appointment to be scheduled.  For questions, problems or results please call your physician - Rui Holder MD at Work:  [phone].  [facility], EMERGENCY ROOM PHONE NUMBER: [phone]  IF A COMPLICATION OR EMERGENCY SITUATION ARISES AND YOU ARE UNABLE TO REACH   YOUR PHYSICIAN - GO DIRECTLY TO THE EMERGENCY ROOM.  Rui Holder MD  12/19/2019 8:46:26 AM  This report has been verified and signed electronically.  [employer]

## 2019-12-19 NOTE — TRANSFER OF CARE
"Anesthesia Transfer of Care Note    Patient: Rochelle Espinoza    Procedure(s) Performed: Procedure(s) (LRB):  EGD (ESOPHAGOGASTRODUODENOSCOPY) (N/A)  COLONOSCOPY (N/A)    Patient location: PACU    Anesthesia Type: general    Transport from OR: Transported from OR on 6-10 L/min O2 by face mask with adequate spontaneous ventilation    Post pain: adequate analgesia    Post assessment: no apparent anesthetic complications and tolerated procedure well    Post vital signs: stable    Level of consciousness: awake and alert    Nausea/Vomiting: no nausea/vomiting    Complications: none    Transfer of care protocol was followed      Last vitals:   Visit Vitals  BP (!) 150/90 (BP Location: Left arm, Patient Position: Sitting)   Pulse 75   Temp 36.9 °C (98.4 °F) (Temporal)   Resp 17   Ht 5' 3" (1.6 m)   Wt 59.9 kg (132 lb)   LMP 11/21/2013   SpO2 100%   Breastfeeding? No   BMI 23.38 kg/m²     "

## 2019-12-19 NOTE — ANESTHESIA POSTPROCEDURE EVALUATION
Anesthesia Post Evaluation    Patient: Rochelle Espinoza    Procedure(s) Performed: Procedure(s) (LRB):  EGD (ESOPHAGOGASTRODUODENOSCOPY) (N/A)  COLONOSCOPY (N/A)    Final Anesthesia Type: general    Patient location during evaluation: PACU  Patient participation: Yes- Able to Participate  Level of consciousness: awake and alert  Post-procedure vital signs: reviewed and stable  Pain management: adequate  Airway patency: patent    PONV status at discharge: No PONV  Anesthetic complications: no      Cardiovascular status: stable  Respiratory status: unassisted and spontaneous ventilation  Hydration status: euvolemic  Follow-up not needed.          Vitals Value Taken Time   /88 12/19/2019  9:30 AM   Temp 36.7 °C (98.1 °F) 12/19/2019  9:30 AM   Pulse 92 12/19/2019  9:30 AM   Resp 59 12/19/2019  9:30 AM   SpO2 96 % 12/19/2019  9:30 AM         No case tracking events are documented in the log.      Pain/Arlene Score: Arlene Score: 10 (12/19/2019  8:55 AM)

## 2019-12-19 NOTE — [employer] PATIENT INSTRUCTIONS
Discharge Summary/Instructions after an Endoscopic Procedure  Patient Name: Rochelle Espinoza  Patient MRN: [id number]  Patient YOB: 1955 Thursday, December 19, 2019  Rui Holder MD  RESTRICTIONS:  During your procedure today, you received medications for sedation.  These   medications may affect your judgment, balance and coordination.  Therefore,   for 24 hours, you have the following restrictions:   - DO NOT drive a car, operate machinery, make legal/financial decisions,   sign important papers or drink alcohol.    ACTIVITY:  Today: no heavy lifting, straining or running due to procedural   sedation/anesthesia.  The following day: return to full activity including work.  DIET:  Eat and drink normally unless instructed otherwise.     TREATMENT FOR COMMON SIDE EFFECTS:  - Mild abdominal pain, nausea, belching, bloating or excessive gas:  rest,   eat lightly and use a heating pad.  - Sore Throat: treat with throat lozenges and/or gargle with warm salt   water.  - Because air was used during the procedure, expelling large amounts of air   from your rectum or belching is normal.  - If a bowel prep was taken, you may not have a bowel movement for 1-3 days.    This is normal.  SYMPTOMS TO WATCH FOR AND REPORT TO YOUR PHYSICIAN:  1. Abdominal pain or bloating, other than gas cramps.  2. Chest pain.  3. Back pain.  4. Signs of infection such as: chills or fever occurring within 24 hours   after the procedure.  5. Rectal bleeding, which would show as bright red, maroon, or black stools.   (A tablespoon of blood from the rectum is not serious, especially if   hemorrhoids are present.)  6. Vomiting.  7. Weakness or dizziness.  GO DIRECTLY TO THE NEAREST EMERGENCY ROOM IF YOU HAVE ANY OF THE FOLLOWING:      Difficulty breathing              Chills and/or fever over 101 F   Persistent vomiting and/or vomiting blood   Severe abdominal pain   Severe chest pain   Black, tarry stools   Bleeding- more than one  tablespoon   Any other symptom or condition that you feel may need urgent attention  Your doctor recommends these additional instructions:  If any biopsies were taken, your doctors clinic will contact you in 1 to 2   weeks with any results.  - Discharge patient to home.   - Resume previous diet.   - Continue present medications.   - Repeat colonoscopy in 10 years for screening purposes as her health   dictates  For questions, problems or results please call your physician - Rui Holder MD at Work:  [phone].  [facility], EMERGENCY ROOM PHONE NUMBER: [phone]  IF A COMPLICATION OR EMERGENCY SITUATION ARISES AND YOU ARE UNABLE TO REACH   YOUR PHYSICIAN - GO DIRECTLY TO THE EMERGENCY ROOM.  Rui Holder MD  12/19/2019 8:48:14 AM  This report has been verified and signed electronically.  [employer]

## 2019-12-19 NOTE — H&P
Short Stay Endoscopy History and Physical    PCP - Yosi Samuels MD  Referring Physician - Nicole Copeland, ANA MARIA  [address] [place] [address]    Procedure - egd/colon   ASA - per anesthesia  Mallampati - per anesthesia  History of Anesthesia problems - no  Family history Anesthesia problems -  no   Plan of anesthesia - General    HPI:  This is a 64 y.o. female here for evaluation of: gerd screening    Reflux - no  Dysphagia - no  Abdominal pain - no  Diarrhea - no    ROS:  Constitutional: No fevers, chills, No weight loss  CV: No chest pain  Pulm: No cough, No shortness of breath  Ophtho: No vision changes  GI: see HPI  Derm: No rash    Medical History:  has a past medical history of CHF (congestive heart failure), COPD (chronic obstructive pulmonary disease), Herpes zoster without mention of complication (2011), Hypertension, Leg edema, and Pulmonary hypertension.    Surgical History:  has a past surgical history that includes  section and Breast biopsy (Right).    Family History: family history includes Heart disease in her mother and paternal uncle..    Social History:  reports that she has been smoking cigarettes. She has a 40.00 pack-year smoking history. She has never used smokeless tobacco. She reports that she drinks alcohol. She reports that she does not use drugs.    Review of patient's allergies indicates:   Allergen Reactions    Orange juice      Swelling in pt tongue         Medications:   Medications Prior to Admission   Medication Sig Dispense Refill Last Dose    albuterol (PROVENTIL/VENTOLIN HFA) 90 mcg/actuation inhaler INHALE 2 PUFFS BY MOUTH EVERY 6 HOURS AS NEEDED 18 g 4 2019 at Unknown time    albuterol-ipratropium (DUO-NEB) 2.5 mg-0.5 mg/3 mL nebulizer solution USE 1 VIAL VIA NEBULIZER EVERY 4 HOURS AS NEEDED FOR WHEEZING OR SHORTNESS OF BREATH 180 mL 3 2019 at Unknown time    colchicine (COLCRYS) 0.6 mg tablet TAKE 2 TABLETS BY MOUTH NOW, TAKE  1 TABLET BY MOUTH 1 HOUR LATER 30 tablet 2 12/18/2019 at Unknown time    furosemide (LASIX) 20 MG tablet TAKE 1 TABLET(20 MG) BY MOUTH TWICE DAILY 60 tablet 7 Past Month at Unknown time    gabapentin (NEURONTIN) 100 MG capsule Take 1 capsule (100 mg total) by mouth 3 (three) times daily. 90 capsule 0 Past Week at Unknown time    multivitamin capsule Take 1 capsule by mouth once daily.   Past Week at Unknown time    omeprazole (PRILOSEC) 40 MG capsule Take 1 capsule (40 mg total) by mouth once daily. 90 capsule 3 12/18/2019 at Unknown time    potassium chloride SA (K-DUR,KLOR-CON) 20 MEQ tablet TAKE 1 TABLET(20 MEQ) BY MOUTH EVERY DAY 30 tablet 7 Past Week at Unknown time    COMBIVENT RESPIMAT  mcg/actuation inhaler INHALE 1 PUFF BY MOUTH FOUR TIMES DAILY FOR RESCUE 4 g 0 More than a month at Unknown time    COMBIVENT RESPIMAT  mcg/actuation inhaler INHALE 1 PUFF BY MOUTH INTO THE LUNGS FOUR TIMES DAILY FOR RESCUE 4 g 0 More than a month at Unknown time    flu vacc [id number] 6mos up,PF, 60 mcg (15 mcg x 4)/0.5 mL Syrg Inject 0.5 mLs into the muscle once. For one dose. for 1 dose 0.5 mL 0 More than a month at Unknown time    glycerin adult suppository Place 1 suppository rectally as needed for Constipation.   More than a month at Unknown time    losartan-hydrochlorothiazide 50-12.5 mg (HYZAAR) 50-12.5 mg per tablet TAKE 1 TABLET BY MOUTH EVERY DAY 90 tablet 3 More than a month at Unknown time    nicotine (NICODERM CQ) 21 mg/24 hr Place 1 patch onto the skin once daily. 14 patch 0 More than a month at Unknown time    traMADol (ULTRAM) 50 mg tablet Take 1 tablet (50 mg total) by mouth every 6 (six) hours. 30 tablet 0 More than a month at Unknown time    traZODone (DESYREL) 50 MG tablet TAKE 1 TABLET(50 MG) BY MOUTH EVERY NIGHT AS NEEDED FOR ANXIETY 90 tablet 3 More than a month at Unknown time    umeclidinium-vilanterol (ANORO ELLIPTA) 62.5-25 mcg/actuation DsDv Inhale 1 puff into the lungs  once daily. Controller 1 each 5 More than a month at Unknown time    varenicline (CHANTIX STARTING MONTH BOX) 0.5 mg (11)- 1 mg (42) tablet Take one 0.5mg tab by mouth once daily X3 days,then increase to one 0.5mg tab twice daily X4 days,then increase to one 1mg tab twice daily 53 tablet 0 More than a month at Unknown time       Physical Exam:    Vital Signs:   Vitals:    12/19/19 0731   BP: (!) 150/90   Pulse: 75   Resp: 17   Temp: 98.4 °F (36.9 °C)       General Appearance: Well appearing in no acute distress    Labs:  Lab Results   Component Value Date    WBC 9.89 08/27/2019    HGB 14.6 08/27/2019    HCT 44.6 08/27/2019     08/27/2019    CHOL 189 03/16/2019    TRIG 53 03/16/2019    HDL 84 (H) 03/16/2019    ALT 17 08/27/2019    AST 23 08/27/2019     08/27/2019    K 4.1 08/27/2019    CL 98 08/27/2019    CREATININE 1.0 08/27/2019    BUN 22 08/27/2019    CO2 29 08/27/2019    TSH 1.180 04/09/2018    INR 1.0 04/11/2018    HGBA1C 5.2 04/11/2018       I have explained the risks and benefits of this endoscopic procedure to the patient including but not limited to bleeding, inflammation, infection, perforation, and death.      Rui Holder MD

## 2019-12-19 NOTE — ANESTHESIA PREPROCEDURE EVALUATION
12/19/2019  Rochelle Espinoza is a 64 y.o., female with severe COPD, smoker and on home O2 with GERD and esophagitis here for below procedure. Pt reports last hospitalization for COPD about a year ago and never has been intubated.     Pre-operative evaluation for Procedure(s) (LRB):  EGD (ESOPHAGOGASTRODUODENOSCOPY) (N/A)  COLONOSCOPY (N/A)    Rochelle Espinoza is a 64 y.o. female     Patient Active Problem List   Diagnosis    Essential hypertension    COPD (chronic obstructive pulmonary disease)    Adenomatous polyp of colon    Chronic airway obstruction, not elsewhere classified    Hallux valgus (acquired)    Obstructive chronic bronchitis without exacerbation    Primary localized osteoarthrosis, lower leg    Lumbar radiculitis    Tobacco abuse    Gout involving toe of left foot    Pulmonary hypertension    Cor pulmonale, chronic    Oxygen dependent       Review of patient's allergies indicates:   Allergen Reactions    Orange juice      Swelling in pt tongue         No current facility-administered medications on file prior to encounter.      Current Outpatient Medications on File Prior to Encounter   Medication Sig Dispense Refill    albuterol-ipratropium (DUO-NEB) 2.5 mg-0.5 mg/3 mL nebulizer solution USE 1 VIAL VIA NEBULIZER EVERY 4 HOURS AS NEEDED FOR WHEEZING OR SHORTNESS OF BREATH 180 mL 3    colchicine (COLCRYS) 0.6 mg tablet TAKE 2 TABLETS BY MOUTH NOW, TAKE 1 TABLET BY MOUTH 1 HOUR LATER 30 tablet 2    COMBIVENT RESPIMAT  mcg/actuation inhaler INHALE 1 PUFF BY MOUTH FOUR TIMES DAILY FOR RESCUE 4 g 0    COMBIVENT RESPIMAT  mcg/actuation inhaler INHALE 1 PUFF BY MOUTH INTO THE LUNGS FOUR TIMES DAILY FOR RESCUE 4 g 0    flu vacc [id number] 6mos up,PF, 60 mcg (15 mcg x 4)/0.5 mL Syrg Inject 0.5 mLs into the muscle once. For one dose. for 1 dose 0.5 mL 0    furosemide (LASIX) 20  MG tablet TAKE 1 TABLET(20 MG) BY MOUTH TWICE DAILY 60 tablet 7    glycerin adult suppository Place 1 suppository rectally as needed for Constipation.      multivitamin capsule Take 1 capsule by mouth once daily.      nicotine (NICODERM CQ) 21 mg/24 hr Place 1 patch onto the skin once daily. 14 patch 0    omeprazole (PRILOSEC) 40 MG capsule Take 1 capsule (40 mg total) by mouth once daily. 90 capsule 3    potassium chloride SA (K-DUR,KLOR-CON) 20 MEQ tablet TAKE 1 TABLET(20 MEQ) BY MOUTH EVERY DAY 30 tablet 7    umeclidinium-vilanterol (ANORO ELLIPTA) 62.5-25 mcg/actuation DsDv Inhale 1 puff into the lungs once daily. Controller 1 each 5    varenicline (CHANTIX STARTING MONTH BOX) 0.5 mg (11)- 1 mg (42) tablet Take one 0.5mg tab by mouth once daily X3 days,then increase to one 0.5mg tab twice daily X4 days,then increase to one 1mg tab twice daily 53 tablet 0       Past Surgical History:   Procedure Laterality Date    BREAST BIOPSY Right     core     SECTION         Social History     Socioeconomic History    Marital status:      Spouse name: Not on file    Number of children: Not on file    Years of education: Not on file    Highest education level: Not on file   Occupational History    Occupation:    Social Needs    Financial resource strain: Somewhat hard    Food insecurity:     Worry: Sometimes true     Inability: Sometimes true    Transportation needs:     Medical: No     Non-medical: Yes   Tobacco Use    Smoking status: Current Every Day Smoker     Packs/day: 1.00     Years: 40.00     Pack years: 40.00     Types: Cigarettes    Smokeless tobacco: Never Used   Substance and Sexual Activity    Alcohol use: Yes     Frequency: Monthly or less     Drinks per session: 3 or 4     Binge frequency: Never     Comment: beer daily 2-3 daily, none today    Drug use: No    Sexual activity: Not Currently     Birth control/protection: None   Lifestyle    Physical activity:      Days per week: 0 days     Minutes per session: 0 min    Stress: Rather much   Relationships    Social connections:     Talks on phone: More than three times a week     Gets together: More than three times a week     Attends [employer] service: Not on file     Active member of club or organization: No     Attends meetings of clubs or organizations: Never     Relationship status:    Other Topics Concern    Not on file   Social History Narrative    Not on file             2D Echo:  Results for orders placed or performed during the hospital encounter of 04/30/18   2D echo with color flow doppler   Result Value Ref Range    QEF 50 55 - 65    Diastolic Dysfunction No        Anesthesia Evaluation    I have reviewed the Patient Summary Reports.     I have reviewed the Medications.     Review of Systems  Anesthesia Hx:  History of prior surgery of interest to airway management or planning:  Denies Personal Hx of Anesthesia complications.   Social:  Smoker    Cardiovascular:   Hypertension    Pulmonary:   COPD, severe    Hepatic/GI:   GERD    Musculoskeletal:   Arthritis     Endocrine:  Endocrine Normal        Physical Exam  General:  Well nourished    Airway/Jaw/Neck:  Airway Findings: Mouth Opening: Normal Tongue: Normal  General Airway Assessment: Adult  Improves to I with phonation.  TM Distance: Normal, at least 6 cm      Dental:  Dental Findings: In tact   Chest/Lungs:  Chest/Lungs Findings: Decreased Breath Sounds Bilateral, Expiratory Wheezes, Mod.     Heart/Vascular:  Heart Findings: Rate: Normal  Rhythm: Regular Rhythm        Mental Status:  Mental Status Findings:  Cooperative, Alert and Oriented         Anesthesia Plan  Type of Anesthesia, risks & benefits discussed:  Anesthesia Type:  general  Patient's Preference:   Intra-op Monitoring Plan: standard ASA monitors  Intra-op Monitoring Plan Comments:   Post Op Pain Control Plan: per primary service following discharge from PACU  Post Op Pain Control  Plan Comments:   Induction:   IV  Beta Blocker:  Patient is not currently on a Beta-Blocker (No further documentation required).       Informed Consent: Patient understands risks and agrees with Anesthesia plan.  Questions answered. Anesthesia consent signed with patient.  ASA Score: 3     Day of Surgery Review of History & Physical:    H&P update referred to the surgeon.         Ready For Surgery From Anesthesia Perspective.

## 2019-12-31 DIAGNOSIS — M54.12 CERVICAL RADICULOPATHY: ICD-10-CM

## 2019-12-31 RX ORDER — GABAPENTIN 100 MG/1
CAPSULE ORAL
Qty: 90 CAPSULE | Refills: 0 | Status: SHIPPED | OUTPATIENT
Start: 2019-12-31 | End: 2020-06-13 | Stop reason: SDUPTHER

## 2020-01-02 ENCOUNTER — HOSPITAL ENCOUNTER (OUTPATIENT)
Facility: OTHER | Age: 65
Discharge: HOME | End: 2020-01-02
Attending: ANESTHESIOLOGY | Admitting: ANESTHESIOLOGY
Payer: COMMERCIAL

## 2020-01-02 VITALS
OXYGEN SATURATION: 98 % | HEART RATE: 85 BPM | TEMPERATURE: 99 F | SYSTOLIC BLOOD PRESSURE: 141 MMHG | RESPIRATION RATE: 14 BRPM | WEIGHT: 132 LBS | BODY MASS INDEX: 23.39 KG/M2 | HEIGHT: 63 IN | DIASTOLIC BLOOD PRESSURE: 80 MMHG

## 2020-01-02 DIAGNOSIS — M54.12 CERVICAL RADICULOPATHY: Primary | ICD-10-CM

## 2020-01-02 PROCEDURE — 63600175 PHARM REV CODE 636 W HCPCS: Performed by: STUDENT IN AN ORGANIZED HEALTH CARE EDUCATION/TRAINING PROGRAM

## 2020-01-02 PROCEDURE — 63600175 PHARM REV CODE 636 W HCPCS: Performed by: ANESTHESIOLOGY

## 2020-01-02 PROCEDURE — 25000003 PHARM REV CODE 250: Performed by: ANESTHESIOLOGY

## 2020-01-02 PROCEDURE — 62321 NJX INTERLAMINAR CRV/THRC: CPT | Performed by: ANESTHESIOLOGY

## 2020-01-02 PROCEDURE — 62321 PR INJ CERV/THORAC, W/GUIDANCE: ICD-10-PCS | Mod: ,,, | Performed by: ANESTHESIOLOGY

## 2020-01-02 PROCEDURE — 25500020 PHARM REV CODE 255: Performed by: ANESTHESIOLOGY

## 2020-01-02 PROCEDURE — 62321 NJX INTERLAMINAR CRV/THRC: CPT | Mod: ,,, | Performed by: ANESTHESIOLOGY

## 2020-01-02 RX ORDER — LIDOCAINE HYDROCHLORIDE 10 MG/ML
INJECTION INFILTRATION; PERINEURAL
Status: DISCONTINUED | OUTPATIENT
Start: 2020-01-02 | End: 2020-01-02 | Stop reason: HOSPADM

## 2020-01-02 RX ORDER — MIDAZOLAM HYDROCHLORIDE 1 MG/ML
INJECTION INTRAMUSCULAR; INTRAVENOUS
Status: DISCONTINUED | OUTPATIENT
Start: 2020-01-02 | End: 2020-01-02 | Stop reason: HOSPADM

## 2020-01-02 RX ORDER — LIDOCAINE HYDROCHLORIDE 10 MG/ML
INJECTION, SOLUTION EPIDURAL; INFILTRATION; INTRACAUDAL; PERINEURAL
Status: DISCONTINUED | OUTPATIENT
Start: 2020-01-02 | End: 2020-01-02 | Stop reason: HOSPADM

## 2020-01-02 RX ORDER — DEXAMETHASONE SODIUM PHOSPHATE 100 MG/10ML
INJECTION INTRAMUSCULAR; INTRAVENOUS
Status: DISCONTINUED | OUTPATIENT
Start: 2020-01-02 | End: 2020-01-02 | Stop reason: HOSPADM

## 2020-01-02 RX ORDER — SODIUM CHLORIDE 9 MG/ML
500 INJECTION, SOLUTION INTRAVENOUS CONTINUOUS
Status: DISCONTINUED | OUTPATIENT
Start: 2020-01-02 | End: 2020-01-02 | Stop reason: HOSPADM

## 2020-01-02 RX ORDER — FENTANYL CITRATE 50 UG/ML
INJECTION, SOLUTION INTRAMUSCULAR; INTRAVENOUS
Status: DISCONTINUED | OUTPATIENT
Start: 2020-01-02 | End: 2020-01-02 | Stop reason: HOSPADM

## 2020-01-02 RX ADMIN — SODIUM CHLORIDE 500 ML: 0.9 INJECTION, SOLUTION INTRAVENOUS at 10:01

## 2020-01-02 NOTE — DISCHARGE INSTRUCTIONS

## 2020-01-02 NOTE — OP NOTE
Cervical Interlaminar Epidural Steroid Injection under fluoroscopic guidance.  Time-out taken to identify patient and procedure side prior to starting the procedure.   I attest that I have reviewed the patient's home medications prior to the procedure and no contraindication have been identified. I  re-evaluated the patient after the patient was positioned for the procedure in the procedure room immediately before the procedural time-out. The vital signs are current and represent the current state of the patient which has not significantly changed since the preprocedure assessment.                                                              Date of Service: 01/02/2020    PCP: Yosi Samuels MD    Referring Physician:    PROCEDURE: C7-T1 cervical interlaminar epidural steroid injection under fluoroscopy.  REASONS FOR PROCEDURE: Cervical radiculopathy [M54.12]  1. Cervical radiculopathy      POSTOP DIAGNOSIS:  Cervical radiculopathy [M54.12]     1. Cervical radiculopathy      PHYSICIAN: Papito High MD  ASSISTANTS: None    MEDICATIONS INJECTED:  Preservative-free dexamethasone 10mg and Xylocaine 1% MPF 1ml.  This was followed by a slow injection of 4 mL sterile, preservative-free normal saline.     LOCAL ANESTHETIC USED: Xylocaine 1% 9ml with Sodium Bicarbonate 1ml.     SEDATION MEDICATIONS: local/IV sedation: Versed 2 mg and fentanyl 25 mcg IV.  Conscious sedation ordered by MD.  Patient reevaluated and sedation administered by MD and monitored by RN.  Total sedation time was less than 15 min. (See nurse documentation and case log for sedation time)    COMPLICATIONS:  none.    ESTIMATED BLOOD LOSS: none.    TECHNIQUE:  With the patient laying in a prone position with the neck in a flexed forward position, the area was prepped and draped in the usual sterile fashion using ChloraPrep and a fenestrated drape.  The area was determined under fluoroscopic guidance.  Local anesthetic was given using a 27-gauge needle by  raising a wheal and going down to the osseous elements of the cervical spine.  A 3.5 inch 20-gauge Touhy needle was introduced under fluoroscopic guidance to meet the lamina of T1.  The needle was then hinged under the lamina then advanced using loss of resistance technique.  Once the tip of the needle was in the desired position, the contrast dye Omnipaque was injected to determine placement and no vascular runoff.  Digital subtraction was employed to confirm that there is no vascular runoff.  The steroid was then injected slowly followed by a slow injection of the 4 mL of the sterile preservative-free normal saline.  The patient tolerated the procedure well.    PAIN BEFORE THE PROCEDURE: 5/10    PAIN AFTER THE PROCEDURE: 0/10    The patient was monitored after the procedure and was given post-procedure and discharge instructions to follow at home. The patient was discharged in a stable condition

## 2020-01-02 NOTE — DISCHARGE SUMMARY
Discharge Note  Short Stay      SUMMARY     Admit Date: 1/2/2020    Attending Physician: Papito High      Discharge Physician: Papito High      Discharge Date: 1/2/2020 10:22 AM    Procedure(s) (LRB):  CERVICAL BLAKE (N/A)    Final Diagnosis: Cervical radiculopathy [M54.12]    Disposition: Home or self care    Patient Instructions:   Current Discharge Medication List      CONTINUE these medications which have NOT CHANGED    Details   albuterol (PROVENTIL/VENTOLIN HFA) 90 mcg/actuation inhaler INHALE 2 PUFFS BY MOUTH EVERY 6 HOURS AS NEEDED  Qty: 18 g, Refills: 4    Associated Diagnoses: Bronchospasm, acute; Chronic obstructive pulmonary disease with hypoxia      albuterol-ipratropium (DUO-NEB) 2.5 mg-0.5 mg/3 mL nebulizer solution USE 1 VIAL VIA NEBULIZER EVERY 4 HOURS AS NEEDED FOR WHEEZING OR SHORTNESS OF BREATH  Qty: 180 mL, Refills: 3    Associated Diagnoses: At high risk for respiratory infection      colchicine (COLCRYS) 0.6 mg tablet TAKE 2 TABLETS BY MOUTH NOW, TAKE 1 TABLET BY MOUTH 1 HOUR LATER  Qty: 30 tablet, Refills: 2    Associated Diagnoses: Gout, arthropathy      !! COMBIVENT RESPIMAT  mcg/actuation inhaler INHALE 1 PUFF BY MOUTH FOUR TIMES DAILY FOR RESCUE  Qty: 4 g, Refills: 0      !! COMBIVENT RESPIMAT  mcg/actuation inhaler INHALE 1 PUFF BY MOUTH INTO THE LUNGS FOUR TIMES DAILY FOR RESCUE  Qty: 4 g, Refills: 0      flu vacc [id number] 6mos up,PF, 60 mcg (15 mcg x 4)/0.5 mL Syrg Inject 0.5 mLs into the muscle once. For one dose. for 1 dose  Qty: 0.5 mL, Refills: 0      furosemide (LASIX) 20 MG tablet TAKE 1 TABLET(20 MG) BY MOUTH TWICE DAILY  Qty: 60 tablet, Refills: 7      gabapentin (NEURONTIN) 100 MG capsule TAKE 1 CAPSULE(100 MG) BY MOUTH THREE TIMES DAILY  Qty: 90 capsule, Refills: 0    Associated Diagnoses: Cervical radiculopathy      glycerin adult suppository Place 1 suppository rectally as needed for Constipation.      losartan-hydrochlorothiazide 50-12.5 mg (HYZAAR) 50-12.5 mg  per tablet TAKE 1 TABLET BY MOUTH EVERY DAY  Qty: 90 tablet, Refills: 3      multivitamin capsule Take 1 capsule by mouth once daily.      nicotine (NICODERM CQ) 21 mg/24 hr Place 1 patch onto the skin once daily.  Qty: 14 patch, Refills: 0    Comments:  scripts ID [id number]  Associated Diagnoses: Moderate cigarette smoker (10-19 per day)      omeprazole (PRILOSEC) 40 MG capsule Take 1 capsule (40 mg total) by mouth once daily.  Qty: 90 capsule, Refills: 3      potassium chloride SA (K-DUR,KLOR-CON) 20 MEQ tablet TAKE 1 TABLET(20 MEQ) BY MOUTH EVERY DAY  Qty: 30 tablet, Refills: 7      traMADol (ULTRAM) 50 mg tablet Take 1 tablet (50 mg total) by mouth every 6 (six) hours.  Qty: 30 tablet, Refills: 0    Comments: Quantity prescribed more than 7 day supply? Yes, quantity medically necessary      traZODone (DESYREL) 50 MG tablet TAKE 1 TABLET(50 MG) BY MOUTH EVERY NIGHT AS NEEDED FOR ANXIETY  Qty: 90 tablet, Refills: 3      umeclidinium-vilanterol (ANORO ELLIPTA) 62.5-25 mcg/actuation DsDv Inhale 1 puff into the lungs once daily. Controller  Qty: 1 each, Refills: 5      varenicline (CHANTIX STARTING MONTH BOX) 0.5 mg (11)- 1 mg (42) tablet Take one 0.5mg tab by mouth once daily X3 days,then increase to one 0.5mg tab twice daily X4 days,then increase to one 1mg tab twice daily  Qty: 53 tablet, Refills: 0    Associated Diagnoses: Moderate cigarette smoker (10-19 per day)       !! - Potential duplicate medications found. Please discuss with provider.              Discharge Diagnosis: Cervical radiculopathy [M54.12]  Condition on Discharge: Stable with no complications to procedure   Diet on Discharge: Same as before.  Activity: as per instruction sheet.  Discharge to: Home with a responsible adult.  Follow up: 2-4 weeks       Please call my office or pager at [phone] if experienced any weakness or loss of sensation, fever > 101.5, pain uncontrolled with oral medications, persistent nausea/vomiting/or diarrhea,  redness or drainage from the incisions, or any other worrisome concerns. If physician on call was not reached or could not communicate with our office for any reason please go to the nearest emergency department

## 2020-01-02 NOTE — H&P
HPI  Patient presenting for Procedure(s) (LRB):  CERVICAL BLAKE (N/A)     Patient on Anti-coagulation No    No health changes since previous encounter    Past Medical History:   Diagnosis Date    CHF (congestive heart failure)     COPD (chronic obstructive pulmonary disease)     Herpes zoster without mention of complication 2011    Hypertension     Leg edema     Pulmonary hypertension      Past Surgical History:   Procedure Laterality Date    BREAST BIOPSY Right     core     SECTION      COLONOSCOPY N/A 2019    Procedure: COLONOSCOPY;  Surgeon: Rui Holder MD;  Location: [facility] ([address] [place] [address]);  Service: Endoscopy;  Laterality: N/A;    ESOPHAGOGASTRODUODENOSCOPY N/A 2019    Procedure: EGD (ESOPHAGOGASTRODUODENOSCOPY);  Surgeon: Rui Holder MD;  [facility]  Service: Endoscopy;  Laterality: N/A;  2L continuous O2 via NC, COPD, pulm HTN     Review of patient's allergies indicates:   Allergen Reactions    Orange juice      Swelling in pt tongue        Current Facility-Administered Medications   Medication    0.9%  NaCl infusion       PMHx, PSHx, Allergies, Medications reviewed in epic    ROS negative except pain complaints in HPI    OBJECTIVE:    LMP 2013     PHYSICAL EXAMINATION:    GENERAL: Well appearing, in no acute distress, alert and oriented x3.  PSYCH:  Mood and affect appropriate.  SKIN: Skin color, texture, turgor normal, no rashes or lesions which will impact the procedure.  CV: RRR with palpation of the radial artery.  PULM: No evidence of respiratory difficulty, symmetric chest rise. Clear to auscultation.  NEURO: Cranial nerves grossly intact.    Plan:    Proceed with procedure as planned Procedure(s) (LRB):  CERVICAL BLAKE (N/A)    Chinedu Massey  2020

## 2020-02-01 DIAGNOSIS — M54.12 CERVICAL RADICULOPATHY: ICD-10-CM

## 2020-02-03 RX ORDER — GABAPENTIN 100 MG/1
CAPSULE ORAL
Qty: 90 CAPSULE | Refills: 0 | OUTPATIENT
Start: 2020-02-03

## 2020-02-04 ENCOUNTER — OFFICE VISIT (OUTPATIENT)
Dept: INTERNAL MEDICINE | Facility: CLINIC | Age: 65
End: 2020-02-04
Payer: COMMERCIAL

## 2020-02-04 VITALS
HEART RATE: 75 BPM | SYSTOLIC BLOOD PRESSURE: 144 MMHG | HEIGHT: 63 IN | DIASTOLIC BLOOD PRESSURE: 92 MMHG | BODY MASS INDEX: 24.1 KG/M2 | OXYGEN SATURATION: 96 % | WEIGHT: 136 LBS

## 2020-02-04 DIAGNOSIS — I10 ESSENTIAL HYPERTENSION: ICD-10-CM

## 2020-02-04 DIAGNOSIS — Z72.0 TOBACCO ABUSE: ICD-10-CM

## 2020-02-04 DIAGNOSIS — J98.01 BRONCHOSPASM, ACUTE: ICD-10-CM

## 2020-02-04 DIAGNOSIS — I27.81 COR PULMONALE, CHRONIC: Primary | ICD-10-CM

## 2020-02-04 DIAGNOSIS — I27.20 PULMONARY HYPERTENSION: ICD-10-CM

## 2020-02-04 DIAGNOSIS — K21.9 GASTROESOPHAGEAL REFLUX DISEASE WITHOUT ESOPHAGITIS: ICD-10-CM

## 2020-02-04 DIAGNOSIS — J44.9 CHRONIC OBSTRUCTIVE PULMONARY DISEASE WITH HYPOXIA: ICD-10-CM

## 2020-02-04 DIAGNOSIS — J42 CHRONIC BRONCHITIS, UNSPECIFIED CHRONIC BRONCHITIS TYPE: ICD-10-CM

## 2020-02-04 DIAGNOSIS — Z99.81 OXYGEN DEPENDENT: ICD-10-CM

## 2020-02-04 PROCEDURE — 99999 PR PBB SHADOW E&M-EST. PATIENT-LVL IV: CPT | Mod: PBBFAC,,, | Performed by: INTERNAL MEDICINE

## 2020-02-04 PROCEDURE — 99214 PR OFFICE/OUTPT VISIT, EST, LEVL IV, 30-39 MIN: ICD-10-PCS | Mod: S$GLB,,, | Performed by: INTERNAL MEDICINE

## 2020-02-04 PROCEDURE — 3080F DIAST BP >= 90 MM HG: CPT | Mod: CPTII,S$GLB,, | Performed by: INTERNAL MEDICINE

## 2020-02-04 PROCEDURE — 3008F PR BODY MASS INDEX (BMI) DOCUMENTED: ICD-10-PCS | Mod: CPTII,S$GLB,, | Performed by: INTERNAL MEDICINE

## 2020-02-04 PROCEDURE — 3077F PR MOST RECENT SYSTOLIC BLOOD PRESSURE >= 140 MM HG: ICD-10-PCS | Mod: CPTII,S$GLB,, | Performed by: INTERNAL MEDICINE

## 2020-02-04 PROCEDURE — 3008F BODY MASS INDEX DOCD: CPT | Mod: CPTII,S$GLB,, | Performed by: INTERNAL MEDICINE

## 2020-02-04 PROCEDURE — 99214 OFFICE O/P EST MOD 30 MIN: CPT | Mod: S$GLB,,, | Performed by: INTERNAL MEDICINE

## 2020-02-04 PROCEDURE — 3077F SYST BP >= 140 MM HG: CPT | Mod: CPTII,S$GLB,, | Performed by: INTERNAL MEDICINE

## 2020-02-04 PROCEDURE — 3080F PR MOST RECENT DIASTOLIC BLOOD PRESSURE >= 90 MM HG: ICD-10-PCS | Mod: CPTII,S$GLB,, | Performed by: INTERNAL MEDICINE

## 2020-02-04 PROCEDURE — 99999 PR PBB SHADOW E&M-EST. PATIENT-LVL IV: ICD-10-PCS | Mod: PBBFAC,,, | Performed by: INTERNAL MEDICINE

## 2020-02-04 RX ORDER — OMEPRAZOLE 40 MG/1
40 CAPSULE, DELAYED RELEASE ORAL DAILY
Qty: 90 CAPSULE | Refills: 3 | Status: SHIPPED | OUTPATIENT
Start: 2020-02-04 | End: 2021-03-03

## 2020-02-04 RX ORDER — LOSARTAN POTASSIUM 50 MG/1
50 TABLET ORAL DAILY
Qty: 90 TABLET | Refills: 3 | Status: SHIPPED | OUTPATIENT
Start: 2020-02-04 | End: 2020-07-10 | Stop reason: SDUPTHER

## 2020-02-04 RX ORDER — HYDROCHLOROTHIAZIDE 12.5 MG/1
12.5 CAPSULE ORAL DAILY
Qty: 90 CAPSULE | Refills: 3 | Status: ON HOLD | OUTPATIENT
Start: 2020-02-04 | End: 2020-07-20 | Stop reason: HOSPADM

## 2020-02-04 RX ORDER — ALBUTEROL SULFATE 90 UG/1
AEROSOL, METERED RESPIRATORY (INHALATION)
Qty: 18 G | Refills: 4 | Status: SHIPPED | OUTPATIENT
Start: 2020-02-04 | End: 2020-06-29 | Stop reason: SDUPTHER

## 2020-02-04 NOTE — PROGRESS NOTES
Answers for HPI/ROS submitted by the patient on 2/3/2020   activity change: No  unexpected weight change: No  rhinorrhea: Yes  trouble swallowing: No  visual disturbance: No  chest tightness: Yes  polyuria: No  difficulty urinating: No  menstrual problem: No  joint swelling: Yes  arthralgias: No  confusion: No  dysphoric mood: No    INTERNAL MEDICINE   CLINIC NOTE    Patient Name: Rochelle Espinoza  YOB: 1955    PRESENTING HISTORY       History of Present Illness:  Ms. Rochelle Espinoza is a 64 y.o. female w/ COPD on home 2L O2, CHF, HTN on losartan and a hx of gout is here for a 4 month clinic follow-up.   Last visit patient had a /60 and Hctz was discontinued. This visit patient's BP is 144/92. Patient still smokes ~15 cig/day, does not take chantix, Average BP reading at home is in 110s/60s. Patient also has cervical radiculopathy and recently was given an BLAKE which has helped with arm pain but not the numbness.     COPD- stable on 2L home O2, needs inhaler refill, relapse of smoking now smokes 15/day, stopped chantix.   CHF- likely cor pulmonale, no LE swelling currently, no new cough or orthopnea or PND  HTN- Bp elevated today to 144/92 and repeat was 142/88. Last visit Hctz was stopped and BP elevated in last few home readings. Denies headache, chest pain, palpitations.     Needs Omeprazole refill as well for GERD.     Review of Systems   Constitutional: Negative for chills, fever, malaise/fatigue and weight loss.   HENT: Negative for hearing loss and sore throat.    Eyes: Negative for discharge.   Respiratory: Positive for wheezing. Negative for cough and shortness of breath.    Cardiovascular: Positive for palpitations. Negative for chest pain, orthopnea, leg swelling and PND.   Gastrointestinal: Negative for blood in stool, constipation, diarrhea, heartburn, nausea and vomiting.   Genitourinary: Negative for dysuria, hematuria and urgency.   Musculoskeletal: Negative for myalgias and neck  pain.   Neurological: Negative for weakness and headaches.   Endo/Heme/Allergies: Negative for polydipsia.       PAST HISTORY:     Past Medical History:   Diagnosis Date    CHF (congestive heart failure)     COPD (chronic obstructive pulmonary disease)     Herpes zoster without mention of complication 2011    Hypertension     Leg edema     Pulmonary hypertension        Past Surgical History:   Procedure Laterality Date    BREAST BIOPSY Right     core     SECTION      COLONOSCOPY N/A 2019    Procedure: COLONOSCOPY;  Surgeon: Rui Holder MD;  [facility]  Service: Endoscopy;  Laterality: N/A;    EPIDURAL STEROID INJECTION N/A 2020    Procedure: CERVICAL BLAKE;  Surgeon: Papito High MD;  Location: [facility];  Service: Pain Management;  Laterality: N/A;  NEEDS CONSENT    ESOPHAGOGASTRODUODENOSCOPY N/A 2019    Procedure: EGD (ESOPHAGOGASTRODUODENOSCOPY);  Surgeon: Rui Holder MD;  Location: [facility] ([address] [place] [address]);  Service: Endoscopy;  Laterality: N/A;  2L continuous O2 via NC, COPD, pulm HTN       Family History   Problem Relation Age of Onset    Heart disease Mother     Heart disease Paternal Uncle     Celiac disease Neg Hx     Colon cancer Neg Hx     Colon polyps Neg Hx     Crohn's disease Neg Hx     Cystic fibrosis Neg Hx     Esophageal cancer Neg Hx     Inflammatory bowel disease Neg Hx     Irritable bowel syndrome Neg Hx     Liver cancer Neg Hx     Liver disease Neg Hx     Rectal cancer Neg Hx     Stomach cancer Neg Hx     Ulcerative colitis Neg Hx        Social History     Socioeconomic History    Marital status:      Spouse name: Not on file    Number of children: Not on file    Years of education: Not on file    Highest education level: Not on file   Occupational History    Occupation:    Social Needs    Financial resource strain: Somewhat hard    Food insecurity:     Worry: Sometimes true      Inability: Sometimes true    Transportation needs:     Medical: No     Non-medical: Yes   Tobacco Use    Smoking status: Current Every Day Smoker     Packs/day: 1.00     Years: 40.00     Pack years: 40.00     Types: Cigarettes    Smokeless tobacco: Never Used   Substance and Sexual Activity    Alcohol use: Yes     Frequency: Monthly or less     Drinks per session: 3 or 4     Binge frequency: Never     Comment: beer daily 2-3 daily, none today    Drug use: No    Sexual activity: Not Currently     Birth control/protection: None   Lifestyle    Physical activity:     Days per week: 0 days     Minutes per session: 0 min    Stress: Rather much   Relationships    Social connections:     Talks on phone: More than three times a week     Gets together: More than three times a week     Attends [employer] service: Not on file     Active member of club or organization: No     Attends meetings of clubs or organizations: Never     Relationship status:    Other Topics Concern    Not on file   Social History Narrative    Not on file       MEDICATIONS & ALLERGIES:     Current Outpatient Medications on File Prior to Visit   Medication Sig    albuterol-ipratropium (DUO-NEB) 2.5 mg-0.5 mg/3 mL nebulizer solution USE 1 VIAL VIA NEBULIZER EVERY 4 HOURS AS NEEDED FOR WHEEZING OR SHORTNESS OF BREATH    colchicine (COLCRYS) 0.6 mg tablet TAKE 2 TABLETS BY MOUTH NOW, TAKE 1 TABLET BY MOUTH 1 HOUR LATER    COMBIVENT RESPIMAT  mcg/actuation inhaler INHALE 1 PUFF BY MOUTH FOUR TIMES DAILY FOR RESCUE    flu vacc [id number] 6mos up,PF, 60 mcg (15 mcg x 4)/0.5 mL Syrg Inject 0.5 mLs into the muscle once. For one dose. for 1 dose    furosemide (LASIX) 20 MG tablet TAKE 1 TABLET(20 MG) BY MOUTH TWICE DAILY    glycerin adult suppository Place 1 suppository rectally as needed for Constipation.    multivitamin capsule Take 1 capsule by mouth once daily.    nicotine (NICODERM CQ) 21 mg/24 hr Place 1 patch onto the skin once  "daily.    potassium chloride SA (K-DUR,KLOR-CON) 20 MEQ tablet TAKE 1 TABLET(20 MEQ) BY MOUTH EVERY DAY    traMADol (ULTRAM) 50 mg tablet Take 1 tablet (50 mg total) by mouth every 6 (six) hours.    traZODone (DESYREL) 50 MG tablet TAKE 1 TABLET(50 MG) BY MOUTH EVERY NIGHT AS NEEDED FOR ANXIETY    umeclidinium-vilanterol (ANORO ELLIPTA) 62.5-25 mcg/actuation DsDv Inhale 1 puff into the lungs once daily. Controller    [DISCONTINUED] albuterol (PROVENTIL/VENTOLIN HFA) 90 mcg/actuation inhaler INHALE 2 PUFFS BY MOUTH EVERY 6 HOURS AS NEEDED    [DISCONTINUED] losartan-hydrochlorothiazide 50-12.5 mg (HYZAAR) 50-12.5 mg per tablet TAKE 1 TABLET BY MOUTH EVERY DAY    [DISCONTINUED] omeprazole (PRILOSEC) 40 MG capsule Take 1 capsule (40 mg total) by mouth once daily.    [DISCONTINUED] varenicline (CHANTIX STARTING MONTH BOX) 0.5 mg (11)- 1 mg (42) tablet Take one 0.5mg tab by mouth once daily X3 days,then increase to one 0.5mg tab twice daily X4 days,then increase to one 1mg tab twice daily    COMBIVENT RESPIMAT  mcg/actuation inhaler INHALE 1 PUFF BY MOUTH INTO THE LUNGS FOUR TIMES DAILY FOR RESCUE (Patient not taking: Reported on 2/4/2020)    gabapentin (NEURONTIN) 100 MG capsule TAKE 1 CAPSULE(100 MG) BY MOUTH THREE TIMES DAILY (Patient not taking: Reported on 2/4/2020)     No current facility-administered medications on file prior to visit.        Review of patient's allergies indicates:   Allergen Reactions    Orange juice      Swelling in pt tongue         OBJECTIVE:   Vital Signs:  Vitals:    02/04/20 0844   BP: (!) 144/92   Pulse: 75   SpO2: 96%   Weight: 61.7 kg (136 lb 0.4 oz)   Height: 5' 3" (1.6 m)       No results found for this or any previous visit (from the past 24 hour(s)).      Physical Exam   Constitutional: She is oriented to person, place, and time and well-developed, well-nourished, and in no distress. No distress.   HENT:   Head: Normocephalic and atraumatic.   Mouth/Throat: " Oropharynx is clear and moist. No oropharyngeal exudate.   On 2L NC   Eyes: Pupils are equal, round, and reactive to light. Conjunctivae are normal. Right eye exhibits no discharge. Left eye exhibits no discharge. No scleral icterus.   Neck: Normal range of motion. Neck supple. No JVD present. No thyromegaly present.   Cardiovascular: Normal rate, regular rhythm, normal heart sounds and intact distal pulses.   Pulmonary/Chest: Effort normal and breath sounds normal. No respiratory distress.   Abdominal: Soft. Bowel sounds are normal. She exhibits no distension. There is no tenderness.   Musculoskeletal: Normal range of motion. She exhibits no edema or tenderness.   Lymphadenopathy:     She has no cervical adenopathy.   Neurological: She is alert and oriented to person, place, and time.   Skin: Skin is warm and dry. She is not diaphoretic.   Psychiatric: Mood and affect normal.       ASSESSMENT & PLAN:     Rochelle was seen today for follow-up.  HTN needs better control- will restart Hctz at 12.5 mg (prior dose) and continue Losartan at current dose. Patient counseled to quit smoking. Patient verbalizes understanding.     Diagnoses and all orders for this visit:    Cor pulmonale, chronic  -     hydroCHLOROthiazide (MICROZIDE) 12.5 mg capsule; Take 1 capsule (12.5 mg total) by mouth once daily.  -     losartan (COZAAR) 50 MG tablet; Take 1 tablet (50 mg total) by mouth once daily.    Chronic bronchitis, unspecified chronic bronchitis type    Oxygen dependent    Pulmonary hypertension    Tobacco abuse        - Counseled to quit tobacco, advised using chantix        - Due to transportation issues patient has not been able to follow up with smoking cessation program.     Essential hypertension  -     hydroCHLOROthiazide (MICROZIDE) 12.5 mg capsule; Take 1 capsule (12.5 mg total) by mouth once daily.  -     losartan (COZAAR) 50 MG tablet; Take 1 tablet (50 mg total) by mouth once daily.    Bronchospasm, acute  -     albuterol  (PROVENTIL/VENTOLIN HFA) 90 mcg/actuation inhaler; INHALE 2 PUFFS BY MOUTH EVERY 6 HOURS AS NEEDED    Chronic obstructive pulmonary disease with hypoxia  -     albuterol (PROVENTIL/VENTOLIN HFA) 90 mcg/actuation inhaler; INHALE 2 PUFFS BY MOUTH EVERY 6 HOURS AS NEEDED    Gastroesophageal reflux disease without esophagitis  -     omeprazole (PRILOSEC) 40 MG capsule; Take 1 capsule (40 mg total) by mouth once daily.    Health Maintenance       - Patient is up-to-date on health maintenance, due for a mammogram soon.       RCT in 4 months for follow up  Care discussed with Dr Arvind Saini MD  Internal Medicine PGY-1

## 2020-02-05 NOTE — PROGRESS NOTES
"I have personally taken the history and examined this patient and agree with the resident's note as stated above with the following thoughts:  BP (!) 144/92 (BP Location: Right arm, Patient Position: Sitting, BP Method: Medium (Manual))   Pulse 75   Ht 5' 3" (1.6 m)   Wt 61.7 kg (136 lb 0.4 oz)   LMP 11/21/2013   SpO2 96%   BMI 24.10 kg/m²     Discussed getting vaccines up to date.  Discussed importance stopping smoking.  She already has COPD in size and pulmonary hypertension.  To continue smoking she will drastically decrease her life expectancy.  We discussed this and try to make this very clear.  Answers for HPI/ROS submitted by the patient on 2/3/2020   activity change: No  unexpected weight change: No  neck pain: No  hearing loss: No  rhinorrhea: Yes  trouble swallowing: No  eye discharge: No  visual disturbance: No  chest tightness: Yes  wheezing: Yes  chest pain: No  palpitations: Yes  blood in stool: No  constipation: No  vomiting: No  diarrhea: No  polydipsia: No  polyuria: No  difficulty urinating: No  hematuria: No  menstrual problem: No  dysuria: No  joint swelling: Yes  arthralgias: No  headaches: No  weakness: No  confusion: No  dysphoric mood: No    "

## 2020-02-13 DIAGNOSIS — F17.210 MODERATE CIGARETTE SMOKER (10-19 PER DAY): ICD-10-CM

## 2020-02-14 RX ORDER — VARENICLINE TARTRATE 0.5 (11)-1
KIT ORAL
Qty: 53 TABLET | Refills: 0 | Status: SHIPPED | OUTPATIENT
Start: 2020-02-14 | End: 2020-05-25

## 2020-02-17 ENCOUNTER — OFFICE VISIT (OUTPATIENT)
Dept: URGENT CARE | Facility: CLINIC | Age: 65
End: 2020-02-17
Payer: COMMERCIAL

## 2020-02-17 VITALS
TEMPERATURE: 99 F | HEART RATE: 82 BPM | OXYGEN SATURATION: 94 % | SYSTOLIC BLOOD PRESSURE: 118 MMHG | DIASTOLIC BLOOD PRESSURE: 87 MMHG | WEIGHT: 136 LBS | HEIGHT: 63 IN | RESPIRATION RATE: 20 BRPM | BODY MASS INDEX: 24.1 KG/M2

## 2020-02-17 DIAGNOSIS — J06.9 UPPER RESPIRATORY TRACT INFECTION, UNSPECIFIED TYPE: Primary | ICD-10-CM

## 2020-02-17 DIAGNOSIS — R42 DIZZINESS: ICD-10-CM

## 2020-02-17 LAB
BILIRUB UR QL STRIP: NEGATIVE
GLUCOSE UR QL STRIP: NEGATIVE
KETONES UR QL STRIP: NEGATIVE
LEUKOCYTE ESTERASE UR QL STRIP: POSITIVE
PH, POC UA: 8 (ref 5–8)
POC BLOOD, URINE: NEGATIVE
POC NITRATES, URINE: NEGATIVE
PROT UR QL STRIP: NEGATIVE
SP GR UR STRIP: 1.01 (ref 1–1.03)
UROBILINOGEN UR STRIP-ACNC: NORMAL (ref 0.1–1.1)

## 2020-02-17 PROCEDURE — 81003 POCT URINALYSIS, DIPSTICK, AUTOMATED, W/O SCOPE: ICD-10-PCS | Mod: QW,S$GLB,, | Performed by: FAMILY MEDICINE

## 2020-02-17 PROCEDURE — 81003 URINALYSIS AUTO W/O SCOPE: CPT | Mod: QW,S$GLB,, | Performed by: FAMILY MEDICINE

## 2020-02-17 PROCEDURE — 99214 PR OFFICE/OUTPT VISIT, EST, LEVL IV, 30-39 MIN: ICD-10-PCS | Mod: 25,S$GLB,, | Performed by: FAMILY MEDICINE

## 2020-02-17 PROCEDURE — 99214 OFFICE O/P EST MOD 30 MIN: CPT | Mod: 25,S$GLB,, | Performed by: FAMILY MEDICINE

## 2020-02-17 RX ORDER — FLUTICASONE PROPIONATE 50 MCG
1 SPRAY, SUSPENSION (ML) NASAL DAILY
Qty: 9.9 ML | Refills: 0 | Status: SHIPPED | OUTPATIENT
Start: 2020-02-17 | End: 2021-03-22 | Stop reason: SDUPTHER

## 2020-02-17 NOTE — PROGRESS NOTES
"Subjective:       Patient ID: Rochelle Espinoza is a 64 y.o. female.    Vitals:  height is 5' 3" (1.6 m) and weight is 61.7 kg (136 lb). Her oral temperature is 99.2 °F (37.3 °C). Her blood pressure is 118/87 and her pulse is 82. Her respiration is 20 and oxygen saturation is 94% (abnormal).     Chief Complaint: Sinus Problem    This is a 64 y.o. female   with Past Medical History:  No date: CHF (congestive heart failure)  No date: COPD (chronic obstructive pulmonary disease)  2011: Herpes zoster without mention of complication  No date: Hypertension  No date: Leg edema  No date: Pulmonary hypertension   and Past Surgical History:  No date: BREAST BIOPSY; Right      Comment:  core  No date:  SECTION  2019: COLONOSCOPY; N/A      Comment:  Procedure: COLONOSCOPY;  Surgeon: Rui Holder MD;                 Location: [facility] ([address] [place] [address]);  Service: Endoscopy;                 Laterality: N/A;  2020: EPIDURAL STEROID INJECTION; N/A      Comment:  Procedure: CERVICAL BLAKE;  Surgeon: Papito High MD;                 Location: [facility];  Service: Pain Management;                 Laterality: N/A;  NEEDS CONSENT  2019: ESOPHAGOGASTRODUODENOSCOPY; N/A      Comment:  Procedure: EGD (ESOPHAGOGASTRODUODENOSCOPY);  Surgeon:                Rui Holder MD;  Location: [address]);                 Service: Endoscopy;  Laterality: N/A;  2L continuous O2                via NC, COPD, pulm HTN  who presents today with a chief complaint of sinus problem that began two days ago. She's complaining of nasal congestion, sinus pain, sinus pressure, sneezing and chills. She hasn't taken any medication to help relieve her symptoms.     Sinus Problem   This is a new problem. The current episode started in the past 7 days. The problem has been gradually worsening since onset. There has been no fever. Her pain is at a severity of 0/10. She is experiencing no pain. Associated symptoms include chills, congestion " and sinus pressure. Pertinent negatives include no coughing, diaphoresis, ear pain, shortness of breath or sore throat. Past treatments include nothing.       Constitution: Positive for chills. Negative for sweating, fatigue and fever.   HENT: Positive for congestion, sinus pain and sinus pressure. Negative for ear pain, sore throat and voice change.    Neck: Negative for painful lymph nodes.   Eyes: Negative for eye redness.   Respiratory: Negative for chest tightness, cough, sputum production, bloody sputum, COPD, shortness of breath, stridor, wheezing and asthma.    Gastrointestinal: Negative for nausea and vomiting.   Musculoskeletal: Negative for muscle ache.   Skin: Negative for rash.   Allergic/Immunologic: Negative for seasonal allergies and asthma.   Hematologic/Lymphatic: Negative for swollen lymph nodes.       Objective:      Physical Exam   Constitutional: She appears well-developed and well-nourished.   HENT:   Head: Normocephalic and atraumatic.   Eyes: Pupils are equal, round, and reactive to light. EOM are normal.   Neck: Normal range of motion. Neck supple.   Cardiovascular: Normal rate, regular rhythm and normal heart sounds.   Pulmonary/Chest: Effort normal and breath sounds normal.   Nursing note and vitals reviewed.    Results for orders placed or performed in visit on 02/17/20   POCT Urinalysis, Dipstick, Automated, W/O Scope   Result Value Ref Range    POC Blood, Urine Negative Negative    POC Bilirubin, Urine Negative Negative    POC Urobilinogen, Urine normal 0.1 - 1.1    POC Ketones, Urine Negative Negative    POC Protein, Urine Negative Negative    POC Nitrates, Urine Negative Negative    POC Glucose, Urine Negative Negative    pH, UA 8.0 5 - 8    POC Specific Gravity, Urine 1.010 1.003 - 1.029    POC Leukocytes, Urine Positive (A) Negative         Assessment:       1. Upper respiratory tract infection, unspecified type    2. Dizziness      No evidence of dehydration.   Plan:         Upper  respiratory tract infection, unspecified type  -     POCT Urinalysis, Dipstick, Automated, W/O Scope  -     fluticasone propionate (FLONASE) 50 mcg/actuation nasal spray; 1 spray (50 mcg total) by Each Nostril route once daily.  Dispense: 9.9 mL; Refill: 0    Dizziness    discussed with patient possibility of dizziness related to starting HCTZ. To discuss with primary care physician.

## 2020-02-17 NOTE — PATIENT INSTRUCTIONS
Viral Upper Respiratory Illness (Adult)  You have a viral upper respiratory illness (URI), which is another term for the common cold. This illness is contagious during the first few days. It is spread through the air by coughing and sneezing. It may also be spread by direct contact (touching the sick person and then touching your own eyes, nose, or mouth). Frequent handwashing will decrease risk of spread. Most viral illnesses go away within 7 to 10 days with rest and simple home remedies. Sometimes the illness may last for several weeks. Antibiotics will not kill a virus, and they are generally not prescribed for this condition.    Home care  · If symptoms are severe, rest at home for the first 2 to 3 days. When you resume activity, don't let yourself get too tired.  · Avoid being exposed to cigarette smoke (yours or others).  · You may use acetaminophen or ibuprofen to control pain and fever, unless another medicine was prescribed. (Note: If you have chronic liver or kidney disease, have ever had a stomach ulcer or gastrointestinal bleeding, or are taking blood-thinning medicines, talk with your healthcare provider before using these medicines.) Aspirin should never be given to anyone under 18 years of age who is ill with a viral infection or fever. It may cause severe liver or brain damage.  · Your appetite may be poor, so a light diet is fine. Avoid dehydration by drinking 6 to 8 glasses of fluids per day (water, soft drinks, juices, tea, or soup). Extra fluids will help loosen secretions in the nose and lungs.  · Over-the-counter cold medicines will not shorten the length of time youre sick, but they may be helpful for the following symptoms: cough, sore throat, and nasal and sinus congestion. (Note: Do not use decongestants if you have high blood pressure.)  Follow-up care  Follow up with your healthcare provider, or as advised.  When to seek medical advice  Call your healthcare provider right away if any  of these occur:  · Cough with lots of colored sputum (mucus)  · Severe headache; face, neck, or ear pain  · Difficulty swallowing due to throat pain  · Fever of 100.4°F (38°C)  Call 911, or get immediate medical care  Call emergency services right away if any of these occur:  · Chest pain, shortness of breath, wheezing, or difficulty breathing  · Coughing up blood  · Inability to swallow due to throat pain  Date Last Reviewed: 9/13/2015  © 9964-1890 [employer]. [address] [place] [address]. All rights reserved. This information is not intended as a substitute for professional medical care. Always follow your healthcare professional's instructions.

## 2020-02-19 ENCOUNTER — CLINICAL SUPPORT (OUTPATIENT)
Dept: SMOKING CESSATION | Facility: CLINIC | Age: 65
End: 2020-02-19
Payer: COMMERCIAL

## 2020-02-19 ENCOUNTER — TELEPHONE (OUTPATIENT)
Dept: SMOKING CESSATION | Facility: CLINIC | Age: 65
End: 2020-02-19

## 2020-02-19 DIAGNOSIS — F17.200 NICOTINE DEPENDENCE: Primary | ICD-10-CM

## 2020-02-19 PROCEDURE — 99407 BEHAV CHNG SMOKING > 10 MIN: CPT | Mod: S$GLB,,,

## 2020-02-19 PROCEDURE — 99407 PR TOBACCO USE CESSATION INTENSIVE >10 MINUTES: ICD-10-PCS | Mod: S$GLB,,,

## 2020-02-19 NOTE — PROGRESS NOTES
Called pt to f/u on her 6 month smoking cessation quit status. Pt stated she is still smoking. Informed her she has benefits available and is able to rejoin. Pt not ready to make appointment, stated she received RX for Chantix from her doctor and will try that first. Informed her of benefit period, phone follow ups, and contact information. Will complete 6 month smart form and will continue to follow up on quit #2 episode.

## 2020-03-23 PROBLEM — Z08 ENCOUNTER FOR FOLLOW-UP SURVEILLANCE OF COLON CANCER: Status: RESOLVED | Noted: 2019-12-19 | Resolved: 2020-03-23

## 2020-03-23 PROBLEM — Z85.038 ENCOUNTER FOR FOLLOW-UP SURVEILLANCE OF COLON CANCER: Status: RESOLVED | Noted: 2019-12-19 | Resolved: 2020-03-23

## 2020-04-01 ENCOUNTER — PATIENT OUTREACH (OUTPATIENT)
Dept: ADMINISTRATIVE | Facility: OTHER | Age: 65
End: 2020-04-01

## 2020-04-01 ENCOUNTER — OFFICE VISIT (OUTPATIENT)
Dept: PULMONOLOGY | Facility: CLINIC | Age: 65
End: 2020-04-01
Payer: COMMERCIAL

## 2020-04-01 DIAGNOSIS — J43.2 CENTRILOBULAR EMPHYSEMA: Primary | ICD-10-CM

## 2020-04-01 DIAGNOSIS — Z91.89 AT HIGH RISK FOR RESPIRATORY INFECTION: ICD-10-CM

## 2020-04-01 DIAGNOSIS — J96.11 CHRONIC RESPIRATORY FAILURE WITH HYPOXIA: ICD-10-CM

## 2020-04-01 PROCEDURE — 99213 OFFICE O/P EST LOW 20 MIN: CPT | Mod: 95,,, | Performed by: INTERNAL MEDICINE

## 2020-04-01 PROCEDURE — 99213 PR OFFICE/OUTPT VISIT, EST, LEVL III, 20-29 MIN: ICD-10-PCS | Mod: 95,,, | Performed by: INTERNAL MEDICINE

## 2020-04-01 NOTE — PROGRESS NOTES
Subjective:      Patient ID: Rochelle Espinoza is a 64 y.o. female. The patient location is:home  The chief complaint leading to consultation is: COPD  Visit type: Virtual visit with synchronous audio and video  Total time spent with patient: 30  Each patient to whom he or she provides medical services by telemedicine is:  (1) informed of the relationship between the physician and patient and the respective role of any other health care provider with respect to management of the patient; and (2) notified that he or she may decline to receive medical services by telemedicine and may withdraw from such care at any time.    Notes: She is a patient of  Dr. Arvind fuentes says that the only reason for calling is to get refills on several of her meds. I asked her to  Call Mahogany Bernadette in five minutes and t will leave the request      Chief Complaint: No chief complaint on file.    HPI  Review of Systems   Constitutional: Negative.    HENT: Negative.    Eyes: Negative.    Respiratory: Negative.         Respiratory failure for oxygen     COPD     Still smoking  Hx of secondary pulmonary hypertension   Cardiovascular: Negative.    Genitourinary: Negative.    Musculoskeletal: Negative.    Skin: Negative.    Gastrointestinal: Negative.    Neurological: Negative.    Psychiatric/Behavioral: Negative.      Objective:     Physical Exam   Constitutional:   Telemedicine   visit       Assessment:     1. Centrilobular emphysema      Outpatient Encounter Medications as of 4/1/2020   Medication Sig Dispense Refill    albuterol (PROVENTIL/VENTOLIN HFA) 90 mcg/actuation inhaler INHALE 2 PUFFS BY MOUTH EVERY 6 HOURS AS NEEDED 18 g 4    albuterol-ipratropium (DUO-NEB) 2.5 mg-0.5 mg/3 mL nebulizer solution USE 1 VIAL VIA NEBULIZER EVERY 4 HOURS AS NEEDED FOR WHEEZING OR SHORTNESS OF BREATH 180 mL 3    colchicine (COLCRYS) 0.6 mg tablet TAKE 2 TABLETS BY MOUTH NOW, TAKE 1 TABLET BY MOUTH 1 HOUR LATER 30 tablet 2    COMBIVENT RESPIMAT   mcg/actuation inhaler INHALE 1 PUFF BY MOUTH FOUR TIMES DAILY FOR RESCUE 4 g 0    COMBIVENT RESPIMAT  mcg/actuation inhaler INHALE 1 PUFF BY MOUTH INTO THE LUNGS FOUR TIMES DAILY FOR RESCUE 4 g 0    flu vacc [id number] 6mos up,PF, 60 mcg (15 mcg x 4)/0.5 mL Syrg Inject 0.5 mLs into the muscle once. For one dose. for 1 dose (Patient not taking: Reported on 2/17/2020) 0.5 mL 0    fluticasone propionate (FLONASE) 50 mcg/actuation nasal spray 1 spray (50 mcg total) by Each Nostril route once daily. 9.9 mL 0    furosemide (LASIX) 20 MG tablet TAKE 1 TABLET(20 MG) BY MOUTH TWICE DAILY 60 tablet 7    gabapentin (NEURONTIN) 100 MG capsule TAKE 1 CAPSULE(100 MG) BY MOUTH THREE TIMES DAILY 90 capsule 0    glycerin adult suppository Place 1 suppository rectally as needed for Constipation.      hydroCHLOROthiazide (MICROZIDE) 12.5 mg capsule Take 1 capsule (12.5 mg total) by mouth once daily. 90 capsule 3    losartan (COZAAR) 50 MG tablet Take 1 tablet (50 mg total) by mouth once daily. 90 tablet 3    multivitamin capsule Take 1 capsule by mouth once daily.      nicotine (NICODERM CQ) 21 mg/24 hr Place 1 patch onto the skin once daily. 14 patch 0    omeprazole (PRILOSEC) 40 MG capsule Take 1 capsule (40 mg total) by mouth once daily. 90 capsule 3    potassium chloride SA (K-DUR,KLOR-CON) 20 MEQ tablet TAKE 1 TABLET(20 MEQ) BY MOUTH EVERY DAY 30 tablet 7    traMADol (ULTRAM) 50 mg tablet Take 1 tablet (50 mg total) by mouth every 6 (six) hours. 30 tablet 0    traZODone (DESYREL) 50 MG tablet TAKE 1 TABLET(50 MG) BY MOUTH EVERY NIGHT AS NEEDED FOR ANXIETY 90 tablet 3    umeclidinium-vilanterol (ANORO ELLIPTA) 62.5-25 mcg/actuation DsDv Inhale 1 puff into the lungs once daily. Controller 1 each 5    varenicline (CHANTIX STARTING MONTH BOX) 0.5 mg (11)- 1 mg (42) tablet TAKE ONE 0.5 MG TABLET BY MOUTH DAILY FOR 3 DAYS, THEN INCREASE TO ONE 0.5 MG TABLET TWICE DAILY FOR 4 DAYS, ONE 1 MG TABLET TWICE DAILY 53  tablet 0     No facility-administered encounter medications on file as of 4/1/2020.      No orders of the defined types were placed in this encounter.    Plan:   COPD with respiratory failure for oxgen  Nicotine ADDiction  Problem List Items Addressed This Visit     COPD (chronic obstructive pulmonary disease) - Primary

## 2020-04-03 RX ORDER — IPRATROPIUM BROMIDE AND ALBUTEROL SULFATE 2.5; .5 MG/3ML; MG/3ML
3 SOLUTION RESPIRATORY (INHALATION) EVERY 4 HOURS PRN
Qty: 4 BOX | Refills: 3 | Status: CANCELLED | OUTPATIENT
Start: 2020-04-03

## 2020-04-04 ENCOUNTER — PATIENT MESSAGE (OUTPATIENT)
Dept: PULMONOLOGY | Facility: CLINIC | Age: 65
End: 2020-04-04

## 2020-04-06 DIAGNOSIS — J44.9 CHRONIC OBSTRUCTIVE PULMONARY DISEASE, UNSPECIFIED COPD TYPE: Primary | ICD-10-CM

## 2020-04-12 DIAGNOSIS — Z91.89 AT HIGH RISK FOR RESPIRATORY INFECTION: ICD-10-CM

## 2020-04-13 RX ORDER — IPRATROPIUM BROMIDE AND ALBUTEROL SULFATE 2.5; .5 MG/3ML; MG/3ML
3 SOLUTION RESPIRATORY (INHALATION) EVERY 6 HOURS PRN
Qty: 180 ML | Refills: 3 | Status: SHIPPED | OUTPATIENT
Start: 2020-04-13 | End: 2020-08-16 | Stop reason: SDUPTHER

## 2020-05-20 ENCOUNTER — PATIENT MESSAGE (OUTPATIENT)
Dept: INTERNAL MEDICINE | Facility: CLINIC | Age: 65
End: 2020-05-20

## 2020-05-25 ENCOUNTER — OFFICE VISIT (OUTPATIENT)
Dept: INTERNAL MEDICINE | Facility: CLINIC | Age: 65
End: 2020-05-25
Payer: COMMERCIAL

## 2020-05-25 VITALS
BODY MASS INDEX: 26.21 KG/M2 | DIASTOLIC BLOOD PRESSURE: 86 MMHG | WEIGHT: 147.94 LBS | OXYGEN SATURATION: 97 % | HEIGHT: 63 IN | HEART RATE: 86 BPM | SYSTOLIC BLOOD PRESSURE: 130 MMHG

## 2020-05-25 DIAGNOSIS — Z99.81 OXYGEN DEPENDENT: ICD-10-CM

## 2020-05-25 DIAGNOSIS — I27.20 PULMONARY HYPERTENSION: ICD-10-CM

## 2020-05-25 DIAGNOSIS — I10 ESSENTIAL HYPERTENSION: ICD-10-CM

## 2020-05-25 DIAGNOSIS — M54.12 CERVICAL RADICULOPATHY: ICD-10-CM

## 2020-05-25 DIAGNOSIS — J42 CHRONIC BRONCHITIS, UNSPECIFIED CHRONIC BRONCHITIS TYPE: ICD-10-CM

## 2020-05-25 DIAGNOSIS — J44.89 OBSTRUCTIVE CHRONIC BRONCHITIS WITHOUT EXACERBATION: ICD-10-CM

## 2020-05-25 DIAGNOSIS — D12.6 ADENOMATOUS POLYP OF COLON, UNSPECIFIED PART OF COLON: Primary | ICD-10-CM

## 2020-05-25 PROCEDURE — 3008F BODY MASS INDEX DOCD: CPT | Mod: CPTII,S$GLB,, | Performed by: INTERNAL MEDICINE

## 2020-05-25 PROCEDURE — 99214 OFFICE O/P EST MOD 30 MIN: CPT | Mod: S$GLB,,, | Performed by: INTERNAL MEDICINE

## 2020-05-25 PROCEDURE — 99999 PR PBB SHADOW E&M-EST. PATIENT-LVL III: ICD-10-PCS | Mod: PBBFAC,,, | Performed by: INTERNAL MEDICINE

## 2020-05-25 PROCEDURE — 99999 PR PBB SHADOW E&M-EST. PATIENT-LVL III: CPT | Mod: PBBFAC,,, | Performed by: INTERNAL MEDICINE

## 2020-05-25 PROCEDURE — 3008F PR BODY MASS INDEX (BMI) DOCUMENTED: ICD-10-PCS | Mod: CPTII,S$GLB,, | Performed by: INTERNAL MEDICINE

## 2020-05-25 PROCEDURE — 3075F SYST BP GE 130 - 139MM HG: CPT | Mod: CPTII,S$GLB,, | Performed by: INTERNAL MEDICINE

## 2020-05-25 PROCEDURE — 99214 PR OFFICE/OUTPT VISIT, EST, LEVL IV, 30-39 MIN: ICD-10-PCS | Mod: S$GLB,,, | Performed by: INTERNAL MEDICINE

## 2020-05-25 PROCEDURE — 3079F DIAST BP 80-89 MM HG: CPT | Mod: CPTII,S$GLB,, | Performed by: INTERNAL MEDICINE

## 2020-05-25 PROCEDURE — 3079F PR MOST RECENT DIASTOLIC BLOOD PRESSURE 80-89 MM HG: ICD-10-PCS | Mod: CPTII,S$GLB,, | Performed by: INTERNAL MEDICINE

## 2020-05-25 PROCEDURE — 3075F PR MOST RECENT SYSTOLIC BLOOD PRESS GE 130-139MM HG: ICD-10-PCS | Mod: CPTII,S$GLB,, | Performed by: INTERNAL MEDICINE

## 2020-05-25 RX ORDER — ESCITALOPRAM OXALATE 10 MG/1
10 TABLET ORAL DAILY
Qty: 90 TABLET | Refills: 2 | Status: SHIPPED | OUTPATIENT
Start: 2020-05-25 | End: 2020-07-18 | Stop reason: CLARIF

## 2020-05-25 NOTE — PROGRESS NOTES
"History of Present Illness:  Ms. Rochelle Espinoza is a 64 y.o. female w/ COPD on home 2L O2, CHF, HTN on losartan and a hx of gout is here for a 4 month clinic follow-up.   Last visit patient had a /60 and Hctz was discontinued. This visit patient's BP is 130/86. Patient had quit smoking but started back.  ~15 cig/day,  Average BP reading at home is in 110s/60s. Patient also has cervical radiculopathy and recently was given an BLAKE which has helped with arm pain but not the numbness.      COPD- stable on 2L home O2, needs inhaler refill, relapse of smoking now smokes 15/day, stopped chantix.   CHF- likely cor pulmonale, no LE swelling currently, no new cough or orthopnea or PND  HTN- Bp elevated today to 130/86.    Last visit Hctz was stopped and BP elevated in last few home readings. Denies headache, chest pain, palpitations.      She has gerd and she takes  Omeprazole  For this.  She is still having reflux.      She has been having neck pain .  She has been having  This for a week to a month.  She has been trying to exercise it.  It hurts in back.       Review of Systems   Constitutional: Negative for chills, fever, malaise/fatigue and weight loss. She has gained 11#.    HENT: Negative for hearing loss and sore throat.    Eyes: Negative for discharge.   Respiratory: Positive for wheezing. Negative for cough and shortness of breath.    Cardiovascular: Positive for palpitations. Negative for chest pain, orthopnea, leg swelling and PND.   Gastrointestinal: Negative for blood in stool, constipation, diarrhea, heartburn, nausea and vomiting.   Genitourinary: Negative for dysuria, hematuria and urgency.   Musculoskeletal: Negative for myalgias and neck pain.   Neurological: Negative for weakness and headaches.   Endo/Heme/Allergies: Negative for polydipsia.                    OBJECTIVE:   Vital Signs:  /86 (BP Location: Left arm, Patient Position: Sitting, BP Method: Medium (Manual))   Pulse 86   Ht 5' 3" (1.6 " m)   Wt 67.1 kg (147 lb 14.9 oz)   LMP 11/21/2013   SpO2 97%   BMI 26.20 kg/m²          Physical Exam   Constitutional: She is oriented to person, place, and time and well-developed, well-nourished, and in no distress. No distress.   HENT:   Head: Normocephalic and atraumatic.   Mouth/Throat: Oropharynx is clear and moist. No oropharyngeal exudate.   On 2L NC   Eyes: Pupils are equal, round, and reactive to light. Conjunctivae are normal. Right eye exhibits no discharge. Left eye exhibits no discharge. No scleral icterus.   Neck: Normal range of motion. Neck supple. No JVD present. No thyromegaly present.   Cardiovascular: Normal rate, regular rhythm, normal heart sounds and intact distal pulses.   Pulmonary/Chest: Effort normal and breath sounds normal. No respiratory distress.   Abdominal: Soft. Bowel sounds are normal. She exhibits no distension. There is no tenderness.   Musculoskeletal: Normal range of motion. She exhibits no edema or tenderness.   Lymphadenopathy:     She has no cervical adenopathy.   Neurological: She is alert and oriented to person, place, and time.   Skin: Skin is warm and dry. She is not diaphoretic.   Psychiatric: Mood and affect normal.         ASSESSMENT & PLAN:      Rochelle was seen today for follow-up.  HTN needs better control-  Patient counseled to quit smoking. Patient verbalizes understanding.      Diagnoses and all orders for this visit:     Cor pulmonale, chronic  -     hydroCHLOROthiazide (MICROZIDE) 12.5 mg capsule; Take 1 capsule (12.5 mg total) by mouth once daily.  -     losartan (COZAAR) 50 MG tablet; Take 1 tablet (50 mg total) by mouth once daily.     Chronic bronchitis, unspecified chronic bronchitis type     Oxygen dependent     Pulmonary hypertension     Tobacco abuse        - Counseled to quit tobacco, advised using chantix        - Due to transportation issues patient has not been able to follow up with smoking cessation program.      Essential hypertension  -      hydroCHLOROthiazide (MICROZIDE) 12.5 mg capsule; Take 1 capsule (12.5 mg total) by mouth once daily.  -     losartan (COZAAR) 50 MG tablet; Take 1 tablet (50 mg total) by mouth once daily.     Bronchospasm, acute  -     albuterol (PROVENTIL/VENTOLIN HFA) 90 mcg/actuation inhaler; INHALE 2 PUFFS BY MOUTH EVERY 6 HOURS AS NEEDED     Chronic obstructive pulmonary disease with hypoxia  -     albuterol (PROVENTIL/VENTOLIN HFA) 90 mcg/actuation inhaler; INHALE 2 PUFFS BY MOUTH EVERY 6 HOURS AS NEEDED     Gastroesophageal reflux disease without esophagitis  -     omeprazole (PRILOSEC) 40 MG capsule; Take 1 capsule (40 mg total) by mouth once daily.     Anxiety and Neck pain -- Will try topical rub for neck pain  And will try some  lexapro        RCT in 6 months        Answers for HPI/ROS submitted by the patient on 5/21/2020   activity change: No  unexpected weight change: Yes  neck pain: No  hearing loss: No  rhinorrhea: Yes  trouble swallowing: No  eye discharge: No  visual disturbance: No  chest tightness: Yes  wheezing: Yes  chest pain: No  palpitations: No  blood in stool: No  constipation: No  vomiting: No  diarrhea: No  polydipsia: No  polyuria: No  difficulty urinating: No  hematuria: No  menstrual problem: No  dysuria: No  joint swelling: No  arthralgias: No  headaches: No  weakness: No  confusion: No  dysphoric mood: Yes

## 2020-06-04 ENCOUNTER — TELEPHONE (OUTPATIENT)
Dept: SMOKING CESSATION | Facility: CLINIC | Age: 65
End: 2020-06-04

## 2020-06-13 DIAGNOSIS — M54.12 CERVICAL RADICULOPATHY: ICD-10-CM

## 2020-06-15 RX ORDER — GABAPENTIN 100 MG/1
100 CAPSULE ORAL 3 TIMES DAILY
Qty: 90 CAPSULE | Refills: 0 | Status: SHIPPED | OUTPATIENT
Start: 2020-06-15 | End: 2020-08-16 | Stop reason: SDUPTHER

## 2020-06-29 ENCOUNTER — CLINICAL SUPPORT (OUTPATIENT)
Dept: SMOKING CESSATION | Facility: CLINIC | Age: 65
End: 2020-06-29
Payer: COMMERCIAL

## 2020-06-29 DIAGNOSIS — J44.9 CHRONIC OBSTRUCTIVE PULMONARY DISEASE WITH HYPOXIA: ICD-10-CM

## 2020-06-29 DIAGNOSIS — J98.01 BRONCHOSPASM, ACUTE: ICD-10-CM

## 2020-06-29 DIAGNOSIS — F17.200 NICOTINE DEPENDENCE: Primary | ICD-10-CM

## 2020-06-29 PROCEDURE — 99407 PR TOBACCO USE CESSATION INTENSIVE >10 MINUTES: ICD-10-PCS | Mod: S$GLB,,,

## 2020-06-29 PROCEDURE — 99407 BEHAV CHNG SMOKING > 10 MIN: CPT | Mod: S$GLB,,,

## 2020-06-29 NOTE — PROGRESS NOTES
Spoke with patient today in regard to smoking cessation progress for 12 month telephone follow up on quit 3. Patient states that she is not tobacco free at this time. Not interested in making an appointment at this time due to transportation issues.  Informed patient of benefit period, future follow up, and contact information if any further help or support is needed. Will complete/resolve smart form for 12 month follow up on Quit attempt #3.

## 2020-06-30 RX ORDER — ALBUTEROL SULFATE 90 UG/1
AEROSOL, METERED RESPIRATORY (INHALATION)
Qty: 18 G | Refills: 4 | Status: SHIPPED | OUTPATIENT
Start: 2020-06-30 | End: 2020-07-10 | Stop reason: SDUPTHER

## 2020-07-10 ENCOUNTER — PATIENT MESSAGE (OUTPATIENT)
Dept: INTERNAL MEDICINE | Facility: CLINIC | Age: 65
End: 2020-07-10

## 2020-07-10 DIAGNOSIS — J44.9 CHRONIC OBSTRUCTIVE PULMONARY DISEASE WITH HYPOXIA: ICD-10-CM

## 2020-07-10 DIAGNOSIS — I10 ESSENTIAL HYPERTENSION: ICD-10-CM

## 2020-07-10 DIAGNOSIS — I27.81 COR PULMONALE, CHRONIC: ICD-10-CM

## 2020-07-10 DIAGNOSIS — J98.01 BRONCHOSPASM, ACUTE: ICD-10-CM

## 2020-07-10 RX ORDER — LOSARTAN POTASSIUM 50 MG/1
50 TABLET ORAL DAILY
Qty: 90 TABLET | Refills: 3 | Status: SHIPPED | OUTPATIENT
Start: 2020-07-10 | End: 2020-07-13

## 2020-07-10 RX ORDER — ALBUTEROL SULFATE 90 UG/1
AEROSOL, METERED RESPIRATORY (INHALATION)
Qty: 18 G | Refills: 4 | Status: SHIPPED | OUTPATIENT
Start: 2020-07-10 | End: 2020-07-31 | Stop reason: SDUPTHER

## 2020-07-13 ENCOUNTER — OFFICE VISIT (OUTPATIENT)
Dept: URGENT CARE | Facility: CLINIC | Age: 65
End: 2020-07-13
Payer: COMMERCIAL

## 2020-07-13 VITALS
TEMPERATURE: 98 F | DIASTOLIC BLOOD PRESSURE: 92 MMHG | SYSTOLIC BLOOD PRESSURE: 161 MMHG | HEART RATE: 93 BPM | OXYGEN SATURATION: 94 %

## 2020-07-13 DIAGNOSIS — R06.02 SOB (SHORTNESS OF BREATH): ICD-10-CM

## 2020-07-13 DIAGNOSIS — M79.89 LEG SWELLING: ICD-10-CM

## 2020-07-13 DIAGNOSIS — R09.89 BILATERAL RALES: Primary | ICD-10-CM

## 2020-07-13 PROCEDURE — 99214 OFFICE O/P EST MOD 30 MIN: CPT | Mod: S$GLB,,, | Performed by: NURSE PRACTITIONER

## 2020-07-13 PROCEDURE — 99214 PR OFFICE/OUTPT VISIT, EST, LEVL IV, 30-39 MIN: ICD-10-PCS | Mod: S$GLB,,, | Performed by: NURSE PRACTITIONER

## 2020-07-13 PROCEDURE — 71046 XR CHEST PA AND LATERAL: ICD-10-PCS | Mod: S$GLB,,, | Performed by: RADIOLOGY

## 2020-07-13 PROCEDURE — 71046 X-RAY EXAM CHEST 2 VIEWS: CPT | Mod: S$GLB,,, | Performed by: RADIOLOGY

## 2020-07-13 RX ORDER — LOSARTAN POTASSIUM 100 MG/1
100 TABLET ORAL DAILY
Qty: 90 TABLET | Refills: 3 | Status: ON HOLD | OUTPATIENT
Start: 2020-07-13 | End: 2020-07-20 | Stop reason: SDUPTHER

## 2020-07-13 NOTE — PROGRESS NOTES
Subjective:       Patient ID: Rochelle Espinoza is a 64 y.o. female.    Vitals:  temperature is 98.3 °F (36.8 °C). Her blood pressure is 161/92 (abnormal) and her pulse is 93. Her oxygen saturation is 94% (abnormal).     Chief Complaint: Hypertension    Pt states she has been having issues since last Friday. Swelling in legs,  She spoke with her pcp who instructed her to increase losartan and continue hctz at current dose. Patient states she is not taking lasix.  She is on continuous O2/NC at 2L.  Has not taken the HCTZ x several weeks; took a dose earlier today.     Hypertension  This is a new problem. The current episode started in the past 7 days (last friday). The problem is unchanged. Associated symptoms include headaches, shortness of breath and sweats. Pertinent negatives include no blurred vision or chest pain. There are no associated agents to hypertension. There are no known risk factors for coronary artery disease. Past treatments include nothing. The current treatment provides no improvement. There are no compliance problems.  Hypertensive end-organ damage includes heart failure. COPD.       Constitution: Negative for chills, fatigue and fever.   HENT: Negative for congestion and sore throat.    Neck: Negative for painful lymph nodes.   Cardiovascular: Positive for leg swelling. Negative for chest pain.   Eyes: Negative for double vision and blurred vision.   Respiratory: Positive for COPD and shortness of breath. Negative for cough.    Gastrointestinal: Negative for nausea, vomiting and diarrhea.   Genitourinary: Negative for dysuria, frequency, urgency and history of kidney stones.   Musculoskeletal: Negative for joint pain, joint swelling, muscle cramps and muscle ache.   Skin: Negative for color change, pale, rash and bruising.   Allergic/Immunologic: Negative for seasonal allergies.   Neurological: Positive for headaches. Negative for dizziness, history of vertigo, light-headedness and passing out.    Hematologic/Lymphatic: Negative for swollen lymph nodes.   Psychiatric/Behavioral: Negative for nervous/anxious, sleep disturbance and depression. The patient is not nervous/anxious.        Objective:      Physical Exam   Constitutional: She is oriented to person, place, and time. She appears well-developed. She is cooperative.  Non-toxic appearance. She does not appear ill. No distress.   HENT:   Head: Normocephalic and atraumatic.   Right Ear: Hearing, tympanic membrane, external ear and ear canal normal.   Left Ear: Hearing, tympanic membrane, external ear and ear canal normal.   Nose: Nose normal. No mucosal edema, rhinorrhea or nasal deformity. No epistaxis. Right sinus exhibits no maxillary sinus tenderness and no frontal sinus tenderness. Left sinus exhibits no maxillary sinus tenderness and no frontal sinus tenderness.   Mouth/Throat: Uvula is midline, oropharynx is clear and moist and mucous membranes are normal. No trismus in the jaw. Normal dentition. No uvula swelling. No oropharyngeal exudate, posterior oropharyngeal edema or posterior oropharyngeal erythema.   Eyes: Conjunctivae and lids are normal. No scleral icterus.   Neck: Trachea normal, full passive range of motion without pain and phonation normal. Neck supple. No neck rigidity. No edema and no erythema present.   Cardiovascular: Normal rate, regular rhythm, normal heart sounds and normal pulses.   Pulmonary/Chest: Effort normal. No respiratory distress. She has no decreased breath sounds. She has wheezes. She has no rhonchi. She has rales.   Abdominal: Normal appearance.   Musculoskeletal: Normal range of motion.         General: No deformity.      Right lower le+ Edema present.      Left lower le+ Edema present.   Neurological: She is alert and oriented to person, place, and time. She exhibits normal muscle tone. Coordination normal.   Skin: Skin is warm, dry, intact, not diaphoretic and not pale.   Psychiatric: Her speech is normal  and behavior is normal. Judgment and thought content normal.   Nursing note and vitals reviewed.      X-ray Chest Pa And Lateral    Result Date: 7/13/2020  EXAMINATION: CHEST PA AND LATERAL CLINICAL HISTORY: Other specified symptoms and signs involving the circulatory and respiratory systems TECHNIQUE: PA and lateral chest radiograph COMPARISON: 08/27/2019 FINDINGS: The cardiac silhouette is within normal limits. There is small blunting of the left costophrenic angle, which may be due to pleural thickening or trace fluid.  There is no focal consolidation or pneumothorax.     Mild blunting of the left costophrenic angle. Electronically signed by: Haleigh Causey Date:    07/13/2020 Time:    18:01    Assessment:       1. Bilateral rales    2. SOB (shortness of breath)    3. Leg swelling        Plan:         Bilateral rales  -     X-Ray Chest PA And Lateral; Future; Expected date: 07/13/2020    SOB (shortness of breath)  -     X-Ray Chest PA And Lateral; Future; Expected date: 07/13/2020    Leg swelling  -     X-Ray Chest PA And Lateral; Future; Expected date: 07/13/2020

## 2020-07-13 NOTE — PROGRESS NOTES
Subjective:       Patient ID: Rochelle Espinoza is a 64 y.o. female.    Vitals:  vitals were not taken for this visit.     Chief Complaint: Hypertension    HPI  ROS    Objective:      Physical Exam      Assessment:       No diagnosis found.    Plan:         There are no diagnoses linked to this encounter.

## 2020-07-13 NOTE — TELEPHONE ENCOUNTER
I called and spoke to her.  Her blood pressure has been doing little bit better but systolicly  still been elevated.  We will increase her losartan to 100 mg a day.  Continue the hydrochlorothiazide.  She does mention that she had some salty pork chops during the time her blood pressure went up.  Also spoke to her little bit about trying to stop smoking.  She is working hard to quit smoking.

## 2020-07-13 NOTE — PATIENT INSTRUCTIONS
Return to Urgent Care or go to ER if symptoms worsen or fail to improve.  Follow up with PCP within the week as recommended for further management.   Continue medications as prescribed.       Your High Blood Pressure Risk Factors  Risk factors are things that make you more likely to have a disease or condition. Do you know your risk factors for high blood pressure? You cant do anything about some risk factors. But other risk factors are things that can be changed. Know what high blood pressure risk factors you have. Then find out what changes you can make to help control your risk for high blood pressure. Start with the change that you think will be easiest for you.  Risk factors you cant control  Though you cant change any of the things listed below, check off the ones that apply to you. The more boxes you check, the greater your risk for high blood pressure.  Family history  ? One or both of your parents or grandparents has had high blood pressure or heart disease.  Gender and age  ? Youre a man over age 55 or a postmenopausal woman.  Risk factors you can control  There are plenty of risk factors for high blood pressure that you can control. Learn what these risk factors are and then find out how to reduce your risk. Check the ones that apply to you.  ? What you eat  Do you eat a lot of salty, fatty, fried, or greasy foods? Do you go to restaurants or eat out frequently?  ? Alcohol consumption  Do you drink more than 1 drink a day if you are a female or more than 2 drinks a day if you male?   ? If you smoke or someone close to you smokes  Do you smoke cigarettes or cigars, chew tobacco, or dip snuff? Are you exposed to second-hand smoke on a regular basis?  ? How active you are   Are you inactive most of the time at work and at home? Do you go weeks without exercising?  ? Your weight   Has your doctor said that you are 15 or more pounds overweight?  ? Your stress level    Do you often feel anxious, nervous,  and stressed? Do you feel that you don't have a supportive environment?  Date Last Reviewed: 4/27/2016  © 9705-9393 [employer]. [address] [place] [address], [place] [address]. All rights reserved. This information is not intended as a substitute for professional medical care. Always follow your healthcare professional's instructions.

## 2020-07-14 ENCOUNTER — PATIENT OUTREACH (OUTPATIENT)
Dept: ADMINISTRATIVE | Facility: OTHER | Age: 65
End: 2020-07-14

## 2020-07-14 NOTE — PROGRESS NOTES
Care Everywhere:   Immunization:   Health Maintenance:   Media Review: reviewed for outside mammogram report  Legacy Review:   Order placed:   Upcoming appts:  Mammogram scheduling ticket sent to patient's portal

## 2020-07-15 ENCOUNTER — OFFICE VISIT (OUTPATIENT)
Dept: OBSTETRICS AND GYNECOLOGY | Facility: CLINIC | Age: 65
End: 2020-07-15
Payer: COMMERCIAL

## 2020-07-15 VITALS
BODY MASS INDEX: 25.01 KG/M2 | HEIGHT: 63 IN | SYSTOLIC BLOOD PRESSURE: 140 MMHG | DIASTOLIC BLOOD PRESSURE: 90 MMHG | WEIGHT: 141.13 LBS

## 2020-07-15 DIAGNOSIS — Z01.419 VISIT FOR GYNECOLOGIC EXAMINATION: Primary | ICD-10-CM

## 2020-07-15 DIAGNOSIS — Z12.39 BREAST CANCER SCREENING: ICD-10-CM

## 2020-07-15 DIAGNOSIS — N95.9 MENOPAUSAL AND PERIMENOPAUSAL DISORDER: ICD-10-CM

## 2020-07-15 PROCEDURE — 88175 CYTOPATH C/V AUTO FLUID REDO: CPT

## 2020-07-15 PROCEDURE — 3077F PR MOST RECENT SYSTOLIC BLOOD PRESSURE >= 140 MM HG: ICD-10-PCS | Mod: CPTII,S$GLB,, | Performed by: OBSTETRICS & GYNECOLOGY

## 2020-07-15 PROCEDURE — 3008F PR BODY MASS INDEX (BMI) DOCUMENTED: ICD-10-PCS | Mod: CPTII,S$GLB,, | Performed by: OBSTETRICS & GYNECOLOGY

## 2020-07-15 PROCEDURE — 99386 PREV VISIT NEW AGE 40-64: CPT | Mod: S$GLB,,, | Performed by: OBSTETRICS & GYNECOLOGY

## 2020-07-15 PROCEDURE — 3077F SYST BP >= 140 MM HG: CPT | Mod: CPTII,S$GLB,, | Performed by: OBSTETRICS & GYNECOLOGY

## 2020-07-15 PROCEDURE — 99999 PR PBB SHADOW E&M-EST. PATIENT-LVL III: CPT | Mod: PBBFAC,,, | Performed by: OBSTETRICS & GYNECOLOGY

## 2020-07-15 PROCEDURE — 99999 PR PBB SHADOW E&M-EST. PATIENT-LVL III: ICD-10-PCS | Mod: PBBFAC,,, | Performed by: OBSTETRICS & GYNECOLOGY

## 2020-07-15 PROCEDURE — 3008F BODY MASS INDEX DOCD: CPT | Mod: CPTII,S$GLB,, | Performed by: OBSTETRICS & GYNECOLOGY

## 2020-07-15 PROCEDURE — 3080F DIAST BP >= 90 MM HG: CPT | Mod: CPTII,S$GLB,, | Performed by: OBSTETRICS & GYNECOLOGY

## 2020-07-15 PROCEDURE — 87624 HPV HI-RISK TYP POOLED RSLT: CPT

## 2020-07-15 PROCEDURE — 99386 PR PREVENTIVE VISIT,NEW,40-64: ICD-10-PCS | Mod: S$GLB,,, | Performed by: OBSTETRICS & GYNECOLOGY

## 2020-07-15 PROCEDURE — 3080F PR MOST RECENT DIASTOLIC BLOOD PRESSURE >= 90 MM HG: ICD-10-PCS | Mod: CPTII,S$GLB,, | Performed by: OBSTETRICS & GYNECOLOGY

## 2020-07-15 NOTE — PROGRESS NOTES
HISTORY OF PRESENT ILLNESS:    Rochelle Espinoza is a 64 y.o. female , presents for a routine exam and has no complaints.  COTEST SUBMITTED.  MAMMO SCHEDULED.  RETIRED 2 YRS AND HAPPY; DOING WELL WITH NO GYN ISSUES.  DAUGHTER WORKS [employer] AND SON [employer] - BUSY THRU COVID    LAST 2017:  PAP DUE AND SUBMITTED.  MAMMO ORDERED.  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  RECENT STAYCATION AT [facility] IN [place] WITH GRANDKIDS . .   LAST VISIT :  NEW PATIENT TO ME.  PAP SUBMITTED AND DISCUSSED 3YR SCREENING INTERVAL.  MAMMO IS UP TO DATE.  C/O HOT FLUSHES, BUT BETTER THAN PREVIOUSLY.  NO SIGNIF VAGINAL DRYNESS AND NO SANDRA  HX TRICH IN THE PAST WITH LUIS.  NOT SA OVER THE PAST 4 YEARS .    Past Medical History:   Diagnosis Date    CHF (congestive heart failure)     COPD (chronic obstructive pulmonary disease)     Herpes zoster without mention of complication 2011    Hypertension     Leg edema     Pulmonary hypertension        Past Surgical History:   Procedure Laterality Date    BREAST BIOPSY Right     core     SECTION      COLONOSCOPY N/A 2019    Procedure: COLONOSCOPY;  Surgeon: Rui Holder MD;  [facility]  Service: Endoscopy;  Laterality: N/A;    EPIDURAL STEROID INJECTION N/A 2020    Procedure: CERVICAL BLAKE;  Surgeon: Papito High MD;  Location: [facility];  Service: Pain Management;  Laterality: N/A;  NEEDS CONSENT    ESOPHAGOGASTRODUODENOSCOPY N/A 2019    Procedure: EGD (ESOPHAGOGASTRODUODENOSCOPY);  Surgeon: Rui Holder MD;  [facility]  Service: Endoscopy;  Laterality: N/A;  2L continuous O2 via NC, COPD, pulm HTN        MEDICATIONS AND ALLERGIES:      Current Outpatient Medications:     albuterol (PROVENTIL/VENTOLIN HFA) 90 mcg/actuation inhaler, INHALE 2 PUFFS BY MOUTH EVERY 6 HOURS AS NEEDED, Disp: 18 g, Rfl: 4    albuterol-ipratropium  (DUO-NEB) 2.5 mg-0.5 mg/3 mL nebulizer solution, Take 3 mLs by nebulization every 6 (six) hours as needed for Wheezing. Rescue, Disp: 180 mL, Rfl: 3    colchicine (COLCRYS) 0.6 mg tablet, TAKE 2 TABLETS BY MOUTH NOW, TAKE 1 TABLET BY MOUTH 1 HOUR LATER, Disp: 30 tablet, Rfl: 2    COMBIVENT RESPIMAT  mcg/actuation inhaler, INHALE 1 PUFF BY MOUTH FOUR TIMES DAILY FOR RESCUE, Disp: 4 g, Rfl: 0    escitalopram oxalate (LEXAPRO) 10 MG tablet, Take 1 tablet (10 mg total) by mouth once daily., Disp: 90 tablet, Rfl: 2    fluticasone propionate (FLONASE) 50 mcg/actuation nasal spray, 1 spray (50 mcg total) by Each Nostril route once daily., Disp: 9.9 mL, Rfl: 0    furosemide (LASIX) 20 MG tablet, TAKE 1 TABLET(20 MG) BY MOUTH TWICE DAILY, Disp: 60 tablet, Rfl: 7    gabapentin (NEURONTIN) 100 MG capsule, Take 1 capsule (100 mg total) by mouth 3 (three) times daily., Disp: 90 capsule, Rfl: 0    glycerin adult suppository, Place 1 suppository rectally as needed for Constipation., Disp: , Rfl:     hydroCHLOROthiazide (MICROZIDE) 12.5 mg capsule, Take 1 capsule (12.5 mg total) by mouth once daily., Disp: 90 capsule, Rfl: 3    losartan (COZAAR) 100 MG tablet, Take 1 tablet (100 mg total) by mouth once daily., Disp: 90 tablet, Rfl: 3    multivitamin capsule, Take 1 capsule by mouth once daily., Disp: , Rfl:     nicotine (NICODERM CQ) 21 mg/24 hr, Place 1 patch onto the skin once daily., Disp: 14 patch, Rfl: 0    omeprazole (PRILOSEC) 40 MG capsule, Take 1 capsule (40 mg total) by mouth once daily., Disp: 90 capsule, Rfl: 3    potassium chloride SA (K-DUR,KLOR-CON) 20 MEQ tablet, TAKE 1 TABLET(20 MEQ) BY MOUTH EVERY DAY, Disp: 30 tablet, Rfl: 7    umeclidinium-vilanteroL (ANORO ELLIPTA) 62.5-25 mcg/actuation DsDv, Inhale 1 puff into the lungs once daily. Controller, Disp: 1 each, Rfl: 11    Review of patient's allergies indicates:   Allergen Reactions    Orange juice      Swelling in pt tongue         Family  History   Problem Relation Age of Onset    Heart disease Mother     Heart disease Paternal Uncle     Celiac disease Neg Hx     Colon cancer Neg Hx     Colon polyps Neg Hx     Crohn's disease Neg Hx     Cystic fibrosis Neg Hx     Esophageal cancer Neg Hx     Inflammatory bowel disease Neg Hx     Irritable bowel syndrome Neg Hx     Liver cancer Neg Hx     Liver disease Neg Hx     Rectal cancer Neg Hx     Stomach cancer Neg Hx     Ulcerative colitis Neg Hx        Social History     Socioeconomic History    Marital status:      Spouse name: Not on file    Number of children: Not on file    Years of education: Not on file    Highest education level: Not on file   Occupational History    Occupation:    Social Needs    Financial resource strain: Somewhat hard    Food insecurity     Worry: Sometimes true     Inability: Sometimes true    Transportation needs     Medical: No     Non-medical: Yes   Tobacco Use    Smoking status: Current Every Day Smoker     Packs/day: 1.00     Years: 40.00     Pack years: 40.00     Types: Cigarettes    Smokeless tobacco: Never Used   Substance and Sexual Activity    Alcohol use: Yes     Frequency: Monthly or less     Drinks per session: 3 or 4     Binge frequency: Never     Comment: beer daily 2-3 daily, none today    Drug use: No    Sexual activity: Not Currently     Birth control/protection: None   Lifestyle    Physical activity     Days per week: 0 days     Minutes per session: 0 min    Stress: Rather much   Relationships    Social connections     Talks on phone: More than three times a week     Gets together: More than three times a week     Attends [employer] service: Not on file     Active member of club or organization: No     Attends meetings of clubs or organizations: Never     Relationship status:    Other Topics Concern    Not on file   Social History Narrative    Not on file       COMPREHENSIVE GYN HISTORY:  PAP  "History:  Denies abnormal Paps except a noted above.  Infection History: Denies STDs. Denies PID.  Benign History: Denies uterine fibroids. Denies ovarian cysts. Denies endometriosis.  Denies other conditions.  Cancer History: Denies cervical cancer. Denies uterine cancer or hyperplasia. Denies ovarian cancer. Denies vulvar cancer or pre-cancer. Denies vaginal cancer or pre-cancer. Denies breast cancer. Denies colon cancer.    ROS:  GENERAL: No weight changes. No swelling. No fatigue. No fever.  CARDIOVASCULAR: No chest pain. No shortness of breath. No leg cramps.   NEUROLOGICAL: No headaches. No vision changes.  BREASTS: No pain. No lumps. No discharge.  ABDOMEN: No pain. No nausea. No vomiting. No diarrhea. No constipation.  REPRODUCTIVE: No abnormal bleeding.   VULVA: No pain. No lesions. No itching.  VAGINA: No relaxation. No itching. No odor. No discharge. No lesions.  URINARY: No incontinence. No nocturia. No frequency. No dysuria.    BP (!) 140/90   Ht 5' 3" (1.6 m)   Wt 64 kg (141 lb 1.5 oz)   LMP 11/21/2013   BMI 24.99 kg/m²     PE:  APPEARANCE: Well nourished, well developed, in no acute distress.  AFFECT: WNL, alert and oriented x 3.  SKIN: No hirsutism or acne.  NECK: Neck symmetric without masses or thyromegaly.  NODES: No inguinal, cervical, axillary or femoral lymph node enlargement.  CHEST: Good respiratory effort.   ABDOMEN: Soft. No tenderness or masses. No hepatosplenomegaly. No hernias.  BREASTS: Symmetrical, no skin changes or visible lesions. No palpable masses, nipple discharge bilaterally.  PELVIC: ATROPHIC EXTERNAL FEMALE GENITALIA without lesions. Normal hair distribution. Adequate perineal body, normal urethral meatus. VAGINA DRY without lesions or discharge. CERVIX STENOTIC without lesions, discharge or tenderness. No significant cystocele or rectocele. Bimanual exam shows uterus to be normal size, regular, mobile and nontender. Adnexa without masses or tenderness.  EXTREMITIES: No " edema.    PROCEDURES:  Pap    DIAGNOSIS:  1. Visit for gynecologic examination  Liquid-Based Pap Smear, Screening    HPV High Risk Genotypes, PCR   2. Breast cancer screening     3. Menopausal and perimenopausal disorder         COUNSELING:  The patient was counseled today on osteoporosis prevention, calcium supplementation, and regular weight bearing exercise. The patient was also counseled today on ACS PAP guidelines, with recommendations for yearly pelvic exams unless their uterus, cervix, and ovaries were removed for benign reasons; in that case, examinations every 3-5 years are recommended.  The patient was also counseled regarding monthly breast self-examination, routine STD screening for at-risk populations, prophylactic immunizations for transmitted infections such as  HPV, Pertussis, or Influenza as appropriate, and yearly mammograms when indicated by ACS guidelines.  She was advised to see her primary care physician for all other health maintenance.    FOLLOW-UP with me annually.

## 2020-07-18 ENCOUNTER — HOSPITAL ENCOUNTER (OUTPATIENT)
Facility: HOSPITAL | Age: 65
Discharge: HOME | End: 2020-07-20
Attending: EMERGENCY MEDICINE | Admitting: HOSPITALIST
Payer: COMMERCIAL

## 2020-07-18 DIAGNOSIS — I10 ESSENTIAL HYPERTENSION: ICD-10-CM

## 2020-07-18 DIAGNOSIS — R06.02 SOB (SHORTNESS OF BREATH): ICD-10-CM

## 2020-07-18 DIAGNOSIS — I95.89 OTHER SPECIFIED HYPOTENSION: Primary | ICD-10-CM

## 2020-07-18 DIAGNOSIS — I50.20 HFREF (HEART FAILURE WITH REDUCED EJECTION FRACTION): ICD-10-CM

## 2020-07-18 DIAGNOSIS — I95.9 HYPOTENSION: ICD-10-CM

## 2020-07-18 DIAGNOSIS — R07.9 CHEST PAIN: ICD-10-CM

## 2020-07-18 DIAGNOSIS — I27.81 COR PULMONALE, CHRONIC: ICD-10-CM

## 2020-07-18 PROBLEM — J96.11 CHRONIC HYPOXEMIC RESPIRATORY FAILURE: Status: ACTIVE | Noted: 2020-07-18

## 2020-07-18 LAB
ALBUMIN SERPL BCP-MCNC: 4.1 G/DL (ref 3.5–5.2)
ALP SERPL-CCNC: 56 U/L (ref 55–135)
ALT SERPL W/O P-5'-P-CCNC: 15 U/L (ref 10–44)
ANION GAP SERPL CALC-SCNC: 11 MMOL/L (ref 8–16)
AST SERPL-CCNC: 21 U/L (ref 10–40)
BASOPHILS # BLD AUTO: 0.02 K/UL (ref 0–0.2)
BASOPHILS NFR BLD: 0.2 % (ref 0–1.9)
BILIRUB SERPL-MCNC: 0.3 MG/DL (ref 0.1–1)
BILIRUB UR QL STRIP: NEGATIVE
BUN SERPL-MCNC: 21 MG/DL (ref 8–23)
CALCIUM SERPL-MCNC: 9.5 MG/DL (ref 8.7–10.5)
CHLORIDE SERPL-SCNC: 102 MMOL/L (ref 95–110)
CLARITY UR REFRACT.AUTO: CLEAR
CO2 SERPL-SCNC: 28 MMOL/L (ref 23–29)
COLOR UR AUTO: NORMAL
CREAT SERPL-MCNC: 1 MG/DL (ref 0.5–1.4)
DIFFERENTIAL METHOD: NORMAL
EOSINOPHIL # BLD AUTO: 0.1 K/UL (ref 0–0.5)
EOSINOPHIL NFR BLD: 0.6 % (ref 0–8)
ERYTHROCYTE [DISTWIDTH] IN BLOOD BY AUTOMATED COUNT: 13.6 % (ref 11.5–14.5)
EST. GFR  (AFRICAN AMERICAN): >60 ML/MIN/1.73 M^2
EST. GFR  (NON AFRICAN AMERICAN): 59.7 ML/MIN/1.73 M^2
GLUCOSE SERPL-MCNC: 138 MG/DL (ref 70–110)
GLUCOSE UR QL STRIP: NEGATIVE
HCT VFR BLD AUTO: 45.4 % (ref 37–48.5)
HGB BLD-MCNC: 14.9 G/DL (ref 12–16)
HGB UR QL STRIP: NEGATIVE
IMM GRANULOCYTES # BLD AUTO: 0.01 K/UL (ref 0–0.04)
IMM GRANULOCYTES NFR BLD AUTO: 0.1 % (ref 0–0.5)
KETONES UR QL STRIP: NEGATIVE
LEUKOCYTE ESTERASE UR QL STRIP: NEGATIVE
LYMPHOCYTES # BLD AUTO: 3.5 K/UL (ref 1–4.8)
LYMPHOCYTES NFR BLD: 39.6 % (ref 18–48)
MCH RBC QN AUTO: 28.6 PG (ref 27–31)
MCHC RBC AUTO-ENTMCNC: 32.8 G/DL (ref 32–36)
MCV RBC AUTO: 87 FL (ref 82–98)
MONOCYTES # BLD AUTO: 0.6 K/UL (ref 0.3–1)
MONOCYTES NFR BLD: 7.1 % (ref 4–15)
NEUTROPHILS # BLD AUTO: 4.7 K/UL (ref 1.8–7.7)
NEUTROPHILS NFR BLD: 52.4 % (ref 38–73)
NITRITE UR QL STRIP: NEGATIVE
NRBC BLD-RTO: 0 /100 WBC
PH UR STRIP: 5 [PH] (ref 5–8)
PLATELET # BLD AUTO: 271 K/UL (ref 150–350)
PMV BLD AUTO: 11.9 FL (ref 9.2–12.9)
POTASSIUM SERPL-SCNC: 3.5 MMOL/L (ref 3.5–5.1)
PROT SERPL-MCNC: 7.5 G/DL (ref 6–8.4)
PROT UR QL STRIP: NEGATIVE
RBC # BLD AUTO: 5.21 M/UL (ref 4–5.4)
SARS-COV-2 RDRP RESP QL NAA+PROBE: NEGATIVE
SODIUM SERPL-SCNC: 141 MMOL/L (ref 136–145)
SP GR UR STRIP: 1.01 (ref 1–1.03)
TROPONIN I SERPL DL<=0.01 NG/ML-MCNC: <0.006 NG/ML (ref 0–0.03)
URN SPEC COLLECT METH UR: NORMAL
WBC # BLD AUTO: 8.93 K/UL (ref 3.9–12.7)

## 2020-07-18 PROCEDURE — 93010 ELECTROCARDIOGRAM REPORT: CPT | Mod: 76,,, | Performed by: INTERNAL MEDICINE

## 2020-07-18 PROCEDURE — 93010 EKG 12-LEAD: ICD-10-PCS | Mod: 76,,, | Performed by: INTERNAL MEDICINE

## 2020-07-18 PROCEDURE — 96360 HYDRATION IV INFUSION INIT: CPT

## 2020-07-18 PROCEDURE — 99291 CRITICAL CARE FIRST HOUR: CPT | Mod: ,,, | Performed by: EMERGENCY MEDICINE

## 2020-07-18 PROCEDURE — G0378 HOSPITAL OBSERVATION PER HR: HCPCS

## 2020-07-18 PROCEDURE — 25000003 PHARM REV CODE 250: Performed by: HOSPITALIST

## 2020-07-18 PROCEDURE — 99291 CRITICAL CARE FIRST HOUR: CPT | Mod: 25

## 2020-07-18 PROCEDURE — 63600175 PHARM REV CODE 636 W HCPCS: Performed by: EMERGENCY MEDICINE

## 2020-07-18 PROCEDURE — 99220 PR INITIAL OBSERVATION CARE,LEVL III: CPT | Mod: ,,, | Performed by: HOSPITALIST

## 2020-07-18 PROCEDURE — U0002 COVID-19 LAB TEST NON-CDC: HCPCS

## 2020-07-18 PROCEDURE — 85025 COMPLETE CBC W/AUTO DIFF WBC: CPT

## 2020-07-18 PROCEDURE — 81003 URINALYSIS AUTO W/O SCOPE: CPT

## 2020-07-18 PROCEDURE — 96372 THER/PROPH/DIAG INJ SC/IM: CPT | Mod: 59

## 2020-07-18 PROCEDURE — 96361 HYDRATE IV INFUSION ADD-ON: CPT

## 2020-07-18 PROCEDURE — 99220 PR INITIAL OBSERVATION CARE,LEVL III: ICD-10-PCS | Mod: ,,, | Performed by: HOSPITALIST

## 2020-07-18 PROCEDURE — 99291 PR CRITICAL CARE, E/M 30-74 MINUTES: ICD-10-PCS | Mod: ,,, | Performed by: EMERGENCY MEDICINE

## 2020-07-18 PROCEDURE — 93010 ELECTROCARDIOGRAM REPORT: CPT | Mod: ,,, | Performed by: INTERNAL MEDICINE

## 2020-07-18 PROCEDURE — 80053 COMPREHEN METABOLIC PANEL: CPT

## 2020-07-18 PROCEDURE — 84484 ASSAY OF TROPONIN QUANT: CPT

## 2020-07-18 PROCEDURE — 63600175 PHARM REV CODE 636 W HCPCS: Performed by: HOSPITALIST

## 2020-07-18 PROCEDURE — 93005 ELECTROCARDIOGRAM TRACING: CPT

## 2020-07-18 RX ORDER — IBUPROFEN 200 MG
16 TABLET ORAL
Status: DISCONTINUED | OUTPATIENT
Start: 2020-07-18 | End: 2020-07-20 | Stop reason: HOSPADM

## 2020-07-18 RX ORDER — SODIUM CHLORIDE, SODIUM LACTATE, POTASSIUM CHLORIDE, CALCIUM CHLORIDE 600; 310; 30; 20 MG/100ML; MG/100ML; MG/100ML; MG/100ML
1000 INJECTION, SOLUTION INTRAVENOUS
Status: COMPLETED | OUTPATIENT
Start: 2020-07-18 | End: 2020-07-19

## 2020-07-18 RX ORDER — ONDANSETRON 2 MG/ML
4 INJECTION INTRAMUSCULAR; INTRAVENOUS EVERY 8 HOURS PRN
Status: DISCONTINUED | OUTPATIENT
Start: 2020-07-18 | End: 2020-07-20 | Stop reason: HOSPADM

## 2020-07-18 RX ORDER — ACETAMINOPHEN 325 MG/1
650 TABLET ORAL EVERY 4 HOURS PRN
Status: DISCONTINUED | OUTPATIENT
Start: 2020-07-18 | End: 2020-07-20 | Stop reason: HOSPADM

## 2020-07-18 RX ORDER — PROCHLORPERAZINE EDISYLATE 5 MG/ML
5 INJECTION INTRAMUSCULAR; INTRAVENOUS EVERY 6 HOURS PRN
Status: DISCONTINUED | OUTPATIENT
Start: 2020-07-18 | End: 2020-07-20 | Stop reason: HOSPADM

## 2020-07-18 RX ORDER — GABAPENTIN 100 MG/1
100 CAPSULE ORAL 3 TIMES DAILY
Status: DISCONTINUED | OUTPATIENT
Start: 2020-07-18 | End: 2020-07-20 | Stop reason: HOSPADM

## 2020-07-18 RX ORDER — TALC
6 POWDER (GRAM) TOPICAL NIGHTLY PRN
Status: DISCONTINUED | OUTPATIENT
Start: 2020-07-18 | End: 2020-07-20 | Stop reason: HOSPADM

## 2020-07-18 RX ORDER — SODIUM CHLORIDE 0.9 % (FLUSH) 0.9 %
10 SYRINGE (ML) INJECTION
Status: DISCONTINUED | OUTPATIENT
Start: 2020-07-18 | End: 2020-07-20 | Stop reason: HOSPADM

## 2020-07-18 RX ORDER — TIOTROPIUM BROMIDE 18 UG/1
1 CAPSULE ORAL; RESPIRATORY (INHALATION) DAILY
Status: DISCONTINUED | OUTPATIENT
Start: 2020-07-19 | End: 2020-07-20 | Stop reason: HOSPADM

## 2020-07-18 RX ORDER — SODIUM CHLORIDE 0.9 % (FLUSH) 0.9 %
10 SYRINGE (ML) INJECTION
Status: CANCELLED | OUTPATIENT
Start: 2020-07-18

## 2020-07-18 RX ORDER — IBUPROFEN 200 MG
24 TABLET ORAL
Status: DISCONTINUED | OUTPATIENT
Start: 2020-07-18 | End: 2020-07-20 | Stop reason: HOSPADM

## 2020-07-18 RX ORDER — IPRATROPIUM BROMIDE AND ALBUTEROL SULFATE 2.5; .5 MG/3ML; MG/3ML
3 SOLUTION RESPIRATORY (INHALATION) EVERY 6 HOURS PRN
Status: DISCONTINUED | OUTPATIENT
Start: 2020-07-18 | End: 2020-07-20 | Stop reason: HOSPADM

## 2020-07-18 RX ORDER — PANTOPRAZOLE SODIUM 40 MG/1
40 TABLET, DELAYED RELEASE ORAL DAILY
Status: DISCONTINUED | OUTPATIENT
Start: 2020-07-19 | End: 2020-07-20 | Stop reason: HOSPADM

## 2020-07-18 RX ORDER — ENOXAPARIN SODIUM 100 MG/ML
40 INJECTION SUBCUTANEOUS EVERY 24 HOURS
Status: DISCONTINUED | OUTPATIENT
Start: 2020-07-18 | End: 2020-07-20 | Stop reason: HOSPADM

## 2020-07-18 RX ORDER — FLUTICASONE PROPIONATE 50 MCG
1 SPRAY, SUSPENSION (ML) NASAL DAILY
Status: DISCONTINUED | OUTPATIENT
Start: 2020-07-19 | End: 2020-07-20 | Stop reason: HOSPADM

## 2020-07-18 RX ADMIN — SODIUM CHLORIDE, SODIUM LACTATE, POTASSIUM CHLORIDE, AND CALCIUM CHLORIDE 500 ML: .6; .31; .03; .02 INJECTION, SOLUTION INTRAVENOUS at 02:07

## 2020-07-18 RX ADMIN — SODIUM CHLORIDE, SODIUM LACTATE, POTASSIUM CHLORIDE, AND CALCIUM CHLORIDE 250 ML: .6; .31; .03; .02 INJECTION, SOLUTION INTRAVENOUS at 06:07

## 2020-07-18 RX ADMIN — SODIUM CHLORIDE, SODIUM LACTATE, POTASSIUM CHLORIDE, AND CALCIUM CHLORIDE 1000 ML: .6; .31; .03; .02 INJECTION, SOLUTION INTRAVENOUS at 03:07

## 2020-07-18 RX ADMIN — GABAPENTIN 100 MG: 100 CAPSULE ORAL at 10:07

## 2020-07-18 RX ADMIN — Medication 6 MG: at 10:07

## 2020-07-18 RX ADMIN — ENOXAPARIN SODIUM 40 MG: 100 INJECTION SUBCUTANEOUS at 10:07

## 2020-07-18 NOTE — H&P
"Hospital Medicine  History and Physical Exam    Team: Networked reference to record PCT  Ronal Chahal MD  Admit Date: 7/18/2020  Principal Problem:  Hypotension   Patient information was obtained from patient, past medical records and ER records.   Primary care Physician: Yosi Samuels MD  Code status: Full Code    HPI: 65 yo F with PMHx HTN, COPD on home 2L O2, tobacco abuse, HFrEF, and gout presents to the hospital complaining of low BP at home with associated weakness and lightheadedness. The patient reports that she takes her BP every morning. About a week ago, the patient reports that her BP was elevated to the 160's everytime she checked despite compliance with her losartan. She had a telephone encounter with her PCP Dr. Samuels 7/13 about her BP. She went to an urgent care that day, and her BP was recorded to be 161/92. She was instructed to increase her losartan to 100 mg and also start taking her HCTZ regularly. She had previously only been taking occasionally for "fluid". She states that she had lwer extremity edema as well on 7/13, but that has improved since she restarted the HCTZ. Two days later, the patient saw her OB/GYN, and her BP was 140/90 at that visit. The last couple of days, however, the patient reports that her BP has been low in the morning. Yesterday she reports it was in the 110"s systolic. Today when she woke up, she noted her BP was in the 80's systolic. She states that she felt very weak and lightheaded to the point of almost passing out. She also reports having a sensation that her heart was beating out her chest at one point. She denies any worsening SOB, chest pain, changes in PO intake, nausea, vomiting, cough, fevers, or chills.    Hemoglobin A1C   Date Value Ref Range Status   04/11/2018 5.2 4.0 - 5.6 % Final     Comment:     According to ADA guidelines, hemoglobin A1c <7.0% represents  optimal control in non-pregnant diabetic patients. Different  metrics may apply to specific " patient populations.   Standards of Medical Care in Diabetes-2016.  For the purpose of screening for the presence of diabetes:  <5.7%     Consistent with the absence of diabetes  5.7-6.4%  Consistent with increasing risk for diabetes   (prediabetes)  >or=6.5%  Consistent with diabetes  Currently, no consensus exists for use of hemoglobin A1c  for diagnosis of diabetes for children.  This Hemoglobin A1c assay has significant interference with fetal   hemoglobin   (HbF). The results are invalid for patients with abnormal amounts of   HbF,   including those with known Hereditary Persistence   of Fetal Hemoglobin. Heterozygous hemoglobin variants (HbAS, HbAC,   HbAD, HbAE, HbA2) do not significantly interfere with this assay;   however, presence of multiple variants in a sample may impact the %   interference.     2016 6.1 4.5 - 6.2 % Final       Past Medical History: Patient has a past medical history of CHF (congestive heart failure), COPD (chronic obstructive pulmonary disease), Herpes zoster without mention of complication (2011), Hypertension, Leg edema, and Pulmonary hypertension.    Past Surgical History: Patient has a past surgical history that includes  section; Breast biopsy (Right); Esophagogastroduodenoscopy (N/A, 2019); Colonoscopy (N/A, 2019); and Epidural steroid injection (N/A, 2020).    Social History: Patient reports that she has been smoking cigarettes. She has a 40.00 pack-year smoking history. She has never used smokeless tobacco. She reports current alcohol use. She reports that she does not use drugs.    Family History: family history includes Heart disease in her mother and paternal uncle.    Medications: reviewed     Allergies: Patient is allergic to orange juice.    ROS  Pain Scale: 0 /10   Constitutional: no fever or chills  Respiratory: no cough or shortness of breath  Cardiovascular: Positive for lightheadedness, palpitations, no chest  pain  Gastrointestinal: no nausea or vomiting, no abdominal pain or change in bowel habits  Genitourinary: no hematuria or dysuria  Integument/Breast: no rash or pruritis  Hematologic/Lymphatic: no easy bruising or lymphadenopathy  Musculoskeletal: no arthralgias or myalgias  Neurological: no seizures or tremors  Behavioral/Psych: no depression or anxiety    PEx  Temp:  [98.5 °F (36.9 °C)]   Pulse:  []   Resp:  [20]   BP: ()/(51-64)   SpO2:  [90 %-100 %]   Body mass index is 24.8 kg/m².   No intake or output data in the 24 hours ending 07/18/20 1729    General appearance: no distress, pt. Resting comfortably  Mental status: Alert and oriented x 3  HEENT:  conjunctivae/corneas clear, PERRL  Neck: supple, thyroid not enlarged  Pulm: Diffusely decreased breath sounds, no crackles or  wheezes  Card: RRR, S1, S2 normal, no murmur, click, rub or gallop  Abd: soft, NT, ND, BS present; no masses, no organomegaly  Ext: no c/c/e  Pulses: 2+, symmetric  Skin: color, texture, turgor normal. No rashes or lesions  Neuro: CN II-XII grossly intact, no focal numbness or weakness, normal strength and tone     Recent Results (from the past 24 hour(s))   CBC auto differential    Collection Time: 07/18/20  2:34 PM   Result Value Ref Range    WBC 8.93 3.90 - 12.70 K/uL    RBC 5.21 4.00 - 5.40 M/uL    Hemoglobin 14.9 12.0 - 16.0 g/dL    Hematocrit 45.4 37.0 - 48.5 %    Mean Corpuscular Volume 87 82 - 98 fL    Mean Corpuscular Hemoglobin 28.6 27.0 - 31.0 pg    Mean Corpuscular Hemoglobin Conc 32.8 32.0 - 36.0 g/dL    RDW 13.6 11.5 - 14.5 %    Platelets 271 150 - 350 K/uL    MPV 11.9 9.2 - 12.9 fL    Immature Granulocytes 0.1 0.0 - 0.5 %    Gran # (ANC) 4.7 1.8 - 7.7 K/uL    Immature Grans (Abs) 0.01 0.00 - 0.04 K/uL    Lymph # 3.5 1.0 - 4.8 K/uL    Mono # 0.6 0.3 - 1.0 K/uL    Eos # 0.1 0.0 - 0.5 K/uL    Baso # 0.02 0.00 - 0.20 K/uL    nRBC 0 0 /100 WBC    Gran% 52.4 38.0 - 73.0 %    Lymph% 39.6 18.0 - 48.0 %    Mono% 7.1 4.0 -  15.0 %    Eosinophil% 0.6 0.0 - 8.0 %    Basophil% 0.2 0.0 - 1.9 %    Differential Method Automated    Comprehensive metabolic panel    Collection Time: 07/18/20  2:34 PM   Result Value Ref Range    Sodium 141 136 - 145 mmol/L    Potassium 3.5 3.5 - 5.1 mmol/L    Chloride 102 95 - 110 mmol/L    CO2 28 23 - 29 mmol/L    Glucose 138 (H) 70 - 110 mg/dL    BUN, Bld 21 8 - 23 mg/dL    Creatinine 1.0 0.5 - 1.4 mg/dL    Calcium 9.5 8.7 - 10.5 mg/dL    Total Protein 7.5 6.0 - 8.4 g/dL    Albumin 4.1 3.5 - 5.2 g/dL    Total Bilirubin 0.3 0.1 - 1.0 mg/dL    Alkaline Phosphatase 56 55 - 135 U/L    AST 21 10 - 40 U/L    ALT 15 10 - 44 U/L    Anion Gap 11 8 - 16 mmol/L    eGFR if African American >60.0 >60 mL/min/1.73 m^2    eGFR if non  59.7 (A) >60 mL/min/1.73 m^2       No results for input(s): POCTGLUCOSE in the last 168 hours.    Active Hospital Problems    Diagnosis  POA    Arterial hypotension [I95.9]  Yes      Resolved Hospital Problems   No resolved problems to display.     Assessment and Plan:  Hypotension  HTN  -Pt. Presents with symptomatic hypotension after having her BP medications recently increased. No signs of hypovolemia on exam, no infectious symptoms to suggest sepsis or reported cardiac symptoms other than lightheadedness  -Hold BP medications and continue to monitor pt. Overnight. Gentle hydration given pt. History of HF    COPD  Chronic Hypoxemic Respiratory Failure  -Pt. Stable on home 2L NC, no wheezes or acute SOB reported  -Duonebs PRN, continue home inhalers    HFrEF/Cor Pulmonale  -Last TTE 4/2018 shows mildly decreased EF 50-55% and R ventricular enlargement  -No evidence of volume overload on exam  -Pt. Receiving fluids in ED, monitor volume status  -Repeat TTE ordered as pt. Has not had one in >2 yrs and reporting lightheadedness and had LE edema a few days ago. Ok to be done as outpatient if symptoms improve holding BP medications alone     DVT PPx: Geraldine Chahal,  MD  [facility] Medicine Staff  [phone] pager

## 2020-07-18 NOTE — ED TRIAGE NOTES
Pt presents to the ED c/o hypotension. Pt reports recent change in medication (losartan 50mg to 100mg) on Tuesday. +weaknes, dizziness, lightheadedness. +CP, SOB x 1 day.

## 2020-07-18 NOTE — ED PROVIDER NOTES
"Chief complaint:  Hypotension ("My doctor increased my high blood pressure pills on Monday and since then my pressure has been low. I just feel weak". )      HPI:  Rochelle Espinoza is a 64 y.o. female presenting with lightheadedness and dizziness since this morning.  She measured her blood pressure at home and was getting systolics in the 80s.  Comes in for evaluation.  She denies chest pain, headaches, visual changes. She denies any numbness tingling, weakness in arms or her legs.  She states that yesterday she began to feel more short of breath than her baseline and some palpitations.  This is unusual for her.  Four days ago her blood pressure medication was increased by her PCP.  She began having lower extremity edema and her blood pressure was running high.  Her Cozaar 50 mg was increased to 100 mg.  She also started taking HCTZ regularly.  She has not been taking this daily for several months.    ROS: As per HPI and below:  REVIEW OF SYSTEMS:  GENERAL: No chills.  No fever.   SKIN: No lesions.  Denies rash.  HEENT: Denies URI symptoms.  No headache.  CHEST: No cough.  Has had dyspnea.  States she is on oxygen at home for her COPD.  CARDIOVASCULAR: No chest pain.    ABDOMEN: No abdominal pain.  Denies nausea.  Denies vomiting.  No diarrhea.  No constipation.  No decreased appetite.  URINARY: No abnormal urination.  HEMATOLOGIC: No anemia.  Denies excessive bleeding.  PERIPHERAL VASCULAR: No edema.  No cramps.  MUSCULOSKELETAL: No backache.  Denies joint pain.  Denies joint stiffness.  NEUROLOGIC: No paresthesias.  No numbness.  No focal weakness.  Denies syncope.  Had near syncope this morning.    Review of patient's allergies indicates:   Allergen Reactions    Orange juice      Swelling in pt tongue         No current facility-administered medications on file prior to encounter.      Current Outpatient Medications on File Prior to Encounter   Medication Sig Dispense Refill    gabapentin (NEURONTIN) 100 MG " capsule Take 1 capsule (100 mg total) by mouth 3 (three) times daily. 90 capsule 0    hydroCHLOROthiazide (MICROZIDE) 12.5 mg capsule Take 1 capsule (12.5 mg total) by mouth once daily. 90 capsule 3    losartan (COZAAR) 100 MG tablet Take 1 tablet (100 mg total) by mouth once daily. 90 tablet 3    multivitamin capsule Take 1 capsule by mouth once daily.      omeprazole (PRILOSEC) 40 MG capsule Take 1 capsule (40 mg total) by mouth once daily. 90 capsule 3    potassium chloride SA (K-DUR,KLOR-CON) 20 MEQ tablet TAKE 1 TABLET(20 MEQ) BY MOUTH EVERY DAY 30 tablet 7    albuterol (PROVENTIL/VENTOLIN HFA) 90 mcg/actuation inhaler INHALE 2 PUFFS BY MOUTH EVERY 6 HOURS AS NEEDED 18 g 4    albuterol-ipratropium (DUO-NEB) 2.5 mg-0.5 mg/3 mL nebulizer solution Take 3 mLs by nebulization every 6 (six) hours as needed for Wheezing. Rescue 180 mL 3    colchicine (COLCRYS) 0.6 mg tablet TAKE 2 TABLETS BY MOUTH NOW, TAKE 1 TABLET BY MOUTH 1 HOUR LATER 30 tablet 2    COMBIVENT RESPIMAT  mcg/actuation inhaler INHALE 1 PUFF BY MOUTH FOUR TIMES DAILY FOR RESCUE 4 g 0    fluticasone propionate (FLONASE) 50 mcg/actuation nasal spray 1 spray (50 mcg total) by Each Nostril route once daily. 9.9 mL 0    glycerin adult suppository Place 1 suppository rectally as needed for Constipation.      umeclidinium-vilanteroL (ANORO ELLIPTA) 62.5-25 mcg/actuation DsDv Inhale 1 puff into the lungs once daily. Controller 1 each 11    [DISCONTINUED] escitalopram oxalate (LEXAPRO) 10 MG tablet Take 1 tablet (10 mg total) by mouth once daily. 90 tablet 2    [DISCONTINUED] furosemide (LASIX) 20 MG tablet TAKE 1 TABLET(20 MG) BY MOUTH TWICE DAILY 60 tablet 7    [DISCONTINUED] nicotine (NICODERM CQ) 21 mg/24 hr Place 1 patch onto the skin once daily. 14 patch 0       PMH:  As per HPI and below:  Past Medical History:   Diagnosis Date    CHF (congestive heart failure)     COPD (chronic obstructive pulmonary disease)     Herpes zoster  without mention of complication 2011    Hypertension     Leg edema     Pulmonary hypertension      Past Medical History Pertinent Negatives:   Diagnosis Date Noted    Emphysema (subcutaneous) (surgical) resulting from a procedure 04/10/2018     Past Surgical History:   Procedure Laterality Date    BREAST BIOPSY Right     core     SECTION      COLONOSCOPY N/A 2019    Procedure: COLONOSCOPY;  Surgeon: Rui Holder MD;  [facility]  Service: Endoscopy;  Laterality: N/A;    EPIDURAL STEROID INJECTION N/A 2020    Procedure: CERVICAL BLAKE;  Surgeon: Papito High MD;  Location: [facility] PAIN MGT;  Service: Pain Management;  Laterality: N/A;  NEEDS CONSENT    ESOPHAGOGASTRODUODENOSCOPY N/A 2019    Procedure: EGD (ESOPHAGOGASTRODUODENOSCOPY);  Surgeon: Rui Holder MD;  Location: [facility] ([address] [place] [address]);  Service: Endoscopy;  Laterality: N/A;  2L continuous O2 via NC, COPD, pulm HTN       Social History     Socioeconomic History    Marital status:      Spouse name: Not on file    Number of children: Not on file    Years of education: Not on file    Highest education level: Not on file   Occupational History    Occupation:    Social Needs    Financial resource strain: Somewhat hard    Food insecurity     Worry: Sometimes true     Inability: Sometimes true    Transportation needs     Medical: No     Non-medical: Yes   Tobacco Use    Smoking status: Current Every Day Smoker     Packs/day: 1.00     Years: 40.00     Pack years: 40.00     Types: Cigarettes    Smokeless tobacco: Never Used   Substance and Sexual Activity    Alcohol use: Yes     Frequency: Monthly or less     Drinks per session: 3 or 4     Binge frequency: Never     Comment: beer daily 2-3 daily, none today    Drug use: No    Sexual activity: Not Currently     Birth control/protection: None   Lifestyle    Physical activity     Days per week: 0 days     Minutes per session: 0  min    Stress: Rather much   Relationships    Social connections     Talks on phone: More than three times a week     Gets together: More than three times a week     Attends Religion service: Not on file     Active member of club or organization: No     Attends meetings of clubs or organizations: Never     Relationship status:    Other Topics Concern    Not on file   Social History Narrative    Not on file       Family History   Problem Relation Age of Onset    Heart disease Mother     Heart disease Paternal Uncle     Celiac disease Neg Hx     Colon cancer Neg Hx     Colon polyps Neg Hx     Crohn's disease Neg Hx     Cystic fibrosis Neg Hx     Esophageal cancer Neg Hx     Inflammatory bowel disease Neg Hx     Irritable bowel syndrome Neg Hx     Liver cancer Neg Hx     Liver disease Neg Hx     Rectal cancer Neg Hx     Stomach cancer Neg Hx     Ulcerative colitis Neg Hx        Physical Exam:    Vitals:    07/18/20 1602   BP: 98/62   Pulse: 76   Resp:    Temp:      APPEARANCE: Alert and oriented, and in no acute distress.  SKIN: Warm and dry. No obvious rashes or lesions noted.  HEENT: PERRL.  EOMI.  Airway intact.  No tonsillar enlargement.  Patient is wearing a mask.  NECK: No JVD.  Supple. Trachea midline.  CHEST:  Distant breath sounds.  No abnormal breath sounds.  Breath sounds equal bilaterally.  CARDIOVASCULAR: Normal S1, S2. No murmurs, gallops, or rubs. Rhythm regular with no ectopy noted.  Tachycardia.  ABDOMEN: Soft.  Bowel sounds normal.  No tenderness.  No abdominal masses.  PERIPHERAL VASCULAR: Radial pulses 2+ bilaterally.  Dorsalis pedis 2+ bilaterally.  No edema.  No calf tenderness.  Extremities warm.  MUSCULOSKELETAL:  No joint tenderness or swelling.  No soft tissue swelling or tenderness.  No limited range of motion.  BACK:  No flank or low back tenderness, normal curvature.  NEUROLOGIC: CN II-XII grossly intact.   Sensory: Intact to light touch distally.  Motor: 5/5  strength major flexors/extensors.  MENTAL STATUS: Patient alert, oriented x 3, conversant.    Labs Reviewed   COMPREHENSIVE METABOLIC PANEL - Abnormal; Notable for the following components:       Result Value    Glucose 138 (*)     eGFR if non  59.7 (*)     All other components within normal limits   CBC W/ AUTO DIFFERENTIAL   URINALYSIS, REFLEX TO URINE CULTURE   SARS-COV-2 RNA AMPLIFICATION, QUAL       Medications   lactated ringers bolus 500 mL (0 mLs Intravenous Stopped 7/18/20 1502)   lactated ringers infusion (1,000 mLs Intravenous New Bag 7/18/20 1513)       MDM:    64 y.o. female with hypotension and lightheadedness.  Differential diagnosis includes but is not limited to medication effect, dehydration, sepsis, acute kidney injury.      ED COURSE AND NOTES:    CXR (Independently reviewed):  No acute finding.  No infiltrate.  EKG (Independently reviewed):  Normal sinus rhythm at 96 beats per minute.  There is flattening of the T-waves in inferior and lateral leads.  No ST elevation or T-wave inversions.  Medical Record Reviewed (I have decided to obtain this patient's electronic medical record):  Patient's last visit with Internal Medicine was July 13.  This is having care visit because of swelling in her legs.  His PCP had instructed her to increase her losartan and continue her HCTZ.  At that point she had not been taking the HCTZ for several weeks but restarted it.  She has a history of COPD and her pulse ox on that visit is 94%.    Patient was started on a 500 cc bolus of LR.  Blood pressure running in the 80s systolic and was as low as 77 systolic.  After the 500 cc bolus she improved to the 90s.  I am continuing on a maintenance infusion of 75 cc/hour.  She continues to run in the 90s to low 100s systolic.  A plan to place this patient in observation overnight to watch her blood pressure.  I have discussed the case with Internal Medicine who agreed with the plan.    Critical Care:  Date:  07/18/2020  Performed by: Jenny Brownlee MD  Authorized by: Jenny Brownlee MD  Total critical care time (exclusive of procedural time) : 30 minutes  Critical care was necessary to treat or prevent imminent or life-threatening deterioration of the following conditions:  Hypotension.  Patient had the potential for deterioration in condition at any time.        Diagnoses:    Final diagnoses:  [R07.9] Chest pain  [R06.02] SOB (shortness of breath)  [I95.89] Other specified hypotension (Primary)           Jenny Julio MD  07/18/20 1518

## 2020-07-19 LAB
ANION GAP SERPL CALC-SCNC: 6 MMOL/L (ref 8–16)
ASCENDING AORTA: 2.73 CM
AV INDEX (PROSTH): 0.97
AV MEAN GRADIENT: 3 MMHG
AV PEAK GRADIENT: 6 MMHG
AV VALVE AREA: 3.51 CM2
AV VELOCITY RATIO: 0.75
BASOPHILS # BLD AUTO: 0.02 K/UL (ref 0–0.2)
BASOPHILS NFR BLD: 0.3 % (ref 0–1.9)
BSA FOR ECHO PROCEDURE: 1.68 M2
BUN SERPL-MCNC: 14 MG/DL (ref 8–23)
CALCIUM SERPL-MCNC: 8.8 MG/DL (ref 8.7–10.5)
CHLORIDE SERPL-SCNC: 105 MMOL/L (ref 95–110)
CO2 SERPL-SCNC: 31 MMOL/L (ref 23–29)
CREAT SERPL-MCNC: 0.7 MG/DL (ref 0.5–1.4)
CV ECHO LV RWT: 0.36 CM
DIFFERENTIAL METHOD: ABNORMAL
DOP CALC AO PEAK VEL: 1.26 M/S
DOP CALC AO VTI: 20.17 CM
DOP CALC LVOT AREA: 3.6 CM2
DOP CALC LVOT DIAMETER: 2.15 CM
DOP CALC LVOT PEAK VEL: 0.94 M/S
DOP CALC LVOT STROKE VOLUME: 70.76 CM3
DOP CALCLVOT PEAK VEL VTI: 19.5 CM
E WAVE DECELERATION TIME: 195.37 MSEC
E/A RATIO: 0.75
E/E' RATIO: 9.5 M/S
ECHO LV POSTERIOR WALL: 0.76 CM (ref 0.6–1.1)
EOSINOPHIL # BLD AUTO: 0.1 K/UL (ref 0–0.5)
EOSINOPHIL NFR BLD: 0.8 % (ref 0–8)
ERYTHROCYTE [DISTWIDTH] IN BLOOD BY AUTOMATED COUNT: 13.8 % (ref 11.5–14.5)
EST. GFR  (AFRICAN AMERICAN): >60 ML/MIN/1.73 M^2
EST. GFR  (NON AFRICAN AMERICAN): >60 ML/MIN/1.73 M^2
FRACTIONAL SHORTENING: 28 % (ref 28–44)
GLUCOSE SERPL-MCNC: 79 MG/DL (ref 70–110)
HCT VFR BLD AUTO: 39.8 % (ref 37–48.5)
HGB BLD-MCNC: 12.7 G/DL (ref 12–16)
IMM GRANULOCYTES # BLD AUTO: 0.02 K/UL (ref 0–0.04)
IMM GRANULOCYTES NFR BLD AUTO: 0.3 % (ref 0–0.5)
INTERVENTRICULAR SEPTUM: 0.85 CM (ref 0.6–1.1)
IVRT: 156.99 MSEC
LA MAJOR: 4.74 CM
LA MINOR: 5 CM
LA WIDTH: 3.37 CM
LEFT ATRIUM SIZE: 2.64 CM
LEFT ATRIUM VOLUME INDEX: 22.1 ML/M2
LEFT ATRIUM VOLUME: 36.8 CM3
LEFT INTERNAL DIMENSION IN SYSTOLE: 3.06 CM (ref 2.1–4)
LEFT VENTRICLE DIASTOLIC VOLUME INDEX: 48.63 ML/M2
LEFT VENTRICLE DIASTOLIC VOLUME: 80.82 ML
LEFT VENTRICLE MASS INDEX: 63 G/M2
LEFT VENTRICLE SYSTOLIC VOLUME INDEX: 22.1 ML/M2
LEFT VENTRICLE SYSTOLIC VOLUME: 36.8 ML
LEFT VENTRICULAR INTERNAL DIMENSION IN DIASTOLE: 4.25 CM (ref 3.5–6)
LEFT VENTRICULAR MASS: 104.15 G
LV LATERAL E/E' RATIO: 8.44 M/S
LV SEPTAL E/E' RATIO: 10.86 M/S
LYMPHOCYTES # BLD AUTO: 3 K/UL (ref 1–4.8)
LYMPHOCYTES NFR BLD: 40.3 % (ref 18–48)
MCH RBC QN AUTO: 28.6 PG (ref 27–31)
MCHC RBC AUTO-ENTMCNC: 31.9 G/DL (ref 32–36)
MCV RBC AUTO: 90 FL (ref 82–98)
MONOCYTES # BLD AUTO: 0.7 K/UL (ref 0.3–1)
MONOCYTES NFR BLD: 9.5 % (ref 4–15)
MV PEAK A VEL: 1.01 M/S
MV PEAK E VEL: 0.76 M/S
MV STENOSIS PRESSURE HALF TIME: 56.66 MS
MV VALVE AREA P 1/2 METHOD: 3.88 CM2
NEUTROPHILS # BLD AUTO: 3.6 K/UL (ref 1.8–7.7)
NEUTROPHILS NFR BLD: 48.8 % (ref 38–73)
NRBC BLD-RTO: 0 /100 WBC
PISA TR MAX VEL: 2.44 M/S
PLATELET # BLD AUTO: 244 K/UL (ref 150–350)
PMV BLD AUTO: 12.2 FL (ref 9.2–12.9)
POTASSIUM SERPL-SCNC: 4.2 MMOL/L (ref 3.5–5.1)
RA MAJOR: 4.16 CM
RA PRESSURE: 3 MMHG
RA WIDTH: 3.05 CM
RBC # BLD AUTO: 4.44 M/UL (ref 4–5.4)
RIGHT VENTRICULAR END-DIASTOLIC DIMENSION: 3.26 CM
RV TISSUE DOPPLER FREE WALL SYSTOLIC VELOCITY 1 (APICAL 4 CHAMBER VIEW): 10.6 CM/S
SINUS: 2.89 CM
SODIUM SERPL-SCNC: 142 MMOL/L (ref 136–145)
STJ: 2.49 CM
TDI LATERAL: 0.09 M/S
TDI SEPTAL: 0.07 M/S
TDI: 0.08 M/S
TR MAX PG: 24 MMHG
TRICUSPID ANNULAR PLANE SYSTOLIC EXCURSION: 1.87 CM
TV REST PULMONARY ARTERY PRESSURE: 27 MMHG
WBC # BLD AUTO: 7.4 K/UL (ref 3.9–12.7)

## 2020-07-19 PROCEDURE — 85025 COMPLETE CBC W/AUTO DIFF WBC: CPT

## 2020-07-19 PROCEDURE — 97165 OT EVAL LOW COMPLEX 30 MIN: CPT

## 2020-07-19 PROCEDURE — G0378 HOSPITAL OBSERVATION PER HR: HCPCS

## 2020-07-19 PROCEDURE — 96361 HYDRATE IV INFUSION ADD-ON: CPT

## 2020-07-19 PROCEDURE — 99225 PR SUBSEQUENT OBSERVATION CARE,LEVEL II: CPT | Mod: ,,, | Performed by: PHYSICIAN ASSISTANT

## 2020-07-19 PROCEDURE — 99225 PR SUBSEQUENT OBSERVATION CARE,LEVEL II: ICD-10-PCS | Mod: ,,, | Performed by: PHYSICIAN ASSISTANT

## 2020-07-19 PROCEDURE — 80048 BASIC METABOLIC PNL TOTAL CA: CPT

## 2020-07-19 PROCEDURE — 36415 COLL VENOUS BLD VENIPUNCTURE: CPT

## 2020-07-19 PROCEDURE — 96372 THER/PROPH/DIAG INJ SC/IM: CPT | Mod: 59

## 2020-07-19 PROCEDURE — 25000242 PHARM REV CODE 250 ALT 637 W/ HCPCS: Performed by: HOSPITALIST

## 2020-07-19 PROCEDURE — 63600175 PHARM REV CODE 636 W HCPCS: Performed by: HOSPITALIST

## 2020-07-19 PROCEDURE — 25000003 PHARM REV CODE 250: Performed by: HOSPITALIST

## 2020-07-19 PROCEDURE — 97161 PT EVAL LOW COMPLEX 20 MIN: CPT

## 2020-07-19 RX ADMIN — PANTOPRAZOLE SODIUM 40 MG: 40 TABLET, DELAYED RELEASE ORAL at 08:07

## 2020-07-19 RX ADMIN — GABAPENTIN 100 MG: 100 CAPSULE ORAL at 09:07

## 2020-07-19 RX ADMIN — FLUTICASONE PROPIONATE 50 MCG: 50 SPRAY, METERED NASAL at 08:07

## 2020-07-19 RX ADMIN — ENOXAPARIN SODIUM 40 MG: 100 INJECTION SUBCUTANEOUS at 09:07

## 2020-07-19 RX ADMIN — GABAPENTIN 100 MG: 100 CAPSULE ORAL at 08:07

## 2020-07-19 RX ADMIN — Medication 6 MG: at 09:07

## 2020-07-19 NOTE — PT/OT/SLP EVAL
Physical Therapy Evaluation and Discharge Note    Patient Name:  Rochelle Espinoza   MRN:  [id number]    Recommendations:     Discharge Recommendations:  home   Discharge Equipment Recommendations: none   Barriers to discharge: None    Assessment:     Rochelle Espinoza is a 64 y.o. female admitted with a medical diagnosis of Hypotension. .  At this time, patient is functioning at their prior level of function and does not require further acute PT services.     Recent Surgery: * No surgery found *      Plan:     During this hospitalization, patient does not require further acute PT services.  Please re-consult if situation changes.      Subjective     Chief Complaint: none  Patient/Family Comments/goals: return home to PLOF  Pain/Comfort:  · Pain Rating 1: 0/10  · Pain Rating Post-Intervention 1: 0/10    Patients cultural, spiritual, Alevism conflicts given the current situation: no    Living Environment:  Pt lives alone in a 2 story town house w/ 0 MILADYS. Her bed/bathroom are on 2nd floor w/ a handrail. Pt has a tub/shower combo.  Prior to admission, patients level of function was IND w/o AD. She wears O2 at all times.  Equipment used at home: oxygen.  DME owned (not currently used): none.  Upon discharge, patient will have assistance from no one but none is needed.    Objective:     Communicated with nursing prior to session.  Patient found supine with telemetry, oxygen upon PT entry to room.    General Precautions: Standard, fall   Orthopedic Precautions:N/A   Braces: N/A     Exams:  · Cognitive Exam:  Patient is oriented to Person, Place, Time and Situation  · Gross Motor Coordination:  WFL  · Postural Exam:  Patient presented with the following abnormalities:    · -       Rounded shoulders  · -       Forward head  · Sensation:    · -       Intact  · RLE ROM: WFL  · RLE Strength: WFL  · LLE ROM: WFL  · LLE Strength: WFL    Functional Mobility:  · Bed Mobility:    · Supine<>sit on bed IND  · Transfers:   · Sit<>stand  to/from EOB w/o AD IND  · Gait:   · >200ft w/o AD IND  · Swing through gait pattern w/ no LOB or instability  · Balance:   · Static stand w/o AD IND    AM-PAC 6 CLICK MOBILITY  Total Score:24       Therapeutic Activities and Exercises:   Pt was educated on D/C from PT due to current LOF. Pt in agreement and verb understanding.    AM-PAC 6 CLICK MOBILITY  Total Score:24     Patient left supine with all lines intact and call button in reach.    GOALS:   Multidisciplinary Problems     Physical Therapy Goals        Problem: Physical Therapy Goal    Goal Priority Disciplines Outcome Goal Variances Interventions   Physical Therapy Goal     PT, PT/OT Adequate for Care Transition     Description: Pt is IND w/ mobility and not appropriate for acute PT services at this time. Please reconsult if needed.                      History:     Past Medical History:   Diagnosis Date    CHF (congestive heart failure)     COPD (chronic obstructive pulmonary disease)     Herpes zoster without mention of complication 2011    Hypertension     Leg edema     Pulmonary hypertension        Past Surgical History:   Procedure Laterality Date    BREAST BIOPSY Right     core     SECTION      COLONOSCOPY N/A 2019    Procedure: COLONOSCOPY;  Surgeon: Rui Holder MD;  [facility]  Service: Endoscopy;  Laterality: N/A;    EPIDURAL STEROID INJECTION N/A 2020    Procedure: CERVICAL BLAKE;  Surgeon: Papito High MD;  Location: [facility];  Service: Pain Management;  Laterality: N/A;  NEEDS CONSENT    ESOPHAGOGASTRODUODENOSCOPY N/A 2019    Procedure: EGD (ESOPHAGOGASTRODUODENOSCOPY);  Surgeon: Rui Holder MD;  Location: [facility] (2ND FLR);  Service: Endoscopy;  Laterality: N/A;  2L continuous O2 via NC, COPD, pulm HTN       Time Tracking:     PT Received On: 20  PT Start Time: 1121     PT Stop Time: 1131  PT Total Time (min): 10 min     Billable Minutes: Evaluation 10 and Total Time  10      Tricia Garvin, PT  07/19/2020

## 2020-07-19 NOTE — SUBJECTIVE & OBJECTIVE
Interval History: No acute events overnight. Pt seen at bedside resting in NAD. BP has improved, but patient is anxious about monitoring BP without IVF. Reasonable to monitor overnight and anticipate d/c early AM if BP remains stable    Review of Systems   Constitutional: Negative for fever.   Respiratory: Negative for cough and shortness of breath.    Cardiovascular: Negative for chest pain and leg swelling.   Gastrointestinal: Negative for nausea and vomiting.   Genitourinary: Negative for dysuria.   Musculoskeletal: Negative for gait problem.   Neurological: Positive for light-headedness (resolved).   Psychiatric/Behavioral: The patient is nervous/anxious.      Objective:     Vital Signs (Most Recent):  Temp: 97.9 °F (36.6 °C) (07/19/20 1551)  Pulse: 61 (07/19/20 1551)  Resp: 16 (07/19/20 1551)  BP: 124/67 (07/19/20 1551)  SpO2: 97 % (07/19/20 1551) Vital Signs (24h Range):  Temp:  [97.3 °F (36.3 °C)-98.6 °F (37 °C)] 97.9 °F (36.6 °C)  Pulse:  [61-74] 61  Resp:  [16-18] 16  SpO2:  [93 %-100 %] 97 %  BP: ()/(52-79) 124/67     Weight: 63.5 kg (140 lb)  Body mass index is 24.8 kg/m².    Intake/Output Summary (Last 24 hours) at 7/19/2020 1616  Last data filed at 7/19/2020 1402  Gross per 24 hour   Intake 1730 ml   Output --   Net 1730 ml      Physical Exam  Constitutional:       General: She is not in acute distress.     Appearance: Normal appearance. She is not ill-appearing.   HENT:      Head: Normocephalic and atraumatic.   Eyes:      Extraocular Movements: Extraocular movements intact.   Cardiovascular:      Rate and Rhythm: Normal rate and regular rhythm.   Pulmonary:      Effort: Pulmonary effort is normal. No respiratory distress.      Breath sounds: Normal breath sounds.   Abdominal:      General: Abdomen is flat. Bowel sounds are normal. There is no distension.      Tenderness: There is no abdominal tenderness. There is no guarding.   Musculoskeletal: Normal range of motion.         General: No  swelling.   Skin:     General: Skin is warm.   Neurological:      General: No focal deficit present.      Mental Status: She is alert.   Psychiatric:         Mood and Affect: Mood normal.         Behavior: Behavior normal.         Significant Labs: All pertinent labs within the past 24 hours have been reviewed.    Significant Imaging: I have reviewed all pertinent imaging results/findings within the past 24 hours.

## 2020-07-19 NOTE — NURSING
Notified tele monitoring room that troubleshooting box #7614 unsuccessful. Tech is to send up replacement box.

## 2020-07-19 NOTE — HOSPITAL COURSE
Patient admitted for hypotension in setting of recent BP medication up-titration. No signs of infection on arrival, 0/4 SIRS, CXR/UA without infectious etiology. BP medications held and given IVF with improvement in BP. Patient still eager about BP, reasonable to stop IVF and monitor BP. BP remained stable with MAPs 80s. Stable for d/c with instructions to reduce losartan back to 50mg and d/c HCTZ at d/c with instructions to keep BP log for PCP to review at f/u and cards f/u for CHF/fluid management. Return precautions discussed, no further questions at d/c

## 2020-07-19 NOTE — ASSESSMENT & PLAN NOTE
-Last TTE 4/2018 shows mildly decreased EF 50-55% and R ventricular enlargement  -No evidence of volume overload on exam  -Pt. Receiving fluids in ED, monitor volume status  -Repeat TTE ordered as pt. Has not had one in >2 yrs and reporting lightheadedness and had LE edema a few days ago. Ok to be done as outpatient if symptoms improve holding BP medications alone

## 2020-07-19 NOTE — PROGRESS NOTES
"[facility] Hospital Medicine  Progress Note    Patient Name: Rochelle Espinoza  MRN: [id number]  Patient Class: OP- Observation   Admission Date: 7/18/2020  Length of Stay: 0 days  Attending Physician: Khanh Martínez MD  Primary Care Provider: Yosi Samuels MD    [facility] Medicine Team: [facility]OSP MED E Bailey Arriaza PA-C    Subjective:     Principal Problem:Hypotension        HPI:  65 yo F with PMHx HTN, COPD on home 2L O2, tobacco abuse, HFrEF, and gout presents to the hospital complaining of low BP at home with associated weakness and lightheadedness. The patient reports that she takes her BP every morning. About a week ago, the patient reports that her BP was elevated to the 160's everytime she checked despite compliance with her losartan. She had a telephone encounter with her PCP Dr. Samuels 7/13 about her BP. She went to an urgent care that day, and her BP was recorded to be 161/92. She was instructed to increase her losartan to 100 mg and also start taking her HCTZ regularly. She had previously only been taking occasionally for "fluid". She states that she had lwer extremity edema as well on 7/13, but that has improved since she restarted the HCTZ. Two days later, the patient saw her OB/GYN, and her BP was 140/90 at that visit. The last couple of days, however, the patient reports that her BP has been low in the morning. Yesterday she reports it was in the 110"s systolic. Today when she woke up, she noted her BP was in the 80's systolic. She states that she felt very weak and lightheaded to the point of almost passing out. She also reports having a sensation that her heart was beating out her chest at one point. She denies any worsening SOB, chest pain, changes in PO intake, nausea, vomiting, cough, fevers, or chills.    Overview/Hospital Course:  Patient admitted for hypotension in setting of recent BP medication up-titration. No signs of infection on arrival, 0/4 SIRS, CXR/UA without " infectious etiology. BP medications held and given IVF with improvement in BP. Patient still eager about BP, reasonable to stop IVF and monitor BP. Plan to reduce losartan back to 50mg and d/c HCTZ at d/c with instructions to keep BP log for PCP to review at f/u and cards f/u for CHF/fluid management     Interval History: No acute events overnight. Pt seen at bedside resting in NAD. BP has improved, but patient is anxious about monitoring BP without IVF. Reasonable to monitor overnight and anticipate d/c early AM if BP remains stable    Review of Systems   Constitutional: Negative for fever.   Respiratory: Negative for cough and shortness of breath.    Cardiovascular: Negative for chest pain and leg swelling.   Gastrointestinal: Negative for nausea and vomiting.   Genitourinary: Negative for dysuria.   Musculoskeletal: Negative for gait problem.   Neurological: Positive for light-headedness (resolved).   Psychiatric/Behavioral: The patient is nervous/anxious.      Objective:     Vital Signs (Most Recent):  Temp: 97.9 °F (36.6 °C) (07/19/20 1551)  Pulse: 61 (07/19/20 1551)  Resp: 16 (07/19/20 1551)  BP: 124/67 (07/19/20 1551)  SpO2: 97 % (07/19/20 1551) Vital Signs (24h Range):  Temp:  [97.3 °F (36.3 °C)-98.6 °F (37 °C)] 97.9 °F (36.6 °C)  Pulse:  [61-74] 61  Resp:  [16-18] 16  SpO2:  [93 %-100 %] 97 %  BP: ()/(52-79) 124/67     Weight: 63.5 kg (140 lb)  Body mass index is 24.8 kg/m².    Intake/Output Summary (Last 24 hours) at 7/19/2020 1616  Last data filed at 7/19/2020 1402  Gross per 24 hour   Intake 1730 ml   Output --   Net 1730 ml      Physical Exam  Constitutional:       General: She is not in acute distress.     Appearance: Normal appearance. She is not ill-appearing.   HENT:      Head: Normocephalic and atraumatic.   Eyes:      Extraocular Movements: Extraocular movements intact.   Cardiovascular:      Rate and Rhythm: Normal rate and regular rhythm.   Pulmonary:      Effort: Pulmonary effort is  normal. No respiratory distress.      Breath sounds: Normal breath sounds.   Abdominal:      General: Abdomen is flat. Bowel sounds are normal. There is no distension.      Tenderness: There is no abdominal tenderness. There is no guarding.   Musculoskeletal: Normal range of motion.         General: No swelling.   Skin:     General: Skin is warm.   Neurological:      General: No focal deficit present.      Mental Status: She is alert.   Psychiatric:         Mood and Affect: Mood normal.         Behavior: Behavior normal.         Significant Labs: All pertinent labs within the past 24 hours have been reviewed.    Significant Imaging: I have reviewed all pertinent imaging results/findings within the past 24 hours.      Assessment/Plan:      * Hypotension  -Pt. Presents with symptomatic hypotension after having her BP medications recently increased. No signs of hypovolemia on exam, no infectious symptoms to suggest sepsis or reported cardiac symptoms other than lightheadedness  -Hold BP medications and continue to monitor pt. Overnight. Gentle hydration given pt. History of HF  - BP improving, anticipate d/c in AM if BP continues to remain stable without IVF      COPD (chronic obstructive pulmonary disease)  -Pt. Stable on home 2L NC, no wheezes or acute SOB reported  -Duonebs PRN, continue home inhalers      Cor pulmonale, chronic    -Last TTE 4/2018 shows mildly decreased EF 50-55% and R ventricular enlargement  -No evidence of volume overload on exam  -Pt. Receiving fluids in ED, monitor volume status  -Repeat TTE ordered as pt. Has not had one in >2 yrs and reporting lightheadedness and had LE edema a few days ago. Ok to be done as outpatient if symptoms improve holding BP medications alone          VTE Risk Mitigation (From admission, onward)         Ordered     enoxaparin injection 40 mg  Every 24 hours      07/18/20 1723     IP VTE HIGH RISK PATIENT  Once      07/18/20 1723                    Discussed with  staff  Bailey Arriaza PA-C  Department of Hospital Medicine   [facility]

## 2020-07-19 NOTE — PT/OT/SLP EVAL
Occupational Therapy   Evaluation and Discharge Note    Name: Rochelle Epsinoza  MRN: [id number]  Admitting Diagnosis:  Hypotension      Recommendations:     Discharge Recommendations: home  Discharge Equipment Recommendations:  none  Barriers to discharge:  None    Assessment:     Rochelle Espinoza is a 64 y.o. female with a medical diagnosis of Hypotension. At this time, patient is functioning at their prior level of function and does not require further acute OT services.     Plan:     During this hospitalization, patient does not require further acute OT services.  Please re-consult if situation changes.    · Plan of Care Reviewed with: patient    Subjective     Chief Complaint: none  Patient/Family Comments/goals: ready to go home    Occupational Profile:  Living Environment: pt lives alone in a 2 story townhouse - bed/bath upstairs - stairs to gain access to 2nd floor with rail on L when ascending - has suction GB to use to stabilize when getting in/out tub  Previous level of function: independent  Equipment Used at home:  oxygen  Assistance upon Discharge: as needed    Pain/Comfort:  · Pain Rating 1: 0/10  · Pain Rating Post-Intervention 1: 0/10    Patients cultural, spiritual, Rastafarian conflicts given the current situation:      Objective:     Communicated with: nurse prior to session.  Patient found HOB elevated with oxygen, telemetry upon OT entry to room.    General Precautions: Standard, fall   Orthopedic Precautions:N/A   Braces: N/A     Occupational Performance:    Bed Mobility:    · Patient completed Rolling/Turning to Right with independence  · Patient completed Scooting/Bridging with independence  · Patient completed Supine to Sit with independence  · Patient completed Sit to Supine with independence    Functional Mobility/Transfers:  · Patient completed Sit <> Stand Transfer with independence  with  no assistive device   · Functional Mobility: Pt walked with PT in hallway (refer to PT francisca for distance)  without any AD or safety concerns - pt independent with ambulation    Activities of Daily Living:  · Grooming: independence    · Upper Body Dressing: independence gown  · Lower Body Dressing: independence josefa/doff socks    Cognitive/Visual Perceptual:  Cognitive/Psychosocial Skills:     -       Oriented to: Person, Place, Time and Situation   -       Follows Commands/attention:Follows multistep  commands  -       Communication: clear/fluent  -       Memory: No Deficits noted  -       Safety awareness/insight to disability: intact   -       Mood/Affect/Coping skills/emotional control: Appropriate to situation    Physical Exam:  Balance: -       good sitting and standing balance  Postural examination/scapula alignment:    -       No postural abnormalities identified  Skin integrity: Visible skin intact  Edema:  None noted  Dominant hand: -       right  Upper Extremity Range of Motion:     -       Right Upper Extremity: WFL  -       Left Upper Extremity: WFL  Upper Extremity Strength:    -       Right Upper Extremity: WFL  -       Left Upper Extremity: WFL   Strength:    -       Right Upper Extremity: WFL  -       Left Upper Extremity: WFL    AMPAC 6 Click ADL:  AMPAC Total Score: 24    Treatment & Education:  · Pt completed ADLs and func mobility activities for tx session as noted above  · Pt educated on role of OT and POC    Education:    Patient left HOB elevated with call button in reach    GOALS:   Multidisciplinary Problems     Occupational Therapy Goals     Not on file          Multidisciplinary Problems (Resolved)        Problem: Occupational Therapy Goal    Goal Priority Disciplines Outcome Interventions   Occupational Therapy Goal   (Resolved)     OT, PT/OT Met                    History:     Past Medical History:   Diagnosis Date    CHF (congestive heart failure)     COPD (chronic obstructive pulmonary disease)     Herpes zoster without mention of complication 2/21/2011    Hypertension     Leg  edema     Pulmonary hypertension        Past Surgical History:   Procedure Laterality Date    BREAST BIOPSY Right     core     SECTION      COLONOSCOPY N/A 2019    Procedure: COLONOSCOPY;  Surgeon: Rui Holder MD;  Location: [facility] ([address] [place] [address]);  Service: Endoscopy;  Laterality: N/A;    EPIDURAL STEROID INJECTION N/A 2020    Procedure: CERVICAL BLAKE;  Surgeon: Papito High MD;  Location: [facility];  Service: Pain Management;  Laterality: N/A;  NEEDS CONSENT    ESOPHAGOGASTRODUODENOSCOPY N/A 2019    Procedure: EGD (ESOPHAGOGASTRODUODENOSCOPY);  Surgeon: Rui Holder MD;  Location: [facility] ([address] [place] [address]);  Service: Endoscopy;  Laterality: N/A;  2L continuous O2 via NC, COPD, pulm HTN       Time Tracking:     OT Date of Treatment: 20  OT Start Time: 1121  OT Stop Time: 1130  OT Total Time (min): 9 min    Billable Minutes:Evaluation 9 - cotx with PT    Екатерина Anand, OT  2020

## 2020-07-19 NOTE — PLAN OF CARE
Problem: Physical Therapy Goal  Goal: Physical Therapy Goal  Description: Pt is IND w/ mobility and not appropriate for acute PT services at this time. Please reconsult if needed.     Outcome: Adequate for Care Transition     Tricia Garvin, PT  7/19/2020

## 2020-07-19 NOTE — ASSESSMENT & PLAN NOTE
-Pt. Presents with symptomatic hypotension after having her BP medications recently increased. No signs of hypovolemia on exam, no infectious symptoms to suggest sepsis or reported cardiac symptoms other than lightheadedness  -Hold BP medications and continue to monitor pt. Overnight. Gentle hydration given pt. History of HF  - BP improving, anticipate d/c in AM if BP continues to remain stable without IVF

## 2020-07-19 NOTE — HPI
"63 yo F with PMHx HTN, COPD on home 2L O2, tobacco abuse, HFrEF, and gout presents to the hospital complaining of low BP at home with associated weakness and lightheadedness. The patient reports that she takes her BP every morning. About a week ago, the patient reports that her BP was elevated to the 160's everytime she checked despite compliance with her losartan. She had a telephone encounter with her PCP Dr. Samuels 7/13 about her BP. She went to an urgent care that day, and her BP was recorded to be 161/92. She was instructed to increase her losartan to 100 mg and also start taking her HCTZ regularly. She had previously only been taking occasionally for "fluid". She states that she had lwer extremity edema as well on 7/13, but that has improved since she restarted the HCTZ. Two days later, the patient saw her OB/GYN, and her BP was 140/90 at that visit. The last couple of days, however, the patient reports that her BP has been low in the morning. Yesterday she reports it was in the 110"s systolic. Today when she woke up, she noted her BP was in the 80's systolic. She states that she felt very weak and lightheaded to the point of almost passing out. She also reports having a sensation that her heart was beating out her chest at one point. She denies any worsening SOB, chest pain, changes in PO intake, nausea, vomiting, cough, fevers, or chills.  "

## 2020-07-19 NOTE — ED NOTES
Tirso MENJIVAR contacted about pt continuing hypotension. MD states that it is a BP medication issue and not a volume issue. MD states parameter for BP is MAP greater than 60

## 2020-07-19 NOTE — NURSING
BP improved today, but pt nervous about what it will be like in the morning, as IVF has been d/c'd. Relayed to PA. WCTM over night, pt updated re: POC. No other acute issues. Safety maintained throughout. Fall precautions reviewed w/ pt who indicated understanding. See flowsheet for detail.

## 2020-07-19 NOTE — PLAN OF CARE
Problem: Occupational Therapy Goal  Goal: Occupational Therapy Goal  Outcome: Met     Pt's PLOF was I with ADLs and func mobility.  Pt completed OT/PT evaluation and noted to perform func mobility and ADLs with mod I to I.  Pt will have assistance/supervision as needed once discharged home.  Due to these factors, pt is discharged from OT services.  No DME or post acute OT required at this time.     Екатерина Anand, OT  7/19/2020

## 2020-07-20 VITALS
BODY MASS INDEX: 24.8 KG/M2 | OXYGEN SATURATION: 96 % | SYSTOLIC BLOOD PRESSURE: 126 MMHG | TEMPERATURE: 99 F | HEIGHT: 63 IN | HEART RATE: 57 BPM | RESPIRATION RATE: 16 BRPM | DIASTOLIC BLOOD PRESSURE: 66 MMHG | WEIGHT: 140 LBS

## 2020-07-20 LAB
ANION GAP SERPL CALC-SCNC: 7 MMOL/L (ref 8–16)
BASOPHILS # BLD AUTO: 0.02 K/UL (ref 0–0.2)
BASOPHILS NFR BLD: 0.3 % (ref 0–1.9)
BUN SERPL-MCNC: 11 MG/DL (ref 8–23)
CALCIUM SERPL-MCNC: 9 MG/DL (ref 8.7–10.5)
CHLORIDE SERPL-SCNC: 106 MMOL/L (ref 95–110)
CO2 SERPL-SCNC: 30 MMOL/L (ref 23–29)
CREAT SERPL-MCNC: 0.6 MG/DL (ref 0.5–1.4)
DIFFERENTIAL METHOD: ABNORMAL
EOSINOPHIL # BLD AUTO: 0.1 K/UL (ref 0–0.5)
EOSINOPHIL NFR BLD: 1 % (ref 0–8)
ERYTHROCYTE [DISTWIDTH] IN BLOOD BY AUTOMATED COUNT: 13.6 % (ref 11.5–14.5)
EST. GFR  (AFRICAN AMERICAN): >60 ML/MIN/1.73 M^2
EST. GFR  (NON AFRICAN AMERICAN): >60 ML/MIN/1.73 M^2
GLUCOSE SERPL-MCNC: 80 MG/DL (ref 70–110)
HCT VFR BLD AUTO: 41.9 % (ref 37–48.5)
HGB BLD-MCNC: 12.9 G/DL (ref 12–16)
IMM GRANULOCYTES # BLD AUTO: 0.02 K/UL (ref 0–0.04)
IMM GRANULOCYTES NFR BLD AUTO: 0.3 % (ref 0–0.5)
LYMPHOCYTES # BLD AUTO: 2.8 K/UL (ref 1–4.8)
LYMPHOCYTES NFR BLD: 38.7 % (ref 18–48)
MCH RBC QN AUTO: 28.3 PG (ref 27–31)
MCHC RBC AUTO-ENTMCNC: 30.8 G/DL (ref 32–36)
MCV RBC AUTO: 92 FL (ref 82–98)
MONOCYTES # BLD AUTO: 0.7 K/UL (ref 0.3–1)
MONOCYTES NFR BLD: 10 % (ref 4–15)
NEUTROPHILS # BLD AUTO: 3.5 K/UL (ref 1.8–7.7)
NEUTROPHILS NFR BLD: 49.7 % (ref 38–73)
NRBC BLD-RTO: 0 /100 WBC
PLATELET # BLD AUTO: 223 K/UL (ref 150–350)
PMV BLD AUTO: 12.1 FL (ref 9.2–12.9)
POTASSIUM SERPL-SCNC: 4.3 MMOL/L (ref 3.5–5.1)
RBC # BLD AUTO: 4.56 M/UL (ref 4–5.4)
SODIUM SERPL-SCNC: 143 MMOL/L (ref 136–145)
WBC # BLD AUTO: 7.11 K/UL (ref 3.9–12.7)

## 2020-07-20 PROCEDURE — 25000242 PHARM REV CODE 250 ALT 637 W/ HCPCS: Performed by: HOSPITALIST

## 2020-07-20 PROCEDURE — 99217 PR OBSERVATION CARE DISCHARGE: ICD-10-PCS | Mod: ,,, | Performed by: PHYSICIAN ASSISTANT

## 2020-07-20 PROCEDURE — 94761 N-INVAS EAR/PLS OXIMETRY MLT: CPT

## 2020-07-20 PROCEDURE — G0378 HOSPITAL OBSERVATION PER HR: HCPCS

## 2020-07-20 PROCEDURE — 99900035 HC TECH TIME PER 15 MIN (STAT)

## 2020-07-20 PROCEDURE — 85025 COMPLETE CBC W/AUTO DIFF WBC: CPT

## 2020-07-20 PROCEDURE — 99217 PR OBSERVATION CARE DISCHARGE: CPT | Mod: ,,, | Performed by: PHYSICIAN ASSISTANT

## 2020-07-20 PROCEDURE — 80048 BASIC METABOLIC PNL TOTAL CA: CPT

## 2020-07-20 PROCEDURE — 36415 COLL VENOUS BLD VENIPUNCTURE: CPT

## 2020-07-20 PROCEDURE — 27000221 HC OXYGEN, UP TO 24 HOURS

## 2020-07-20 PROCEDURE — 25000003 PHARM REV CODE 250: Performed by: HOSPITALIST

## 2020-07-20 RX ORDER — LOSARTAN POTASSIUM 100 MG/1
50 TABLET ORAL DAILY
Qty: 45 TABLET | Refills: 3 | Status: SHIPPED | OUTPATIENT
Start: 2020-07-20 | End: 2020-08-31

## 2020-07-20 RX ADMIN — PANTOPRAZOLE SODIUM 40 MG: 40 TABLET, DELAYED RELEASE ORAL at 09:07

## 2020-07-20 RX ADMIN — GABAPENTIN 100 MG: 100 CAPSULE ORAL at 09:07

## 2020-07-20 RX ADMIN — TIOTROPIUM BROMIDE 18 MCG: 18 CAPSULE ORAL; RESPIRATORY (INHALATION) at 07:07

## 2020-07-20 RX ADMIN — FLUTICASONE PROPIONATE 50 MCG: 50 SPRAY, METERED NASAL at 09:07

## 2020-07-20 NOTE — DISCHARGE SUMMARY
"[facility] Hospital Medicine  Discharge Summary      Patient Name: Rochelle Espinoza  MRN: [id number]  Admission Date: 7/18/2020  Hospital Length of Stay: 0 days  Discharge Date and Time: No discharge date for patient encounter.  Attending Physician: Khanh Martínez MD   Discharging Provider: Bailey Arriaza PA-C  Primary Care Provider: Yosi Samuels MD  [facility] Medicine Team: [facility] MED E Bailey Arriaza PA-C    HPI:   65 yo F with PMHx HTN, COPD on home 2L O2, tobacco abuse, HFrEF, and gout presents to the hospital complaining of low BP at home with associated weakness and lightheadedness. The patient reports that she takes her BP every morning. About a week ago, the patient reports that her BP was elevated to the 160's everytime she checked despite compliance with her losartan. She had a telephone encounter with her PCP Dr. Samuels 7/13 about her BP. She went to an urgent care that day, and her BP was recorded to be 161/92. She was instructed to increase her losartan to 100 mg and also start taking her HCTZ regularly. She had previously only been taking occasionally for "fluid". She states that she had lwer extremity edema as well on 7/13, but that has improved since she restarted the HCTZ. Two days later, the patient saw her OB/GYN, and her BP was 140/90 at that visit. The last couple of days, however, the patient reports that her BP has been low in the morning. Yesterday she reports it was in the 110"s systolic. Today when she woke up, she noted her BP was in the 80's systolic. She states that she felt very weak and lightheaded to the point of almost passing out. She also reports having a sensation that her heart was beating out her chest at one point. She denies any worsening SOB, chest pain, changes in PO intake, nausea, vomiting, cough, fevers, or chills.    * No surgery found *      Hospital Course:   Patient admitted for hypotension in setting of recent BP medication up-titration. No " signs of infection on arrival, 0/4 SIRS, CXR/UA without infectious etiology. BP medications held and given IVF with improvement in BP. Patient still eager about BP, reasonable to stop IVF and monitor BP. BP remained stable with MAPs 80s. Stable for d/c with instructions to reduce losartan back to 50mg and d/c HCTZ at d/c with instructions to keep BP log for PCP to review at f/u and cards f/u for CHF/fluid management. Return precautions discussed, no further questions at d/c      Consults:     * Hypotension  -Pt. Presents with symptomatic hypotension after having her BP medications recently increased. No signs of hypovolemia on exam, no infectious symptoms to suggest sepsis or reported cardiac symptoms other than lightheadedness  -Hold BP medications and continue to monitor pt. Overnight. Gentle hydration given pt. History of HF  - BP improved with IVF and remained stable MAPS in the 80s without IVF  Stable for dc with reduction of losartan back to 50mg daily, discontinue HCTZ; PCP f/u and Cards f/u to further discuss fluid mgmt (unclear why pt was on HCTZ and not lasix? She says she had been on lasix but doesn't know why this was stopped)      COPD (chronic obstructive pulmonary disease)  -Pt. Stable on home 2L NC, no wheezes or acute SOB reported  -Duonebs PRN, continue home inhalers      Cor pulmonale, chronic  CHF  -Last TTE 4/2018 shows mildly decreased EF 50-55% and R ventricular enlargement  -No evidence of volume overload on exam  -Pt. Receiving fluids in ED, monitor volume status  -Repeat TTE ordered as pt. Has not had one in >2 yrs and reporting lightheadedness and had LE edema a few days ago. Ok to be done as outpatient if symptoms improve holding BP medications alone    - repeat TTE shows pEF 60%, PAP 27mmHg, normal CVP; hypokinetic septal WMA- f/u with cards to further discuss ischemic work up; patient without CP      Final Active Diagnoses:    Diagnosis Date Noted POA    PRINCIPAL PROBLEM:  Hypotension  [I95.9] 07/18/2020 Yes    COPD (chronic obstructive pulmonary disease) [J44.9]  Yes    Cor pulmonale, chronic [I27.81] 04/10/2018 Yes    Chronic hypoxemic respiratory failure [J96.11] 07/18/2020 Yes    Essential hypertension [I10]  Yes      Problems Resolved During this Admission:       Discharged Condition: stable    Disposition: Home or Self Care    Follow Up:    Patient Instructions:      Ambulatory referral/consult to Cardiology   Standing Status: Future   Referral Priority: Routine Referral Type: Consultation   Referral Reason: Specialty Services Required   Requested Specialty: Cardiology   Number of Visits Requested: 1     Notify your health care provider if you experience any of the following:  temperature >100.4     Notify your health care provider if you experience any of the following:  redness, tenderness, or signs of infection (pain, swelling, redness, odor or green/yellow discharge around incision site)     Notify your health care provider if you experience any of the following:  difficulty breathing or increased cough     Notify your health care provider if you experience any of the following:  worsening rash     Notify your health care provider if you experience any of the following:  increased confusion or weakness     Notify your health care provider if you experience any of the following:  persistent dizziness, light-headedness, or visual disturbances     Activity as tolerated       Significant Diagnostic Studies: Labs:   Troponin   Recent Labs   Lab 07/18/20  1434   TROPONINI <0.006     Radiology: X-Ray: CXR:no convincing radiographic evidence of pneumonia, no consolidation    Pending Diagnostic Studies:     None         Medications:  Reconciled Home Medications:      Medication List      CHANGE how you take these medications    losartan 100 MG tablet  Commonly known as: COZAAR  Take 0.5 tablets (50 mg total) by mouth once daily.  What changed: how much to take        CONTINUE taking these  medications    albuterol 90 mcg/actuation inhaler  Commonly known as: PROVENTIL/VENTOLIN HFA  INHALE 2 PUFFS BY MOUTH EVERY 6 HOURS AS NEEDED     colchicine 0.6 mg tablet  Commonly known as: COLCRYS  TAKE 2 TABLETS BY MOUTH NOW, TAKE 1 TABLET BY MOUTH 1 HOUR LATER     * COMBIVENT RESPIMAT  mcg/actuation inhaler  Generic drug: ipratropium-albuteroL  INHALE 1 PUFF BY MOUTH FOUR TIMES DAILY FOR RESCUE     * albuterol-ipratropium 2.5 mg-0.5 mg/3 mL nebulizer solution  Commonly known as: DUO-NEB  Take 3 mLs by nebulization every 6 (six) hours as needed for Wheezing. Rescue     fluticasone propionate 50 mcg/actuation nasal spray  Commonly known as: FLONASE  1 spray (50 mcg total) by Each Nostril route once daily.     gabapentin 100 MG capsule  Commonly known as: NEURONTIN  Take 1 capsule (100 mg total) by mouth 3 (three) times daily.     glycerin adult suppository  Place 1 suppository rectally as needed for Constipation.     multivitamin capsule  Take 1 capsule by mouth once daily.     omeprazole 40 MG capsule  Commonly known as: PRILOSEC  Take 1 capsule (40 mg total) by mouth once daily.     potassium chloride SA 20 MEQ tablet  Commonly known as: K-DUR,KLOR-CON  TAKE 1 TABLET(20 MEQ) BY MOUTH EVERY DAY     umeclidinium-vilanteroL 62.5-25 mcg/actuation Dsdv  Commonly known as: ANORO ELLIPTA  Inhale 1 puff into the lungs once daily. Controller         * This list has 2 medication(s) that are the same as other medications prescribed for you. Read the directions carefully, and ask your doctor or other care provider to review them with you.            STOP taking these medications    hydroCHLOROthiazide 12.5 mg capsule  Commonly known as: MICROZIDE            Indwelling Lines/Drains at time of discharge:   Lines/Drains/Airways     None                 Time spent on the discharge of patient: >35 minutes  Patient was seen and examined on the date of discharge and determined to be suitable for discharge.       Discussed  with staff  Bailey Arriaza PA-C  Department of Hospital Medicine  [facility]

## 2020-07-20 NOTE — ASSESSMENT & PLAN NOTE
-Pt. Presents with symptomatic hypotension after having her BP medications recently increased. No signs of hypovolemia on exam, no infectious symptoms to suggest sepsis or reported cardiac symptoms other than lightheadedness  -Hold BP medications and continue to monitor pt. Overnight. Gentle hydration given pt. History of HF  - BP improved with IVF and remained stable MAPS in the 80s without IVF  Stable for dc with reduction of losartan back to 50mg daily, discontinue HCTZ; PCP f/u and Cards f/u to further discuss fluid mgmt (unclear why pt was on HCTZ and not lasix? She says she had been on lasix but doesn't know why this was stopped)

## 2020-07-20 NOTE — PLAN OF CARE
Pt on fall precautions. Yellow fall risk band and socks on pt. Instructed patient to call for assistance as needed and pt voiced understanding. O2/2l/NC maintained.  Afebrile.  Denies pain or discomfort. Will continue to assess patient's pain and also instructed pt to notify me of any pain.  Patient voiced understanding.

## 2020-07-20 NOTE — NURSING
Patient discharged to home. Discharge instructions given to patient who voiced understanding. IV removed catheter intact. Denies pain or discomfort. Respirations even and unlabored. No distress noted. Pt stable. Left unit in wheelchair on home O2 with escort to meet ride in front of hospital.

## 2020-07-20 NOTE — ASSESSMENT & PLAN NOTE
CHF  -Last TTE 4/2018 shows mildly decreased EF 50-55% and R ventricular enlargement  -No evidence of volume overload on exam  -Pt. Receiving fluids in ED, monitor volume status  -Repeat TTE ordered as pt. Has not had one in >2 yrs and reporting lightheadedness and had LE edema a few days ago. Ok to be done as outpatient if symptoms improve holding BP medications alone    - repeat TTE shows pEF 60%, PAP 27mmHg, normal CVP; hypokinetic septal WMA- f/u with cards to further discuss ischemic work up; patient without CP

## 2020-07-20 NOTE — PLAN OF CARE
07/20/20 1436   Final Note   Assessment Type Final Discharge Note   Anticipated Discharge Disposition Home   What phone number can be called within the next 1-3 days to see how you are doing after discharge? [phone]   Right Care Referral Info   Post Acute Recommendation No Care   Post-Acute Status   Post-Acute Authorization Other   Other Status No Post-Acute Service Needs

## 2020-07-20 NOTE — PLAN OF CARE
CM met with patient at the bedside to discuss discharge planning assessment. Pt lives alone and has transportation home at discharge.  Pt verified PCP and Pharmacy. CM will continue to follow for discharge needs.         07/20/20 1020   Discharge Assessment   Assessment Type Discharge Planning Assessment   Confirmed/corrected address and phone number on facesheet? Yes   Assessment information obtained from? Patient   Expected Length of Stay (days) 2   Communicated expected length of stay with patient/caregiver yes   Prior to hospitilization cognitive status: Alert/Oriented   Prior to hospitalization functional status: Independent   Current cognitive status: Alert/Oriented   Current Functional Status: Independent   Lives With alone   Able to Return to Prior Arrangements yes   Is patient able to care for self after discharge? Yes   Patient's perception of discharge disposition home or selfcare   Readmission Within the Last 30 Days no previous admission in last 30 days   Patient currently being followed by outpatient case management? No   Patient currently receives any other outside agency services? No   Equipment Currently Used at Home none   Do you have any problems affording any of your prescribed medications? No   Is the patient taking medications as prescribed? yes   Does the patient have transportation home? Yes   Transportation Anticipated family or friend will provide   Does the patient receive services at the Coumadin Clinic? No   Discharge Plan A Home   Discharge Plan B Home with family   DME Needed Upon Discharge  oxygen;nebulizer   Patient/Family in Agreement with Plan yes

## 2020-07-22 ENCOUNTER — HOSPITAL ENCOUNTER (OUTPATIENT)
Dept: RADIOLOGY | Facility: HOSPITAL | Age: 65
Discharge: HOME | End: 2020-07-22
Attending: INTERNAL MEDICINE
Payer: COMMERCIAL

## 2020-07-22 DIAGNOSIS — Z12.39 BREAST CANCER SCREENING: ICD-10-CM

## 2020-07-22 LAB
HPV HR 12 DNA SPEC QL NAA+PROBE: NEGATIVE
HPV16 AG SPEC QL: NEGATIVE
HPV18 DNA SPEC QL NAA+PROBE: NEGATIVE

## 2020-07-22 PROCEDURE — 77067 MAMMO DIGITAL SCREENING BILAT WITH TOMOSYNTHESIS_CAD: ICD-10-PCS | Mod: 26,,, | Performed by: RADIOLOGY

## 2020-07-22 PROCEDURE — 77063 MAMMO DIGITAL SCREENING BILAT WITH TOMOSYNTHESIS_CAD: ICD-10-PCS | Mod: 26,,, | Performed by: RADIOLOGY

## 2020-07-22 PROCEDURE — 77067 SCR MAMMO BI INCL CAD: CPT | Mod: 26,,, | Performed by: RADIOLOGY

## 2020-07-22 PROCEDURE — 77067 SCR MAMMO BI INCL CAD: CPT | Mod: TC

## 2020-07-22 PROCEDURE — 77063 BREAST TOMOSYNTHESIS BI: CPT | Mod: 26,,, | Performed by: RADIOLOGY

## 2020-07-27 ENCOUNTER — OFFICE VISIT (OUTPATIENT)
Dept: CARDIOLOGY | Facility: CLINIC | Age: 65
End: 2020-07-27
Payer: COMMERCIAL

## 2020-07-27 VITALS
HEART RATE: 78 BPM | DIASTOLIC BLOOD PRESSURE: 84 MMHG | SYSTOLIC BLOOD PRESSURE: 140 MMHG | OXYGEN SATURATION: 97 % | BODY MASS INDEX: 25.39 KG/M2 | HEIGHT: 63 IN | WEIGHT: 143.31 LBS

## 2020-07-27 DIAGNOSIS — R07.9 CHEST PAIN, UNSPECIFIED TYPE: ICD-10-CM

## 2020-07-27 DIAGNOSIS — Z72.0 TOBACCO ABUSE: ICD-10-CM

## 2020-07-27 DIAGNOSIS — I10 ESSENTIAL HYPERTENSION: ICD-10-CM

## 2020-07-27 DIAGNOSIS — I50.20 HFREF (HEART FAILURE WITH REDUCED EJECTION FRACTION): ICD-10-CM

## 2020-07-27 LAB
FINAL PATHOLOGIC DIAGNOSIS: NORMAL
Lab: NORMAL

## 2020-07-27 PROCEDURE — 99999 PR PBB SHADOW E&M-EST. PATIENT-LVL V: CPT | Mod: PBBFAC,,, | Performed by: STUDENT IN AN ORGANIZED HEALTH CARE EDUCATION/TRAINING PROGRAM

## 2020-07-27 PROCEDURE — 99213 PR OFFICE/OUTPT VISIT, EST, LEVL III, 20-29 MIN: ICD-10-PCS | Mod: S$GLB,,, | Performed by: STUDENT IN AN ORGANIZED HEALTH CARE EDUCATION/TRAINING PROGRAM

## 2020-07-27 PROCEDURE — 3079F DIAST BP 80-89 MM HG: CPT | Mod: CPTII,S$GLB,, | Performed by: STUDENT IN AN ORGANIZED HEALTH CARE EDUCATION/TRAINING PROGRAM

## 2020-07-27 PROCEDURE — 99999 PR PBB SHADOW E&M-EST. PATIENT-LVL V: ICD-10-PCS | Mod: PBBFAC,,, | Performed by: STUDENT IN AN ORGANIZED HEALTH CARE EDUCATION/TRAINING PROGRAM

## 2020-07-27 PROCEDURE — 99213 OFFICE O/P EST LOW 20 MIN: CPT | Mod: S$GLB,,, | Performed by: STUDENT IN AN ORGANIZED HEALTH CARE EDUCATION/TRAINING PROGRAM

## 2020-07-27 PROCEDURE — 3077F SYST BP >= 140 MM HG: CPT | Mod: CPTII,S$GLB,, | Performed by: STUDENT IN AN ORGANIZED HEALTH CARE EDUCATION/TRAINING PROGRAM

## 2020-07-27 PROCEDURE — 3008F PR BODY MASS INDEX (BMI) DOCUMENTED: ICD-10-PCS | Mod: CPTII,S$GLB,, | Performed by: STUDENT IN AN ORGANIZED HEALTH CARE EDUCATION/TRAINING PROGRAM

## 2020-07-27 PROCEDURE — 3008F BODY MASS INDEX DOCD: CPT | Mod: CPTII,S$GLB,, | Performed by: STUDENT IN AN ORGANIZED HEALTH CARE EDUCATION/TRAINING PROGRAM

## 2020-07-27 PROCEDURE — 3079F PR MOST RECENT DIASTOLIC BLOOD PRESSURE 80-89 MM HG: ICD-10-PCS | Mod: CPTII,S$GLB,, | Performed by: STUDENT IN AN ORGANIZED HEALTH CARE EDUCATION/TRAINING PROGRAM

## 2020-07-27 PROCEDURE — 3077F PR MOST RECENT SYSTOLIC BLOOD PRESSURE >= 140 MM HG: ICD-10-PCS | Mod: CPTII,S$GLB,, | Performed by: STUDENT IN AN ORGANIZED HEALTH CARE EDUCATION/TRAINING PROGRAM

## 2020-07-27 NOTE — ASSESSMENT & PLAN NOTE
-no medication changes (c/w losartan 50 mg QD)  -instructed pt to keep BP log and call clinic with BP readings after a week  -instructed pt to adopt low sodium diet and quit smoking

## 2020-07-27 NOTE — ASSESSMENT & PLAN NOTE
-patient with risk factors for CAD including age, family history, active smoker  -TTE called WMA of the ismael-septal and infero-septal regions; EKG shows sinus rhythm with possible septal infarct vs poor R wave progression  -she has been experiencing CP for past 1 week  -ordered SPECT nuclear stress test to evaluate for myocardial ischemia

## 2020-07-27 NOTE — PROGRESS NOTES
I have personally taken the history and examined this patient and agree with the fellow's note as stated above. Has had some exertional chest tightness since being seen in the ED. BP at home has been higher since medication adjustment. She will continue to pattern. Stress test ordered.

## 2020-07-27 NOTE — PROGRESS NOTES
Cardiology Clinic Note  Reason for Visit: Hospital Follow Up    HPI:   64 year old female with history of HTN, COPD on home 2L O2, tobacco use and HFrEF who presents to the clinic for follow up after recent hospital visit. She had presented to the ER after having weakness, light-headedness and presyncopal event. Her BP was checked and systolic was 80 mmHg. She was given IVF and her BP improved. Infectious workup was unremarkable. Her home anti-hypertensive medications were adjusted. She takes losartan 50 mg QD and was told to stop taking HCTZ. She was instructed to keep BP log.     For the past week she reports having a constellation of symptoms including fatigue, chest tightness (worse with movement and reproducible to palpation of chest wall) and on occasion shortness of breath.     She continues to smoke 15 cigarettes a day.     She denies personal history of CAD. No prior stress tests. Family history (mother and brother) have CAD.     EKG: NSR with borderline criteria of septal infarct versus poor R wave progression    TTE:  ·  Normal left ventricular systolic function. The estimated ejection fraction is 60%.  · Septal wall has abnormal motion.  · Local segmental wall motion abnormalities. (Hypokinesis of the basal anteroseptal, mid inferior, mid inferior septal)   · Normal LV diastolic function.  · Normal right ventricular systolic function.  · Normal central venous pressure (3 mmHg).  · The estimated PA systolic pressure is 27 mmHg.    ROS:    Constitution: Negative for fever, chills, weight loss or gain.   HENT: Negative for sore throat, rhinorrhea, or headache.  Eyes: Negative for blurred or double vision.   Cardiovascular: Positive for chest pain.  Pulmonary: Positive for SOB.   Gastrointestinal: Negative for abdominal pain, nausea, vomiting, or diarrhea.   Neurological: Negative for focal weakness or sensory changes.  PMH:     Past Medical History:   Diagnosis Date    CHF (congestive heart failure)      COPD (chronic obstructive pulmonary disease)     Herpes zoster without mention of complication 2011    Hypertension     Leg edema     Pulmonary hypertension      Past Surgical History:   Procedure Laterality Date    BREAST BIOPSY Right     core     SECTION      COLONOSCOPY N/A 2019    Procedure: COLONOSCOPY;  Surgeon: Rui Holder MD;  Location: [facility] (2ND FLR);  Service: Endoscopy;  Laterality: N/A;    EPIDURAL STEROID INJECTION N/A 2020    Procedure: CERVICAL BLAKE;  Surgeon: Papito High MD;  Location: [facility] PAIN MGT;  Service: Pain Management;  Laterality: N/A;  NEEDS CONSENT    ESOPHAGOGASTRODUODENOSCOPY N/A 2019    Procedure: EGD (ESOPHAGOGASTRODUODENOSCOPY);  Surgeon: Rui Holder MD;  [facility]  Service: Endoscopy;  Laterality: N/A;  2L continuous O2 via NC, COPD, pulm HTN     Allergies:     Review of patient's allergies indicates:   Allergen Reactions    Orange juice      Swelling in pt tongue       Medications:     Current Outpatient Medications on File Prior to Visit   Medication Sig Dispense Refill    albuterol (PROVENTIL/VENTOLIN HFA) 90 mcg/actuation inhaler INHALE 2 PUFFS BY MOUTH EVERY 6 HOURS AS NEEDED 18 g 4    albuterol-ipratropium (DUO-NEB) 2.5 mg-0.5 mg/3 mL nebulizer solution Take 3 mLs by nebulization every 6 (six) hours as needed for Wheezing. Rescue 180 mL 3    colchicine (COLCRYS) 0.6 mg tablet TAKE 2 TABLETS BY MOUTH NOW, TAKE 1 TABLET BY MOUTH 1 HOUR LATER 30 tablet 2    COMBIVENT RESPIMAT  mcg/actuation inhaler INHALE 1 PUFF BY MOUTH FOUR TIMES DAILY FOR RESCUE (Patient not taking: Reported on 2020) 4 g 0    fluticasone propionate (FLONASE) 50 mcg/actuation nasal spray 1 spray (50 mcg total) by Each Nostril route once daily. 9.9 mL 0    gabapentin (NEURONTIN) 100 MG capsule Take 1 capsule (100 mg total) by mouth 3 (three) times daily. 90 capsule 0    glycerin adult suppository Place 1 suppository  "rectally as needed for Constipation.      losartan (COZAAR) 100 MG tablet Take 0.5 tablets (50 mg total) by mouth once daily. 45 tablet 3    multivitamin capsule Take 1 capsule by mouth once daily.      omeprazole (PRILOSEC) 40 MG capsule Take 1 capsule (40 mg total) by mouth once daily. 90 capsule 3    potassium chloride SA (K-DUR,KLOR-CON) 20 MEQ tablet TAKE 1 TABLET(20 MEQ) BY MOUTH EVERY DAY 30 tablet 7    umeclidinium-vilanteroL (ANORO ELLIPTA) 62.5-25 mcg/actuation DsDv Inhale 1 puff into the lungs once daily. Controller 1 each 11     No current facility-administered medications on file prior to visit.      Social History:     Social History     Tobacco Use    Smoking status: Current Every Day Smoker     Packs/day: 1.00     Years: 40.00     Pack years: 40.00     Types: Cigarettes    Smokeless tobacco: Never Used   Substance Use Topics    Alcohol use: Yes     Frequency: Monthly or less     Drinks per session: 3 or 4     Binge frequency: Never     Comment: beer daily 2-3 daily, none today     Family History:     Family History   Problem Relation Age of Onset    Heart disease Mother     Heart disease Paternal Uncle     Celiac disease Neg Hx     Colon cancer Neg Hx     Colon polyps Neg Hx     Crohn's disease Neg Hx     Cystic fibrosis Neg Hx     Esophageal cancer Neg Hx     Inflammatory bowel disease Neg Hx     Irritable bowel syndrome Neg Hx     Liver cancer Neg Hx     Liver disease Neg Hx     Rectal cancer Neg Hx     Stomach cancer Neg Hx     Ulcerative colitis Neg Hx      Physical Exam:   BP (!) 140/84   Pulse 78   Ht 5' 3" (1.6 m)   Wt 65 kg (143 lb 4.8 oz)   LMP 11/21/2013   SpO2 97%   BMI 25.38 kg/m²      Constitutional: NAD, conversant  HEENT: Sclera anicteric, PERRLA, EOMI  Neck: No JVD, no carotid bruits  CV: RRR, no murmur, normal S1/S2  Pulm: CTAB, no wheezes, rales, or rhonchi  GI: Abdomen soft, NTND, +BS  Extremities: No LE edema, warm and well perfused  Skin: No " ecchymosis, erythema, or ulcers  Psych: AOx3, appropriate affect  Neuro: CNII-XII intact, no focal deficits    Labs:     Lab Results   Component Value Date     07/20/2020    K 4.3 07/20/2020     07/20/2020    CO2 30 (H) 07/20/2020    BUN 11 07/20/2020    CREATININE 0.6 07/20/2020    ANIONGAP 7 (L) 07/20/2020     Lab Results   Component Value Date    HGBA1C 5.2 04/11/2018     Lab Results   Component Value Date    BNP <10 08/27/2019    BNP <10 07/13/2019     (H) 04/10/2018    Lab Results   Component Value Date    WBC 7.11 07/20/2020    HGB 12.9 07/20/2020    HCT 41.9 07/20/2020     07/20/2020    GRAN 3.5 07/20/2020    GRAN 49.7 07/20/2020     Lab Results   Component Value Date    CHOL 189 03/16/2019    HDL 84 (H) 03/16/2019    LDLCALC 94.4 03/16/2019    TRIG 53 03/16/2019          Imaging:     See HPI     Assessment/Plan:   Tobacco abuse  -counseled patient on smoking cessation    Chest pain  -patient with risk factors for CAD including age, family history, active smoker  -TTE called WMA of the ismael-septal and infero-septal regions; EKG shows sinus rhythm with possible septal infarct vs poor R wave progression  -she has been experiencing CP for past 1 week  -ordered SPECT nuclear stress test to evaluate for myocardial ischemia    Essential hypertension  -no medication changes (c/w losartan 50 mg QD)  -instructed pt to keep BP log and call clinic with BP readings after a week  -instructed pt to adopt low sodium diet and quit smoking     Signed:  Marga Dash DO  Cardiology Fellow  7/27/2020 8:13 AM

## 2020-08-02 ENCOUNTER — HOSPITAL ENCOUNTER (OUTPATIENT)
Facility: HOSPITAL | Age: 65
Discharge: HOME | End: 2020-08-03
Attending: EMERGENCY MEDICINE | Admitting: EMERGENCY MEDICINE
Payer: MEDICARE

## 2020-08-02 DIAGNOSIS — J44.1 COPD WITH ACUTE EXACERBATION: Primary | ICD-10-CM

## 2020-08-02 DIAGNOSIS — Z72.0 TOBACCO ABUSE: ICD-10-CM

## 2020-08-02 DIAGNOSIS — I10 ESSENTIAL HYPERTENSION: ICD-10-CM

## 2020-08-02 DIAGNOSIS — R07.9 CHEST PAIN: ICD-10-CM

## 2020-08-02 DIAGNOSIS — J20.9 ACUTE BRONCHITIS, UNSPECIFIED ORGANISM: ICD-10-CM

## 2020-08-02 LAB
ALBUMIN SERPL BCP-MCNC: 4 G/DL (ref 3.5–5.2)
ALLENS TEST: ABNORMAL
ALP SERPL-CCNC: 50 U/L (ref 55–135)
ALT SERPL W/O P-5'-P-CCNC: 14 U/L (ref 10–44)
ANION GAP SERPL CALC-SCNC: 7 MMOL/L (ref 8–16)
AST SERPL-CCNC: 20 U/L (ref 10–40)
BACTERIA #/AREA URNS AUTO: NORMAL /HPF
BASOPHILS # BLD AUTO: 0.03 K/UL (ref 0–0.2)
BASOPHILS NFR BLD: 0.3 % (ref 0–1.9)
BILIRUB SERPL-MCNC: 0.5 MG/DL (ref 0.1–1)
BILIRUB UR QL STRIP: NEGATIVE
BNP SERPL-MCNC: 24 PG/ML (ref 0–99)
BUN SERPL-MCNC: 7 MG/DL (ref 8–23)
CALCIUM SERPL-MCNC: 9.5 MG/DL (ref 8.7–10.5)
CHLORIDE SERPL-SCNC: 107 MMOL/L (ref 95–110)
CK SERPL-CCNC: 112 U/L (ref 20–180)
CLARITY UR REFRACT.AUTO: CLEAR
CO2 SERPL-SCNC: 29 MMOL/L (ref 23–29)
COLOR UR AUTO: YELLOW
CREAT SERPL-MCNC: 0.7 MG/DL (ref 0.5–1.4)
CRP SERPL-MCNC: 1.4 MG/L (ref 0–8.2)
DIFFERENTIAL METHOD: ABNORMAL
EOSINOPHIL # BLD AUTO: 0.2 K/UL (ref 0–0.5)
EOSINOPHIL NFR BLD: 2.8 % (ref 0–8)
ERYTHROCYTE [DISTWIDTH] IN BLOOD BY AUTOMATED COUNT: 13.7 % (ref 11.5–14.5)
EST. GFR  (AFRICAN AMERICAN): >60 ML/MIN/1.73 M^2
EST. GFR  (NON AFRICAN AMERICAN): >60 ML/MIN/1.73 M^2
FERRITIN SERPL-MCNC: 290 NG/ML (ref 20–300)
GLUCOSE SERPL-MCNC: 84 MG/DL (ref 70–110)
GLUCOSE UR QL STRIP: NEGATIVE
HCO3 UR-SCNC: 33.4 MMOL/L (ref 24–28)
HCT VFR BLD AUTO: 42.6 % (ref 37–48.5)
HGB BLD-MCNC: 13.5 G/DL (ref 12–16)
HGB UR QL STRIP: NEGATIVE
IMM GRANULOCYTES # BLD AUTO: 0.02 K/UL (ref 0–0.04)
IMM GRANULOCYTES NFR BLD AUTO: 0.2 % (ref 0–0.5)
KETONES UR QL STRIP: NEGATIVE
LACTATE SERPL-SCNC: 0.7 MMOL/L (ref 0.5–2.2)
LDH SERPL L TO P-CCNC: 207 U/L (ref 110–260)
LEUKOCYTE ESTERASE UR QL STRIP: NEGATIVE
LIPASE SERPL-CCNC: 21 U/L (ref 4–60)
LYMPHOCYTES # BLD AUTO: 3.2 K/UL (ref 1–4.8)
LYMPHOCYTES NFR BLD: 36.7 % (ref 18–48)
MCH RBC QN AUTO: 28.5 PG (ref 27–31)
MCHC RBC AUTO-ENTMCNC: 31.7 G/DL (ref 32–36)
MCV RBC AUTO: 90 FL (ref 82–98)
MICROSCOPIC COMMENT: NORMAL
MONOCYTES # BLD AUTO: 0.7 K/UL (ref 0.3–1)
MONOCYTES NFR BLD: 8.4 % (ref 4–15)
NEUTROPHILS # BLD AUTO: 4.5 K/UL (ref 1.8–7.7)
NEUTROPHILS NFR BLD: 51.6 % (ref 38–73)
NITRITE UR QL STRIP: NEGATIVE
NRBC BLD-RTO: 0 /100 WBC
PCO2 BLDA: 61.5 MMHG (ref 35–45)
PH SMN: 7.34 [PH] (ref 7.35–7.45)
PH UR STRIP: 7 [PH] (ref 5–8)
PLATELET # BLD AUTO: 236 K/UL (ref 150–350)
PMV BLD AUTO: 12.1 FL (ref 9.2–12.9)
PO2 BLDA: 44 MMHG (ref 40–60)
POC BE: 8 MMOL/L
POC SATURATED O2: 75 % (ref 95–100)
POC TCO2: 35 MMOL/L (ref 24–29)
POTASSIUM SERPL-SCNC: 4.1 MMOL/L (ref 3.5–5.1)
PROT SERPL-MCNC: 7.3 G/DL (ref 6–8.4)
PROT UR QL STRIP: NEGATIVE
RBC # BLD AUTO: 4.73 M/UL (ref 4–5.4)
RBC #/AREA URNS AUTO: 1 /HPF (ref 0–4)
SAMPLE: ABNORMAL
SARS-COV-2 RDRP RESP QL NAA+PROBE: NEGATIVE
SITE: ABNORMAL
SODIUM SERPL-SCNC: 143 MMOL/L (ref 136–145)
SP GR UR STRIP: 1.03 (ref 1–1.03)
SQUAMOUS #/AREA URNS AUTO: 8 /HPF
TROPONIN I SERPL DL<=0.01 NG/ML-MCNC: <0.006 NG/ML (ref 0–0.03)
TROPONIN I SERPL DL<=0.01 NG/ML-MCNC: <0.006 NG/ML (ref 0–0.03)
URN SPEC COLLECT METH UR: NORMAL
WBC # BLD AUTO: 8.62 K/UL (ref 3.9–12.7)
WBC #/AREA URNS AUTO: 1 /HPF (ref 0–5)

## 2020-08-02 PROCEDURE — 63600175 PHARM REV CODE 636 W HCPCS: Performed by: EMERGENCY MEDICINE

## 2020-08-02 PROCEDURE — 96374 THER/PROPH/DIAG INJ IV PUSH: CPT | Mod: HCNC

## 2020-08-02 PROCEDURE — 25000242 PHARM REV CODE 250 ALT 637 W/ HCPCS: Performed by: EMERGENCY MEDICINE

## 2020-08-02 PROCEDURE — 93005 ELECTROCARDIOGRAM TRACING: CPT

## 2020-08-02 PROCEDURE — 25500020 PHARM REV CODE 255: Mod: HCNC | Performed by: EMERGENCY MEDICINE

## 2020-08-02 PROCEDURE — 99285 PR EMERGENCY DEPT VISIT,LEVEL V: ICD-10-PCS | Mod: ,,, | Performed by: EMERGENCY MEDICINE

## 2020-08-02 PROCEDURE — 99285 EMERGENCY DEPT VISIT HI MDM: CPT | Mod: 25,HCNC

## 2020-08-02 PROCEDURE — 83880 ASSAY OF NATRIURETIC PEPTIDE: CPT

## 2020-08-02 PROCEDURE — U0002 COVID-19 LAB TEST NON-CDC: HCPCS

## 2020-08-02 PROCEDURE — 83690 ASSAY OF LIPASE: CPT

## 2020-08-02 PROCEDURE — 99285 EMERGENCY DEPT VISIT HI MDM: CPT | Mod: ,,, | Performed by: EMERGENCY MEDICINE

## 2020-08-02 PROCEDURE — 25000242 PHARM REV CODE 250 ALT 637 W/ HCPCS: Performed by: PHYSICIAN ASSISTANT

## 2020-08-02 PROCEDURE — S4991 NICOTINE PATCH NONLEGEND: HCPCS | Mod: HCNC | Performed by: PHYSICIAN ASSISTANT

## 2020-08-02 PROCEDURE — 25000003 PHARM REV CODE 250: Performed by: PHYSICIAN ASSISTANT

## 2020-08-02 PROCEDURE — G0378 HOSPITAL OBSERVATION PER HR: HCPCS

## 2020-08-02 PROCEDURE — 83605 ASSAY OF LACTIC ACID: CPT

## 2020-08-02 PROCEDURE — 99220 PR INITIAL OBSERVATION CARE,LEVL III: ICD-10-PCS | Mod: HCNC,,, | Performed by: PHYSICIAN ASSISTANT

## 2020-08-02 PROCEDURE — 94799 UNLISTED PULMONARY SVC/PX: CPT

## 2020-08-02 PROCEDURE — 82728 ASSAY OF FERRITIN: CPT

## 2020-08-02 PROCEDURE — 83615 LACTATE (LD) (LDH) ENZYME: CPT

## 2020-08-02 PROCEDURE — 94761 N-INVAS EAR/PLS OXIMETRY MLT: CPT

## 2020-08-02 PROCEDURE — 63600175 PHARM REV CODE 636 W HCPCS: Mod: HCNC | Performed by: EMERGENCY MEDICINE

## 2020-08-02 PROCEDURE — 93010 ELECTROCARDIOGRAM REPORT: CPT | Mod: ,,, | Performed by: INTERNAL MEDICINE

## 2020-08-02 PROCEDURE — 94640 AIRWAY INHALATION TREATMENT: CPT

## 2020-08-02 PROCEDURE — 81001 URINALYSIS AUTO W/SCOPE: CPT

## 2020-08-02 PROCEDURE — 99900035 HC TECH TIME PER 15 MIN (STAT)

## 2020-08-02 PROCEDURE — 96375 TX/PRO/DX INJ NEW DRUG ADDON: CPT | Mod: 59 | Performed by: EMERGENCY MEDICINE

## 2020-08-02 PROCEDURE — 27000221 HC OXYGEN, UP TO 24 HOURS

## 2020-08-02 PROCEDURE — 82803 BLOOD GASES ANY COMBINATION: CPT

## 2020-08-02 PROCEDURE — 25000003 PHARM REV CODE 250: Mod: HCNC | Performed by: EMERGENCY MEDICINE

## 2020-08-02 PROCEDURE — 84484 ASSAY OF TROPONIN QUANT: CPT

## 2020-08-02 PROCEDURE — 96375 TX/PRO/DX INJ NEW DRUG ADDON: CPT | Mod: HCNC

## 2020-08-02 PROCEDURE — 82550 ASSAY OF CK (CPK): CPT

## 2020-08-02 PROCEDURE — 93010 EKG 12-LEAD: ICD-10-PCS | Mod: ,,, | Performed by: INTERNAL MEDICINE

## 2020-08-02 PROCEDURE — 25000242 PHARM REV CODE 250 ALT 637 W/ HCPCS: Mod: HCNC | Performed by: EMERGENCY MEDICINE

## 2020-08-02 PROCEDURE — 86140 C-REACTIVE PROTEIN: CPT

## 2020-08-02 PROCEDURE — 25000003 PHARM REV CODE 250: Performed by: EMERGENCY MEDICINE

## 2020-08-02 PROCEDURE — 99220 PR INITIAL OBSERVATION CARE,LEVL III: CPT | Mod: HCNC,,, | Performed by: PHYSICIAN ASSISTANT

## 2020-08-02 PROCEDURE — 80053 COMPREHEN METABOLIC PANEL: CPT

## 2020-08-02 PROCEDURE — 85025 COMPLETE CBC W/AUTO DIFF WBC: CPT

## 2020-08-02 PROCEDURE — 25000003 PHARM REV CODE 250: Mod: HCNC | Performed by: PHYSICIAN ASSISTANT

## 2020-08-02 RX ORDER — HYDROCODONE BITARTRATE AND ACETAMINOPHEN 5; 325 MG/1; MG/1
1 TABLET ORAL EVERY 6 HOURS PRN
Status: DISCONTINUED | OUTPATIENT
Start: 2020-08-02 | End: 2020-08-03 | Stop reason: HOSPADM

## 2020-08-02 RX ORDER — FLUTICASONE FUROATE AND VILANTEROL 200; 25 UG/1; UG/1
1 POWDER RESPIRATORY (INHALATION) DAILY
Status: DISCONTINUED | OUTPATIENT
Start: 2020-08-03 | End: 2020-08-03 | Stop reason: HOSPADM

## 2020-08-02 RX ORDER — PANTOPRAZOLE SODIUM 40 MG/1
40 TABLET, DELAYED RELEASE ORAL DAILY
Status: DISCONTINUED | OUTPATIENT
Start: 2020-08-02 | End: 2020-08-03 | Stop reason: HOSPADM

## 2020-08-02 RX ORDER — SODIUM CHLORIDE 0.9 % (FLUSH) 0.9 %
5 SYRINGE (ML) INJECTION
Status: DISCONTINUED | OUTPATIENT
Start: 2020-08-02 | End: 2020-08-03 | Stop reason: HOSPADM

## 2020-08-02 RX ORDER — IBUPROFEN 200 MG
24 TABLET ORAL
Status: DISCONTINUED | OUTPATIENT
Start: 2020-08-02 | End: 2020-08-03 | Stop reason: HOSPADM

## 2020-08-02 RX ORDER — CEFTRIAXONE 1 G/1
1 INJECTION, POWDER, FOR SOLUTION INTRAMUSCULAR; INTRAVENOUS
Status: COMPLETED | OUTPATIENT
Start: 2020-08-02 | End: 2020-08-02

## 2020-08-02 RX ORDER — IBUPROFEN 200 MG
1 TABLET ORAL DAILY
Status: DISCONTINUED | OUTPATIENT
Start: 2020-08-02 | End: 2020-08-03 | Stop reason: HOSPADM

## 2020-08-02 RX ORDER — ASPIRIN 325 MG
325 TABLET ORAL
Status: COMPLETED | OUTPATIENT
Start: 2020-08-02 | End: 2020-08-02

## 2020-08-02 RX ORDER — TALC
9 POWDER (GRAM) TOPICAL NIGHTLY PRN
Status: DISCONTINUED | OUTPATIENT
Start: 2020-08-02 | End: 2020-08-03 | Stop reason: HOSPADM

## 2020-08-02 RX ORDER — METHYLPREDNISOLONE SOD SUCC 125 MG
125 VIAL (EA) INJECTION
Status: COMPLETED | OUTPATIENT
Start: 2020-08-02 | End: 2020-08-02

## 2020-08-02 RX ORDER — BISACODYL 10 MG
10 SUPPOSITORY, RECTAL RECTAL DAILY PRN
Status: DISCONTINUED | OUTPATIENT
Start: 2020-08-02 | End: 2020-08-03 | Stop reason: HOSPADM

## 2020-08-02 RX ORDER — ONDANSETRON 8 MG/1
8 TABLET, ORALLY DISINTEGRATING ORAL EVERY 8 HOURS PRN
Status: DISCONTINUED | OUTPATIENT
Start: 2020-08-02 | End: 2020-08-03 | Stop reason: HOSPADM

## 2020-08-02 RX ORDER — GLUCAGON 1 MG
1 KIT INJECTION
Status: DISCONTINUED | OUTPATIENT
Start: 2020-08-02 | End: 2020-08-03 | Stop reason: HOSPADM

## 2020-08-02 RX ORDER — IPRATROPIUM BROMIDE AND ALBUTEROL SULFATE 2.5; .5 MG/3ML; MG/3ML
3 SOLUTION RESPIRATORY (INHALATION)
Status: COMPLETED | OUTPATIENT
Start: 2020-08-02 | End: 2020-08-02

## 2020-08-02 RX ORDER — ACETAMINOPHEN 325 MG/1
650 TABLET ORAL EVERY 8 HOURS PRN
Status: DISCONTINUED | OUTPATIENT
Start: 2020-08-02 | End: 2020-08-03 | Stop reason: HOSPADM

## 2020-08-02 RX ORDER — MAG HYDROX/ALUMINUM HYD/SIMETH 200-200-20
30 SUSPENSION, ORAL (FINAL DOSE FORM) ORAL
Status: DISCONTINUED | OUTPATIENT
Start: 2020-08-02 | End: 2020-08-03 | Stop reason: HOSPADM

## 2020-08-02 RX ORDER — GABAPENTIN 100 MG/1
100 CAPSULE ORAL 3 TIMES DAILY
Status: DISCONTINUED | OUTPATIENT
Start: 2020-08-02 | End: 2020-08-03 | Stop reason: HOSPADM

## 2020-08-02 RX ORDER — AZITHROMYCIN 250 MG/1
250 TABLET, FILM COATED ORAL DAILY
Status: DISCONTINUED | OUTPATIENT
Start: 2020-08-03 | End: 2020-08-03 | Stop reason: HOSPADM

## 2020-08-02 RX ORDER — BENZONATATE 100 MG/1
100 CAPSULE ORAL 3 TIMES DAILY PRN
Status: DISCONTINUED | OUTPATIENT
Start: 2020-08-02 | End: 2020-08-03 | Stop reason: HOSPADM

## 2020-08-02 RX ORDER — PREDNISONE 20 MG/1
60 TABLET ORAL DAILY
Status: DISCONTINUED | OUTPATIENT
Start: 2020-08-03 | End: 2020-08-03 | Stop reason: HOSPADM

## 2020-08-02 RX ORDER — POLYETHYLENE GLYCOL 3350 17 G/17G
17 POWDER, FOR SOLUTION ORAL DAILY
Status: DISCONTINUED | OUTPATIENT
Start: 2020-08-02 | End: 2020-08-03 | Stop reason: HOSPADM

## 2020-08-02 RX ORDER — ACETAMINOPHEN 325 MG/1
650 TABLET ORAL EVERY 4 HOURS PRN
Status: DISCONTINUED | OUTPATIENT
Start: 2020-08-02 | End: 2020-08-03 | Stop reason: HOSPADM

## 2020-08-02 RX ORDER — IPRATROPIUM BROMIDE AND ALBUTEROL SULFATE 2.5; .5 MG/3ML; MG/3ML
3 SOLUTION RESPIRATORY (INHALATION)
Status: DISCONTINUED | OUTPATIENT
Start: 2020-08-02 | End: 2020-08-03 | Stop reason: HOSPADM

## 2020-08-02 RX ORDER — LOSARTAN POTASSIUM 50 MG/1
100 TABLET ORAL DAILY
Status: DISCONTINUED | OUTPATIENT
Start: 2020-08-02 | End: 2020-08-03 | Stop reason: HOSPADM

## 2020-08-02 RX ORDER — IBUPROFEN 200 MG
16 TABLET ORAL
Status: DISCONTINUED | OUTPATIENT
Start: 2020-08-02 | End: 2020-08-03 | Stop reason: HOSPADM

## 2020-08-02 RX ADMIN — NICOTINE 1 PATCH: 14 PATCH TRANSDERMAL at 06:08

## 2020-08-02 RX ADMIN — GUAIFENESIN AND DEXTROMETHORPHAN HYDROBROMIDE 1 TABLET: 600; 30 TABLET, EXTENDED RELEASE ORAL at 08:08

## 2020-08-02 RX ADMIN — AZITHROMYCIN MONOHYDRATE 500 MG: 500 INJECTION, POWDER, LYOPHILIZED, FOR SOLUTION INTRAVENOUS at 12:08

## 2020-08-02 RX ADMIN — IPRATROPIUM BROMIDE AND ALBUTEROL SULFATE 3 ML: .5; 2.5 SOLUTION RESPIRATORY (INHALATION) at 10:08

## 2020-08-02 RX ADMIN — METHYLPREDNISOLONE SODIUM SUCCINATE 125 MG: 125 INJECTION, POWDER, FOR SOLUTION INTRAMUSCULAR; INTRAVENOUS at 09:08

## 2020-08-02 RX ADMIN — CEFTRIAXONE SODIUM 1 G: 1 INJECTION, POWDER, FOR SOLUTION INTRAMUSCULAR; INTRAVENOUS at 12:08

## 2020-08-02 RX ADMIN — POLYETHYLENE GLYCOL 3350 17 G: 17 POWDER, FOR SOLUTION ORAL at 06:08

## 2020-08-02 RX ADMIN — ASPIRIN 325 MG ORAL TABLET 325 MG: 325 PILL ORAL at 09:08

## 2020-08-02 RX ADMIN — IPRATROPIUM BROMIDE AND ALBUTEROL SULFATE 3 ML: .5; 2.5 SOLUTION RESPIRATORY (INHALATION) at 09:08

## 2020-08-02 RX ADMIN — PANTOPRAZOLE SODIUM 40 MG: 40 TABLET, DELAYED RELEASE ORAL at 03:08

## 2020-08-02 RX ADMIN — GABAPENTIN 100 MG: 100 CAPSULE ORAL at 08:08

## 2020-08-02 RX ADMIN — IPRATROPIUM BROMIDE AND ALBUTEROL SULFATE 3 ML: .5; 2.5 SOLUTION RESPIRATORY (INHALATION) at 07:08

## 2020-08-02 RX ADMIN — ACETAMINOPHEN 650 MG: 325 TABLET ORAL at 08:08

## 2020-08-02 RX ADMIN — GABAPENTIN 100 MG: 100 CAPSULE ORAL at 06:08

## 2020-08-02 RX ADMIN — BENZONATATE 100 MG: 100 CAPSULE ORAL at 06:08

## 2020-08-02 RX ADMIN — IOHEXOL 75 ML: 350 INJECTION, SOLUTION INTRAVENOUS at 12:08

## 2020-08-02 NOTE — ASSESSMENT & PLAN NOTE
Patient reports worsening chest pressure over past 3 days. She was seen by cardiology 7/27/20 and outpatient SPECT nuclear stress ordered.   - patient with risk factors for CAD including age, family history, active smoker  - Last TTE with WMA of the ismael-septal and infero-septal regions   - troponin negative X 2  - EKG non-ischemic   - lipid panel, A1c pending for risk stratification   - not on ASA, statin  - given current COPD exacerbation, will hold off on ordering SPECT stress for now unless worsening CP or patient becomes unstable. NPO midnight  - will trial mylanta as patient concerned pain may be from GI upset  - EKG PRN  - tele

## 2020-08-02 NOTE — SUBJECTIVE & OBJECTIVE
Past Medical History:   Diagnosis Date    CHF (congestive heart failure)     COPD (chronic obstructive pulmonary disease)     Herpes zoster without mention of complication 2011    Hypertension     Leg edema     Pulmonary hypertension        Past Surgical History:   Procedure Laterality Date    BREAST BIOPSY Right     core     SECTION      COLONOSCOPY N/A 2019    Procedure: COLONOSCOPY;  Surgeon: Rui Holder MD;  Location: [facility] ENDO (2ND FLR);  Service: Endoscopy;  Laterality: N/A;    EPIDURAL STEROID INJECTION N/A 2020    Procedure: CERVICAL BLAKE;  Surgeon: Papito High MD;  Location: [facility] PAIN MGT;  Service: Pain Management;  Laterality: N/A;  NEEDS CONSENT    ESOPHAGOGASTRODUODENOSCOPY N/A 2019    Procedure: EGD (ESOPHAGOGASTRODUODENOSCOPY);  Surgeon: Rui Holder MD;  Location: [facility] ENDO (2ND FLR);  Service: Endoscopy;  Laterality: N/A;  2L continuous O2 via NC, COPD, pulm HTN       Review of patient's allergies indicates:   Allergen Reactions    Orange juice      Swelling in pt tongue         No current facility-administered medications on file prior to encounter.      Current Outpatient Medications on File Prior to Encounter   Medication Sig    albuterol (PROVENTIL/VENTOLIN HFA) 90 mcg/actuation inhaler INHALE 2 PUFFS BY MOUTH EVERY 6 HOURS AS NEEDED    albuterol-ipratropium (DUO-NEB) 2.5 mg-0.5 mg/3 mL nebulizer solution Take 3 mLs by nebulization every 6 (six) hours as needed for Wheezing. Rescue    gabapentin (NEURONTIN) 100 MG capsule Take 1 capsule (100 mg total) by mouth 3 (three) times daily.    losartan (COZAAR) 100 MG tablet Take 0.5 tablets (50 mg total) by mouth once daily.    multivitamin capsule Take 1 capsule by mouth once daily.    omeprazole (PRILOSEC) 40 MG capsule Take 1 capsule (40 mg total) by mouth once daily.    umeclidinium-vilanteroL (ANORO ELLIPTA) 62.5-25 mcg/actuation DsDv Inhale 1 puff into the lungs once daily. Controller     colchicine (COLCRYS) 0.6 mg tablet TAKE 2 TABLETS BY MOUTH NOW, TAKE 1 TABLET BY MOUTH 1 HOUR LATER    fluticasone propionate (FLONASE) 50 mcg/actuation nasal spray 1 spray (50 mcg total) by Each Nostril route once daily.    glycerin adult suppository Place 1 suppository rectally as needed for Constipation.    potassium chloride SA (K-DUR,KLOR-CON) 20 MEQ tablet TAKE 1 TABLET(20 MEQ) BY MOUTH EVERY DAY    [DISCONTINUED] COMBIVENT RESPIMAT  mcg/actuation inhaler INHALE 1 PUFF BY MOUTH FOUR TIMES DAILY FOR RESCUE (Patient not taking: Reported on 7/27/2020)     Family History     Problem Relation (Age of Onset)    Heart disease Mother, Paternal Uncle        Tobacco Use    Smoking status: Current Every Day Smoker     Packs/day: 1.00     Years: 40.00     Pack years: 40.00     Types: Cigarettes    Smokeless tobacco: Never Used   Substance and Sexual Activity    Alcohol use: Yes     Frequency: Monthly or less     Drinks per session: 3 or 4     Binge frequency: Never     Comment: beer daily 2-3 daily, none today    Drug use: No    Sexual activity: Not Currently     Birth control/protection: None     Review of Systems   Constitutional: Positive for fatigue. Negative for chills, diaphoresis and fever.   HENT: Negative for congestion and rhinorrhea.    Respiratory: Positive for cough, chest tightness, shortness of breath and wheezing.    Cardiovascular: Positive for chest pain. Negative for palpitations and leg swelling.   Gastrointestinal: Positive for abdominal distention. Negative for abdominal pain, constipation, diarrhea, nausea and vomiting.   Genitourinary: Negative for difficulty urinating, flank pain, hematuria and urgency.   Musculoskeletal: Positive for back pain (chronic). Negative for arthralgias.   Skin: Negative for color change and pallor.   Neurological: Negative for dizziness, syncope, light-headedness, numbness and headaches.   Psychiatric/Behavioral: Negative for agitation, behavioral  problems, confusion and decreased concentration.     Objective:     Vital Signs (Most Recent):  Temp: 98.5 °F (36.9 °C) (08/02/20 1225)  Pulse: 91 (08/02/20 1225)  Resp: 20 (08/02/20 1225)  BP: (!) 162/87 (08/02/20 1225)  SpO2: 95 % (08/02/20 1225) Vital Signs (24h Range):  Temp:  [98.4 °F (36.9 °C)-99 °F (37.2 °C)] 98.5 °F (36.9 °C)  Pulse:  [69-98] 91  Resp:  [20-30] 20  SpO2:  [85 %-99 %] 95 %  BP: (117-172)/(82-96) 162/87     Weight: 64.9 kg (143 lb)  Body mass index is 25.33 kg/m².    Physical Exam  Vitals signs and nursing note reviewed.   Constitutional:       General: She is not in acute distress.     Appearance: Normal appearance. She is not ill-appearing.   HENT:      Head: Normocephalic and atraumatic.      Mouth/Throat:      Mouth: Mucous membranes are moist.      Pharynx: Oropharynx is clear.   Eyes:      Extraocular Movements: Extraocular movements intact.      Pupils: Pupils are equal, round, and reactive to light.   Neck:      Musculoskeletal: Normal range of motion and neck supple.   Cardiovascular:      Rate and Rhythm: Normal rate and regular rhythm.      Pulses: Normal pulses.      Heart sounds: Normal heart sounds.   Pulmonary:      Effort: Pulmonary effort is normal. No respiratory distress.      Breath sounds: Wheezing (throughout) present. No rales.      Comments: On 2L O2  Coarse breath sounds throughout  Abdominal:      General: Abdomen is flat. Bowel sounds are normal. There is no distension.      Palpations: Abdomen is soft.      Tenderness: There is no abdominal tenderness. There is no guarding.   Musculoskeletal: Normal range of motion.         General: No tenderness.      Comments: Trace pretibial edema bilaterally   Skin:     General: Skin is warm and dry.   Neurological:      General: No focal deficit present.      Mental Status: She is alert and oriented to person, place, and time. Mental status is at baseline.   Psychiatric:         Mood and Affect: Mood normal.         Behavior:  Behavior normal.           CRANIAL NERVES     CN III, IV, VI   Pupils are equal, round, and reactive to light.       Significant Labs:   CBC:   Recent Labs   Lab 08/02/20 0918   WBC 8.62   HGB 13.5   HCT 42.6        CMP:   Recent Labs   Lab 08/02/20 0918      K 4.1      CO2 29   GLU 84   BUN 7*   CREATININE 0.7   CALCIUM 9.5   PROT 7.3   ALBUMIN 4.0   BILITOT 0.5   ALKPHOS 50*   AST 20   ALT 14   ANIONGAP 7*   EGFRNONAA >60.0     Cardiac Markers:   Recent Labs   Lab 08/02/20  0918   BNP 24     Lactic Acid:   Recent Labs   Lab 08/02/20 0918   LACTATE 0.7     Magnesium: No results for input(s): MG in the last 48 hours.  POCT Glucose: No results for input(s): POCTGLUCOSE in the last 48 hours.  Troponin:   Recent Labs   Lab 08/02/20 0918 08/02/20  1158   TROPONINI <0.006 <0.006     Urine Studies: No results for input(s): COLORU, APPEARANCEUA, PHUR, SPECGRAV, PROTEINUA, GLUCUA, KETONESU, BILIRUBINUA, OCCULTUA, NITRITE, UROBILINOGEN, LEUKOCYTESUR, RBCUA, WBCUA, BACTERIA, SQUAMEPITHEL, HYALINECASTS in the last 48 hours.    Invalid input(s): BELEN    Significant Imaging: I have reviewed all pertinent imaging results/findings within the past 24 hours.

## 2020-08-02 NOTE — ASSESSMENT & PLAN NOTE
Cor pulmonale, chronic  - Last TTE 7/20 with pEF 60%, normal diastolic function, PAP 27mmHg, normal CVP; hypokinetic septal WMA  - no evidence of volume overload on exam  - continue home O2

## 2020-08-02 NOTE — ED PROVIDER NOTES
Encounter Date: 2020       History     Chief Complaint   Patient presents with    Shortness of Breath     +cough, denies fever, chills.      HPI     This is a 64-year-old woman with a history of COPD on 2 L nasal cannula baseline, heart failure, pulmonary hypertension who presents with acute worsening of her chronic shortness of breath.  She reports that since Friday she is feeling more short of breath, in addition feels a pressure/heaviness in the center of her chest and she points to her sternum as the location of her pain.  Does not radiate.  Does not associated with diaphoresis.  She has not had any fevers or chills, but she does report a cough.  Reports that does not feel like her prior COPD exacerbations or pneumonia, but a combination of the above.  Review of patient's allergies indicates:   Allergen Reactions    Orange juice      Swelling in pt tongue       Past Medical History:   Diagnosis Date    CHF (congestive heart failure)     COPD (chronic obstructive pulmonary disease)     Herpes zoster without mention of complication 2011    Hypertension     Leg edema     Pulmonary hypertension      Past Surgical History:   Procedure Laterality Date    BREAST BIOPSY Right     core     SECTION      COLONOSCOPY N/A 2019    Procedure: COLONOSCOPY;  Surgeon: Rui Holder MD;  Location: [facility] ([address] [place] [address]);  Service: Endoscopy;  Laterality: N/A;    EPIDURAL STEROID INJECTION N/A 2020    Procedure: CERVICAL BLAKE;  Surgeon: Papito High MD;  Location: [facility]IN [facility];  Service: Pain Management;  Laterality: N/A;  NEEDS CONSENT    ESOPHAGOGASTRODUODENOSCOPY N/A 2019    Procedure: EGD (ESOPHAGOGASTRODUODENOSCOPY);  Surgeon: Rui Holder MD;  Location: [facility] ([address] [place] [address]);  Service: Endoscopy;  Laterality: N/A;  2L continuous O2 via NC, COPD, pulm HTN     Family History   Problem Relation Age of Onset    Heart disease Mother     Heart disease Paternal Uncle     Celiac disease  Neg Hx     Colon cancer Neg Hx     Colon polyps Neg Hx     Crohn's disease Neg Hx     Cystic fibrosis Neg Hx     Esophageal cancer Neg Hx     Inflammatory bowel disease Neg Hx     Irritable bowel syndrome Neg Hx     Liver cancer Neg Hx     Liver disease Neg Hx     Rectal cancer Neg Hx     Stomach cancer Neg Hx     Ulcerative colitis Neg Hx      Social History     Tobacco Use    Smoking status: Current Every Day Smoker     Packs/day: 1.00     Years: 40.00     Pack years: 40.00     Types: Cigarettes    Smokeless tobacco: Never Used   Substance Use Topics    Alcohol use: Yes     Frequency: Monthly or less     Drinks per session: 3 or 4     Binge frequency: Never     Comment: beer daily 2-3 daily, none today    Drug use: No     Review of Systems   Constitutional: Negative for chills, diaphoresis, fatigue and fever.   HENT: Negative for congestion, rhinorrhea and sore throat.    Eyes: Negative for visual disturbance.   Respiratory: Positive for chest tightness, shortness of breath and wheezing. Negative for cough.    Cardiovascular: Positive for chest pain. Negative for leg swelling.   Gastrointestinal: Negative for abdominal pain, blood in stool, constipation, diarrhea and vomiting.   Genitourinary: Negative for dysuria, hematuria and urgency.   Musculoskeletal: Negative for back pain.   Skin: Negative for rash.   Neurological: Negative for seizures and syncope.   Hematological: Does not bruise/bleed easily.   Psychiatric/Behavioral: Negative for agitation and hallucinations.       Physical Exam     Initial Vitals [08/02/20 0855]   BP Pulse Resp Temp SpO2   (!) 117/96 98 20 99 °F (37.2 °C) (!) 85 %      MAP       --         Physical Exam  Gen: AxOx3, is dyspneic in mild respiratory distress, well nourished  Eye: sclera anicteric, EOMI, no conjunctivitis, no periorbital edema  ENT: NCAT, OP clear, neck supple with FROM, no stridor  CVS: RRR, no m/r/g, distal pulses intact/symmetric  Pulm:  Diffuse wheeze  in all lung fields, with increased work of breathing, speaking in short sentences  Abd: soft, nontender, nondistended, no organomegaly, no CVAT  Ext: no edema, lesions, rashes, or deformity  Neuro: GCS15, moving all extremities, gait intact  Psych: normal affect, cooperative  ED Course   Procedures  Labs Reviewed   CBC W/ AUTO DIFFERENTIAL - Abnormal; Notable for the following components:       Result Value    Mean Corpuscular Hemoglobin Conc 31.7 (*)     All other components within normal limits   COMPREHENSIVE METABOLIC PANEL - Abnormal; Notable for the following components:    BUN, Bld 7 (*)     Alkaline Phosphatase 50 (*)     Anion Gap 7 (*)     All other components within normal limits    Narrative:     LIPAS ADD-ON per Anahi Baker MD @ 09:33 AM on 08/02/2020   ;Order #[id number]   ISTAT PROCEDURE - Abnormal; Notable for the following components:    POC PH 7.342 (*)     POC PCO2 61.5 (*)     POC HCO3 33.4 (*)     POC SATURATED O2 75 (*)     POC TCO2 35 (*)     All other components within normal limits   TROPONIN I    Narrative:     LIPAS ADD-ON per Anahi Baker MD @ 09:33 AM on 08/02/2020   ;Order #[id number]   B-TYPE NATRIURETIC PEPTIDE    Narrative:     LIPAS ADD-ON per Anahi Baker MD @ 09:33 AM on 08/02/2020   ;Order #[id number]   SARS-COV-2 RNA AMPLIFICATION, QUAL   C-REACTIVE PROTEIN    Narrative:     LIPAS ADD-ON per Anahi Baker MD @ 09:33 AM on 08/02/2020   ;Order #[id number]   FERRITIN    Narrative:     LIPAS ADD-ON per Anahi Baker MD @ 09:33 AM on 08/02/2020   ;Order #[id number]   LACTATE DEHYDROGENASE   CK    Narrative:     LIPAS ADD-ON per Anahi Baker MD @ 09:33 AM on 08/02/2020   ;Order #[id number]   LACTIC ACID, PLASMA   LIPASE   TROPONIN I   LIPASE    Narrative:     LIPAS ADD-ON per Anahi Baker MD @ 09:33 AM on 08/02/2020   ;Order #[id number]   URINALYSIS, REFLEX TO URINE CULTURE        ECG Results          EKG 12-lead (Final result)  Result time 08/02/20  09:28:12    Final result by Interface, Lab In [facility] (08/02/20 09:28:12)                 Narrative:    Test Reason : R07.9,    Vent. Rate : 086 BPM     Atrial Rate : 086 BPM     P-R Int : 166 ms          QRS Dur : 076 ms      QT Int : 366 ms       P-R-T Axes : 072 016 024 degrees     QTc Int : 437 ms    Normal sinus rhythm  Possible Left atrial enlargement  Borderline Abnormal ECG  When compared with ECG of 18-JUL-2020 19:30,  Nonspecific T wave abnormality now evident in Inferior leads  Confirmed by Yovany SCHULZ MD ([id number]) on 8/2/2020 9:27:59 AM    Referred By: System System           Confirmed By:Yovany SCHULZ MD                            Imaging Results          CTA Chest Non-Coronary - PE Study (Final result)  Result time 08/02/20 12:15:45    Final result by Samantha Goodwin MD (08/02/20 12:15:45)                 Impression:      1.  Limited exam due to respiratory motion and suboptimal contrast bolus timing and distal segmental and subsegmental vessels are not well evaluated.  No convincing evidence of thromboembolism.    2.  New bronchial wall thickening most pronounced in the right upper lobe which could be due to bronchitis or reactive airways disease.      Electronically signed by: Samantha Goodwin MD  Date:    08/02/2020  Time:    12:15             Narrative:    EXAMINATION:  CTA CHEST NON CORONARY    CLINICAL HISTORY:  PE suspected, high pretest prob;    TECHNIQUE:  Low dose axial images, sagittal and coronal reformations were obtained from the thoracic inlet to the lung bases following the IV administration of 75 mL of Omnipaque 350.  Study is optimized for PE evaluation.    COMPARISON:  Prior chest radiograph dated 08/02/2020 and prior thoracic CT dated 11/29/2019    FINDINGS:  Due to respiratory motion and suboptimal contrast bolus timing, distal segmental and subsegmental vessels are not well evaluated.  There is no convincing evidence of thromboembolism.  The structures at the base of the neck are  unremarkable.  The mediastinal structures are midline.  The heart is not enlarged.  There is left-sided aortic arch with 3 branch vessels.  No mediastinal or hilar adenopathy is seen.  There is no pericardial fluid.    The airways are patent.  There is mild bronchial wall thickening most pronounced in the right upper lobe which is not evident at the time of the November 2019 exam and may be due to bronchitis or reactive airways disease.  Previously described subcentimeter pulmonary nodule in the left lung is not visualized due to motion artifact.  4 mm nodule in the lingula demonstrates long-term stability, benign.  No new suspicious nodules are seen.  The lungs are clear.  No pleural fluid is seen.    No osseous abnormalities are identified.                               X-Ray Chest AP Portable (Final result)  Result time 08/02/20 09:37:00    Final result by Samantha Goodwin MD (08/02/20 09:37:00)                 Impression:      Pulmonary vascular congestion without definite interstitial edema.      Electronically signed by: Samantha Goodwin MD  Date:    08/02/2020  Time:    09:37             Narrative:    EXAMINATION:  XR CHEST AP PORTABLE    CLINICAL HISTORY:  Chest Pain, hypoxemia;    TECHNIQUE:  Single frontal view of the chest was performed.    COMPARISON:  Prior dated 07/18/20    FINDINGS:  The mediastinal structures are midline.  The cardiac silhouette is prominent and unchanged.  There is pulmonary vascular congestion without definite interstitial edema.  No focal consolidation or pleural fluid is seen.  I                                 Medical Decision Making:   History:   Old Medical Records: I decided to obtain old medical records.  Old Records Summarized: records from clinic visits.  Initial Assessment:   This is a 64-year-old woman with multiple pulmonary conditions on 2 L nasal cannula at baseline who presents with acute on chronic hypoxemic respiratory failure, saturating 85% on her baseline 2  L on arrival, improved to 90 to 95 on 4 L once placed in a room.  Given her wheezing on exam, am concerned about COPD exacerbation, pneumonia, coronavirus given the COVID pandemic, verses acute coronary syndrome as she does have chest pressure.  Her EKG does not show obvious ischemia by my independent interpretation, we will obtain a troponin to be sure.  Plan for chest x-ray, VBG, labs, Solu-Medrol, DuoNebs, COVID swab, and close reassessment of the patient.  Clinical Tests:   Lab Tests: Ordered and Reviewed  Radiological Study: Ordered and Reviewed  ED Management:  Chest x-ray by my independent interpretation does not show any obvious infiltrate, but does show mild pulmonary vascular congestion.  Given that the patient remains symptomatic, with new oxygen requirement, I am also concerned for PE, so we did obtain a CTA of the chest which was limited by motion artifact but did not show obvoius PE by my interpretation.  It did show bronchitis, and I have treated her with antibiotics given that she appears to be symptomatic from it.  Plan for observation in the hospital for further respiratory support and antibiotics.                                 Clinical Impression:       ICD-10-CM ICD-9-CM   1. Acute bronchitis, unspecified organism  J20.9 466.0   2. Chest pain  R07.9 786.50   3. COPD with acute exacerbation  J44.1 491.21             ED Disposition Condition    Observation                           Anahi Baker MD  08/02/20 5498

## 2020-08-02 NOTE — ASSESSMENT & PLAN NOTE
Chronic hypoxemic respiratory failure on 2L  home O2    - In ED, O2 85% on 2L O2 placed on 4L, given duonebs and IV solumedrol and able to wean to 2L. Given CTX/Azithro  - afebrile without leukocytosis  - CXR with vascular congestion  - CTA with new bronchial wall thickening most pronounced in the right upper lobe, bronchitis vs reactive airways disease. Limited exam but no evidence of PE   - continue Azithromycin X 5 days  - prednisone 60 mg X 5 days  - duonebs q4h wake  - on anora ellipta at home, not on formulary, breo while inpatient  - mucinex- DM BID, tessalon perles, incentive spirometer

## 2020-08-02 NOTE — ASSESSMENT & PLAN NOTE
- continue home losartan 100 mg daily  - recently admitted for episode of hypotension, will monitor VS closely

## 2020-08-02 NOTE — ED NOTES
Pt identifiers Rochelle Espinoza were checked and are correct  LOC: The patient is awake, alert, aware of environment with an appropriate affect. Oriented x4, speaking appropriately  APPEARANCE: Pt rates heaviness in her chest a 9/10 , in no acute distress, pt is clean and well groomed, clothing properly fastened  SKIN: Skin warm, dry and intact, normal skin turgor, moist mucus membranes  RESPIRATORY: Coarse breath sounds auscultated to all lung fields CARDIAC: Normal rate and rhythm, no peripheral edema noted, capillary refill < 3 seconds, bilateral radial pulses 2+  ABDOMEN: Soft, nontender, nondistended. Bowel sounds present to all four quad of abd on auscultation  NEUROLOGIC: facial expression is symmetrical, patient moving all extremities spontaneously, normal sensation in all extremities when touched with a finger.  Follows all commands appropriately  MUSCULOSKELETAL: No obvious deformities.

## 2020-08-02 NOTE — ED NOTES
Patient placed on tele box#[id number] for continuous cardiac monitoring, verified by war room 93 normal sinus

## 2020-08-02 NOTE — HPI
Mrs. Espinoza is 65 yo F with PMHx HTN, COPD on home 2L O2, tobacco abuse, HFrEF, and gout who presents with COPD exacerbation and chest pain. Patient reports for past 3 days she has been having worsening fatigue, chest tightness, SOB, wheezing, and cough. Cough was initially dry but is now productive with white sputum. She has used her inhalers without relief. She also endorses intermittent chest heaviness over the past month that has worsened over the past week. She at first though the chest heaviness was related to gas pain and noted some mild epigastric distension. She was seen by cardiology 7/27/20 and outpatient nuclear stress test was ordered. Since then, chest pain has been more persistent, but resolves on own. No worsening or alleviating factors. She denies diaphoresis, N/V, jaw pain, numbness, or tingling. Patient reports she started smoking again two weeks ago after a brief hiatus, currently smoking 15 cigarettes a day. She denies fever, chills, abdominal pain, diarrhea, constipation, urinary symptoms, weakness, lightheadedness, dizziness.    In ED, O2 85% on 2L on arrival, placed on 4L and able to wean down to 2L satting at 95% after duonebs and Solumedrol 125 mg. Started on ceftriaxone and azithromycin. Afebrile without leukocytosis. CXR with vascular pulmonary congestion. CTA with limited exam to but no convincing PE, new bronchial wall thickening most pronounced in the right upper lobe which could be due to bronchitis or reactive airways disease. Troponin negative, BNP negative, lactate WNL, COVID negative. Patient admitted to observation for COPD exacerbation.

## 2020-08-02 NOTE — H&P
[facility] Hospital Medicine  History & Physical    Patient Name: Rochelle Espinoza  MRN: [id number]  Admission Date: 8/2/2020  Attending Physician: Khanh Martínez MD   Primary Care Provider: Yosi Samuels MD    [facility] Medicine Team: [facility] [place] HOSP MED F Payton Choi PA-C     Patient information was obtained from patient and ER records.     Subjective:     Principal Problem:Chronic obstructive pulmonary disease with acute exacerbation    Chief Complaint:   Chief Complaint   Patient presents with    Shortness of Breath     +cough, denies fever, chills.         HPI: Mrs. Espinoza is 63 yo F with PMHx HTN, COPD on home 2L O2, tobacco abuse, HFrEF, and gout who presents with COPD exacerbation and chest pain. Patient reports for past 3 days she has been having worsening fatigue, chest tightness, SOB, wheezing, and cough. Cough was initially dry but is now productive with white sputum. She has used her inhalers without relief. She also endorses intermittent chest heaviness over the past month that has worsened over the past week. She at first though the chest heaviness was related to gas pain and noted some mild epigastric distension. She was seen by cardiology 7/27/20 and outpatient nuclear stress test was ordered. Since then, chest pain has been more persistent, but resolves on own. No worsening or alleviating factors. She denies diaphoresis, N/V, jaw pain, numbness, or tingling. Patient reports she started smoking again two weeks ago after a brief hiatus, currently smoking 15 cigarettes a day. She denies fever, chills, abdominal pain, diarrhea, constipation, urinary symptoms, weakness, lightheadedness, dizziness.    In ED, O2 85% on 2L on arrival, placed on 4L and able to wean down to 2L satting at 95% after duonebs and Solumedrol 125 mg. Started on ceftriaxone and azithromycin. Afebrile without leukocytosis. CXR with vascular pulmonary congestion. CTA with limited exam to but no convincing PE, new  bronchial wall thickening most pronounced in the right upper lobe which could be due to bronchitis or reactive airways disease. Troponin negative, BNP negative, lactate WNL, COVID negative. Patient admitted to observation for COPD exacerbation.     Past Medical History:   Diagnosis Date    CHF (congestive heart failure)     COPD (chronic obstructive pulmonary disease)     Herpes zoster without mention of complication 2011    Hypertension     Leg edema     Pulmonary hypertension        Past Surgical History:   Procedure Laterality Date    BREAST BIOPSY Right     core     SECTION      COLONOSCOPY N/A 2019    Procedure: COLONOSCOPY;  Surgeon: Rui Holder MD;  Location: [facility] ENDO (2ND FLR);  Service: Endoscopy;  Laterality: N/A;    EPIDURAL STEROID INJECTION N/A 2020    Procedure: CERVICAL BLAKE;  Surgeon: Papito High MD;  Location: [facility] PAIN MGT;  Service: Pain Management;  Laterality: N/A;  NEEDS CONSENT    ESOPHAGOGASTRODUODENOSCOPY N/A 2019    Procedure: EGD (ESOPHAGOGASTRODUODENOSCOPY);  Surgeon: Rui Holder MD;  Location: [facility] YOHANA (2ND FLR);  Service: Endoscopy;  Laterality: N/A;  2L continuous O2 via NC, COPD, pulm HTN       Review of patient's allergies indicates:   Allergen Reactions    Orange juice      Swelling in pt tongue         No current facility-administered medications on file prior to encounter.      Current Outpatient Medications on File Prior to Encounter   Medication Sig    albuterol (PROVENTIL/VENTOLIN HFA) 90 mcg/actuation inhaler INHALE 2 PUFFS BY MOUTH EVERY 6 HOURS AS NEEDED    albuterol-ipratropium (DUO-NEB) 2.5 mg-0.5 mg/3 mL nebulizer solution Take 3 mLs by nebulization every 6 (six) hours as needed for Wheezing. Rescue    gabapentin (NEURONTIN) 100 MG capsule Take 1 capsule (100 mg total) by mouth 3 (three) times daily.    losartan (COZAAR) 100 MG tablet Take 0.5 tablets (50 mg total) by mouth once daily.    multivitamin capsule Take 1  capsule by mouth once daily.    omeprazole (PRILOSEC) 40 MG capsule Take 1 capsule (40 mg total) by mouth once daily.    umeclidinium-vilanteroL (ANORO ELLIPTA) 62.5-25 mcg/actuation DsDv Inhale 1 puff into the lungs once daily. Controller    colchicine (COLCRYS) 0.6 mg tablet TAKE 2 TABLETS BY MOUTH NOW, TAKE 1 TABLET BY MOUTH 1 HOUR LATER    fluticasone propionate (FLONASE) 50 mcg/actuation nasal spray 1 spray (50 mcg total) by Each Nostril route once daily.    glycerin adult suppository Place 1 suppository rectally as needed for Constipation.    potassium chloride SA (K-DUR,KLOR-CON) 20 MEQ tablet TAKE 1 TABLET(20 MEQ) BY MOUTH EVERY DAY    [DISCONTINUED] COMBIVENT RESPIMAT  mcg/actuation inhaler INHALE 1 PUFF BY MOUTH FOUR TIMES DAILY FOR RESCUE (Patient not taking: Reported on 7/27/2020)     Family History     Problem Relation (Age of Onset)    Heart disease Mother, Paternal Uncle        Tobacco Use    Smoking status: Current Every Day Smoker     Packs/day: 1.00     Years: 40.00     Pack years: 40.00     Types: Cigarettes    Smokeless tobacco: Never Used   Substance and Sexual Activity    Alcohol use: Yes     Frequency: Monthly or less     Drinks per session: 3 or 4     Binge frequency: Never     Comment: beer daily 2-3 daily, none today    Drug use: No    Sexual activity: Not Currently     Birth control/protection: None     Review of Systems   Constitutional: Positive for fatigue. Negative for chills, diaphoresis and fever.   HENT: Negative for congestion and rhinorrhea.    Respiratory: Positive for cough, chest tightness, shortness of breath and wheezing.    Cardiovascular: Positive for chest pain. Negative for palpitations and leg swelling.   Gastrointestinal: Positive for abdominal distention. Negative for abdominal pain, constipation, diarrhea, nausea and vomiting.   Genitourinary: Negative for difficulty urinating, flank pain, hematuria and urgency.   Musculoskeletal: Positive for back  pain (chronic). Negative for arthralgias.   Skin: Negative for color change and pallor.   Neurological: Negative for dizziness, syncope, light-headedness, numbness and headaches.   Psychiatric/Behavioral: Negative for agitation, behavioral problems, confusion and decreased concentration.     Objective:     Vital Signs (Most Recent):  Temp: 98.5 °F (36.9 °C) (08/02/20 1225)  Pulse: 91 (08/02/20 1225)  Resp: 20 (08/02/20 1225)  BP: (!) 162/87 (08/02/20 1225)  SpO2: 95 % (08/02/20 1225) Vital Signs (24h Range):  Temp:  [98.4 °F (36.9 °C)-99 °F (37.2 °C)] 98.5 °F (36.9 °C)  Pulse:  [69-98] 91  Resp:  [20-30] 20  SpO2:  [85 %-99 %] 95 %  BP: (117-172)/(82-96) 162/87     Weight: 64.9 kg (143 lb)  Body mass index is 25.33 kg/m².    Physical Exam  Vitals signs and nursing note reviewed.   Constitutional:       General: She is not in acute distress.     Appearance: Normal appearance. She is not ill-appearing.   HENT:      Head: Normocephalic and atraumatic.      Mouth/Throat:      Mouth: Mucous membranes are moist.      Pharynx: Oropharynx is clear.   Eyes:      Extraocular Movements: Extraocular movements intact.      Pupils: Pupils are equal, round, and reactive to light.   Neck:      Musculoskeletal: Normal range of motion and neck supple.   Cardiovascular:      Rate and Rhythm: Normal rate and regular rhythm.      Pulses: Normal pulses.      Heart sounds: Normal heart sounds.   Pulmonary:      Effort: Pulmonary effort is normal. No respiratory distress.      Breath sounds: Wheezing (throughout) present. No rales.      Comments: On 2L O2  Coarse breath sounds throughout  Abdominal:      General: Abdomen is flat. Bowel sounds are normal. There is no distension.      Palpations: Abdomen is soft.      Tenderness: There is no abdominal tenderness. There is no guarding.   Musculoskeletal: Normal range of motion.         General: No tenderness.      Comments: Trace pretibial edema bilaterally   Skin:     General: Skin is warm  and dry.   Neurological:      General: No focal deficit present.      Mental Status: She is alert and oriented to person, place, and time. Mental status is at baseline.   Psychiatric:         Mood and Affect: Mood normal.         Behavior: Behavior normal.           CRANIAL NERVES     CN III, IV, VI   Pupils are equal, round, and reactive to light.       Significant Labs:   CBC:   Recent Labs   Lab 08/02/20 0918   WBC 8.62   HGB 13.5   HCT 42.6        CMP:   Recent Labs   Lab 08/02/20 0918      K 4.1      CO2 29   GLU 84   BUN 7*   CREATININE 0.7   CALCIUM 9.5   PROT 7.3   ALBUMIN 4.0   BILITOT 0.5   ALKPHOS 50*   AST 20   ALT 14   ANIONGAP 7*   EGFRNONAA >60.0     Cardiac Markers:   Recent Labs   Lab 08/02/20 0918   BNP 24     Lactic Acid:   Recent Labs   Lab 08/02/20 0918   LACTATE 0.7     Magnesium: No results for input(s): MG in the last 48 hours.  POCT Glucose: No results for input(s): POCTGLUCOSE in the last 48 hours.  Troponin:   Recent Labs   Lab 08/02/20 0918 08/02/20  1158   TROPONINI <0.006 <0.006     Urine Studies: No results for input(s): COLORU, APPEARANCEUA, PHUR, SPECGRAV, PROTEINUA, GLUCUA, KETONESU, BILIRUBINUA, OCCULTUA, NITRITE, UROBILINOGEN, LEUKOCYTESUR, RBCUA, WBCUA, BACTERIA, SQUAMEPITHEL, HYALINECASTS in the last 48 hours.    Invalid input(s): WRIGHTSUR    Significant Imaging: I have reviewed all pertinent imaging results/findings within the past 24 hours.    Assessment/Plan:     * Chronic obstructive pulmonary disease with acute exacerbation  Chronic hypoxemic respiratory failure on 2L  home O2    - In ED, O2 85% on 2L O2 placed on 4L, given duonebs and IV solumedrol and able to wean to 2L. Given CTX/Azithro  - afebrile without leukocytosis  - CXR with vascular congestion  - CTA with new bronchial wall thickening most pronounced in the right upper lobe, bronchitis vs reactive airways disease. Limited exam but no evidence of PE   - continue Azithromycin X 5 days  -  prednisone 60 mg X 5 days  - duonebs q4h wake  - on anora ellipta at home, not on formulary, breo while inpatient  - mucinex- DM BID, tessalon perles, incentive spirometer    Chest pain  Patient reports worsening chest pressure over past 3 days. She was seen by cardiology 7/27/20 and outpatient SPECT nuclear stress ordered.   - patient with risk factors for CAD including age, family history, active smoker  - Last TTE with WMA of the ismael-septal and infero-septal regions   - troponin negative X 2  - EKG non-ischemic   - lipid panel, A1c pending for risk stratification   - not on ASA, statin  - given current COPD exacerbation, will hold off on ordering SPECT stress for now unless worsening CP or patient becomes unstable. NPO midnight  - will trial mylanta as patient concerned pain may be from GI upset  - EKG PRN  - tele    Pulmonary hypertension  Cor pulmonale, chronic  - Last TTE 7/20 with pEF 60%, normal diastolic function, PAP 27mmHg, normal CVP; hypokinetic septal WMA  - no evidence of volume overload on exam  - continue home O2    Tobacco abuse  - currently smokes pack/day  - nicotene patch  - tobacco cessation education given    Essential hypertension  - continue home losartan 100 mg daily  - recently admitted for episode of hypotension, will monitor VS closely      VTE Risk Mitigation (From admission, onward)         Ordered     IP VTE HIGH RISK PATIENT  Once      08/02/20 1315     Place sequential compression device  Until discontinued      08/02/20 1315                   Payton Choi PA-C  Department of Hospital Medicine   [facility]

## 2020-08-03 VITALS
TEMPERATURE: 98 F | DIASTOLIC BLOOD PRESSURE: 81 MMHG | RESPIRATION RATE: 12 BRPM | HEIGHT: 63 IN | WEIGHT: 143.31 LBS | OXYGEN SATURATION: 95 % | BODY MASS INDEX: 25.39 KG/M2 | HEART RATE: 69 BPM | SYSTOLIC BLOOD PRESSURE: 117 MMHG

## 2020-08-03 LAB
ANION GAP SERPL CALC-SCNC: 7 MMOL/L (ref 8–16)
BASOPHILS # BLD AUTO: 0.02 K/UL (ref 0–0.2)
BASOPHILS NFR BLD: 0.2 % (ref 0–1.9)
BUN SERPL-MCNC: 10 MG/DL (ref 8–23)
CALCIUM SERPL-MCNC: 9.3 MG/DL (ref 8.7–10.5)
CHLORIDE SERPL-SCNC: 105 MMOL/L (ref 95–110)
CHOLEST SERPL-MCNC: 180 MG/DL (ref 120–199)
CHOLEST/HDLC SERPL: 2.5 {RATIO} (ref 2–5)
CO2 SERPL-SCNC: 31 MMOL/L (ref 23–29)
CREAT SERPL-MCNC: 0.7 MG/DL (ref 0.5–1.4)
DIFFERENTIAL METHOD: ABNORMAL
EOSINOPHIL # BLD AUTO: 0.1 K/UL (ref 0–0.5)
EOSINOPHIL NFR BLD: 0.6 % (ref 0–8)
ERYTHROCYTE [DISTWIDTH] IN BLOOD BY AUTOMATED COUNT: 13.9 % (ref 11.5–14.5)
EST. GFR  (AFRICAN AMERICAN): >60 ML/MIN/1.73 M^2
EST. GFR  (NON AFRICAN AMERICAN): >60 ML/MIN/1.73 M^2
ESTIMATED AVG GLUCOSE: 105 MG/DL (ref 68–131)
GLUCOSE SERPL-MCNC: 85 MG/DL (ref 70–110)
HBA1C MFR BLD HPLC: 5.3 % (ref 4–5.6)
HCT VFR BLD AUTO: 40.8 % (ref 37–48.5)
HDLC SERPL-MCNC: 73 MG/DL (ref 40–75)
HDLC SERPL: 40.6 % (ref 20–50)
HGB BLD-MCNC: 12.8 G/DL (ref 12–16)
IMM GRANULOCYTES # BLD AUTO: 0.03 K/UL (ref 0–0.04)
IMM GRANULOCYTES NFR BLD AUTO: 0.3 % (ref 0–0.5)
LDLC SERPL CALC-MCNC: 97.2 MG/DL (ref 63–159)
LYMPHOCYTES # BLD AUTO: 2.9 K/UL (ref 1–4.8)
LYMPHOCYTES NFR BLD: 24.6 % (ref 18–48)
MAGNESIUM SERPL-MCNC: 1.9 MG/DL (ref 1.6–2.6)
MCH RBC QN AUTO: 28.1 PG (ref 27–31)
MCHC RBC AUTO-ENTMCNC: 31.4 G/DL (ref 32–36)
MCV RBC AUTO: 90 FL (ref 82–98)
MONOCYTES # BLD AUTO: 1 K/UL (ref 0.3–1)
MONOCYTES NFR BLD: 8.8 % (ref 4–15)
NEUTROPHILS # BLD AUTO: 7.8 K/UL (ref 1.8–7.7)
NEUTROPHILS NFR BLD: 65.5 % (ref 38–73)
NONHDLC SERPL-MCNC: 107 MG/DL
NRBC BLD-RTO: 0 /100 WBC
PHOSPHATE SERPL-MCNC: 4.2 MG/DL (ref 2.7–4.5)
PLATELET # BLD AUTO: 245 K/UL (ref 150–350)
PMV BLD AUTO: 11.8 FL (ref 9.2–12.9)
POTASSIUM SERPL-SCNC: 4.5 MMOL/L (ref 3.5–5.1)
RBC # BLD AUTO: 4.56 M/UL (ref 4–5.4)
SODIUM SERPL-SCNC: 143 MMOL/L (ref 136–145)
TRIGL SERPL-MCNC: 49 MG/DL (ref 30–150)
WBC # BLD AUTO: 11.83 K/UL (ref 3.9–12.7)

## 2020-08-03 PROCEDURE — 99217 PR OBSERVATION CARE DISCHARGE: ICD-10-PCS | Mod: HCNC,,, | Performed by: PHYSICIAN ASSISTANT

## 2020-08-03 PROCEDURE — 27000221 HC OXYGEN, UP TO 24 HOURS

## 2020-08-03 PROCEDURE — 94761 N-INVAS EAR/PLS OXIMETRY MLT: CPT

## 2020-08-03 PROCEDURE — 80048 BASIC METABOLIC PNL TOTAL CA: CPT | Mod: HCNC

## 2020-08-03 PROCEDURE — 63600175 PHARM REV CODE 636 W HCPCS: Performed by: PHYSICIAN ASSISTANT

## 2020-08-03 PROCEDURE — 99217 PR OBSERVATION CARE DISCHARGE: CPT | Mod: HCNC,,, | Performed by: PHYSICIAN ASSISTANT

## 2020-08-03 PROCEDURE — 63700000 PHARM REV CODE 250 ALT 637 W/O HCPCS: Performed by: PHYSICIAN ASSISTANT

## 2020-08-03 PROCEDURE — 25000242 PHARM REV CODE 250 ALT 637 W/ HCPCS: Mod: HCNC | Performed by: PHYSICIAN ASSISTANT

## 2020-08-03 PROCEDURE — S4991 NICOTINE PATCH NONLEGEND: HCPCS | Performed by: PHYSICIAN ASSISTANT

## 2020-08-03 PROCEDURE — 36415 COLL VENOUS BLD VENIPUNCTURE: CPT | Mod: HCNC

## 2020-08-03 PROCEDURE — 83735 ASSAY OF MAGNESIUM: CPT | Mod: HCNC

## 2020-08-03 PROCEDURE — 85025 COMPLETE CBC W/AUTO DIFF WBC: CPT | Mod: HCNC

## 2020-08-03 PROCEDURE — 83036 HEMOGLOBIN GLYCOSYLATED A1C: CPT | Mod: HCNC

## 2020-08-03 PROCEDURE — 99900035 HC TECH TIME PER 15 MIN (STAT): Mod: HCNC

## 2020-08-03 PROCEDURE — 25000003 PHARM REV CODE 250: Mod: HCNC | Performed by: PHYSICIAN ASSISTANT

## 2020-08-03 PROCEDURE — G0378 HOSPITAL OBSERVATION PER HR: HCPCS | Mod: HCNC

## 2020-08-03 PROCEDURE — 94640 AIRWAY INHALATION TREATMENT: CPT | Mod: HCNC

## 2020-08-03 PROCEDURE — 84100 ASSAY OF PHOSPHORUS: CPT | Mod: HCNC

## 2020-08-03 PROCEDURE — 80061 LIPID PANEL: CPT | Mod: HCNC

## 2020-08-03 RX ORDER — ATORVASTATIN CALCIUM 10 MG/1
10 TABLET, FILM COATED ORAL NIGHTLY
Status: DISCONTINUED | OUTPATIENT
Start: 2020-08-03 | End: 2020-08-03 | Stop reason: HOSPADM

## 2020-08-03 RX ORDER — IBUPROFEN 200 MG
1 TABLET ORAL DAILY
Qty: 30 PATCH | Refills: 0 | Status: SHIPPED | OUTPATIENT
Start: 2020-08-04 | End: 2021-10-08

## 2020-08-03 RX ORDER — ATORVASTATIN CALCIUM 10 MG/1
10 TABLET, FILM COATED ORAL NIGHTLY
Qty: 90 TABLET | Refills: 3 | Status: SHIPPED | OUTPATIENT
Start: 2020-08-03 | End: 2021-03-03 | Stop reason: SDUPTHER

## 2020-08-03 RX ORDER — BENZONATATE 100 MG/1
100 CAPSULE ORAL 3 TIMES DAILY PRN
Qty: 30 CAPSULE | Refills: 0 | Status: SHIPPED | OUTPATIENT
Start: 2020-08-03 | End: 2020-08-13

## 2020-08-03 RX ORDER — AZITHROMYCIN 250 MG/1
250 TABLET, FILM COATED ORAL DAILY
Qty: 3 TABLET | Refills: 0 | Status: SHIPPED | OUTPATIENT
Start: 2020-08-04 | End: 2020-08-07

## 2020-08-03 RX ORDER — PREDNISONE 20 MG/1
60 TABLET ORAL DAILY
Qty: 9 TABLET | Refills: 0 | Status: SHIPPED | OUTPATIENT
Start: 2020-08-04 | End: 2020-08-07

## 2020-08-03 RX ADMIN — IPRATROPIUM BROMIDE AND ALBUTEROL SULFATE 3 ML: .5; 2.5 SOLUTION RESPIRATORY (INHALATION) at 01:08

## 2020-08-03 RX ADMIN — POLYETHYLENE GLYCOL 3350 17 G: 17 POWDER, FOR SOLUTION ORAL at 09:08

## 2020-08-03 RX ADMIN — FLUTICASONE FUROATE AND VILANTEROL TRIFENATATE 1 PUFF: 200; 25 POWDER RESPIRATORY (INHALATION) at 10:08

## 2020-08-03 RX ADMIN — PANTOPRAZOLE SODIUM 40 MG: 40 TABLET, DELAYED RELEASE ORAL at 09:08

## 2020-08-03 RX ADMIN — NICOTINE 1 PATCH: 14 PATCH TRANSDERMAL at 08:08

## 2020-08-03 RX ADMIN — GUAIFENESIN AND DEXTROMETHORPHAN HYDROBROMIDE 1 TABLET: 600; 30 TABLET, EXTENDED RELEASE ORAL at 09:08

## 2020-08-03 RX ADMIN — LOSARTAN POTASSIUM 100 MG: 50 TABLET, FILM COATED ORAL at 09:08

## 2020-08-03 RX ADMIN — GABAPENTIN 100 MG: 100 CAPSULE ORAL at 09:08

## 2020-08-03 RX ADMIN — GABAPENTIN 100 MG: 100 CAPSULE ORAL at 03:08

## 2020-08-03 RX ADMIN — AZITHROMYCIN 250 MG: 250 TABLET, FILM COATED ORAL at 09:08

## 2020-08-03 RX ADMIN — PREDNISONE 60 MG: 20 TABLET ORAL at 09:08

## 2020-08-03 RX ADMIN — IPRATROPIUM BROMIDE AND ALBUTEROL SULFATE 3 ML: .5; 2.5 SOLUTION RESPIRATORY (INHALATION) at 07:08

## 2020-08-03 NOTE — NURSING
Pt sitting up in bed with lights and television off. Urine ready to be collected for UA. Pt requesting Tylenol for pain. States pain level is a 7 out of 10. Call light in reach.

## 2020-08-03 NOTE — DISCHARGE SUMMARY
[facility] Hospital Medicine  Discharge Summary      Patient Name: Rochelle Espinoza  MRN: [id number]  Admission Date: 8/2/2020  Hospital Length of Stay: 0 days  Discharge Date and Time: 8/3/2020  6:37 PM  Attending Physician: Khanh Martínez MD   Discharging Provider: Payton Choi PA-C  Primary Care Provider: Yosi Samuels MD  [facility] Medicine Team: [facility] [place] [facility]OSP MED F Payton Choi PA-C    HPI:   Mrs. Espinoza is 65 yo F with PMHx HTN, COPD on home 2L O2, tobacco abuse, HFrEF, and gout who presents with COPD exacerbation and chest pain. Patient reports for past 3 days she has been having worsening fatigue, chest tightness, SOB, wheezing, and cough. Cough was initially dry but is now productive with white sputum. She has used her inhalers without relief. She also endorses intermittent chest heaviness over the past month that has worsened over the past week. She at first though the chest heaviness was related to gas pain and noted some mild epigastric distension. She was seen by cardiology 7/27/20 and outpatient nuclear stress test was ordered. Since then, chest pain has been more persistent, but resolves on own. No worsening or alleviating factors. She denies diaphoresis, N/V, jaw pain, numbness, or tingling. Patient reports she started smoking again two weeks ago after a brief hiatus, currently smoking 15 cigarettes a day. She denies fever, chills, abdominal pain, diarrhea, constipation, urinary symptoms, weakness, lightheadedness, dizziness.    In ED, O2 85% on 2L on arrival, placed on 4L and able to wean down to 2L satting at 95% after duonebs and Solumedrol 125 mg. Started on ceftriaxone and azithromycin. Afebrile without leukocytosis. CXR with vascular pulmonary congestion. CTA with limited exam to but no convincing PE, new bronchial wall thickening most pronounced in the right upper lobe which could be due to bronchitis or reactive airways disease. Troponin negative, BNP negative, lactate  WNL, COVID negative. Patient admitted to observation for COPD exacerbation.     * No surgery found *      Hospital Course:   Ms. Espinoza was admitted to observation for COPD exacerbation and chest pain. Patient started on azithromycin, prednisone, and duonebs with improvement. Troponin negative X2, EKG with non-specific t wave abnormalities in inferior leads. Patient reports chest pain has resolved. Lipid panel obtained, A1c 5.3. 10 year ASCVD risk intermediate (17%), started on moderate intensity statin and daily ASA per guidelines. Will need outpatient stress test (ordered by cardiology) due to COPD exacerbation. Patient reports chest pain resolved after mylanta. Patient with improvement in SOB and stable on home O2. Patient discharged on azithromycin and prednisone to complete 5 day course total of each, and nicotine patches. Tobacco cessation education given. PCP follow up in 1 week. Patient verbalized understanding. All questions answered.        Consults:     * Chronic obstructive pulmonary disease with acute exacerbation  Chronic hypoxemic respiratory failure on 2L  home O2    - In ED, O2 85% on 2L O2 placed on 4L, given duonebs and IV solumedrol and able to wean to 2L. Given CTX/Azithro  - afebrile without leukocytosis  - CXR with vascular congestion  - CTA with new bronchial wall thickening most pronounced in the right upper lobe, bronchitis vs reactive airways disease. Limited exam but no evidence of PE   - continue Azithromycin X 5 days  - prednisone 60 mg X 5 days  - duonebs q4h wake  - on anora ellipta at home, not on formulary, breo while inpatient  - mucinex- DM BID, tessalon perles, incentive spirometer  - patient with improvement in SOB and stable on home O2  - discharged on Azithromycin, prednisone 60 mg to complete 5 day course total  - nicotine patches and smoking cessation education given    Chest pain  Patient reports worsening chest pressure over past 3 days. She was seen by cardiology 7/27/20  and outpatient SPECT nuclear stress ordered.   - patient with risk factors for CAD including age, family history, active smoker  - Last TTE with WMA of the ismael-septal and infero-septal regions   - troponin negative X 2  - EKG non-ischemic   - lipid panel, A1c5.3.   - 10 year ASCVD risk intermediate (17%), started on moderate intensity statin and daily ASA per guidelines.   - given current COPD exacerbation, will hold off on ordering SPECT stress for now unless worsening CP or patient becomes unstable. NPO midnight  - will trial mylanta as patient concerned pain may be from GI upset  - no further episodes of chest pain, possibly related to COPD exacerbation/ GERD  - outpatient stress test ordered by cardiology  - tele    Tobacco abuse  - currently smokes pack/day  - nicotene patch  - tobacco cessation education given      Final Active Diagnoses:    Diagnosis Date Noted POA    PRINCIPAL PROBLEM:  Chronic obstructive pulmonary disease with acute exacerbation [J44.1]  Yes    Chest pain [R07.9] 07/27/2020 Yes    Chronic hypoxemic respiratory failure [J96.11] 07/18/2020 Yes    Cor pulmonale, chronic [I27.81] 04/10/2018 Yes    Pulmonary hypertension [I27.20] 03/05/2018 Yes    Tobacco abuse [Z72.0] 02/07/2016 Yes    Essential hypertension [I10]  Yes      Problems Resolved During this Admission:       Discharged Condition: good    Disposition: Home or Self Care    Follow Up:  Follow-up Information     Yosi Samuels MD In 1 week.    Specialty: Internal Medicine  Contact information:  [address]  [place] [address]  [phone]                 Patient Instructions:      Diet Cardiac     Notify your health care provider if you experience any of the following:  increased confusion or weakness     Notify your health care provider if you experience any of the following:  persistent dizziness, light-headedness, or visual disturbances     Notify your health care provider if you experience any of the following:   difficulty breathing or increased cough     Notify your health care provider if you experience any of the following:  temperature >100.4     Activity as tolerated       Significant Diagnostic Studies: Labs:   CMP   Recent Labs   Lab 08/02/20  0918 08/03/20  0554    143   K 4.1 4.5    105   CO2 29 31*   GLU 84 85   BUN 7* 10   CREATININE 0.7 0.7   CALCIUM 9.5 9.3   PROT 7.3  --    ALBUMIN 4.0  --    BILITOT 0.5  --    ALKPHOS 50*  --    AST 20  --    ALT 14  --    ANIONGAP 7* 7*   ESTGFRAFRICA >60.0 >60.0   EGFRNONAA >60.0 >60.0   , CBC   Recent Labs   Lab 08/02/20  0918 08/03/20  0554   WBC 8.62 11.83   HGB 13.5 12.8   HCT 42.6 40.8    245   , Lipid Panel   Lab Results   Component Value Date    CHOL 180 08/03/2020    HDL 73 08/03/2020    LDLCALC 97.2 08/03/2020    TRIG 49 08/03/2020    CHOLHDL 40.6 08/03/2020   , Troponin   Recent Labs   Lab 08/02/20  1158   TROPONINI <0.006    and A1C:   Recent Labs   Lab 08/03/20  0554   HGBA1C 5.3       Pending Diagnostic Studies:     None         Medications:  Reconciled Home Medications:      Medication List      START taking these medications    atorvastatin 10 MG tablet  Commonly known as: LIPITOR  Take 1 tablet (10 mg total) by mouth every evening.     azithromycin 250 MG tablet  Commonly known as: Z-CLARITA  Take 1 tablet (250 mg total) by mouth once daily. for 3 days  Start taking on: August 4, 2020     benzonatate 100 MG capsule  Commonly known as: TESSALON  Take 1 capsule (100 mg total) by mouth 3 (three) times daily as needed for Cough.     dextromethorphan-guaifenesin  mg  mg per 12 hr tablet  Commonly known as: MUCINEX DM  Take 1 tablet by mouth 2 (two) times daily. for 10 days     nicotine 14 mg/24 hr  Commonly known as: NICODERM CQ  Place 1 patch onto the skin once daily.  Start taking on: August 4, 2020     predniSONE 20 MG tablet  Commonly known as: DELTASONE  Take 3 tablets (60 mg total) by mouth once daily. for 3 days  Start taking on:  August 4, 2020        CHANGE how you take these medications    albuterol-ipratropium 2.5 mg-0.5 mg/3 mL nebulizer solution  Commonly known as: DUO-NEB  Take 3 mLs by nebulization every 6 (six) hours as needed for Wheezing. Rescue  What changed: Another medication with the same name was removed. Continue taking this medication, and follow the directions you see here.        CONTINUE taking these medications    albuterol 90 mcg/actuation inhaler  Commonly known as: PROVENTIL/VENTOLIN HFA  INHALE 2 PUFFS BY MOUTH EVERY 6 HOURS AS NEEDED     colchicine 0.6 mg tablet  Commonly known as: COLCRYS  TAKE 2 TABLETS BY MOUTH NOW, TAKE 1 TABLET BY MOUTH 1 HOUR LATER     fluticasone propionate 50 mcg/actuation nasal spray  Commonly known as: FLONASE  1 spray (50 mcg total) by Each Nostril route once daily.     gabapentin 100 MG capsule  Commonly known as: NEURONTIN  Take 1 capsule (100 mg total) by mouth 3 (three) times daily.     glycerin adult suppository  Place 1 suppository rectally as needed for Constipation.     losartan 100 MG tablet  Commonly known as: COZAAR  Take 0.5 tablets (50 mg total) by mouth once daily.     multivitamin capsule  Take 1 capsule by mouth once daily.     omeprazole 40 MG capsule  Commonly known as: PRILOSEC  Take 1 capsule (40 mg total) by mouth once daily.     potassium chloride SA 20 MEQ tablet  Commonly known as: K-DUR,KLOR-CON  TAKE 1 TABLET(20 MEQ) BY MOUTH EVERY DAY     umeclidinium-vilanteroL 62.5-25 mcg/actuation Dsdv  Commonly known as: ANORO ELLIPTA  Inhale 1 puff into the lungs once daily. Controller            Indwelling Lines/Drains at time of discharge:   Lines/Drains/Airways     None                 Time spent on the discharge of patient: 36 minutes  Patient was seen and examined on the date of discharge and determined to be suitable for discharge.         Payton Choi PA-C  Department of Hospital Medicine  [facility]

## 2020-08-03 NOTE — PROGRESS NOTES
Home Oxygen Evaluation    Date Performed: 8/3/2020    1) Patient's Home O2 Sat on room air, while at rest: 87              2) Patient's O2 Sat on room air while exercisin%            3) Patient's O2 Sat while exercising on O2: 91% at 2 LPM

## 2020-08-03 NOTE — ASSESSMENT & PLAN NOTE
Patient reports worsening chest pressure over past 3 days. She was seen by cardiology 7/27/20 and outpatient SPECT nuclear stress ordered.   - patient with risk factors for CAD including age, family history, active smoker  - Last TTE with WMA of the ismael-septal and infero-septal regions   - troponin negative X 2  - EKG non-ischemic   - lipid panel, A1c5.3.   - 10 year ASCVD risk intermediate (17%), started on moderate intensity statin and daily ASA per guidelines.   - given current COPD exacerbation, will hold off on ordering SPECT stress for now unless worsening CP or patient becomes unstable. NPO midnight  - will trial mylanta as patient concerned pain may be from GI upset  - no further episodes of chest pain, possibly related to COPD exacerbation/ GERD  - outpatient stress test ordered by cardiology  - tele

## 2020-08-03 NOTE — PLAN OF CARE
SW met with patient at bedside. Patient appeared calm and was cooperative during dc planning assessment. Patient confirmed information on FS. Patient has transportation at DC. Patient has no concerns at this time. SW/CM to follow and assist with needs as indicated.    Yosi Samuels MD    [id number] [place], [place] - [address] AT [facility] [address]  [address]  [facility] [address]  Phone: [phone] Fax: [phone]       08/03/20 1033   Discharge Assessment   Assessment Type Discharge Planning Assessment   Confirmed/corrected address and phone number on facesheet? Yes   Assessment information obtained from? Patient   Expected Length of Stay (days) 1   Communicated expected length of stay with patient/caregiver yes   Prior to hospitilization cognitive status: Alert/Oriented   Prior to hospitalization functional status: Independent   Current cognitive status: Alert/Oriented   Current Functional Status: Independent   Lives With alone   Able to Return to Prior Arrangements yes   Is patient able to care for self after discharge? Yes   Patient's perception of discharge disposition home or selfcare   Patient currently being followed by outpatient case management? No   Patient currently receives any other outside agency services? No   Equipment Currently Used at Home none   Do you have any problems affording any of your prescribed medications? No   Is the patient taking medications as prescribed? yes   Does the patient have transportation home? Yes   Transportation Anticipated family or friend will provide   Does the patient receive services at the [facility]? No   Discharge Plan A Home   Discharge Plan B Home   DME Needed Upon Discharge  none   Patient/Family in Agreement with Plan yes       Hailee Kessler LMSW  [facility]  [id number]

## 2020-08-03 NOTE — ASSESSMENT & PLAN NOTE
Chronic hypoxemic respiratory failure on 2L  home O2    - In ED, O2 85% on 2L O2 placed on 4L, given duonebs and IV solumedrol and able to wean to 2L. Given CTX/Azithro  - afebrile without leukocytosis  - CXR with vascular congestion  - CTA with new bronchial wall thickening most pronounced in the right upper lobe, bronchitis vs reactive airways disease. Limited exam but no evidence of PE   - continue Azithromycin X 5 days  - prednisone 60 mg X 5 days  - duonebs q4h wake  - on anora ellipta at home, not on formulary, breo while inpatient  - mucinex- DM BID, tessalon perles, incentive spirometer  - patient with improvement in SOB and stable on home O2  - discharged on Azithromycin, prednisone 60 mg to complete 5 day course total  - nicotine patches and smoking cessation education given

## 2020-08-04 NOTE — PLAN OF CARE
08/04/20 0834   Final Note   Assessment Type Final Discharge Note   Anticipated Discharge Disposition Home   What phone number can be called within the next 1-3 days to see how you are doing after discharge? [phone]   Discharge plans and expectations educations in teach back method with documentation complete? Yes   Right Care Referral Info   Post Acute Recommendation No Care   Post-Acute Status   Post-Acute Authorization Other   Other Status No Post-Acute Service Needs     No future appointments.

## 2020-08-07 DIAGNOSIS — R07.9 CHEST PAIN, UNSPECIFIED TYPE: Primary | ICD-10-CM

## 2020-08-09 ENCOUNTER — OFFICE VISIT (OUTPATIENT)
Dept: URGENT CARE | Facility: CLINIC | Age: 65
End: 2020-08-09
Payer: MEDICARE

## 2020-08-09 VITALS
DIASTOLIC BLOOD PRESSURE: 101 MMHG | SYSTOLIC BLOOD PRESSURE: 158 MMHG | BODY MASS INDEX: 25.34 KG/M2 | HEIGHT: 63 IN | TEMPERATURE: 98 F | OXYGEN SATURATION: 95 % | WEIGHT: 143 LBS | HEART RATE: 100 BPM

## 2020-08-09 DIAGNOSIS — K21.9 GASTROESOPHAGEAL REFLUX DISEASE, ESOPHAGITIS PRESENCE NOT SPECIFIED: Primary | ICD-10-CM

## 2020-08-09 PROCEDURE — 99214 PR OFFICE/OUTPT VISIT, EST, LEVL IV, 30-39 MIN: ICD-10-PCS | Mod: S$GLB,,, | Performed by: FAMILY MEDICINE

## 2020-08-09 PROCEDURE — 99214 OFFICE O/P EST MOD 30 MIN: CPT | Mod: S$GLB,,, | Performed by: FAMILY MEDICINE

## 2020-08-09 NOTE — PROGRESS NOTES
"Subjective:       Patient ID: Rochelle Espinoza is a 64 y.o. female.    Vitals:  height is 5' 3" (1.6 m) and weight is 64.9 kg (143 lb). Her temperature is 97.8 °F (36.6 °C). Her blood pressure is 158/101 (abnormal) and her pulse is 100. Her oxygen saturation is 95%.     Chief Complaint: Heartburn    Patient presents with acid reflux with burning in chest, that started Friday. Patient was recent hospital     Gastroesophageal Reflux  She complains of belching and coughing. She reports no chest pain, no nausea or no sore throat. This is a new problem. The current episode started in the past 7 days. The problem occurs constantly. The problem has been unchanged. Pertinent negatives include no fatigue. She has tried nothing for the symptoms.       Constitution: Negative for chills, fatigue and fever.   HENT: Negative for congestion and sore throat.    Neck: Negative for painful lymph nodes.   Cardiovascular: Positive for sob on exertion. Negative for chest pain and leg swelling.   Eyes: Negative for double vision and blurred vision.   Respiratory: Positive for cough and shortness of breath.    Gastrointestinal: Negative for nausea, vomiting and diarrhea.   Genitourinary: Negative for dysuria, frequency, urgency and history of kidney stones.   Musculoskeletal: Negative for joint pain, joint swelling, muscle cramps and muscle ache.   Skin: Negative for color change, pale, rash and bruising.   Allergic/Immunologic: Negative for seasonal allergies.   Neurological: Negative for dizziness, history of vertigo, light-headedness, passing out and headaches.   Hematologic/Lymphatic: Negative for swollen lymph nodes.   Psychiatric/Behavioral: Negative for nervous/anxious, sleep disturbance and depression. The patient is not nervous/anxious.        Objective:      Physical Exam   HENT:   Head: Normocephalic and atraumatic.   Nose: Nose normal.   Mouth/Throat: Mucous membranes are moist.   Eyes: Pupils are equal, round, and reactive to " light.   Neck: Normal range of motion. Neck supple.   Cardiovascular: Normal rate, regular rhythm, normal heart sounds and normal pulses.   Pulmonary/Chest: Effort normal and breath sounds normal.   Abdominal: Soft. Normal appearance.   Neurological: She is alert.   Nursing note and vitals reviewed.        Assessment:       1. Gastroesophageal reflux disease, esophagitis presence not specified      reports symptomatic relief with GI cocktail. Has started omeprazole 40 mg po BID. Discussed use of Pepcid, TUMs, dietary modifications. Has appointment with cardiology next Friday for cardiac workup  Plan:         Gastroesophageal reflux disease, esophagitis presence not specified  -     (pyxis) gi cocktail (mylanta 30 mL, lidocaine 2 % viscous 10 mL, dicyclomine 10 mL) 50 mL

## 2020-08-09 NOTE — PATIENT INSTRUCTIONS

## 2020-08-12 ENCOUNTER — TELEPHONE (OUTPATIENT)
Dept: CARDIOLOGY | Facility: CLINIC | Age: 65
End: 2020-08-12

## 2020-08-14 ENCOUNTER — TELEPHONE (OUTPATIENT)
Dept: CARDIOLOGY | Facility: HOSPITAL | Age: 65
End: 2020-08-14

## 2020-08-14 ENCOUNTER — HOSPITAL ENCOUNTER (OUTPATIENT)
Dept: CARDIOLOGY | Facility: HOSPITAL | Age: 65
Discharge: HOME | End: 2020-08-14
Attending: STUDENT IN AN ORGANIZED HEALTH CARE EDUCATION/TRAINING PROGRAM
Payer: MEDICARE

## 2020-08-14 ENCOUNTER — HOSPITAL ENCOUNTER (OUTPATIENT)
Dept: CARDIOLOGY | Facility: HOSPITAL | Age: 65
Discharge: HOME | End: 2020-08-14
Attending: STUDENT IN AN ORGANIZED HEALTH CARE EDUCATION/TRAINING PROGRAM
Payer: COMMERCIAL

## 2020-08-14 DIAGNOSIS — R07.9 CHEST PAIN, UNSPECIFIED TYPE: ICD-10-CM

## 2020-08-14 LAB
CV PHARM DOSE: 0.4 MG
CV STRESS BASE HR: 62 BPM
DIASTOLIC BLOOD PRESSURE: 92 MMHG
END DIASTOLIC INDEX-HIGH: 170 ML/M2
END SYSTOLIC INDEX-HIGH: 70 ML/M2
OHS CV CPX 85 PERCENT MAX PREDICTED HEART RATE MALE: 127
OHS CV CPX MAX PREDICTED HEART RATE: 150
OHS CV CPX PATIENT IS FEMALE: 1
OHS CV CPX PATIENT IS MALE: 0
OHS CV CPX PEAK DIASTOLIC BLOOD PRESSURE: 83 MMHG
OHS CV CPX PEAK HEAR RATE: 87 BPM
OHS CV CPX PEAK RATE PRESSURE PRODUCT: NORMAL
OHS CV CPX PEAK SYSTOLIC BLOOD PRESSURE: 163 MMHG
OHS CV CPX PERCENT MAX PREDICTED HEART RATE ACHIEVED: 58
OHS CV CPX RATE PRESSURE PRODUCT PRESENTING: 8866
RETIRED EF AND QEF - SEE NOTES: 51 %
SUMMED DIFFERENCE: 0
SUMMED REST SCORE: 3
SUMMED STRESS SCORE: 3
SYSTOLIC BLOOD PRESSURE: 143 MMHG

## 2020-08-14 PROCEDURE — 63600175 PHARM REV CODE 636 W HCPCS: Mod: HCNC | Performed by: STUDENT IN AN ORGANIZED HEALTH CARE EDUCATION/TRAINING PROGRAM

## 2020-08-14 PROCEDURE — A9502 TC99M TETROFOSMIN: HCPCS

## 2020-08-14 PROCEDURE — 78452 HT MUSCLE IMAGE SPECT MULT: CPT

## 2020-08-14 PROCEDURE — 93016 STRESS TEST WITH MYOCARDIAL PERFUSION (CUPID ONLY): ICD-10-PCS | Mod: ,,, | Performed by: INTERNAL MEDICINE

## 2020-08-14 PROCEDURE — 93018 CV STRESS TEST I&R ONLY: CPT | Mod: ,,, | Performed by: INTERNAL MEDICINE

## 2020-08-14 PROCEDURE — 93016 CV STRESS TEST SUPVJ ONLY: CPT | Mod: ,,, | Performed by: INTERNAL MEDICINE

## 2020-08-14 PROCEDURE — 78452 STRESS TEST WITH MYOCARDIAL PERFUSION (CUPID ONLY): ICD-10-PCS | Mod: 26,,, | Performed by: INTERNAL MEDICINE

## 2020-08-14 PROCEDURE — 78452 HT MUSCLE IMAGE SPECT MULT: CPT | Mod: 26,,, | Performed by: INTERNAL MEDICINE

## 2020-08-14 PROCEDURE — 93018 STRESS TEST WITH MYOCARDIAL PERFUSION (CUPID ONLY): ICD-10-PCS | Mod: ,,, | Performed by: INTERNAL MEDICINE

## 2020-08-14 RX ORDER — REGADENOSON 0.08 MG/ML
0.4 INJECTION, SOLUTION INTRAVENOUS ONCE
Status: COMPLETED | OUTPATIENT
Start: 2020-08-14 | End: 2020-08-14

## 2020-08-14 RX ORDER — AMINOPHYLLINE 25 MG/ML
75 INJECTION, SOLUTION INTRAVENOUS
Status: COMPLETED | OUTPATIENT
Start: 2020-08-14 | End: 2020-08-14

## 2020-08-14 RX ADMIN — REGADENOSON 0.4 MG: 0.08 INJECTION, SOLUTION INTRAVENOUS at 08:08

## 2020-08-14 RX ADMIN — AMINOPHYLLINE 75 MG: 25 INJECTION, SOLUTION INTRAVENOUS at 09:08

## 2020-08-14 NOTE — TELEPHONE ENCOUNTER
Telephone Communication Note    Discussed results of patient's cardiac stress test. All questions answered. Instructed pt to keep BP log and call clinic with BP readings for 1 week.     Marga Dash, PGY-6 (Cardiology Fellow)

## 2020-08-16 DIAGNOSIS — K21.9 GASTROESOPHAGEAL REFLUX DISEASE WITHOUT ESOPHAGITIS: ICD-10-CM

## 2020-08-16 DIAGNOSIS — M54.12 CERVICAL RADICULOPATHY: ICD-10-CM

## 2020-08-16 RX ORDER — OMEPRAZOLE 40 MG/1
40 CAPSULE, DELAYED RELEASE ORAL DAILY
Qty: 90 CAPSULE | Refills: 3 | Status: CANCELLED | OUTPATIENT
Start: 2020-08-16

## 2020-08-17 RX ORDER — GABAPENTIN 100 MG/1
100 CAPSULE ORAL 3 TIMES DAILY
Qty: 90 CAPSULE | Refills: 0 | Status: SHIPPED | OUTPATIENT
Start: 2020-08-17 | End: 2020-09-15 | Stop reason: SDUPTHER

## 2020-08-21 ENCOUNTER — PATIENT OUTREACH (OUTPATIENT)
Dept: ADMINISTRATIVE | Facility: HOSPITAL | Age: 65
End: 2020-08-21

## 2020-08-21 DIAGNOSIS — Z13.820 ENCOUNTER FOR SCREENING FOR OSTEOPOROSIS: Primary | ICD-10-CM

## 2020-08-21 NOTE — PROGRESS NOTES
Health Maintenance Due   Topic Date Due    DEXA SCAN  11/27/2016    Pneumococcal Vaccine (65+ Low/Medium Risk) (1 of 2 - PCV13) 08/17/2020     Order has been placed for DEXA scan.  Portal message has been sent to patient regarding overdue DEXA scan.  Chart review completed.

## 2020-08-25 ENCOUNTER — OFFICE VISIT (OUTPATIENT)
Dept: INTERNAL MEDICINE | Facility: CLINIC | Age: 65
End: 2020-08-25
Payer: COMMERCIAL

## 2020-08-25 VITALS
SYSTOLIC BLOOD PRESSURE: 120 MMHG | OXYGEN SATURATION: 92 % | HEART RATE: 99 BPM | HEIGHT: 63 IN | WEIGHT: 140.63 LBS | BODY MASS INDEX: 24.92 KG/M2 | DIASTOLIC BLOOD PRESSURE: 82 MMHG

## 2020-08-25 DIAGNOSIS — J44.1 CHRONIC OBSTRUCTIVE PULMONARY DISEASE WITH ACUTE EXACERBATION: ICD-10-CM

## 2020-08-25 DIAGNOSIS — J96.11 CHRONIC HYPOXEMIC RESPIRATORY FAILURE: Primary | ICD-10-CM

## 2020-08-25 DIAGNOSIS — Z99.81 OXYGEN DEPENDENT: ICD-10-CM

## 2020-08-25 PROCEDURE — 1101F PR PT FALLS ASSESS DOC 0-1 FALLS W/OUT INJ PAST YR: ICD-10-PCS | Mod: CPTII,S$GLB,, | Performed by: INTERNAL MEDICINE

## 2020-08-25 PROCEDURE — 3008F PR BODY MASS INDEX (BMI) DOCUMENTED: ICD-10-PCS | Mod: CPTII,S$GLB,, | Performed by: INTERNAL MEDICINE

## 2020-08-25 PROCEDURE — 99214 OFFICE O/P EST MOD 30 MIN: CPT | Mod: S$GLB,,, | Performed by: INTERNAL MEDICINE

## 2020-08-25 PROCEDURE — 3074F SYST BP LT 130 MM HG: CPT | Mod: CPTII,S$GLB,, | Performed by: INTERNAL MEDICINE

## 2020-08-25 PROCEDURE — 1101F PT FALLS ASSESS-DOCD LE1/YR: CPT | Mod: CPTII,S$GLB,, | Performed by: INTERNAL MEDICINE

## 2020-08-25 PROCEDURE — 99999 PR PBB SHADOW E&M-EST. PATIENT-LVL V: ICD-10-PCS | Mod: PBBFAC,,, | Performed by: INTERNAL MEDICINE

## 2020-08-25 PROCEDURE — 3074F PR MOST RECENT SYSTOLIC BLOOD PRESSURE < 130 MM HG: ICD-10-PCS | Mod: CPTII,S$GLB,, | Performed by: INTERNAL MEDICINE

## 2020-08-25 PROCEDURE — 3079F DIAST BP 80-89 MM HG: CPT | Mod: CPTII,S$GLB,, | Performed by: INTERNAL MEDICINE

## 2020-08-25 PROCEDURE — 3079F PR MOST RECENT DIASTOLIC BLOOD PRESSURE 80-89 MM HG: ICD-10-PCS | Mod: CPTII,S$GLB,, | Performed by: INTERNAL MEDICINE

## 2020-08-25 PROCEDURE — 3008F BODY MASS INDEX DOCD: CPT | Mod: CPTII,S$GLB,, | Performed by: INTERNAL MEDICINE

## 2020-08-25 PROCEDURE — 99999 PR PBB SHADOW E&M-EST. PATIENT-LVL V: CPT | Mod: PBBFAC,,, | Performed by: INTERNAL MEDICINE

## 2020-08-25 PROCEDURE — 99214 PR OFFICE/OUTPT VISIT, EST, LEVL IV, 30-39 MIN: ICD-10-PCS | Mod: S$GLB,,, | Performed by: INTERNAL MEDICINE

## 2020-08-25 RX ORDER — PREDNISONE 20 MG/1
20 TABLET ORAL DAILY
Qty: 14 TABLET | Refills: 0 | Status: SHIPPED | OUTPATIENT
Start: 2020-08-25 | End: 2020-09-08

## 2020-08-25 RX ORDER — PREDNISONE 20 MG/1
20 TABLET ORAL DAILY
Qty: 14 TABLET | Refills: 0 | Status: SHIPPED | OUTPATIENT
Start: 2020-08-25 | End: 2020-08-25

## 2020-08-25 NOTE — PROGRESS NOTES
Ms. Rochelle Espinoza is a 65 y.o. female w/ COPD on home 2L O2, CHF, HTN on losartan and a hx of gout is here for a worsening shortness of breath.  She is on oxygen 2 liters/minute right now at 2 liters/minute.  She she has quit smoking since last visit.  She had cut way back but since last visit she has been able stop smoking.  She has had some congestive heart failure some lower extremity edema.  She has had no recent cough or fevers.  No COVID exposure that she knows of.Currently prescribed medications are LAMA-LABA and nebulizers.  On a good week she uses her rescue nebs 3 4 times a week.  He also uses albuterol inhaler.               CHF- likely cor pulmonale, no LE swelling currently, no new cough or orthopnea or PND  HTN-   blood pressure today is 120/82..     she is on losartan 50 mg a day now.  She is not having CT chest pain shortness of breath.  She was on a Dyazide diuretic however she is off of this due to some low blood pressure.        She has gerd and she takes  Omeprazole  For this.  She is still having reflux.       She has history of some depression the situational.  She has left work due to her chronic illness and that has caused a lot of anxiety but she is currently doing lot better.     Review of Systems   Constitutional: Negative for chills, fever, malaise/fatigue --she has lost 7 lb since last visit but she has gained 12 lb before that so she is roughly still about 5 lb from where she was 9 months ago.  HENT: Negative for hearing loss and sore throat.    Eyes: Negative for discharge.   Respiratory: Positive for wheezing. Negative for cough and shortness of breath.    Cardiovascular: Positive for palpitations. Negative for chest pain, orthopnea, leg swelling and PND.   Gastrointestinal: Negative for blood in stool, constipation, diarrhea, heartburn, nausea and vomiting.   Genitourinary: Negative for dysuria, hematuria and urgency.   Musculoskeletal: Negative for myalgias and neck pain.  "  Neurological: Negative for weakness and headaches.   Endo/Heme/Allergies: Negative for polydipsia.                        OBJECTIVE:   Vital Signs:      /82 (BP Location: Right arm, Patient Position: Sitting, BP Method: Medium (Manual))   Pulse 99   Ht 5' 3" (1.6 m)   Wt 63.8 kg (140 lb 10.5 oz)   LMP 11/21/2013   SpO2 (!) 92%   BMI 24.92 kg/m²              Physical Exam   Constitutional: She is oriented to person, place, and time and well-developed, well-nourished, and in no distress. No distress.   HENT:   Head: Normocephalic and atraumatic.   Mouth/Throat: Oropharynx is clear and moist. No oropharyngeal exudate.   On 2L NC   Eyes: Pupils are equal, round, and reactive to light. Conjunctivae are normal. Right eye exhibits no discharge. Left eye exhibits no discharge. No scleral icterus.   Neck: Normal range of motion. Neck supple. No JVD present. No thyromegaly present.   Cardiovascular: Normal rate, regular rhythm, normal heart sounds and intact distal pulses.   Pulmonary/Chest: Effort normal and breath sounds normal. No respiratory distress.   Abdominal: Soft. Bowel sounds are normal. She exhibits no distension. There is no tenderness.   Musculoskeletal: Normal range of motion. She exhibits no edema or tenderness.   Lymphadenopathy:     She has no cervical adenopathy.   Neurological: She is alert and oriented to person, place, and time.   Skin: Skin is warm and dry. She is not diaphoretic.   Psychiatric: Mood and affect normal.         ASSESSMENT & PLAN:      Rochelle was seen today for follow-up.  Hypertension doing well--continue current medications     Diagnoses and all orders for this visit:     Cor pulmonale, chronic  -   - not on diuretic at the current time.  The most important thing that she has done recently so stop smoking.  -     losartan (COZAAR) 50 MG tablet; Take 1 tablet (50 mg total) by mouth once daily.     Chronic bronchitis, unspecified chronic bronchitis type--with hypoxia-she has " not seen pulmonary in a while, so we will make a referral back to them to help her disease s to see if we can make her feel more comfortable.     Oxygen dependent--she has oxygen and her supplies     Pulmonary hypertension     Tobacco abuse        - resolved             Chronic obstructive pulmonary disease   -     albuterol (PROVENTIL/VENTOLIN HFA) 90 mcg/actuation inhaler; INHALE 2 PUFFS BY MOUTH EVERY 6 HOURS AS NEEDED     Gastroesophageal reflux disease without esophagitis  -     omeprazole (PRILOSEC) 40 MG capsule; Take 1 capsule (40 mg total) by mouth once daily.      We make sure she gets a flu shot when it becomes available.        RCT in 6 months

## 2020-08-26 ENCOUNTER — HOSPITAL ENCOUNTER (OUTPATIENT)
Dept: RADIOLOGY | Facility: HOSPITAL | Age: 65
Discharge: HOME | End: 2020-08-26
Attending: INTERNAL MEDICINE
Payer: MEDICARE

## 2020-08-26 DIAGNOSIS — J96.11 CHRONIC HYPOXEMIC RESPIRATORY FAILURE: ICD-10-CM

## 2020-08-26 DIAGNOSIS — J44.1 CHRONIC OBSTRUCTIVE PULMONARY DISEASE WITH ACUTE EXACERBATION: ICD-10-CM

## 2020-08-26 PROCEDURE — 71046 X-RAY EXAM CHEST 2 VIEWS: CPT | Mod: 26,,, | Performed by: RADIOLOGY

## 2020-08-26 PROCEDURE — 71046 XR CHEST PA AND LATERAL: ICD-10-PCS | Mod: 26,,, | Performed by: RADIOLOGY

## 2020-08-26 PROCEDURE — 71046 X-RAY EXAM CHEST 2 VIEWS: CPT | Mod: TC

## 2020-08-28 ENCOUNTER — HOSPITAL ENCOUNTER (OUTPATIENT)
Dept: RADIOLOGY | Facility: CLINIC | Age: 65
Discharge: HOME | End: 2020-08-28
Payer: MEDICARE

## 2020-08-28 DIAGNOSIS — Z13.820 ENCOUNTER FOR SCREENING FOR OSTEOPOROSIS: ICD-10-CM

## 2020-08-28 PROCEDURE — 77080 DEXA BONE DENSITY SPINE HIP: ICD-10-PCS | Mod: 26,,, | Performed by: INTERNAL MEDICINE

## 2020-08-28 PROCEDURE — 77080 DXA BONE DENSITY AXIAL: CPT | Mod: TC

## 2020-08-28 PROCEDURE — 77080 DXA BONE DENSITY AXIAL: CPT | Mod: 26,,, | Performed by: INTERNAL MEDICINE

## 2020-08-31 RX ORDER — LOSARTAN POTASSIUM 50 MG/1
TABLET ORAL
Qty: 90 TABLET | Refills: 2 | Status: SHIPPED | OUTPATIENT
Start: 2020-08-31 | End: 2021-02-11 | Stop reason: SDUPTHER

## 2020-09-15 DIAGNOSIS — M54.12 CERVICAL RADICULOPATHY: ICD-10-CM

## 2020-09-16 RX ORDER — GABAPENTIN 100 MG/1
100 CAPSULE ORAL 3 TIMES DAILY
Qty: 90 CAPSULE | Refills: 0 | Status: SHIPPED | OUTPATIENT
Start: 2020-09-16 | End: 2020-12-11 | Stop reason: SDUPTHER

## 2020-09-21 ENCOUNTER — TELEPHONE (OUTPATIENT)
Dept: PULMONOLOGY | Facility: CLINIC | Age: 65
End: 2020-09-21

## 2020-09-21 DIAGNOSIS — J44.9 CHRONIC OBSTRUCTIVE PULMONARY DISEASE, UNSPECIFIED COPD TYPE: Primary | ICD-10-CM

## 2020-09-22 DIAGNOSIS — J44.9 CHRONIC OBSTRUCTIVE PULMONARY DISEASE, UNSPECIFIED COPD TYPE: Primary | ICD-10-CM

## 2020-09-25 ENCOUNTER — LAB VISIT (OUTPATIENT)
Dept: SURGERY | Facility: CLINIC | Age: 65
End: 2020-09-25
Payer: MEDICARE

## 2020-09-25 DIAGNOSIS — J44.9 CHRONIC OBSTRUCTIVE PULMONARY DISEASE, UNSPECIFIED COPD TYPE: ICD-10-CM

## 2020-09-25 PROCEDURE — U0003 INFECTIOUS AGENT DETECTION BY NUCLEIC ACID (DNA OR RNA); SEVERE ACUTE RESPIRATORY SYNDROME CORONAVIRUS 2 (SARS-COV-2) (CORONAVIRUS DISEASE [COVID-19]), AMPLIFIED PROBE TECHNIQUE, MAKING USE OF HIGH THROUGHPUT TECHNOLOGIES AS DESCRIBED BY CMS-2020-01-R: HCPCS | Mod: HCNC

## 2020-09-26 LAB — SARS-COV-2 RNA RESP QL NAA+PROBE: NOT DETECTED

## 2020-09-27 ENCOUNTER — PATIENT OUTREACH (OUTPATIENT)
Dept: ADMINISTRATIVE | Facility: OTHER | Age: 65
End: 2020-09-27

## 2020-09-27 NOTE — PROGRESS NOTES
Care Everywhere: updated  Immunization: updated  Health Maintenance: updated  Media Review:   Legacy Review:   Order placed:   Upcoming appts

## 2020-09-28 ENCOUNTER — OFFICE VISIT (OUTPATIENT)
Dept: PULMONOLOGY | Facility: CLINIC | Age: 65
End: 2020-09-28
Payer: MEDICARE

## 2020-09-28 ENCOUNTER — HOSPITAL ENCOUNTER (OUTPATIENT)
Dept: PULMONOLOGY | Facility: CLINIC | Age: 65
Discharge: HOME | End: 2020-09-28
Payer: MEDICARE

## 2020-09-28 VITALS
SYSTOLIC BLOOD PRESSURE: 120 MMHG | OXYGEN SATURATION: 91 % | HEART RATE: 91 BPM | WEIGHT: 143 LBS | BODY MASS INDEX: 25.34 KG/M2 | HEIGHT: 63 IN | DIASTOLIC BLOOD PRESSURE: 82 MMHG

## 2020-09-28 DIAGNOSIS — Z12.2 ENCOUNTER FOR SCREENING FOR MALIGNANT NEOPLASM OF RESPIRATORY ORGANS: ICD-10-CM

## 2020-09-28 DIAGNOSIS — Z87.891 PERSONAL HISTORY OF NICOTINE DEPENDENCE: ICD-10-CM

## 2020-09-28 DIAGNOSIS — J44.9 CHRONIC OBSTRUCTIVE PULMONARY DISEASE, UNSPECIFIED COPD TYPE: ICD-10-CM

## 2020-09-28 DIAGNOSIS — J44.9 CHRONIC OBSTRUCTIVE PULMONARY DISEASE, UNSPECIFIED COPD TYPE: Primary | ICD-10-CM

## 2020-09-28 DIAGNOSIS — J96.11 CHRONIC HYPOXEMIC RESPIRATORY FAILURE: ICD-10-CM

## 2020-09-28 DIAGNOSIS — J44.1 CHRONIC OBSTRUCTIVE PULMONARY DISEASE WITH ACUTE EXACERBATION: ICD-10-CM

## 2020-09-28 DIAGNOSIS — Z72.0 TOBACCO ABUSE: ICD-10-CM

## 2020-09-28 PROBLEM — J20.9 ACUTE BRONCHITIS: Status: RESOLVED | Noted: 2020-08-02 | Resolved: 2020-09-28

## 2020-09-28 PROBLEM — I27.20 PULMONARY HYPERTENSION: Status: RESOLVED | Noted: 2018-03-05 | Resolved: 2020-09-28

## 2020-09-28 PROBLEM — Z99.81 OXYGEN DEPENDENT: Status: RESOLVED | Noted: 2019-03-16 | Resolved: 2020-09-28

## 2020-09-28 PROCEDURE — 94010 BREATHING CAPACITY TEST: CPT | Mod: HCNC,S$GLB,, | Performed by: INTERNAL MEDICINE

## 2020-09-28 PROCEDURE — 99499 RISK ADDL DX/OHS AUDIT: ICD-10-PCS | Mod: HCNC,S$GLB,, | Performed by: INTERNAL MEDICINE

## 2020-09-28 PROCEDURE — 3288F PR FALLS RISK ASSESSMENT DOCUMENTED: ICD-10-PCS | Mod: HCNC,CPTII,S$GLB, | Performed by: INTERNAL MEDICINE

## 2020-09-28 PROCEDURE — 94727 PR PULM FUNCTION TEST BY GAS: ICD-10-PCS | Mod: HCNC,S$GLB,, | Performed by: INTERNAL MEDICINE

## 2020-09-28 PROCEDURE — 94729 DIFFUSING CAPACITY: CPT | Mod: HCNC,S$GLB,, | Performed by: INTERNAL MEDICINE

## 2020-09-28 PROCEDURE — 3074F SYST BP LT 130 MM HG: CPT | Mod: HCNC,CPTII,S$GLB, | Performed by: INTERNAL MEDICINE

## 2020-09-28 PROCEDURE — 90694 VACC AIIV4 NO PRSRV 0.5ML IM: CPT | Mod: HCNC,S$GLB,, | Performed by: INTERNAL MEDICINE

## 2020-09-28 PROCEDURE — 1126F AMNT PAIN NOTED NONE PRSNT: CPT | Mod: HCNC,S$GLB,, | Performed by: INTERNAL MEDICINE

## 2020-09-28 PROCEDURE — 99499 UNLISTED E&M SERVICE: CPT | Mod: HCNC,S$GLB,, | Performed by: INTERNAL MEDICINE

## 2020-09-28 PROCEDURE — 3288F FALL RISK ASSESSMENT DOCD: CPT | Mod: HCNC,CPTII,S$GLB, | Performed by: INTERNAL MEDICINE

## 2020-09-28 PROCEDURE — 94729 PR C02/MEMBANE DIFFUSE CAPACITY: ICD-10-PCS | Mod: HCNC,S$GLB,, | Performed by: INTERNAL MEDICINE

## 2020-09-28 PROCEDURE — G0008 FLU VACCINE - QUADRIVALENT - ADJUVANTED: ICD-10-PCS | Mod: HCNC,S$GLB,, | Performed by: INTERNAL MEDICINE

## 2020-09-28 PROCEDURE — 90694 FLU VACCINE - QUADRIVALENT - ADJUVANTED: ICD-10-PCS | Mod: HCNC,S$GLB,, | Performed by: INTERNAL MEDICINE

## 2020-09-28 PROCEDURE — 3079F PR MOST RECENT DIASTOLIC BLOOD PRESSURE 80-89 MM HG: ICD-10-PCS | Mod: HCNC,CPTII,S$GLB, | Performed by: INTERNAL MEDICINE

## 2020-09-28 PROCEDURE — 99999 PR PBB SHADOW E&M-EST. PATIENT-LVL V: CPT | Mod: PBBFAC,HCNC,, | Performed by: INTERNAL MEDICINE

## 2020-09-28 PROCEDURE — G0008 ADMIN INFLUENZA VIRUS VAC: HCPCS | Mod: HCNC,S$GLB,, | Performed by: INTERNAL MEDICINE

## 2020-09-28 PROCEDURE — 94727 GAS DIL/WSHOT DETER LNG VOL: CPT | Mod: HCNC,S$GLB,, | Performed by: INTERNAL MEDICINE

## 2020-09-28 PROCEDURE — 1101F PT FALLS ASSESS-DOCD LE1/YR: CPT | Mod: HCNC,CPTII,S$GLB, | Performed by: INTERNAL MEDICINE

## 2020-09-28 PROCEDURE — 1126F PR PAIN SEVERITY QUANTIFIED, NO PAIN PRESENT: ICD-10-PCS | Mod: HCNC,S$GLB,, | Performed by: INTERNAL MEDICINE

## 2020-09-28 PROCEDURE — 1101F PR PT FALLS ASSESS DOC 0-1 FALLS W/OUT INJ PAST YR: ICD-10-PCS | Mod: HCNC,CPTII,S$GLB, | Performed by: INTERNAL MEDICINE

## 2020-09-28 PROCEDURE — 3008F BODY MASS INDEX DOCD: CPT | Mod: HCNC,CPTII,S$GLB, | Performed by: INTERNAL MEDICINE

## 2020-09-28 PROCEDURE — 99213 OFFICE O/P EST LOW 20 MIN: CPT | Mod: 25,HCNC,S$GLB, | Performed by: INTERNAL MEDICINE

## 2020-09-28 PROCEDURE — 99213 PR OFFICE/OUTPT VISIT, EST, LEVL III, 20-29 MIN: ICD-10-PCS | Mod: 25,HCNC,S$GLB, | Performed by: INTERNAL MEDICINE

## 2020-09-28 PROCEDURE — 99999 PR PBB SHADOW E&M-EST. PATIENT-LVL V: ICD-10-PCS | Mod: PBBFAC,HCNC,, | Performed by: INTERNAL MEDICINE

## 2020-09-28 PROCEDURE — 90670 PCV13 VACCINE IM: CPT | Mod: HCNC,S$GLB,, | Performed by: INTERNAL MEDICINE

## 2020-09-28 PROCEDURE — 90670 PNEUMOCOCCAL CONJUGATE VACCINE 13-VALENT LESS THAN 5YO & GREATER THAN: ICD-10-PCS | Mod: HCNC,S$GLB,, | Performed by: INTERNAL MEDICINE

## 2020-09-28 PROCEDURE — G0009 ADMIN PNEUMOCOCCAL VACCINE: HCPCS | Mod: HCNC,S$GLB,, | Performed by: INTERNAL MEDICINE

## 2020-09-28 PROCEDURE — 94010 BREATHING CAPACITY TEST: ICD-10-PCS | Mod: HCNC,S$GLB,, | Performed by: INTERNAL MEDICINE

## 2020-09-28 PROCEDURE — 3079F DIAST BP 80-89 MM HG: CPT | Mod: HCNC,CPTII,S$GLB, | Performed by: INTERNAL MEDICINE

## 2020-09-28 PROCEDURE — G0009 PNEUMOCOCCAL CONJUGATE VACCINE 13-VALENT LESS THAN 5YO & GREATER THAN: ICD-10-PCS | Mod: HCNC,S$GLB,, | Performed by: INTERNAL MEDICINE

## 2020-09-28 PROCEDURE — 3008F PR BODY MASS INDEX (BMI) DOCUMENTED: ICD-10-PCS | Mod: HCNC,CPTII,S$GLB, | Performed by: INTERNAL MEDICINE

## 2020-09-28 PROCEDURE — 3074F PR MOST RECENT SYSTOLIC BLOOD PRESSURE < 130 MM HG: ICD-10-PCS | Mod: HCNC,CPTII,S$GLB, | Performed by: INTERNAL MEDICINE

## 2020-09-28 RX ORDER — ESCITALOPRAM OXALATE 10 MG/1
TABLET ORAL
COMMUNITY
Start: 2020-09-14 | End: 2021-10-08

## 2020-09-28 RX ORDER — BUPROPION HYDROCHLORIDE 150 MG/1
150 TABLET ORAL DAILY
Qty: 30 TABLET | Refills: 3 | Status: SHIPPED | OUTPATIENT
Start: 2020-09-28 | End: 2021-03-03

## 2020-09-28 NOTE — LETTER
September 28, 2020      Yosi Samuels Jr., MD  [address]  [facility] [address]           [facility] [address] [address]  [address]  [facility] [address]  Phone: [phone]          Patient: Rochelle Espinoza   MR Number: [id number]   YOB: 1955   Date of Visit: 9/28/2020       Dear Dr. Yosi Samuels Jr.:    Thank you for referring Rochelle Espinoza to me for evaluation. Attached you will find relevant portions of my assessment and plan of care.    If you have questions, please do not hesitate to call me. I look forward to following Rochelle Espinoza along with you.    Sincerely,    Shima Paulino MD    Enclosure  CC:  No Recipients    If you would like to receive this communication electronically, please contact [email or web address] or [phone] to request more information on [facility] access.    For providers and/or their staff who would like to refer a patient to [facility], please contact us through our one-stop-shop provider referral line, [facility], at [phone].    If you feel you have received this communication in error or would no longer like to receive these types of communications, please e-mail [email or web address]

## 2020-09-28 NOTE — PROGRESS NOTES
Subjective:       Patient ID: Rochelle Espinoza is a 65 y.o. female.    Chief Complaint: COPD    HPI:   Rochelle Espinoza is a 65 y.o. female who presents for follow up.    Past medical history of COPD (on 2L at baseline), tobacco abuse (100 pack year history; recently started smoking again-  Now smoking about 10 cigarettes a day), diastolic heart failure (EF 50%), HTN, GERD, pHTN, who presents to the clinic as a follow up for her COPD.    She is prescribed home O2 continuously (2L) for 2 years.    Currently prescribed medications are LAMA-LABA and nebulizers.  On a good week she uses her rescue nebs 3x per week.  Rescue HFA is used nearly every day.   Patient reports that she feels that Breo worked better for her than Anoro.     She was hospitalized in August for COPD.  She has had 2-3 courses of prednisone in the last 12 months.   Completed her last course of steroids last Tuesday.      She denies seasonal allergies.  No childhood asthma.  Mother with emphysema (smoker).  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       Review of Systems   Constitutional: Negative for fever, chills and activity change.   HENT: Negative for postnasal drip, rhinorrhea, sinus pressure and congestion.    Respiratory: Positive for cough (clear sputum), sputum production and shortness of breath (occasional). Negative for hemoptysis and dyspnea on extertion.    Cardiovascular: Negative for chest pain and leg swelling.   Genitourinary: Negative for difficulty urinating.   Endocrine: Negative for cold intolerance and heat intolerance.    Musculoskeletal: Negative for joint swelling and myalgias.   Gastrointestinal: Negative for nausea, vomiting and abdominal pain.   Neurological: Negative for headaches.   Hematological: Negative for adenopathy. No excessive bruising.   Psychiatric/Behavioral: Negative for confusion and sleep disturbance.         Social History     Tobacco Use     Smoking status: Light Tobacco Smoker     Packs/day: 0.25     Years: 40.00     Pack years: 10.00     Types: Cigarettes    Smokeless tobacco: Never Used   Substance Use Topics    Alcohol use: Not Currently     Frequency: Monthly or less     Drinks per session: 3 or 4     Binge frequency: Never     Comment: beer daily 2-3 daily, none today       Review of patient's allergies indicates:   Allergen Reactions    Orange juice      Swelling in pt tongue       Past Medical History:   Diagnosis Date    CHF (congestive heart failure)     COPD (chronic obstructive pulmonary disease)     Herpes zoster without mention of complication 2011    Hypertension     Leg edema     Pulmonary hypertension      Past Surgical History:   Procedure Laterality Date    BREAST BIOPSY Right     core     SECTION      COLONOSCOPY N/A 2019    Procedure: COLONOSCOPY;  Surgeon: Rui Holder MD;  Location: [facility] ([address] [place] [address]);  Service: Endoscopy;  Laterality: N/A;    EPIDURAL STEROID INJECTION N/A 2020    Procedure: CERVICAL BLAKE;  Surgeon: Papito High MD;  Location: [facility];  Service: Pain Management;  Laterality: N/A;  NEEDS CONSENT    ESOPHAGOGASTRODUODENOSCOPY N/A 2019    Procedure: EGD (ESOPHAGOGASTRODUODENOSCOPY);  Surgeon: Rui Holder MD;  Location: [address]);  Service: Endoscopy;  Laterality: N/A;  2L continuous O2 via NC, COPD, pulm HTN     Current Outpatient Medications on File Prior to Visit   Medication Sig    albuterol (PROVENTIL/VENTOLIN HFA) 90 mcg/actuation inhaler Inhale 2 puffs into the lungs every 6 (six) hours. Rescue    albuterol-ipratropium (DUO-NEB) 2.5 mg-0.5 mg/3 mL nebulizer solution Take 3 mLs by nebulization every 6 (six) hours as needed for Wheezing. Rescue    atorvastatin (LIPITOR) 10 MG tablet Take 1 tablet (10 mg total) by mouth every evening.    colchicine (COLCRYS) 0.6 mg tablet TAKE 2 TABLETS BY MOUTH NOW, TAKE 1 TABLET BY MOUTH 1 HOUR LATER     fluticasone propionate (FLONASE) 50 mcg/actuation nasal spray 1 spray (50 mcg total) by Each Nostril route once daily.    gabapentin (NEURONTIN) 100 MG capsule Take 1 capsule (100 mg total) by mouth 3 (three) times daily.    glycerin adult suppository Place 1 suppository rectally as needed for Constipation.    losartan (COZAAR) 50 MG tablet TAKE 1 TABLET BY MOUTH EVERY DAY    multivitamin capsule Take 1 capsule by mouth once daily.    nicotine (NICODERM CQ) 14 mg/24 hr Place 1 patch onto the skin once daily.    omeprazole (PRILOSEC) 40 MG capsule Take 1 capsule (40 mg total) by mouth once daily.    potassium chloride SA (K-DUR,KLOR-CON) 20 MEQ tablet TAKE 1 TABLET(20 MEQ) BY MOUTH EVERY DAY    umeclidinium-vilanteroL (ANORO ELLIPTA) 62.5-25 mcg/actuation DsDv Inhale 1 puff into the lungs once daily. Controller     No current facility-administered medications on file prior to visit.        Objective:      Vitals:    09/28/20 0924   BP: 120/82   Pulse: 91   O2 Sat =91% on 3L  Physical Exam   Constitutional: She is oriented to person, place, and time. She appears well-developed and well-nourished.   HENT:   Head: Normocephalic.   Neck: Normal range of motion. Neck supple. No tracheal deviation present.   Cardiovascular: Normal rate and regular rhythm.   No murmur heard.  Pulmonary/Chest: Normal expansion, effort normal and breath sounds normal. She has no wheezes. She has no rales.   Abdominal: Soft. Bowel sounds are normal. She exhibits no distension. There is no abdominal tenderness.   Musculoskeletal: Normal range of motion.         General: No edema.   Neurological: She is alert and oriented to person, place, and time.   Skin: Skin is warm and dry.   Psychiatric: She has a normal mood and affect. Her behavior is normal. Judgment and thought content normal.   Vitals reviewed.    Personal Diagnostic Review  CT Chest: 11/29/19: scattered pulmonary micronodules; stable over several years.  CTA Chest: 8/2/20: no  PE;  Bronchial wall thickening  PFTs: 9/28/20: severe obstruction (FEV1 31 PPD), restriction, severely reduced DLCO    Assessment:     Orders Placed This Encounter   Procedures    CT Chest Lung Screening Low Dose     Standing Status:   Future     Standing Expiration Date:   9/28/2021     Order Specific Question:   Is there documentation of shared decision making for this lung screening exam?     Answer:   Yes     Order Specific Question:   Are you a current or former smoker who quit within the past 15 years?     Answer:   Yes     Order Specific Question:   Are you between age 55-77 years old?     Answer:   Yes     Order Specific Question:   Has the patient smoked 30 or more packs of cigarettes/year?     Answer:   Yes     Order Specific Question:   Does the patient show any signs or symptoms of lung cancer?     Answer:   No     Order Specific Question:   Is this the first (baseline) CT or an annual exam?     Answer:   Annual [2]     Order Specific Question:   May the Radiologist modify the order per protocol to meet the clinical needs of the patient?     Answer:   Yes    (In Office Administered) Pneumococcal Conjugate Vaccine (13 Valent) (IM)    Ambulatory referral/consult to Pulmonary Rehab     Standing Status:   Future     Standing Expiration Date:   10/28/2021     Referral Priority:   Routine     Referral Type:   Consultation     Referral Reason:   Specialty Services Required     Requested Specialty:   Pulmonary Disease     Number of Visits Requested:   1     1. Chronic hypoxemic respiratory failure    2. Chronic obstructive pulmonary disease with acute exacerbation        Plan:       .  Problem List Items Addressed This Visit        Pulmonary    Chronic obstructive pulmonary disease with acute exacerbation    Overview     Very Severe obstruction with FEV1=31 PPD         Current Assessment & Plan     · Discontinue Anoro.  · Start Trelegy 1 inhalation daily.  · Patient instructed to rinse/gargle after each  use.  · Full explanation of inhaler technique using demo inhaler performed during the visit.  · Referral to pulmonary rehab         Relevant Orders    Ambulatory referral/consult to Pulmonary Rehab    Chronic hypoxemic respiratory failure    Overview     Patient is on 3L via NC continuously.         Current Assessment & Plan     Continue supplemental oxygen therapy.            Other    Tobacco abuse    Current Assessment & Plan     Patient requested wellbutrin as part of a smoking cessation regimen.  She reports she had some side effects when she tried the medication previously, so I will opt to prescribe a lower dose.      Plan for yearly LD lung cancer CT screening.           Other Visit Diagnoses     Encounter for screening for malignant neoplasm of respiratory organs        Relevant Orders    CT Chest Lung Screening Low Dose    Personal history of nicotine dependence         Relevant Orders    CT Chest Lung Screening Low Dose          Problem List Items Addressed This Visit        Pulmonary    Chronic obstructive pulmonary disease with acute exacerbation    Chronic hypoxemic respiratory failure         Influenza vaccine and PSV-13 administered.

## 2020-09-28 NOTE — ASSESSMENT & PLAN NOTE
· Discontinue Anoro.  · Start Trelegy 1 inhalation daily.  · Patient instructed to rinse/gargle after each use.  · Full explanation of inhaler technique using demo inhaler performed during the visit.  · Referral to pulmonary rehab

## 2020-09-28 NOTE — ASSESSMENT & PLAN NOTE
Patient requested wellbutrin as part of a smoking cessation regimen.  She reports she had some side effects when she tried the medication previously, so I will opt to prescribe a lower dose.      Plan for yearly LD lung cancer CT screening.

## 2020-09-30 ENCOUNTER — TELEPHONE (OUTPATIENT)
Dept: PULMONOLOGY | Facility: CLINIC | Age: 65
End: 2020-09-30

## 2020-09-30 ENCOUNTER — HOSPITAL ENCOUNTER (OUTPATIENT)
Dept: RADIOLOGY | Facility: HOSPITAL | Age: 65
Discharge: HOME | End: 2020-09-30
Attending: INTERNAL MEDICINE
Payer: MEDICARE

## 2020-09-30 ENCOUNTER — PATIENT MESSAGE (OUTPATIENT)
Dept: PULMONOLOGY | Facility: CLINIC | Age: 65
End: 2020-09-30

## 2020-09-30 DIAGNOSIS — Z87.891 PERSONAL HISTORY OF NICOTINE DEPENDENCE: ICD-10-CM

## 2020-09-30 DIAGNOSIS — Z12.2 ENCOUNTER FOR SCREENING FOR MALIGNANT NEOPLASM OF RESPIRATORY ORGANS: ICD-10-CM

## 2020-09-30 PROCEDURE — G0297 LDCT FOR LUNG CA SCREEN: HCPCS | Mod: 26,HCNC,, | Performed by: RADIOLOGY

## 2020-09-30 PROCEDURE — G0297 LDCT FOR LUNG CA SCREEN: HCPCS | Mod: TC,HCNC

## 2020-09-30 PROCEDURE — G0297 CT CHEST LUNG SCREENING LOW DOSE: ICD-10-PCS | Mod: 26,HCNC,, | Performed by: RADIOLOGY

## 2020-09-30 NOTE — TELEPHONE ENCOUNTER
Spoke with patient, informed her that I have received her message. I advised patient that Dr Paulino tried to contact her at 4:21 pm but was not able to get through. I also advised patient that as it states in patient chart that Dr Paulino will try to contact patient this evening. Patient verbalized that she understand and states that she would like for me to advise Dr Paulino that she is by her telephone right now incase Dr Paulino wants to call before later this evening. I verbalized to patient that I understand and advised patient that I will forward her message to Dr Paulino to review/advise. Patient verbalized that she understand.

## 2020-09-30 NOTE — TELEPHONE ENCOUNTER
----- Message from Marce Main sent at 9/30/2020  4:30 PM CDT -----   pt is returning a missed call she received.    Contact [employer] [phone]

## 2020-10-01 ENCOUNTER — OFFICE VISIT (OUTPATIENT)
Dept: URGENT CARE | Facility: CLINIC | Age: 65
End: 2020-10-01
Payer: MEDICARE

## 2020-10-01 VITALS
WEIGHT: 143 LBS | TEMPERATURE: 98 F | DIASTOLIC BLOOD PRESSURE: 94 MMHG | OXYGEN SATURATION: 92 % | BODY MASS INDEX: 25.34 KG/M2 | HEIGHT: 63 IN | HEART RATE: 57 BPM | SYSTOLIC BLOOD PRESSURE: 156 MMHG

## 2020-10-01 DIAGNOSIS — J44.1 COPD EXACERBATION: Primary | ICD-10-CM

## 2020-10-01 PROCEDURE — 99214 OFFICE O/P EST MOD 30 MIN: CPT | Mod: S$GLB,,, | Performed by: NURSE PRACTITIONER

## 2020-10-01 PROCEDURE — 99214 PR OFFICE/OUTPT VISIT, EST, LEVL IV, 30-39 MIN: ICD-10-PCS | Mod: S$GLB,,, | Performed by: NURSE PRACTITIONER

## 2020-10-01 RX ORDER — PREDNISONE 20 MG/1
40 TABLET ORAL DAILY
Qty: 10 TABLET | Refills: 0 | Status: SHIPPED | OUTPATIENT
Start: 2020-10-01 | End: 2020-10-06

## 2020-10-01 RX ORDER — DOXYCYCLINE 100 MG/1
100 CAPSULE ORAL 2 TIMES DAILY
Qty: 14 CAPSULE | Refills: 0 | Status: SHIPPED | OUTPATIENT
Start: 2020-10-01 | End: 2020-10-08

## 2020-10-01 NOTE — PROGRESS NOTES
"Subjective:       Patient ID: Rochelle Espinoza is a 65 y.o. female.    Vitals:  height is 5' 3" (1.6 m) and weight is 64.9 kg (143 lb). Her temperature is 98.2 °F (36.8 °C). Her blood pressure is 156/94 (abnormal) and her pulse is 57 (abnormal). Her oxygen saturation is 92% (abnormal).     Chief Complaint: Shortness of Breath    PT is a 65 y.o. female complaining of trouble breathing. Pt states she started feeling like she cannot get enough air in her lungs on Sunday. She also says her back is tight. Pt states she saw her pulmonologist on Monday, who told her she could "hear something in the front, but not in the back". She has been doing her nebulizer. Pt has hx of COPD. On Sunday, she got a flu and pneumonia shot. PT states this is recurring, and she normally gets a steroid shot and antibiotics, which resolve the issue.  Seen by pulmonary 3 days ago, but these symptoms developed yesterday.  CT chest stable.     Shortness of Breath  This is a chronic problem. The current episode started in the past 7 days. The problem occurs constantly. Associated symptoms include headaches. Pertinent negatives include no abdominal pain, chest pain, fever, leg swelling, neck pain, rash, syncope or vomiting. The symptoms are aggravated by weather changes. Treatments tried: Tylenol, Nebulizer. The treatment provided moderate relief. Her past medical history is significant for COPD.       Constitution: Negative for chills, fatigue, fever and international travel in last 60 days.   HENT: Negative for trouble swallowing.    Neck: Negative for neck pain.   Cardiovascular: Positive for sob on exertion. Negative for chest trauma, chest pain, leg swelling, palpitations and passing out.   Respiratory: Negative for sleep apnea and shortness of breath.    Gastrointestinal: Negative for abdominal pain, nausea, vomiting and heartburn.   Genitourinary: Negative for history of kidney stones.   Musculoskeletal: Positive for back pain.   Skin: Negative " for rash.   Neurological: Positive for headaches. Negative for light-headedness and passing out.   Hematologic/Lymphatic: Negative for history of blood clots.   Psychiatric/Behavioral: Negative for nervous/anxious. The patient is not nervous/anxious.        Objective:      Physical Exam   Constitutional: She is oriented to person, place, and time. She appears well-developed. She is cooperative.  Non-toxic appearance. She does not appear ill. No distress.      Comments:On 2L/NC   HENT:   Head: Normocephalic and atraumatic.   Ears:   Right Ear: Hearing and external ear normal.   Left Ear: Hearing and external ear normal.   Nose: Nose normal. No mucosal edema, rhinorrhea or nasal deformity. No epistaxis. Right sinus exhibits no maxillary sinus tenderness and no frontal sinus tenderness. Left sinus exhibits no maxillary sinus tenderness and no frontal sinus tenderness.   Mouth/Throat: Uvula is midline, oropharynx is clear and moist and mucous membranes are normal. No trismus in the jaw. Normal dentition. No uvula swelling. No oropharyngeal exudate, posterior oropharyngeal edema or posterior oropharyngeal erythema.   Eyes: Conjunctivae and lids are normal. No scleral icterus.   Neck: Trachea normal, full passive range of motion without pain and phonation normal. Neck supple. No neck rigidity. No edema and no erythema present.   Cardiovascular: Normal rate, regular rhythm, normal heart sounds and normal pulses.   Pulmonary/Chest: Effort normal. No respiratory distress. She has no decreased breath sounds. She has rhonchi.   Abdominal: Normal appearance.   Musculoskeletal: Normal range of motion.         General: No deformity.   Neurological: She is alert and oriented to person, place, and time. She exhibits normal muscle tone. Coordination normal.   Skin: Skin is warm, dry, intact, not diaphoretic and not pale. Psychiatric: Her speech is normal and behavior is normal. Judgment and thought content normal.   Nursing note and  vitals reviewed.        Assessment:       1. COPD exacerbation        Plan:         COPD exacerbation  -     doxycycline (VIBRAMYCIN) 100 MG Cap; Take 1 capsule (100 mg total) by mouth 2 (two) times daily. for 7 days  Dispense: 14 capsule; Refill: 0  -     predniSONE (DELTASONE) 20 MG tablet; Take 2 tablets (40 mg total) by mouth once daily. for 5 days  Dispense: 10 tablet; Refill: 0      Dr. Ellis called patient while in room with patient.  Discussed symptoms and reviewed plan and recommendations received.   Discussed case with Dr. Ellis via telephone, regarding patient's HPI, subjective complaints, physical exam, diagnostic test results which included review of CT chest which is stable; SpO2: 92-94% on 2L; HR 80s; copd exacerbation, and plan of care: would like to give antibiotic and steroid.  Dr. Ellis agrees with plan and provided the following additional recommendations: give doxy and prednisone 40 mg po QD x 5 days.

## 2020-10-01 NOTE — PATIENT INSTRUCTIONS
Return to Urgent Care or go to ER if symptoms worsen or fail to improve.  Follow up with PCP/Pulmonary as recommended for further management.           COPD Flare    You have had a flare-up of your COPD.  COPD, or chronic obstructive pulmonary disease, is a common lung disease. It causes your airways to become irritated and narrower. This makes it harder for you to breathe. Emphysema and chronic bronchitis are both types of COPD. This is a chronic condition, which means you always have it. Sometimes it gets worse. When this happens, it is called a flare-up.  Symptoms of COPD  People with COPD may have symptoms most of the time. In a flare-up, your symptoms get worse. These symptoms may mean you are having a flare-up:  · Shortness of breath, shallow or rapid breathing, or wheezing that gets worse  · Lung infection  · Cough that gets worse  · More mucus, thicker mucus or mucus of a different color  · Tiredness, decreased energy, or trouble doing your usual activities  · Fever  · Chest tightness  · Your symptoms dont get better even when you use your usual medicines, inhalers, and nebulizer  · Trouble talking  · You feel confused  Causes of flare-ups  Unfortunately, a flare-up can happen even though you did everything right, and you followed your doctors instructions. Some causes of flare-ups are:  · Smoking or secondhand smoke  · Colds, the flu, or respiratory infections  · Air pollution  · Sudden change in the weather  · Dust, irritating chemicals, or strong fumes  · Not taking your medicines as prescribed  Home care  Here are some things you can do at home to treat a flare-up:  · Try not to panic. This makes it harder to breathe, and keeps you from doing the right things.  · Dont smoke or be around others who are smoking.  · Try to drink more fluids than usual during a flare-up, unless your doctor has told you not to because of heart and kidney problems. More fluids can help loosen the mucus.  · Use your inhalers  and nebulizer, if you have one, as you have been told to.  · If you were given antibiotics, take them until they are used up or your doctor tells you to stop. Its important to finish the antibiotics, even though you feel better. This will make sure the infection has cleared.  · If you were given prednisone or another steroid, finish it even if you feel better.  Preventing a flare-up  Even though flare-ups happen, the best way to treat one is to prevent it before it starts. Here are some pointers:  · Dont smoke or be around others who are smoking.  · Take your medicines as you have been told.  · Talk with your doctor about getting a flu shot every year. Also find out if you need a pneumonia shot.  · If there is a weather advisory warning to stay indoors, try to stay inside when possible.  · Try to eat healthy and get plenty of sleep.  · Try to avoid things that usually set you off, like dust, chemical fumes, hairsprays, or strong perfumes.  Follow-up care  Follow up with your healthcare provider, or as advised.  If a culture was done, you will be told if your treatment needs to be changed. You can call as directed for the results.  If X-rays were done, you will be notified of any new findings that may affect your care.  Call 911  Call 911 if any of these occur:  · You have trouble breathing  · You feel confused or its difficult to wake you up  · You faint or lose consciousness  · You have a rapid heart rate  · You have new pain in your chest, arm, shoulder, neck or upper back  When to seek medical advice  Call your healthcare provider right away if any of these occur:  · Wheezing or shortness of breath gets worse  · You need to use your inhalers more often than usual without relief  · Fever of 100.4°F (38ºC) or higher, or as directed by your healthcare provider  · Coughing up lots of dark-colored or bloody mucus (sputum)  · Chest pain with each breath  · You do not start to get better within 24 hours  · Swelling  of your ankles gets worse  · Dizziness or weakness  Date Last Reviewed: 9/1/2016  © 4515-5199 [employer]. [address] [place] [address], [place] [address]. All rights reserved. This information is not intended as a substitute for professional medical care. Always follow your healthcare professional's instructions.

## 2020-10-02 DIAGNOSIS — Z12.2 ENCOUNTER FOR SCREENING FOR MALIGNANT NEOPLASM OF RESPIRATORY ORGANS: ICD-10-CM

## 2020-10-02 DIAGNOSIS — Z87.891 PERSONAL HISTORY OF NICOTINE DEPENDENCE: ICD-10-CM

## 2020-10-14 ENCOUNTER — HOSPITAL ENCOUNTER (OUTPATIENT)
Dept: PULMONOLOGY | Facility: OTHER | Age: 65
Discharge: HOME | End: 2020-10-14
Attending: INTERNAL MEDICINE
Payer: MEDICARE

## 2020-10-14 VITALS — WEIGHT: 145 LBS | BODY MASS INDEX: 25.69 KG/M2 | HEIGHT: 63 IN

## 2020-10-14 DIAGNOSIS — J44.1 CHRONIC OBSTRUCTIVE PULMONARY DISEASE WITH ACUTE EXACERBATION: ICD-10-CM

## 2020-10-14 DIAGNOSIS — J44.9 COPD (CHRONIC OBSTRUCTIVE PULMONARY DISEASE): Primary | ICD-10-CM

## 2020-10-14 PROCEDURE — 94618 PULMONARY STRESS TESTING: CPT | Mod: HCNC

## 2020-10-19 ENCOUNTER — HOSPITAL ENCOUNTER (OUTPATIENT)
Dept: PULMONOLOGY | Facility: OTHER | Age: 65
Discharge: HOME | End: 2020-10-19
Attending: INTERNAL MEDICINE
Payer: MEDICARE

## 2020-10-21 ENCOUNTER — HOSPITAL ENCOUNTER (OUTPATIENT)
Dept: PULMONOLOGY | Facility: OTHER | Age: 65
Discharge: HOME | End: 2020-10-21
Attending: INTERNAL MEDICINE
Payer: MEDICARE

## 2020-10-21 PROCEDURE — 27000221 HC OXYGEN, UP TO 24 HOURS: Mod: HCNC

## 2020-10-21 PROCEDURE — G0424 PULMONARY REHAB W EXER: HCPCS | Mod: HCNC

## 2021-01-23 ENCOUNTER — PATIENT MESSAGE (OUTPATIENT)
Dept: INTERNAL MEDICINE | Facility: CLINIC | Age: 66
End: 2021-01-23

## 2021-01-25 ENCOUNTER — PATIENT MESSAGE (OUTPATIENT)
Dept: INTERNAL MEDICINE | Facility: CLINIC | Age: 66
End: 2021-01-25

## 2021-01-26 ENCOUNTER — OFFICE VISIT (OUTPATIENT)
Dept: INTERNAL MEDICINE | Facility: CLINIC | Age: 66
End: 2021-01-26
Payer: MEDICARE

## 2021-01-26 VITALS
BODY MASS INDEX: 24.88 KG/M2 | WEIGHT: 140.44 LBS | HEART RATE: 81 BPM | OXYGEN SATURATION: 94 % | SYSTOLIC BLOOD PRESSURE: 130 MMHG | DIASTOLIC BLOOD PRESSURE: 85 MMHG

## 2021-01-26 DIAGNOSIS — M54.32 SCIATICA OF LEFT SIDE: Primary | ICD-10-CM

## 2021-01-26 PROCEDURE — 99213 PR OFFICE/OUTPT VISIT, EST, LEVL III, 20-29 MIN: ICD-10-PCS | Mod: HCNC,GC,S$GLB, | Performed by: STUDENT IN AN ORGANIZED HEALTH CARE EDUCATION/TRAINING PROGRAM

## 2021-01-26 PROCEDURE — 3079F PR MOST RECENT DIASTOLIC BLOOD PRESSURE 80-89 MM HG: ICD-10-PCS | Mod: HCNC,CPTII,GC,S$GLB | Performed by: STUDENT IN AN ORGANIZED HEALTH CARE EDUCATION/TRAINING PROGRAM

## 2021-01-26 PROCEDURE — 99999 PR PBB SHADOW E&M-EST. PATIENT-LVL III: CPT | Mod: PBBFAC,HCNC,GC, | Performed by: STUDENT IN AN ORGANIZED HEALTH CARE EDUCATION/TRAINING PROGRAM

## 2021-01-26 PROCEDURE — 3075F PR MOST RECENT SYSTOLIC BLOOD PRESS GE 130-139MM HG: ICD-10-PCS | Mod: HCNC,CPTII,GC,S$GLB | Performed by: STUDENT IN AN ORGANIZED HEALTH CARE EDUCATION/TRAINING PROGRAM

## 2021-01-26 PROCEDURE — 3079F DIAST BP 80-89 MM HG: CPT | Mod: HCNC,CPTII,GC,S$GLB | Performed by: STUDENT IN AN ORGANIZED HEALTH CARE EDUCATION/TRAINING PROGRAM

## 2021-01-26 PROCEDURE — 99213 OFFICE O/P EST LOW 20 MIN: CPT | Mod: HCNC,GC,S$GLB, | Performed by: STUDENT IN AN ORGANIZED HEALTH CARE EDUCATION/TRAINING PROGRAM

## 2021-01-26 PROCEDURE — 99999 PR PBB SHADOW E&M-EST. PATIENT-LVL III: ICD-10-PCS | Mod: PBBFAC,HCNC,GC, | Performed by: STUDENT IN AN ORGANIZED HEALTH CARE EDUCATION/TRAINING PROGRAM

## 2021-01-26 PROCEDURE — 3075F SYST BP GE 130 - 139MM HG: CPT | Mod: HCNC,CPTII,GC,S$GLB | Performed by: STUDENT IN AN ORGANIZED HEALTH CARE EDUCATION/TRAINING PROGRAM

## 2021-01-26 RX ORDER — DICLOFENAC SODIUM 10 MG/G
2 GEL TOPICAL DAILY
Qty: 50 G | Refills: 0 | Status: SHIPPED | OUTPATIENT
Start: 2021-01-26 | End: 2021-02-11

## 2021-01-29 ENCOUNTER — PES CALL (OUTPATIENT)
Dept: ADMINISTRATIVE | Facility: CLINIC | Age: 66
End: 2021-01-29

## 2021-02-11 ENCOUNTER — OFFICE VISIT (OUTPATIENT)
Dept: INTERNAL MEDICINE | Facility: CLINIC | Age: 66
End: 2021-02-11
Payer: MEDICARE

## 2021-02-11 VITALS
BODY MASS INDEX: 23.63 KG/M2 | WEIGHT: 133.38 LBS | HEART RATE: 88 BPM | OXYGEN SATURATION: 95 % | DIASTOLIC BLOOD PRESSURE: 86 MMHG | SYSTOLIC BLOOD PRESSURE: 130 MMHG | HEIGHT: 63 IN

## 2021-02-11 DIAGNOSIS — J98.01 BRONCHOSPASM, ACUTE: ICD-10-CM

## 2021-02-11 DIAGNOSIS — M54.30 SCIATICA, UNSPECIFIED LATERALITY: Primary | ICD-10-CM

## 2021-02-11 DIAGNOSIS — M54.12 CERVICAL RADICULOPATHY: ICD-10-CM

## 2021-02-11 DIAGNOSIS — I10 ESSENTIAL HYPERTENSION: ICD-10-CM

## 2021-02-11 DIAGNOSIS — J44.9 CHRONIC OBSTRUCTIVE PULMONARY DISEASE WITH HYPOXIA: ICD-10-CM

## 2021-02-11 DIAGNOSIS — J96.11 CHRONIC HYPOXEMIC RESPIRATORY FAILURE: ICD-10-CM

## 2021-02-11 PROCEDURE — 3079F DIAST BP 80-89 MM HG: CPT | Mod: HCNC,CPTII,S$GLB, | Performed by: INTERNAL MEDICINE

## 2021-02-11 PROCEDURE — 99999 PR PBB SHADOW E&M-EST. PATIENT-LVL IV: CPT | Mod: PBBFAC,HCNC,, | Performed by: INTERNAL MEDICINE

## 2021-02-11 PROCEDURE — 99999 PR PBB SHADOW E&M-EST. PATIENT-LVL IV: ICD-10-PCS | Mod: PBBFAC,HCNC,, | Performed by: INTERNAL MEDICINE

## 2021-02-11 PROCEDURE — 3079F PR MOST RECENT DIASTOLIC BLOOD PRESSURE 80-89 MM HG: ICD-10-PCS | Mod: HCNC,CPTII,S$GLB, | Performed by: INTERNAL MEDICINE

## 2021-02-11 PROCEDURE — 3075F SYST BP GE 130 - 139MM HG: CPT | Mod: HCNC,CPTII,S$GLB, | Performed by: INTERNAL MEDICINE

## 2021-02-11 PROCEDURE — 3075F PR MOST RECENT SYSTOLIC BLOOD PRESS GE 130-139MM HG: ICD-10-PCS | Mod: HCNC,CPTII,S$GLB, | Performed by: INTERNAL MEDICINE

## 2021-02-11 PROCEDURE — 99499 UNLISTED E&M SERVICE: CPT | Mod: HCNC,S$GLB,, | Performed by: INTERNAL MEDICINE

## 2021-02-11 PROCEDURE — 99214 PR OFFICE/OUTPT VISIT, EST, LEVL IV, 30-39 MIN: ICD-10-PCS | Mod: HCNC,S$GLB,, | Performed by: INTERNAL MEDICINE

## 2021-02-11 PROCEDURE — 99214 OFFICE O/P EST MOD 30 MIN: CPT | Mod: HCNC,S$GLB,, | Performed by: INTERNAL MEDICINE

## 2021-02-11 PROCEDURE — 99499 RISK ADDL DX/OHS AUDIT: ICD-10-PCS | Mod: HCNC,S$GLB,, | Performed by: INTERNAL MEDICINE

## 2021-02-11 RX ORDER — DICLOFENAC SODIUM 50 MG/1
50 TABLET, DELAYED RELEASE ORAL 2 TIMES DAILY
Qty: 60 TABLET | Refills: 2 | Status: SHIPPED | OUTPATIENT
Start: 2021-02-11 | End: 2021-03-03

## 2021-02-11 RX ORDER — GABAPENTIN 300 MG/1
300 CAPSULE ORAL 3 TIMES DAILY
Qty: 90 CAPSULE | Refills: 11 | Status: SHIPPED | OUTPATIENT
Start: 2021-02-11 | End: 2022-02-25

## 2021-02-11 RX ORDER — GABAPENTIN 100 MG/1
100 CAPSULE ORAL 3 TIMES DAILY
Qty: 90 CAPSULE | Refills: 0 | Status: CANCELLED | OUTPATIENT
Start: 2021-02-11

## 2021-02-11 RX ORDER — LOSARTAN POTASSIUM 50 MG/1
50 TABLET ORAL DAILY
Qty: 90 TABLET | Refills: 2 | Status: SHIPPED | OUTPATIENT
Start: 2021-02-11 | End: 2021-06-04

## 2021-02-11 RX ORDER — ALBUTEROL SULFATE 90 UG/1
2 AEROSOL, METERED RESPIRATORY (INHALATION) EVERY 6 HOURS
Qty: 18 G | Refills: 4 | Status: SHIPPED | OUTPATIENT
Start: 2021-02-11 | End: 2021-04-26

## 2021-03-03 ENCOUNTER — OFFICE VISIT (OUTPATIENT)
Dept: INTERNAL MEDICINE | Facility: CLINIC | Age: 66
End: 2021-03-03
Payer: MEDICARE

## 2021-03-03 VITALS
HEART RATE: 86 BPM | WEIGHT: 133.63 LBS | OXYGEN SATURATION: 96 % | SYSTOLIC BLOOD PRESSURE: 140 MMHG | DIASTOLIC BLOOD PRESSURE: 86 MMHG | BODY MASS INDEX: 23.68 KG/M2 | HEIGHT: 63 IN

## 2021-03-03 DIAGNOSIS — I10 ESSENTIAL HYPERTENSION: ICD-10-CM

## 2021-03-03 DIAGNOSIS — M54.12 CERVICAL RADICULOPATHY: Primary | ICD-10-CM

## 2021-03-03 DIAGNOSIS — M54.32 SCIATICA OF LEFT SIDE: ICD-10-CM

## 2021-03-03 DIAGNOSIS — G95.9 CERVICAL MYELOPATHY: ICD-10-CM

## 2021-03-03 PROCEDURE — 99999 PR PBB SHADOW E&M-EST. PATIENT-LVL V: CPT | Mod: PBBFAC,,, | Performed by: INTERNAL MEDICINE

## 2021-03-03 PROCEDURE — 99214 OFFICE O/P EST MOD 30 MIN: CPT | Mod: S$GLB,,, | Performed by: INTERNAL MEDICINE

## 2021-03-03 PROCEDURE — 99499 UNLISTED E&M SERVICE: CPT | Mod: HCNC,S$GLB,, | Performed by: INTERNAL MEDICINE

## 2021-03-03 PROCEDURE — 1101F PR PT FALLS ASSESS DOC 0-1 FALLS W/OUT INJ PAST YR: ICD-10-PCS | Mod: CPTII,S$GLB,, | Performed by: INTERNAL MEDICINE

## 2021-03-03 PROCEDURE — 3077F SYST BP >= 140 MM HG: CPT | Mod: CPTII,S$GLB,, | Performed by: INTERNAL MEDICINE

## 2021-03-03 PROCEDURE — 3288F PR FALLS RISK ASSESSMENT DOCUMENTED: ICD-10-PCS | Mod: CPTII,S$GLB,, | Performed by: INTERNAL MEDICINE

## 2021-03-03 PROCEDURE — 3288F FALL RISK ASSESSMENT DOCD: CPT | Mod: CPTII,S$GLB,, | Performed by: INTERNAL MEDICINE

## 2021-03-03 PROCEDURE — 3008F BODY MASS INDEX DOCD: CPT | Mod: CPTII,S$GLB,, | Performed by: INTERNAL MEDICINE

## 2021-03-03 PROCEDURE — 1125F PR PAIN SEVERITY QUANTIFIED, PAIN PRESENT: ICD-10-PCS | Mod: S$GLB,,, | Performed by: INTERNAL MEDICINE

## 2021-03-03 PROCEDURE — 99499 RISK ADDL DX/OHS AUDIT: ICD-10-PCS | Mod: HCNC,S$GLB,, | Performed by: INTERNAL MEDICINE

## 2021-03-03 PROCEDURE — 3079F PR MOST RECENT DIASTOLIC BLOOD PRESSURE 80-89 MM HG: ICD-10-PCS | Mod: CPTII,S$GLB,, | Performed by: INTERNAL MEDICINE

## 2021-03-03 PROCEDURE — 1125F AMNT PAIN NOTED PAIN PRSNT: CPT | Mod: S$GLB,,, | Performed by: INTERNAL MEDICINE

## 2021-03-03 PROCEDURE — 3079F DIAST BP 80-89 MM HG: CPT | Mod: CPTII,S$GLB,, | Performed by: INTERNAL MEDICINE

## 2021-03-03 PROCEDURE — 99999 PR PBB SHADOW E&M-EST. PATIENT-LVL V: ICD-10-PCS | Mod: PBBFAC,,, | Performed by: INTERNAL MEDICINE

## 2021-03-03 PROCEDURE — 1101F PT FALLS ASSESS-DOCD LE1/YR: CPT | Mod: CPTII,S$GLB,, | Performed by: INTERNAL MEDICINE

## 2021-03-03 PROCEDURE — 3008F PR BODY MASS INDEX (BMI) DOCUMENTED: ICD-10-PCS | Mod: CPTII,S$GLB,, | Performed by: INTERNAL MEDICINE

## 2021-03-03 PROCEDURE — 3077F PR MOST RECENT SYSTOLIC BLOOD PRESSURE >= 140 MM HG: ICD-10-PCS | Mod: CPTII,S$GLB,, | Performed by: INTERNAL MEDICINE

## 2021-03-03 PROCEDURE — 99214 PR OFFICE/OUTPT VISIT, EST, LEVL IV, 30-39 MIN: ICD-10-PCS | Mod: S$GLB,,, | Performed by: INTERNAL MEDICINE

## 2021-03-03 RX ORDER — ATORVASTATIN CALCIUM 10 MG/1
10 TABLET, FILM COATED ORAL NIGHTLY
Qty: 90 TABLET | Refills: 3 | Status: SHIPPED | OUTPATIENT
Start: 2021-03-03 | End: 2021-10-08 | Stop reason: SDUPTHER

## 2021-03-03 RX ORDER — TRAMADOL HYDROCHLORIDE 50 MG/1
50 TABLET ORAL EVERY 12 HOURS PRN
Qty: 60 EACH | Refills: 0 | Status: SHIPPED | OUTPATIENT
Start: 2021-03-03 | End: 2021-04-02

## 2021-03-04 PROBLEM — G95.9 CERVICAL MYELOPATHY: Status: ACTIVE | Noted: 2021-03-04

## 2021-03-09 ENCOUNTER — TELEPHONE (OUTPATIENT)
Dept: ADMINISTRATIVE | Facility: CLINIC | Age: 66
End: 2021-03-09

## 2021-03-10 ENCOUNTER — OFFICE VISIT (OUTPATIENT)
Dept: URGENT CARE | Facility: CLINIC | Age: 66
End: 2021-03-10
Payer: MEDICARE

## 2021-03-10 VITALS
DIASTOLIC BLOOD PRESSURE: 95 MMHG | BODY MASS INDEX: 23.57 KG/M2 | HEIGHT: 63 IN | HEART RATE: 70 BPM | TEMPERATURE: 99 F | OXYGEN SATURATION: 93 % | WEIGHT: 133 LBS | SYSTOLIC BLOOD PRESSURE: 153 MMHG

## 2021-03-10 DIAGNOSIS — R06.02 SOB (SHORTNESS OF BREATH): Primary | ICD-10-CM

## 2021-03-10 LAB
CTP QC/QA: YES
SARS-COV-2 RDRP RESP QL NAA+PROBE: NEGATIVE

## 2021-03-10 PROCEDURE — 3008F PR BODY MASS INDEX (BMI) DOCUMENTED: ICD-10-PCS | Mod: CPTII,S$GLB,, | Performed by: NURSE PRACTITIONER

## 2021-03-10 PROCEDURE — 99214 PR OFFICE/OUTPT VISIT, EST, LEVL IV, 30-39 MIN: ICD-10-PCS | Mod: S$GLB,,, | Performed by: NURSE PRACTITIONER

## 2021-03-10 PROCEDURE — 99214 OFFICE O/P EST MOD 30 MIN: CPT | Mod: S$GLB,,, | Performed by: NURSE PRACTITIONER

## 2021-03-10 PROCEDURE — U0002: ICD-10-PCS | Mod: QW,S$GLB,, | Performed by: NURSE PRACTITIONER

## 2021-03-10 PROCEDURE — 3008F BODY MASS INDEX DOCD: CPT | Mod: CPTII,S$GLB,, | Performed by: NURSE PRACTITIONER

## 2021-03-10 PROCEDURE — U0002 COVID-19 LAB TEST NON-CDC: HCPCS | Mod: QW,S$GLB,, | Performed by: NURSE PRACTITIONER

## 2021-03-19 ENCOUNTER — TELEPHONE (OUTPATIENT)
Dept: ADMINISTRATIVE | Facility: CLINIC | Age: 66
End: 2021-03-19

## 2021-03-22 ENCOUNTER — OFFICE VISIT (OUTPATIENT)
Dept: INTERNAL MEDICINE | Facility: CLINIC | Age: 66
End: 2021-03-22
Payer: MEDICARE

## 2021-03-22 VITALS — BODY MASS INDEX: 23.57 KG/M2 | HEIGHT: 63 IN | WEIGHT: 133 LBS

## 2021-03-22 DIAGNOSIS — Z59.82 LACK OF ACCESS TO TRANSPORTATION: ICD-10-CM

## 2021-03-22 DIAGNOSIS — I70.0 AORTIC CALCIFICATION: ICD-10-CM

## 2021-03-22 DIAGNOSIS — Z00.00 ENCOUNTER FOR PREVENTIVE HEALTH EXAMINATION: Primary | ICD-10-CM

## 2021-03-22 DIAGNOSIS — J96.11 CHRONIC HYPOXEMIC RESPIRATORY FAILURE: ICD-10-CM

## 2021-03-22 DIAGNOSIS — M54.12 CERVICAL RADICULOPATHY: ICD-10-CM

## 2021-03-22 DIAGNOSIS — Z72.0 TOBACCO ABUSE: ICD-10-CM

## 2021-03-22 DIAGNOSIS — I10 ESSENTIAL HYPERTENSION: ICD-10-CM

## 2021-03-22 DIAGNOSIS — I20.89 STABLE ANGINA: ICD-10-CM

## 2021-03-22 DIAGNOSIS — M17.10 PRIMARY LOCALIZED OSTEOARTHROSIS OF LOWER LEG, UNSPECIFIED LATERALITY: ICD-10-CM

## 2021-03-22 DIAGNOSIS — Z99.81 DEPENDENCE ON SUPPLEMENTAL OXYGEN: ICD-10-CM

## 2021-03-22 DIAGNOSIS — J06.9 UPPER RESPIRATORY TRACT INFECTION, UNSPECIFIED TYPE: ICD-10-CM

## 2021-03-22 DIAGNOSIS — M54.32 SCIATICA OF LEFT SIDE: ICD-10-CM

## 2021-03-22 DIAGNOSIS — M54.16 LUMBAR RADICULITIS: ICD-10-CM

## 2021-03-22 DIAGNOSIS — J44.1 CHRONIC OBSTRUCTIVE PULMONARY DISEASE WITH ACUTE EXACERBATION: ICD-10-CM

## 2021-03-22 PROCEDURE — 3008F BODY MASS INDEX DOCD: CPT | Mod: CPTII,S$GLB,, | Performed by: NURSE PRACTITIONER

## 2021-03-22 PROCEDURE — G0439 PR MEDICARE ANNUAL WELLNESS SUBSEQUENT VISIT: ICD-10-PCS | Mod: 95,,, | Performed by: NURSE PRACTITIONER

## 2021-03-22 PROCEDURE — 3288F FALL RISK ASSESSMENT DOCD: CPT | Mod: CPTII,S$GLB,, | Performed by: NURSE PRACTITIONER

## 2021-03-22 PROCEDURE — G0439 PPPS, SUBSEQ VISIT: HCPCS | Mod: 95,,, | Performed by: NURSE PRACTITIONER

## 2021-03-22 PROCEDURE — 1125F AMNT PAIN NOTED PAIN PRSNT: CPT | Mod: S$GLB,,, | Performed by: NURSE PRACTITIONER

## 2021-03-22 PROCEDURE — 3288F PR FALLS RISK ASSESSMENT DOCUMENTED: ICD-10-PCS | Mod: CPTII,S$GLB,, | Performed by: NURSE PRACTITIONER

## 2021-03-22 PROCEDURE — 3008F PR BODY MASS INDEX (BMI) DOCUMENTED: ICD-10-PCS | Mod: CPTII,S$GLB,, | Performed by: NURSE PRACTITIONER

## 2021-03-22 PROCEDURE — 1101F PT FALLS ASSESS-DOCD LE1/YR: CPT | Mod: CPTII,S$GLB,, | Performed by: NURSE PRACTITIONER

## 2021-03-22 PROCEDURE — 1125F PR PAIN SEVERITY QUANTIFIED, PAIN PRESENT: ICD-10-PCS | Mod: S$GLB,,, | Performed by: NURSE PRACTITIONER

## 2021-03-22 PROCEDURE — 99499 UNLISTED E&M SERVICE: CPT | Mod: S$GLB,,, | Performed by: NURSE PRACTITIONER

## 2021-03-22 PROCEDURE — 99499 RISK ADDL DX/OHS AUDIT: ICD-10-PCS | Mod: S$GLB,,, | Performed by: NURSE PRACTITIONER

## 2021-03-22 PROCEDURE — 1101F PR PT FALLS ASSESS DOC 0-1 FALLS W/OUT INJ PAST YR: ICD-10-PCS | Mod: CPTII,S$GLB,, | Performed by: NURSE PRACTITIONER

## 2021-03-22 RX ORDER — FLUTICASONE PROPIONATE 50 MCG
1 SPRAY, SUSPENSION (ML) NASAL DAILY
Qty: 9.9 ML | Refills: 0 | Status: SHIPPED | OUTPATIENT
Start: 2021-03-22 | End: 2021-04-15

## 2021-03-22 SDOH — SOCIAL DETERMINANTS OF HEALTH (SDOH): TRANSPORTATION INSECURITY: Z59.82

## 2021-03-25 ENCOUNTER — OUTPATIENT CASE MANAGEMENT (OUTPATIENT)
Dept: ADMINISTRATIVE | Facility: OTHER | Age: 66
End: 2021-03-25

## 2021-04-26 DIAGNOSIS — J98.01 BRONCHOSPASM, ACUTE: ICD-10-CM

## 2021-04-26 DIAGNOSIS — J44.9 CHRONIC OBSTRUCTIVE PULMONARY DISEASE WITH HYPOXIA: ICD-10-CM

## 2021-04-26 RX ORDER — ALBUTEROL SULFATE 90 UG/1
AEROSOL, METERED RESPIRATORY (INHALATION)
Qty: 54 G | Refills: 9 | Status: SHIPPED | OUTPATIENT
Start: 2021-04-26 | End: 2021-08-06

## 2021-04-29 ENCOUNTER — OFFICE VISIT (OUTPATIENT)
Dept: PAIN MEDICINE | Facility: CLINIC | Age: 66
End: 2021-04-29
Payer: MEDICARE

## 2021-04-29 VITALS
TEMPERATURE: 97 F | DIASTOLIC BLOOD PRESSURE: 94 MMHG | SYSTOLIC BLOOD PRESSURE: 159 MMHG | HEIGHT: 63 IN | OXYGEN SATURATION: 95 % | WEIGHT: 127.88 LBS | BODY MASS INDEX: 22.66 KG/M2 | HEART RATE: 90 BPM | RESPIRATION RATE: 18 BRPM

## 2021-04-29 DIAGNOSIS — M54.16 LUMBAR RADICULOPATHY: Primary | ICD-10-CM

## 2021-04-29 DIAGNOSIS — M54.12 CERVICAL RADICULOPATHY: ICD-10-CM

## 2021-04-29 DIAGNOSIS — M54.32 SCIATICA OF LEFT SIDE: ICD-10-CM

## 2021-04-29 DIAGNOSIS — M79.10 MYALGIA: ICD-10-CM

## 2021-04-29 PROCEDURE — 3288F FALL RISK ASSESSMENT DOCD: CPT | Mod: CPTII,S$GLB,, | Performed by: ANESTHESIOLOGY

## 2021-04-29 PROCEDURE — 1125F PR PAIN SEVERITY QUANTIFIED, PAIN PRESENT: ICD-10-PCS | Mod: S$GLB,,, | Performed by: ANESTHESIOLOGY

## 2021-04-29 PROCEDURE — 1101F PT FALLS ASSESS-DOCD LE1/YR: CPT | Mod: CPTII,S$GLB,, | Performed by: ANESTHESIOLOGY

## 2021-04-29 PROCEDURE — 99204 PR OFFICE/OUTPT VISIT, NEW, LEVL IV, 45-59 MIN: ICD-10-PCS | Mod: GC,S$GLB,, | Performed by: ANESTHESIOLOGY

## 2021-04-29 PROCEDURE — 3008F BODY MASS INDEX DOCD: CPT | Mod: CPTII,S$GLB,, | Performed by: ANESTHESIOLOGY

## 2021-04-29 PROCEDURE — 1101F PR PT FALLS ASSESS DOC 0-1 FALLS W/OUT INJ PAST YR: ICD-10-PCS | Mod: CPTII,S$GLB,, | Performed by: ANESTHESIOLOGY

## 2021-04-29 PROCEDURE — 1125F AMNT PAIN NOTED PAIN PRSNT: CPT | Mod: S$GLB,,, | Performed by: ANESTHESIOLOGY

## 2021-04-29 PROCEDURE — 99999 PR PBB SHADOW E&M-EST. PATIENT-LVL V: CPT | Mod: PBBFAC,,, | Performed by: ANESTHESIOLOGY

## 2021-04-29 PROCEDURE — 99204 OFFICE O/P NEW MOD 45 MIN: CPT | Mod: GC,S$GLB,, | Performed by: ANESTHESIOLOGY

## 2021-04-29 PROCEDURE — 3008F PR BODY MASS INDEX (BMI) DOCUMENTED: ICD-10-PCS | Mod: CPTII,S$GLB,, | Performed by: ANESTHESIOLOGY

## 2021-04-29 PROCEDURE — 99999 PR PBB SHADOW E&M-EST. PATIENT-LVL V: ICD-10-PCS | Mod: PBBFAC,,, | Performed by: ANESTHESIOLOGY

## 2021-04-29 PROCEDURE — 3288F PR FALLS RISK ASSESSMENT DOCUMENTED: ICD-10-PCS | Mod: CPTII,S$GLB,, | Performed by: ANESTHESIOLOGY

## 2021-05-03 ENCOUNTER — HOSPITAL ENCOUNTER (OUTPATIENT)
Dept: RADIOLOGY | Facility: HOSPITAL | Age: 66
Discharge: HOME | End: 2021-05-03
Attending: ANESTHESIOLOGY
Payer: MEDICARE

## 2021-05-03 DIAGNOSIS — M54.32 SCIATICA OF LEFT SIDE: ICD-10-CM

## 2021-05-03 DIAGNOSIS — M79.10 MYALGIA: ICD-10-CM

## 2021-05-03 DIAGNOSIS — M54.16 LUMBAR RADICULOPATHY: ICD-10-CM

## 2021-05-03 DIAGNOSIS — M54.12 CERVICAL RADICULOPATHY: ICD-10-CM

## 2021-05-03 PROCEDURE — 72110 XR LUMBAR SPINE 5 VIEW WITH FLEX AND EXT: ICD-10-PCS | Mod: 26,,, | Performed by: RADIOLOGY

## 2021-05-03 PROCEDURE — 72110 X-RAY EXAM L-2 SPINE 4/>VWS: CPT | Mod: TC

## 2021-05-03 PROCEDURE — 72110 X-RAY EXAM L-2 SPINE 4/>VWS: CPT | Mod: 26,,, | Performed by: RADIOLOGY

## 2021-05-06 ENCOUNTER — HOSPITAL ENCOUNTER (OUTPATIENT)
Dept: RADIOLOGY | Facility: HOSPITAL | Age: 66
Discharge: HOME | End: 2021-05-06
Attending: ANESTHESIOLOGY
Payer: MEDICARE

## 2021-05-06 DIAGNOSIS — M79.10 MYALGIA: ICD-10-CM

## 2021-05-06 DIAGNOSIS — M54.12 CERVICAL RADICULOPATHY: ICD-10-CM

## 2021-05-06 DIAGNOSIS — M54.32 SCIATICA OF LEFT SIDE: ICD-10-CM

## 2021-05-06 DIAGNOSIS — M54.16 LUMBAR RADICULOPATHY: ICD-10-CM

## 2021-05-06 PROCEDURE — 72052 X-RAY EXAM NECK SPINE 6/>VWS: CPT | Mod: TC

## 2021-05-06 PROCEDURE — 72052 X-RAY EXAM NECK SPINE 6/>VWS: CPT | Mod: 26,,, | Performed by: RADIOLOGY

## 2021-05-06 PROCEDURE — 72052 XR CERVICAL SPINE 5 VIEW WITH FLEX AND EXT: ICD-10-PCS | Mod: 26,,, | Performed by: RADIOLOGY

## 2021-05-10 ENCOUNTER — PATIENT MESSAGE (OUTPATIENT)
Dept: PAIN MEDICINE | Facility: OTHER | Age: 66
End: 2021-05-10

## 2021-05-17 ENCOUNTER — PATIENT MESSAGE (OUTPATIENT)
Dept: PAIN MEDICINE | Facility: OTHER | Age: 66
End: 2021-05-17

## 2021-06-02 ENCOUNTER — PATIENT MESSAGE (OUTPATIENT)
Dept: PAIN MEDICINE | Facility: OTHER | Age: 66
End: 2021-06-02

## 2021-06-03 ENCOUNTER — HOSPITAL ENCOUNTER (OUTPATIENT)
Facility: OTHER | Age: 66
Discharge: HOME | End: 2021-06-03
Attending: ANESTHESIOLOGY | Admitting: ANESTHESIOLOGY
Payer: MEDICARE

## 2021-06-03 VITALS
DIASTOLIC BLOOD PRESSURE: 88 MMHG | SYSTOLIC BLOOD PRESSURE: 176 MMHG | HEART RATE: 74 BPM | BODY MASS INDEX: 23.04 KG/M2 | RESPIRATION RATE: 16 BRPM | WEIGHT: 130 LBS | OXYGEN SATURATION: 97 % | TEMPERATURE: 100 F | HEIGHT: 63 IN

## 2021-06-03 DIAGNOSIS — G89.29 CHRONIC PAIN: ICD-10-CM

## 2021-06-03 DIAGNOSIS — M54.16 LUMBAR RADICULOPATHY: Primary | ICD-10-CM

## 2021-06-03 DIAGNOSIS — M51.36 DDD (DEGENERATIVE DISC DISEASE), LUMBAR: ICD-10-CM

## 2021-06-03 PROCEDURE — 25000003 PHARM REV CODE 250: Performed by: ANESTHESIOLOGY

## 2021-06-03 PROCEDURE — 63600175 PHARM REV CODE 636 W HCPCS: Performed by: ANESTHESIOLOGY

## 2021-06-03 PROCEDURE — 25500020 PHARM REV CODE 255: Performed by: ANESTHESIOLOGY

## 2021-06-03 PROCEDURE — 64483 NJX AA&/STRD TFRM EPI L/S 1: CPT | Mod: RT,,, | Performed by: ANESTHESIOLOGY

## 2021-06-03 PROCEDURE — 25000003 PHARM REV CODE 250: Performed by: STUDENT IN AN ORGANIZED HEALTH CARE EDUCATION/TRAINING PROGRAM

## 2021-06-03 PROCEDURE — 64483 NJX AA&/STRD TFRM EPI L/S 1: CPT | Mod: RT | Performed by: ANESTHESIOLOGY

## 2021-06-03 PROCEDURE — 64483 PR EPIDURAL INJ, ANES/STEROID, TRANSFORAMINAL, LUMB/SACR, SNGL LEVL: ICD-10-PCS | Mod: RT,,, | Performed by: ANESTHESIOLOGY

## 2021-06-03 RX ORDER — MIDAZOLAM HYDROCHLORIDE 1 MG/ML
INJECTION INTRAMUSCULAR; INTRAVENOUS
Status: DISCONTINUED | OUTPATIENT
Start: 2021-06-03 | End: 2021-06-03 | Stop reason: HOSPADM

## 2021-06-03 RX ORDER — FENTANYL CITRATE 50 UG/ML
INJECTION, SOLUTION INTRAMUSCULAR; INTRAVENOUS
Status: DISCONTINUED | OUTPATIENT
Start: 2021-06-03 | End: 2021-06-03 | Stop reason: HOSPADM

## 2021-06-03 RX ORDER — SODIUM CHLORIDE 9 MG/ML
INJECTION, SOLUTION INTRAVENOUS CONTINUOUS
Status: DISCONTINUED | OUTPATIENT
Start: 2021-06-03 | End: 2021-06-04

## 2021-06-03 RX ORDER — BUPIVACAINE HYDROCHLORIDE 2.5 MG/ML
INJECTION, SOLUTION EPIDURAL; INFILTRATION; INTRACAUDAL
Status: DISCONTINUED | OUTPATIENT
Start: 2021-06-03 | End: 2021-06-03 | Stop reason: HOSPADM

## 2021-06-03 RX ORDER — DEXAMETHASONE SODIUM PHOSPHATE 10 MG/ML
INJECTION INTRAMUSCULAR; INTRAVENOUS
Status: DISCONTINUED | OUTPATIENT
Start: 2021-06-03 | End: 2021-06-03 | Stop reason: HOSPADM

## 2021-06-04 ENCOUNTER — TELEPHONE (OUTPATIENT)
Dept: INTERNAL MEDICINE | Facility: CLINIC | Age: 66
End: 2021-06-04

## 2021-06-04 ENCOUNTER — OFFICE VISIT (OUTPATIENT)
Dept: INTERNAL MEDICINE | Facility: CLINIC | Age: 66
End: 2021-06-04
Payer: MEDICARE

## 2021-06-04 VITALS
SYSTOLIC BLOOD PRESSURE: 162 MMHG | BODY MASS INDEX: 23.01 KG/M2 | WEIGHT: 129.88 LBS | HEIGHT: 63 IN | DIASTOLIC BLOOD PRESSURE: 100 MMHG

## 2021-06-04 DIAGNOSIS — I10 ESSENTIAL HYPERTENSION: ICD-10-CM

## 2021-06-04 DIAGNOSIS — G89.29 OTHER CHRONIC PAIN: ICD-10-CM

## 2021-06-04 DIAGNOSIS — I27.81 COR PULMONALE, CHRONIC: Primary | ICD-10-CM

## 2021-06-04 DIAGNOSIS — K21.9 GASTROESOPHAGEAL REFLUX DISEASE, UNSPECIFIED WHETHER ESOPHAGITIS PRESENT: ICD-10-CM

## 2021-06-04 PROCEDURE — 99214 OFFICE O/P EST MOD 30 MIN: CPT | Mod: S$GLB,,, | Performed by: INTERNAL MEDICINE

## 2021-06-04 PROCEDURE — 3008F BODY MASS INDEX DOCD: CPT | Mod: CPTII,S$GLB,, | Performed by: INTERNAL MEDICINE

## 2021-06-04 PROCEDURE — 99214 PR OFFICE/OUTPT VISIT, EST, LEVL IV, 30-39 MIN: ICD-10-PCS | Mod: S$GLB,,, | Performed by: INTERNAL MEDICINE

## 2021-06-04 PROCEDURE — 1101F PR PT FALLS ASSESS DOC 0-1 FALLS W/OUT INJ PAST YR: ICD-10-PCS | Mod: CPTII,S$GLB,, | Performed by: INTERNAL MEDICINE

## 2021-06-04 PROCEDURE — 1126F AMNT PAIN NOTED NONE PRSNT: CPT | Mod: S$GLB,,, | Performed by: INTERNAL MEDICINE

## 2021-06-04 PROCEDURE — 99499 UNLISTED E&M SERVICE: CPT | Mod: S$GLB,,, | Performed by: INTERNAL MEDICINE

## 2021-06-04 PROCEDURE — 99999 PR PBB SHADOW E&M-EST. PATIENT-LVL III: CPT | Mod: PBBFAC,,, | Performed by: INTERNAL MEDICINE

## 2021-06-04 PROCEDURE — 99499 RISK ADDL DX/OHS AUDIT: ICD-10-PCS | Mod: S$GLB,,, | Performed by: INTERNAL MEDICINE

## 2021-06-04 PROCEDURE — 3288F PR FALLS RISK ASSESSMENT DOCUMENTED: ICD-10-PCS | Mod: CPTII,S$GLB,, | Performed by: INTERNAL MEDICINE

## 2021-06-04 PROCEDURE — 99999 PR PBB SHADOW E&M-EST. PATIENT-LVL III: ICD-10-PCS | Mod: PBBFAC,,, | Performed by: INTERNAL MEDICINE

## 2021-06-04 PROCEDURE — 1126F PR PAIN SEVERITY QUANTIFIED, NO PAIN PRESENT: ICD-10-PCS | Mod: S$GLB,,, | Performed by: INTERNAL MEDICINE

## 2021-06-04 PROCEDURE — 1101F PT FALLS ASSESS-DOCD LE1/YR: CPT | Mod: CPTII,S$GLB,, | Performed by: INTERNAL MEDICINE

## 2021-06-04 PROCEDURE — 3008F PR BODY MASS INDEX (BMI) DOCUMENTED: ICD-10-PCS | Mod: CPTII,S$GLB,, | Performed by: INTERNAL MEDICINE

## 2021-06-04 PROCEDURE — 3288F FALL RISK ASSESSMENT DOCD: CPT | Mod: CPTII,S$GLB,, | Performed by: INTERNAL MEDICINE

## 2021-06-04 RX ORDER — LOSARTAN POTASSIUM 100 MG/1
100 TABLET ORAL DAILY
Qty: 90 TABLET | Refills: 3 | Status: SHIPPED | OUTPATIENT
Start: 2021-06-04 | End: 2021-10-08 | Stop reason: SDUPTHER

## 2021-06-04 RX ORDER — AMLODIPINE BESYLATE 5 MG/1
5 TABLET ORAL DAILY
Qty: 90 TABLET | Refills: 1 | Status: SHIPPED | OUTPATIENT
Start: 2021-06-04 | End: 2021-10-08 | Stop reason: SDUPTHER

## 2021-06-04 RX ORDER — LOSARTAN POTASSIUM 100 MG/1
TABLET ORAL
COMMUNITY
Start: 2021-03-07 | End: 2021-06-04

## 2021-06-11 RX ORDER — POTASSIUM CHLORIDE 20 MEQ/1
TABLET, EXTENDED RELEASE ORAL
Qty: 30 TABLET | Refills: 7 | Status: SHIPPED | OUTPATIENT
Start: 2021-06-11 | End: 2021-10-08 | Stop reason: SDUPTHER

## 2021-06-29 ENCOUNTER — OFFICE VISIT (OUTPATIENT)
Dept: INTERNAL MEDICINE | Facility: CLINIC | Age: 66
End: 2021-06-29
Payer: MEDICARE

## 2021-06-29 VITALS
DIASTOLIC BLOOD PRESSURE: 81 MMHG | BODY MASS INDEX: 22.69 KG/M2 | HEIGHT: 63 IN | WEIGHT: 128.06 LBS | SYSTOLIC BLOOD PRESSURE: 123 MMHG

## 2021-06-29 DIAGNOSIS — M54.12 CERVICAL RADICULOPATHY: Primary | ICD-10-CM

## 2021-06-29 PROCEDURE — 3288F PR FALLS RISK ASSESSMENT DOCUMENTED: ICD-10-PCS | Mod: CPTII,S$GLB,, | Performed by: INTERNAL MEDICINE

## 2021-06-29 PROCEDURE — 1125F PR PAIN SEVERITY QUANTIFIED, PAIN PRESENT: ICD-10-PCS | Mod: CPTII,S$GLB,, | Performed by: INTERNAL MEDICINE

## 2021-06-29 PROCEDURE — 99999 PR PBB SHADOW E&M-EST. PATIENT-LVL IV: CPT | Mod: PBBFAC,,, | Performed by: INTERNAL MEDICINE

## 2021-06-29 PROCEDURE — 1101F PR PT FALLS ASSESS DOC 0-1 FALLS W/OUT INJ PAST YR: ICD-10-PCS | Mod: CPTII,S$GLB,, | Performed by: INTERNAL MEDICINE

## 2021-06-29 PROCEDURE — 3074F PR MOST RECENT SYSTOLIC BLOOD PRESSURE < 130 MM HG: ICD-10-PCS | Mod: CPTII,S$GLB,, | Performed by: INTERNAL MEDICINE

## 2021-06-29 PROCEDURE — 3079F DIAST BP 80-89 MM HG: CPT | Mod: CPTII,S$GLB,, | Performed by: INTERNAL MEDICINE

## 2021-06-29 PROCEDURE — 3008F PR BODY MASS INDEX (BMI) DOCUMENTED: ICD-10-PCS | Mod: CPTII,S$GLB,, | Performed by: INTERNAL MEDICINE

## 2021-06-29 PROCEDURE — 3079F PR MOST RECENT DIASTOLIC BLOOD PRESSURE 80-89 MM HG: ICD-10-PCS | Mod: CPTII,S$GLB,, | Performed by: INTERNAL MEDICINE

## 2021-06-29 PROCEDURE — 3074F SYST BP LT 130 MM HG: CPT | Mod: CPTII,S$GLB,, | Performed by: INTERNAL MEDICINE

## 2021-06-29 PROCEDURE — 1125F AMNT PAIN NOTED PAIN PRSNT: CPT | Mod: CPTII,S$GLB,, | Performed by: INTERNAL MEDICINE

## 2021-06-29 PROCEDURE — 1159F MED LIST DOCD IN RCRD: CPT | Mod: CPTII,S$GLB,, | Performed by: INTERNAL MEDICINE

## 2021-06-29 PROCEDURE — 3008F BODY MASS INDEX DOCD: CPT | Mod: CPTII,S$GLB,, | Performed by: INTERNAL MEDICINE

## 2021-06-29 PROCEDURE — 3288F FALL RISK ASSESSMENT DOCD: CPT | Mod: CPTII,S$GLB,, | Performed by: INTERNAL MEDICINE

## 2021-06-29 PROCEDURE — 99999 PR PBB SHADOW E&M-EST. PATIENT-LVL IV: ICD-10-PCS | Mod: PBBFAC,,, | Performed by: INTERNAL MEDICINE

## 2021-06-29 PROCEDURE — 1159F PR MEDICATION LIST DOCUMENTED IN MEDICAL RECORD: ICD-10-PCS | Mod: CPTII,S$GLB,, | Performed by: INTERNAL MEDICINE

## 2021-06-29 PROCEDURE — 99214 PR OFFICE/OUTPT VISIT, EST, LEVL IV, 30-39 MIN: ICD-10-PCS | Mod: S$GLB,,, | Performed by: INTERNAL MEDICINE

## 2021-06-29 PROCEDURE — 1101F PT FALLS ASSESS-DOCD LE1/YR: CPT | Mod: CPTII,S$GLB,, | Performed by: INTERNAL MEDICINE

## 2021-06-29 PROCEDURE — 99214 OFFICE O/P EST MOD 30 MIN: CPT | Mod: S$GLB,,, | Performed by: INTERNAL MEDICINE

## 2021-06-29 RX ORDER — HYDROCHLOROTHIAZIDE 12.5 MG/1
12.5 TABLET ORAL DAILY PRN
Qty: 30 TABLET | Refills: 4 | Status: SHIPPED | OUTPATIENT
Start: 2021-06-29 | End: 2021-10-08 | Stop reason: SDUPTHER

## 2021-06-29 RX ORDER — HYDROCHLOROTHIAZIDE 12.5 MG/1
12.5 TABLET ORAL DAILY
Qty: 30 TABLET | Refills: 11 | Status: SHIPPED | OUTPATIENT
Start: 2021-06-29 | End: 2021-06-29

## 2021-07-29 ENCOUNTER — PATIENT OUTREACH (OUTPATIENT)
Dept: ADMINISTRATIVE | Facility: OTHER | Age: 66
End: 2021-07-29

## 2021-07-29 DIAGNOSIS — Z12.31 BREAST CANCER SCREENING BY MAMMOGRAM: Primary | ICD-10-CM

## 2021-07-30 ENCOUNTER — PATIENT MESSAGE (OUTPATIENT)
Dept: PAIN MEDICINE | Facility: CLINIC | Age: 66
End: 2021-07-30

## 2021-07-30 ENCOUNTER — TELEPHONE (OUTPATIENT)
Dept: PAIN MEDICINE | Facility: CLINIC | Age: 66
End: 2021-07-30

## 2021-08-03 ENCOUNTER — HOSPITAL ENCOUNTER (OUTPATIENT)
Dept: RADIOLOGY | Facility: HOSPITAL | Age: 66
Discharge: HOME | End: 2021-08-03
Attending: INTERNAL MEDICINE
Payer: MEDICARE

## 2021-08-03 DIAGNOSIS — Z12.31 BREAST CANCER SCREENING BY MAMMOGRAM: ICD-10-CM

## 2021-08-03 PROCEDURE — 77067 SCR MAMMO BI INCL CAD: CPT | Mod: TC

## 2021-08-03 PROCEDURE — 77063 MAMMO DIGITAL SCREENING BILAT WITH TOMO: ICD-10-PCS | Mod: 26,,, | Performed by: RADIOLOGY

## 2021-08-03 PROCEDURE — 77063 BREAST TOMOSYNTHESIS BI: CPT | Mod: 26,,, | Performed by: RADIOLOGY

## 2021-08-03 PROCEDURE — 77067 MAMMO DIGITAL SCREENING BILAT WITH TOMO: ICD-10-PCS | Mod: 26,,, | Performed by: RADIOLOGY

## 2021-08-03 PROCEDURE — 77067 SCR MAMMO BI INCL CAD: CPT | Mod: 26,,, | Performed by: RADIOLOGY

## 2021-08-25 ENCOUNTER — PATIENT MESSAGE (OUTPATIENT)
Dept: INTERNAL MEDICINE | Facility: CLINIC | Age: 66
End: 2021-08-25

## 2021-09-22 ENCOUNTER — TELEPHONE (OUTPATIENT)
Dept: INTERNAL MEDICINE | Facility: CLINIC | Age: 66
End: 2021-09-22

## 2021-09-29 ENCOUNTER — TELEPHONE (OUTPATIENT)
Dept: INTERNAL MEDICINE | Facility: CLINIC | Age: 66
End: 2021-09-29

## 2021-10-08 ENCOUNTER — LAB VISIT (OUTPATIENT)
Dept: LAB | Facility: HOSPITAL | Age: 66
End: 2021-10-08
Attending: INTERNAL MEDICINE
Payer: MEDICARE

## 2021-10-08 ENCOUNTER — OFFICE VISIT (OUTPATIENT)
Dept: INTERNAL MEDICINE | Facility: CLINIC | Age: 66
End: 2021-10-08
Payer: MEDICARE

## 2021-10-08 VITALS
SYSTOLIC BLOOD PRESSURE: 123 MMHG | BODY MASS INDEX: 23.17 KG/M2 | WEIGHT: 130.75 LBS | OXYGEN SATURATION: 95 % | DIASTOLIC BLOOD PRESSURE: 83 MMHG | HEIGHT: 63 IN | HEART RATE: 97 BPM

## 2021-10-08 DIAGNOSIS — I10 ESSENTIAL HYPERTENSION: ICD-10-CM

## 2021-10-08 DIAGNOSIS — J96.11 CHRONIC HYPOXEMIC RESPIRATORY FAILURE: ICD-10-CM

## 2021-10-08 DIAGNOSIS — J98.01 BRONCHOSPASM, ACUTE: ICD-10-CM

## 2021-10-08 DIAGNOSIS — I27.81 COR PULMONALE, CHRONIC: Primary | ICD-10-CM

## 2021-10-08 DIAGNOSIS — J44.9 CHRONIC OBSTRUCTIVE PULMONARY DISEASE WITH HYPOXIA: ICD-10-CM

## 2021-10-08 DIAGNOSIS — Z72.0 TOBACCO ABUSE: ICD-10-CM

## 2021-10-08 LAB
ALBUMIN SERPL BCP-MCNC: 4 G/DL (ref 3.5–5.2)
ALP SERPL-CCNC: 51 U/L (ref 55–135)
ALT SERPL W/O P-5'-P-CCNC: 13 U/L (ref 10–44)
ANION GAP SERPL CALC-SCNC: 15 MMOL/L (ref 8–16)
AST SERPL-CCNC: 19 U/L (ref 10–40)
BILIRUB SERPL-MCNC: 0.5 MG/DL (ref 0.1–1)
BUN SERPL-MCNC: 8 MG/DL (ref 8–23)
CALCIUM SERPL-MCNC: 9.9 MG/DL (ref 8.7–10.5)
CHLORIDE SERPL-SCNC: 97 MMOL/L (ref 95–110)
CHOLEST SERPL-MCNC: 174 MG/DL (ref 120–199)
CHOLEST/HDLC SERPL: 1.9 {RATIO} (ref 2–5)
CO2 SERPL-SCNC: 28 MMOL/L (ref 23–29)
CREAT SERPL-MCNC: 0.6 MG/DL (ref 0.5–1.4)
EST. GFR  (AFRICAN AMERICAN): >60 ML/MIN/1.73 M^2
EST. GFR  (NON AFRICAN AMERICAN): >60 ML/MIN/1.73 M^2
ESTIMATED AVG GLUCOSE: 103 MG/DL (ref 68–131)
GLUCOSE SERPL-MCNC: 72 MG/DL (ref 70–110)
HBA1C MFR BLD: 5.2 % (ref 4–5.6)
HDLC SERPL-MCNC: 90 MG/DL (ref 40–75)
HDLC SERPL: 51.7 % (ref 20–50)
LDLC SERPL CALC-MCNC: 73.8 MG/DL (ref 63–159)
NONHDLC SERPL-MCNC: 84 MG/DL
POTASSIUM SERPL-SCNC: 3.5 MMOL/L (ref 3.5–5.1)
PROT SERPL-MCNC: 7 G/DL (ref 6–8.4)
SODIUM SERPL-SCNC: 140 MMOL/L (ref 136–145)
TRIGL SERPL-MCNC: 51 MG/DL (ref 30–150)

## 2021-10-08 PROCEDURE — 80061 LIPID PANEL: CPT | Performed by: INTERNAL MEDICINE

## 2021-10-08 PROCEDURE — 83036 HEMOGLOBIN GLYCOSYLATED A1C: CPT | Performed by: INTERNAL MEDICINE

## 2021-10-08 PROCEDURE — 99999 PR PBB SHADOW E&M-EST. PATIENT-LVL III: ICD-10-PCS | Mod: PBBFAC,,, | Performed by: INTERNAL MEDICINE

## 2021-10-08 PROCEDURE — 99214 OFFICE O/P EST MOD 30 MIN: CPT | Mod: S$GLB,,, | Performed by: INTERNAL MEDICINE

## 2021-10-08 PROCEDURE — 80053 COMPREHEN METABOLIC PANEL: CPT | Performed by: INTERNAL MEDICINE

## 2021-10-08 PROCEDURE — 4010F ACE/ARB THERAPY RXD/TAKEN: CPT | Mod: CPTII,S$GLB,, | Performed by: INTERNAL MEDICINE

## 2021-10-08 PROCEDURE — 99999 PR PBB SHADOW E&M-EST. PATIENT-LVL III: CPT | Mod: PBBFAC,,, | Performed by: INTERNAL MEDICINE

## 2021-10-08 PROCEDURE — 99214 PR OFFICE/OUTPT VISIT, EST, LEVL IV, 30-39 MIN: ICD-10-PCS | Mod: S$GLB,,, | Performed by: INTERNAL MEDICINE

## 2021-10-08 PROCEDURE — 4010F PR ACE/ARB THEARPY RXD/TAKEN: ICD-10-PCS | Mod: CPTII,S$GLB,, | Performed by: INTERNAL MEDICINE

## 2021-10-08 PROCEDURE — 36415 COLL VENOUS BLD VENIPUNCTURE: CPT | Performed by: INTERNAL MEDICINE

## 2021-10-08 RX ORDER — ALBUTEROL SULFATE 90 UG/1
2 AEROSOL, METERED RESPIRATORY (INHALATION) EVERY 6 HOURS
Qty: 18 G | Refills: 3 | Status: SHIPPED | OUTPATIENT
Start: 2021-10-08 | End: 2021-11-10

## 2021-10-08 RX ORDER — HYDROCHLOROTHIAZIDE 12.5 MG/1
12.5 TABLET ORAL DAILY PRN
Qty: 30 TABLET | Refills: 4 | Status: SHIPPED | OUTPATIENT
Start: 2021-10-08 | End: 2022-07-25

## 2021-10-08 RX ORDER — POTASSIUM CHLORIDE 20 MEQ/1
TABLET, EXTENDED RELEASE ORAL
Qty: 30 TABLET | Refills: 7 | Status: SHIPPED | OUTPATIENT
Start: 2021-10-08 | End: 2023-06-30 | Stop reason: SDUPTHER

## 2021-10-08 RX ORDER — LOSARTAN POTASSIUM 100 MG/1
100 TABLET ORAL DAILY
Qty: 90 TABLET | Refills: 3 | Status: SHIPPED | OUTPATIENT
Start: 2021-10-08 | End: 2022-04-04 | Stop reason: SDUPTHER

## 2021-10-08 RX ORDER — AMLODIPINE BESYLATE 5 MG/1
5 TABLET ORAL DAILY
Qty: 90 TABLET | Refills: 3 | Status: SHIPPED | OUTPATIENT
Start: 2021-10-08 | End: 2022-08-24

## 2021-10-08 RX ORDER — ATORVASTATIN CALCIUM 10 MG/1
10 TABLET, FILM COATED ORAL NIGHTLY
Qty: 90 TABLET | Refills: 3 | Status: SHIPPED | OUTPATIENT
Start: 2021-10-08 | End: 2022-08-30 | Stop reason: SDUPTHER

## 2021-12-07 ENCOUNTER — TELEPHONE (OUTPATIENT)
Dept: PULMONOLOGY | Facility: CLINIC | Age: 66
End: 2021-12-07
Payer: MEDICARE

## 2021-12-14 ENCOUNTER — TELEPHONE (OUTPATIENT)
Dept: PULMONOLOGY | Facility: CLINIC | Age: 66
End: 2021-12-14
Payer: MEDICARE

## 2021-12-21 DIAGNOSIS — Z91.89 AT HIGH RISK FOR RESPIRATORY INFECTION: ICD-10-CM

## 2021-12-29 RX ORDER — IPRATROPIUM BROMIDE AND ALBUTEROL SULFATE 2.5; .5 MG/3ML; MG/3ML
SOLUTION RESPIRATORY (INHALATION)
Qty: 1080 ML | Refills: 3 | Status: SHIPPED | OUTPATIENT
Start: 2021-12-29 | End: 2022-02-25 | Stop reason: SDUPTHER

## 2021-12-30 ENCOUNTER — HOSPITAL ENCOUNTER (INPATIENT)
Facility: HOSPITAL | Age: 66
LOS: 2 days | Discharge: HOME HEALTH | DRG: 177 | End: 2022-01-01
Attending: EMERGENCY MEDICINE | Admitting: HOSPITALIST
Payer: MEDICARE

## 2021-12-30 DIAGNOSIS — R07.9 CHEST PAIN: ICD-10-CM

## 2021-12-30 DIAGNOSIS — J44.1 COPD EXACERBATION: ICD-10-CM

## 2021-12-30 DIAGNOSIS — U07.1 COVID-19: Primary | ICD-10-CM

## 2021-12-30 DIAGNOSIS — R06.02 SHORTNESS OF BREATH: ICD-10-CM

## 2021-12-30 PROBLEM — J44.9 CHRONIC OBSTRUCTIVE PULMONARY DISEASE: Status: ACTIVE | Noted: 2021-12-30

## 2021-12-30 PROBLEM — E78.5 HYPERLIPIDEMIA: Status: ACTIVE | Noted: 2021-12-30

## 2021-12-30 PROBLEM — J12.82 PNEUMONIA DUE TO COVID-19 VIRUS: Status: ACTIVE | Noted: 2021-12-30

## 2021-12-30 LAB
ALBUMIN SERPL BCP-MCNC: 3.6 G/DL (ref 3.5–5.2)
ALP SERPL-CCNC: 49 U/L (ref 55–135)
ALT SERPL W/O P-5'-P-CCNC: 23 U/L (ref 10–44)
ANION GAP SERPL CALC-SCNC: 8 MMOL/L (ref 8–16)
APTT BLDCRRT: 32.2 SEC (ref 21–32)
AST SERPL-CCNC: 28 U/L (ref 10–40)
BASOPHILS # BLD AUTO: 0.02 K/UL (ref 0–0.2)
BASOPHILS NFR BLD: 0.3 % (ref 0–1.9)
BILIRUB SERPL-MCNC: 0.2 MG/DL (ref 0.1–1)
BNP SERPL-MCNC: <10 PG/ML (ref 0–99)
BUN SERPL-MCNC: 3 MG/DL (ref 6–30)
BUN SERPL-MCNC: 8 MG/DL (ref 8–23)
CALCIUM SERPL-MCNC: 8.2 MG/DL (ref 8.7–10.5)
CHLORIDE SERPL-SCNC: 100 MMOL/L (ref 95–110)
CHLORIDE SERPL-SCNC: 105 MMOL/L (ref 95–110)
CK SERPL-CCNC: 110 U/L (ref 20–180)
CO2 SERPL-SCNC: 30 MMOL/L (ref 23–29)
CREAT SERPL-MCNC: 0.5 MG/DL (ref 0.5–1.4)
CREAT SERPL-MCNC: 0.7 MG/DL (ref 0.5–1.4)
CTP QC/QA: YES
D DIMER PPP IA.FEU-MCNC: 0.57 MG/L FEU
DIFFERENTIAL METHOD: ABNORMAL
EOSINOPHIL # BLD AUTO: 0 K/UL (ref 0–0.5)
EOSINOPHIL NFR BLD: 0.7 % (ref 0–8)
ERYTHROCYTE [DISTWIDTH] IN BLOOD BY AUTOMATED COUNT: 14.4 % (ref 11.5–14.5)
ERYTHROCYTE [SEDIMENTATION RATE] IN BLOOD BY WESTERGREN METHOD: 44 MM/HR (ref 0–36)
EST. GFR  (AFRICAN AMERICAN): >60 ML/MIN/1.73 M^2
EST. GFR  (NON AFRICAN AMERICAN): >60 ML/MIN/1.73 M^2
FERRITIN SERPL-MCNC: 297 NG/ML (ref 20–300)
GLUCOSE SERPL-MCNC: 154 MG/DL (ref 70–110)
GLUCOSE SERPL-MCNC: 81 MG/DL (ref 70–110)
HCT VFR BLD AUTO: 44.2 % (ref 37–48.5)
HCT VFR BLD CALC: 44 %PCV (ref 36–54)
HGB BLD-MCNC: 13.9 G/DL (ref 12–16)
IMM GRANULOCYTES # BLD AUTO: 0.01 K/UL (ref 0–0.04)
IMM GRANULOCYTES NFR BLD AUTO: 0.2 % (ref 0–0.5)
INR PPP: 0.9 (ref 0.8–1.2)
LACTATE SERPL-SCNC: 1.4 MMOL/L (ref 0.5–2.2)
LDH SERPL L TO P-CCNC: 298 U/L (ref 110–260)
LYMPHOCYTES # BLD AUTO: 2.3 K/UL (ref 1–4.8)
LYMPHOCYTES NFR BLD: 37.7 % (ref 18–48)
MCH RBC QN AUTO: 28.5 PG (ref 27–31)
MCHC RBC AUTO-ENTMCNC: 31.4 G/DL (ref 32–36)
MCV RBC AUTO: 91 FL (ref 82–98)
MONOCYTES # BLD AUTO: 0.9 K/UL (ref 0.3–1)
MONOCYTES NFR BLD: 15.2 % (ref 4–15)
NEUTROPHILS # BLD AUTO: 2.8 K/UL (ref 1.8–7.7)
NEUTROPHILS NFR BLD: 45.9 % (ref 38–73)
NRBC BLD-RTO: 0 /100 WBC
PLATELET # BLD AUTO: 232 K/UL (ref 150–450)
PMV BLD AUTO: 11.8 FL (ref 9.2–12.9)
POC IONIZED CALCIUM: 1.19 MMOL/L (ref 1.06–1.42)
POC TCO2 (MEASURED): 32 MMOL/L (ref 23–29)
POTASSIUM BLD-SCNC: 3.7 MMOL/L (ref 3.5–5.1)
POTASSIUM SERPL-SCNC: 4.5 MMOL/L (ref 3.5–5.1)
PROT SERPL-MCNC: 6.8 G/DL (ref 6–8.4)
PROTHROMBIN TIME: 10.3 SEC (ref 9–12.5)
RBC # BLD AUTO: 4.87 M/UL (ref 4–5.4)
SAMPLE: ABNORMAL
SARS-COV-2 RDRP RESP QL NAA+PROBE: POSITIVE
SODIUM BLD-SCNC: 143 MMOL/L (ref 136–145)
SODIUM SERPL-SCNC: 143 MMOL/L (ref 136–145)
TROPONIN I SERPL DL<=0.01 NG/ML-MCNC: <0.006 NG/ML (ref 0–0.03)
WBC # BLD AUTO: 5.99 K/UL (ref 3.9–12.7)

## 2021-12-30 PROCEDURE — 82728 ASSAY OF FERRITIN: CPT | Performed by: STUDENT IN AN ORGANIZED HEALTH CARE EDUCATION/TRAINING PROGRAM

## 2021-12-30 PROCEDURE — 83615 LACTATE (LD) (LDH) ENZYME: CPT | Performed by: STUDENT IN AN ORGANIZED HEALTH CARE EDUCATION/TRAINING PROGRAM

## 2021-12-30 PROCEDURE — 93005 ELECTROCARDIOGRAM TRACING: CPT

## 2021-12-30 PROCEDURE — 99222 PR INITIAL HOSPITAL CARE,LEVL II: ICD-10-PCS | Mod: AI,GC,, | Performed by: HOSPITALIST

## 2021-12-30 PROCEDURE — 25000003 PHARM REV CODE 250: Performed by: STUDENT IN AN ORGANIZED HEALTH CARE EDUCATION/TRAINING PROGRAM

## 2021-12-30 PROCEDURE — 63600175 PHARM REV CODE 636 W HCPCS: Performed by: STUDENT IN AN ORGANIZED HEALTH CARE EDUCATION/TRAINING PROGRAM

## 2021-12-30 PROCEDURE — 85652 RBC SED RATE AUTOMATED: CPT | Performed by: STUDENT IN AN ORGANIZED HEALTH CARE EDUCATION/TRAINING PROGRAM

## 2021-12-30 PROCEDURE — 25000242 PHARM REV CODE 250 ALT 637 W/ HCPCS: Performed by: HOSPITALIST

## 2021-12-30 PROCEDURE — 85379 FIBRIN DEGRADATION QUANT: CPT | Performed by: STUDENT IN AN ORGANIZED HEALTH CARE EDUCATION/TRAINING PROGRAM

## 2021-12-30 PROCEDURE — 63600175 PHARM REV CODE 636 W HCPCS: Performed by: PHYSICIAN ASSISTANT

## 2021-12-30 PROCEDURE — 80053 COMPREHEN METABOLIC PANEL: CPT | Performed by: STUDENT IN AN ORGANIZED HEALTH CARE EDUCATION/TRAINING PROGRAM

## 2021-12-30 PROCEDURE — 80047 BASIC METABLC PNL IONIZED CA: CPT

## 2021-12-30 PROCEDURE — 93010 EKG 12-LEAD: ICD-10-PCS | Mod: ,,, | Performed by: INTERNAL MEDICINE

## 2021-12-30 PROCEDURE — 20600001 HC STEP DOWN PRIVATE ROOM

## 2021-12-30 PROCEDURE — 93010 ELECTROCARDIOGRAM REPORT: CPT | Mod: ,,, | Performed by: INTERNAL MEDICINE

## 2021-12-30 PROCEDURE — 25000003 PHARM REV CODE 250: Performed by: HOSPITALIST

## 2021-12-30 PROCEDURE — 82550 ASSAY OF CK (CPK): CPT | Performed by: STUDENT IN AN ORGANIZED HEALTH CARE EDUCATION/TRAINING PROGRAM

## 2021-12-30 PROCEDURE — 25000003 PHARM REV CODE 250

## 2021-12-30 PROCEDURE — 85025 COMPLETE CBC W/AUTO DIFF WBC: CPT | Performed by: PHYSICIAN ASSISTANT

## 2021-12-30 PROCEDURE — 83880 ASSAY OF NATRIURETIC PEPTIDE: CPT | Performed by: PHYSICIAN ASSISTANT

## 2021-12-30 PROCEDURE — 96374 THER/PROPH/DIAG INJ IV PUSH: CPT

## 2021-12-30 PROCEDURE — C9399 UNCLASSIFIED DRUGS OR BIOLOG: HCPCS | Performed by: STUDENT IN AN ORGANIZED HEALTH CARE EDUCATION/TRAINING PROGRAM

## 2021-12-30 PROCEDURE — U0002 COVID-19 LAB TEST NON-CDC: HCPCS | Performed by: EMERGENCY MEDICINE

## 2021-12-30 PROCEDURE — 99285 EMERGENCY DEPT VISIT HI MDM: CPT | Mod: CS,,, | Performed by: PHYSICIAN ASSISTANT

## 2021-12-30 PROCEDURE — 99222 1ST HOSP IP/OBS MODERATE 55: CPT | Mod: AI,GC,, | Performed by: HOSPITALIST

## 2021-12-30 PROCEDURE — 85730 THROMBOPLASTIN TIME PARTIAL: CPT | Performed by: STUDENT IN AN ORGANIZED HEALTH CARE EDUCATION/TRAINING PROGRAM

## 2021-12-30 PROCEDURE — 83605 ASSAY OF LACTIC ACID: CPT | Performed by: STUDENT IN AN ORGANIZED HEALTH CARE EDUCATION/TRAINING PROGRAM

## 2021-12-30 PROCEDURE — 84484 ASSAY OF TROPONIN QUANT: CPT | Performed by: PHYSICIAN ASSISTANT

## 2021-12-30 PROCEDURE — 99285 PR EMERGENCY DEPT VISIT,LEVEL V: ICD-10-PCS | Mod: CS,,, | Performed by: PHYSICIAN ASSISTANT

## 2021-12-30 PROCEDURE — 85610 PROTHROMBIN TIME: CPT | Performed by: STUDENT IN AN ORGANIZED HEALTH CARE EDUCATION/TRAINING PROGRAM

## 2021-12-30 PROCEDURE — 99285 EMERGENCY DEPT VISIT HI MDM: CPT | Mod: 25

## 2021-12-30 PROCEDURE — 27000207 HC ISOLATION

## 2021-12-30 RX ORDER — BENZONATATE 100 MG/1
100 CAPSULE ORAL 3 TIMES DAILY PRN
Status: DISCONTINUED | OUTPATIENT
Start: 2021-12-30 | End: 2022-01-01 | Stop reason: HOSPADM

## 2021-12-30 RX ORDER — ACETAMINOPHEN 325 MG/1
650 TABLET ORAL EVERY 6 HOURS PRN
Status: DISCONTINUED | OUTPATIENT
Start: 2021-12-30 | End: 2022-01-01 | Stop reason: HOSPADM

## 2021-12-30 RX ORDER — ALBUTEROL SULFATE 90 UG/1
2 AEROSOL, METERED RESPIRATORY (INHALATION) EVERY 6 HOURS
Status: DISCONTINUED | OUTPATIENT
Start: 2021-12-30 | End: 2021-12-30

## 2021-12-30 RX ORDER — SODIUM CHLORIDE 0.9 % (FLUSH) 0.9 %
10 SYRINGE (ML) INJECTION EVERY 12 HOURS PRN
Status: DISCONTINUED | OUTPATIENT
Start: 2021-12-30 | End: 2022-01-01 | Stop reason: HOSPADM

## 2021-12-30 RX ORDER — GABAPENTIN 300 MG/1
300 CAPSULE ORAL 3 TIMES DAILY
Status: DISCONTINUED | OUTPATIENT
Start: 2021-12-30 | End: 2022-01-01 | Stop reason: HOSPADM

## 2021-12-30 RX ORDER — ASCORBIC ACID 500 MG
500 TABLET ORAL 2 TIMES DAILY
Status: DISCONTINUED | OUTPATIENT
Start: 2021-12-30 | End: 2022-01-01 | Stop reason: HOSPADM

## 2021-12-30 RX ORDER — GLUCAGON 1 MG
1 KIT INJECTION
Status: DISCONTINUED | OUTPATIENT
Start: 2021-12-30 | End: 2022-01-01 | Stop reason: HOSPADM

## 2021-12-30 RX ORDER — ALBUTEROL SULFATE 90 UG/1
2 AEROSOL, METERED RESPIRATORY (INHALATION) EVERY 6 HOURS
Status: DISCONTINUED | OUTPATIENT
Start: 2021-12-30 | End: 2022-01-01 | Stop reason: HOSPADM

## 2021-12-30 RX ORDER — IBUPROFEN 200 MG
1 TABLET ORAL DAILY
Status: DISCONTINUED | OUTPATIENT
Start: 2021-12-31 | End: 2022-01-01 | Stop reason: HOSPADM

## 2021-12-30 RX ORDER — ENOXAPARIN SODIUM 100 MG/ML
1 INJECTION SUBCUTANEOUS 2 TIMES DAILY
Status: DISCONTINUED | OUTPATIENT
Start: 2021-12-30 | End: 2021-12-30

## 2021-12-30 RX ORDER — ATORVASTATIN CALCIUM 10 MG/1
10 TABLET, FILM COATED ORAL NIGHTLY
Status: DISCONTINUED | OUTPATIENT
Start: 2021-12-30 | End: 2022-01-01 | Stop reason: HOSPADM

## 2021-12-30 RX ORDER — ACETAMINOPHEN 325 MG/1
650 TABLET ORAL ONCE
Status: COMPLETED | OUTPATIENT
Start: 2021-12-30 | End: 2021-12-30

## 2021-12-30 RX ORDER — DEXAMETHASONE SODIUM PHOSPHATE 4 MG/ML
6 INJECTION, SOLUTION INTRA-ARTICULAR; INTRALESIONAL; INTRAMUSCULAR; INTRAVENOUS; SOFT TISSUE
Status: COMPLETED | OUTPATIENT
Start: 2021-12-30 | End: 2021-12-30

## 2021-12-30 RX ORDER — IBUPROFEN 200 MG
24 TABLET ORAL
Status: DISCONTINUED | OUTPATIENT
Start: 2021-12-30 | End: 2022-01-01 | Stop reason: HOSPADM

## 2021-12-30 RX ORDER — IBUPROFEN 200 MG
16 TABLET ORAL
Status: DISCONTINUED | OUTPATIENT
Start: 2021-12-30 | End: 2022-01-01 | Stop reason: HOSPADM

## 2021-12-30 RX ORDER — ENOXAPARIN SODIUM 100 MG/ML
40 INJECTION SUBCUTANEOUS EVERY 24 HOURS
Status: DISCONTINUED | OUTPATIENT
Start: 2021-12-30 | End: 2022-01-01 | Stop reason: HOSPADM

## 2021-12-30 RX ADMIN — ACETAMINOPHEN 650 MG: 325 TABLET ORAL at 06:12

## 2021-12-30 RX ADMIN — GABAPENTIN 300 MG: 300 CAPSULE ORAL at 09:12

## 2021-12-30 RX ADMIN — MULTIPLE VITAMINS W/ MINERALS TAB 1 TABLET: TAB at 04:12

## 2021-12-30 RX ADMIN — OXYCODONE HYDROCHLORIDE AND ACETAMINOPHEN 500 MG: 500 TABLET ORAL at 09:12

## 2021-12-30 RX ADMIN — GABAPENTIN 300 MG: 300 CAPSULE ORAL at 04:12

## 2021-12-30 RX ADMIN — ENOXAPARIN SODIUM 40 MG: 100 INJECTION SUBCUTANEOUS at 07:12

## 2021-12-30 RX ADMIN — ATORVASTATIN CALCIUM 10 MG: 10 TABLET, FILM COATED ORAL at 09:12

## 2021-12-30 RX ADMIN — ALBUTEROL SULFATE 2 PUFF: 108 INHALANT RESPIRATORY (INHALATION) at 07:12

## 2021-12-30 RX ADMIN — REMDESIVIR 200 MG: 100 INJECTION, POWDER, LYOPHILIZED, FOR SOLUTION INTRAVENOUS at 04:12

## 2021-12-30 RX ADMIN — DEXAMETHASONE SODIUM PHOSPHATE 6 MG: 4 INJECTION INTRA-ARTICULAR; INTRALESIONAL; INTRAMUSCULAR; INTRAVENOUS; SOFT TISSUE at 01:12

## 2021-12-30 RX ADMIN — BENZONATATE 100 MG: 100 CAPSULE ORAL at 06:12

## 2021-12-30 RX ADMIN — GUAIFENESIN AND DEXTROMETHORPHAN HYDROBROMIDE 1 TABLET: 600; 30 TABLET, EXTENDED RELEASE ORAL at 09:12

## 2021-12-30 NOTE — ED NOTES
Patient identifiers verified and correct for Ms Espinoza  C/C: SOB, cough SEE NN  APPEARANCE: awake and alert in NAD.  SKIN: warm, dry and intact. No breakdown or bruising.  MUSCULOSKELETAL: Patient moving all extremities spontaneously, no obvious swelling or deformities noted. Ambulates independently.  RESPIRATORY: Positive  shortness of breath.Respirations unlabored. Positive cough Denies fevers., 2 lpm nc  CARDIAC: Denies CP, 2+ distal pulses; no peripheral edema  ABDOMEN: S/ND/NT, Denies nausea  : voids spontaneously, denies difficulty  Neurologic: AAO x 4; follows commands equal strength in all extremities; denies numbness/tingling. Denies dizziness dneis wekwness
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
## 2021-12-30 NOTE — ASSESSMENT & PLAN NOTE
COVID-19 Virus Infection  Viral Pneumonia due to COVID-19     66 year old lady on home oxygen 2L for COPD admitted to the hospital for covid pneumonia. With the history and examination above, likely with her symptoms, her vaccination status, and possible infection from a family gathering, she has a COVID-19 pneumonia. CXR showed trace right effusion, stable mild pulmonary venous congestion.    - COVID-19 testing:   - Isolation: Airborne/Droplet. Surgical mask on patient.  - Diagnostics: Trend Q48hrs if stable, more frequently if patient decompensating       - CBC       - CMP       - D-dimer       - Ferritin       - CK       - LDH       - BNP       - Troponin       - Procalcitonin  Lymphopenia, hyponatremia, hyperferritinemia, elevated troponin, elevated d-dimer, age, and medical comorbidities are significant predictors of poor clinical outcome    - Management: Per [facility] COVID Treatment Protocol    - Telemetry & Continuous Pulse Oximetry    - Nutrition:        - Multivitamin PO daily       - Boost supplement       - Vitamin D 1000IU daily if deficient       - Ascorbic acid 500mg PO bid    - Supportive Care:       - acetaminophen 650mg PO Q6hr PRN fever/headache       - loperamide PRN viral diarrhea       - IVF if indicated, restrictive strategy preferred, no maintenance IV if able       - VTE PPx: enoxaparin or heparin SQ unless contraindicated       - Remdesivir (day 1)     - Dexamethasone (day 1)
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
## 2021-12-30 NOTE — ED NOTES
Patient states increased SOB onset Tuesday, currently on 2 LPM nc, reports cough and back pain, last HCTZ Monday

## 2021-12-30 NOTE — ASSESSMENT & PLAN NOTE
At home, patient is taking amlodipine 5, HCTZ 12.5, and losartan 100. BP on admission 141/71    - holding anti-htn meds at the moment  - will resume when clinically indicated

## 2021-12-30 NOTE — SUBJECTIVE & OBJECTIVE
Past Medical History:   Diagnosis Date    CHF (congestive heart failure)     COPD (chronic obstructive pulmonary disease)     Herpes zoster without mention of complication 2011    Hypertension     Leg edema     Pulmonary hypertension     Sciatica        Past Surgical History:   Procedure Laterality Date    BREAST BIOPSY Right     core     SECTION      COLONOSCOPY N/A 2019    Procedure: COLONOSCOPY;  Surgeon: Rui Holder MD;  Location: [facility] (2ND FLR);  Service: Endoscopy;  Laterality: N/A;    EPIDURAL STEROID INJECTION N/A 2020    Procedure: CERVICAL BLAKE;  Surgeon: Papito High MD;  Location: [facility] PAIN [place];  Service: Pain Management;  Laterality: N/A;  NEEDS CONSENT    ESOPHAGOGASTRODUODENOSCOPY N/A 2019    Procedure: EGD (ESOPHAGOGASTRODUODENOSCOPY);  Surgeon: Rui Holder MD;  Location: [facility] (2ND FLR);  Service: Endoscopy;  Laterality: N/A;  2L continuous O2 via NC, COPD, pulm HTN    TRANSFORAMINAL EPIDURAL INJECTION OF STEROID Right 6/3/2021    Procedure: INJECTION, STEROID, EPIDURAL, TRANSFORAMINAL APPROACH, L4-L5;  Surgeon: Anibal Robles MD;  Location: [facility] PAIN [place];  Service: Pain Management;  Laterality: Right;       Review of patient's allergies indicates:   Allergen Reactions    Orange juice      Swelling in pt tongue         No current facility-administered medications on file prior to encounter.     Current Outpatient Medications on File Prior to Encounter   Medication Sig    albuterol-ipratropium (DUO-NEB) 2.5 mg-0.5 mg/3 mL nebulizer solution USE 3ML VIA NEBULIZATION EVERY 6 HOURS AS NEEDED FOR WHEEZING    amLODIPine (NORVASC) 5 MG tablet Take 1 tablet (5 mg total) by mouth once daily.    atorvastatin (LIPITOR) 10 MG tablet Take 1 tablet (10 mg total) by mouth every evening.    gabapentin (NEURONTIN) 300 MG capsule Take 1 capsule (300 mg total) by mouth 3 (three) times daily.    losartan (COZAAR) 100 MG tablet Take 1 tablet (100 mg  total) by mouth once daily.    fluticasone-umeclidin-vilanter (TRELEGY ELLIPTA) 100-62.5-25 mcg DsDv Inhale 1 puff into the lungs once daily.    glycerin adult suppository Place 1 suppository rectally as needed for Constipation.    hydroCHLOROthiazide (HYDRODIURIL) 12.5 MG Tab Take 1 tablet (12.5 mg total) by mouth daily as needed (edema/htn).    potassium chloride SA (K-DUR,KLOR-CON) 20 MEQ tablet TAKE 1 TABLET(20 MEQ) BY MOUTH EVERY DAY    VENTOLIN HFA 90 mcg/actuation inhaler INHALE 2 PUFFS BY MOUTH EVERY 6 HOURS AS NEEDED     Family History     Problem Relation (Age of Onset)    Heart disease Mother, Paternal Uncle        Tobacco Use    Smoking status: Light Tobacco Smoker     Packs/day: 0.25     Years: 40.00     Pack years: 10.00     Types: Cigarettes    Smokeless tobacco: Never Used   Substance and Sexual Activity    Alcohol use: Not Currently     Comment: beer daily 2-3 daily, none today    Drug use: No    Sexual activity: Not Currently     Birth control/protection: None     Review of Systems   Constitutional: Positive for chills and fatigue. Negative for appetite change, diaphoresis and fever.   HENT: Positive for postnasal drip. Negative for congestion, ear pain, hearing loss, sneezing and sore throat.    Eyes: Negative for pain, redness and visual disturbance.   Respiratory: Positive for cough and shortness of breath. Negative for chest tightness.    Cardiovascular: Negative for chest pain and leg swelling.   Gastrointestinal: Negative for abdominal pain, blood in stool, constipation, diarrhea, nausea and vomiting.   Endocrine: Negative for polydipsia, polyphagia and polyuria.   Genitourinary: Negative for flank pain, menstrual problem, pelvic pain, vaginal bleeding, vaginal discharge and vaginal pain.   Musculoskeletal: Positive for back pain. Negative for joint swelling, neck pain and neck stiffness.   Skin: Negative for color change and rash.   Neurological: Positive for weakness. Negative for  dizziness, tremors, speech difficulty, light-headedness, numbness and headaches.   Psychiatric/Behavioral: Negative for agitation, confusion and hallucinations.     Objective:     Vital Signs (Most Recent):  Temp: 98.6 °F (37 °C) (12/30/21 1108)  Pulse: 88 (12/30/21 1321)  Resp: 18 (12/30/21 1321)  BP: (!) 141/71 (12/30/21 1108)  SpO2: 95 % (12/30/21 1321) Vital Signs (24h Range):  Temp:  [98.6 °F (37 °C)] 98.6 °F (37 °C)  Pulse:  [75-88] 88  Resp:  [18-22] 18  SpO2:  [94 %-95 %] 95 %  BP: (141)/(71) 141/71        There is no height or weight on file to calculate BMI.    Physical Exam  Vitals and nursing note reviewed.   Constitutional:       General: She is not in acute distress.     Appearance: Normal appearance. She is not ill-appearing or diaphoretic.      Interventions: Nasal cannula in place.   HENT:      Head: Normocephalic and atraumatic.      Right Ear: External ear normal.      Left Ear: External ear normal.      Nose: Nose normal. No congestion or rhinorrhea.      Mouth/Throat:      Mouth: Mucous membranes are moist.      Pharynx: Oropharynx is clear. No oropharyngeal exudate or posterior oropharyngeal erythema.   Eyes:      General: No scleral icterus.     Conjunctiva/sclera: Conjunctivae normal.      Pupils: Pupils are equal, round, and reactive to light.   Cardiovascular:      Rate and Rhythm: Normal rate and regular rhythm.      Pulses: Normal pulses.      Heart sounds: Normal heart sounds. No murmur heard.  No friction rub.   Pulmonary:      Effort: Pulmonary effort is normal. No respiratory distress.      Breath sounds: No stridor. Wheezing and rhonchi present.   Chest:      Chest wall: No tenderness.   Abdominal:      General: Abdomen is flat. Bowel sounds are normal. There is no distension.      Palpations: There is no mass.      Tenderness: There is no right CVA tenderness, left CVA tenderness or guarding.   Genitourinary:     General: Normal vulva.      Rectum: Normal.   Musculoskeletal:          General: Tenderness (tenderness on back to palpation) present. No swelling or deformity. Normal range of motion.      Cervical back: Normal range of motion. No rigidity.      Right lower leg: No edema.      Left lower leg: No edema.   Skin:     General: Skin is warm and dry.      Capillary Refill: Capillary refill takes less than 2 seconds.      Coloration: Skin is not jaundiced or pale.      Findings: No bruising or erythema.   Neurological:      General: No focal deficit present.      Mental Status: She is alert and oriented to person, place, and time. Mental status is at baseline.      Motor: No weakness.      Coordination: Coordination normal.   Psychiatric:         Mood and Affect: Mood normal.         Behavior: Behavior normal.         Thought Content: Thought content normal.         Judgment: Judgment normal.          Significant Labs:   All pertinent labs within the past 24 hours have been reviewed.  CBC:   Recent Labs   Lab 12/30/21 1201 12/30/21 1215   WBC 5.99  --    HGB 13.9  --    HCT 44.2 44     --      CMP: No results for input(s): NA, K, CL, CO2, GLU, BUN, CREATININE, CALCIUM, PROT, ALBUMIN, BILITOT, ALKPHOS, AST, ALT, ANIONGAP, EGFRNONAA in the last 48 hours.    Invalid input(s): ESTGFAFRICA  Cardiac Markers:   Recent Labs   Lab 12/30/21 1201   BNP <10     Lactic Acid: No results for input(s): LACTATE in the last 48 hours.  Troponin:   Recent Labs   Lab 12/30/21 1201   TROPONINI <0.006     Recent Lab Results       12/30/21  1215   12/30/21 1201        Baso #   0.02       Basophil %   0.3       BNP   <10  Comment: Values of less than 100 pg/ml are consistent with non-CHF populations.       Differential Method   Automated       Eos #   0.0       Eosinophil %   0.7       Gran # (ANC)   2.8       Gran %   45.9       Hematocrit   44.2       Hemoglobin   13.9       Immature Grans (Abs)   0.01  Comment: Mild elevation in immature granulocytes is non specific and   can be seen in a variety of  conditions including stress response,   acute inflammation, trauma and pregnancy. Correlation with other   laboratory and clinical findings is essential.         Immature Granulocytes   0.2       Lymph #   2.3       Lymph %   37.7       MCH   28.5       MCHC   31.4       MCV   91       Mono #   0.9       Mono %   15.2       MPV   11.8       nRBC   0       Platelets   232       POC BUN 3         POC Chloride 100         POC Creatinine 0.5         POC Glucose 81         POC Hematocrit 44         POC Ionized Calcium 1.19         POC Potassium 3.7         POC Sodium 143         POC TCO2 (MEASURED) 32          Acceptable   Yes       RBC   4.87       RDW   14.4       Sample LEROY         SARS-CoV-2 RNA, Amplification, Qual   Positive       Troponin I   <0.006  Comment: The reference interval for Troponin I represents the 99th percentile   cutoff   for our facility and is consistent with 3rd generation assay   performance.         WBC   5.99             Significant Imaging: I have reviewed all pertinent imaging results/findings within the past 24 hours.
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
## 2021-12-30 NOTE — ASSESSMENT & PLAN NOTE
Patient on 2L home oxygen and has duo-nebs and on trelegy at home.     - duo-neb q6 prn  - fluticasone furoate qd

## 2021-12-30 NOTE — ED NOTES
Pt to ED with symptoms that are similar to a COPD exacerbation.  She has had coughing with runny nose.  Had episode of chills/ sweating two days ago.  She wears 2L at home at baseline.    LOC: Patient name and date of birth verified. The patient is awake, alert and aware of environment with an appropriate affect, the patient is oriented x 3 and speaking appropriately.   APPEARANCE: Patient resting comfortably, patient is clean and well groomed, patient's clothing is properly fastened.  SKIN: The skin is warm and dry, color consistent with ethnicity, patient has normal skin turgor and moist mucus membranes, skin intact, no breakdown or bruising noted.  MUSCULOSKELETAL: Patient moving all extremities well, no obvious swelling or deformities noted.   RESPIRATORY: Respirations are spontaneous, patient has a normal effort and rate, no accessory muscle use noted.  CARDIAC: Patient has a normal rate and rhythm, no periphreal edema noted, capillary refill < 3 seconds.  ABDOMEN: Soft and non tender to palpation, no distention noted. Bowel sounds present in all four quadrants.  NEUROLOGIC: Eyes open spontaneously, behavior appropriate to situation, follows commands, facial expression symmetrical, bilateral hand grasp equal and even, purposeful motor response noted, normal sensation in all extremities when touched with a finger.    Pending work up.  Patient is pleasant, speaking in full sentences, no needs at this time.

## 2021-12-31 LAB
ALBUMIN SERPL BCP-MCNC: 3.6 G/DL (ref 3.5–5.2)
ALP SERPL-CCNC: 47 U/L (ref 55–135)
ALT SERPL W/O P-5'-P-CCNC: 23 U/L (ref 10–44)
ANION GAP SERPL CALC-SCNC: 6 MMOL/L (ref 8–16)
AST SERPL-CCNC: 23 U/L (ref 10–40)
BASOPHILS # BLD AUTO: 0.01 K/UL (ref 0–0.2)
BASOPHILS NFR BLD: 0.2 % (ref 0–1.9)
BILIRUB SERPL-MCNC: 0.1 MG/DL (ref 0.1–1)
BUN SERPL-MCNC: 6 MG/DL (ref 8–23)
CALCIUM SERPL-MCNC: 9 MG/DL (ref 8.7–10.5)
CHLORIDE SERPL-SCNC: 103 MMOL/L (ref 95–110)
CO2 SERPL-SCNC: 33 MMOL/L (ref 23–29)
CREAT SERPL-MCNC: 0.6 MG/DL (ref 0.5–1.4)
DIFFERENTIAL METHOD: ABNORMAL
EOSINOPHIL # BLD AUTO: 0 K/UL (ref 0–0.5)
EOSINOPHIL NFR BLD: 0 % (ref 0–8)
ERYTHROCYTE [DISTWIDTH] IN BLOOD BY AUTOMATED COUNT: 14.3 % (ref 11.5–14.5)
EST. GFR  (AFRICAN AMERICAN): >60 ML/MIN/1.73 M^2
EST. GFR  (NON AFRICAN AMERICAN): >60 ML/MIN/1.73 M^2
GLUCOSE SERPL-MCNC: 88 MG/DL (ref 70–110)
HCT VFR BLD AUTO: 45.6 % (ref 37–48.5)
HGB BLD-MCNC: 13.9 G/DL (ref 12–16)
IMM GRANULOCYTES # BLD AUTO: 0 K/UL (ref 0–0.04)
IMM GRANULOCYTES NFR BLD AUTO: 0 % (ref 0–0.5)
LYMPHOCYTES # BLD AUTO: 1.6 K/UL (ref 1–4.8)
LYMPHOCYTES NFR BLD: 28.8 % (ref 18–48)
MAGNESIUM SERPL-MCNC: 1.8 MG/DL (ref 1.6–2.6)
MCH RBC QN AUTO: 28.2 PG (ref 27–31)
MCHC RBC AUTO-ENTMCNC: 30.5 G/DL (ref 32–36)
MCV RBC AUTO: 93 FL (ref 82–98)
MONOCYTES # BLD AUTO: 0.6 K/UL (ref 0.3–1)
MONOCYTES NFR BLD: 10.9 % (ref 4–15)
NEUTROPHILS # BLD AUTO: 3.3 K/UL (ref 1.8–7.7)
NEUTROPHILS NFR BLD: 60.1 % (ref 38–73)
NRBC BLD-RTO: 0 /100 WBC
PHOSPHATE SERPL-MCNC: 4.3 MG/DL (ref 2.7–4.5)
PLATELET # BLD AUTO: 202 K/UL (ref 150–450)
PMV BLD AUTO: 12 FL (ref 9.2–12.9)
POTASSIUM SERPL-SCNC: 4.6 MMOL/L (ref 3.5–5.1)
PROT SERPL-MCNC: 6.8 G/DL (ref 6–8.4)
RBC # BLD AUTO: 4.93 M/UL (ref 4–5.4)
SODIUM SERPL-SCNC: 142 MMOL/L (ref 136–145)
WBC # BLD AUTO: 5.49 K/UL (ref 3.9–12.7)

## 2021-12-31 PROCEDURE — 25000003 PHARM REV CODE 250: Performed by: STUDENT IN AN ORGANIZED HEALTH CARE EDUCATION/TRAINING PROGRAM

## 2021-12-31 PROCEDURE — 84100 ASSAY OF PHOSPHORUS: CPT | Performed by: STUDENT IN AN ORGANIZED HEALTH CARE EDUCATION/TRAINING PROGRAM

## 2021-12-31 PROCEDURE — 80053 COMPREHEN METABOLIC PANEL: CPT | Performed by: STUDENT IN AN ORGANIZED HEALTH CARE EDUCATION/TRAINING PROGRAM

## 2021-12-31 PROCEDURE — C9399 UNCLASSIFIED DRUGS OR BIOLOG: HCPCS | Performed by: STUDENT IN AN ORGANIZED HEALTH CARE EDUCATION/TRAINING PROGRAM

## 2021-12-31 PROCEDURE — 27000207 HC ISOLATION

## 2021-12-31 PROCEDURE — S4991 NICOTINE PATCH NONLEGEND: HCPCS

## 2021-12-31 PROCEDURE — 25000242 PHARM REV CODE 250 ALT 637 W/ HCPCS: Performed by: STUDENT IN AN ORGANIZED HEALTH CARE EDUCATION/TRAINING PROGRAM

## 2021-12-31 PROCEDURE — 99900035 HC TECH TIME PER 15 MIN (STAT)

## 2021-12-31 PROCEDURE — 20600001 HC STEP DOWN PRIVATE ROOM

## 2021-12-31 PROCEDURE — 25000003 PHARM REV CODE 250: Performed by: HOSPITALIST

## 2021-12-31 PROCEDURE — 94640 AIRWAY INHALATION TREATMENT: CPT

## 2021-12-31 PROCEDURE — 94761 N-INVAS EAR/PLS OXIMETRY MLT: CPT

## 2021-12-31 PROCEDURE — 99232 SBSQ HOSP IP/OBS MODERATE 35: CPT | Mod: GC,,, | Performed by: STUDENT IN AN ORGANIZED HEALTH CARE EDUCATION/TRAINING PROGRAM

## 2021-12-31 PROCEDURE — 99232 PR SUBSEQUENT HOSPITAL CARE,LEVL II: ICD-10-PCS | Mod: GC,,, | Performed by: STUDENT IN AN ORGANIZED HEALTH CARE EDUCATION/TRAINING PROGRAM

## 2021-12-31 PROCEDURE — 63600175 PHARM REV CODE 636 W HCPCS: Performed by: STUDENT IN AN ORGANIZED HEALTH CARE EDUCATION/TRAINING PROGRAM

## 2021-12-31 PROCEDURE — 85025 COMPLETE CBC W/AUTO DIFF WBC: CPT | Performed by: STUDENT IN AN ORGANIZED HEALTH CARE EDUCATION/TRAINING PROGRAM

## 2021-12-31 PROCEDURE — 36415 COLL VENOUS BLD VENIPUNCTURE: CPT | Performed by: STUDENT IN AN ORGANIZED HEALTH CARE EDUCATION/TRAINING PROGRAM

## 2021-12-31 PROCEDURE — 63600175 PHARM REV CODE 636 W HCPCS: Performed by: HOSPITALIST

## 2021-12-31 PROCEDURE — 25000003 PHARM REV CODE 250

## 2021-12-31 PROCEDURE — 83735 ASSAY OF MAGNESIUM: CPT | Performed by: STUDENT IN AN ORGANIZED HEALTH CARE EDUCATION/TRAINING PROGRAM

## 2021-12-31 PROCEDURE — 27000221 HC OXYGEN, UP TO 24 HOURS

## 2021-12-31 PROCEDURE — 25000242 PHARM REV CODE 250 ALT 637 W/ HCPCS: Performed by: HOSPITALIST

## 2021-12-31 RX ADMIN — GABAPENTIN 300 MG: 300 CAPSULE ORAL at 04:12

## 2021-12-31 RX ADMIN — GABAPENTIN 300 MG: 300 CAPSULE ORAL at 09:12

## 2021-12-31 RX ADMIN — OXYCODONE HYDROCHLORIDE AND ACETAMINOPHEN 500 MG: 500 TABLET ORAL at 09:12

## 2021-12-31 RX ADMIN — NICOTINE 1 PATCH: 14 PATCH, EXTENDED RELEASE TRANSDERMAL at 09:12

## 2021-12-31 RX ADMIN — FLUTICASONE FUROATE 1 PUFF: 200 POWDER RESPIRATORY (INHALATION) at 02:12

## 2021-12-31 RX ADMIN — GUAIFENESIN AND DEXTROMETHORPHAN HYDROBROMIDE 1 TABLET: 600; 30 TABLET, EXTENDED RELEASE ORAL at 09:12

## 2021-12-31 RX ADMIN — TIOTROPIUM BROMIDE INHALATION SPRAY 2 PUFF: 3.12 SPRAY, METERED RESPIRATORY (INHALATION) at 02:12

## 2021-12-31 RX ADMIN — ALBUTEROL SULFATE 2 PUFF: 108 INHALANT RESPIRATORY (INHALATION) at 07:12

## 2021-12-31 RX ADMIN — ALBUTEROL SULFATE 2 PUFF: 108 INHALANT RESPIRATORY (INHALATION) at 02:12

## 2021-12-31 RX ADMIN — BENZONATATE 100 MG: 100 CAPSULE ORAL at 01:12

## 2021-12-31 RX ADMIN — DEXAMETHASONE 6 MG: 4 TABLET ORAL at 09:12

## 2021-12-31 RX ADMIN — ACETAMINOPHEN 650 MG: 325 TABLET ORAL at 01:12

## 2021-12-31 RX ADMIN — ENOXAPARIN SODIUM 40 MG: 100 INJECTION SUBCUTANEOUS at 04:12

## 2021-12-31 RX ADMIN — ATORVASTATIN CALCIUM 10 MG: 10 TABLET, FILM COATED ORAL at 09:12

## 2021-12-31 RX ADMIN — ALBUTEROL SULFATE 2 PUFF: 108 INHALANT RESPIRATORY (INHALATION) at 09:12

## 2021-12-31 RX ADMIN — MULTIPLE VITAMINS W/ MINERALS TAB 1 TABLET: TAB at 09:12

## 2021-12-31 RX ADMIN — REMDESIVIR 100 MG: 100 INJECTION, POWDER, LYOPHILIZED, FOR SOLUTION INTRAVENOUS at 09:12

## 2021-12-31 NOTE — ASSESSMENT & PLAN NOTE
COVID-19 Virus Infection  Viral Pneumonia due to COVID-19     66 year old lady on home oxygen 2L for COPD admitted to the hospital for covid pneumonia. With the history and examination above, likely with her symptoms, her vaccination status, and possible infection from a family gathering, she has a COVID-19 pneumonia. CXR showed trace right effusion, stable mild pulmonary venous congestion.    - COVID-19 testing:   - Isolation: Airborne/Droplet. Surgical mask on patient.  - Diagnostics: Trend Q48hrs if stable, more frequently if patient decompensating       - CBC       - CMP       - D-dimer       - Ferritin       - CK       - LDH       - BNP       - Troponin       - Procalcitonin  Lymphopenia, hyponatremia, hyperferritinemia, elevated troponin, elevated d-dimer, age, and medical comorbidities are significant predictors of poor clinical outcome    - Management: Per [facility] COVID Treatment Protocol    - Telemetry & Continuous Pulse Oximetry    - Nutrition:        - Multivitamin PO daily       - Boost supplement       - Vitamin D 1000IU daily if deficient       - Ascorbic acid 500mg PO bid    - Supportive Care:       - acetaminophen 650mg PO Q6hr PRN fever/headache       - loperamide PRN viral diarrhea       - IVF if indicated, restrictive strategy preferred, no maintenance IV if able       - VTE PPx: enoxaparin or heparin SQ unless contraindicated       - Remdesivir (day 2)     - Dexamethasone (day 2)
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
## 2021-12-31 NOTE — PLAN OF CARE
Problem: Adult Inpatient Plan of Care  Goal: Plan of Care Review  12/31/2021 0539 by Aleida Newman RN  Outcome: Ongoing, Progressing  12/31/2021 0538 by Aleida Newman RN  Outcome: Ongoing, Progressing  Goal: Patient-Specific Goal (Individualized)  12/31/2021 0539 by Aleida Newman RN  Outcome: Ongoing, Progressing  12/31/2021 0538 by Aleida Newman RN  Outcome: Ongoing, Progressing  Goal: Absence of Hospital-Acquired Illness or Injury  12/31/2021 0539 by Aleida Newman RN  Outcome: Ongoing, Progressing  12/31/2021 0538 by Aleida Newman RN  Outcome: Ongoing, Progressing  Goal: Optimal Comfort and Wellbeing  12/31/2021 0539 by Aleida Newman RN  Outcome: Ongoing, Progressing  12/31/2021 0538 by Aleida Newman RN  Outcome: Ongoing, Progressing  Goal: Readiness for Transition of Care  12/31/2021 0539 by Aleida Newman RN  Outcome: Ongoing, Progressing  12/31/2021 0538 by Aleida Newman RN  Outcome: Ongoing, Progressing     Problem: Infection  Goal: Absence of Infection Signs and Symptoms  Outcome: Ongoing, Progressing

## 2021-12-31 NOTE — HOSPITAL COURSE
Patient admitted to hospital medicine for further management. Patient still only on 3L O2. Started remdesivir and dexamethasone. Patient states her symptoms are mostly resolved. Patient was discharged with home health on 1/1/2021 without any changes to her home medication.
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
## 2021-12-31 NOTE — SUBJECTIVE & OBJECTIVE
Interval History: Patient feels much better from yesterday. No acute events overnight.     Review of Systems   Constitutional: Positive for fatigue. Negative for appetite change, chills, diaphoresis and fever.   HENT: Negative for congestion, ear pain, hearing loss, postnasal drip, sneezing and sore throat.    Eyes: Negative for pain, redness and visual disturbance.   Respiratory: Positive for shortness of breath. Negative for cough and chest tightness.    Cardiovascular: Negative for chest pain and leg swelling.   Gastrointestinal: Negative for abdominal pain, blood in stool, constipation, diarrhea, nausea and vomiting.   Endocrine: Negative for polydipsia, polyphagia and polyuria.   Genitourinary: Negative for flank pain, menstrual problem, pelvic pain, vaginal bleeding, vaginal discharge and vaginal pain.   Musculoskeletal: Negative for back pain, joint swelling, neck pain and neck stiffness.   Skin: Negative for color change and rash.   Neurological: Negative for dizziness, tremors, speech difficulty, weakness, light-headedness, numbness and headaches.   Psychiatric/Behavioral: Negative for agitation, confusion and hallucinations.     Objective:     Vital Signs (Most Recent):  Temp: 99 °F (37.2 °C) (12/31/21 1200)  Pulse: 63 (12/31/21 1406)  Resp: 20 (12/31/21 1200)  BP: 117/64 (12/31/21 1200)  SpO2: 95 % (12/31/21 1406) Vital Signs (24h Range):  Temp:  [97.6 °F (36.4 °C)-99.1 °F (37.3 °C)] 99 °F (37.2 °C)  Pulse:  [63-88] 63  Resp:  [18-20] 20  SpO2:  [92 %-100 %] 95 %  BP: (117-135)/(64-89) 117/64     Weight: 59.8 kg (131 lb 13.4 oz)  Body mass index is 23.35 kg/m².    Intake/Output Summary (Last 24 hours) at 12/31/2021 1512  Last data filed at 12/31/2021 1200  Gross per 24 hour   Intake 1019.17 ml   Output 1 ml   Net 1018.17 ml      Physical Exam  Vitals and nursing note reviewed.   Constitutional:       General: She is not in acute distress.     Appearance: Normal appearance. She is not ill-appearing or  diaphoretic.      Interventions: Nasal cannula in place.   HENT:      Head: Normocephalic and atraumatic.      Right Ear: External ear normal.      Left Ear: External ear normal.      Nose: Nose normal. No congestion or rhinorrhea.      Mouth/Throat:      Mouth: Mucous membranes are moist.      Pharynx: Oropharynx is clear. No oropharyngeal exudate or posterior oropharyngeal erythema.   Eyes:      General: No scleral icterus.     Conjunctiva/sclera: Conjunctivae normal.      Pupils: Pupils are equal, round, and reactive to light.   Cardiovascular:      Rate and Rhythm: Normal rate and regular rhythm.      Pulses: Normal pulses.      Heart sounds: Normal heart sounds. No murmur heard.  No friction rub.   Pulmonary:      Effort: Pulmonary effort is normal. No respiratory distress.      Breath sounds: No stridor. Wheezing and rhonchi present.   Chest:      Chest wall: No tenderness.   Abdominal:      General: Abdomen is flat. Bowel sounds are normal. There is no distension.      Palpations: There is no mass.      Tenderness: There is no right CVA tenderness, left CVA tenderness or guarding.   Genitourinary:     General: Normal vulva.      Rectum: Normal.   Musculoskeletal:         General: Tenderness (tenderness on back to palpation) present. No swelling or deformity. Normal range of motion.      Cervical back: Normal range of motion. No rigidity.      Right lower leg: No edema.      Left lower leg: No edema.   Skin:     General: Skin is warm and dry.      Capillary Refill: Capillary refill takes less than 2 seconds.      Coloration: Skin is not jaundiced or pale.      Findings: No bruising or erythema.   Neurological:      General: No focal deficit present.      Mental Status: She is alert and oriented to person, place, and time. Mental status is at baseline.      Motor: No weakness.      Coordination: Coordination normal.   Psychiatric:         Mood and Affect: Mood normal.         Behavior: Behavior normal.          Thought Content: Thought content normal.         Judgment: Judgment normal.         Significant Labs:   All pertinent labs within the past 24 hours have been reviewed.  CBC:   Recent Labs   Lab 12/30/21  1201 12/30/21  1215 12/31/21  0603   WBC 5.99  --  5.49   HGB 13.9  --  13.9   HCT 44.2 44 45.6     --  202     CMP:   Recent Labs   Lab 12/30/21  1657 12/31/21  0603    142   K 4.5 4.6    103   CO2 30* 33*   * 88   BUN 8 6*   CREATININE 0.7 0.6   CALCIUM 8.2* 9.0   PROT 6.8 6.8   ALBUMIN 3.6 3.6   BILITOT 0.2 0.1   ALKPHOS 49* 47*   AST 28 23   ALT 23 23   ANIONGAP 8 6*   EGFRNONAA >60.0 >60.0     Recent Lab Results       12/31/21  0603   12/30/21  1717   12/30/21  1657        Albumin 3.6     3.6       Alkaline Phosphatase 47     49       ALT 23     23       Anion Gap 6     8       aPTT     32.2  Comment: aPTT therapeutic range = 39-69 seconds       AST 23     28  Comment: *Result may be interfered by visible hemolysis       Baso # 0.01           Basophil % 0.2           BILIRUBIN TOTAL 0.1  Comment: For infants and newborns, interpretation of results should be based  on gestational age, weight and in agreement with clinical  observations.    Premature Infant recommended reference ranges:  Up to 24 hours.............<8.0 mg/dL  Up to 48 hours............<12.0 mg/dL  3-5 days..................<15.0 mg/dL  6-29 days.................<15.0 mg/dL       0.2  Comment: For infants and newborns, interpretation of results should be based  on gestational age, weight and in agreement with clinical  observations.    Premature Infant recommended reference ranges:  Up to 24 hours.............<8.0 mg/dL  Up to 48 hours............<12.0 mg/dL  3-5 days..................<15.0 mg/dL  6-29 days.................<15.0 mg/dL         BUN 6     8       Calcium 9.0     8.2       Chloride 103     105       CO2 33     30       CPK     110       Creatinine 0.6     0.7       D-Dimer     0.57  Comment: The quantitative  D-dimer assay should be used as an aid in   the diagnosis of deep vein thrombosis and pulmonary embolism  in patients with the appropriate presentation and clinical  history. The upper limit of the reference interval and the clinical   cut off   point are identical. Causes of a positive (>0.50 mg/L FEU) D-Dimer   test  include, but are not limited to: DVT, PE, DIC, thrombolytic   therapy, anticoagulant therapy, recent surgery, trauma, or   pregnancy, disseminated malignancy, aortic aneurysm, cirrhosis,  and severe infection. False negative results may occur in   patients with distal DVT.         Differential Method Automated           eGFR if  >60.0     >60.0       eGFR if non  >60.0  Comment: Calculation used to obtain the estimated glomerular filtration  rate (eGFR) is the CKD-EPI equation.        >60.0  Comment: Calculation used to obtain the estimated glomerular filtration  rate (eGFR) is the CKD-EPI equation.          Eos # 0.0           Eosinophil % 0.0           Ferritin     297       Glucose 88     154       Gran # (ANC) 3.3           Gran % 60.1           Hematocrit 45.6           Hemoglobin 13.9           Immature Grans (Abs) 0.00  Comment: Mild elevation in immature granulocytes is non specific and   can be seen in a variety of conditions including stress response,   acute inflammation, trauma and pregnancy. Correlation with other   laboratory and clinical findings is essential.             Immature Granulocytes 0.0           INR     0.9  Comment: Coumadin Therapy:  2.0 - 3.0 for INR for all indicators except mechanical heart valves  and antiphospholipid syndromes which should use 2.5 - 3.5.         Lactate, Jama     1.4  Comment: Falsely low lactic acid results can be found in samples   containing >=13.0 mg/dL total bilirubin and/or >=3.5 mg/dL   direct bilirubin.         LD     298  Comment: Results are increased in hemolyzed samples.  *Result may be interfered by visible  hemolysis         Lymph # 1.6           Lymph % 28.8           Magnesium 1.8           MCH 28.2           MCHC 30.5           MCV 93           Mono # 0.6           Mono % 10.9           MPV 12.0           nRBC 0           Phosphorus 4.3           Platelets 202           Potassium 4.6     4.5       PROTEIN TOTAL 6.8     6.8       Protime     10.3       RBC 4.93           RDW 14.3           Sed Rate   44         Sodium 142     143       WBC 5.49                 Significant Imaging: I have reviewed all pertinent imaging results/findings within the past 24 hours.

## 2021-12-31 NOTE — PLAN OF CARE
Problem: Adult Inpatient Plan of Care  Goal: Plan of Care Review  Outcome: Ongoing, Progressing  Goal: Patient-Specific Goal (Individualized)  Outcome: Ongoing, Progressing  Goal: Absence of Hospital-Acquired Illness or Injury  Outcome: Ongoing, Progressing  Goal: Optimal Comfort and Wellbeing  Outcome: Ongoing, Progressing  Goal: Readiness for Transition of Care  Outcome: Ongoing, Progressing     Problem: Infection  Goal: Absence of Infection Signs and Symptoms  Outcome: Ongoing, Progressing

## 2022-01-01 VITALS
TEMPERATURE: 99 F | SYSTOLIC BLOOD PRESSURE: 128 MMHG | WEIGHT: 131.81 LBS | HEART RATE: 82 BPM | BODY MASS INDEX: 23.36 KG/M2 | RESPIRATION RATE: 18 BRPM | HEIGHT: 63 IN | DIASTOLIC BLOOD PRESSURE: 75 MMHG | OXYGEN SATURATION: 98 %

## 2022-01-01 LAB
ALBUMIN SERPL BCP-MCNC: 3.3 G/DL (ref 3.5–5.2)
ALP SERPL-CCNC: 43 U/L (ref 55–135)
ALT SERPL W/O P-5'-P-CCNC: 20 U/L (ref 10–44)
ANION GAP SERPL CALC-SCNC: 6 MMOL/L (ref 8–16)
AST SERPL-CCNC: 20 U/L (ref 10–40)
BASOPHILS # BLD AUTO: 0.01 K/UL (ref 0–0.2)
BASOPHILS NFR BLD: 0.1 % (ref 0–1.9)
BILIRUB SERPL-MCNC: 0.2 MG/DL (ref 0.1–1)
BUN SERPL-MCNC: 7 MG/DL (ref 8–23)
CALCIUM SERPL-MCNC: 9 MG/DL (ref 8.7–10.5)
CHLORIDE SERPL-SCNC: 105 MMOL/L (ref 95–110)
CO2 SERPL-SCNC: 31 MMOL/L (ref 23–29)
CREAT SERPL-MCNC: 0.6 MG/DL (ref 0.5–1.4)
DIFFERENTIAL METHOD: ABNORMAL
EOSINOPHIL # BLD AUTO: 0 K/UL (ref 0–0.5)
EOSINOPHIL NFR BLD: 0 % (ref 0–8)
ERYTHROCYTE [DISTWIDTH] IN BLOOD BY AUTOMATED COUNT: 14.1 % (ref 11.5–14.5)
EST. GFR  (AFRICAN AMERICAN): >60 ML/MIN/1.73 M^2
EST. GFR  (NON AFRICAN AMERICAN): >60 ML/MIN/1.73 M^2
GLUCOSE SERPL-MCNC: 84 MG/DL (ref 70–110)
HCT VFR BLD AUTO: 42.2 % (ref 37–48.5)
HGB BLD-MCNC: 13.2 G/DL (ref 12–16)
IMM GRANULOCYTES # BLD AUTO: 0.01 K/UL (ref 0–0.04)
IMM GRANULOCYTES NFR BLD AUTO: 0.1 % (ref 0–0.5)
LYMPHOCYTES # BLD AUTO: 2 K/UL (ref 1–4.8)
LYMPHOCYTES NFR BLD: 26 % (ref 18–48)
MAGNESIUM SERPL-MCNC: 1.8 MG/DL (ref 1.6–2.6)
MCH RBC QN AUTO: 28.3 PG (ref 27–31)
MCHC RBC AUTO-ENTMCNC: 31.3 G/DL (ref 32–36)
MCV RBC AUTO: 91 FL (ref 82–98)
MONOCYTES # BLD AUTO: 0.6 K/UL (ref 0.3–1)
MONOCYTES NFR BLD: 7 % (ref 4–15)
NEUTROPHILS # BLD AUTO: 5.2 K/UL (ref 1.8–7.7)
NEUTROPHILS NFR BLD: 66.8 % (ref 38–73)
NRBC BLD-RTO: 0 /100 WBC
PHOSPHATE SERPL-MCNC: 3.6 MG/DL (ref 2.7–4.5)
PLATELET # BLD AUTO: 219 K/UL (ref 150–450)
PMV BLD AUTO: 11.8 FL (ref 9.2–12.9)
POTASSIUM SERPL-SCNC: 4.1 MMOL/L (ref 3.5–5.1)
PROT SERPL-MCNC: 6.1 G/DL (ref 6–8.4)
RBC # BLD AUTO: 4.66 M/UL (ref 4–5.4)
SODIUM SERPL-SCNC: 142 MMOL/L (ref 136–145)
WBC # BLD AUTO: 7.82 K/UL (ref 3.9–12.7)

## 2022-01-01 PROCEDURE — 25000003 PHARM REV CODE 250: Performed by: STUDENT IN AN ORGANIZED HEALTH CARE EDUCATION/TRAINING PROGRAM

## 2022-01-01 PROCEDURE — 27000221 HC OXYGEN, UP TO 24 HOURS

## 2022-01-01 PROCEDURE — 25000242 PHARM REV CODE 250 ALT 637 W/ HCPCS: Performed by: STUDENT IN AN ORGANIZED HEALTH CARE EDUCATION/TRAINING PROGRAM

## 2022-01-01 PROCEDURE — C9399 UNCLASSIFIED DRUGS OR BIOLOG: HCPCS | Performed by: STUDENT IN AN ORGANIZED HEALTH CARE EDUCATION/TRAINING PROGRAM

## 2022-01-01 PROCEDURE — 84100 ASSAY OF PHOSPHORUS: CPT | Performed by: STUDENT IN AN ORGANIZED HEALTH CARE EDUCATION/TRAINING PROGRAM

## 2022-01-01 PROCEDURE — 63600175 PHARM REV CODE 636 W HCPCS: Performed by: HOSPITALIST

## 2022-01-01 PROCEDURE — 94640 AIRWAY INHALATION TREATMENT: CPT

## 2022-01-01 PROCEDURE — 94761 N-INVAS EAR/PLS OXIMETRY MLT: CPT

## 2022-01-01 PROCEDURE — 1111F DSCHRG MED/CURRENT MED MERGE: CPT | Mod: CPTII,GC,, | Performed by: STUDENT IN AN ORGANIZED HEALTH CARE EDUCATION/TRAINING PROGRAM

## 2022-01-01 PROCEDURE — 97116 GAIT TRAINING THERAPY: CPT

## 2022-01-01 PROCEDURE — 63600175 PHARM REV CODE 636 W HCPCS: Performed by: STUDENT IN AN ORGANIZED HEALTH CARE EDUCATION/TRAINING PROGRAM

## 2022-01-01 PROCEDURE — 1111F PR DISCHARGE MEDS RECONCILED W/ CURRENT OUTPATIENT MED LIST: ICD-10-PCS | Mod: CPTII,GC,, | Performed by: STUDENT IN AN ORGANIZED HEALTH CARE EDUCATION/TRAINING PROGRAM

## 2022-01-01 PROCEDURE — 36415 COLL VENOUS BLD VENIPUNCTURE: CPT | Performed by: STUDENT IN AN ORGANIZED HEALTH CARE EDUCATION/TRAINING PROGRAM

## 2022-01-01 PROCEDURE — 97162 PT EVAL MOD COMPLEX 30 MIN: CPT

## 2022-01-01 PROCEDURE — 25000003 PHARM REV CODE 250

## 2022-01-01 PROCEDURE — 97165 OT EVAL LOW COMPLEX 30 MIN: CPT

## 2022-01-01 PROCEDURE — 0002A HC IMMUNIZ ADMIN, SARS-COV-2 COVID-19 VACC, 30MCG/0.3ML, 2ND DOSE: CPT | Performed by: STUDENT IN AN ORGANIZED HEALTH CARE EDUCATION/TRAINING PROGRAM

## 2022-01-01 PROCEDURE — 83735 ASSAY OF MAGNESIUM: CPT | Performed by: STUDENT IN AN ORGANIZED HEALTH CARE EDUCATION/TRAINING PROGRAM

## 2022-01-01 PROCEDURE — S4991 NICOTINE PATCH NONLEGEND: HCPCS

## 2022-01-01 PROCEDURE — 85025 COMPLETE CBC W/AUTO DIFF WBC: CPT | Performed by: STUDENT IN AN ORGANIZED HEALTH CARE EDUCATION/TRAINING PROGRAM

## 2022-01-01 PROCEDURE — 91300 PHARM REV CODE 636 W HCPCS: CPT | Performed by: STUDENT IN AN ORGANIZED HEALTH CARE EDUCATION/TRAINING PROGRAM

## 2022-01-01 PROCEDURE — 99239 HOSP IP/OBS DSCHRG MGMT >30: CPT | Mod: GC,,, | Performed by: STUDENT IN AN ORGANIZED HEALTH CARE EDUCATION/TRAINING PROGRAM

## 2022-01-01 PROCEDURE — 99239 PR HOSPITAL DISCHARGE DAY,>30 MIN: ICD-10-PCS | Mod: GC,,, | Performed by: STUDENT IN AN ORGANIZED HEALTH CARE EDUCATION/TRAINING PROGRAM

## 2022-01-01 PROCEDURE — 97535 SELF CARE MNGMENT TRAINING: CPT

## 2022-01-01 PROCEDURE — 80053 COMPREHEN METABOLIC PANEL: CPT | Performed by: STUDENT IN AN ORGANIZED HEALTH CARE EDUCATION/TRAINING PROGRAM

## 2022-01-01 RX ADMIN — OXYCODONE HYDROCHLORIDE AND ACETAMINOPHEN 500 MG: 500 TABLET ORAL at 09:01

## 2022-01-01 RX ADMIN — TIOTROPIUM BROMIDE INHALATION SPRAY 2 PUFF: 3.12 SPRAY, METERED RESPIRATORY (INHALATION) at 12:01

## 2022-01-01 RX ADMIN — MULTIPLE VITAMINS W/ MINERALS TAB 1 TABLET: TAB at 09:01

## 2022-01-01 RX ADMIN — GUAIFENESIN AND DEXTROMETHORPHAN HYDROBROMIDE 1 TABLET: 600; 30 TABLET, EXTENDED RELEASE ORAL at 09:01

## 2022-01-01 RX ADMIN — FLUTICASONE FUROATE 1 PUFF: 200 POWDER RESPIRATORY (INHALATION) at 12:01

## 2022-01-01 RX ADMIN — REMDESIVIR 100 MG: 100 INJECTION, POWDER, LYOPHILIZED, FOR SOLUTION INTRAVENOUS at 09:01

## 2022-01-01 RX ADMIN — RNA INGREDIENT BNT-162B2 0.3 ML: 0.23 INJECTION, SUSPENSION INTRAMUSCULAR at 01:01

## 2022-01-01 RX ADMIN — NICOTINE 1 PATCH: 14 PATCH, EXTENDED RELEASE TRANSDERMAL at 09:01

## 2022-01-01 RX ADMIN — DEXAMETHASONE 6 MG: 4 TABLET ORAL at 09:01

## 2022-01-01 RX ADMIN — ALBUTEROL SULFATE 2 PUFF: 108 INHALANT RESPIRATORY (INHALATION) at 12:01

## 2022-01-01 RX ADMIN — GABAPENTIN 300 MG: 300 CAPSULE ORAL at 09:01

## 2022-01-01 NOTE — PT/OT/SLP EVAL
"Physical Therapy Co-Evaluation/Co-Treatment and Discharge Note    Patient Name:  Rochelle Espinoza   MRN:  [id number]    Recommendations:     Discharge Recommendations:  home health PT   Discharge Equipment Recommendations: none   Barriers to discharge: None    Assessment:     Rochelle Espinoza is a 66 y.o. female admitted with a medical diagnosis of Pneumonia due to COVID-19 virus.  Pt is scheduled for d/c this day and is safe for home however is not at her baseline.  Recommend HHPT to allow the patient to reach PLOF. SpO2 drops to 86% with some dizziness present, pt recovers quickly but this is not her baseline.  She is on 2L via NC at home.     Recent Surgery: * No surgery found *      Plan:     During this hospitalization, patient does not require further acute PT services.  Please re-consult if situation changes.      Subjective     Chief Complaint: "I walk around much better at home"  Patient/Family Comments/goals: Return home  Pain/Comfort:  Pain Rating 1: 0/10    Patients cultural, spiritual, Mu-ism conflicts given the current situation: no    Living Environment:  Patient lives with their grandchild and her daughter comes to visit in two story home, entrance: no steps, inside home: 14 steps up with Bilateral handrail and no landing, Bathroom on 2 floor, Bedroom on 2 floor, bathroom: tub/shower. Pt utilizes no AD for ambulation of all distances.      Prior to admission, patient was independent with ADLs. Patient uses DME as follows: oxygen,grab bar (2L). DME owned (not currently used): grab bar. Upon discharge, patient will have assistance from family with no 24/7 assist.     Objective:     Communicated with nursing prior to session.  Patient found HOB elevated with pulse ox (continuous),telemetry,oxygen  upon PT entry to room.    General Precautions: Standard, airborne,contact,droplet,fall   Orthopedic Precautions:N/A   Braces: N/A   Respiratory Status:        Exams:  · Cognitive Exam:  Patient is oriented to " Person, Place, Time and Situation  · RLE ROM: WFL  · RLE Strength: WFL  · LLE ROM: WFL  · LLE Strength: WFL  · Postural Exam:  Patient presented with the following abnormalities:    · -       Rounded shoulders  · -       Forward head  · Sensation:    · -       Intact      Functional Mobility:  · Bed Mobility:  Rolling Right: stand by assistance  · Scooting: stand by assistance  · Supine to Sit: stand by assistance  · Sit to Supine: stand by assistance  · Head of bed position: HOB elevated    · Transfers:     · Sit to Stand: stand by assistance with no AD  · Toilet transfer: stand by assistance with no AD using GB    · Gait: Patient ambulated 10' and 60' with no AD and stand by assistance. Patient demonstrates occasional unsteady gait, decreased step length, flexed posture and decreased tico. All lines remained intact throughout ambulation trial.  · Pt ambulates to bathroom and then in room  · SpO2 drops to 86% from 95% on 2L via NC  · Some dizziness noted, patient recovers in ~ 30sec with standing and seated rest breaks     · Balance: fair+      Therapeutic Activities:  Patient educated on role of acute care PT and PT POC, safety while in hospital including calling nurse for mobility, call light usage, benefits of out of bed mobility, home exercise program , breathing technique, fall risk, bed mobility , transfers, gait technique, positioning, posture, risks of prolonged bed rest, possible discharge disposition  and benefits of continued PT by explanation.    Patient demonstrates good understanding of education provided this day.   Whiteboard updated      Therapeutic Exercises:  n/a    AM-PAC 6 CLICK MOBILITY  Total Score:18     Patient left HOB elevated with all lines intact and call button in reach.    GOALS:   Multidisciplinary Problems     Physical Therapy Goals     Not on file          Multidisciplinary Problems (Resolved)        Problem: Physical Therapy Goal    Goal Priority Disciplines Outcome Goal  Variances Interventions   Physical Therapy Goal   (Resolved)     PT, PT/OT Met     Description: Goals to be met by: eval    Patient will increase functional independence with mobility by performing: eval and treat                     History:     Past Medical History:   Diagnosis Date    CHF (congestive heart failure)     COPD (chronic obstructive pulmonary disease)     Herpes zoster without mention of complication 2011    Hypertension     Leg edema     Pulmonary hypertension     Sciatica        Past Surgical History:   Procedure Laterality Date    BREAST BIOPSY Right     core     SECTION      COLONOSCOPY N/A 2019    Procedure: COLONOSCOPY;  Surgeon: Rui Holder MD;  Location: [facility] ENDO (2ND FLR);  Service: Endoscopy;  Laterality: N/A;    EPIDURAL STEROID INJECTION N/A 2020    Procedure: CERVICAL BLAKE;  Surgeon: Papito High MD;  Location: [facility] PAIN [facility]GT;  Service: Pain Management;  Laterality: N/A;  NEEDS CONSENT    ESOPHAGOGASTRODUODENOSCOPY N/A 2019    Procedure: EGD (ESOPHAGOGASTRODUODENOSCOPY);  Surgeon: Rui Holder MD;  Location: [facility] (2ND FLR);  Service: Endoscopy;  Laterality: N/A;  2L continuous O2 via NC, COPD, pulm HTN    TRANSFORAMINAL EPIDURAL INJECTION OF STEROID Right 6/3/2021    Procedure: INJECTION, STEROID, EPIDURAL, TRANSFORAMINAL APPROACH, L4-L5;  Surgeon: Anibal Robles MD;  Location: [facility] PAIN [facility]GT;  Service: Pain Management;  Laterality: Right;       Time Tracking:     PT Received On: 22  PT Start Time: 0754     PT Stop Time: 0813  PT Total Time (min): 19 min     Billable Minutes: Evaluation 10 and Gait Training 9      2022    Co-treatment performed for this visit due to patient need for two skilled therapists to ensure patient and staff safety and to accommodate for patient activity tolerance/pain management

## 2022-01-01 NOTE — PT/OT/SLP EVAL
Occupational Therapy   Co-Evaluation and Discharge Note    Name: Rochelle Espinoza  MRN: [id number]  Admitting Diagnosis:  Pneumonia due to COVID-19 virus   Recent Surgery: * No surgery found *      Recommendations:     Discharge Recommendations: home  Discharge Equipment Recommendations:  none  Barriers to discharge:  None    Assessment:     Rochelle Espinoza is a 66 y.o. female with a medical diagnosis of Pneumonia due to COVID-19 virus. At this time, patient is functioning at their prior level of function and does not require further acute OT services.     Plan:     During this hospitalization, patient does not require further acute OT services.  Please re-consult if situation changes.    · Plan of Care Reviewed with: patient   · Co-tx d/t pt medical complexity, decreased endurance, and to insure pt safety.    Subjective     Chief Complaint: COVID-19, PNA  Patient/Family Comments/goals: To go home.    Occupational Profile:  Living Environment: Pt lives in a one story house c 14 MILADYS and B rails.  Has a tub/shower combo.  Previous level of function: I PTA  Equipment Used at home:  grab bar,oxygen  Assistance upon Discharge: Pt lives c her granddaughter    Pain/Comfort:  · Pain Rating 1: 0/10    Patients cultural, spiritual, Adventism conflicts given the current situation: no    Objective:     Communicated with: RN prior to session.  Patient found supine with oxygen,pulse ox (continuous),telemetry upon OT entry to room.    General Precautions: Standard, airborne,contact,droplet,fall   Orthopedic Precautions:N/A   Braces: N/A  Respiratory Status: Nasal cannula, flow 2 L/min     Occupational Performance:    Bed Mobility:    · Patient completed Supine to Sit with independence    Functional Mobility/Transfers:  · Patient completed Sit <> Stand Transfer with independence  with  no assistive device   · Functional Mobility: Pt was able to walk to bathroom c SBA.    Activities of Daily Living:  · Grooming: modified independence to  brush teeth while standing at sink.  · Upper Body Dressing: maximal assistance to don hospital gown.  · Lower Body Dressing: independence to don/doff socks crossing B LE    Cognitive/Visual Perceptual:  Cognitive/Psychosocial Skills:     -       Oriented to: Person, Place, Time and Situation   -       Follows Commands/attention:Follows multistep  commands    Physical Exam:  Upper Extremity Range of Motion:     -       Right Upper Extremity: WFL  -       Left Upper Extremity: WFL  Upper Extremity Strength:    -       Right Upper Extremity: WFL  -       Left Upper Extremity: WFL    AMPAC 6 Click ADL:  AMPAC Total Score: 24  Education:    Patient left supine with all lines intact, call button in reach and RN notified    GOALS:   Multidisciplinary Problems     Occupational Therapy Goals     Not on file          Multidisciplinary Problems (Resolved)        Problem: Occupational Therapy Goal    Goal Priority Disciplines Outcome Interventions   Occupational Therapy Goal   (Resolved)     OT, PT/OT Met                    History:     Past Medical History:   Diagnosis Date    CHF (congestive heart failure)     COPD (chronic obstructive pulmonary disease)     Herpes zoster without mention of complication 2011    Hypertension     Leg edema     Pulmonary hypertension     Sciatica        Past Surgical History:   Procedure Laterality Date    BREAST BIOPSY Right     core     SECTION      COLONOSCOPY N/A 2019    Procedure: COLONOSCOPY;  Surgeon: Rui Holder MD;  Location: [facility] ([address] [place] [address]);  Service: Endoscopy;  Laterality: N/A;    EPIDURAL STEROID INJECTION N/A 2020    Procedure: CERVICAL BLAKE;  Surgeon: Papito High MD;  Location: [facility]IN [facility];  Service: Pain Management;  Laterality: N/A;  NEEDS CONSENT    ESOPHAGOGASTRODUODENOSCOPY N/A 2019    Procedure: EGD (ESOPHAGOGASTRODUODENOSCOPY);  Surgeon: Rui Holder MD;  Location: [facility] ([address] [place] [address]);  Service: Endoscopy;  Laterality:  N/A;  2L continuous O2 via NC, COPD, pulm HTN    TRANSFORAMINAL EPIDURAL INJECTION OF STEROID Right 6/3/2021    Procedure: INJECTION, STEROID, EPIDURAL, TRANSFORAMINAL APPROACH, L4-L5;  Surgeon: Anibal Robles MD;  [facility];  Service: Pain Management;  Laterality: Right;       Time Tracking:     OT Date of Treatment: 01/01/22  OT Start Time: 0755  OT Stop Time: 0813  OT Total Time (min): 18 min    Billable Minutes:Evaluation 9  Self Care/Home Management 9    1/1/2022

## 2022-01-01 NOTE — DISCHARGE INSTRUCTIONS
Discharge packet reviewed with patient. Pt verbalizes understanding. All lines removed, will continue to monitor.

## 2022-01-01 NOTE — PLAN OF CARE
[facility] ([place] [address])      HOME HEALTH ORDERS  FACE TO FACE ENCOUNTER    Patient Name: Rochelle Espinoza  YOB: 1955    PCP: Yosi Samuels MD   PCP Address: [address] / [place] [address]  PCP Phone Number: [phone]  PCP Fax: [phone]    Encounter Date: 12/30/21    Admit to Home Health    Diagnoses:  Active Hospital Problems    Diagnosis  POA    *Pneumonia due to COVID-19 virus [U07.1, J12.82]  Unknown     Priority: 1 - High    Chronic obstructive pulmonary disease [J44.9]  Unknown     Priority: 2     Hyperlipidemia [E78.5]  Unknown     Priority: 3     Essential hypertension [I10]  Yes     Priority: 3       Resolved Hospital Problems    Diagnosis Date Resolved POA    Tobacco abuse [Z72.0] 12/30/2021 Yes     Priority: 3        Follow Up Appointments:  Future Appointments   Date Time Provider Department Center   2/21/2022 10:20 AM Yosi Samuels Jr., MD [facility] IM [facility] PCW   4/12/2022  8:40 AM Karla Ni OD [facility] OPT[facility]       Allergies:  Review of patient's allergies indicates:   Allergen Reactions    Orange juice      Swelling in pt tongue         Medications: Review discharge medications with patient and family and provide education.    Current Facility-Administered Medications   Medication Dose Route Frequency Provider Last Rate Last Admin    acetaminophen tablet 650 mg  650 mg Oral Q6H PRN Lorraine Colon MD   650 mg at 12/31/21 1321    albuterol inhaler 2 puff  2 puff Inhalation Q6H Lorraine Colon MD   2 puff at 12/31/21 1930    ascorbic acid (vitamin C) tablet 500 mg  500 mg Oral BID Mike Galarza MD   500 mg at 01/01/22 0919    atorvastatin tablet 10 mg  10 mg Oral QHS Mike Galarza MD   10 mg at 12/31/21 2139    benzonatate capsule 100 mg  100 mg Oral TID PRN Jarod Clancy MD   100 mg at 12/31/21 1321    dexAMETHasone tablet 6 mg  6 mg Oral Daily Lorraine Colon MD   6 mg at 01/01/22 0919    dextromethorphan-guaiFENesin   mg per 12 hr tablet 1 tablet  1 tablet Oral BID Jarod Clancy MD   1 tablet at 01/01/22 0919    dextrose 50% injection 12.5 g  12.5 g Intravenous PRN Mike Galarza MD        dextrose 50% injection 25 g  25 g Intravenous PRN Mike Galarza MD        enoxaparin injection 40 mg  40 mg Subcutaneous Daily Mike Galarza MD   40 mg at 12/31/21 1623    fluticasone furoate 200 mcg/actuation inhaler 1 puff  1 puff Inhalation Daily Mike Galarza MD   1 puff at 12/31/21 1406    gabapentin capsule 300 mg  300 mg Oral TID Mike Galarza MD   300 mg at 01/01/22 0918    glucagon (human recombinant) injection 1 mg  1 mg Intramuscular PRN Mike Galarza MD        glucose chewable tablet 16 g  16 g Oral PRN Mike Galarza MD        glucose chewable tablet 24 g  24 g Oral PRN Mike Galarza MD        multivitamin tablet  1 tablet Oral Daily Mike Galarza MD   1 tablet at 01/01/22 0919    nicotine 14 mg/24 hr 1 patch  1 patch Transdermal Daily Jarod Clancy MD   1 patch at 01/01/22 0919    remdesivir 100 mg in sodium chloride 0.9% 250 mL infusion  100 mg Intravenous Daily Mike Galarza MD   Stopped at 01/01/22 1020    sodium chloride 0.9% flush 10 mL  10 mL Intravenous Q12H PRN Mike Galarza MD        tiotropium bromide 2.5 mcg/actuation inhaler 2 puff  2 puff Inhalation Daily Mike Galarza MD   2 puff at 12/31/21 1406     Current Discharge Medication List      CONTINUE these medications which have NOT CHANGED    Details   albuterol-ipratropium (DUO-NEB) 2.5 mg-0.5 mg/3 mL nebulizer solution USE 3ML VIA NEBULIZATION EVERY 6 HOURS AS NEEDED FOR WHEEZING  Qty: 1080 mL, Refills: 3    Associated Diagnoses: At high risk for respiratory infection      amLODIPine (NORVASC) 5 MG tablet Take 1 tablet (5 mg total) by mouth once daily.  Qty: 90 tablet, Refills: 3    Comments: .      atorvastatin (LIPITOR) 10 MG tablet Take 1 tablet (10 mg total) by mouth every evening.  Qty: 90 tablet, Refills: 3      gabapentin (NEURONTIN) 300 MG capsule  Take 1 capsule (300 mg total) by mouth 3 (three) times daily.  Qty: 90 capsule, Refills: 11      losartan (COZAAR) 100 MG tablet Take 1 tablet (100 mg total) by mouth once daily.  Qty: 90 tablet, Refills: 3    Comments: .      fluticasone-umeclidin-vilanter (TRELEGY ELLIPTA) 100-62.5-25 mcg DsDv Inhale 1 puff into the lungs once daily.  Qty: 1 each, Refills: 11      glycerin adult suppository Place 1 suppository rectally as needed for Constipation.      hydroCHLOROthiazide (HYDRODIURIL) 12.5 MG Tab Take 1 tablet (12.5 mg total) by mouth daily as needed (edema/htn).  Qty: 30 tablet, Refills: 4    Comments: .      potassium chloride SA (K-DUR,KLOR-CON) 20 MEQ tablet TAKE 1 TABLET(20 MEQ) BY MOUTH EVERY DAY  Qty: 30 tablet, Refills: 7      VENTOLIN HFA 90 mcg/actuation inhaler INHALE 2 PUFFS BY MOUTH EVERY 6 HOURS AS NEEDED  Qty: 18 g, Refills: 3    Associated Diagnoses: Bronchospasm, acute; Chronic obstructive pulmonary disease with hypoxia               I have seen and examined this patient within the last 30 days. My clinical findings that support the need for the home health skilled services and home bound status are the following:no   Weakness/numbness causing balance and gait disturbance due to COPD Exacerbation and Infection making it taxing to leave home.     Diet:   cardiac diet    Labs:  no labs to be performed    Referrals/ Consults  Physical Therapy to evaluate and treat. Evaluate for home safety and equipment needs; Establish/upgrade home exercise program. Perform / instruct on therapeutic exercises, gait training, transfer training, and Range of Motion.  Occupational Therapy to evaluate and treat. Evaluate home environment for safety and equipment needs. Perform/Instruct on transfers, ADL training, ROM, and therapeutic exercises.    Activities:   activity as tolerated    Nursing:   Agency to admit patient within 24 hours of hospital discharge unless specified on physician order or at patient request    SN  to complete comprehensive assessment including routine vital signs. Instruct on disease process and s/s of complications to report to MD. Review/verify medication list sent home with the patient at time of discharge  and instruct patient/caregiver as needed. Frequency may be adjusted depending on start of care date.     Skilled nurse to perform up to 3 visits PRN for symptoms related to diagnosis    Notify MD if SBP > 160 or < 90; DBP > 90 or < 50; HR > 120 or < 50; Temp > 101; O2 < 88%    Ok to schedule additional visits based on staff availability and patient request on consecutive days within the home health episode.    When multiple disciplines ordered:    Start of Care occurs on Sunday - Wednesday schedule remaining discipline evaluations as ordered on separate consecutive days following the start of care.    Thursday SOC -schedule subsequent evaluations Friday and Monday the following week.     Friday - Saturday SOC - schedule subsequent discipline evaluations on consecutive days starting Monday of the following week.    For all post-discharge communication and subsequent orders please contact patient's primary care physician. If unable to reach primary care physician or do not receive response within 30 minutes, please contact Dr. Clancy for clinical staff order clarification    Miscellaneous   N/A    Home Health Aide:  Physical Therapy Weekly and Occupational Therapy Weekly    Wound Care Orders  NA    I certify that this patient is confined to her home and needs physical therapy and occupational therapy.

## 2022-01-01 NOTE — PLAN OF CARE
Problem: Physical Therapy Goal  Goal: Physical Therapy Goal  Description: Goals to be met by: eval    Patient will increase functional independence with mobility by performing: eval and treat    Outcome: Met     Pt tolerated PT session well.      All needs met, all questions answered.  Juan Carlos Hebert PT, DPT
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
## 2022-01-01 NOTE — PLAN OF CARE
01/01/22 1257   Post-Acute Status   Post-Acute Authorization Home Health   Home Health Status Referrals Sent       Referral sent to [facility]-NO. Awaiting facility decision.     Tayler Encarnacion LMSW  Case Management Social Worker   [facility] [address]

## 2022-01-02 NOTE — PLAN OF CARE
[facility] ([facility])  Discharge Final Note    Primary Care Provider: Yosi Samuels MD    Expected Discharge Date: 1/1/2022    Final Discharge Note (most recent)       Final Note - 01/02/22 1049          Final Note    Assessment Type Final Discharge Note (P)      Anticipated Discharge Disposition Home-Health Care Svc (P)         Post-Acute Status    Discharge Delays None known at this time (P)                      Important Message from Medicare             Pt accepted to [facility]-NO. No further post acute needs.     Tayler Encarnacion LMSW  Case Management Social Worker   [facility] [address]

## 2022-01-03 DIAGNOSIS — U07.1 COVID-19 VIRUS DETECTED: ICD-10-CM

## 2022-01-12 ENCOUNTER — EXTERNAL HOME HEALTH (OUTPATIENT)
Dept: HOME HEALTH SERVICES | Facility: HOSPITAL | Age: 67
End: 2022-01-12
Payer: MEDICARE

## 2022-02-15 DIAGNOSIS — J98.01 BRONCHOSPASM, ACUTE: ICD-10-CM

## 2022-02-15 DIAGNOSIS — J44.9 CHRONIC OBSTRUCTIVE PULMONARY DISEASE WITH HYPOXIA: ICD-10-CM

## 2022-02-15 RX ORDER — ALBUTEROL SULFATE 90 UG/1
AEROSOL, METERED RESPIRATORY (INHALATION)
Qty: 54 G | Refills: 3 | Status: SHIPPED | OUTPATIENT
Start: 2022-02-15 | End: 2022-06-20 | Stop reason: SDUPTHER

## 2022-02-15 NOTE — TELEPHONE ENCOUNTER
No new care gaps identified.  Powered by [employer]. Reference number: [id number].   2/15/2022 8:06:16 AM CST

## 2022-02-21 ENCOUNTER — OFFICE VISIT (OUTPATIENT)
Dept: INTERNAL MEDICINE | Facility: CLINIC | Age: 67
End: 2022-02-21
Payer: MEDICARE

## 2022-02-21 VITALS
WEIGHT: 133.19 LBS | HEIGHT: 63 IN | OXYGEN SATURATION: 95 % | SYSTOLIC BLOOD PRESSURE: 108 MMHG | BODY MASS INDEX: 23.6 KG/M2 | HEART RATE: 88 BPM | DIASTOLIC BLOOD PRESSURE: 82 MMHG

## 2022-02-21 DIAGNOSIS — J43.9 PULMONARY EMPHYSEMA, UNSPECIFIED EMPHYSEMA TYPE: ICD-10-CM

## 2022-02-21 DIAGNOSIS — G95.9 CERVICAL MYELOPATHY: ICD-10-CM

## 2022-02-21 DIAGNOSIS — I27.81 COR PULMONALE, CHRONIC: ICD-10-CM

## 2022-02-21 DIAGNOSIS — I20.89 STABLE ANGINA: ICD-10-CM

## 2022-02-21 DIAGNOSIS — E78.5 HYPERLIPIDEMIA, UNSPECIFIED HYPERLIPIDEMIA TYPE: Primary | ICD-10-CM

## 2022-02-21 DIAGNOSIS — I95.9 HYPOTENSION, UNSPECIFIED HYPOTENSION TYPE: ICD-10-CM

## 2022-02-21 DIAGNOSIS — I70.0 AORTIC CALCIFICATION: ICD-10-CM

## 2022-02-21 PROBLEM — J44.9 CHRONIC OBSTRUCTIVE PULMONARY DISEASE: Status: RESOLVED | Noted: 2021-12-30 | Resolved: 2022-02-21

## 2022-02-21 PROCEDURE — 3288F PR FALLS RISK ASSESSMENT DOCUMENTED: ICD-10-PCS | Mod: HCNC,CPTII,S$GLB, | Performed by: INTERNAL MEDICINE

## 2022-02-21 PROCEDURE — 3288F FALL RISK ASSESSMENT DOCD: CPT | Mod: HCNC,CPTII,S$GLB, | Performed by: INTERNAL MEDICINE

## 2022-02-21 PROCEDURE — 1159F MED LIST DOCD IN RCRD: CPT | Mod: HCNC,CPTII,S$GLB, | Performed by: INTERNAL MEDICINE

## 2022-02-21 PROCEDURE — 3079F DIAST BP 80-89 MM HG: CPT | Mod: HCNC,CPTII,S$GLB, | Performed by: INTERNAL MEDICINE

## 2022-02-21 PROCEDURE — 3079F PR MOST RECENT DIASTOLIC BLOOD PRESSURE 80-89 MM HG: ICD-10-PCS | Mod: HCNC,CPTII,S$GLB, | Performed by: INTERNAL MEDICINE

## 2022-02-21 PROCEDURE — 1159F PR MEDICATION LIST DOCUMENTED IN MEDICAL RECORD: ICD-10-PCS | Mod: HCNC,CPTII,S$GLB, | Performed by: INTERNAL MEDICINE

## 2022-02-21 PROCEDURE — 99214 OFFICE O/P EST MOD 30 MIN: CPT | Mod: HCNC,S$GLB,, | Performed by: INTERNAL MEDICINE

## 2022-02-21 PROCEDURE — 1101F PT FALLS ASSESS-DOCD LE1/YR: CPT | Mod: HCNC,CPTII,S$GLB, | Performed by: INTERNAL MEDICINE

## 2022-02-21 PROCEDURE — 3008F PR BODY MASS INDEX (BMI) DOCUMENTED: ICD-10-PCS | Mod: HCNC,CPTII,S$GLB, | Performed by: INTERNAL MEDICINE

## 2022-02-21 PROCEDURE — 3074F PR MOST RECENT SYSTOLIC BLOOD PRESSURE < 130 MM HG: ICD-10-PCS | Mod: HCNC,CPTII,S$GLB, | Performed by: INTERNAL MEDICINE

## 2022-02-21 PROCEDURE — 99214 PR OFFICE/OUTPT VISIT, EST, LEVL IV, 30-39 MIN: ICD-10-PCS | Mod: HCNC,S$GLB,, | Performed by: INTERNAL MEDICINE

## 2022-02-21 PROCEDURE — 3008F BODY MASS INDEX DOCD: CPT | Mod: HCNC,CPTII,S$GLB, | Performed by: INTERNAL MEDICINE

## 2022-02-21 PROCEDURE — 99999 PR PBB SHADOW E&M-EST. PATIENT-LVL IV: ICD-10-PCS | Mod: PBBFAC,HCNC,, | Performed by: INTERNAL MEDICINE

## 2022-02-21 PROCEDURE — 99999 PR PBB SHADOW E&M-EST. PATIENT-LVL IV: CPT | Mod: PBBFAC,HCNC,, | Performed by: INTERNAL MEDICINE

## 2022-02-21 PROCEDURE — 1126F PR PAIN SEVERITY QUANTIFIED, NO PAIN PRESENT: ICD-10-PCS | Mod: HCNC,CPTII,S$GLB, | Performed by: INTERNAL MEDICINE

## 2022-02-21 PROCEDURE — 1101F PR PT FALLS ASSESS DOC 0-1 FALLS W/OUT INJ PAST YR: ICD-10-PCS | Mod: HCNC,CPTII,S$GLB, | Performed by: INTERNAL MEDICINE

## 2022-02-21 PROCEDURE — 1126F AMNT PAIN NOTED NONE PRSNT: CPT | Mod: HCNC,CPTII,S$GLB, | Performed by: INTERNAL MEDICINE

## 2022-02-21 PROCEDURE — 3074F SYST BP LT 130 MM HG: CPT | Mod: HCNC,CPTII,S$GLB, | Performed by: INTERNAL MEDICINE
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
## 2022-02-24 NOTE — PROGRESS NOTES
"  I have personally taken the history and examined this patient and agree with the resident's note as stated above with the following thoughts:  /82 (BP Location: Left arm, Patient Position: Sitting, BP Method: Medium (Manual))   Pulse 88   Ht 5' 3" (1.6 m)   Wt 60.4 kg (133 lb 2.5 oz)   LMP 11/21/2013   SpO2 95%   BMI 23.59 kg/m²     Discussed getting vaccines up to date.  Discussed importance of wearing her oxygen.  She has recovered well from her COVID.  We discussed that she needs continue avoid tobacco abuse.  I will see her back  "

## 2022-03-29 ENCOUNTER — TELEPHONE (OUTPATIENT)
Dept: INTERNAL MEDICINE | Facility: CLINIC | Age: 67
End: 2022-03-29
Payer: MEDICARE

## 2022-03-29 NOTE — TELEPHONE ENCOUNTER
----- Message from Brenda Henriquez sent at 3/29/2022  8:24 AM CDT -----  Type:  Patient Call    Who Called: Pt   Would the patient rather a call back or a response via [employer]? Call back   Best Call Back Number: [phone]  Additional Information:  Portal Message     Message  Appointment Request From: Rochelle Espinoza  With Provider: Yosi Samuels MD [[facility]]  Preferred Date Range: 4/1/2022 - 4/4/2022  Preferred Times: Any Time    Reason for visit: Need form filled out for [employer]    Comments:  Need form filled out for [employer]

## 2022-03-29 NOTE — TELEPHONE ENCOUNTER
Left pt a voicemail instructing her to call back so that I can get her scheduled.    Alexis DENNIS.

## 2022-04-01 DIAGNOSIS — Z91.89 AT HIGH RISK FOR RESPIRATORY INFECTION: ICD-10-CM

## 2022-04-01 DIAGNOSIS — J98.01 BRONCHOSPASM, ACUTE: ICD-10-CM

## 2022-04-01 DIAGNOSIS — J44.9 CHRONIC OBSTRUCTIVE PULMONARY DISEASE WITH HYPOXIA: ICD-10-CM

## 2022-04-01 RX ORDER — LOSARTAN POTASSIUM 100 MG/1
TABLET ORAL
Qty: 90 TABLET | Refills: 0 | OUTPATIENT
Start: 2022-04-01

## 2022-04-01 RX ORDER — IPRATROPIUM BROMIDE AND ALBUTEROL SULFATE 2.5; .5 MG/3ML; MG/3ML
SOLUTION RESPIRATORY (INHALATION)
Refills: 0 | OUTPATIENT
Start: 2022-04-01

## 2022-04-01 RX ORDER — POTASSIUM CHLORIDE 20 MEQ/1
TABLET, EXTENDED RELEASE ORAL
Qty: 90 TABLET | Refills: 0 | OUTPATIENT
Start: 2022-04-01

## 2022-04-01 RX ORDER — HYDROCHLOROTHIAZIDE 12.5 MG/1
TABLET ORAL
Qty: 90 TABLET | Refills: 0 | OUTPATIENT
Start: 2022-04-01

## 2022-04-01 RX ORDER — AMLODIPINE BESYLATE 5 MG/1
TABLET ORAL
Qty: 90 TABLET | Refills: 0 | OUTPATIENT
Start: 2022-04-01

## 2022-04-01 RX ORDER — ALBUTEROL SULFATE 90 UG/1
AEROSOL, METERED RESPIRATORY (INHALATION)
Refills: 0 | OUTPATIENT
Start: 2022-04-01

## 2022-04-01 RX ORDER — ATORVASTATIN CALCIUM 10 MG/1
TABLET, FILM COATED ORAL
Qty: 90 TABLET | Refills: 0 | OUTPATIENT
Start: 2022-04-01

## 2022-04-01 NOTE — TELEPHONE ENCOUNTER
No new care gaps identified.  Powered by [employer] by [employer]. Reference number: [id number].   4/01/2022 1:00:59 PM CDT

## 2022-04-01 NOTE — TELEPHONE ENCOUNTER
No new care gaps identified.  Powered by [employer]. Reference number: [id number].   4/01/2022 1:03:12 PM CDT

## 2022-04-01 NOTE — TELEPHONE ENCOUNTER
No new care gaps identified.  Powered by [employer] by [employer]. Reference number: [id number].   4/01/2022 12:59:53 PM CDT

## 2022-04-01 NOTE — TELEPHONE ENCOUNTER
No new care gaps identified.  Powered by [employer] by [employer]. Reference number: [id number].   4/01/2022 12:59:15 PM CDT

## 2022-04-01 NOTE — TELEPHONE ENCOUNTER
No new care gaps identified.  Powered by [employer] by [employer]. Reference number: [id number].   4/01/2022 1:16:05 PM CDT   Finasteride Pregnancy And Lactation Text: This medication is absolutely contraindicated during pregnancy. It is unknown if it is excreted in breast milk.

## 2022-04-01 NOTE — TELEPHONE ENCOUNTER
No new care gaps identified.  Powered by [employer] by [employer] (formerly [employer]). Reference number: [id number].   4/01/2022 1:07:26 PM CDT

## 2022-04-02 NOTE — TELEPHONE ENCOUNTER
[facility] Note  Quick DC. Inappropriate Request   Refill request requires further review by MD: NO   Medication Therapy Plan: Pharmacy is requesting new script(s) for the following medications without required information, (sig/ frequency/qty/etc)     [facility] action(s):  Quick Discontinue      Duplicate Pended Encounter(s)/ Last Prescribed Details:    Pharmacies have been requesting medications for patients without required information, (sig, frequency, qty, etc.). In addition, requests are sent for medication(s) pt. are currently not taking, and medications patients have never taken.    We have spoken to the pharmacies about these request types and advised their teams previously that we are unable to assess these New Script requests and require all details for these requests. This is a known issue and has been reported.        Medication related problems are not assessed for QDC.   Medication Reconciliation Completed? NO Were there pending details that required adjustment? NO     Automatic Epic Generated Protocol Data Below:   Requested Prescriptions   Pending Prescriptions Disp Refills    albuterol-ipratropium (DUO-NEB) 2.5 mg-0.5 mg/3 mL nebulizer solution [Pharmacy Med Name: IPRATROP/ALBUT 0.5-3MG/3ML NEB SOL (180ML)]  0              Appointments      Date Provider   Last Visit   2/21/2022 Yosi Samuels Jr., MD   Next Visit   4/4/2022 Yosi Samuels Jr., MD        Note composed:9:32 PM 04/01/2022

## 2022-04-02 NOTE — TELEPHONE ENCOUNTER
[facility] Note  Quick DC. Inappropriate Request   Refill request requires further review by MD: NO   Medication Therapy Plan: Pharmacy is requesting new script(s) for the following medications without required information, (sig/ frequency/qty/etc)     [facility] action(s):  Quick Discontinue      Duplicate Pended Encounter(s)/ Last Prescribed Details:    Pharmacies have been requesting medications for patients without required information, (sig, frequency, qty, etc.). In addition, requests are sent for medication(s) pt. are currently not taking, and medications patients have never taken.    We have spoken to the pharmacies about these request types and advised their teams previously that we are unable to assess these New Script requests and require all details for these requests. This is a known issue and has been reported.        Medication related problems are not assessed for QDC.   Medication Reconciliation Completed? NO Were there pending details that required adjustment? NO     Automatic Epic Generated Protocol Data Below:   Requested Prescriptions   Pending Prescriptions Disp Refills    losartan (COZAAR) 100 MG tablet [Pharmacy Med Name: LOSARTAN 100MG TAB] 90 tablet 0              Appointments      Date Provider   Last Visit   2/21/2022 Yosi Samuels Jr., MD   Next Visit   4/1/2022 Yosi Samuels Jr., MD        Note composed:9:32 PM 04/01/2022

## 2022-04-02 NOTE — TELEPHONE ENCOUNTER
[facility] Note  Quick DC. Inappropriate Request   Refill request requires further review by MD: NO   Medication Therapy Plan: Pharmacy is requesting new script(s) for the following medications without required information, (sig/ frequency/qty/etc)     [facility] action(s):  Quick Discontinue      Duplicate Pended Encounter(s)/ Last Prescribed Details:    Pharmacies have been requesting medications for patients without required information, (sig, frequency, qty, etc.). In addition, requests are sent for medication(s) pt. are currently not taking, and medications patients have never taken.    We have spoken to the pharmacies about these request types and advised their teams previously that we are unable to assess these New Script requests and require all details for these requests. This is a known issue and has been reported.        Medication related problems are not assessed for QDC.   Medication Reconciliation Completed? NO Were there pending details that required adjustment? NO     Automatic Epic Generated Protocol Data Below:   Requested Prescriptions   Pending Prescriptions Disp Refills    hydroCHLOROthiazide (HYDRODIURIL) 12.5 MG Tab [Pharmacy Med Name: HYDROCHLOROTHIAZIDE 12.5MG TAB] 90 tablet 0              Appointments      Date Provider   Last Visit   2/21/2022 Yosi Samuels Jr., MD   Next Visit   4/1/2022 Yosi Samuels Jr., MD        Note composed:9:32 PM 04/01/2022

## 2022-04-02 NOTE — TELEPHONE ENCOUNTER
[facility] Note  Quick DC. Inappropriate Request   Refill request requires further review by MD: NO   Medication Therapy Plan: Pharmacy is requesting new script(s) for the following medications without required information, (sig/ frequency/qty/etc)     [facility] action(s):  Quick Discontinue      Duplicate Pended Encounter(s)/ Last Prescribed Details:    Pharmacies have been requesting medications for patients without required information, (sig, frequency, qty, etc.). In addition, requests are sent for medication(s) pt. are currently not taking, and medications patients have never taken.    We have spoken to the pharmacies about these request types and advised their teams previously that we are unable to assess these New Script requests and require all details for these requests. This is a known issue and has been reported.        Medication related problems are not assessed for QDC.   Medication Reconciliation Completed? NO Were there pending details that required adjustment? NO     Automatic Epic Generated Protocol Data Below:   Requested Prescriptions   Pending Prescriptions Disp Refills    amLODIPine (NORVASC) 5 MG tablet [Pharmacy Med Name: AMLODIPINE  5MG TAB] 90 tablet 0              Appointments      Date Provider   Last Visit   2/21/2022 Yosi Samuels Jr., MD   Next Visit   4/1/2022 Yosi Samuels Jr., MD        Note composed:9:33 PM 04/01/2022

## 2022-04-02 NOTE — TELEPHONE ENCOUNTER
[facility] Note  Quick DC. Inappropriate Request   Refill request requires further review by MD: NO   Medication Therapy Plan: Pharmacy is requesting new script(s) for the following medications without required information, (sig/ frequency/qty/etc)     [facility] action(s):  Quick Discontinue      Duplicate Pended Encounter(s)/ Last Prescribed Details:    Pharmacies have been requesting medications for patients without required information, (sig, frequency, qty, etc.). In addition, requests are sent for medication(s) pt. are currently not taking, and medications patients have never taken.    We have spoken to the pharmacies about these request types and advised their teams previously that we are unable to assess these New Script requests and require all details for these requests. This is a known issue and has been reported.        Medication related problems are not assessed for QDC.   Medication Reconciliation Completed? NO Were there pending details that required adjustment? NO     Automatic Epic Generated Protocol Data Below:   Requested Prescriptions   Pending Prescriptions Disp Refills    fluticasone-umeclidin-vilanter (TRELEGY ELLIPTA) 100-62.5-25 mcg DsDv [Pharmacy Med Name: TRELEGY 100-62.5-25MCG ELLIPTA INH]  0              Appointments      Date Provider   Last Visit   2/21/2022 Yosi Samuels Jr., MD   Next Visit   4/1/2022 Yosi Samuels Jr., MD        Note composed:9:33 PM 04/01/2022

## 2022-04-02 NOTE — TELEPHONE ENCOUNTER
[facility] Note  Quick DC. Inappropriate Request   Refill request requires further review by MD: NO   Medication Therapy Plan: Pharmacy is requesting new script(s) for the following medications without required information, (sig/ frequency/qty/etc)     [facility] action(s):  Quick Discontinue      Duplicate Pended Encounter(s)/ Last Prescribed Details:    Pharmacies have been requesting medications for patients without required information, (sig, frequency, qty, etc.). In addition, requests are sent for medication(s) pt. are currently not taking, and medications patients have never taken.    We have spoken to the pharmacies about these request types and advised their teams previously that we are unable to assess these New Script requests and require all details for these requests. This is a known issue and has been reported.        Medication related problems are not assessed for QDC.   Medication Reconciliation Completed? NO Were there pending details that required adjustment? NO     Automatic Epic Generated Protocol Data Below:   Requested Prescriptions   Pending Prescriptions Disp Refills    potassium chloride SA (K-DUR,KLOR-CON) 20 MEQ tablet [Pharmacy Med Name: POTASSIUM CL ER 20MEQ MICRO TAB] 90 tablet 0              Appointments      Date Provider   Last Visit   2/21/2022 Yosi Samuels Jr., MD   Next Visit   4/1/2022 Yosi Samuels Jr., MD        Note composed:9:34 PM 04/01/2022

## 2022-04-02 NOTE — TELEPHONE ENCOUNTER
[facility] Note  Quick DC. Inappropriate Request   Refill request requires further review by MD: NO   Medication Therapy Plan: Pharmacy is requesting new script(s) for the following medications without required information, (sig/ frequency/qty/etc)     [facility] action(s):  Quick Discontinue      Duplicate Pended Encounter(s)/ Last Prescribed Details:    Pharmacies have been requesting medications for patients without required information, (sig, frequency, qty, etc.). In addition, requests are sent for medication(s) pt. are currently not taking, and medications patients have never taken.    We have spoken to the pharmacies about these request types and advised their teams previously that we are unable to assess these New Script requests and require all details for these requests. This is a known issue and has been reported.        Medication related problems are not assessed for QDC.   Medication Reconciliation Completed? NO Were there pending details that required adjustment? NO     Automatic Epic Generated Protocol Data Below:   Requested Prescriptions   Pending Prescriptions Disp Refills    atorvastatin (LIPITOR) 10 MG tablet [Pharmacy Med Name: ATORVASTATIN 10MG TAB] 90 tablet 0              Appointments      Date Provider   Last Visit   2/21/2022 Yosi Samuels Jr., MD   Next Visit   4/1/2022 Yosi Samuels Jr., MD        Note composed:9:33 PM 04/01/2022

## 2022-04-02 NOTE — TELEPHONE ENCOUNTER
[facility] Note  Quick DC. Inappropriate Request   Refill request requires further review by MD: NO   Medication Therapy Plan: Pharmacy is requesting new script(s) for the following medications without required information, (sig/ frequency/qty/etc)     [facility] action(s):  Quick Discontinue      Duplicate Pended Encounter(s)/ Last Prescribed Details:    Pharmacies have been requesting medications for patients without required information, (sig, frequency, qty, etc.). In addition, requests are sent for medication(s) pt. are currently not taking, and medications patients have never taken.    We have spoken to the pharmacies about these request types and advised their teams previously that we are unable to assess these New Script requests and require all details for these requests. This is a known issue and has been reported.        Medication related problems are not assessed for QDC.   Medication Reconciliation Completed? NO Were there pending details that required adjustment? NO     Automatic Epic Generated Protocol Data Below:   Requested Prescriptions   Pending Prescriptions Disp Refills    albuterol (PROVENTIL/VENTOLIN HFA) 90 mcg/actuation inhaler [Pharmacy Med Name: ALBUTEROL SULF HFA 90MCG/ACT AER 6.7GM (GEN FOR PROVENTIL)]  0              Appointments      Date Provider   Last Visit   2/21/2022 Yosi Samuels Jr., MD   Next Visit   4/1/2022 Yosi Samuels Jr., MD        Note composed:9:32 PM 04/01/2022

## 2022-04-05 ENCOUNTER — OFFICE VISIT (OUTPATIENT)
Dept: INTERNAL MEDICINE | Facility: CLINIC | Age: 67
End: 2022-04-05
Payer: MEDICARE

## 2022-04-05 VITALS
BODY MASS INDEX: 24.06 KG/M2 | OXYGEN SATURATION: 97 % | HEART RATE: 86 BPM | WEIGHT: 135.81 LBS | HEIGHT: 63 IN | DIASTOLIC BLOOD PRESSURE: 76 MMHG | SYSTOLIC BLOOD PRESSURE: 114 MMHG

## 2022-04-05 DIAGNOSIS — J43.9 PULMONARY EMPHYSEMA, UNSPECIFIED EMPHYSEMA TYPE: Primary | ICD-10-CM

## 2022-04-05 DIAGNOSIS — E78.5 HYPERLIPIDEMIA, UNSPECIFIED HYPERLIPIDEMIA TYPE: ICD-10-CM

## 2022-04-05 DIAGNOSIS — J96.11 CHRONIC HYPOXEMIC RESPIRATORY FAILURE: ICD-10-CM

## 2022-04-05 PROBLEM — U07.1 PNEUMONIA DUE TO COVID-19 VIRUS: Status: RESOLVED | Noted: 2021-12-30 | Resolved: 2022-04-05

## 2022-04-05 PROBLEM — J12.82 PNEUMONIA DUE TO COVID-19 VIRUS: Status: RESOLVED | Noted: 2021-12-30 | Resolved: 2022-04-05

## 2022-04-05 PROCEDURE — 99999 PR PBB SHADOW E&M-EST. PATIENT-LVL IV: CPT | Mod: PBBFAC,,, | Performed by: INTERNAL MEDICINE

## 2022-04-05 PROCEDURE — 99213 OFFICE O/P EST LOW 20 MIN: CPT | Mod: S$GLB,,, | Performed by: INTERNAL MEDICINE

## 2022-04-05 PROCEDURE — 99213 PR OFFICE/OUTPT VISIT, EST, LEVL III, 20-29 MIN: ICD-10-PCS | Mod: S$GLB,,, | Performed by: INTERNAL MEDICINE

## 2022-04-05 PROCEDURE — 99999 PR PBB SHADOW E&M-EST. PATIENT-LVL IV: ICD-10-PCS | Mod: PBBFAC,,, | Performed by: INTERNAL MEDICINE

## 2022-04-05 NOTE — PROGRESS NOTES
Clinic Note  4/5/2022      Subjective:       Patient ID:  patient is a 66 y.o. female being seen for a follow up.     Chief Complaint: Follow-up    HPI   Ms. Andrews is a 66 year old female with a history of COPD on 2L home O2, HTN, diastolic congestive heart failure and GERD who presents today for a follow up. Patient was originally scheduled for completion of [employer] paperwork. However, she states this has already been completed and is no longer needed. She does state that she would like her COVID-19 booster vaccination along w/ an ambulatory referral to pulmonary clinic. Patient denies HA, vision changes, CP, increased SOB, abdominal pain, cough, increased sputum production, urinary changes or stool changes.     Chronic Hypoxic Respiratory Failure and Severe Emphysema  - patient on 2L home O2 continuously   - last seen by by Dr. Paulino 9/28/2020  - continues to smoke but is actively trying to cut down and quit   - Adherent w/ her medications and wears her O2 continuously  - [facility] 1/[facility] for COVID-19 PNA. At this time, she received steroids and completed 3 day course of remdesivir.       Review of Systems   Constitutional: Negative for chills and fever.   HENT: Negative for congestion and sinus pain.    Eyes: Negative for blurred vision and pain.   Respiratory: Positive for shortness of breath (Chronic ). Negative for cough.    Cardiovascular: Negative for chest pain and palpitations.   Gastrointestinal: Negative for abdominal pain, diarrhea and vomiting.   Musculoskeletal: Negative for joint pain and myalgias.   Skin: Negative for itching and rash.   Neurological: Negative for dizziness and headaches.   Psychiatric/Behavioral: Negative for depression and suicidal ideas.       Medication List with Changes/Refills   Current Medications    ALBUTEROL (PROVENTIL/VENTOLIN HFA) 90 MCG/ACTUATION INHALER    INHALE 2 PUFFS BY MOUTH EVERY 6 HOURS AS NEEDED    ALBUTEROL-IPRATROPIUM (DUO-NEB) 2.5  "MG-0.5 MG/3 ML NEBULIZER SOLUTION    Take 3 mLs by nebulization every 4 (four) hours as needed for Wheezing. Rescue    AMLODIPINE (NORVASC) 5 MG TABLET    Take 1 tablet (5 mg total) by mouth once daily.    ATORVASTATIN (LIPITOR) 10 MG TABLET    Take 1 tablet (10 mg total) by mouth every evening.    FLUTICASONE-UMECLIDIN-VILANTER (TRELEGY ELLIPTA) 100-62.5-25 MCG DSDV    Inhale 1 puff into the lungs once daily.    GABAPENTIN (NEURONTIN) 300 MG CAPSULE    Take 1 capsule (300 mg total) by mouth 3 (three) times daily.    GLYCERIN ADULT SUPPOSITORY    Place 1 suppository rectally as needed for Constipation.    HYDROCHLOROTHIAZIDE (HYDRODIURIL) 12.5 MG TAB    Take 1 tablet (12.5 mg total) by mouth daily as needed (edema/htn).    LOSARTAN (COZAAR) 100 MG TABLET    Take 1 tablet (100 mg total) by mouth once daily.    POTASSIUM CHLORIDE SA (K-DUR,KLOR-CON) 20 MEQ TABLET    TAKE 1 TABLET(20 MEQ) BY MOUTH EVERY DAY       Patient Active Problem List   Diagnosis    Essential hypertension    Adenomatous polyp of colon    Hallux valgus (acquired)    Primary localized osteoarthrosis, lower leg    Lumbar radiculopathy    Gout involving toe of left foot    Cor pulmonale, chronic    Cervical radiculopathy    Hypotension    Chronic hypoxemic respiratory failure    Chest pain    Sciatica of left side    Cervical myelopathy    Stable angina    Aortic calcification    Myalgia    Chronic pain    Hyperlipidemia    Pulmonary emphysema             Health Maintenance Topics with due status: Not Due       Topic Last Completion Date    TETANUS VACCINE 03/16/2019    Colorectal Cancer Screening 12/19/2019    DEXA Scan 08/28/2020    Mammogram 08/03/2021    Lipid Panel 10/08/2021    COVID-19 Vaccine 01/01/2022       Objective:      /76 (BP Location: Right arm, Patient Position: Sitting, BP Method: Small (Manual))   Pulse 86   Ht 5' 3" (1.6 m)   Wt 61.6 kg (135 lb 12.9 oz)   LMP 11/21/2013   SpO2 97%   BMI 24.06 kg/m² " "  Estimated body mass index is 24.06 kg/m² as calculated from the following:    Height as of this encounter: 5' 3" (1.6 m).    Weight as of this encounter: 61.6 kg (135 lb 12.9 oz).  Physical Exam  Constitutional:       General: She is not in acute distress.     Appearance: Normal appearance. She is not ill-appearing.   HENT:      Head: Normocephalic and atraumatic.      Right Ear: External ear normal.      Left Ear: External ear normal.      Mouth/Throat:      Mouth: Mucous membranes are moist.      Pharynx: Oropharynx is clear.   Eyes:      General: No scleral icterus.     Extraocular Movements: Extraocular movements intact.      Conjunctiva/sclera: Conjunctivae normal.   Cardiovascular:      Rate and Rhythm: Normal rate and regular rhythm.      Heart sounds: Normal heart sounds. No murmur heard.  Pulmonary:      Effort: Pulmonary effort is normal.      Breath sounds: Normal breath sounds. No wheezing.   Abdominal:      General: Abdomen is flat. Bowel sounds are normal.      Palpations: Abdomen is soft.      Tenderness: There is no abdominal tenderness.   Musculoskeletal:         General: No swelling or tenderness. Normal range of motion.   Skin:     General: Skin is warm and dry.      Capillary Refill: Capillary refill takes less than 2 seconds.      Coloration: Skin is not jaundiced.   Neurological:      General: No focal deficit present.      Mental Status: She is alert and oriented to person, place, and time.   Psychiatric:         Mood and Affect: Mood normal.         Behavior: Behavior normal.           Assessment and Plan:     1. Chronic hypoxemic respiratory failure  2. Pulmonary emphysema   - explained importance of patient wearing home O2 continuously given her severe lung disease and cor pulmonale    - counseled on importance of quitting smoking   - ambulatory pulmonary referral pending    - COVID-19 booster ordered        Follow Up:   6 month follow up         Isabela Max M.D.  Internal Medicine " PGY-1  [facility]

## 2022-04-06 RX ORDER — LOSARTAN POTASSIUM 100 MG/1
100 TABLET ORAL DAILY
Qty: 90 TABLET | Refills: 3 | Status: SHIPPED | OUTPATIENT
Start: 2022-04-06 | End: 2023-04-11

## 2022-04-13 NOTE — PROGRESS NOTES
"I have personally taken the history and examined this patient and agree with the resident's note as stated above with the following thoughts:  /76 (BP Location: Right arm, Patient Position: Sitting, BP Method: Small (Manual))   Pulse 86   Ht 5' 3" (1.6 m)   Wt 61.6 kg (135 lb 12.9 oz)   LMP 11/21/2013   SpO2 97%   BMI 24.06 kg/m²       Discussed getting vaccines up to date.  Discussed importance of low fat diet and exercise .  breathing is doing well.  covid booster discussed today   "

## 2022-04-19 NOTE — PROGRESS NOTES
Pt placed on 2 liters of oxygen O2 sat was 79 on 2 liters of O2 pt O2 sat is 84  Name and   verified  initiated nebulizer albuterol treat per physicians orders patient tolerated welll      Taltz Counseling: I discussed with the patient the risks of ixekizumab including but not limited to immunosuppression, serious infections, worsening of inflammatory bowel disease and drug reactions.  The patient understands that monitoring is required including a PPD at baseline and must alert us or the primary physician if symptoms of infection or other concerning signs are noted.

## 2022-06-06 ENCOUNTER — PATIENT MESSAGE (OUTPATIENT)
Dept: ADMINISTRATIVE | Facility: OTHER | Age: 67
End: 2022-06-06
Payer: MEDICARE

## 2022-06-10 DIAGNOSIS — J98.01 BRONCHOSPASM, ACUTE: ICD-10-CM

## 2022-06-10 DIAGNOSIS — J44.9 CHRONIC OBSTRUCTIVE PULMONARY DISEASE WITH HYPOXIA: ICD-10-CM

## 2022-06-10 RX ORDER — AMLODIPINE BESYLATE 5 MG/1
TABLET ORAL
Qty: 90 TABLET | Refills: 0 | OUTPATIENT
Start: 2022-06-10

## 2022-06-10 RX ORDER — HYDROCHLOROTHIAZIDE 12.5 MG/1
TABLET ORAL
Qty: 90 TABLET | Refills: 0 | OUTPATIENT
Start: 2022-06-10

## 2022-06-10 RX ORDER — ALBUTEROL SULFATE 90 UG/1
AEROSOL, METERED RESPIRATORY (INHALATION)
Refills: 0 | OUTPATIENT
Start: 2022-06-10

## 2022-06-10 RX ORDER — ATORVASTATIN CALCIUM 10 MG/1
TABLET, FILM COATED ORAL
Qty: 90 TABLET | Refills: 0 | OUTPATIENT
Start: 2022-06-10

## 2022-06-10 NOTE — TELEPHONE ENCOUNTER
[facility] Note  Quick DC. Inappropriate Request   Refill request requires further review by MD: NO   Medication Therapy Plan: Pharmacy is requesting new script(s) for the following medications without required information, (sig/ frequency/qty/etc)     [facility] action(s):  Quick Discontinue      Duplicate Pended Encounter(s)/ Last Prescribed Details:    Pharmacies have been requesting medications for patients without required information, (sig, frequency, qty, etc.). In addition, requests are sent for medication(s) pt. are currently not taking, and medications patients have never taken.    We have spoken to the pharmacies about these request types and advised their teams previously that we are unable to assess these New Script requests and require all details for these requests. This is a known issue and has been reported.        Medication related problems are not assessed for QDC.   Medication Reconciliation Completed? NO Were there pending details that required adjustment? NO     Automatic Epic Generated Protocol Data Below:   Requested Prescriptions   Pending Prescriptions Disp Refills    atorvastatin (LIPITOR) 10 MG tablet [Pharmacy Med Name: ATORVASTATIN 10MG TAB] 90 tablet 0              Appointments      Date Provider   Last Visit   4/5/2022 Yosi Samuels Jr., MD   Next Visit   6/10/2022 Yosi Samuels Jr., MD        Note composed:9:52 AM 06/10/2022

## 2022-06-10 NOTE — TELEPHONE ENCOUNTER
[facility] Note  Quick DC. Inappropriate Request   Refill request requires further review by MD: NO   Medication Therapy Plan: Pharmacy is requesting new script(s) for the following medications without required information, (sig/ frequency/qty/etc)     [facility] action(s):  Quick Discontinue      Duplicate Pended Encounter(s)/ Last Prescribed Details:    Pharmacies have been requesting medications for patients without required information, (sig, frequency, qty, etc.). In addition, requests are sent for medication(s) pt. are currently not taking, and medications patients have never taken.    We have spoken to the pharmacies about these request types and advised their teams previously that we are unable to assess these New Script requests and require all details for these requests. This is a known issue and has been reported.        Medication related problems are not assessed for QDC.   Medication Reconciliation Completed? NO Were there pending details that required adjustment? NO     Automatic Epic Generated Protocol Data Below:   Requested Prescriptions   Pending Prescriptions Disp Refills    albuterol (PROVENTIL/VENTOLIN HFA) 90 mcg/actuation inhaler [Pharmacy Med Name: ALBUTEROL SULF HFA 90MCG/ACT AER 6.7GM (GEN FOR PROVENTIL)]  0              Appointments      Date Provider   Last Visit   4/5/2022 Yosi Samuels Jr., MD   Next Visit   6/10/2022 Yosi Samuels Jr., MD        Note composed:9:53 AM 06/10/2022

## 2022-06-10 NOTE — TELEPHONE ENCOUNTER
No new care gaps identified.  [facility] Embedded Care Gaps. Reference number: [id number]. 6/10/2022   9:43:54 AM SAMIRAT

## 2022-06-10 NOTE — TELEPHONE ENCOUNTER
[facility] Note  Quick DC. Inappropriate Request   Refill request requires further review by MD: NO   Medication Therapy Plan: Pharmacy is requesting new script(s) for the following medications without required information, (sig/ frequency/qty/etc)     [facility] action(s):  Quick Discontinue      Duplicate Pended Encounter(s)/ Last Prescribed Details:    Pharmacies have been requesting medications for patients without required information, (sig, frequency, qty, etc.). In addition, requests are sent for medication(s) pt. are currently not taking, and medications patients have never taken.    We have spoken to the pharmacies about these request types and advised their teams previously that we are unable to assess these New Script requests and require all details for these requests. This is a known issue and has been reported.        Medication related problems are not assessed for QDC.   Medication Reconciliation Completed? NO Were there pending details that required adjustment? NO     Automatic Epic Generated Protocol Data Below:   Requested Prescriptions   Pending Prescriptions Disp Refills    amLODIPine (NORVASC) 5 MG tablet [Pharmacy Med Name: AMLODIPINE  5MG TAB] 90 tablet 0              Appointments      Date Provider   Last Visit   4/5/2022 Yosi Samuels Jr., MD   Next Visit   6/10/2022 Yosi Samuels Jr., MD        Note composed:9:52 AM 06/10/2022

## 2022-06-10 NOTE — TELEPHONE ENCOUNTER
No new care gaps identified.  [facility] Embedded Care Gaps. Reference number: [id number]. 6/10/2022   9:44:31 AM SAMIRAT

## 2022-06-10 NOTE — TELEPHONE ENCOUNTER
[facility] Note  Quick DC. Inappropriate Request   Refill request requires further review by MD: NO   Medication Therapy Plan: Pharmacy is requesting new script(s) for the following medications without required information, (sig/ frequency/qty/etc)     [facility] action(s):  Quick Discontinue      Duplicate Pended Encounter(s)/ Last Prescribed Details:    Pharmacies have been requesting medications for patients without required information, (sig, frequency, qty, etc.). In addition, requests are sent for medication(s) pt. are currently not taking, and medications patients have never taken.    We have spoken to the pharmacies about these request types and advised their teams previously that we are unable to assess these New Script requests and require all details for these requests. This is a known issue and has been reported.        Medication related problems are not assessed for QDC.   Medication Reconciliation Completed? NO Were there pending details that required adjustment? NO     Automatic Epic Generated Protocol Data Below:   Requested Prescriptions   Pending Prescriptions Disp Refills    hydroCHLOROthiazide (HYDRODIURIL) 12.5 MG Tab [Pharmacy Med Name: HYDROCHLOROTHIAZIDE 12.5MG TAB] 90 tablet 0              Appointments      Date Provider   Last Visit   4/5/2022 Yosi Samuels Jr., MD   Next Visit   10/6/2022 Yosi Samuels Jr., MD        Note composed:9:53 AM 06/10/2022

## 2022-06-10 NOTE — TELEPHONE ENCOUNTER
No new care gaps identified.  [facility] Embedded Care Gaps. Reference number: [id number]. 6/10/2022   9:43:24 AM CDT

## 2022-06-10 NOTE — TELEPHONE ENCOUNTER
No new care gaps identified.  [facility] Embedded Care Gaps. Reference number: [id number]. 6/10/2022   9:45:43 AM SAMIRAT

## 2022-06-15 ENCOUNTER — TELEPHONE (OUTPATIENT)
Dept: PULMONOLOGY | Facility: CLINIC | Age: 67
End: 2022-06-15
Payer: MEDICARE

## 2022-06-15 DIAGNOSIS — R06.02 SOB (SHORTNESS OF BREATH): Primary | ICD-10-CM

## 2022-06-20 ENCOUNTER — HOSPITAL ENCOUNTER (OUTPATIENT)
Dept: PULMONOLOGY | Facility: CLINIC | Age: 67
Discharge: HOME | End: 2022-06-20
Payer: MEDICARE

## 2022-06-20 ENCOUNTER — OFFICE VISIT (OUTPATIENT)
Dept: PULMONOLOGY | Facility: CLINIC | Age: 67
End: 2022-06-20
Payer: MEDICARE

## 2022-06-20 VITALS
SYSTOLIC BLOOD PRESSURE: 110 MMHG | HEIGHT: 63 IN | BODY MASS INDEX: 24.53 KG/M2 | DIASTOLIC BLOOD PRESSURE: 70 MMHG | WEIGHT: 138.44 LBS

## 2022-06-20 DIAGNOSIS — J96.11 CHRONIC HYPOXEMIC RESPIRATORY FAILURE: ICD-10-CM

## 2022-06-20 DIAGNOSIS — R06.02 SOB (SHORTNESS OF BREATH): ICD-10-CM

## 2022-06-20 DIAGNOSIS — J98.01 BRONCHOSPASM, ACUTE: ICD-10-CM

## 2022-06-20 DIAGNOSIS — J43.9 PULMONARY EMPHYSEMA, UNSPECIFIED EMPHYSEMA TYPE: ICD-10-CM

## 2022-06-20 DIAGNOSIS — Z72.0 TOBACCO ABUSE: Primary | ICD-10-CM

## 2022-06-20 DIAGNOSIS — J44.9 CHRONIC OBSTRUCTIVE PULMONARY DISEASE WITH HYPOXIA: ICD-10-CM

## 2022-06-20 DIAGNOSIS — F17.210 NICOTINE DEPENDENCE, CIGARETTES, UNCOMPLICATED: ICD-10-CM

## 2022-06-20 DIAGNOSIS — Z91.89 AT HIGH RISK FOR RESPIRATORY INFECTION: ICD-10-CM

## 2022-06-20 LAB
ERV LLN: -16449.3
ERV PRE REF: 34.1 %
ERV REF: 0.7
ERVULN: ABNORMAL
FEF 25 75 LLN: 0.68
FEF 25 75 PRE REF: 15.7 %
FEF 25 75 REF: 1.72
FET100 CHG: 5.8 %
FEV05 LLN: 0.85
FEV05 REF: 1.71
FEV1 CHG: -3.4 %
FEV1 FVC LLN: 67
FEV1 FVC PRE REF: 74.2 %
FEV1 FVC REF: 79
FEV1 LLN: 1.36
FEV1 PRE REF: 29.5 %
FEV1 REF: 1.92
FEV1 VOL CHG: -0.02
FRCPLETH LLN: 1.83
FRCPLETH PREREF: 139.4 %
FRCPLETH REF: 2.65
FRCPLETHULN: 3.47
FVC CHG: 2.5 %
FVC LLN: 1.75
FVC PRE REF: 39.6 %
FVC REF: 2.44
FVC VOL CHG: 0.02
LLN IC: -16448.11
PEF LLN: 3.07
PEF PRE REF: 38.7 %
PEF REF: 5.01
PHYSICIAN COMMENT: ABNORMAL
POST FEF 25 75: 0.26 L/S (ref 0.68–3.27)
POST FET 100: 5.78 SEC
POST FEV1 FVC: 55.32 % (ref 66.57–89.87)
POST FEV1: 0.55 L (ref 1.36–2.45)
POST FEV5: 0.39 L (ref 0.85–2.57)
POST FVC: 0.99 L (ref 1.75–3.16)
POST PEF: 2.08 L/S (ref 3.07–6.95)
PRE ERV: 0.24 L (ref -16449.3–16450.7)
PRE FEF 25 75: 0.27 L/S (ref 0.68–3.27)
PRE FET 100: 5.46 SEC
PRE FEV05 REF: 21.2 %
PRE FEV1 FVC: 58.67 % (ref 66.57–89.87)
PRE FEV1: 0.57 L (ref 1.36–2.45)
PRE FEV5: 0.36 L (ref 0.85–2.57)
PRE FRC PL: 3.69 L (ref 1.83–3.47)
PRE FVC: 0.96 L (ref 1.75–3.16)
PRE IC: 0.77 L (ref -16448.11–16451.89)
PRE PEF: 1.94 L/S (ref 3.07–6.95)
PRE REF IC: 40.6 %
PRE RV: 3.46 L (ref 1.38–2.53)
PRE TLC: 4.46 L (ref 3.78–5.76)
PRE VTG: 3.75 L
RAW PRE REF: 313.8 %
RAW PRE: 9.6 CMH2O*S/L (ref 3.06–3.06)
RAW REF: 3.06
REF IC: 1.89
RV LLN: 1.38
RV PRE REF: 177 %
RV REF: 1.95
RVTLC LLN: 32
RVTLC PRE REF: 187 %
RVTLC PRE: 77.42 % (ref 31.81–50.99)
RVTLC REF: 41
RVTLCULN: 51
RVULN: 2.53
SGAW PRE REF: 25.9 %
SGAW PRE: 0.03 1/(CMH2O*S) (ref 0.1–0.1)
SGAW REF: 0.1
TLC LLN: 3.78
TLC PRE REF: 93.6 %
TLC REF: 4.77
TLC ULN: 5.76
ULN IC: ABNORMAL
VC LLN: 1.75
VC PRE REF: 41.3 %
VC PRE: 1.01 L (ref 1.75–3.16)
VC REF: 2.44
VC ULN: 3.16

## 2022-06-20 PROCEDURE — 1159F MED LIST DOCD IN RCRD: CPT | Mod: CPTII,S$GLB,, | Performed by: INTERNAL MEDICINE

## 2022-06-20 PROCEDURE — 1160F RVW MEDS BY RX/DR IN RCRD: CPT | Mod: CPTII,S$GLB,, | Performed by: INTERNAL MEDICINE

## 2022-06-20 PROCEDURE — 1159F PR MEDICATION LIST DOCUMENTED IN MEDICAL RECORD: ICD-10-PCS | Mod: CPTII,S$GLB,, | Performed by: INTERNAL MEDICINE

## 2022-06-20 PROCEDURE — 3288F FALL RISK ASSESSMENT DOCD: CPT | Mod: CPTII,S$GLB,, | Performed by: INTERNAL MEDICINE

## 2022-06-20 PROCEDURE — 4010F ACE/ARB THERAPY RXD/TAKEN: CPT | Mod: CPTII,S$GLB,, | Performed by: INTERNAL MEDICINE

## 2022-06-20 PROCEDURE — 99999 PR PBB SHADOW E&M-EST. PATIENT-LVL III: ICD-10-PCS | Mod: PBBFAC,,, | Performed by: INTERNAL MEDICINE

## 2022-06-20 PROCEDURE — 1101F PR PT FALLS ASSESS DOC 0-1 FALLS W/OUT INJ PAST YR: ICD-10-PCS | Mod: CPTII,S$GLB,, | Performed by: INTERNAL MEDICINE

## 2022-06-20 PROCEDURE — 99999 PR PBB SHADOW E&M-EST. PATIENT-LVL III: CPT | Mod: PBBFAC,,, | Performed by: INTERNAL MEDICINE

## 2022-06-20 PROCEDURE — 94729 PR C02/MEMBANE DIFFUSE CAPACITY: ICD-10-PCS | Mod: 53,S$GLB,, | Performed by: INTERNAL MEDICINE

## 2022-06-20 PROCEDURE — 3078F DIAST BP <80 MM HG: CPT | Mod: CPTII,S$GLB,, | Performed by: INTERNAL MEDICINE

## 2022-06-20 PROCEDURE — 3074F PR MOST RECENT SYSTOLIC BLOOD PRESSURE < 130 MM HG: ICD-10-PCS | Mod: CPTII,S$GLB,, | Performed by: INTERNAL MEDICINE

## 2022-06-20 PROCEDURE — 94729 DIFFUSING CAPACITY: CPT | Mod: 53,S$GLB,, | Performed by: INTERNAL MEDICINE

## 2022-06-20 PROCEDURE — 94726 PULM FUNCT TST PLETHYSMOGRAP: ICD-10-PCS | Mod: S$GLB,,, | Performed by: INTERNAL MEDICINE

## 2022-06-20 PROCEDURE — 3078F PR MOST RECENT DIASTOLIC BLOOD PRESSURE < 80 MM HG: ICD-10-PCS | Mod: CPTII,S$GLB,, | Performed by: INTERNAL MEDICINE

## 2022-06-20 PROCEDURE — 99213 PR OFFICE/OUTPT VISIT, EST, LEVL III, 20-29 MIN: ICD-10-PCS | Mod: 25,S$GLB,, | Performed by: INTERNAL MEDICINE

## 2022-06-20 PROCEDURE — 4010F PR ACE/ARB THEARPY RXD/TAKEN: ICD-10-PCS | Mod: CPTII,S$GLB,, | Performed by: INTERNAL MEDICINE

## 2022-06-20 PROCEDURE — 94060 EVALUATION OF WHEEZING: CPT | Mod: S$GLB,,, | Performed by: INTERNAL MEDICINE

## 2022-06-20 PROCEDURE — 1126F AMNT PAIN NOTED NONE PRSNT: CPT | Mod: CPTII,S$GLB,, | Performed by: INTERNAL MEDICINE

## 2022-06-20 PROCEDURE — 3008F BODY MASS INDEX DOCD: CPT | Mod: CPTII,S$GLB,, | Performed by: INTERNAL MEDICINE

## 2022-06-20 PROCEDURE — 3008F PR BODY MASS INDEX (BMI) DOCUMENTED: ICD-10-PCS | Mod: CPTII,S$GLB,, | Performed by: INTERNAL MEDICINE

## 2022-06-20 PROCEDURE — 94726 PLETHYSMOGRAPHY LUNG VOLUMES: CPT | Mod: S$GLB,,, | Performed by: INTERNAL MEDICINE

## 2022-06-20 PROCEDURE — 99213 OFFICE O/P EST LOW 20 MIN: CPT | Mod: 25,S$GLB,, | Performed by: INTERNAL MEDICINE

## 2022-06-20 PROCEDURE — 94060 PR EVAL OF BRONCHOSPASM: ICD-10-PCS | Mod: S$GLB,,, | Performed by: INTERNAL MEDICINE

## 2022-06-20 PROCEDURE — 1126F PR PAIN SEVERITY QUANTIFIED, NO PAIN PRESENT: ICD-10-PCS | Mod: CPTII,S$GLB,, | Performed by: INTERNAL MEDICINE

## 2022-06-20 PROCEDURE — 3074F SYST BP LT 130 MM HG: CPT | Mod: CPTII,S$GLB,, | Performed by: INTERNAL MEDICINE

## 2022-06-20 PROCEDURE — 1160F PR REVIEW ALL MEDS BY PRESCRIBER/CLIN PHARMACIST DOCUMENTED: ICD-10-PCS | Mod: CPTII,S$GLB,, | Performed by: INTERNAL MEDICINE

## 2022-06-20 PROCEDURE — 1101F PT FALLS ASSESS-DOCD LE1/YR: CPT | Mod: CPTII,S$GLB,, | Performed by: INTERNAL MEDICINE

## 2022-06-20 PROCEDURE — 3288F PR FALLS RISK ASSESSMENT DOCUMENTED: ICD-10-PCS | Mod: CPTII,S$GLB,, | Performed by: INTERNAL MEDICINE

## 2022-06-20 RX ORDER — IBUPROFEN 200 MG
1 TABLET ORAL DAILY
Qty: 14 PATCH | Refills: 0 | Status: ON HOLD | OUTPATIENT
Start: 2022-06-20 | End: 2022-10-25 | Stop reason: HOSPADM

## 2022-06-20 RX ORDER — ALBUTEROL SULFATE 90 UG/1
2 AEROSOL, METERED RESPIRATORY (INHALATION) EVERY 6 HOURS
Qty: 54 G | Refills: 3 | Status: SHIPPED | OUTPATIENT
Start: 2022-06-20 | End: 2023-03-14

## 2022-06-20 RX ORDER — IPRATROPIUM BROMIDE AND ALBUTEROL SULFATE 2.5; .5 MG/3ML; MG/3ML
3 SOLUTION RESPIRATORY (INHALATION) EVERY 4 HOURS PRN
Qty: 1080 ML | Refills: 3 | Status: SHIPPED | OUTPATIENT
Start: 2022-06-20

## 2022-06-20 RX ORDER — ASPIRIN 81 MG/1
81 TABLET ORAL DAILY
Status: ON HOLD | COMMUNITY
End: 2023-08-18 | Stop reason: HOSPADM

## 2022-06-20 RX ORDER — NICOTINE 7MG/24HR
1 PATCH, TRANSDERMAL 24 HOURS TRANSDERMAL DAILY
Qty: 14 PATCH | Refills: 0 | Status: ON HOLD | OUTPATIENT
Start: 2022-06-20 | End: 2022-10-25 | Stop reason: HOSPADM
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
## 2022-06-20 NOTE — ASSESSMENT & PLAN NOTE
Encouraged smoking cessation.  Nicoderm replacement therapy ordered.  LD CT Chest for lung cancer screening ordered.

## 2022-06-20 NOTE — ASSESSMENT & PLAN NOTE
Stable.   Continue Trelegy daily  Continue duonebs and albuterol hfa prn  Encouraged smoking cessation.  If able to quit smoking for more than 6 months, will refer to transplant.

## 2022-06-28 ENCOUNTER — HOSPITAL ENCOUNTER (OUTPATIENT)
Dept: RADIOLOGY | Facility: HOSPITAL | Age: 67
Discharge: HOME | End: 2022-06-28
Attending: INTERNAL MEDICINE
Payer: MEDICARE

## 2022-06-28 DIAGNOSIS — Z72.0 TOBACCO ABUSE: ICD-10-CM

## 2022-06-28 DIAGNOSIS — F17.210 NICOTINE DEPENDENCE, CIGARETTES, UNCOMPLICATED: ICD-10-CM

## 2022-06-28 PROCEDURE — 71271 CT THORAX LUNG CANCER SCR C-: CPT | Mod: TC

## 2022-06-28 PROCEDURE — 71271 CT CHEST LUNG SCREENING LOW DOSE: ICD-10-PCS | Mod: 26,,, | Performed by: RADIOLOGY

## 2022-06-28 PROCEDURE — 71271 CT THORAX LUNG CANCER SCR C-: CPT | Mod: 26,,, | Performed by: RADIOLOGY

## 2022-06-29 DIAGNOSIS — F17.210 NICOTINE DEPENDENCE, CIGARETTES, UNCOMPLICATED: Primary | ICD-10-CM

## 2022-06-29 NOTE — PROGRESS NOTES
Ms. Trivedi,    I had an opportunity to review the results of your CT chest.  Thankfully, every thing looks stable! You have a few tiny nodules (or spots) but they are the same size as in the prior scan.  I recommend you have a repeat scan next year as part of annual lung cancer screening.  Please call the office if you have questions.    Dr. LI

## 2022-07-29 ENCOUNTER — HOSPITAL ENCOUNTER (OUTPATIENT)
Facility: HOSPITAL | Age: 67
Discharge: HOME | End: 2022-07-31
Attending: EMERGENCY MEDICINE | Admitting: INTERNAL MEDICINE
Payer: MEDICARE

## 2022-07-29 DIAGNOSIS — R05.9 COUGH: ICD-10-CM

## 2022-07-29 DIAGNOSIS — R06.02 SOB (SHORTNESS OF BREATH): ICD-10-CM

## 2022-07-29 DIAGNOSIS — I50.9 CONGESTIVE HEART FAILURE, UNSPECIFIED HF CHRONICITY, UNSPECIFIED HEART FAILURE TYPE: ICD-10-CM

## 2022-07-29 DIAGNOSIS — J44.1 COPD WITH ACUTE EXACERBATION: ICD-10-CM

## 2022-07-29 DIAGNOSIS — R06.00 DYSPNEA: ICD-10-CM

## 2022-07-29 DIAGNOSIS — J44.1 COPD EXACERBATION: Primary | ICD-10-CM

## 2022-07-29 DIAGNOSIS — J96.21 ACUTE ON CHRONIC RESPIRATORY FAILURE WITH HYPOXIA: ICD-10-CM

## 2022-07-29 PROBLEM — F17.210 NICOTINE DEPENDENCE, CIGARETTES, UNCOMPLICATED: Chronic | Status: ACTIVE | Noted: 2022-06-20

## 2022-07-29 PROBLEM — J44.9 CHRONIC OBSTRUCTIVE PULMONARY DISEASE WITH HYPOXIA: Chronic | Status: ACTIVE | Noted: 2021-12-30

## 2022-07-29 PROBLEM — E78.5 HYPERLIPIDEMIA: Chronic | Status: ACTIVE | Noted: 2021-12-30

## 2022-07-29 PROBLEM — J96.11 CHRONIC HYPOXEMIC RESPIRATORY FAILURE: Chronic | Status: ACTIVE | Noted: 2020-07-18

## 2022-07-29 PROBLEM — M10.9 GOUT INVOLVING TOE OF LEFT FOOT: Chronic | Status: ACTIVE | Noted: 2017-12-09

## 2022-07-29 PROBLEM — I27.81 COR PULMONALE, CHRONIC: Chronic | Status: ACTIVE | Noted: 2018-04-10

## 2022-07-29 PROCEDURE — 93005 ELECTROCARDIOGRAM TRACING: CPT

## 2022-07-29 PROCEDURE — 93010 ELECTROCARDIOGRAM REPORT: CPT | Mod: ,,, | Performed by: INTERNAL MEDICINE

## 2022-07-29 PROCEDURE — 93010 EKG 12-LEAD: ICD-10-PCS | Mod: ,,, | Performed by: INTERNAL MEDICINE

## 2022-07-29 PROCEDURE — 99285 EMERGENCY DEPT VISIT HI MDM: CPT | Mod: 25,CS

## 2022-07-29 PROCEDURE — 99285 PR EMERGENCY DEPT VISIT,LEVEL V: ICD-10-PCS | Mod: GC,CS,, | Performed by: EMERGENCY MEDICINE

## 2022-07-29 PROCEDURE — 99285 EMERGENCY DEPT VISIT HI MDM: CPT | Mod: GC,CS,, | Performed by: EMERGENCY MEDICINE

## 2022-07-30 PROBLEM — J96.21 ACUTE ON CHRONIC RESPIRATORY FAILURE WITH HYPOXIA: Status: ACTIVE | Noted: 2022-07-30

## 2022-07-30 LAB
ALBUMIN SERPL BCP-MCNC: 4 G/DL (ref 3.5–5.2)
ALP SERPL-CCNC: 55 U/L (ref 55–135)
ALT SERPL W/O P-5'-P-CCNC: 15 U/L (ref 10–44)
ANION GAP SERPL CALC-SCNC: 10 MMOL/L (ref 8–16)
AST SERPL-CCNC: 21 U/L (ref 10–40)
BASOPHILS # BLD AUTO: 0.02 K/UL (ref 0–0.2)
BASOPHILS # BLD AUTO: 0.02 K/UL (ref 0–0.2)
BASOPHILS NFR BLD: 0.3 % (ref 0–1.9)
BASOPHILS NFR BLD: 0.3 % (ref 0–1.9)
BILIRUB SERPL-MCNC: 0.3 MG/DL (ref 0.1–1)
BNP SERPL-MCNC: 11 PG/ML (ref 0–99)
BUN SERPL-MCNC: 7 MG/DL (ref 8–23)
CALCIUM SERPL-MCNC: 9 MG/DL (ref 8.7–10.5)
CHLORIDE SERPL-SCNC: 101 MMOL/L (ref 95–110)
CO2 SERPL-SCNC: 29 MMOL/L (ref 23–29)
CREAT SERPL-MCNC: 0.6 MG/DL (ref 0.5–1.4)
DIFFERENTIAL METHOD: ABNORMAL
DIFFERENTIAL METHOD: ABNORMAL
EOSINOPHIL # BLD AUTO: 0.1 K/UL (ref 0–0.5)
EOSINOPHIL # BLD AUTO: 0.2 K/UL (ref 0–0.5)
EOSINOPHIL NFR BLD: 1.6 % (ref 0–8)
EOSINOPHIL NFR BLD: 2 % (ref 0–8)
ERYTHROCYTE [DISTWIDTH] IN BLOOD BY AUTOMATED COUNT: 14.3 % (ref 11.5–14.5)
ERYTHROCYTE [DISTWIDTH] IN BLOOD BY AUTOMATED COUNT: 14.6 % (ref 11.5–14.5)
EST. GFR  (AFRICAN AMERICAN): >60 ML/MIN/1.73 M^2
EST. GFR  (NON AFRICAN AMERICAN): >60 ML/MIN/1.73 M^2
GLUCOSE SERPL-MCNC: 80 MG/DL (ref 70–110)
HCT VFR BLD AUTO: 39.3 % (ref 37–48.5)
HCT VFR BLD AUTO: 44.1 % (ref 37–48.5)
HGB BLD-MCNC: 12.4 G/DL (ref 12–16)
HGB BLD-MCNC: 13.6 G/DL (ref 12–16)
IMM GRANULOCYTES # BLD AUTO: 0.01 K/UL (ref 0–0.04)
IMM GRANULOCYTES # BLD AUTO: 0.02 K/UL (ref 0–0.04)
IMM GRANULOCYTES NFR BLD AUTO: 0.1 % (ref 0–0.5)
IMM GRANULOCYTES NFR BLD AUTO: 0.3 % (ref 0–0.5)
LYMPHOCYTES # BLD AUTO: 2.1 K/UL (ref 1–4.8)
LYMPHOCYTES # BLD AUTO: 2.3 K/UL (ref 1–4.8)
LYMPHOCYTES NFR BLD: 27.8 % (ref 18–48)
LYMPHOCYTES NFR BLD: 30.1 % (ref 18–48)
MAGNESIUM SERPL-MCNC: 2 MG/DL (ref 1.6–2.6)
MCH RBC QN AUTO: 28.6 PG (ref 27–31)
MCH RBC QN AUTO: 29 PG (ref 27–31)
MCHC RBC AUTO-ENTMCNC: 30.8 G/DL (ref 32–36)
MCHC RBC AUTO-ENTMCNC: 31.6 G/DL (ref 32–36)
MCV RBC AUTO: 92 FL (ref 82–98)
MCV RBC AUTO: 93 FL (ref 82–98)
MONOCYTES # BLD AUTO: 0.7 K/UL (ref 0.3–1)
MONOCYTES # BLD AUTO: 0.7 K/UL (ref 0.3–1)
MONOCYTES NFR BLD: 9.1 % (ref 4–15)
MONOCYTES NFR BLD: 9.2 % (ref 4–15)
NEUTROPHILS # BLD AUTO: 4.5 K/UL (ref 1.8–7.7)
NEUTROPHILS # BLD AUTO: 4.6 K/UL (ref 1.8–7.7)
NEUTROPHILS NFR BLD: 58.3 % (ref 38–73)
NEUTROPHILS NFR BLD: 60.9 % (ref 38–73)
NRBC BLD-RTO: 0 /100 WBC
NRBC BLD-RTO: 0 /100 WBC
PHOSPHATE SERPL-MCNC: 4.1 MG/DL (ref 2.7–4.5)
PLATELET # BLD AUTO: 210 K/UL (ref 150–450)
PLATELET # BLD AUTO: 234 K/UL (ref 150–450)
PMV BLD AUTO: 11.5 FL (ref 9.2–12.9)
PMV BLD AUTO: 11.7 FL (ref 9.2–12.9)
POTASSIUM SERPL-SCNC: 4.2 MMOL/L (ref 3.5–5.1)
PROT SERPL-MCNC: 7.3 G/DL (ref 6–8.4)
RBC # BLD AUTO: 4.27 M/UL (ref 4–5.4)
RBC # BLD AUTO: 4.75 M/UL (ref 4–5.4)
SARS-COV-2 RDRP RESP QL NAA+PROBE: NEGATIVE
SODIUM SERPL-SCNC: 140 MMOL/L (ref 136–145)
TROPONIN I SERPL DL<=0.01 NG/ML-MCNC: <0.006 NG/ML (ref 0–0.03)
WBC # BLD AUTO: 7.59 K/UL (ref 3.9–12.7)
WBC # BLD AUTO: 7.63 K/UL (ref 3.9–12.7)

## 2022-07-30 PROCEDURE — 25000242 PHARM REV CODE 250 ALT 637 W/ HCPCS

## 2022-07-30 PROCEDURE — 25000003 PHARM REV CODE 250: Performed by: INTERNAL MEDICINE

## 2022-07-30 PROCEDURE — 36415 COLL VENOUS BLD VENIPUNCTURE: CPT | Performed by: INTERNAL MEDICINE

## 2022-07-30 PROCEDURE — 85025 COMPLETE CBC W/AUTO DIFF WBC: CPT | Mod: 91 | Performed by: INTERNAL MEDICINE

## 2022-07-30 PROCEDURE — 63600175 PHARM REV CODE 636 W HCPCS

## 2022-07-30 PROCEDURE — 94640 AIRWAY INHALATION TREATMENT: CPT

## 2022-07-30 PROCEDURE — 99900035 HC TECH TIME PER 15 MIN (STAT)

## 2022-07-30 PROCEDURE — 94640 AIRWAY INHALATION TREATMENT: CPT | Mod: XB

## 2022-07-30 PROCEDURE — 84484 ASSAY OF TROPONIN QUANT: CPT

## 2022-07-30 PROCEDURE — 83880 ASSAY OF NATRIURETIC PEPTIDE: CPT

## 2022-07-30 PROCEDURE — 84100 ASSAY OF PHOSPHORUS: CPT | Performed by: INTERNAL MEDICINE

## 2022-07-30 PROCEDURE — S4991 NICOTINE PATCH NONLEGEND: HCPCS | Performed by: INTERNAL MEDICINE

## 2022-07-30 PROCEDURE — 99220 PR INITIAL OBSERVATION CARE,LEVL III: ICD-10-PCS | Mod: ,,, | Performed by: INTERNAL MEDICINE

## 2022-07-30 PROCEDURE — 99220 PR INITIAL OBSERVATION CARE,LEVL III: CPT | Mod: ,,, | Performed by: INTERNAL MEDICINE

## 2022-07-30 PROCEDURE — 25000242 PHARM REV CODE 250 ALT 637 W/ HCPCS: Performed by: PHYSICIAN ASSISTANT

## 2022-07-30 PROCEDURE — 63600175 PHARM REV CODE 636 W HCPCS: Performed by: INTERNAL MEDICINE

## 2022-07-30 PROCEDURE — 80053 COMPREHEN METABOLIC PANEL: CPT

## 2022-07-30 PROCEDURE — G0378 HOSPITAL OBSERVATION PER HR: HCPCS

## 2022-07-30 PROCEDURE — 27000221 HC OXYGEN, UP TO 24 HOURS

## 2022-07-30 PROCEDURE — 96372 THER/PROPH/DIAG INJ SC/IM: CPT | Performed by: INTERNAL MEDICINE

## 2022-07-30 PROCEDURE — 63700000 PHARM REV CODE 250 ALT 637 W/O HCPCS: Performed by: INTERNAL MEDICINE

## 2022-07-30 PROCEDURE — 86803 HEPATITIS C AB TEST: CPT | Performed by: EMERGENCY MEDICINE

## 2022-07-30 PROCEDURE — 85025 COMPLETE CBC W/AUTO DIFF WBC: CPT

## 2022-07-30 PROCEDURE — 83735 ASSAY OF MAGNESIUM: CPT | Performed by: INTERNAL MEDICINE

## 2022-07-30 PROCEDURE — U0002 COVID-19 LAB TEST NON-CDC: HCPCS

## 2022-07-30 PROCEDURE — 25000242 PHARM REV CODE 250 ALT 637 W/ HCPCS: Performed by: INTERNAL MEDICINE

## 2022-07-30 PROCEDURE — 94761 N-INVAS EAR/PLS OXIMETRY MLT: CPT

## 2022-07-30 RX ORDER — SODIUM CHLORIDE 0.9 % (FLUSH) 0.9 %
3 SYRINGE (ML) INJECTION
Status: DISCONTINUED | OUTPATIENT
Start: 2022-07-30 | End: 2022-07-31 | Stop reason: HOSPADM

## 2022-07-30 RX ORDER — IPRATROPIUM BROMIDE AND ALBUTEROL SULFATE 2.5; .5 MG/3ML; MG/3ML
3 SOLUTION RESPIRATORY (INHALATION) EVERY 6 HOURS PRN
Status: DISCONTINUED | OUTPATIENT
Start: 2022-07-30 | End: 2022-07-31 | Stop reason: HOSPADM

## 2022-07-30 RX ORDER — PREDNISONE 20 MG/1
60 TABLET ORAL
Status: COMPLETED | OUTPATIENT
Start: 2022-07-30 | End: 2022-07-30

## 2022-07-30 RX ORDER — AZITHROMYCIN 250 MG/1
250 TABLET, FILM COATED ORAL ONCE
Status: DISCONTINUED | OUTPATIENT
Start: 2022-07-30 | End: 2022-07-30

## 2022-07-30 RX ORDER — PREDNISONE 20 MG/1
40 TABLET ORAL DAILY
Status: DISCONTINUED | OUTPATIENT
Start: 2022-07-30 | End: 2022-07-30

## 2022-07-30 RX ORDER — IPRATROPIUM BROMIDE AND ALBUTEROL SULFATE 2.5; .5 MG/3ML; MG/3ML
3 SOLUTION RESPIRATORY (INHALATION)
Status: DISCONTINUED | OUTPATIENT
Start: 2022-07-30 | End: 2022-07-30

## 2022-07-30 RX ORDER — IBUPROFEN 200 MG
1 TABLET ORAL DAILY
Status: DISCONTINUED | OUTPATIENT
Start: 2022-07-30 | End: 2022-07-31 | Stop reason: HOSPADM

## 2022-07-30 RX ORDER — AMLODIPINE BESYLATE 5 MG/1
5 TABLET ORAL DAILY
Status: DISCONTINUED | OUTPATIENT
Start: 2022-07-30 | End: 2022-07-31 | Stop reason: HOSPADM

## 2022-07-30 RX ORDER — ACETAMINOPHEN 325 MG/1
650 TABLET ORAL EVERY 4 HOURS PRN
Status: DISCONTINUED | OUTPATIENT
Start: 2022-07-30 | End: 2022-07-31 | Stop reason: HOSPADM

## 2022-07-30 RX ORDER — ENOXAPARIN SODIUM 100 MG/ML
40 INJECTION SUBCUTANEOUS EVERY 24 HOURS
Status: DISCONTINUED | OUTPATIENT
Start: 2022-07-30 | End: 2022-07-31 | Stop reason: HOSPADM

## 2022-07-30 RX ORDER — PREDNISONE 20 MG/1
40 TABLET ORAL DAILY
Status: DISCONTINUED | OUTPATIENT
Start: 2022-07-31 | End: 2022-07-30

## 2022-07-30 RX ORDER — ATORVASTATIN CALCIUM 10 MG/1
10 TABLET, FILM COATED ORAL NIGHTLY
Status: DISCONTINUED | OUTPATIENT
Start: 2022-07-30 | End: 2022-07-31 | Stop reason: HOSPADM

## 2022-07-30 RX ORDER — IPRATROPIUM BROMIDE AND ALBUTEROL SULFATE 2.5; .5 MG/3ML; MG/3ML
3 SOLUTION RESPIRATORY (INHALATION)
Status: DISPENSED | OUTPATIENT
Start: 2022-07-30 | End: 2022-07-30

## 2022-07-30 RX ORDER — GABAPENTIN 300 MG/1
300 CAPSULE ORAL 3 TIMES DAILY
Status: DISCONTINUED | OUTPATIENT
Start: 2022-07-30 | End: 2022-07-31 | Stop reason: HOSPADM

## 2022-07-30 RX ORDER — ASCORBIC ACID 500 MG
500 TABLET ORAL DAILY
Status: DISCONTINUED | OUTPATIENT
Start: 2022-07-30 | End: 2022-07-31 | Stop reason: HOSPADM

## 2022-07-30 RX ORDER — TALC
6 POWDER (GRAM) TOPICAL NIGHTLY PRN
Status: DISCONTINUED | OUTPATIENT
Start: 2022-07-30 | End: 2022-07-31 | Stop reason: HOSPADM

## 2022-07-30 RX ORDER — POLYETHYLENE GLYCOL 3350 17 G/17G
17 POWDER, FOR SOLUTION ORAL 2 TIMES DAILY PRN
Status: DISCONTINUED | OUTPATIENT
Start: 2022-07-30 | End: 2022-07-31 | Stop reason: HOSPADM

## 2022-07-30 RX ORDER — PREDNISONE 20 MG/1
60 TABLET ORAL DAILY
Status: DISCONTINUED | OUTPATIENT
Start: 2022-07-31 | End: 2022-07-31 | Stop reason: HOSPADM

## 2022-07-30 RX ORDER — ONDANSETRON 8 MG/1
8 TABLET, ORALLY DISINTEGRATING ORAL EVERY 8 HOURS PRN
Status: DISCONTINUED | OUTPATIENT
Start: 2022-07-30 | End: 2022-07-31 | Stop reason: HOSPADM

## 2022-07-30 RX ORDER — GUAIFENESIN/DEXTROMETHORPHAN 100-10MG/5
5 SYRUP ORAL EVERY 4 HOURS PRN
Status: DISCONTINUED | OUTPATIENT
Start: 2022-07-30 | End: 2022-07-31 | Stop reason: HOSPADM

## 2022-07-30 RX ORDER — LOSARTAN POTASSIUM 50 MG/1
100 TABLET ORAL DAILY
Status: DISCONTINUED | OUTPATIENT
Start: 2022-07-30 | End: 2022-07-31 | Stop reason: HOSPADM

## 2022-07-30 RX ORDER — AZITHROMYCIN 250 MG/1
500 TABLET, FILM COATED ORAL ONCE
Status: COMPLETED | OUTPATIENT
Start: 2022-07-30 | End: 2022-07-30

## 2022-07-30 RX ORDER — FLUTICASONE FUROATE AND VILANTEROL 200; 25 UG/1; UG/1
1 POWDER RESPIRATORY (INHALATION) DAILY
Status: DISCONTINUED | OUTPATIENT
Start: 2022-07-30 | End: 2022-07-31 | Stop reason: HOSPADM

## 2022-07-30 RX ORDER — ASPIRIN 81 MG/1
81 TABLET ORAL DAILY
Status: DISCONTINUED | OUTPATIENT
Start: 2022-07-30 | End: 2022-07-31 | Stop reason: HOSPADM

## 2022-07-30 RX ORDER — AZITHROMYCIN 250 MG/1
250 TABLET, FILM COATED ORAL DAILY
Status: DISCONTINUED | OUTPATIENT
Start: 2022-07-31 | End: 2022-07-31 | Stop reason: HOSPADM

## 2022-07-30 RX ADMIN — GABAPENTIN 300 MG: 300 CAPSULE ORAL at 09:07

## 2022-07-30 RX ADMIN — ATORVASTATIN CALCIUM 10 MG: 10 TABLET, FILM COATED ORAL at 03:07

## 2022-07-30 RX ADMIN — IPRATROPIUM BROMIDE AND ALBUTEROL SULFATE 3 ML: 2.5; .5 SOLUTION RESPIRATORY (INHALATION) at 12:07

## 2022-07-30 RX ADMIN — PREDNISONE 60 MG: 20 TABLET ORAL at 02:07

## 2022-07-30 RX ADMIN — ASPIRIN 81 MG: 81 TABLET, COATED ORAL at 09:07

## 2022-07-30 RX ADMIN — GABAPENTIN 300 MG: 300 CAPSULE ORAL at 08:07

## 2022-07-30 RX ADMIN — AMLODIPINE BESYLATE 5 MG: 5 TABLET ORAL at 09:07

## 2022-07-30 RX ADMIN — LOSARTAN POTASSIUM 100 MG: 50 TABLET, FILM COATED ORAL at 09:07

## 2022-07-30 RX ADMIN — FLUTICASONE FUROATE AND VILANTEROL TRIFENATATE 1 PUFF: 200; 25 POWDER RESPIRATORY (INHALATION) at 12:07

## 2022-07-30 RX ADMIN — GABAPENTIN 300 MG: 300 CAPSULE ORAL at 04:07

## 2022-07-30 RX ADMIN — AZITHROMYCIN MONOHYDRATE 500 MG: 250 TABLET ORAL at 03:07

## 2022-07-30 RX ADMIN — OXYCODONE HYDROCHLORIDE AND ACETAMINOPHEN 500 MG: 500 TABLET ORAL at 09:07

## 2022-07-30 RX ADMIN — IPRATROPIUM BROMIDE AND ALBUTEROL SULFATE 3 ML: 2.5; .5 SOLUTION RESPIRATORY (INHALATION) at 10:07

## 2022-07-30 RX ADMIN — TIOTROPIUM BROMIDE INHALATION SPRAY 2 PUFF: 3.12 SPRAY, METERED RESPIRATORY (INHALATION) at 05:07

## 2022-07-30 RX ADMIN — ENOXAPARIN SODIUM 40 MG: 100 INJECTION SUBCUTANEOUS at 04:07

## 2022-07-30 RX ADMIN — NICOTINE 1 PATCH: 14 PATCH, EXTENDED RELEASE TRANSDERMAL at 09:07

## 2022-07-30 RX ADMIN — ATORVASTATIN CALCIUM 10 MG: 10 TABLET, FILM COATED ORAL at 08:07

## 2022-07-30 NOTE — HOSPITAL COURSE
Patient placed in observation for COPD exacerbation.  Started on home controllers as well as prednisone 60 mg PO daily and azithromycin (for empiric pneumonia treatment).  Patient's symptoms improved on treatment.  Unable to wean her completely from 3L to 2L but likely will need to complete treatment prior to weaning (vs may be her new baseline for her GOLD D COPD?).  Patient advised to follow up with pulmonologist.  All questions answered.  Patient acknowledges understanding of discharge instructions and feels safe to discharge home. Patient discharged 7/31/2022 in stable condition.

## 2022-07-30 NOTE — ASSESSMENT & PLAN NOTE
Controlled  Latest blood pressure and vitals reviewed  Home meds for hypertension were reviewed and noted below.   Hypertension Medications             amLODIPine (NORVASC) 5 MG tablet Take 1 tablet (5 mg total) by mouth once daily.    hydroCHLOROthiazide (HYDRODIURIL) 12.5 MG Tab TAKE 1 TABLET(12.5 MG) BY MOUTH DAILY AS NEEDED FOR SWELLING OR HYPERTENSION    losartan (COZAAR) 100 MG tablet Take 1 tablet (100 mg total) by mouth once daily.        Medication adjustment for hospital antihypertensives is as follows- continue above  - Goal -140. Will utilize p.r.n. blood pressure medication only if patient's blood pressure greater than 180/110 and he develops symptoms such as worsening chest pain or shortness of breath.

## 2022-07-30 NOTE — PLAN OF CARE
Problem: Adult Inpatient Plan of Care  Goal: Plan of Care Review  Outcome: Ongoing, Progressing     Problem: Infection COPD (Chronic Obstructive Pulmonary Disease)  Goal: Absence of Infection Signs and Symptoms  Outcome: Ongoing, Progressing

## 2022-07-30 NOTE — SUBJECTIVE & OBJECTIVE
Past Medical History:   Diagnosis Date    CHF (congestive heart failure)     COPD (chronic obstructive pulmonary disease)     Herpes zoster without mention of complication 2011    Hypertension     Leg edema     Pulmonary hypertension     Sciatica        Past Surgical History:   Procedure Laterality Date    BREAST BIOPSY Right     core     SECTION      COLONOSCOPY N/A 2019    Procedure: COLONOSCOPY;  Surgeon: Rui Holder MD;  Location: [facility] (2ND FLR);  Service: Endoscopy;  Laterality: N/A;    EPIDURAL STEROID INJECTION N/A 2020    Procedure: CERVICAL BLAKE;  Surgeon: Papito High MD;  Location: [facility];  Service: Pain Management;  Laterality: N/A;  NEEDS CONSENT    ESOPHAGOGASTRODUODENOSCOPY N/A 2019    Procedure: EGD (ESOPHAGOGASTRODUODENOSCOPY);  Surgeon: Rui Holder MD;  Location: [facility] (2ND FLR);  Service: Endoscopy;  Laterality: N/A;  2L continuous O2 via NC, COPD, pulm HTN    TRANSFORAMINAL EPIDURAL INJECTION OF STEROID Right 6/3/2021    Procedure: INJECTION, STEROID, EPIDURAL, TRANSFORAMINAL APPROACH, L4-L5;  Surgeon: Anibal Robles MD;  Location: [facility];  Service: Pain Management;  Laterality: Right;       Review of patient's allergies indicates:   Allergen Reactions    Orange juice      Swelling in pt tongue         No current facility-administered medications on file prior to encounter.     Current Outpatient Medications on File Prior to Encounter   Medication Sig    albuterol (PROVENTIL/VENTOLIN HFA) 90 mcg/actuation inhaler Inhale 2 puffs into the lungs every 6 (six) hours. Rescue    albuterol-ipratropium (DUO-NEB) 2.5 mg-0.5 mg/3 mL nebulizer solution Take 3 mLs by nebulization every 4 (four) hours as needed for Wheezing. Rescue    amLODIPine (NORVASC) 5 MG tablet Take 1 tablet (5 mg total) by mouth once daily.    ascorbic acid, vitamin C, (VITAMIN C) 500 MG tablet Take 500 mg by mouth once daily.    aspirin (ECOTRIN) 81 MG EC tablet Take 81 mg  by mouth once daily. Low Dose    atorvastatin (LIPITOR) 10 MG tablet Take 1 tablet (10 mg total) by mouth every evening.    cholecalciferol, vitamin D3, (VITAMIN D3) 25 mcg (1,000 unit) capsule Take 1,000 Units by mouth once daily.    fluticasone-umeclidin-vilanter (TRELEGY ELLIPTA) 100-62.5-25 mcg DsDv Inhale 1 puff into the lungs once daily.    gabapentin (NEURONTIN) 300 MG capsule Take 1 capsule (300 mg total) by mouth 3 (three) times daily.    glycerin adult suppository Place 1 suppository rectally as needed for Constipation.    hydroCHLOROthiazide (HYDRODIURIL) 12.5 MG Tab TAKE 1 TABLET(12.5 MG) BY MOUTH DAILY AS NEEDED FOR SWELLING OR HYPERTENSION    losartan (COZAAR) 100 MG tablet Take 1 tablet (100 mg total) by mouth once daily.    magnesium 250 mg Tab Take 250 mg by mouth once.    nicotine (NICODERM CQ) 14 mg/24 hr Place 1 patch onto the skin once daily.    nicotine (NICODERM CQ) 7 mg/24 hr Place 1 patch onto the skin once daily.    potassium chloride SA (K-DUR,KLOR-CON) 20 MEQ tablet TAKE 1 TABLET(20 MEQ) BY MOUTH EVERY DAY     Family History       Problem Relation (Age of Onset)    Heart disease Mother, Paternal Uncle          Tobacco Use    Smoking status: Light Tobacco Smoker     Packs/day: 0.25     Years: 40.00     Pack years: 10.00     Types: Cigarettes    Smokeless tobacco: Never Used   Substance and Sexual Activity    Alcohol use: Not Currently     Comment: beer daily 2-3 daily, none today    Drug use: No    Sexual activity: Not Currently     Birth control/protection: None     Review of Systems   Constitutional:  Positive for activity change. Negative for appetite change, chills and fever.   HENT:  Negative for congestion, hearing loss and rhinorrhea.    Eyes:  Negative for discharge, itching and visual disturbance.   Respiratory:  Positive for shortness of breath and wheezing. Negative for apnea and cough.    Cardiovascular:  Negative for chest pain, palpitations and leg swelling.    Gastrointestinal:  Negative for abdominal distention, abdominal pain, constipation, diarrhea, nausea and vomiting.   Endocrine: Negative for cold intolerance and heat intolerance.   Genitourinary:  Negative for dysuria and hematuria.   Musculoskeletal:  Negative for back pain, neck pain and neck stiffness.   Skin:  Negative for rash and wound.   Neurological:  Negative for dizziness, seizures, light-headedness and headaches.   Psychiatric/Behavioral:  Negative for agitation, confusion and suicidal ideas.    Objective:     Vital Signs (Most Recent):  Temp: 99.6 °F (37.6 °C) (07/30/22 0339)  Pulse: 91 (07/30/22 0339)  Resp: 20 (07/30/22 0339)  BP: 129/73 (07/30/22 0339)  SpO2: 95 % (07/30/22 0339) Vital Signs (24h Range):  Temp:  [99.4 °F (37.4 °C)-99.6 °F (37.6 °C)] 99.6 °F (37.6 °C)  Pulse:  [75-91] 91  Resp:  [18-22] 20  SpO2:  [89 %-100 %] 95 %  BP: (129-137)/(72-80) 129/73     Weight: 62.8 kg (138 lb 7.2 oz)  Body mass index is 24.53 kg/m².    Physical Exam  Vitals reviewed.   Constitutional:       General: She is not in acute distress.     Appearance: She is well-developed.   HENT:      Head: Normocephalic and atraumatic.      Nose: Nose normal. No rhinorrhea.      Mouth/Throat:      Mouth: Mucous membranes are moist.   Eyes:      General: No scleral icterus.        Right eye: No discharge.         Left eye: No discharge.      Pupils: Pupils are equal, round, and reactive to light.   Neck:      Vascular: No JVD.   Cardiovascular:      Rate and Rhythm: Normal rate and regular rhythm.      Heart sounds: Normal heart sounds. No murmur heard.    No friction rub.   Pulmonary:      Effort: Pulmonary effort is normal. No respiratory distress.      Breath sounds: Wheezing present.   Abdominal:      General: Bowel sounds are normal. There is no distension.      Palpations: Abdomen is soft.      Tenderness: There is no abdominal tenderness.   Musculoskeletal:         General: No deformity. Normal range of motion.       Cervical back: Normal range of motion and neck supple.   Skin:     General: Skin is warm and dry.   Neurological:      General: No focal deficit present.      Mental Status: She is alert and oriented to person, place, and time.   Psychiatric:         Mood and Affect: Mood normal.         Behavior: Behavior normal.         CRANIAL NERVES     CN III, IV, VI   Pupils are equal, round, and reactive to light.     Significant Labs: All pertinent labs within the past 24 hours have been reviewed.  CBC:   Recent Labs   Lab 07/30/22  0009 07/30/22  0305   WBC 7.63 7.59   HGB 13.6 12.4   HCT 44.1 39.3    210     CMP:   Recent Labs   Lab 07/30/22  0009      K 4.2      CO2 29   GLU 80   BUN 7*   CREATININE 0.6   CALCIUM 9.0   PROT 7.3   ALBUMIN 4.0   BILITOT 0.3   ALKPHOS 55   AST 21   ALT 15   ANIONGAP 10   EGFRNONAA >60.0       Significant Imaging: I have reviewed all pertinent imaging results/findings within the past 24 hours.

## 2022-07-30 NOTE — HPI
65 yo female with chronic resp failure 2/2 COPD on 2L, CHF, HTN presenting for dyspnea that started about 1 week. It has gotten a little worse over the past 4 days. It gets better with rest, worsens with activity. She states that she could not relay the symptoms to any specific incident but does feel her allergies are worse, and her A/C unit in her apartment is broken and she's been using a window unit. She noticed today her oxygen had gone down to 89% and decided to come to the ED. She does have increased sputum production, denies any chest pain, abd pain, leg swelling.     In ED, given steroids, [facility] medicine called for admit.

## 2022-07-30 NOTE — H&P
[facility] [facility]  History & Physical    Patient Name: Rochelle Espinoza  MRN: [id number]  Patient Class: OP- Observation  Admission Date: 2022  Attending Physician: Dolores Cano MD   Primary Care Provider: Yosi Samuels MD     Patient information was obtained from patient, past medical records and ER records.     Subjective:     Principal Problem:Acute on chronic respiratory failure with hypoxia    Chief Complaint:   Chief Complaint   Patient presents with    Shortness of Breath     SOB x 1 week. 88% on 2L home O2. Reports coughing up mucous.         HPI: 67 yo female with chronic resp failure 2/2 COPD on 2L, CHF, HTN presenting for dyspnea that started about 1 week. It has gotten a little worse over the past 4 days. It gets better with rest, worsens with activity. She states that she could not relay the symptoms to any specific incident but does feel her allergies are worse, and her A/C unit in her apartment is broken and she's been using a window unit. She noticed today her oxygen had gone down to 89% and decided to come to the ED. She does have increased sputum production, denies any chest pain, abd pain, leg swelling.     In ED, given steroids, duonebs, medicine called for admit.            Past Medical History:   Diagnosis Date    CHF (congestive heart failure)     COPD (chronic obstructive pulmonary disease)     Herpes zoster without mention of complication 2011    Hypertension     Leg edema     Pulmonary hypertension     Sciatica        Past Surgical History:   Procedure Laterality Date    BREAST BIOPSY Right     core     SECTION      COLONOSCOPY N/A 2019    Procedure: COLONOSCOPY;  Surgeon: Rui Holder MD;  Location: [facility] ENDO [address]);  Service: Endoscopy;  Laterality: N/A;    EPIDURAL STEROID INJECTION N/A 2020    Procedure: CERVICAL BLAKE;  Surgeon: Papito High MD;  Location: [facility];  Service: Pain Management;   Laterality: N/A;  NEEDS CONSENT    ESOPHAGOGASTRODUODENOSCOPY N/A 12/19/2019    Procedure: EGD (ESOPHAGOGASTRODUODENOSCOPY);  Surgeon: Rui Holder MD;  Location: [facility] ([address] [place] [address]);  Service: Endoscopy;  Laterality: N/A;  2L continuous O2 via NC, COPD, pulm HTN    TRANSFORAMINAL EPIDURAL INJECTION OF STEROID Right 6/3/2021    Procedure: INJECTION, STEROID, EPIDURAL, TRANSFORAMINAL APPROACH, L4-L5;  Surgeon: Anibal Robles MD;  Location: [facility];  Service: Pain Management;  Laterality: Right;       Review of patient's allergies indicates:   Allergen Reactions    Orange juice      Swelling in pt tongue         No current facility-administered medications on file prior to encounter.     Current Outpatient Medications on File Prior to Encounter   Medication Sig    albuterol (PROVENTIL/VENTOLIN HFA) 90 mcg/actuation inhaler Inhale 2 puffs into the lungs every 6 (six) hours. Rescue    albuterol-ipratropium (DUO-NEB) 2.5 mg-0.5 mg/3 mL nebulizer solution Take 3 mLs by nebulization every 4 (four) hours as needed for Wheezing. Rescue    amLODIPine (NORVASC) 5 MG tablet Take 1 tablet (5 mg total) by mouth once daily.    ascorbic acid, vitamin C, (VITAMIN C) 500 MG tablet Take 500 mg by mouth once daily.    aspirin (ECOTRIN) 81 MG EC tablet Take 81 mg by mouth once daily. Low Dose    atorvastatin (LIPITOR) 10 MG tablet Take 1 tablet (10 mg total) by mouth every evening.    cholecalciferol, vitamin D3, (VITAMIN D3) 25 mcg (1,000 unit) capsule Take 1,000 Units by mouth once daily.    fluticasone-umeclidin-vilanter (TRELEGY ELLIPTA) 100-62.5-25 mcg DsDv Inhale 1 puff into the lungs once daily.    gabapentin (NEURONTIN) 300 MG capsule Take 1 capsule (300 mg total) by mouth 3 (three) times daily.    glycerin adult suppository Place 1 suppository rectally as needed for Constipation.    hydroCHLOROthiazide (HYDRODIURIL) 12.5 MG Tab TAKE 1 TABLET(12.5 MG) BY MOUTH DAILY AS NEEDED FOR SWELLING OR  HYPERTENSION    losartan (COZAAR) 100 MG tablet Take 1 tablet (100 mg total) by mouth once daily.    magnesium 250 mg Tab Take 250 mg by mouth once.    nicotine (NICODERM CQ) 14 mg/24 hr Place 1 patch onto the skin once daily.    nicotine (NICODERM CQ) 7 mg/24 hr Place 1 patch onto the skin once daily.    potassium chloride SA (K-DUR,KLOR-CON) 20 MEQ tablet TAKE 1 TABLET(20 MEQ) BY MOUTH EVERY DAY     Family History       Problem Relation (Age of Onset)    Heart disease Mother, Paternal Uncle          Tobacco Use    Smoking status: Light Tobacco Smoker     Packs/day: 0.25     Years: 40.00     Pack years: 10.00     Types: Cigarettes    Smokeless tobacco: Never Used   Substance and Sexual Activity    Alcohol use: Not Currently     Comment: beer daily 2-3 daily, none today    Drug use: No    Sexual activity: Not Currently     Birth control/protection: None     Review of Systems   Constitutional:  Positive for activity change. Negative for appetite change, chills and fever.   HENT:  Negative for congestion, hearing loss and rhinorrhea.    Eyes:  Negative for discharge, itching and visual disturbance.   Respiratory:  Positive for shortness of breath and wheezing. Negative for apnea and cough.    Cardiovascular:  Negative for chest pain, palpitations and leg swelling.   Gastrointestinal:  Negative for abdominal distention, abdominal pain, constipation, diarrhea, nausea and vomiting.   Endocrine: Negative for cold intolerance and heat intolerance.   Genitourinary:  Negative for dysuria and hematuria.   Musculoskeletal:  Negative for back pain, neck pain and neck stiffness.   Skin:  Negative for rash and wound.   Neurological:  Negative for dizziness, seizures, light-headedness and headaches.   Psychiatric/Behavioral:  Negative for agitation, confusion and suicidal ideas.    Objective:     Vital Signs (Most Recent):  Temp: 99.6 °F (37.6 °C) (07/30/22 0339)  Pulse: 91 (07/30/22 0339)  Resp: 20 (07/30/22 0339)  BP:  129/73 (07/30/22 0339)  SpO2: 95 % (07/30/22 0339) Vital Signs (24h Range):  Temp:  [99.4 °F (37.4 °C)-99.6 °F (37.6 °C)] 99.6 °F (37.6 °C)  Pulse:  [75-91] 91  Resp:  [18-22] 20  SpO2:  [89 %-100 %] 95 %  BP: (129-137)/(72-80) 129/73     Weight: 62.8 kg (138 lb 7.2 oz)  Body mass index is 24.53 kg/m².    Physical Exam  Vitals reviewed.   Constitutional:       General: She is not in acute distress.     Appearance: She is well-developed.   HENT:      Head: Normocephalic and atraumatic.      Nose: Nose normal. No rhinorrhea.      Mouth/Throat:      Mouth: Mucous membranes are moist.   Eyes:      General: No scleral icterus.        Right eye: No discharge.         Left eye: No discharge.      Pupils: Pupils are equal, round, and reactive to light.   Neck:      Vascular: No JVD.   Cardiovascular:      Rate and Rhythm: Normal rate and regular rhythm.      Heart sounds: Normal heart sounds. No murmur heard.    No friction rub.   Pulmonary:      Effort: Pulmonary effort is normal. No respiratory distress.      Breath sounds: Wheezing present.   Abdominal:      General: Bowel sounds are normal. There is no distension.      Palpations: Abdomen is soft.      Tenderness: There is no abdominal tenderness.   Musculoskeletal:         General: No deformity. Normal range of motion.      Cervical back: Normal range of motion and neck supple.   Skin:     General: Skin is warm and dry.   Neurological:      General: No focal deficit present.      Mental Status: She is alert and oriented to person, place, and time.   Psychiatric:         Mood and Affect: Mood normal.         Behavior: Behavior normal.         CRANIAL NERVES     CN III, IV, VI   Pupils are equal, round, and reactive to light.     Significant Labs: All pertinent labs within the past 24 hours have been reviewed.  CBC:   Recent Labs   Lab 07/30/22  0009 07/30/22  0305   WBC 7.63 7.59   HGB 13.6 12.4   HCT 44.1 39.3    210     CMP:   Recent Labs   Lab 07/30/22 0009       K 4.2      CO2 29   GLU 80   BUN 7*   CREATININE 0.6   CALCIUM 9.0   PROT 7.3   ALBUMIN 4.0   BILITOT 0.3   ALKPHOS 55   AST 21   ALT 15   ANIONGAP 10   EGFRNONAA >60.0       Significant Imaging: I have reviewed all pertinent imaging results/findings within the past 24 hours.    Assessment/Plan:     * Acute on chronic respiratory failure with hypoxia  Patient with Hypoxic Respiratory failure which is Acute on chronic.  she is on home oxygen at 2 LPM. Supplemental oxygen was provided and noted-  .   Signs/symptoms of respiratory failure include- tachypnea, increased work of breathing, respiratory distress and wheezing. Contributing diagnoses includes - COPD Labs and images were reviewed. Patient Has not had a recent ABG. Will treat underlying causes and adjust management of respiratory failure as follows  Continue steroids, azithromycin, duonebs  COPD pathway initiated.     Nicotine dependence, cigarettes, uncomplicated   Patient trying to quit, nicotine patches      Chronic obstructive pulmonary disease with hypoxia  Chronic, uncontrolled, currently see treatment plan above      Hyperlipidemia  Chronic, controlled, continue home meds        Essential hypertension  Chronic, controlled, continue home medications        VTE Risk Mitigation (From admission, onward)         Ordered     enoxaparin injection 40 mg  Daily         07/30/22 0219     IP VTE HIGH RISK PATIENT  Once         07/30/22 0219     Place sequential compression device  Until discontinued         07/30/22 0219                   Ranjith Brenner MD  Department of Hospital Medicine   [facility] - Telemetry Stepdown ([place]

## 2022-07-30 NOTE — ASSESSMENT & PLAN NOTE
Patient with Hypoxic Respiratory failure which is Acute on chronic.  she is on home oxygen at 2 LPM. Supplemental oxygen was provided and noted-  .   Signs/symptoms of respiratory failure include- tachypnea, increased work of breathing, respiratory distress and wheezing. Contributing diagnoses includes - COPD Labs and images were reviewed. Patient Has not had a recent ABG. Will treat underlying causes and adjust management of respiratory failure as follows  Continue steroids, azithromycin, duonebs  COPD pathway initiated.

## 2022-07-30 NOTE — ED PROVIDER NOTES
"Encounter Date: 2022       History     Chief Complaint   Patient presents with    Shortness of Breath     SOB x 1 week. 88% on 2L home O2. Reports coughing up mucous.      Ms. Espinoza is a 65 y/o female with a PMH of chronic hypoxic respiratory failure 2/2 COPD on 2L NC O2 at home, CHF, and HTN who presents to the ED with a CC of dyspnea that began 4 days ago, is intermittent, improves with rest and worsens with activity, and has gotten worse today. She endorses a cough x 2 days productive of "cloudy" but nonbloody sputum, chest tightness, and headaches intermittently since then from coughing. She says she started feeling lightheaded today and noticed her O2 sat had gone down to 89% and decided to come into the ED. She denies fever, chills, sore throat, rhinorrhea, congestion, chest pain, N/V/D, abdominal pain, dysuria, swelling, or rash. She had COVID in 2021 and has received her second COVID booster. She says this feels similar to prior COPD exacerbations. She states she went through entire MDI in the past week, when normally she would do only 3 puffs per day         Review of patient's allergies indicates:   Allergen Reactions    Orange juice      Swelling in pt tongue       Past Medical History:   Diagnosis Date    CHF (congestive heart failure)     COPD (chronic obstructive pulmonary disease)     Herpes zoster without mention of complication 2011    Hypertension     Leg edema     Pulmonary hypertension     Sciatica      Past Surgical History:   Procedure Laterality Date    BREAST BIOPSY Right     core     SECTION      COLONOSCOPY N/A 2019    Procedure: COLONOSCOPY;  Surgeon: Rui Holder MD;  Location: [facility] ENDO ([address] [place] [address]);  Service: Endoscopy;  Laterality: N/A;    EPIDURAL STEROID INJECTION N/A 2020    Procedure: CERVICAL BLAKE;  Surgeon: Papito High MD;  Location: [facility] PAIN MGT;  Service: Pain Management;  Laterality: N/A;  NEEDS CONSENT    " ESOPHAGOGASTRODUODENOSCOPY N/A 12/19/2019    Procedure: EGD (ESOPHAGOGASTRODUODENOSCOPY);  Surgeon: Rui Holder MD;  Location: [facility] ([address] [place] [address]);  Service: Endoscopy;  Laterality: N/A;  2L continuous O2 via NC, COPD, pulm HTN    TRANSFORAMINAL EPIDURAL INJECTION OF STEROID Right 6/3/2021    Procedure: INJECTION, STEROID, EPIDURAL, TRANSFORAMINAL APPROACH, L4-L5;  Surgeon: Anibal Robles MD;  Location: [facility] PAIN MGT;  Service: Pain Management;  Laterality: Right;     Family History   Problem Relation Age of Onset    Heart disease Mother     Heart disease Paternal Uncle     Celiac disease Neg Hx     Colon cancer Neg Hx     Colon polyps Neg Hx     Crohn's disease Neg Hx     Cystic fibrosis Neg Hx     Esophageal cancer Neg Hx     Inflammatory bowel disease Neg Hx     Irritable bowel syndrome Neg Hx     Liver cancer Neg Hx     Liver disease Neg Hx     Rectal cancer Neg Hx     Stomach cancer Neg Hx     Ulcerative colitis Neg Hx      Social History     Tobacco Use    Smoking status: Light Tobacco Smoker     Packs/day: 0.25     Years: 40.00     Pack years: 10.00     Types: Cigarettes    Smokeless tobacco: Never Used   Substance Use Topics    Alcohol use: Not Currently     Comment: beer daily 2-3 daily, none today    Drug use: No     Review of Systems   Constitutional: Negative for chills and fever.   HENT: Negative for congestion, rhinorrhea and sore throat.    Eyes: Negative for pain and redness.   Respiratory: Positive for cough, chest tightness, shortness of breath and wheezing.    Cardiovascular: Negative for chest pain and leg swelling.   Gastrointestinal: Negative for abdominal distention, abdominal pain, diarrhea, nausea and vomiting.   Genitourinary: Negative for dysuria, flank pain and hematuria.   Musculoskeletal: Negative for back pain and neck pain.   Skin: Negative for color change and rash.   Neurological: Positive for light-headedness. Negative for dizziness, speech difficulty and  headaches.       Physical Exam     Initial Vitals [07/29/22 2134]   BP Pulse Resp Temp SpO2   134/78 90 (!) 22 99.4 °F (37.4 °C) (!) 89 %      MAP       --         Physical Exam    Constitutional: She appears well-developed and well-nourished.   Pleasant female laying in bed comfortably, no acute distress, answers all questions appropriately    HENT:   Head: Normocephalic.   Right Ear: External ear normal.   Left Ear: External ear normal.   Eyes: Conjunctivae are normal. Pupils are equal, round, and reactive to light. No scleral icterus.   Neck: Neck supple.   Cardiovascular: Normal rate and regular rhythm.   Pulmonary/Chest: No stridor.   Sitting comfortably with nasal cannula, O2 at 4 L/min. Decreased breath sounds bilaterally with scattered wheezes, speaks in full sentences without distress   Abdominal: Abdomen is soft. There is no abdominal tenderness. There is no rebound and no guarding.   Musculoskeletal:         General: No edema.      Cervical back: Neck supple.     Neurological: She is alert and oriented to person, place, and time. GCS score is 15. GCS eye subscore is 4. GCS verbal subscore is 5. GCS motor subscore is 6.   Skin: Skin is warm and dry. Capillary refill takes less than 2 seconds. No rash noted.   Psychiatric: She has a normal mood and affect.         ED Course   Procedures  Labs Reviewed   COMPREHENSIVE METABOLIC PANEL - Abnormal; Notable for the following components:       Result Value    BUN 7 (*)     All other components within normal limits   CBC W/ AUTO DIFFERENTIAL - Abnormal; Notable for the following components:    MCHC 30.8 (*)     All other components within normal limits   TROPONIN I   SARS-COV-2 RNA AMPLIFICATION, QUAL   B-TYPE NATRIURETIC PEPTIDE   HEPATITIS C ANTIBODY          Imaging Results          X-Ray Chest AP Portable (Final result)  Result time 07/30/22 01:12:40    Final result by Fatemeh Rowland MD (07/30/22 01:12:40)                 Impression:      Please see  above.      Electronically signed by: Fatemeh Rowland MD  Date:    07/30/2022  Time:    01:12             Narrative:    EXAMINATION:  XR CHEST AP PORTABLE    CLINICAL HISTORY:  Dyspnea, unspecified    TECHNIQUE:  Single frontal view of the chest was performed.    COMPARISON:  Chest radiograph 08/26/2020, chest CT 06/28/2020    FINDINGS:  Cardiac monitoring leads overlie the chest.  There is unchanged mild prominence of the cardiomediastinal silhouette.  Mediastinal structures are midline.  There is atherosclerosis of the thoracic aorta.  The lungs are symmetrically expanded with diffuse coarse interstitial attenuation and bibasilar subsegmental atelectasis.  Increased attenuation over the lower lung zones likely relates to overlying soft tissue.  No confluent airspace consolidation, large volume of pleural fluid or pneumothorax identified.  Osseous structures are intact with degenerative change.                                 Medications   albuterol-ipratropium 2.5 mg-0.5 mg/3 mL nebulizer solution 3 mL (3 mLs Nebulization Given 7/30/22 0044)   albuterol-ipratropium 2.5 mg-0.5 mg/3 mL nebulizer solution 3 mL (has no administration in time range)   predniSONE tablet 60 mg (has no administration in time range)   azithromycin tablet 250 mg (has no administration in time range)     Medical Decision Making:   History:   Old Records Summarized: records from previous admission(s).  Clinical Tests:   Lab Tests: Ordered and Reviewed  Radiological Study: Ordered and Reviewed  ED Management:  Ms. Espinoza is a 65 y/o female with PMH signifcant for chronic hypoxic respiratory failure 2/2 COPD on 2L NC O2 at home and CHF who presented with dyspnea and cough with associated headache and an episode of lightheadedness. She was hemodynamically stable and afebrile upon arrival with and O2 saturation of 89% on her baseline 2LNC O2.     Differential diagnoses were considered including but not limited to the following: ACS, PE,  pneumothorax, pneumonia, COPD exacerbation, CHF exacerbation.     Troponin negative, EKG normal, CXR without significant changes, CBC normal. Patient improved some with breathing treatment, but still requiring 4L O2 which is above her baseline of 2L O2.     Azithro and solumedrol started in ED, duonebs q2 hours. Patient agrees with plan.     Admit to observation for COPD exacerbation.   Other:   I have discussed this case with another health care provider.            Attending Attestation:   Physician Attestation Statement for Resident:  As the supervising MD   Physician Attestation Statement: I have personally seen and examined this patient.   I agree with the above history. -:   As the supervising MD I agree with the above PE.    As the supervising MD I agree with the above treatment, course, plan, and disposition.   -:   66-year-old female with history of CHF and COPD presenting to emergency department with complaint of shortness of breath, increased oxygen requirement at home.  She has gone through an entire MDI in the past week.  She has a cough that is productive of cloudy sputum.  Here she is afebrile.  She received 3 stack of DuoNebs with improvement in her lung exam, however has ongoing wheezing and increased oxygen requirement.  Labs reviewed.  No elevation of BNP.  No pneumonia, pneumothorax, or pulmonary edema on chest x-ray.  COVID negative.  Plan scheduled nebs, steroids, and admission to Internal Medicine.  I have reviewed and agree with the residents interpretation of the following: lab data, x-rays and EKG.  I have reviewed the following: old records at this facility.                ED Course as of 07/30/22 0214   Sat Jul 30, 2022   0045 Reassessed patient, she is currently getting breathing treatment and says it is helping. Will reassess lung sounds once finished.  [OW]   0057 Troponin I: <0.006 [OW]   0057 BNP: 11 [OW]   0057 SARS-CoV-2 RNA, Amplification, Qual: Negative [OW]   0057 WBC: 7.63 [OW]    0127 Re-evaluated patient after 1st breathing treatment, breath sounds mildly improved but still wheezing and decreased breath sounds. Will repeat breathing tx.  [OW]      ED Course User Index  [OW] Charissa Black MD             Clinical Impression:   Final diagnoses:  [R06.00] Dyspnea  [J44.1] COPD exacerbation (Primary)  [R06.02] SOB (shortness of breath)  [I50.9] Congestive heart failure, unspecified HF chronicity, unspecified heart failure type  [J44.1] COPD with acute exacerbation  [R05.9] Cough          ED Disposition Condition    Observation               Charissa Black MD  Resident  07/30/22 0214       Charissa Black MD  Resident  07/30/22 0216       Chelsea Hinson MD  07/30/22 1273

## 2022-07-30 NOTE — ED TRIAGE NOTES
Rochelle Espinoza, a 66 y.o. female presents to the ED w/ complaint of     Triage note:  Chief Complaint   Patient presents with    Shortness of Breath     SOB x 1 week. 88% on 2L home O2. Reports coughing up mucous.      Review of patient's allergies indicates:   Allergen Reactions    Orange juice      Swelling in pt tongue       Past Medical History:   Diagnosis Date    CHF (congestive heart failure)     COPD (chronic obstructive pulmonary disease)     Herpes zoster without mention of complication 2/21/2011    Hypertension     Leg edema     Pulmonary hypertension     Sciatica

## 2022-07-30 NOTE — ASSESSMENT & PLAN NOTE
"COPD exacerbation  Patient with Hypoxic Respiratory failure which is Acute on chronic.  she is on home oxygen at 2 LPM. Supplemental oxygen was provided and noted-  .   Signs/symptoms of respiratory failure include- tachypnea, increased work of breathing, respiratory distress and wheezing. Contributing diagnoses includes - COPD Labs and images were reviewed. Patient Has not had a recent ABG. Will treat underlying causes and adjust management of respiratory failure as follows    COPD pathway initiated.   - on 4 L NC on admit satting at low 90s (BL on 2 LNC)  - due to patient's severe dyspnea, patient GOLD group D, with 20% 3 year mortality risk. On appropriate medical management below  - home controller is Loren; will start the equivalent below:  - LABA+ICS (BREO)  - LAMA (tiotropium)  - Spirometry in 2022 shows "very severe obstruction. Lung volume determination shows TLC is normal with evidence air trapping is present."  - adding prednisone 60 mg PO daily- to complete 3 days outpatient  - started on empiric azithromycin- to complete 3 more days outpatient  - PRN lukas  - per recent pulm note, patient on 3L NC, but per patient she is on only 2L.  Patient's baseline reported as 92-93% on 2L NC, but patient 94-96% on 3L NC while hospitalized.  Patient 88-91% on 2L NC but symptoms improving so patient stable for DC.  May need home O2 adjusted to 3L but patient can check after completing full treatment for COPD at home.   "

## 2022-07-31 VITALS
DIASTOLIC BLOOD PRESSURE: 69 MMHG | HEART RATE: 92 BPM | HEIGHT: 63 IN | RESPIRATION RATE: 18 BRPM | TEMPERATURE: 100 F | WEIGHT: 138.44 LBS | SYSTOLIC BLOOD PRESSURE: 121 MMHG | OXYGEN SATURATION: 92 % | BODY MASS INDEX: 24.53 KG/M2

## 2022-07-31 LAB
ANION GAP SERPL CALC-SCNC: 10 MMOL/L (ref 8–16)
BASOPHILS # BLD AUTO: 0.02 K/UL (ref 0–0.2)
BASOPHILS NFR BLD: 0.2 % (ref 0–1.9)
BUN SERPL-MCNC: 12 MG/DL (ref 8–23)
CALCIUM SERPL-MCNC: 9.2 MG/DL (ref 8.7–10.5)
CHLORIDE SERPL-SCNC: 100 MMOL/L (ref 95–110)
CO2 SERPL-SCNC: 31 MMOL/L (ref 23–29)
CREAT SERPL-MCNC: 0.6 MG/DL (ref 0.5–1.4)
DIFFERENTIAL METHOD: ABNORMAL
EOSINOPHIL # BLD AUTO: 0.1 K/UL (ref 0–0.5)
EOSINOPHIL NFR BLD: 1.3 % (ref 0–8)
ERYTHROCYTE [DISTWIDTH] IN BLOOD BY AUTOMATED COUNT: 14.4 % (ref 11.5–14.5)
EST. GFR  (AFRICAN AMERICAN): >60 ML/MIN/1.73 M^2
EST. GFR  (NON AFRICAN AMERICAN): >60 ML/MIN/1.73 M^2
GLUCOSE SERPL-MCNC: 107 MG/DL (ref 70–110)
HCT VFR BLD AUTO: 40.9 % (ref 37–48.5)
HGB BLD-MCNC: 12.4 G/DL (ref 12–16)
IMM GRANULOCYTES # BLD AUTO: 0.02 K/UL (ref 0–0.04)
IMM GRANULOCYTES NFR BLD AUTO: 0.2 % (ref 0–0.5)
LYMPHOCYTES # BLD AUTO: 3 K/UL (ref 1–4.8)
LYMPHOCYTES NFR BLD: 34.6 % (ref 18–48)
MAGNESIUM SERPL-MCNC: 2 MG/DL (ref 1.6–2.6)
MCH RBC QN AUTO: 27.7 PG (ref 27–31)
MCHC RBC AUTO-ENTMCNC: 30.3 G/DL (ref 32–36)
MCV RBC AUTO: 92 FL (ref 82–98)
MONOCYTES # BLD AUTO: 0.9 K/UL (ref 0.3–1)
MONOCYTES NFR BLD: 10.3 % (ref 4–15)
NEUTROPHILS # BLD AUTO: 4.6 K/UL (ref 1.8–7.7)
NEUTROPHILS NFR BLD: 53.4 % (ref 38–73)
NRBC BLD-RTO: 0 /100 WBC
PHOSPHATE SERPL-MCNC: 4.5 MG/DL (ref 2.7–4.5)
PLATELET # BLD AUTO: 224 K/UL (ref 150–450)
PMV BLD AUTO: 11.5 FL (ref 9.2–12.9)
POTASSIUM SERPL-SCNC: 4.3 MMOL/L (ref 3.5–5.1)
RBC # BLD AUTO: 4.47 M/UL (ref 4–5.4)
SODIUM SERPL-SCNC: 141 MMOL/L (ref 136–145)
WBC # BLD AUTO: 8.67 K/UL (ref 3.9–12.7)

## 2022-07-31 PROCEDURE — 25000242 PHARM REV CODE 250 ALT 637 W/ HCPCS: Performed by: INTERNAL MEDICINE

## 2022-07-31 PROCEDURE — 99217 PR OBSERVATION CARE DISCHARGE: ICD-10-PCS | Mod: ,,, | Performed by: PHYSICIAN ASSISTANT

## 2022-07-31 PROCEDURE — 85025 COMPLETE CBC W/AUTO DIFF WBC: CPT | Performed by: INTERNAL MEDICINE

## 2022-07-31 PROCEDURE — 27000221 HC OXYGEN, UP TO 24 HOURS

## 2022-07-31 PROCEDURE — 25000242 PHARM REV CODE 250 ALT 637 W/ HCPCS: Performed by: PHYSICIAN ASSISTANT

## 2022-07-31 PROCEDURE — 84100 ASSAY OF PHOSPHORUS: CPT | Performed by: INTERNAL MEDICINE

## 2022-07-31 PROCEDURE — 99217 PR OBSERVATION CARE DISCHARGE: CPT | Mod: ,,, | Performed by: PHYSICIAN ASSISTANT

## 2022-07-31 PROCEDURE — G0378 HOSPITAL OBSERVATION PER HR: HCPCS

## 2022-07-31 PROCEDURE — S4991 NICOTINE PATCH NONLEGEND: HCPCS | Performed by: INTERNAL MEDICINE

## 2022-07-31 PROCEDURE — 83735 ASSAY OF MAGNESIUM: CPT | Performed by: INTERNAL MEDICINE

## 2022-07-31 PROCEDURE — 36415 COLL VENOUS BLD VENIPUNCTURE: CPT | Performed by: INTERNAL MEDICINE

## 2022-07-31 PROCEDURE — 94640 AIRWAY INHALATION TREATMENT: CPT

## 2022-07-31 PROCEDURE — 99900035 HC TECH TIME PER 15 MIN (STAT)

## 2022-07-31 PROCEDURE — 63600175 PHARM REV CODE 636 W HCPCS: Performed by: INTERNAL MEDICINE

## 2022-07-31 PROCEDURE — 25000003 PHARM REV CODE 250: Performed by: INTERNAL MEDICINE

## 2022-07-31 PROCEDURE — 94640 AIRWAY INHALATION TREATMENT: CPT | Mod: XB

## 2022-07-31 PROCEDURE — 80048 BASIC METABOLIC PNL TOTAL CA: CPT | Performed by: INTERNAL MEDICINE

## 2022-07-31 PROCEDURE — 63700000 PHARM REV CODE 250 ALT 637 W/O HCPCS: Performed by: INTERNAL MEDICINE

## 2022-07-31 PROCEDURE — 94761 N-INVAS EAR/PLS OXIMETRY MLT: CPT

## 2022-07-31 RX ORDER — PREDNISONE 20 MG/1
60 TABLET ORAL DAILY
Qty: 9 TABLET | Refills: 0 | Status: SHIPPED | OUTPATIENT
Start: 2022-08-01 | End: 2022-08-04

## 2022-07-31 RX ORDER — AZITHROMYCIN 250 MG/1
250 TABLET, FILM COATED ORAL DAILY
Qty: 3 TABLET | Refills: 0 | Status: SHIPPED | OUTPATIENT
Start: 2022-08-01 | End: 2022-08-04

## 2022-07-31 RX ORDER — IPRATROPIUM BROMIDE AND ALBUTEROL SULFATE 2.5; .5 MG/3ML; MG/3ML
3 SOLUTION RESPIRATORY (INHALATION) ONCE
Status: COMPLETED | OUTPATIENT
Start: 2022-07-31 | End: 2022-07-31

## 2022-07-31 RX ADMIN — GABAPENTIN 300 MG: 300 CAPSULE ORAL at 08:07

## 2022-07-31 RX ADMIN — NICOTINE 1 PATCH: 14 PATCH, EXTENDED RELEASE TRANSDERMAL at 08:07

## 2022-07-31 RX ADMIN — AZITHROMYCIN MONOHYDRATE 250 MG: 250 TABLET ORAL at 02:07

## 2022-07-31 RX ADMIN — IPRATROPIUM BROMIDE AND ALBUTEROL SULFATE 3 ML: 2.5; .5 SOLUTION RESPIRATORY (INHALATION) at 10:07

## 2022-07-31 RX ADMIN — FLUTICASONE FUROATE AND VILANTEROL TRIFENATATE 1 PUFF: 200; 25 POWDER RESPIRATORY (INHALATION) at 07:07

## 2022-07-31 RX ADMIN — TIOTROPIUM BROMIDE INHALATION SPRAY 2 PUFF: 3.12 SPRAY, METERED RESPIRATORY (INHALATION) at 07:07

## 2022-07-31 RX ADMIN — AMLODIPINE BESYLATE 5 MG: 5 TABLET ORAL at 08:07

## 2022-07-31 RX ADMIN — LOSARTAN POTASSIUM 100 MG: 50 TABLET, FILM COATED ORAL at 08:07

## 2022-07-31 RX ADMIN — OXYCODONE HYDROCHLORIDE AND ACETAMINOPHEN 500 MG: 500 TABLET ORAL at 08:07

## 2022-07-31 RX ADMIN — PREDNISONE 60 MG: 20 TABLET ORAL at 08:07

## 2022-07-31 RX ADMIN — ASPIRIN 81 MG: 81 TABLET, COATED ORAL at 08:07

## 2022-07-31 NOTE — PLAN OF CARE
PT AAO CALM AND COOPERATIVE. MEDICATION COMPLIANT, VSS NADN. O2 AT 3L N, ADLIB POC REVIEWED WITH PT, MEDICATION EDUCATION GIVEN, SMOKING CESSATION GIVEN. PIV REMOVED TIP INTACT PRESSURED APPLIED PT TOLERATED WELL.PERSONAL BELONGINGS AT BEDSIDE. PT TRANSPORTED BY STAFF IN  TO PRIVATE VEHICLE

## 2022-07-31 NOTE — DISCHARGE SUMMARY
[facility] - Tele[facility] Stepdown ([place] [id number])  [facility] Medicine  Discharge Summary      Patient Name: Rochelle Espinoza  MRN: [id number]  Patient Class: OP- Observation  Admission Date: 7/29/2022  Hospital Length of Stay: 0 days  Discharge Date and Time:  07/31/2022 2:49 PM  Attending Physician: Dolores Cano MD   Discharging Provider: Tereso Thornton PA-C  Primary Care Provider: Yosi Samuels MD  [facility] Medicine Team: [facility] Tereso Thornton PA-C    HPI:   65 yo female with chronic resp failure 2/2 COPD on 2L, CHF, HTN presenting for dyspnea that started about 1 week. It has gotten a little worse over the past 4 days. It gets better with rest, worsens with activity. She states that she could not relay the symptoms to any specific incident but does feel her allergies are worse, and her A/C unit in her apartment is broken and she's been using a window unit. She noticed today her oxygen had gone down to 89% and decided to come to the ED. She does have increased sputum production, denies any chest pain, abd pain, leg swelling.     In ED, given steroids, duonebs, medicine called for admit.            * No surgery found *      Hospital Course:   Patient placed in observation for COPD exacerbation.  Started on home controllers as well as prednisone 60 mg PO daily and azithromycin (for empiric pneumonia treatment).  Patient's symptoms improved on treatment.  Unable to wean her completely from 3L to 2L but likely will need to complete treatment prior to weaning (vs may be her new baseline for her GOLD D COPD?).  Patient advised to follow up with pulmonologist.  All questions answered.  Patient acknowledges understanding of discharge instructions and feels safe to discharge home. Patient discharged 7/31/2022 in stable condition.        Goals of Care Treatment Preferences:  Code Status: Full Code      Consults:   Consults (From admission, onward)        Status Ordering Provider     COPD PharmD consult  Once       "  Provider:  (Not yet assigned)    Acknowledged DONNA RUSSELL          * Acute on chronic respiratory failure with hypoxia  COPD exacerbation  Patient with Hypoxic Respiratory failure which is Acute on chronic.  she is on home oxygen at 2 LPM. Supplemental oxygen was provided and noted-  .   Signs/symptoms of respiratory failure include- tachypnea, increased work of breathing, respiratory distress and wheezing. Contributing diagnoses includes - COPD Labs and images were reviewed. Patient Has not had a recent ABG. Will treat underlying causes and adjust management of respiratory failure as follows    COPD pathway initiated.   - on 4 L NC on admit satting at low 90s (BL on 2 LNC)  - due to patient's severe dyspnea, patient GOLD group D, with 20% 3 year mortality risk. On appropriate medical management below  - home controller is Loren; will start the equivalent below:  - LABA+ICS (BREO)  - LAMA (tiotropium)  - Spirometry in 2022 shows "very severe obstruction. Lung volume determination shows TLC is normal with evidence air trapping is present."  - adding prednisone 60 mg PO daily- to complete 3 days outpatient  - started on empiric azithromycin- to complete 3 more days outpatient  - PRN duonebs  - per recent pulm note, patient on 3L NC, but per patient she is on only 2L.  Patient's baseline reported as 92-93% on 2L NC, but patient 94-96% on 3L NC while hospitalized.  Patient 88-91% on 2L NC but symptoms improving so patient stable for DC.  May need home O2 adjusted to 3L but patient can check after completing full treatment for COPD at home.     Essential hypertension  Controlled  Latest blood pressure and vitals reviewed  Home meds for hypertension were reviewed and noted below.   Hypertension Medications             amLODIPine (NORVASC) 5 MG tablet Take 1 tablet (5 mg total) by mouth once daily.    hydroCHLOROthiazide (HYDRODIURIL) 12.5 MG Tab TAKE 1 TABLET(12.5 MG) BY MOUTH DAILY AS NEEDED FOR SWELLING OR " HYPERTENSION    losartan (COZAAR) 100 MG tablet Take 1 tablet (100 mg total) by mouth once daily.        Medication adjustment for hospital antihypertensives is as follows- continue above  - Goal -140. Will utilize p.r.n. blood pressure medication only if patient's blood pressure greater than 180/110 and he develops symptoms such as worsening chest pain or shortness of breath.    Chronic obstructive pulmonary disease with hypoxia  Chronic, uncontrolled, currently see treatment plan above  - follows with pulmonology Dr. Paulino    Nicotine dependence, cigarettes, uncomplicated   Patient trying to quit, nicotine patches    Hyperlipidemia  Chronic, controlled, continue home meds      Final Active Diagnoses:    Diagnosis Date Noted POA    PRINCIPAL PROBLEM:  Acute on chronic respiratory failure with hypoxia [J96.21] 07/30/2022 Yes    Essential hypertension [I10]  Yes     Chronic    Chronic obstructive pulmonary disease with hypoxia [J44.9, R09.02] 12/30/2021 Yes     Chronic    Nicotine dependence, cigarettes, uncomplicated  [F17.210] 06/20/2022 Yes     Chronic    Hyperlipidemia [E78.5] 12/30/2021 Yes     Chronic      Problems Resolved During this Admission:       Discharged Condition: stable    Disposition: Home or Self Care    Follow Up:    Patient Instructions:      Ambulatory referral/consult to COPD Discharge Clinic   Standing Status:    Referral Priority: Routine Referral Type: Consultation   Referral Reason: Specialty Services Required   Requested Specialty: Pulmonary Disease   Number of Visits Requested: 1       Significant Diagnostic Studies: Labs:   CMP   Recent Labs   Lab 07/30/22  0009 07/31/22  0251    141   K 4.2 4.3    100   CO2 29 31*   GLU 80 107   BUN 7* 12   CREATININE 0.6 0.6   CALCIUM 9.0 9.2   PROT 7.3  --    ALBUMIN 4.0  --    BILITOT 0.3  --    ALKPHOS 55  --    AST 21  --    ALT 15  --    ANIONGAP 10 10   ESTGFRAFRICA >60.0 >60.0   EGFRNONAA >60.0 >60.0    and CBC    Recent Labs   Lab 07/30/22  0009 07/30/22  0305 07/31/22  0251   WBC 7.63 7.59 8.67   HGB 13.6 12.4 12.4   HCT 44.1 39.3 40.9    210 224       Pending Diagnostic Studies:     Procedure Component Value Units Date/Time    Hepatitis C Antibody [[id number]] Collected: 07/30/22 0009    Order Status: Sent Lab Status: In process Updated: 07/30/22 0014    Specimen: Blood          Medications:  Reconciled Home Medications:      Medication List      START taking these medications    azithromycin 250 MG tablet  Commonly known as: Z-CLARITA  Take 1 tablet (250 mg total) by mouth once daily. for 3 days  Start taking on: August 1, 2022     predniSONE 20 MG tablet  Commonly known as: DELTASONE  Take 3 tablets (60 mg total) by mouth once daily. for 3 days  Start taking on: August 1, 2022        CONTINUE taking these medications    albuterol 90 mcg/actuation inhaler  Commonly known as: PROVENTIL/VENTOLIN HFA  Inhale 2 puffs into the lungs every 6 (six) hours. Rescue     albuterol-ipratropium 2.5 mg-0.5 mg/3 mL nebulizer solution  Commonly known as: DUO-NEB  Take 3 mLs by nebulization every 4 (four) hours as needed for Wheezing. Rescue     amLODIPine 5 MG tablet  Commonly known as: NORVASC  Take 1 tablet (5 mg total) by mouth once daily.     ascorbic acid (vitamin C) 500 MG tablet  Commonly known as: VITAMIN C  Take 500 mg by mouth once daily.     aspirin 81 MG EC tablet  Commonly known as: ECOTRIN  Take 81 mg by mouth once daily. Low Dose     atorvastatin 10 MG tablet  Commonly known as: LIPITOR  Take 1 tablet (10 mg total) by mouth every evening.     cholecalciferol (vitamin D3) 25 mcg (1,000 unit) capsule  Commonly known as: VITAMIN D3  Take 1,000 Units by mouth once daily.     fluticasone-umeclidin-vilanter 100-62.5-25 mcg Dsdv  Commonly known as: TRELEGY ELLIPTA  Inhale 1 puff into the lungs once daily.     gabapentin 300 MG capsule  Commonly known as: NEURONTIN  Take 1 capsule (300 mg total) by mouth 3 (three) times  daily.     glycerin adult suppository  Place 1 suppository rectally as needed for Constipation.     hydroCHLOROthiazide 12.5 MG Tab  Commonly known as: HYDRODIURIL  TAKE 1 TABLET(12.5 MG) BY MOUTH DAILY AS NEEDED FOR SWELLING OR HYPERTENSION     losartan 100 MG tablet  Commonly known as: COZAAR  Take 1 tablet (100 mg total) by mouth once daily.     magnesium 250 mg Tab  Take 250 mg by mouth once.     * nicotine 14 mg/24 hr  Commonly known as: NICODERM CQ  Place 1 patch onto the skin once daily.     * nicotine 7 mg/24 hr  Commonly known as: NICODERM CQ  Place 1 patch onto the skin once daily.     potassium chloride SA 20 MEQ tablet  Commonly known as: K-DUR,KLOR-CON  TAKE 1 TABLET(20 MEQ) BY MOUTH EVERY DAY         * This list has 2 medication(s) that are the same as other medications prescribed for you. Read the directions carefully, and ask your doctor or other care provider to review them with you.                Indwelling Lines/Drains at time of discharge:   Lines/Drains/Airways     None                 Time spent on the discharge of patient: 38 minutes         Tereso Thornton PA-C  Department of Hospital Medicine  [facility] - Telemetry Stepdown ([place]

## 2022-07-31 NOTE — NURSING
No s/s of distress.VS stable  resp even and unlabored 02 @ 3L. Safety precautions maintained. Bed alarm activated . Bed in low position call light within reach patient instructed to call if needed will cont to monitor

## 2022-08-01 ENCOUNTER — PATIENT MESSAGE (OUTPATIENT)
Dept: INTERNAL MEDICINE | Facility: CLINIC | Age: 67
End: 2022-08-01
Payer: MEDICARE

## 2022-08-02 LAB — HCV AB SERPL QL IA: NEGATIVE

## 2022-08-03 ENCOUNTER — PATIENT MESSAGE (OUTPATIENT)
Dept: INTERNAL MEDICINE | Facility: CLINIC | Age: 67
End: 2022-08-03
Payer: MEDICARE

## 2022-08-03 RX ORDER — TRAZODONE HYDROCHLORIDE 50 MG/1
50 TABLET ORAL NIGHTLY PRN
Qty: 30 TABLET | Refills: 11 | Status: ON HOLD | OUTPATIENT
Start: 2022-08-03 | End: 2023-08-06 | Stop reason: ALTCHOICE

## 2022-08-29 ENCOUNTER — PATIENT MESSAGE (OUTPATIENT)
Dept: INTERNAL MEDICINE | Facility: CLINIC | Age: 67
End: 2022-08-29
Payer: MEDICARE

## 2022-08-30 RX ORDER — ATORVASTATIN CALCIUM 10 MG/1
10 TABLET, FILM COATED ORAL NIGHTLY
Qty: 90 TABLET | Refills: 3 | Status: SHIPPED | OUTPATIENT
Start: 2022-08-30 | End: 2023-08-24 | Stop reason: SDUPTHER

## 2022-08-30 NOTE — TELEPHONE ENCOUNTER
----- Message from Nerga Padilla sent at 8/30/2022  1:53 PM CDT -----  Contact: [phone]  [facility] pharmacy is calling to follow up on this     Requesting an RX refill or new RX.  Is this a refill or new RX: refill  RX name and strength (copy/paste from chart): atorvastatin (LIPITOR) 10 MG tablet   Is this a 30 day or 90 day RX: 90  Pharmacy name and phone # (copy/paste from chart):    [facility] ([facility]) - [facility] [address]  [address] [address]  Phone: [phone] Fax: [phone]       The doctors have asked that we provide their patients with the following 2 reminders -- prescription refills can take up to 72 hours, and a friendly reminder that in the future you can use your [facility] account to request refills: yes

## 2022-08-30 NOTE — TELEPHONE ENCOUNTER
No new care gaps identified.  [facility] Embedded Care Gaps. Reference number: [id number]. 8/30/2022   2:09:39 PM CDT

## 2022-08-31 ENCOUNTER — OFFICE VISIT (OUTPATIENT)
Dept: URGENT CARE | Facility: CLINIC | Age: 67
End: 2022-08-31
Payer: MEDICARE

## 2022-08-31 VITALS
HEART RATE: 93 BPM | WEIGHT: 138 LBS | DIASTOLIC BLOOD PRESSURE: 72 MMHG | OXYGEN SATURATION: 90 % | HEIGHT: 63 IN | RESPIRATION RATE: 20 BRPM | SYSTOLIC BLOOD PRESSURE: 109 MMHG | BODY MASS INDEX: 24.45 KG/M2 | TEMPERATURE: 99 F

## 2022-08-31 DIAGNOSIS — K04.7 INFECTION OF TOOTH: Primary | ICD-10-CM

## 2022-08-31 PROCEDURE — 3008F BODY MASS INDEX DOCD: CPT | Mod: CPTII,S$GLB,, | Performed by: NURSE PRACTITIONER

## 2022-08-31 PROCEDURE — 3008F PR BODY MASS INDEX (BMI) DOCUMENTED: ICD-10-PCS | Mod: CPTII,S$GLB,, | Performed by: NURSE PRACTITIONER

## 2022-08-31 PROCEDURE — 3078F PR MOST RECENT DIASTOLIC BLOOD PRESSURE < 80 MM HG: ICD-10-PCS | Mod: CPTII,S$GLB,, | Performed by: NURSE PRACTITIONER

## 2022-08-31 PROCEDURE — 3078F DIAST BP <80 MM HG: CPT | Mod: CPTII,S$GLB,, | Performed by: NURSE PRACTITIONER

## 2022-08-31 PROCEDURE — 1159F MED LIST DOCD IN RCRD: CPT | Mod: CPTII,S$GLB,, | Performed by: NURSE PRACTITIONER

## 2022-08-31 PROCEDURE — 1160F RVW MEDS BY RX/DR IN RCRD: CPT | Mod: CPTII,S$GLB,, | Performed by: NURSE PRACTITIONER

## 2022-08-31 PROCEDURE — 1159F PR MEDICATION LIST DOCUMENTED IN MEDICAL RECORD: ICD-10-PCS | Mod: CPTII,S$GLB,, | Performed by: NURSE PRACTITIONER

## 2022-08-31 PROCEDURE — 1125F PR PAIN SEVERITY QUANTIFIED, PAIN PRESENT: ICD-10-PCS | Mod: CPTII,S$GLB,, | Performed by: NURSE PRACTITIONER

## 2022-08-31 PROCEDURE — 1125F AMNT PAIN NOTED PAIN PRSNT: CPT | Mod: CPTII,S$GLB,, | Performed by: NURSE PRACTITIONER

## 2022-08-31 PROCEDURE — 99213 PR OFFICE/OUTPT VISIT, EST, LEVL III, 20-29 MIN: ICD-10-PCS | Mod: S$GLB,,, | Performed by: NURSE PRACTITIONER

## 2022-08-31 PROCEDURE — 3074F SYST BP LT 130 MM HG: CPT | Mod: CPTII,S$GLB,, | Performed by: NURSE PRACTITIONER

## 2022-08-31 PROCEDURE — 99213 OFFICE O/P EST LOW 20 MIN: CPT | Mod: S$GLB,,, | Performed by: NURSE PRACTITIONER

## 2022-08-31 PROCEDURE — 4010F PR ACE/ARB THEARPY RXD/TAKEN: ICD-10-PCS | Mod: CPTII,S$GLB,, | Performed by: NURSE PRACTITIONER

## 2022-08-31 PROCEDURE — 4010F ACE/ARB THERAPY RXD/TAKEN: CPT | Mod: CPTII,S$GLB,, | Performed by: NURSE PRACTITIONER

## 2022-08-31 PROCEDURE — 3074F PR MOST RECENT SYSTOLIC BLOOD PRESSURE < 130 MM HG: ICD-10-PCS | Mod: CPTII,S$GLB,, | Performed by: NURSE PRACTITIONER

## 2022-08-31 PROCEDURE — 1160F PR REVIEW ALL MEDS BY PRESCRIBER/CLIN PHARMACIST DOCUMENTED: ICD-10-PCS | Mod: CPTII,S$GLB,, | Performed by: NURSE PRACTITIONER

## 2022-08-31 RX ORDER — AMOXICILLIN AND CLAVULANATE POTASSIUM 875; 125 MG/1; MG/1
1 TABLET, FILM COATED ORAL 2 TIMES DAILY
Qty: 14 TABLET | Refills: 0 | Status: SHIPPED | OUTPATIENT
Start: 2022-08-31 | End: 2022-09-07

## 2022-08-31 NOTE — PATIENT INSTRUCTIONS
See additional patient Instructions provided    May gargle 2-3 times daily with warm salt water  Tylenol or ibuprofen as needed for pain  Ice pack to affected area as needed for comfort    Take complete course of antibiotics even if you feel better  Follow up with orthodontist in the next 48 hours.  Sooner for worsening of symptoms -     Patient Instructions   - You must understand that you have received an Urgent Care treatment only and that you may be released before all of your medical problems are known or treated.   - You, the patient, will arrange for follow up care as instructed.   - If your condition worsens or fails to improve we recommend that you receive another evaluation at the ER immediately or contact your PCP to discuss your concerns or return here.     Advised on return/follow-up precautions. Advised on ER precautions. Answered all patient questions. Patient verbalized understanding and voiced agreement with current treatment plan.

## 2022-08-31 NOTE — PROGRESS NOTES
"Subjective:       Patient ID: Rochelle Espinoza is a 67 y.o. female.    Vitals:  height is 5' 3" (1.6 m) and weight is 62.6 kg (138 lb). Her temperature is 99.1 °F (37.3 °C). Her blood pressure is 109/72 and her pulse is 93. Her respiration is 20 and oxygen saturation is 90% (abnormal).     Chief Complaint: Dental Pain    This is a 67 y.o. female who presents today with a chief complaint of   Dental pain, to R lower back molar. Pt has a dental appt on sept 9th. Pt taking Her pain meds and Gabapentin. No fever or chills    Dental Pain   This is a new problem. The current episode started in the past 7 days. The problem occurs constantly. The problem has been gradually worsening. The pain is at a severity of 10/10. The pain is severe. Associated symptoms include facial pain. Pertinent negatives include no fever or sinus pressure. She has tried NSAIDs for the symptoms. The treatment provided mild relief.     Constitution: Negative for chills, sweating, fatigue and fever.   HENT:  Positive for dental problem. Negative for ear pain, ear discharge, tinnitus, hearing loss, congestion, sinus pain, sinus pressure, sore throat, trouble swallowing and voice change.    Neck: Negative for neck pain and painful lymph nodes.   Cardiovascular:  Negative for chest pain, palpitations and sob on exertion.   Respiratory:  Negative for cough, sputum production, shortness of breath and wheezing.    Gastrointestinal:  Negative for abdominal pain, nausea, vomiting and diarrhea.   Musculoskeletal:  Negative for muscle ache.   Skin:  Negative for color change, pale and rash.   Allergic/Immunologic: Negative for sneezing.   Neurological:  Negative for headaches, numbness and tingling.   Hematologic/Lymphatic: Negative for swollen lymph nodes.     Objective:      Physical Exam   Constitutional: She is oriented to person, place, and time. She appears well-developed.  Non-toxic appearance. She does not appear ill. No distress.   HENT:   Head: " Normocephalic and atraumatic.   Ears:   Right Ear: Hearing and external ear normal.   Left Ear: Hearing and external ear normal.   Nose: Nose normal. No mucosal edema, rhinorrhea, nasal deformity or congestion. No epistaxis. Right sinus exhibits no maxillary sinus tenderness and no frontal sinus tenderness. Left sinus exhibits no maxillary sinus tenderness and no frontal sinus tenderness.   Mouth/Throat: Uvula is midline, oropharynx is clear and moist and mucous membranes are normal. No trismus in the jaw. Normal dentition. No uvula swelling. No oropharyngeal exudate, posterior oropharyngeal edema or posterior oropharyngeal erythema.       Eyes: Conjunctivae and lids are normal. No scleral icterus.   Neck: Trachea normal and phonation normal. Neck supple. No edema present. No erythema present. No neck rigidity present.   Cardiovascular: Normal rate.   Pulmonary/Chest: Effort normal. No respiratory distress. She has no decreased breath sounds.   Abdominal: Normal appearance.   Musculoskeletal: Normal range of motion.         General: No deformity. Normal range of motion.   Neurological: She is alert and oriented to person, place, and time. She exhibits normal muscle tone. Coordination normal.   Skin: Skin is warm, dry, intact, not diaphoretic and not pale.   Nursing note and vitals reviewed.      Assessment:       1. Infection of tooth          Plan:         Infection of tooth  -     amoxicillin-clavulanate 875-125mg (AUGMENTIN) 875-125 mg per tablet; Take 1 tablet by mouth 2 (two) times daily. for 7 days  Dispense: 14 tablet; Refill: 0               Patient Instructions   See additional patient Instructions provided    May gargle 2-3 times daily with warm salt water  Tylenol or ibuprofen as needed for pain  Ice pack to affected area as needed for comfort    Take complete course of antibiotics even if you feel better  Follow up with orthodontist in the next 48 hours.  Sooner for worsening of symptoms -     Patient  Instructions   - You must understand that you have received an Urgent Care treatment only and that you may be released before all of your medical problems are known or treated.   - You, the patient, will arrange for follow up care as instructed.   - If your condition worsens or fails to improve we recommend that you receive another evaluation at the ER immediately or contact your PCP to discuss your concerns or return here.     Advised on return/follow-up precautions. Advised on ER precautions. Answered all patient questions. Patient verbalized understanding and voiced agreement with current treatment plan.

## 2022-10-05 DIAGNOSIS — Z78.0 MENOPAUSE: ICD-10-CM

## 2022-10-06 ENCOUNTER — IMMUNIZATION (OUTPATIENT)
Dept: INTERNAL MEDICINE | Facility: CLINIC | Age: 67
End: 2022-10-06
Payer: MEDICARE

## 2022-10-06 ENCOUNTER — OFFICE VISIT (OUTPATIENT)
Dept: INTERNAL MEDICINE | Facility: CLINIC | Age: 67
End: 2022-10-06
Payer: MEDICARE

## 2022-10-06 VITALS
HEIGHT: 63 IN | WEIGHT: 133.63 LBS | SYSTOLIC BLOOD PRESSURE: 118 MMHG | DIASTOLIC BLOOD PRESSURE: 78 MMHG | BODY MASS INDEX: 23.68 KG/M2

## 2022-10-06 DIAGNOSIS — M54.9 MECHANICAL BACK PAIN: ICD-10-CM

## 2022-10-06 DIAGNOSIS — F17.210 NICOTINE DEPENDENCE, CIGARETTES, UNCOMPLICATED: Primary | Chronic | ICD-10-CM

## 2022-10-06 DIAGNOSIS — Z23 NEEDS FLU SHOT: Primary | ICD-10-CM

## 2022-10-06 DIAGNOSIS — I10 ESSENTIAL HYPERTENSION: Chronic | ICD-10-CM

## 2022-10-06 DIAGNOSIS — F32.A DEPRESSION, UNSPECIFIED DEPRESSION TYPE: ICD-10-CM

## 2022-10-06 DIAGNOSIS — J44.9 CHRONIC OBSTRUCTIVE PULMONARY DISEASE WITH HYPOXIA: Chronic | ICD-10-CM

## 2022-10-06 PROCEDURE — 99214 PR OFFICE/OUTPT VISIT, EST, LEVL IV, 30-39 MIN: ICD-10-PCS | Mod: S$GLB,,, | Performed by: INTERNAL MEDICINE

## 2022-10-06 PROCEDURE — 3288F PR FALLS RISK ASSESSMENT DOCUMENTED: ICD-10-PCS | Mod: CPTII,S$GLB,, | Performed by: INTERNAL MEDICINE

## 2022-10-06 PROCEDURE — 3078F PR MOST RECENT DIASTOLIC BLOOD PRESSURE < 80 MM HG: ICD-10-PCS | Mod: CPTII,S$GLB,, | Performed by: INTERNAL MEDICINE

## 2022-10-06 PROCEDURE — 3288F FALL RISK ASSESSMENT DOCD: CPT | Mod: CPTII,S$GLB,, | Performed by: INTERNAL MEDICINE

## 2022-10-06 PROCEDURE — 3078F DIAST BP <80 MM HG: CPT | Mod: CPTII,S$GLB,, | Performed by: INTERNAL MEDICINE

## 2022-10-06 PROCEDURE — G0008 FLU VACCINE - QUADRIVALENT - ADJUVANTED: ICD-10-PCS | Mod: S$GLB,,, | Performed by: INTERNAL MEDICINE

## 2022-10-06 PROCEDURE — 99999 PR PBB SHADOW E&M-EST. PATIENT-LVL III: ICD-10-PCS | Mod: PBBFAC,,, | Performed by: INTERNAL MEDICINE

## 2022-10-06 PROCEDURE — G0008 ADMIN INFLUENZA VIRUS VAC: HCPCS | Mod: S$GLB,,, | Performed by: INTERNAL MEDICINE

## 2022-10-06 PROCEDURE — 99214 OFFICE O/P EST MOD 30 MIN: CPT | Mod: S$GLB,,, | Performed by: INTERNAL MEDICINE

## 2022-10-06 PROCEDURE — 1126F PR PAIN SEVERITY QUANTIFIED, NO PAIN PRESENT: ICD-10-PCS | Mod: CPTII,S$GLB,, | Performed by: INTERNAL MEDICINE

## 2022-10-06 PROCEDURE — 4010F PR ACE/ARB THEARPY RXD/TAKEN: ICD-10-PCS | Mod: CPTII,S$GLB,, | Performed by: INTERNAL MEDICINE

## 2022-10-06 PROCEDURE — 99999 PR PBB SHADOW E&M-EST. PATIENT-LVL III: CPT | Mod: PBBFAC,,, | Performed by: INTERNAL MEDICINE

## 2022-10-06 PROCEDURE — 1101F PT FALLS ASSESS-DOCD LE1/YR: CPT | Mod: CPTII,S$GLB,, | Performed by: INTERNAL MEDICINE

## 2022-10-06 PROCEDURE — 1101F PR PT FALLS ASSESS DOC 0-1 FALLS W/OUT INJ PAST YR: ICD-10-PCS | Mod: CPTII,S$GLB,, | Performed by: INTERNAL MEDICINE

## 2022-10-06 PROCEDURE — 3074F PR MOST RECENT SYSTOLIC BLOOD PRESSURE < 130 MM HG: ICD-10-PCS | Mod: CPTII,S$GLB,, | Performed by: INTERNAL MEDICINE

## 2022-10-06 PROCEDURE — 3074F SYST BP LT 130 MM HG: CPT | Mod: CPTII,S$GLB,, | Performed by: INTERNAL MEDICINE

## 2022-10-06 PROCEDURE — 90694 VACC AIIV4 NO PRSRV 0.5ML IM: CPT | Mod: S$GLB,,, | Performed by: INTERNAL MEDICINE

## 2022-10-06 PROCEDURE — 1126F AMNT PAIN NOTED NONE PRSNT: CPT | Mod: CPTII,S$GLB,, | Performed by: INTERNAL MEDICINE

## 2022-10-06 PROCEDURE — 4010F ACE/ARB THERAPY RXD/TAKEN: CPT | Mod: CPTII,S$GLB,, | Performed by: INTERNAL MEDICINE

## 2022-10-06 PROCEDURE — 1159F MED LIST DOCD IN RCRD: CPT | Mod: CPTII,S$GLB,, | Performed by: INTERNAL MEDICINE

## 2022-10-06 PROCEDURE — 1159F PR MEDICATION LIST DOCUMENTED IN MEDICAL RECORD: ICD-10-PCS | Mod: CPTII,S$GLB,, | Performed by: INTERNAL MEDICINE

## 2022-10-06 PROCEDURE — 90694 FLU VACCINE - QUADRIVALENT - ADJUVANTED: ICD-10-PCS | Mod: S$GLB,,, | Performed by: INTERNAL MEDICINE

## 2022-10-06 RX ORDER — HYDROCHLOROTHIAZIDE 12.5 MG/1
12.5 TABLET ORAL DAILY
Qty: 90 TABLET | Refills: 3 | Status: ON HOLD | OUTPATIENT
Start: 2022-10-06 | End: 2023-08-18 | Stop reason: HOSPADM

## 2022-10-06 RX ORDER — BUPROPION HYDROCHLORIDE 150 MG/1
150 TABLET ORAL DAILY
Qty: 90 TABLET | Refills: 3 | Status: ON HOLD | OUTPATIENT
Start: 2022-10-06 | End: 2023-08-06 | Stop reason: ALTCHOICE

## 2022-10-06 RX ORDER — AMLODIPINE BESYLATE 5 MG/1
5 TABLET ORAL DAILY
Qty: 90 TABLET | Refills: 3 | Status: SHIPPED | OUTPATIENT
Start: 2022-10-06 | End: 2022-11-21

## 2022-10-06 NOTE — PROGRESS NOTES
Two pt identifier verified. Pt tolerated well. Advised pt to wait 15 minutes post immunization. Pt verbalized understanding.    Alexis JALLOH

## 2022-10-06 NOTE — PROGRESS NOTES
Subjective     Chief Complaint:    History of Present Illness:  Ms. Rochelle Espinoza is a 67 y.o. female with COPD on 3 L, CHF, HTN, GERD who presents for 6 month follow up. She also presents with complaint of cough, productive of white/foamy mucus x 2 weeks and acute on chronic back pain , worsened within the last 2 weeks. Patient was recently hospitalized in July for COPD exacerbation, treated with prednisone/azithromycin and discharged. She has since gone up to 3 L oxygen requirement (previously 2 L) at home. She states with in the last 2 weeks her and her daughter have been coughing. Her daughter reportedly has allergies. Patient states she has had seasonal allergies in the past as well.Patient has congestion, rhinorrhea, and post nasal drip associated with the cough. Also, patient has been having increased acid reflux within the last 2 weeks; she denies changes in diet, dysphagia, odynophagia, nocturnal cough. She has been on pantoprazole in the past which she states helped, but stopped taking it because she didn't need it anymore. Patient follows closely with her pulmnologist for her COPD, last visit 6/2022. CT chest  at time showed her COPD was stable. She has recently restarted smoking 1 month ago, reports having tried nicotine patches and gum and was able to stop smoking briefly.    Patients back pain is located in the midback, paraspinal, and spreads to the bilateral ribs    Insomnia has improved since introduction of trazodone. Patient reports have great difficulty sleeping prior.    Patient reports being increasingly depressed lately. When pressed on the matter, she began to cry. She struggles with episodic anxiety and reports having difficulty with the logistics/limitations of having COPD. She is unable to go out as much as she used to, and does not want to be a burden on her loved ones. She denies suicidal ideation.    Hypertension is stable, on amlodipine, HCTZ, Losartan  /78 on this  visit    Review of Systems   Constitutional:  Negative for chills and fever.   HENT:  Positive for congestion. Negative for ear pain, sinus pain and sore throat.    Eyes:  Negative for blurred vision.   Respiratory:  Positive for sputum production (white/foamy) and shortness of breath (chronic stable). Negative for cough and hemoptysis.    Cardiovascular:  Negative for chest pain, palpitations, orthopnea and leg swelling.   Gastrointestinal:  Positive for heartburn. Negative for abdominal pain, blood in stool, constipation, diarrhea, nausea and vomiting.   Genitourinary:  Negative for dysuria, frequency, hematuria and urgency.   Musculoskeletal:  Negative for neck pain.   Neurological:  Negative for dizziness and headaches.   Psychiatric/Behavioral:  Negative for suicidal ideas. The patient has insomnia.    All other systems reviewed and are negative.    PAST HISTORY:     Past Medical History:   Diagnosis Date    CHF (congestive heart failure)     COPD (chronic obstructive pulmonary disease)     Herpes zoster without mention of complication 2011    Hypertension     Leg edema     Pulmonary hypertension     Sciatica        Past Surgical History:   Procedure Laterality Date    BREAST BIOPSY Right     core     SECTION      COLONOSCOPY N/A 2019    Procedure: COLONOSCOPY;  Surgeon: Rui Holder MD;  Location: [address]);  Service: Endoscopy;  Laterality: N/A;    EPIDURAL STEROID INJECTION N/A 2020    Procedure: CERVICAL BLAKE;  Surgeon: Papito High MD;  Location: [facility];  Service: Pain Management;  Laterality: N/A;  NEEDS CONSENT    ESOPHAGOGASTRODUODENOSCOPY N/A 2019    Procedure: EGD (ESOPHAGOGASTRODUODENOSCOPY);  Surgeon: Riu Holder MD;  Location: [address]);  Service: Endoscopy;  Laterality: N/A;  2L continuous O2 via NC, COPD, pulm HTN    TRANSFORAMINAL EPIDURAL INJECTION OF STEROID Right 6/3/2021    Procedure: INJECTION, STEROID, EPIDURAL, TRANSFORAMINAL  APPROACH, L4-L5;  Surgeon: Anibal Robles MD;  Location: [facility];  Service: Pain Management;  Laterality: Right;       Family History   Problem Relation Age of Onset    Heart disease Mother     Heart disease Paternal Uncle     Celiac disease Neg Hx     Colon cancer Neg Hx     Colon polyps Neg Hx     Crohn's disease Neg Hx     Cystic fibrosis Neg Hx     Esophageal cancer Neg Hx     Inflammatory bowel disease Neg Hx     Irritable bowel syndrome Neg Hx     Liver cancer Neg Hx     Liver disease Neg Hx     Rectal cancer Neg Hx     Stomach cancer Neg Hx     Ulcerative colitis Neg Hx        Social History     Tobacco Use    Smoking status: Light Smoker     Packs/day: 0.25     Years: 40.00     Pack years: 10.00     Types: Cigarettes    Smokeless tobacco: Never   Substance Use Topics    Alcohol use: Not Currently     Comment: beer daily 2-3 daily, none today    Drug use: No       MEDICATIONS & ALLERGIES:     Current Outpatient Medications on File Prior to Visit   Medication Sig    albuterol (PROVENTIL/VENTOLIN HFA) 90 mcg/actuation inhaler Inhale 2 puffs into the lungs every 6 (six) hours. Rescue    albuterol-ipratropium (DUO-NEB) 2.5 mg-0.5 mg/3 mL nebulizer solution Take 3 mLs by nebulization every 4 (four) hours as needed for Wheezing. Rescue    ascorbic acid, vitamin C, (VITAMIN C) 500 MG tablet Take 500 mg by mouth once daily.    aspirin (ECOTRIN) 81 MG EC tablet Take 81 mg by mouth once daily. Low Dose    atorvastatin (LIPITOR) 10 MG tablet Take 1 tablet (10 mg total) by mouth every evening.    cholecalciferol, vitamin D3, (VITAMIN D3) 25 mcg (1,000 unit) capsule Take 1,000 Units by mouth once daily.    gabapentin (NEURONTIN) 300 MG capsule Take 1 capsule (300 mg total) by mouth 3 (three) times daily.    glycerin adult suppository Place 1 suppository rectally as needed for Constipation.    losartan (COZAAR) 100 MG tablet Take 1 tablet (100 mg total) by mouth once daily.    magnesium 250 mg Tab Take 250 mg by  "mouth once.    nicotine (NICODERM CQ) 14 mg/24 hr Place 1 patch onto the skin once daily.    nicotine (NICODERM CQ) 7 mg/24 hr Place 1 patch onto the skin once daily.    potassium chloride SA (K-DUR,KLOR-CON) 20 MEQ tablet TAKE 1 TABLET(20 MEQ) BY MOUTH EVERY DAY    traZODone (DESYREL) 50 MG tablet Take 1 tablet (50 mg total) by mouth nightly as needed for Insomnia.    TRELEGY ELLIPTA 100-62.5-25 mcg DsDv INHALE 1 PUFF EVERY DAY    [DISCONTINUED] amLODIPine (NORVASC) 5 MG tablet Take 1 tablet (5 mg total) by mouth once daily.    [DISCONTINUED] hydroCHLOROthiazide (HYDRODIURIL) 12.5 MG Tab Take 1 tablet (12.5 mg total) by mouth once daily.     No current facility-administered medications on file prior to visit.       Review of patient's allergies indicates:   Allergen Reactions    Orange juice      Swelling in pt tongue         OBJECTIVE:     Vital Signs:  Vitals:    10/06/22 0923   BP: 118/78   BP Location: Left arm   Patient Position: Sitting   BP Method: Medium (Manual)   Weight: 60.6 kg (133 lb 9.6 oz)   Height: 5' 3" (1.6 m)       Body mass index is 23.67 kg/m².     Physical Exam  Vitals reviewed.   Constitutional:       General: She is not in acute distress.     Appearance: Normal appearance. She is not ill-appearing.   HENT:      Head: Normocephalic and atraumatic.      Right Ear: External ear normal.      Left Ear: External ear normal.      Nose: Nose normal. No congestion or rhinorrhea.      Mouth/Throat:      Mouth: Mucous membranes are moist.   Eyes:      General:         Right eye: No discharge.         Left eye: No discharge.      Extraocular Movements: Extraocular movements intact.   Neck:      Vascular: No carotid bruit.   Cardiovascular:      Rate and Rhythm: Normal rate and regular rhythm.      Heart sounds: Normal heart sounds. No murmur heard.    No gallop.   Pulmonary:      Effort: Pulmonary effort is normal. No respiratory distress.      Breath sounds: Normal breath sounds.      Comments: Nasal " canula present  Abdominal:      General: Abdomen is flat. There is no distension.      Palpations: Abdomen is soft.      Tenderness: There is no abdominal tenderness. There is no guarding.   Musculoskeletal:      Cervical back: Normal range of motion. No tenderness.      Right lower leg: No edema.      Left lower leg: No edema.   Skin:     General: Skin is warm and dry.      Coloration: Skin is not jaundiced.   Neurological:      General: No focal deficit present.      Mental Status: She is alert and oriented to person, place, and time.   Psychiatric:         Mood and Affect: Mood normal.         Behavior: Behavior normal.       Health Maintenance Due   Topic Date Due    COVID-19 Vaccine (4 - Booster for Pfizer series) 05/31/2022    Mammogram  08/03/2022    DEXA Scan  08/28/2022         ASSESSMENT & PLAN:   Ms. Rochelle Espinoza is a 67 y.o. female with well controlled HTN and     Nicotine dependence, cigarettes, uncomplicated   Counseled on importance of cessation  Bupropion may help    Essential hypertension  BP well controlled  Continue amlodipine 5 mg daily, HCTZ 12.5mg daily, losartan 100mg daily    Chronic obstructive pulmonary disease with hypoxia  Recent increase in O2 requirments, restarted smoking.   Counseled on smoking cessation  Bupropion prescribed to help with depression, added benefit of smoking cessation    Mechanical back pain  Likely musculoskeletal, related to cough.  Improving, will continue to monitor    Depression, unspecified depression type    Other orders  -     buPROPion (WELLBUTRIN XL) 150 MG TB24 tablet; Take 1 tablet (150 mg total) by mouth once daily.  Dispense: 90 tablet; Refill: 3  -     hydroCHLOROthiazide (HYDRODIURIL) 12.5 MG Tab; Take 1 tablet (12.5 mg total) by mouth once daily.  Dispense: 90 tablet; Refill: 3  -     amLODIPine (NORVASC) 5 MG tablet; Take 1 tablet (5 mg total) by mouth once daily.  Dispense: 90 tablet; Refill: 3    RTC in 3 months    Discussed with Dr. Samuels - staff  attestation to follow    Dhruv Holland MD  Internal Medicine, PGY-I  [facility]  [address], [place] [id number] [phone]

## 2022-10-17 ENCOUNTER — PATIENT MESSAGE (OUTPATIENT)
Dept: INTERNAL MEDICINE | Facility: CLINIC | Age: 67
End: 2022-10-17

## 2022-10-17 NOTE — PROGRESS NOTES
"I have personally taken the history and examined this patient and agree with the resident's note as stated above with the following thoughts:  /78 (BP Location: Left arm, Patient Position: Sitting, BP Method: Medium (Manual))   Ht 5' 3" (1.6 m)   Wt 60.6 kg (133 lb 9.6 oz)   LMP 11/21/2013   BMI 23.67 kg/m²     Discussed getting vaccines up to date.  Discussed importance of stopping smoking totally.  We all her to get signed up in the smoking cessation program.  We will try some Wellbutrin and I will see her back again in 3 months.  "

## 2022-10-21 ENCOUNTER — PATIENT MESSAGE (OUTPATIENT)
Dept: PULMONOLOGY | Facility: CLINIC | Age: 67
End: 2022-10-21

## 2022-10-22 RX ORDER — FLUTICASONE PROPIONATE AND SALMETEROL XINAFOATE 230; 21 UG/1; UG/1
2 AEROSOL, METERED RESPIRATORY (INHALATION) 2 TIMES DAILY
Qty: 36 G | Refills: 3 | Status: ON HOLD | OUTPATIENT
Start: 2022-10-22 | End: 2022-10-25 | Stop reason: HOSPADM

## 2022-10-23 ENCOUNTER — HOSPITAL ENCOUNTER (OUTPATIENT)
Facility: HOSPITAL | Age: 67
Discharge: HOME | End: 2022-10-25
Attending: EMERGENCY MEDICINE | Admitting: STUDENT IN AN ORGANIZED HEALTH CARE EDUCATION/TRAINING PROGRAM
Payer: MEDICARE

## 2022-10-23 DIAGNOSIS — J44.1 COPD WITH ACUTE EXACERBATION: ICD-10-CM

## 2022-10-23 DIAGNOSIS — R07.9 CHEST PAIN: ICD-10-CM

## 2022-10-23 DIAGNOSIS — R06.02 SOB (SHORTNESS OF BREATH): ICD-10-CM

## 2022-10-23 DIAGNOSIS — J44.1 COPD EXACERBATION: Primary | ICD-10-CM

## 2022-10-23 PROBLEM — E78.2 MIXED HYPERLIPIDEMIA: Status: ACTIVE | Noted: 2021-12-30

## 2022-10-23 PROBLEM — F33.41 RECURRENT MAJOR DEPRESSIVE DISORDER, IN PARTIAL REMISSION: Status: ACTIVE | Noted: 2022-10-23

## 2022-10-23 PROBLEM — G62.9 PERIPHERAL POLYNEUROPATHY: Status: ACTIVE | Noted: 2022-10-23

## 2022-10-23 PROBLEM — E55.9 VITAMIN D DEFICIENCY: Status: ACTIVE | Noted: 2022-10-23

## 2022-10-23 PROBLEM — I50.32 CHRONIC DIASTOLIC HEART FAILURE: Status: ACTIVE | Noted: 2022-10-23

## 2022-10-23 LAB
ADENOVIRUS: NOT DETECTED
ALBUMIN SERPL BCP-MCNC: 4.1 G/DL (ref 3.5–5.2)
ALP SERPL-CCNC: 51 U/L (ref 55–135)
ALT SERPL W/O P-5'-P-CCNC: 11 U/L (ref 10–44)
ANION GAP SERPL CALC-SCNC: 8 MMOL/L (ref 8–16)
AST SERPL-CCNC: 17 U/L (ref 10–40)
BASOPHILS # BLD AUTO: 0.03 K/UL (ref 0–0.2)
BASOPHILS NFR BLD: 0.4 % (ref 0–1.9)
BILIRUB SERPL-MCNC: 0.3 MG/DL (ref 0.1–1)
BILIRUB UR QL STRIP: NEGATIVE
BNP SERPL-MCNC: <10 PG/ML (ref 0–99)
BORDETELLA PARAPERTUSSIS (IS1001): NOT DETECTED
BORDETELLA PERTUSSIS (PTXP): NOT DETECTED
BUN SERPL-MCNC: 7 MG/DL (ref 8–23)
CALCIUM SERPL-MCNC: 9.6 MG/DL (ref 8.7–10.5)
CHLAMYDIA PNEUMONIAE: NOT DETECTED
CHLORIDE SERPL-SCNC: 99 MMOL/L (ref 95–110)
CLARITY UR REFRACT.AUTO: CLEAR
CO2 SERPL-SCNC: 33 MMOL/L (ref 23–29)
COLOR UR AUTO: COLORLESS
CORONAVIRUS 229E, COMMON COLD VIRUS: NOT DETECTED
CORONAVIRUS HKU1, COMMON COLD VIRUS: NOT DETECTED
CORONAVIRUS NL63, COMMON COLD VIRUS: NOT DETECTED
CORONAVIRUS OC43, COMMON COLD VIRUS: NOT DETECTED
CREAT SERPL-MCNC: 0.6 MG/DL (ref 0.5–1.4)
DIFFERENTIAL METHOD: ABNORMAL
EOSINOPHIL # BLD AUTO: 0.2 K/UL (ref 0–0.5)
EOSINOPHIL NFR BLD: 2 % (ref 0–8)
ERYTHROCYTE [DISTWIDTH] IN BLOOD BY AUTOMATED COUNT: 14.9 % (ref 11.5–14.5)
EST. GFR  (NO RACE VARIABLE): >60 ML/MIN/1.73 M^2
FLUBV RNA NPH QL NAA+NON-PROBE: NOT DETECTED
GLUCOSE SERPL-MCNC: 88 MG/DL (ref 70–110)
GLUCOSE UR QL STRIP: ABNORMAL
HCT VFR BLD AUTO: 43.7 % (ref 37–48.5)
HGB BLD-MCNC: 13 G/DL (ref 12–16)
HGB UR QL STRIP: NEGATIVE
HPIV1 RNA NPH QL NAA+NON-PROBE: NOT DETECTED
HPIV2 RNA NPH QL NAA+NON-PROBE: NOT DETECTED
HPIV3 RNA NPH QL NAA+NON-PROBE: NOT DETECTED
HPIV4 RNA NPH QL NAA+NON-PROBE: NOT DETECTED
HUMAN METAPNEUMOVIRUS: NOT DETECTED
IMM GRANULOCYTES # BLD AUTO: 0.02 K/UL (ref 0–0.04)
IMM GRANULOCYTES NFR BLD AUTO: 0.2 % (ref 0–0.5)
INFLUENZA A (SUBTYPES H1,H1-2009,H3): NOT DETECTED
INFLUENZA A, MOLECULAR: NEGATIVE
INFLUENZA B, MOLECULAR: NEGATIVE
KETONES UR QL STRIP: NEGATIVE
LEUKOCYTE ESTERASE UR QL STRIP: NEGATIVE
LYMPHOCYTES # BLD AUTO: 2.5 K/UL (ref 1–4.8)
LYMPHOCYTES NFR BLD: 29.1 % (ref 18–48)
MAGNESIUM SERPL-MCNC: 1.8 MG/DL (ref 1.6–2.6)
MCH RBC QN AUTO: 28.1 PG (ref 27–31)
MCHC RBC AUTO-ENTMCNC: 29.7 G/DL (ref 32–36)
MCV RBC AUTO: 95 FL (ref 82–98)
MONOCYTES # BLD AUTO: 0.8 K/UL (ref 0.3–1)
MONOCYTES NFR BLD: 9.7 % (ref 4–15)
MYCOPLASMA PNEUMONIAE: NOT DETECTED
NEUTROPHILS # BLD AUTO: 5 K/UL (ref 1.8–7.7)
NEUTROPHILS NFR BLD: 58.6 % (ref 38–73)
NITRITE UR QL STRIP: NEGATIVE
NRBC BLD-RTO: 0 /100 WBC
PH UR STRIP: 7 [PH] (ref 5–8)
PLATELET # BLD AUTO: 219 K/UL (ref 150–450)
PMV BLD AUTO: 12 FL (ref 9.2–12.9)
POTASSIUM SERPL-SCNC: 4.1 MMOL/L (ref 3.5–5.1)
PROCALCITONIN SERPL IA-MCNC: <0.02 NG/ML
PROT SERPL-MCNC: 7.3 G/DL (ref 6–8.4)
PROT UR QL STRIP: NEGATIVE
RBC # BLD AUTO: 4.62 M/UL (ref 4–5.4)
RESPIRATORY INFECTION PANEL SOURCE: NORMAL
RSV RNA NPH QL NAA+NON-PROBE: NOT DETECTED
RV+EV RNA NPH QL NAA+NON-PROBE: NOT DETECTED
SARS-COV-2 RDRP RESP QL NAA+PROBE: NEGATIVE
SARS-COV-2 RNA RESP QL NAA+PROBE: NOT DETECTED
SODIUM SERPL-SCNC: 140 MMOL/L (ref 136–145)
SP GR UR STRIP: 1 (ref 1–1.03)
SPECIMEN SOURCE: NORMAL
TROPONIN I SERPL DL<=0.01 NG/ML-MCNC: <0.006 NG/ML (ref 0–0.03)
URN SPEC COLLECT METH UR: ABNORMAL
WBC # BLD AUTO: 8.52 K/UL (ref 3.9–12.7)

## 2022-10-23 PROCEDURE — 83735 ASSAY OF MAGNESIUM: CPT | Performed by: EMERGENCY MEDICINE

## 2022-10-23 PROCEDURE — G0378 HOSPITAL OBSERVATION PER HR: HCPCS

## 2022-10-23 PROCEDURE — 99220 PR INITIAL OBSERVATION CARE,LEVL III: ICD-10-PCS | Mod: ,,, | Performed by: STUDENT IN AN ORGANIZED HEALTH CARE EDUCATION/TRAINING PROGRAM

## 2022-10-23 PROCEDURE — 93010 EKG 12-LEAD: ICD-10-PCS | Mod: ,,, | Performed by: INTERNAL MEDICINE

## 2022-10-23 PROCEDURE — 87502 INFLUENZA DNA AMP PROBE: CPT | Performed by: STUDENT IN AN ORGANIZED HEALTH CARE EDUCATION/TRAINING PROGRAM

## 2022-10-23 PROCEDURE — 25000242 PHARM REV CODE 250 ALT 637 W/ HCPCS: Performed by: EMERGENCY MEDICINE

## 2022-10-23 PROCEDURE — 93005 ELECTROCARDIOGRAM TRACING: CPT

## 2022-10-23 PROCEDURE — 1158F ADVNC CARE PLAN TLK DOCD: CPT | Mod: CPTII,,, | Performed by: STUDENT IN AN ORGANIZED HEALTH CARE EDUCATION/TRAINING PROGRAM

## 2022-10-23 PROCEDURE — 87633 RESP VIRUS 12-25 TARGETS: CPT | Performed by: STUDENT IN AN ORGANIZED HEALTH CARE EDUCATION/TRAINING PROGRAM

## 2022-10-23 PROCEDURE — 80053 COMPREHEN METABOLIC PANEL: CPT | Performed by: EMERGENCY MEDICINE

## 2022-10-23 PROCEDURE — 94640 AIRWAY INHALATION TREATMENT: CPT | Mod: XB

## 2022-10-23 PROCEDURE — 99285 PR EMERGENCY DEPT VISIT,LEVEL V: ICD-10-PCS | Mod: CS,,, | Performed by: EMERGENCY MEDICINE

## 2022-10-23 PROCEDURE — 96367 TX/PROPH/DG ADDL SEQ IV INF: CPT

## 2022-10-23 PROCEDURE — 25000003 PHARM REV CODE 250: Performed by: STUDENT IN AN ORGANIZED HEALTH CARE EDUCATION/TRAINING PROGRAM

## 2022-10-23 PROCEDURE — 27000221 HC OXYGEN, UP TO 24 HOURS

## 2022-10-23 PROCEDURE — 84484 ASSAY OF TROPONIN QUANT: CPT | Performed by: EMERGENCY MEDICINE

## 2022-10-23 PROCEDURE — 96372 THER/PROPH/DIAG INJ SC/IM: CPT | Performed by: STUDENT IN AN ORGANIZED HEALTH CARE EDUCATION/TRAINING PROGRAM

## 2022-10-23 PROCEDURE — 81003 URINALYSIS AUTO W/O SCOPE: CPT | Performed by: EMERGENCY MEDICINE

## 2022-10-23 PROCEDURE — 99285 EMERGENCY DEPT VISIT HI MDM: CPT | Mod: CS,,, | Performed by: EMERGENCY MEDICINE

## 2022-10-23 PROCEDURE — 96365 THER/PROPH/DIAG IV INF INIT: CPT

## 2022-10-23 PROCEDURE — U0002 COVID-19 LAB TEST NON-CDC: HCPCS | Performed by: EMERGENCY MEDICINE

## 2022-10-23 PROCEDURE — 25000003 PHARM REV CODE 250: Performed by: EMERGENCY MEDICINE

## 2022-10-23 PROCEDURE — 63600175 PHARM REV CODE 636 W HCPCS: Performed by: EMERGENCY MEDICINE

## 2022-10-23 PROCEDURE — 63600175 PHARM REV CODE 636 W HCPCS: Performed by: STUDENT IN AN ORGANIZED HEALTH CARE EDUCATION/TRAINING PROGRAM

## 2022-10-23 PROCEDURE — 99285 EMERGENCY DEPT VISIT HI MDM: CPT | Mod: 25

## 2022-10-23 PROCEDURE — 1158F PR ADVANCE CARE PLANNING DISCUSS DOCUMENTED IN MEDICAL RECORD: ICD-10-PCS | Mod: CPTII,,, | Performed by: STUDENT IN AN ORGANIZED HEALTH CARE EDUCATION/TRAINING PROGRAM

## 2022-10-23 PROCEDURE — 93010 ELECTROCARDIOGRAM REPORT: CPT | Mod: ,,, | Performed by: INTERNAL MEDICINE

## 2022-10-23 PROCEDURE — S4991 NICOTINE PATCH NONLEGEND: HCPCS | Performed by: STUDENT IN AN ORGANIZED HEALTH CARE EDUCATION/TRAINING PROGRAM

## 2022-10-23 PROCEDURE — 85025 COMPLETE CBC W/AUTO DIFF WBC: CPT | Performed by: EMERGENCY MEDICINE

## 2022-10-23 PROCEDURE — 94761 N-INVAS EAR/PLS OXIMETRY MLT: CPT

## 2022-10-23 PROCEDURE — 84145 PROCALCITONIN (PCT): CPT | Performed by: STUDENT IN AN ORGANIZED HEALTH CARE EDUCATION/TRAINING PROGRAM

## 2022-10-23 PROCEDURE — 99220 PR INITIAL OBSERVATION CARE,LEVL III: CPT | Mod: ,,, | Performed by: STUDENT IN AN ORGANIZED HEALTH CARE EDUCATION/TRAINING PROGRAM

## 2022-10-23 PROCEDURE — 83880 ASSAY OF NATRIURETIC PEPTIDE: CPT | Performed by: EMERGENCY MEDICINE

## 2022-10-23 PROCEDURE — A4216 STERILE WATER/SALINE, 10 ML: HCPCS | Performed by: STUDENT IN AN ORGANIZED HEALTH CARE EDUCATION/TRAINING PROGRAM

## 2022-10-23 RX ORDER — AMLODIPINE BESYLATE 5 MG/1
5 TABLET ORAL DAILY
Status: DISCONTINUED | OUTPATIENT
Start: 2022-10-23 | End: 2022-10-25 | Stop reason: HOSPADM

## 2022-10-23 RX ORDER — FLUTICASONE FUROATE AND VILANTEROL 200; 25 UG/1; UG/1
1 POWDER RESPIRATORY (INHALATION) DAILY
Status: DISCONTINUED | OUTPATIENT
Start: 2022-10-23 | End: 2022-10-25 | Stop reason: HOSPADM

## 2022-10-23 RX ORDER — AZITHROMYCIN 250 MG/1
500 TABLET, FILM COATED ORAL DAILY
Status: DISCONTINUED | OUTPATIENT
Start: 2022-10-23 | End: 2022-10-23

## 2022-10-23 RX ORDER — BUPROPION HYDROCHLORIDE 150 MG/1
150 TABLET ORAL DAILY
Status: DISCONTINUED | OUTPATIENT
Start: 2022-10-23 | End: 2022-10-25 | Stop reason: HOSPADM

## 2022-10-23 RX ORDER — HYDROCHLOROTHIAZIDE 12.5 MG/1
12.5 TABLET ORAL DAILY
Status: DISCONTINUED | OUTPATIENT
Start: 2022-10-23 | End: 2022-10-25 | Stop reason: HOSPADM

## 2022-10-23 RX ORDER — CHOLECALCIFEROL (VITAMIN D3) 25 MCG
1000 TABLET ORAL DAILY
Status: DISCONTINUED | OUTPATIENT
Start: 2022-10-23 | End: 2022-10-25 | Stop reason: HOSPADM

## 2022-10-23 RX ORDER — AZITHROMYCIN 250 MG/1
250 TABLET, FILM COATED ORAL DAILY
Status: DISCONTINUED | OUTPATIENT
Start: 2022-10-24 | End: 2022-10-25 | Stop reason: HOSPADM

## 2022-10-23 RX ORDER — GABAPENTIN 100 MG/1
300 CAPSULE ORAL 3 TIMES DAILY
Status: DISCONTINUED | OUTPATIENT
Start: 2022-10-23 | End: 2022-10-25 | Stop reason: HOSPADM

## 2022-10-23 RX ORDER — ONDANSETRON 2 MG/ML
4 INJECTION INTRAMUSCULAR; INTRAVENOUS EVERY 8 HOURS PRN
Status: DISCONTINUED | OUTPATIENT
Start: 2022-10-23 | End: 2022-10-25 | Stop reason: HOSPADM

## 2022-10-23 RX ORDER — POLYETHYLENE GLYCOL 3350 17 G/17G
17 POWDER, FOR SOLUTION ORAL DAILY PRN
Status: DISCONTINUED | OUTPATIENT
Start: 2022-10-23 | End: 2022-10-25 | Stop reason: HOSPADM

## 2022-10-23 RX ORDER — NALOXONE HCL 0.4 MG/ML
0.02 VIAL (ML) INJECTION
Status: DISCONTINUED | OUTPATIENT
Start: 2022-10-23 | End: 2022-10-25 | Stop reason: HOSPADM

## 2022-10-23 RX ORDER — IPRATROPIUM BROMIDE AND ALBUTEROL SULFATE 2.5; .5 MG/3ML; MG/3ML
3 SOLUTION RESPIRATORY (INHALATION) EVERY 6 HOURS PRN
Status: DISCONTINUED | OUTPATIENT
Start: 2022-10-23 | End: 2022-10-25 | Stop reason: HOSPADM

## 2022-10-23 RX ORDER — ACETAMINOPHEN 325 MG/1
650 TABLET ORAL EVERY 4 HOURS PRN
Status: DISCONTINUED | OUTPATIENT
Start: 2022-10-23 | End: 2022-10-25 | Stop reason: HOSPADM

## 2022-10-23 RX ORDER — LOSARTAN POTASSIUM 50 MG/1
100 TABLET ORAL DAILY
Status: DISCONTINUED | OUTPATIENT
Start: 2022-10-23 | End: 2022-10-25 | Stop reason: HOSPADM

## 2022-10-23 RX ORDER — IBUPROFEN 200 MG
1 TABLET ORAL DAILY
Status: DISCONTINUED | OUTPATIENT
Start: 2022-10-23 | End: 2022-10-25 | Stop reason: HOSPADM

## 2022-10-23 RX ORDER — TRAZODONE HYDROCHLORIDE 50 MG/1
50 TABLET ORAL NIGHTLY PRN
Status: DISCONTINUED | OUTPATIENT
Start: 2022-10-23 | End: 2022-10-25 | Stop reason: HOSPADM

## 2022-10-23 RX ORDER — ENOXAPARIN SODIUM 100 MG/ML
40 INJECTION SUBCUTANEOUS EVERY 24 HOURS
Status: DISCONTINUED | OUTPATIENT
Start: 2022-10-23 | End: 2022-10-25 | Stop reason: HOSPADM

## 2022-10-23 RX ORDER — PROCHLORPERAZINE EDISYLATE 5 MG/ML
5 INJECTION INTRAMUSCULAR; INTRAVENOUS EVERY 6 HOURS PRN
Status: DISCONTINUED | OUTPATIENT
Start: 2022-10-23 | End: 2022-10-25 | Stop reason: HOSPADM

## 2022-10-23 RX ORDER — SIMETHICONE 80 MG
1 TABLET,CHEWABLE ORAL 4 TIMES DAILY PRN
Status: DISCONTINUED | OUTPATIENT
Start: 2022-10-23 | End: 2022-10-25 | Stop reason: HOSPADM

## 2022-10-23 RX ORDER — ASPIRIN 81 MG/1
81 TABLET ORAL DAILY
Status: DISCONTINUED | OUTPATIENT
Start: 2022-10-23 | End: 2022-10-25 | Stop reason: HOSPADM

## 2022-10-23 RX ORDER — IPRATROPIUM BROMIDE AND ALBUTEROL SULFATE 2.5; .5 MG/3ML; MG/3ML
3 SOLUTION RESPIRATORY (INHALATION)
Status: COMPLETED | OUTPATIENT
Start: 2022-10-23 | End: 2022-10-23

## 2022-10-23 RX ORDER — SODIUM CHLORIDE 0.9 % (FLUSH) 0.9 %
10 SYRINGE (ML) INJECTION EVERY 8 HOURS
Status: DISCONTINUED | OUTPATIENT
Start: 2022-10-23 | End: 2022-10-25 | Stop reason: HOSPADM

## 2022-10-23 RX ORDER — MAG HYDROX/ALUMINUM HYD/SIMETH 200-200-20
30 SUSPENSION, ORAL (FINAL DOSE FORM) ORAL 4 TIMES DAILY PRN
Status: DISCONTINUED | OUTPATIENT
Start: 2022-10-23 | End: 2022-10-25 | Stop reason: HOSPADM

## 2022-10-23 RX ORDER — ATORVASTATIN CALCIUM 10 MG/1
10 TABLET, FILM COATED ORAL NIGHTLY
Status: DISCONTINUED | OUTPATIENT
Start: 2022-10-23 | End: 2022-10-25 | Stop reason: HOSPADM

## 2022-10-23 RX ORDER — PREDNISONE 20 MG/1
60 TABLET ORAL
Status: COMPLETED | OUTPATIENT
Start: 2022-10-23 | End: 2022-10-23

## 2022-10-23 RX ORDER — TALC
6 POWDER (GRAM) TOPICAL NIGHTLY PRN
Status: DISCONTINUED | OUTPATIENT
Start: 2022-10-23 | End: 2022-10-25 | Stop reason: HOSPADM

## 2022-10-23 RX ADMIN — CHOLECALCIFEROL TAB 25 MCG (1000 UNIT) 1000 UNITS: 25 TAB at 05:10

## 2022-10-23 RX ADMIN — IPRATROPIUM BROMIDE AND ALBUTEROL SULFATE 3 ML: 2.5; .5 SOLUTION RESPIRATORY (INHALATION) at 12:10

## 2022-10-23 RX ADMIN — Medication 10 ML: at 03:10

## 2022-10-23 RX ADMIN — Medication 10 ML: at 10:10

## 2022-10-23 RX ADMIN — NICOTINE 1 PATCH: 14 PATCH, EXTENDED RELEASE TRANSDERMAL at 05:10

## 2022-10-23 RX ADMIN — GABAPENTIN 300 MG: 300 CAPSULE ORAL at 05:10

## 2022-10-23 RX ADMIN — BUPROPION HYDROCHLORIDE 150 MG: 150 TABLET, FILM COATED, EXTENDED RELEASE ORAL at 05:10

## 2022-10-23 RX ADMIN — ENOXAPARIN SODIUM 40 MG: 100 INJECTION SUBCUTANEOUS at 06:10

## 2022-10-23 RX ADMIN — AZITHROMYCIN 500 MG: 500 INJECTION, POWDER, LYOPHILIZED, FOR SOLUTION INTRAVENOUS at 03:10

## 2022-10-23 RX ADMIN — CEFTRIAXONE 1 G: 1 INJECTION, SOLUTION INTRAVENOUS at 06:10

## 2022-10-23 RX ADMIN — PREDNISONE 60 MG: 20 TABLET ORAL at 01:10

## 2022-10-23 NOTE — HPI
Rochelle Espinoza is a 66yo AAF with a PMH of HFpEF (60%), HTN, HLD, COPD (on 3L home O2), peripheral neuropathy, vitamin D deficiency, MDD, and tobacco abuse who presented to the [facility] ED on 10/23/22 with complaints of SOB. Pt reports she was in her usual state of health until one week PTA when she developed a cough and rhinorrhea. Denies any known sick contacts. Cough productive of clear foamy sputum, and progressed to include SOB. SOB worsened, and she was unable to perform simple tasks at home without becoming winded. She also ran out of one of her inhalers 4 days previously, which also worsened SOB. Denies F/C/N/V/D/C, HA, CP, palpitations, abdominal pain, dysuria, extremity swelling, or symptoms otherwise. On morning of admission, SOB awoke her from sleep, which prompter her to come to the ER.    In the ED, vitals significant for RR 32 and O2 sats 89% on 3L NC (improved with breathing treatments). Labs grossly unremarkable. CXR showed stable pulmonary vascular congestion without definite interstitial edema. ED gave azithro and prednisone, and hospital medicine was consulted for admission and further management.

## 2022-10-23 NOTE — ASSESSMENT & PLAN NOTE
- Working to optimize BP control  - Continue home regimen of amlodipine, HCTZ, and losartan  - Will continue to monitor and further titrate antihypertensives as clinically indicated

## 2022-10-23 NOTE — ED PROVIDER NOTES
Encounter Date: 10/23/2022       History     Chief Complaint   Patient presents with    Shortness of Breath     SOB and chest pressure x 2 days. Hx of COPD. On 3L oxygen at home. Intially satting 85% in triage.     Rochelle Espinoza is a at 67-year-old female history of hypertension, COPD on 2 L home oxygen, pulmonary hypertension, CHF presenting today for shortness of breath.  Patient states for the past 1 week she has been on 3 L of oxygen with oxygen saturation approximately 92%.  She has had increased productive cough and worsening shortness of breath with ambulation.  This morning, she became concerned when she woke up at 2:00 a.m. with central chest pressure.  She checked her pulse ox which with 85%.  She also reported increased heart rate to 117.  She tried a nebulizer without improvement.  She denies leg swelling, fevers but does report occasional chills.    Review of patient's allergies indicates:   Allergen Reactions    Orange juice      Swelling in pt tongue       Past Medical History:   Diagnosis Date    CHF (congestive heart failure)     COPD (chronic obstructive pulmonary disease)     Herpes zoster without mention of complication 2011    Hypertension     Leg edema     Pulmonary hypertension     Sciatica      Past Surgical History:   Procedure Laterality Date    BREAST BIOPSY Right     core     SECTION      COLONOSCOPY N/A 2019    Procedure: COLONOSCOPY;  Surgeon: Rui Holder MD;  Location: [facility] ([address] [place] [address]);  Service: Endoscopy;  Laterality: N/A;    EPIDURAL STEROID INJECTION N/A 2020    Procedure: CERVICAL BLAKE;  Surgeon: Papito High MD;  Location: [facility];  Service: Pain Management;  Laterality: N/A;  NEEDS CONSENT    ESOPHAGOGASTRODUODENOSCOPY N/A 2019    Procedure: EGD (ESOPHAGOGASTRODUODENOSCOPY);  Surgeon: Rui Holder MD;  [facility]  Service: Endoscopy;  Laterality: N/A;  2L continuous O2 via NC, COPD, pulm HTN    TRANSFORAMINAL EPIDURAL  INJECTION OF STEROID Right 6/3/2021    Procedure: INJECTION, STEROID, EPIDURAL, TRANSFORAMINAL APPROACH, L4-L5;  Surgeon: Anibal Robles MD;  Location: [facility];  Service: Pain Management;  Laterality: Right;     Family History   Problem Relation Age of Onset    Heart disease Mother     Heart disease Paternal Uncle     Celiac disease Neg Hx     Colon cancer Neg Hx     Colon polyps Neg Hx     Crohn's disease Neg Hx     Cystic fibrosis Neg Hx     Esophageal cancer Neg Hx     Inflammatory bowel disease Neg Hx     Irritable bowel syndrome Neg Hx     Liver cancer Neg Hx     Liver disease Neg Hx     Rectal cancer Neg Hx     Stomach cancer Neg Hx     Ulcerative colitis Neg Hx      Social History     Tobacco Use    Smoking status: Light Smoker     Packs/day: 0.25     Years: 40.00     Pack years: 10.00     Types: Cigarettes    Smokeless tobacco: Never   Substance Use Topics    Alcohol use: Not Currently     Comment: beer daily 2-3 daily, none today    Drug use: No     Review of Systems   Constitutional:  Positive for chills and fatigue. Negative for fever.   HENT:  Positive for congestion. Negative for sore throat.    Respiratory:  Positive for cough and shortness of breath.    Cardiovascular:  Negative for chest pain.   Gastrointestinal:  Negative for abdominal pain, constipation and nausea.   Genitourinary:  Negative for dysuria and hematuria.   Musculoskeletal:  Negative for back pain.   Skin:  Negative for rash.   Neurological:  Negative for weakness.   Hematological:  Does not bruise/bleed easily.     Physical Exam     Initial Vitals [10/23/22 1130]   BP Pulse Resp Temp SpO2   136/80 98 (!) 30 98.3 °F (36.8 °C) (!) 89 %      MAP       --         Physical Exam    Nursing note and vitals reviewed.  Constitutional: She appears well-developed and well-nourished. No distress.   HENT:   Mouth/Throat: Oropharynx is clear and moist.   Eyes: Conjunctivae are normal. No scleral icterus.   Neck: No JVD present.    Cardiovascular:  Normal rate, regular rhythm and intact distal pulses.           Pulmonary/Chest: Accessory muscle usage present. Tachypnea noted. No respiratory distress. She has wheezes. She has no rales.   Abdominal: Abdomen is soft. Bowel sounds are normal. She exhibits no distension. There is no abdominal tenderness. There is no rebound.   Musculoskeletal:         General: No edema.     Lymphadenopathy:     She has no cervical adenopathy.   Neurological: She is alert and oriented to person, place, and time.   Skin: Skin is warm. No rash noted.       ED Course   Procedures  Labs Reviewed   CBC W/ AUTO DIFFERENTIAL - Abnormal; Notable for the following components:       Result Value    MCHC 29.7 (*)     RDW 14.9 (*)     All other components within normal limits   COMPREHENSIVE METABOLIC PANEL - Abnormal; Notable for the following components:    CO2 33 (*)     BUN 7 (*)     Alkaline Phosphatase 51 (*)     All other components within normal limits   RESPIRATORY INFECTION PANEL (PCR), NASOPHARYNGEAL   INFLUENZA A & B BY MOLECULAR   MAGNESIUM   TROPONIN I   B-TYPE NATRIURETIC PEPTIDE   SARS-COV-2 RNA AMPLIFICATION, QUAL   URINALYSIS, REFLEX TO URINE CULTURE     EKG Readings: (Independently Interpreted)   EKG:  Sinus rhythm at 94, left axis deviation, no ST elevation or depression.     Imaging Results              X-Ray Chest AP Portable (Final result)  Result time 10/23/22 12:23:02      Final result by Samantha Goodwin MD (10/23/22 12:23:02)                   Impression:      Stable pulmonary vascular congestion without definite interstitial edema.      Electronically signed by: Samantha Goodwin MD  Date:    10/23/2022  Time:    12:23               Narrative:    EXAMINATION:  XR CHEST AP PORTABLE    CLINICAL HISTORY:  Shortness of breath    TECHNIQUE:  Single frontal view of the chest was performed.    COMPARISON:  Prior dated 07/30/2022.    FINDINGS:  The mediastinal structures are midline.  The cardiac  silhouette is prominent and stable.  There is stable pulmonary vascular congestion without definite interstitial edema.  No focal consolidation.                                       Medications   azithromycin 500 mg in dextrose 5 % 250 mL IVPB (ready to mix system) (has no administration in time range)   amLODIPine tablet 5 mg (has no administration in time range)   aspirin EC tablet 81 mg (has no administration in time range)   atorvastatin tablet 10 mg (has no administration in time range)   buPROPion TB24 tablet 150 mg (has no administration in time range)   cholecalciferol (vitamin D3) capsule 1,000 Units (has no administration in time range)   fluticasone furoate-vilanteroL 200-25 mcg/dose diskus inhaler 1 puff (1 puff Inhalation Not Given 10/23/22 1530)   gabapentin capsule 300 mg (has no administration in time range)   hydroCHLOROthiazide tablet 12.5 mg (has no administration in time range)   losartan tablet 100 mg (has no administration in time range)   traZODone tablet 50 mg (has no administration in time range)   cefTRIAXone (ROCEPHIN) 1 g/50 mL D5W IVPB (has no administration in time range)   predniSONE tablet 50 mg (has no administration in time range)   sodium chloride 0.9% flush 10 mL (has no administration in time range)   albuterol-ipratropium 2.5 mg-0.5 mg/3 mL nebulizer solution 3 mL (has no administration in time range)   melatonin tablet 6 mg (has no administration in time range)   ondansetron injection 4 mg (has no administration in time range)   prochlorperazine injection Soln 5 mg (has no administration in time range)   polyethylene glycol packet 17 g (has no administration in time range)   simethicone chewable tablet 80 mg (has no administration in time range)   aluminum-magnesium hydroxide-simethicone 200-200-20 mg/5 mL suspension 30 mL (has no administration in time range)   acetaminophen tablet 650 mg (has no administration in time range)   naloxone 0.4 mg/mL injection 0.02 mg (has no  administration in time range)   enoxaparin injection 40 mg (has no administration in time range)   azithromycin tablet 250 mg (has no administration in time range)   nicotine 14 mg/24 hr 1 patch (has no administration in time range)   albuterol-ipratropium 2.5 mg-0.5 mg/3 mL nebulizer solution 3 mL (3 mLs Nebulization Given 10/23/22 1240)   predniSONE tablet 60 mg (60 mg Oral Given 10/23/22 1328)     Medical Decision Making:   History:   Old Medical Records: I decided to obtain old medical records.  Initial Assessment:   Emergent evaluation 67-year-old female presenting today with worsening shortness of breath, mucus production and cough.  On home oxygen at 2 L, requiring 3 L over this past week.    Differential Diagnosis:   Copd exacerbation, covid, pna, chf exacerbation  Independently Interpreted Test(s):   I have ordered and independently interpreted X-rays - see prior notes.  I have ordered and independently interpreted EKG Reading(s) - see prior notes  Clinical Tests:   Lab Tests: Reviewed and Ordered  Radiological Study: Ordered and Reviewed  Medical Tests: Ordered and Reviewed  ED Management:  - 3 L w/ saturation at 92 %  - nebs  - prednisone, Zithromax  - labs  - EKG  - CXR             ED Course as of 10/23/22 1444   Sun Oct 23, 2022   1246 Chest x-ray lungs, congestive changes, no significant change from previous. [GM]   1335 Troponin I: <0.006 [GM]   1335 BUN(!): 7 [GM]   1335 Creatinine: 0.6 [GM]   1335 WBC: 8.52 [GM]      ED Course User Index  [GM] Jonelle Cullen MD          Pt re-evaluated, continues to have slight wheezing but improved.  Will place in observation under  for continued treatments, respiratory watch.        Clinical Impression:   Final diagnoses:  [R06.02] SOB (shortness of breath) (Primary)  [J44.1] COPD with acute exacerbation        ED Disposition Condition    Observation Stable                Jonelle Cullen MD  10/23/22 5961

## 2022-10-23 NOTE — ASSESSMENT & PLAN NOTE
- Pt currently smoking 0.25 PPD  - Currently without plans to quit  - Counseled on the importance of smoking cessation in relation to health   - Nicotine patch provided

## 2022-10-23 NOTE — SUBJECTIVE & OBJECTIVE
Past Medical History:   Diagnosis Date    CHF (congestive heart failure)     COPD (chronic obstructive pulmonary disease)     Herpes zoster without mention of complication 2011    Hypertension     Leg edema     Pulmonary hypertension     Sciatica        Past Surgical History:   Procedure Laterality Date    BREAST BIOPSY Right     core     SECTION      COLONOSCOPY N/A 2019    Procedure: COLONOSCOPY;  Surgeon: Rui Holder MD;  Location: [facility] (2ND FLR);  Service: Endoscopy;  Laterality: N/A;    EPIDURAL STEROID INJECTION N/A 2020    Procedure: CERVICAL BLAKE;  Surgeon: Papito High MD;  [place] [facility]  Service: Pain Management;  Laterality: N/A;  NEEDS CONSENT    ESOPHAGOGASTRODUODENOSCOPY N/A 2019    Procedure: EGD (ESOPHAGOGASTRODUODENOSCOPY);  Surgeon: Rui Holder MD;  Location: [facility] (2ND FLR);  Service: Endoscopy;  Laterality: N/A;  2L continuous O2 via NC, COPD, pulm HTN    TRANSFORAMINAL EPIDURAL INJECTION OF STEROID Right 6/3/2021    Procedure: INJECTION, STEROID, EPIDURAL, TRANSFORAMINAL APPROACH, L4-L5;  Surgeon: Anibal Robles MD;  [place] [facility]  Service: Pain Management;  Laterality: Right;       Review of patient's allergies indicates:   Allergen Reactions    Orange juice      Swelling in pt tongue         No current facility-administered medications on file prior to encounter.     Current Outpatient Medications on File Prior to Encounter   Medication Sig    albuterol (PROVENTIL/VENTOLIN HFA) 90 mcg/actuation inhaler Inhale 2 puffs into the lungs every 6 (six) hours. Rescue    albuterol-ipratropium (DUO-NEB) 2.5 mg-0.5 mg/3 mL nebulizer solution Take 3 mLs by nebulization every 4 (four) hours as needed for Wheezing. Rescue    amLODIPine (NORVASC) 5 MG tablet Take 1 tablet (5 mg total) by mouth once daily.    ascorbic acid, vitamin C, (VITAMIN C) 500 MG tablet Take 500 mg by mouth once daily.    aspirin (ECOTRIN) 81 MG EC tablet Take 81 mg  by mouth once daily. Low Dose    atorvastatin (LIPITOR) 10 MG tablet Take 1 tablet (10 mg total) by mouth every evening.    buPROPion (WELLBUTRIN XL) 150 MG TB24 tablet Take 1 tablet (150 mg total) by mouth once daily.    cholecalciferol, vitamin D3, (VITAMIN D3) 25 mcg (1,000 unit) capsule Take 1,000 Units by mouth once daily.    fluticasone-salmeterol 230-21 mcg/dose (ADVAIR HFA) 230-21 mcg/actuation HFAA inhaler Inhale 2 puffs into the lungs 2 (two) times daily. Controller    gabapentin (NEURONTIN) 300 MG capsule Take 1 capsule (300 mg total) by mouth 3 (three) times daily.    glycerin adult suppository Place 1 suppository rectally as needed for Constipation.    hydroCHLOROthiazide (HYDRODIURIL) 12.5 MG Tab Take 1 tablet (12.5 mg total) by mouth once daily.    losartan (COZAAR) 100 MG tablet Take 1 tablet (100 mg total) by mouth once daily.    magnesium 250 mg Tab Take 250 mg by mouth once.    nicotine (NICODERM CQ) 14 mg/24 hr Place 1 patch onto the skin once daily.    nicotine (NICODERM CQ) 7 mg/24 hr Place 1 patch onto the skin once daily.    potassium chloride SA (K-DUR,KLOR-CON) 20 MEQ tablet TAKE 1 TABLET(20 MEQ) BY MOUTH EVERY DAY    traZODone (DESYREL) 50 MG tablet Take 1 tablet (50 mg total) by mouth nightly as needed for Insomnia.     Family History       Problem Relation (Age of Onset)    Heart disease Mother, Paternal Uncle          Tobacco Use    Smoking status: Light Smoker     Packs/day: 0.25     Years: 40.00     Pack years: 10.00     Types: Cigarettes    Smokeless tobacco: Never   Substance and Sexual Activity    Alcohol use: Not Currently     Comment: beer daily 2-3 daily, none today    Drug use: No    Sexual activity: Not Currently     Birth control/protection: None       ROS  General ROS: negative for weight loss, weight gain, fevers, chills, night sweats  ENT ROS: negative for epistaxis, headaches or sinus pain  Ophthalmic ROS: negative for blurry vision, decreased vision, double vision or  itchy eyes  Cardiovascular ROS: negative for chest pain or dyspnea on exertion  Respiratory ROS: See HPI  Gastrointestinal ROS: negative for abdominal pain, change in bowel habits, black or bloody stools, nausea, emesis, or bloating  Genito-Urinary ROS: negative for dysuria, trouble voiding, or hematuria  Neurological ROS: negative for TIA or stroke symptoms  Endocrine ROS: negative for hair pattern changes or unexpected weight changes  Musculoskeletal ROS: negative for muscle pain, weakness, joint pain or joint swelling  Skin: negative for rash, wounds, skin breakdown, or issues otherwise  Hematological and Lymphatic ROS: negative for bleeding, bruising, swollen lymph nodes  Psychological ROS: negative for anxiety or depression      Objective:     Vital Signs (Most Recent):  Temp: 98.3 °F (36.8 °C) (10/23/22 1130)  Pulse: 92 (10/23/22 1330)  Resp: (!) 31 (10/23/22 1240)  BP: (!) 144/70 (10/23/22 1330)  SpO2: 96 % (10/23/22 1330)   Vital Signs (24h Range):  Temp:  [98.3 °F (36.8 °C)] 98.3 °F (36.8 °C)  Pulse:  [87-98] 92  Resp:  [28-32] 31  SpO2:  [89 %-96 %] 96 %  BP: (120-144)/(70-80) 144/70     Weight: 59.9 kg (132 lb)  Body mass index is 23.38 kg/m².      Physical Exam  Gen: in NAD, appears stated age  Neuro: AAOx4, CN2-12 grossly intact BL; motor, sensory, and strength grossly intact BL  HEENT: NTNC, EOMI, PERRLA, MMM; no thyromegaly or lymphadenopathy; no JVD appreciated  CVS: RRR, no m/r/g; S1/S2 auscultated with no S3 or S4; capillary refill < 2 sec  Resp: coarse breath sounds in all lung fields BL; no belabored breathing or accessory muscle use appreciated   Abd: BS+ in all 4 quadrants; NTND, soft to palpation; no organomegaly appreciated   Extrem: pulses full, equal, and regular over all 4 extremities; no UE or LE edema BL       Significant Labs: All pertinent labs within the past 24 hours have been reviewed.    Significant Imaging: I have reviewed all pertinent imaging results/findings within the past 24  hours.

## 2022-10-23 NOTE — H&P
[facility] - Emergency Dept  Hospital Medicine  History & Physical    Patient Name: Rochelle Espinoza  MRN: [id number]  Patient Class: OP- Observation  Admission Date: 10/23/2022  Attending Physician: Raghu Grover MD   Primary Care Provider: Yosi Samuels MD         Patient information was obtained from patient and ER records.     Subjective:     Principal Problem:COPD exacerbation    Chief Complaint:   Chief Complaint   Patient presents with    Shortness of Breath     SOB and chest pressure x 2 days. Hx of COPD. On 3L oxygen at home. Intially satting 85% in triage.        HPI: Rochelle Espinoza is a 68yo AAF with a PMH of HFpEF (60%), HTN, HLD, COPD (on 3L home O2), peripheral neuropathy, vitamin D deficiency, MDD, and tobacco abuse who [place] [facility] [place] with complaints of SOB. Pt reports she was in her usual state of health until one week PTA when she developed a cough and rhinorrhea. Denies any known sick contacts. Cough productive of clear foamy sputum, and progressed to include SOB. SOB worsened, and she was unable to perform simple tasks at home without becoming winded. She also ran out of one of her inhalers 4 days previously, which also worsened SOB. Denies F/C/N/V/D/C, HA, CP, palpitations, abdominal pain, dysuria, extremity swelling, or symptoms otherwise. On morning of admission, SOB awoke her from sleep, which prompter her to come to the ER.    In the ED, vitals significant for RR 32 and O2 sats 89% on 3L NC (improved with breathing treatments). Labs grossly unremarkable. CXR showed stable pulmonary vascular congestion without definite interstitial edema. ED gave azithro and prednisone, and hospital medicine was consulted for admission and further management.      Past Medical History:   Diagnosis Date    CHF (congestive heart failure)     COPD (chronic obstructive pulmonary disease)     Herpes zoster without mention of complication 2/21/2011    Hypertension     Leg edema     Pulmonary  hypertension     Sciatica        Past Surgical History:   Procedure Laterality Date    BREAST BIOPSY Right     core     SECTION      COLONOSCOPY N/A 2019    Procedure: COLONOSCOPY;  Surgeon: Rui Holder MD;  Location: [facility] ENDO (2ND FLR);  Service: Endoscopy;  Laterality: N/A;    EPIDURAL STEROID INJECTION N/A 2020    Procedure: CERVICAL BLAKE;  Surgeon: Papito High MD;  Location: [facility] PAIN MGT;  Service: Pain Management;  Laterality: N/A;  NEEDS CONSENT    ESOPHAGOGASTRODUODENOSCOPY N/A 2019    Procedure: EGD (ESOPHAGOGASTRODUODENOSCOPY);  Surgeon: Rui Holder MD;  Location: [facility] ENDO (2ND FLR);  Service: Endoscopy;  Laterality: N/A;  2L continuous O2 via NC, COPD, pulm HTN    TRANSFORAMINAL EPIDURAL INJECTION OF STEROID Right 6/3/2021    Procedure: INJECTION, STEROID, EPIDURAL, TRANSFORAMINAL APPROACH, L4-L5;  Surgeon: Anibal Robles MD;  Location: [facility] PAIN MGT;  Service: Pain Management;  Laterality: Right;       Review of patient's allergies indicates:   Allergen Reactions    Orange juice      Swelling in pt tongue         No current facility-administered medications on file prior to encounter.     Current Outpatient Medications on File Prior to Encounter   Medication Sig    albuterol (PROVENTIL/VENTOLIN HFA) 90 mcg/actuation inhaler Inhale 2 puffs into the lungs every 6 (six) hours. Rescue    albuterol-ipratropium (DUO-NEB) 2.5 mg-0.5 mg/3 mL nebulizer solution Take 3 mLs by nebulization every 4 (four) hours as needed for Wheezing. Rescue    amLODIPine (NORVASC) 5 MG tablet Take 1 tablet (5 mg total) by mouth once daily.    ascorbic acid, vitamin C, (VITAMIN C) 500 MG tablet Take 500 mg by mouth once daily.    aspirin (ECOTRIN) 81 MG EC tablet Take 81 mg by mouth once daily. Low Dose    atorvastatin (LIPITOR) 10 MG tablet Take 1 tablet (10 mg total) by mouth every evening.    buPROPion (WELLBUTRIN XL) 150 MG TB24 tablet Take 1 tablet (150 mg total) by mouth once  daily.    cholecalciferol, vitamin D3, (VITAMIN D3) 25 mcg (1,000 unit) capsule Take 1,000 Units by mouth once daily.    fluticasone-salmeterol 230-21 mcg/dose (ADVAIR HFA) 230-21 mcg/actuation HFAA inhaler Inhale 2 puffs into the lungs 2 (two) times daily. Controller    gabapentin (NEURONTIN) 300 MG capsule Take 1 capsule (300 mg total) by mouth 3 (three) times daily.    glycerin adult suppository Place 1 suppository rectally as needed for Constipation.    hydroCHLOROthiazide (HYDRODIURIL) 12.5 MG Tab Take 1 tablet (12.5 mg total) by mouth once daily.    losartan (COZAAR) 100 MG tablet Take 1 tablet (100 mg total) by mouth once daily.    magnesium 250 mg Tab Take 250 mg by mouth once.    nicotine (NICODERM CQ) 14 mg/24 hr Place 1 patch onto the skin once daily.    nicotine (NICODERM CQ) 7 mg/24 hr Place 1 patch onto the skin once daily.    potassium chloride SA (K-DUR,KLOR-CON) 20 MEQ tablet TAKE 1 TABLET(20 MEQ) BY MOUTH EVERY DAY    traZODone (DESYREL) 50 MG tablet Take 1 tablet (50 mg total) by mouth nightly as needed for Insomnia.     Family History       Problem Relation (Age of Onset)    Heart disease Mother, Paternal Uncle          Tobacco Use    Smoking status: Light Smoker     Packs/day: 0.25     Years: 40.00     Pack years: 10.00     Types: Cigarettes    Smokeless tobacco: Never   Substance and Sexual Activity    Alcohol use: Not Currently     Comment: beer daily 2-3 daily, none today    Drug use: No    Sexual activity: Not Currently     Birth control/protection: None       ROS  General ROS: negative for weight loss, weight gain, fevers, chills, night sweats  ENT ROS: negative for epistaxis, headaches or sinus pain  Ophthalmic ROS: negative for blurry vision, decreased vision, double vision or itchy eyes  Cardiovascular ROS: negative for chest pain or dyspnea on exertion  Respiratory ROS: See HPI  Gastrointestinal ROS: negative for abdominal pain, change in bowel habits, black or bloody  stools, nausea, emesis, or bloating  Genito-Urinary ROS: negative for dysuria, trouble voiding, or hematuria  Neurological ROS: negative for TIA or stroke symptoms  Endocrine ROS: negative for hair pattern changes or unexpected weight changes  Musculoskeletal ROS: negative for muscle pain, weakness, joint pain or joint swelling  Skin: negative for rash, wounds, skin breakdown, or issues otherwise  Hematological and Lymphatic ROS: negative for bleeding, bruising, swollen lymph nodes  Psychological ROS: negative for anxiety or depression      Objective:     Vital Signs (Most Recent):  Temp: 98.3 °F (36.8 °C) (10/23/22 1130)  Pulse: 92 (10/23/22 1330)  Resp: (!) 31 (10/23/22 1240)  BP: (!) 144/70 (10/23/22 1330)  SpO2: 96 % (10/23/22 1330)   Vital Signs (24h Range):  Temp:  [98.3 °F (36.8 °C)] 98.3 °F (36.8 °C)  Pulse:  [87-98] 92  Resp:  [28-32] 31  SpO2:  [89 %-96 %] 96 %  BP: (120-144)/(70-80) 144/70     Weight: 59.9 kg (132 lb)  Body mass index is 23.38 kg/m².      Physical Exam  Gen: in NAD, appears stated age  Neuro: AAOx4, CN2-12 grossly intact BL; motor, sensory, and strength grossly intact BL  HEENT: NTNC, EOMI, PERRLA, MMM; no thyromegaly or lymphadenopathy; no JVD appreciated  CVS: RRR, no m/r/g; S1/S2 auscultated with no S3 or S4; capillary refill < 2 sec  Resp: coarse breath sounds in all lung fields BL; no belabored breathing or accessory muscle use appreciated   Abd: BS+ in all 4 quadrants; NTND, soft to palpation; no organomegaly appreciated   Extrem: pulses full, equal, and regular over all 4 extremities; no UE or LE edema BL       Significant Labs: All pertinent labs within the past 24 hours have been reviewed.    Significant Imaging: I have reviewed all pertinent imaging results/findings within the past 24 hours.    Assessment/Plan:     * COPD exacerbation  - Interval history and physical exam findings as described above  - CXR reviewed  - COVID negative  - Flu, RIP, and procal ordered  - Begin  empiric azithro/rocepin and prednisone   - Continue home inhaler regimen  - PRN duonebs provided  - Currently satting well on home 3L NC  - Continuous pulse-ox at bedside  - Closely monitoring    Acute on chronic respiratory failure with hypoxia  - As above    Chronic diastolic heart failure  - BNP WNL, euvolemic, not in acute exacerbation  - Continue home cardioprudent regimen    Essential hypertension  - Working to optimize BP control  - Continue home regimen of amlodipine, HCTZ, and losartan  - Will continue to monitor and further titrate antihypertensives as clinically indicated     Mixed hyperlipidemia  - Continue home statin regimen    Peripheral polyneuropathy  - Continue home gabapentin regimen    Vitamin D deficiency  - Continue home vitamin D supplementation regimen    Recurrent major depressive disorder, in partial remission  - Currently asymptomatic  - Continue home wellbutrin regimen    Tobacco abuse  - Pt currently smoking 0.25 PPD  - Currently without plans to quit  - Counseled on the importance of smoking cessation in relation to health   - Nicotine patch provided      VTE Risk Mitigation (From admission, onward)         Ordered     enoxaparin injection 40 mg  Daily         10/23/22 1429     IP VTE HIGH RISK PATIENT  Once         10/23/22 1429     Place sequential compression device  Until discontinued         10/23/22 1429                 Raghu Grover MD  Attending Physician  Department of Hospital Medicine  Epic secure chat preferred, or ext. [phone]  10/23/2022

## 2022-10-23 NOTE — ASSESSMENT & PLAN NOTE
- Interval history and physical exam findings as described above  - CXR reviewed  - COVID negative  - Flu, RIP, and procal ordered  - Begin empiric azithro/rocepin and prednisone   - Continue home inhaler regimen  - PRN duonebs provided  - Currently satting well on home 3L NC  - Continuous pulse-ox at bedside  - Closely monitoring

## 2022-10-23 NOTE — ACP (ADVANCE CARE PLANNING)
[facility] Medicine Code Status Documentation Note    Spoke with Ms. Espinoza regarding their end of life intentions and goals of care. Discussed code status and explained differences between full code and DNR, and answered all questions to the pt's satisfaction.     At this time, pt desires to be full code. Order has been placed in chart to reflect their wishes.    Raghu Grover MD  Attending Physician  Department of Hospital Medicine  Epic secure chat preferred, or ext. [phone]  10/23/2022      Advance Care Planning     Date: 10/23/2022    Code Status  I engaged the the patient in a conversation about the patient's preferences for care  at the very end of life. The patient wishes to have CPR or other invasive treatments performed when her heart and/or breathing stops. I communicated to the patient that a full code order would be placed in her medical record to reflect this preference.  I spent a total of 15 minutes engaging the patient in this advance care planning discussion.

## 2022-10-24 LAB
ALBUMIN SERPL BCP-MCNC: 3.8 G/DL (ref 3.5–5.2)
ALP SERPL-CCNC: 42 U/L (ref 55–135)
ALT SERPL W/O P-5'-P-CCNC: 12 U/L (ref 10–44)
ANION GAP SERPL CALC-SCNC: 12 MMOL/L (ref 8–16)
AST SERPL-CCNC: 16 U/L (ref 10–40)
BASOPHILS # BLD AUTO: 0.01 K/UL (ref 0–0.2)
BASOPHILS NFR BLD: 0.1 % (ref 0–1.9)
BILIRUB SERPL-MCNC: 0.2 MG/DL (ref 0.1–1)
BUN SERPL-MCNC: 4 MG/DL (ref 8–23)
CALCIUM SERPL-MCNC: 9.5 MG/DL (ref 8.7–10.5)
CHLORIDE SERPL-SCNC: 100 MMOL/L (ref 95–110)
CO2 SERPL-SCNC: 32 MMOL/L (ref 23–29)
CREAT SERPL-MCNC: 0.6 MG/DL (ref 0.5–1.4)
DIFFERENTIAL METHOD: ABNORMAL
EOSINOPHIL # BLD AUTO: 0 K/UL (ref 0–0.5)
EOSINOPHIL NFR BLD: 0.1 % (ref 0–8)
ERYTHROCYTE [DISTWIDTH] IN BLOOD BY AUTOMATED COUNT: 14.9 % (ref 11.5–14.5)
EST. GFR  (NO RACE VARIABLE): >60 ML/MIN/1.73 M^2
GLUCOSE SERPL-MCNC: 79 MG/DL (ref 70–110)
HCT VFR BLD AUTO: 43.3 % (ref 37–48.5)
HGB BLD-MCNC: 12.6 G/DL (ref 12–16)
IMM GRANULOCYTES # BLD AUTO: 0.02 K/UL (ref 0–0.04)
IMM GRANULOCYTES NFR BLD AUTO: 0.3 % (ref 0–0.5)
LYMPHOCYTES # BLD AUTO: 2 K/UL (ref 1–4.8)
LYMPHOCYTES NFR BLD: 26.5 % (ref 18–48)
MAGNESIUM SERPL-MCNC: 1.8 MG/DL (ref 1.6–2.6)
MCH RBC QN AUTO: 28.3 PG (ref 27–31)
MCHC RBC AUTO-ENTMCNC: 29.1 G/DL (ref 32–36)
MCV RBC AUTO: 97 FL (ref 82–98)
MONOCYTES # BLD AUTO: 0.7 K/UL (ref 0.3–1)
MONOCYTES NFR BLD: 9.3 % (ref 4–15)
NEUTROPHILS # BLD AUTO: 4.7 K/UL (ref 1.8–7.7)
NEUTROPHILS NFR BLD: 63.7 % (ref 38–73)
NRBC BLD-RTO: 0 /100 WBC
PHOSPHATE SERPL-MCNC: 4.6 MG/DL (ref 2.7–4.5)
PLATELET # BLD AUTO: 208 K/UL (ref 150–450)
PMV BLD AUTO: 12.7 FL (ref 9.2–12.9)
POTASSIUM SERPL-SCNC: 4.3 MMOL/L (ref 3.5–5.1)
PROT SERPL-MCNC: 6.7 G/DL (ref 6–8.4)
RBC # BLD AUTO: 4.45 M/UL (ref 4–5.4)
SODIUM SERPL-SCNC: 144 MMOL/L (ref 136–145)
WBC # BLD AUTO: 7.41 K/UL (ref 3.9–12.7)

## 2022-10-24 PROCEDURE — G0378 HOSPITAL OBSERVATION PER HR: HCPCS

## 2022-10-24 PROCEDURE — 63600175 PHARM REV CODE 636 W HCPCS: Performed by: STUDENT IN AN ORGANIZED HEALTH CARE EDUCATION/TRAINING PROGRAM

## 2022-10-24 PROCEDURE — 99900035 HC TECH TIME PER 15 MIN (STAT)

## 2022-10-24 PROCEDURE — 25000003 PHARM REV CODE 250: Performed by: STUDENT IN AN ORGANIZED HEALTH CARE EDUCATION/TRAINING PROGRAM

## 2022-10-24 PROCEDURE — 83735 ASSAY OF MAGNESIUM: CPT | Performed by: STUDENT IN AN ORGANIZED HEALTH CARE EDUCATION/TRAINING PROGRAM

## 2022-10-24 PROCEDURE — 94640 AIRWAY INHALATION TREATMENT: CPT

## 2022-10-24 PROCEDURE — 84100 ASSAY OF PHOSPHORUS: CPT | Performed by: STUDENT IN AN ORGANIZED HEALTH CARE EDUCATION/TRAINING PROGRAM

## 2022-10-24 PROCEDURE — 85025 COMPLETE CBC W/AUTO DIFF WBC: CPT | Performed by: STUDENT IN AN ORGANIZED HEALTH CARE EDUCATION/TRAINING PROGRAM

## 2022-10-24 PROCEDURE — 63700000 PHARM REV CODE 250 ALT 637 W/O HCPCS: Performed by: STUDENT IN AN ORGANIZED HEALTH CARE EDUCATION/TRAINING PROGRAM

## 2022-10-24 PROCEDURE — 36415 COLL VENOUS BLD VENIPUNCTURE: CPT | Performed by: STUDENT IN AN ORGANIZED HEALTH CARE EDUCATION/TRAINING PROGRAM

## 2022-10-24 PROCEDURE — 96372 THER/PROPH/DIAG INJ SC/IM: CPT | Performed by: STUDENT IN AN ORGANIZED HEALTH CARE EDUCATION/TRAINING PROGRAM

## 2022-10-24 PROCEDURE — 99226 PR SUBSEQUENT OBSERVATION CARE,LEVEL III: ICD-10-PCS | Mod: ,,, | Performed by: STUDENT IN AN ORGANIZED HEALTH CARE EDUCATION/TRAINING PROGRAM

## 2022-10-24 PROCEDURE — 25000242 PHARM REV CODE 250 ALT 637 W/ HCPCS: Performed by: STUDENT IN AN ORGANIZED HEALTH CARE EDUCATION/TRAINING PROGRAM

## 2022-10-24 PROCEDURE — S4991 NICOTINE PATCH NONLEGEND: HCPCS | Performed by: STUDENT IN AN ORGANIZED HEALTH CARE EDUCATION/TRAINING PROGRAM

## 2022-10-24 PROCEDURE — 80053 COMPREHEN METABOLIC PANEL: CPT | Performed by: STUDENT IN AN ORGANIZED HEALTH CARE EDUCATION/TRAINING PROGRAM

## 2022-10-24 PROCEDURE — A4216 STERILE WATER/SALINE, 10 ML: HCPCS | Performed by: STUDENT IN AN ORGANIZED HEALTH CARE EDUCATION/TRAINING PROGRAM

## 2022-10-24 PROCEDURE — 99226 PR SUBSEQUENT OBSERVATION CARE,LEVEL III: CPT | Mod: ,,, | Performed by: STUDENT IN AN ORGANIZED HEALTH CARE EDUCATION/TRAINING PROGRAM

## 2022-10-24 PROCEDURE — 96366 THER/PROPH/DIAG IV INF ADDON: CPT

## 2022-10-24 PROCEDURE — 94761 N-INVAS EAR/PLS OXIMETRY MLT: CPT

## 2022-10-24 RX ADMIN — CHOLECALCIFEROL TAB 25 MCG (1000 UNIT) 1000 UNITS: 25 TAB at 09:10

## 2022-10-24 RX ADMIN — ASPIRIN 81 MG: 81 TABLET, COATED ORAL at 09:10

## 2022-10-24 RX ADMIN — NICOTINE 1 PATCH: 14 PATCH, EXTENDED RELEASE TRANSDERMAL at 09:10

## 2022-10-24 RX ADMIN — AZITHROMYCIN MONOHYDRATE 250 MG: 250 TABLET ORAL at 11:10

## 2022-10-24 RX ADMIN — BUPROPION HYDROCHLORIDE 150 MG: 150 TABLET, FILM COATED, EXTENDED RELEASE ORAL at 09:10

## 2022-10-24 RX ADMIN — PREDNISONE 50 MG: 5 TABLET ORAL at 09:10

## 2022-10-24 RX ADMIN — GABAPENTIN 300 MG: 300 CAPSULE ORAL at 11:10

## 2022-10-24 RX ADMIN — AMLODIPINE BESYLATE 5 MG: 5 TABLET ORAL at 11:10

## 2022-10-24 RX ADMIN — IPRATROPIUM BROMIDE AND ALBUTEROL SULFATE 3 ML: 2.5; .5 SOLUTION RESPIRATORY (INHALATION) at 01:10

## 2022-10-24 RX ADMIN — GABAPENTIN 300 MG: 300 CAPSULE ORAL at 02:10

## 2022-10-24 RX ADMIN — ACETAMINOPHEN 650 MG: 325 TABLET ORAL at 05:10

## 2022-10-24 RX ADMIN — Medication 10 ML: at 10:10

## 2022-10-24 RX ADMIN — CEFTRIAXONE 1 G: 1 INJECTION, SOLUTION INTRAVENOUS at 05:10

## 2022-10-24 RX ADMIN — LOSARTAN POTASSIUM 100 MG: 50 TABLET, FILM COATED ORAL at 11:10

## 2022-10-24 RX ADMIN — Medication 10 ML: at 02:10

## 2022-10-24 RX ADMIN — ENOXAPARIN SODIUM 40 MG: 100 INJECTION SUBCUTANEOUS at 05:10

## 2022-10-24 NOTE — ED NOTES
Pt AAOx4, follows commands. 3L NC in place. RR even,unlabored at rest. Reports SOB. HOB elevated. Denies pain at this time. Abd soft, nontender. Moves all extremities. Monitoring equipment in place.

## 2022-10-24 NOTE — PLAN OF CARE
Problem: Adult Inpatient Plan of Care  Goal: Plan of Care Review  Outcome: Ongoing, Progressing  Goal: Patient-Specific Goal (Individualized)  Outcome: Ongoing, Progressing  Goal: Absence of Hospital-Acquired Illness or Injury  Outcome: Ongoing, Progressing  Goal: Optimal Comfort and Wellbeing  Outcome: Ongoing, Progressing  Goal: Readiness for Transition of Care  Outcome: Ongoing, Progressing     Problem: Infection  Goal: Absence of Infection Signs and Symptoms  Outcome: Ongoing, Progressing     Problem: COPD (Chronic Obstructive Pulmonary Disease) Comorbidity  Goal: Maintenance of COPD Symptom Control  Outcome: Ongoing, Progressing     Problem: Heart Failure Comorbidity  Goal: Maintenance of Heart Failure Symptom Control  Outcome: Ongoing, Progressing     Problem: Hypertension Comorbidity  Goal: Blood Pressure in Desired Range  Outcome: Ongoing, Progressing

## 2022-10-24 NOTE — SUBJECTIVE & OBJECTIVE
"Interval History: Pt seen and examined this morning on naila [place]. Overall improved this morning, states that she "doesn't feel like something's sitting on my chest anymore." Discussed plan to continue abx and prednisone, and that if she continues to improve, may likely be able to return home tomorrow. Care plan reviewed. Otherwise, doing well and with no further complaints at this time.      Objective:     Vital Signs (Most Recent):  Temp: 98.8 °F (37.1 °C) (10/24/22 0825)  Pulse: 80 (10/24/22 0825)  Resp: 17 (10/24/22 0825)  BP: (!) 141/78 (10/24/22 0825)  SpO2: 96 % (10/24/22 0825)   Vital Signs (24h Range):  Temp:  [97.3 °F (36.3 °C)-98.8 °F (37.1 °C)] 98.8 °F (37.1 °C)  Pulse:  [] 80  Resp:  [12-32] 17  SpO2:  [88 %-99 %] 96 %  BP: (117-144)/(67-80) 141/78     Weight: 62.1 kg (136 lb 14.5 oz)  Body mass index is 24.25 kg/m².    Intake/Output Summary (Last 24 hours) at 10/24/2022 1038  Last data filed at 10/23/2022 2123  Gross per 24 hour   Intake 10 ml   Output --   Net 10 ml        Physical Exam  Gen: in NAD, appears stated age  Neuro: AAOx4, CN2-12 grossly intact BL; motor, sensory, and strength grossly intact BL  HEENT: NTNC, EOMI, PERRLA, MMM; no thyromegaly or lymphadenopathy; no JVD appreciated  CVS: RRR, no m/r/g; S1/S2 auscultated with no S3 or S4; capillary refill < 2 sec  Resp: coarse breath sounds in all lung fields BL; no belabored breathing or accessory muscle use appreciated   Abd: BS+ in all 4 quadrants; NTND, soft to palpation; no organomegaly appreciated   Extrem: pulses full, equal, and regular over all 4 extremities; no UE or LE edema BL      Significant Labs: All pertinent labs within the past 24 hours have been reviewed.    Significant Imaging: I have reviewed all pertinent imaging results/findings within the past 24 hours.  "

## 2022-10-24 NOTE — NURSING
Received pt from ER wheelchair, pt transferred to bed with minimal assist, on 3L NC, AAOx4, mild chest discomfort, JACKSON, independent. Pt oriented to room, assessed and admitted. No acute distress.

## 2022-10-24 NOTE — PROGRESS NOTES
"[facility] Medicine  Progress Note    Patient Name: Rochelle Espinoza  MRN: [id number]  Patient Class: OP- Observation   Admission Date: 10/23/2022  Length of Stay: 0 days  Attending Physician: Raghu Grover MD  Primary Care Provider: Yosi Samuels MD        Subjective:     Principal Problem:COPD exacerbation        HPI:  Rochelle Espinoza is a 66yo AAF with a PMH of HFpEF (60%), HTN, HLD, COPD (on 3L home O2), peripheral neuropathy, vitamin D deficiency, MDD, and tobacco abuse who presented to the [facility] [place] on [place]/23/22 with complaints of SOB. Pt reports she was in her usual state of health until one week PTA when she developed a cough and rhinorrhea. Denies any known sick contacts. Cough productive of clear foamy sputum, and progressed to include SOB. SOB worsened, and she was unable to perform simple tasks at home without becoming winded. She also ran out of one of her inhalers 4 days previously, which also worsened SOB. Denies F/C/N/V/D/C, HA, CP, palpitations, abdominal pain, dysuria, extremity swelling, or symptoms otherwise. On morning of admission, SOB awoke her from sleep, which prompter her to come to the ER.    In the ED, vitals significant for RR 32 and O2 sats 89% on 3L NC (improved with breathing treatments). Labs grossly unremarkable. CXR showed stable pulmonary vascular congestion without definite interstitial edema. ED gave azithro and prednisone, and hospital medicine was consulted for admission and further management.      Overview/Hospital Course:  [place] [facility] [place], and was started on azithro/rocephin and prednisone. COVID, RIP, and flu all negative. O2 sats and symptoms improved into 10/24, back to 3L NC.      Interval History: Pt seen and examined this morning on naila CORTES. Overall improved this morning, states that she "doesn't feel like something's sitting on my chest anymore." Discussed plan to continue abx and prednisone, and that if she continues to " improve, may likely be able to return home tomorrow. Care plan reviewed. Otherwise, doing well and with no further complaints at this time.      Objective:     Vital Signs (Most Recent):  Temp: 98.8 °F (37.1 °C) (10/24/22 0825)  Pulse: 80 (10/24/22 0825)  Resp: 17 (10/24/22 0825)  BP: (!) 141/78 (10/24/22 0825)  SpO2: 96 % (10/24/22 0825)   Vital Signs (24h Range):  Temp:  [97.3 °F (36.3 °C)-98.8 °F (37.1 °C)] 98.8 °F (37.1 °C)  Pulse:  [] 80  Resp:  [12-32] 17  SpO2:  [88 %-99 %] 96 %  BP: (117-144)/(67-80) 141/78     Weight: 62.1 kg (136 lb 14.5 oz)  Body mass index is 24.25 kg/m².    Intake/Output Summary (Last 24 hours) at 10/24/2022 1038  Last data filed at 10/23/2022 2123  Gross per 24 hour   Intake 10 ml   Output --   Net 10 ml        Physical Exam  Gen: in NAD, appears stated age  Neuro: AAOx4, CN2-12 grossly intact BL; motor, sensory, and strength grossly intact BL  HEENT: NTNC, EOMI, PERRLA, MMM; no thyromegaly or lymphadenopathy; no JVD appreciated  CVS: RRR, no m/r/g; S1/S2 auscultated with no S3 or S4; capillary refill < 2 sec  Resp: coarse breath sounds in all lung fields BL; no belabored breathing or accessory muscle use appreciated   Abd: BS+ in all 4 quadrants; NTND, soft to palpation; no organomegaly appreciated   Extrem: pulses full, equal, and regular over all 4 extremities; no UE or LE edema BL      Significant Labs: All pertinent labs within the past 24 hours have been reviewed.    Significant Imaging: I have reviewed all pertinent imaging results/findings within the past 24 hours.      Assessment/Plan:      * COPD exacerbation  - Interval history and physical exam findings as described above  - CXR reviewed  - COVID, RIP, flu, and procal negative  - Continue azithro/rocepin and prednisone   - Continue home inhaler regimen  - PRN duonebs provided  - Currently satting well on home 3L NC  - Continuous pulse-ox at bedside  - Closely monitoring    Acute on chronic respiratory failure with  hypoxia  - As above    Chronic diastolic heart failure  - BNP WNL, euvolemic, not in acute exacerbation  - Continue home cardioprudent regimen    Essential hypertension  - Working to optimize BP control  - Continue home regimen of amlodipine, HCTZ, and losartan  - Will continue to monitor and further titrate antihypertensives as clinically indicated     Mixed hyperlipidemia  - Continue home statin regimen    Peripheral polyneuropathy  - Continue home gabapentin regimen    Vitamin D deficiency  - Continue home vitamin D supplementation regimen    Recurrent major depressive disorder, in partial remission  - Currently asymptomatic  - Continue home wellbutrin regimen    Tobacco abuse  - Pt currently smoking 0.25 PPD  - Currently without plans to quit  - Counseled on the importance of smoking cessation in relation to health   - Nicotine patch provided      VTE Risk Mitigation (From admission, onward)         Ordered     enoxaparin injection 40 mg  Daily         10/23/22 1429     IP VTE HIGH RISK PATIENT  Once         10/23/22 1429     Place sequential compression device  Until discontinued         10/23/22 1429                Discharge Planning   ELIZABETH: 10/25/2022     Code Status: Full Code   Is the patient medically ready for discharge?: No    Reason for patient still in hospital (select all that apply): Patient trending condition             Raghu Grover MD  Attending Physician  Department of Hospital Medicine  Epic secure chat preferred, or ext. [phone]  10/24/2022

## 2022-10-24 NOTE — HOSPITAL COURSE
Pt admitted to [facility] for COPD exacerbation, and was started on azithro/rocephin and prednisone. COVID, RIP, and flu all negative. O2 sats and symptoms improved into 10/24, back to 3-4L NC. She continued to improve into 10/25, and was deemed appropriate for discharge. To complete abx and steroid taper at home. Plan discussed with pt, who was agreeable and amenable; medications were discussed and reviewed, outpatient follow-up scheduled, ER precautions were given, all questions were answered to the pt's satisfaction, and Ms. Espinoza was subsequently discharged.

## 2022-10-24 NOTE — ASSESSMENT & PLAN NOTE
- Interval history and physical exam findings as described above  - CXR reviewed  - COVID, RIP, flu, and procal negative  - Continue azithro/rocepin and prednisone   - Continue home inhaler regimen  - PRN duonebs provided  - Currently satting well on home 3L NC  - Continuous pulse-ox at bedside  - Closely monitoring

## 2022-10-24 NOTE — PLAN OF CARE
VSS, AAOx4, ambulatory, 4L NC with extended O2 tubing, IV abx, reports congestion and HA, PRN nebs. Will continue to monitor.  Problem: Adult Inpatient Plan of Care  Goal: Plan of Care Review  Outcome: Ongoing, Progressing  Goal: Patient-Specific Goal (Individualized)  Outcome: Ongoing, Progressing  Goal: Absence of Hospital-Acquired Illness or Injury  Outcome: Ongoing, Progressing  Goal: Optimal Comfort and Wellbeing  Outcome: Ongoing, Progressing     Problem: Infection  Goal: Absence of Infection Signs and Symptoms  Outcome: Ongoing, Progressing     Problem: COPD (Chronic Obstructive Pulmonary Disease) Comorbidity  Goal: Maintenance of COPD Symptom Control  Outcome: Ongoing, Progressing     Problem: Heart Failure Comorbidity  Goal: Maintenance of Heart Failure Symptom Control  Outcome: Ongoing, Progressing     Problem: Hypertension Comorbidity  Goal: Blood Pressure in Desired Range  Outcome: Ongoing, Progressing

## 2022-10-25 ENCOUNTER — TELEPHONE (OUTPATIENT)
Dept: INTERNAL MEDICINE | Facility: CLINIC | Age: 67
End: 2022-10-25

## 2022-10-25 VITALS
HEIGHT: 63 IN | DIASTOLIC BLOOD PRESSURE: 62 MMHG | OXYGEN SATURATION: 97 % | RESPIRATION RATE: 18 BRPM | SYSTOLIC BLOOD PRESSURE: 104 MMHG | HEART RATE: 87 BPM | BODY MASS INDEX: 24.25 KG/M2 | TEMPERATURE: 98 F | WEIGHT: 136.88 LBS

## 2022-10-25 LAB
ALBUMIN SERPL BCP-MCNC: 3.6 G/DL (ref 3.5–5.2)
ALP SERPL-CCNC: 41 U/L (ref 55–135)
ALT SERPL W/O P-5'-P-CCNC: 11 U/L (ref 10–44)
ANION GAP SERPL CALC-SCNC: 6 MMOL/L (ref 8–16)
AST SERPL-CCNC: 15 U/L (ref 10–40)
BASOPHILS # BLD AUTO: 0.02 K/UL (ref 0–0.2)
BASOPHILS NFR BLD: 0.2 % (ref 0–1.9)
BILIRUB SERPL-MCNC: 0.2 MG/DL (ref 0.1–1)
BUN SERPL-MCNC: 12 MG/DL (ref 8–23)
CALCIUM SERPL-MCNC: 8.9 MG/DL (ref 8.7–10.5)
CHLORIDE SERPL-SCNC: 98 MMOL/L (ref 95–110)
CO2 SERPL-SCNC: 39 MMOL/L (ref 23–29)
CREAT SERPL-MCNC: 0.6 MG/DL (ref 0.5–1.4)
DIFFERENTIAL METHOD: ABNORMAL
EOSINOPHIL # BLD AUTO: 0 K/UL (ref 0–0.5)
EOSINOPHIL NFR BLD: 0.2 % (ref 0–8)
ERYTHROCYTE [DISTWIDTH] IN BLOOD BY AUTOMATED COUNT: 14.8 % (ref 11.5–14.5)
EST. GFR  (NO RACE VARIABLE): >60 ML/MIN/1.73 M^2
GLUCOSE SERPL-MCNC: 100 MG/DL (ref 70–110)
HCT VFR BLD AUTO: 43.6 % (ref 37–48.5)
HGB BLD-MCNC: 12.4 G/DL (ref 12–16)
IMM GRANULOCYTES # BLD AUTO: 0.02 K/UL (ref 0–0.04)
IMM GRANULOCYTES NFR BLD AUTO: 0.2 % (ref 0–0.5)
LYMPHOCYTES # BLD AUTO: 2.5 K/UL (ref 1–4.8)
LYMPHOCYTES NFR BLD: 30.5 % (ref 18–48)
MAGNESIUM SERPL-MCNC: 1.8 MG/DL (ref 1.6–2.6)
MCH RBC QN AUTO: 28.4 PG (ref 27–31)
MCHC RBC AUTO-ENTMCNC: 28.4 G/DL (ref 32–36)
MCV RBC AUTO: 100 FL (ref 82–98)
MONOCYTES # BLD AUTO: 0.8 K/UL (ref 0.3–1)
MONOCYTES NFR BLD: 9.5 % (ref 4–15)
NEUTROPHILS # BLD AUTO: 4.8 K/UL (ref 1.8–7.7)
NEUTROPHILS NFR BLD: 59.4 % (ref 38–73)
NRBC BLD-RTO: 0 /100 WBC
PHOSPHATE SERPL-MCNC: 5.1 MG/DL (ref 2.7–4.5)
PLATELET # BLD AUTO: 201 K/UL (ref 150–450)
PMV BLD AUTO: 11.7 FL (ref 9.2–12.9)
POTASSIUM SERPL-SCNC: 4.5 MMOL/L (ref 3.5–5.1)
PROT SERPL-MCNC: 6.3 G/DL (ref 6–8.4)
RBC # BLD AUTO: 4.37 M/UL (ref 4–5.4)
SODIUM SERPL-SCNC: 143 MMOL/L (ref 136–145)
WBC # BLD AUTO: 8.02 K/UL (ref 3.9–12.7)

## 2022-10-25 PROCEDURE — 63600175 PHARM REV CODE 636 W HCPCS: Performed by: STUDENT IN AN ORGANIZED HEALTH CARE EDUCATION/TRAINING PROGRAM

## 2022-10-25 PROCEDURE — 85025 COMPLETE CBC W/AUTO DIFF WBC: CPT | Performed by: STUDENT IN AN ORGANIZED HEALTH CARE EDUCATION/TRAINING PROGRAM

## 2022-10-25 PROCEDURE — 63700000 PHARM REV CODE 250 ALT 637 W/O HCPCS: Performed by: STUDENT IN AN ORGANIZED HEALTH CARE EDUCATION/TRAINING PROGRAM

## 2022-10-25 PROCEDURE — 99217 PR OBSERVATION CARE DISCHARGE: CPT | Mod: ,,, | Performed by: STUDENT IN AN ORGANIZED HEALTH CARE EDUCATION/TRAINING PROGRAM

## 2022-10-25 PROCEDURE — 94640 AIRWAY INHALATION TREATMENT: CPT

## 2022-10-25 PROCEDURE — 80053 COMPREHEN METABOLIC PANEL: CPT | Performed by: STUDENT IN AN ORGANIZED HEALTH CARE EDUCATION/TRAINING PROGRAM

## 2022-10-25 PROCEDURE — 94761 N-INVAS EAR/PLS OXIMETRY MLT: CPT

## 2022-10-25 PROCEDURE — 25000242 PHARM REV CODE 250 ALT 637 W/ HCPCS: Performed by: STUDENT IN AN ORGANIZED HEALTH CARE EDUCATION/TRAINING PROGRAM

## 2022-10-25 PROCEDURE — 25000003 PHARM REV CODE 250: Performed by: STUDENT IN AN ORGANIZED HEALTH CARE EDUCATION/TRAINING PROGRAM

## 2022-10-25 PROCEDURE — 84100 ASSAY OF PHOSPHORUS: CPT | Performed by: STUDENT IN AN ORGANIZED HEALTH CARE EDUCATION/TRAINING PROGRAM

## 2022-10-25 PROCEDURE — A4216 STERILE WATER/SALINE, 10 ML: HCPCS | Performed by: STUDENT IN AN ORGANIZED HEALTH CARE EDUCATION/TRAINING PROGRAM

## 2022-10-25 PROCEDURE — 36415 COLL VENOUS BLD VENIPUNCTURE: CPT | Performed by: STUDENT IN AN ORGANIZED HEALTH CARE EDUCATION/TRAINING PROGRAM

## 2022-10-25 PROCEDURE — G0378 HOSPITAL OBSERVATION PER HR: HCPCS

## 2022-10-25 PROCEDURE — 99217 PR OBSERVATION CARE DISCHARGE: ICD-10-PCS | Mod: ,,, | Performed by: STUDENT IN AN ORGANIZED HEALTH CARE EDUCATION/TRAINING PROGRAM

## 2022-10-25 PROCEDURE — 83735 ASSAY OF MAGNESIUM: CPT | Performed by: STUDENT IN AN ORGANIZED HEALTH CARE EDUCATION/TRAINING PROGRAM

## 2022-10-25 PROCEDURE — S4991 NICOTINE PATCH NONLEGEND: HCPCS | Performed by: STUDENT IN AN ORGANIZED HEALTH CARE EDUCATION/TRAINING PROGRAM

## 2022-10-25 RX ORDER — CEFDINIR 300 MG/1
300 CAPSULE ORAL 2 TIMES DAILY
Qty: 6 CAPSULE | Refills: 0 | Status: SHIPPED | OUTPATIENT
Start: 2022-10-25 | End: 2022-10-28

## 2022-10-25 RX ORDER — AZITHROMYCIN 250 MG/1
250 TABLET, FILM COATED ORAL DAILY
Qty: 3 TABLET | Refills: 0 | Status: SHIPPED | OUTPATIENT
Start: 2022-10-25 | End: 2022-10-28

## 2022-10-25 RX ORDER — IBUPROFEN 200 MG
1 TABLET ORAL DAILY
Qty: 28 PATCH | Refills: 1 | Status: SHIPPED | OUTPATIENT
Start: 2022-10-26 | End: 2023-04-12

## 2022-10-25 RX ORDER — PREDNISONE 5 MG/1
TABLET ORAL
Qty: 38 TABLET | Refills: 0 | Status: SHIPPED | OUTPATIENT
Start: 2022-10-26 | End: 2022-11-04

## 2022-10-25 RX ORDER — FLUTICASONE FUROATE AND VILANTEROL 200; 25 UG/1; UG/1
1 POWDER RESPIRATORY (INHALATION) DAILY
Qty: 60 EACH | Refills: 1 | Status: SHIPPED | OUTPATIENT
Start: 2022-10-26 | End: 2023-07-12

## 2022-10-25 RX ADMIN — HYDROCHLOROTHIAZIDE 12.5 MG: 12.5 TABLET ORAL at 09:10

## 2022-10-25 RX ADMIN — FLUTICASONE FUROATE AND VILANTEROL TRIFENATATE 1 PUFF: 200; 25 POWDER RESPIRATORY (INHALATION) at 08:10

## 2022-10-25 RX ADMIN — CHOLECALCIFEROL TAB 25 MCG (1000 UNIT) 1000 UNITS: 25 TAB at 09:10

## 2022-10-25 RX ADMIN — AZITHROMYCIN MONOHYDRATE 250 MG: 250 TABLET ORAL at 10:10

## 2022-10-25 RX ADMIN — NICOTINE 1 PATCH: 14 PATCH, EXTENDED RELEASE TRANSDERMAL at 09:10

## 2022-10-25 RX ADMIN — BUPROPION HYDROCHLORIDE 150 MG: 150 TABLET, FILM COATED, EXTENDED RELEASE ORAL at 09:10

## 2022-10-25 RX ADMIN — PREDNISONE 50 MG: 5 TABLET ORAL at 09:10

## 2022-10-25 RX ADMIN — GABAPENTIN 300 MG: 300 CAPSULE ORAL at 09:10

## 2022-10-25 RX ADMIN — ASPIRIN 81 MG: 81 TABLET, COATED ORAL at 09:10

## 2022-10-25 RX ADMIN — Medication 10 ML: at 05:10

## 2022-10-25 NOTE — DISCHARGE SUMMARY
[facility] Cam[facility] - Observation  [facility]  Discharge Summary      Patient Name: Rochelle Espinoza  MRN: [id number]  Patient Class: OP- Observation  Admission Date: 10/23/2022  Hospital Length of Stay: 0 days  Discharge Date and Time:  10/25/2022 9:54 AM  Attending Physician: Raghu Grover MD   Discharging Provider: Raghu Grover MD  Primary Care Provider: Yosi Samuels MD  Hospital Medicine Team: [facility] HOSP MED  Raghu Grover MD    HPI:   Rochelle Espinoza is a 66yo AAF with a PMH of HFpEF (60%), HTN, HLD, COPD (on 3L home O2), peripheral neuropathy, vitamin D deficiency, MDD, and tobacco abuse who presented to the [facility] ED on 10/23/22 with complaints of SOB. Pt reports she was in her usual state of health until one week PTA when she developed a cough and rhinorrhea. Denies any known sick contacts. Cough productive of clear foamy sputum, and progressed to include SOB. SOB worsened, and she was unable to perform simple tasks at home without becoming winded. She also ran out of one of her inhalers 4 days previously, which also worsened SOB. Denies F/C/N/V/D/C, HA, CP, palpitations, abdominal pain, dysuria, extremity swelling, or symptoms otherwise. On morning of admission, SOB awoke her from sleep, which prompter her to come to the ER.    In the ED, vitals significant for RR 32 and O2 sats 89% on 3L NC (improved with breathing treatments). Labs grossly unremarkable. CXR showed stable pulmonary vascular congestion without definite interstitial edema. ED gave azithro and prednisone, and hospital medicine was consulted for admission and further management.      * No surgery found *      Hospital Course:   Pt admitted to [facility] for COPD exacerbation, and was started on azithro/rocephin and prednisone. COVID, RIP, and flu all negative. O2 sats and symptoms improved into 10/24, back to 3-4L NC. She continued to improve into 10/25, and was deemed appropriate for discharge. To complete abx and steroid taper at home. Plan discussed  with pt, who was agreeable and amenable; medications were discussed and reviewed, outpatient follow-up scheduled, ER precautions were given, all questions were answered to the pt's satisfaction, and Ms. Espinoza was subsequently discharged.         Goals of Care Treatment Preferences:  Code Status: Full Code      Consults:     No new Assessment & Plan notes have been filed under this hospital service since the last note was generated.  Service: Hospital Medicine    Final Active Diagnoses:    Diagnosis Date Noted POA    PRINCIPAL PROBLEM:  COPD exacerbation [J44.1] 10/23/2022 Yes    Acute on chronic respiratory failure with hypoxia [J96.21] 07/30/2022 Yes    Chronic diastolic heart failure [I50.32] 10/23/2022 Yes    Essential hypertension [I10]  Yes    Mixed hyperlipidemia [E78.2] 12/30/2021 Yes    Peripheral polyneuropathy [G62.9] 10/23/2022 Yes    Vitamin D deficiency [E55.9] 10/23/2022 Yes    Recurrent major depressive disorder, in partial remission [F33.41] 10/23/2022 Yes    Tobacco abuse [Z72.0] 02/07/2016 Yes      Problems Resolved During this Admission:       Discharged Condition: stable    Disposition: Home or Self Care    Follow Up:   Follow-up Information     Yosi Samuels MD. Schedule an appointment as soon as possible for a visit.    Specialty: Internal Medicine  Contact information:  [address]  [facility] [address] [phone]                       Patient Instructions:      Ambulatory referral/consult to Pulmonology   Standing Status: Future   Referral Priority: Routine Referral Type: Consultation   Referral Reason: Specialty Services Required   Requested Specialty: Pulmonary Disease   Number of Visits Requested: 1     Ambulatory referral/consult to Internal Medicine   Standing Status: Future   Referral Priority: Routine Referral Type: Consultation   Referral Reason: Specialty Services Required   Requested Specialty: Internal Medicine   Number of Visits Requested: 1     Diet Cardiac      Notify your health care provider if you experience any of the following:  increased confusion or weakness     Notify your health care provider if you experience any of the following:  persistent dizziness, light-headedness, or visual disturbances     Notify your health care provider if you experience any of the following:  worsening rash     Notify your health care provider if you experience any of the following:  severe persistent headache     Notify your health care provider if you experience any of the following:  difficulty breathing or increased cough     Notify your health care provider if you experience any of the following:  severe uncontrolled pain     Notify your health care provider if you experience any of the following:  persistent nausea and vomiting or diarrhea     Notify your health care provider if you experience any of the following:  temperature >100.4     Activity as tolerated       Significant Diagnostic Studies: Labs: All labs within the past 24 hours have been reviewed    Pending Diagnostic Studies:     None         Medications:  Reconciled Home Medications:      Medication List      START taking these medications    azithromycin 250 MG tablet  Commonly known as: Z-CLARITA  Take 1 tablet (250 mg total) by mouth once daily. for 3 days     cefdinir 300 MG capsule  Commonly known as: OMNICEF  Take 1 capsule (300 mg total) by mouth 2 (two) times daily. for 3 days     fluticasone furoate-vilanteroL 200-25 mcg/dose Dsdv diskus inhaler  Commonly known as: BREO  Inhale 1 puff into the lungs once daily. Controller  Start taking on: October 26, 2022  Replaces: fluticasone-salmeterol 230-21 mcg/dose 230-21 mcg/actuation Hfaa inhaler     predniSONE 5 MG tablet  Commonly known as: DELTASONE  Take 8 tablets (40 mg total) by mouth once daily for 3 days, THEN 4 tablets (20 mg total) once daily for 2 days, THEN 2 tablets (10 mg total) once daily for 2 days, THEN 1 tablet (5 mg total) once daily for 2  days.  Start taking on: October 26, 2022        CHANGE how you take these medications    nicotine 14 mg/24 hr  Commonly known as: NICODERM CQ  Place 1 patch onto the skin once daily.  Start taking on: October 26, 2022  What changed: Another medication with the same name was removed. Continue taking this medication, and follow the directions you see here.        CONTINUE taking these medications    albuterol 90 mcg/actuation inhaler  Commonly known as: PROVENTIL/VENTOLIN HFA  Inhale 2 puffs into the lungs every 6 (six) hours. Rescue     albuterol-ipratropium 2.5 mg-0.5 mg/3 mL nebulizer solution  Commonly known as: DUO-NEB  Take 3 mLs by nebulization every 4 (four) hours as needed for Wheezing. Rescue     amLODIPine 5 MG tablet  Commonly known as: NORVASC  Take 1 tablet (5 mg total) by mouth once daily.     ascorbic acid (vitamin C) 500 MG tablet  Commonly known as: VITAMIN C  Take 500 mg by mouth once daily.     aspirin 81 MG EC tablet  Commonly known as: ECOTRIN  Take 81 mg by mouth once daily. Low Dose     atorvastatin 10 MG tablet  Commonly known as: LIPITOR  Take 1 tablet (10 mg total) by mouth every evening.     buPROPion 150 MG TB24 tablet  Commonly known as: WELLBUTRIN XL  Take 1 tablet (150 mg total) by mouth once daily.     cholecalciferol (vitamin D3) 25 mcg (1,000 unit) capsule  Commonly known as: VITAMIN D3  Take 1,000 Units by mouth once daily.     gabapentin 300 MG capsule  Commonly known as: NEURONTIN  Take 1 capsule (300 mg total) by mouth 3 (three) times daily.     glycerin adult suppository  Place 1 suppository rectally as needed for Constipation.     hydroCHLOROthiazide 12.5 MG Tab  Commonly known as: HYDRODIURIL  Take 1 tablet (12.5 mg total) by mouth once daily.     losartan 100 MG tablet  Commonly known as: COZAAR  Take 1 tablet (100 mg total) by mouth once daily.     magnesium 250 mg Tab  Take 250 mg by mouth once.     potassium chloride SA 20 MEQ tablet  Commonly known as:  K-DUR,KLOR-CON  TAKE 1 TABLET(20 MEQ) BY MOUTH EVERY DAY     traZODone 50 MG tablet  Commonly known as: DESYREL  Take 1 tablet (50 mg total) by mouth nightly as needed for Insomnia.        STOP taking these medications    fluticasone-salmeterol 230-21 mcg/dose 230-21 mcg/actuation Hfaa inhaler  Commonly known as: ADVAIR HFA  Replaced by: fluticasone furoate-vilanteroL 200-25 mcg/dose Dsdv diskus inhaler            Indwelling Lines/Drains at time of discharge:   Lines/Drains/Airways     None                 Time spent on the discharge of patient: 35 minutes         Raghu Grover MD  Attending Physician  Department of Hospital Medicine  [employer] secure chat preferred, or ext. [phone]  10/25/2022

## 2022-10-25 NOTE — PLAN OF CARE
[facility]ra Saucedo - Observation  Initial Discharge Assessment       Primary Care Provider: Yosi Samuels MD    Admission Diagnosis: SOB (shortness of breath) [R06.02]  Chest pain [R07.9]  COPD with acute exacerbation [J44.1]    Admission Date: 10/23/2022  Expected Discharge Date: 10/25/2022    Discharge Barriers Identified: None    Payor: HU[employer] MANAGED MEDICARE / Plan: HU[employer]NA TOTAL CARE ADVANTAGE / Product Type: Medicare Advantage /     Extended Emergency Contact Information  Primary Emergency Contact: Hipolito Espinoza  Address: [address] [place], [place] [address] [address] [place]  Mobile Phone: [phone]  Relation: Daughter    Discharge Plan A: Home, Home with family, Home Health  Discharge Plan B: Home, Home with family      [facility][id number] [address] AT [facility] [address]  [address] [facility] [address]  Phone: [phone] Fax: [phone]    [facility] Mail [facility] - [facility] [address] [facility]  [address] [facility] [address]  Phone: [phone] Fax: [phone]      Initial Assessment (most recent)       Adult Discharge Assessment - 10/25/22 1336          Discharge Assessment    Assessment Type Discharge Planning Assessment     Confirmed/corrected address, phone number and insurance Yes     Confirmed Demographics Correct on Facesheet     Source of Information patient     Does patient/caregiver understand observation status Yes     Communicated ELIZABETH with patient/caregiver Yes     Reason For Admission COPD Exacerbation     Lives With alone     Do you expect to return to your current living situation? Yes     Do you have help at home or someone to help you manage your care at home? No     Prior to hospitilization cognitive status: Alert/Oriented     Current cognitive status: Alert/Oriented     Walking or Climbing Stairs Difficulty none     Dressing/Bathing Difficulty none     Equipment Currently  Used at Home oxygen     Readmission within 30 days? No     Patient currently being followed by outpatient case management? No     Do you take prescription medications? Yes     Do you have prescription coverage? Yes     Coverage [employer] Managed Medicare     Do you have any problems affording any of your prescribed medications? Yes     If yes, what medications? Trilogy     Is the patient taking medications as prescribed? yes     Who is going to help you get home at discharge? Pt's friend will provide transportation home.     How do you get to doctors appointments? family or friend will provide     Are you on dialysis? No     Do you take coumadin? No     Discharge Plan A Home;Home with family;Home Health     Discharge Plan B Home;Home with family     DME Needed Upon Discharge  none     Discharge Plan discussed with: Patient     Discharge Barriers Identified None                   SW completed initial discharge assessment.  Pt lives along.  Pt stated her daughter and neighbors are his support systems.  Pt does not receive coumadin or dialysis.  Pt stated she is independent with ADLs and mobility.      DisP;  Home with HH-Refer to JRAETH Reyes LMSW  PRN-  [facility]  Ext. [phone]

## 2022-10-25 NOTE — PLAN OF CARE
Pt accepted by [facility].  Pt's family will provide transportation home.     SUSANA telephoned centralized scheduling [phone] for follow up appointment with Dr. Land and was advised he does not have availability until Feb.   will have someone from Dr. Land office telephone pt to schedule hospital follow up.  SUSANA spoke to [facility] with scheduling for Pulmonary and was advised she will put pt on waiting list and contact Pulmonary Staff to follow up with appointment sooner.  Pt has an appointment scheduled with Dr. Caceres.         10/25/22 9035   Post-Acute Status   Post-Acute Authorization Home Health   Home Health Status Set-up Complete/Auth obtained   Hospital Resources/Appts/Education Provided Appointments scheduled and added to AVS   Discharge Delays None known at this time   Discharge Plan   Discharge Plan A Home;Home with family;Home Health   Discharge Plan B Home;Home with family     Tina Reyes LMSW  PRN-  [facility]  Ext. [phone]

## 2022-10-25 NOTE — TELEPHONE ENCOUNTER
----- Message from Aleena Bermudez sent at 10/25/2022  3:07 PM CDT -----  Please call the patient to schedule a Hospital f/u appmnt in the next 7 to 10 days.    Thank you

## 2022-10-25 NOTE — PLAN OF CARE
Problem: Adult Inpatient Plan of Care  Goal: Plan of Care Review  Outcome: Met  Goal: Patient-Specific Goal (Individualized)  Outcome: Met  Goal: Absence of Hospital-Acquired Illness or Injury  Outcome: Met  Goal: Optimal Comfort and Wellbeing  Outcome: Met  Goal: Readiness for Transition of Care  Outcome: Met     Problem: Infection  Goal: Absence of Infection Signs and Symptoms  Outcome: Met     Problem: COPD (Chronic Obstructive Pulmonary Disease) Comorbidity  Goal: Maintenance of COPD Symptom Control  Outcome: Met     Problem: Heart Failure Comorbidity  Goal: Maintenance of Heart Failure Symptom Control  Outcome: Met     Problem: Hypertension Comorbidity  Goal: Blood Pressure in Desired Range  Outcome: Met

## 2022-10-25 NOTE — PLAN OF CARE
Indra Saucedo - Observation      HOME HEALTH ORDERS  FACE TO FACE ENCOUNTER    Patient Name: Rochelle Espinoza  YOB: 1955    PCP: Yosi Samuels MD   PCP Address: [address] / [place] [address]  PCP Phone Number: [phone]  PCP Fax: [phone]    Encounter Date: 10/25/22    Admit to Home Health    Diagnoses:  Active Hospital Problems    Diagnosis  POA    *COPD exacerbation [J44.1]  Yes     Priority: 1 - High    Acute on chronic respiratory failure with hypoxia [J96.21]  Yes     Priority: 2     Chronic diastolic heart failure [I50.32]  Yes     Priority: 3     Essential hypertension [I10]  Yes     Priority: 4     Mixed hyperlipidemia [E78.2]  Yes     Priority: 5     Peripheral polyneuropathy [G62.9]  Yes     Priority: 6     Vitamin D deficiency [E55.9]  Yes     Priority: 7     Recurrent major depressive disorder, in partial remission [F33.41]  Yes     Priority: 8     Tobacco abuse [Z72.0]  Yes     Priority: 9       Resolved Hospital Problems   No resolved problems to display.       Follow Up Appointments:  Future Appointments   Date Time Provider Department Center   11/30/2022 11:00 AM [facility], DEXA1 [facility]   1/27/2023  9:00 AM Yosi Samuels Jr., MD [facility]       Allergies:  Review of patient's allergies indicates:   Allergen Reactions    Orange juice      Swelling in pt tongue         Medications: Review discharge medications with patient and family and provide education.    Current Facility-Administered Medications   Medication Dose Route Frequency Provider Last Rate Last Admin    acetaminophen tablet 650 mg  650 mg Oral Q4H PRN Raghu Grover MD   650 mg at 10/24/22 0557    albuterol-ipratropium 2.5 mg-0.5 mg/3 mL nebulizer solution 3 mL  3 mL Nebulization Q6H PRN Raghu Grover MD   3 mL at 10/24/22 0140    aluminum-magnesium hydroxide-simethicone 200-200-20 mg/5 mL suspension 30 mL  30 mL Oral QID PRN Raghu Grover MD        amLODIPine tablet 5 mg  5 mg Oral  Daily Raghu Grover MD   5 mg at 10/24/22 1117    aspirin EC tablet 81 mg  81 mg Oral Daily Raghu Grover MD   81 mg at 10/25/22 0908    atorvastatin tablet 10 mg  10 mg Oral QHS Raghu Grover MD        azithromycin tablet 250 mg  250 mg Oral Daily Raghu Grover MD   250 mg at 10/24/22 1117    buPROPion TB24 tablet 150 mg  150 mg Oral Daily Raghu Grover MD   150 mg at 10/25/22 0908    cefTRIAXone (ROCEPHIN) 1 g/50 mL D5W IVPB  1 g Intravenous Q24H Raghu Grover MD   Stopped at 10/24/22 1731    enoxaparin injection 40 mg  40 mg Subcutaneous Daily Raghu Grover MD   40 mg at 10/24/22 1702    fluticasone furoate-vilanteroL 200-25 mcg/dose diskus inhaler 1 puff  1 puff Inhalation Daily Raghu Grover MD   1 puff at 10/25/22 0807    gabapentin capsule 300 mg  300 mg Oral TID Raghu Grover MD   300 mg at 10/25/22 0908    hydroCHLOROthiazide tablet 12.5 mg  12.5 mg Oral Daily Raghu Grover MD   12.5 mg at 10/25/22 0908    losartan tablet 100 mg  100 mg Oral Daily Raghu Grover MD   100 mg at 10/24/22 1116    melatonin tablet 6 mg  6 mg Oral Nightly PRN Raghu Grover MD        naloxone 0.4 mg/mL injection 0.02 mg  0.02 mg Intravenous PRN Raghu Grover MD        nicotine 14 mg/24 hr 1 patch  1 patch Transdermal Daily Rgahu Grover MD   1 patch at 10/25/22 0908    ondansetron injection 4 mg  4 mg Intravenous Q8H PRN Raghu Grover MD        polyethylene glycol packet 17 g  17 g Oral Daily PRN Raghu Grover MD        predniSONE tablet 50 mg  50 mg Oral Daily Raghu Grover MD   50 mg at 10/25/22 0907    prochlorperazine injection Soln 5 mg  5 mg Intravenous Q6H PRN Raghu Grover MD        simethicone chewable tablet 80 mg  1 tablet Oral QID PRN Raghu Grover MD        sodium chloride 0.65 % nasal spray 1 spray  1 spray Each Nostril PRN Raghu Grover MD        sodium chloride 0.9% flush 10 mL  10 mL Intravenous Q8H Raghu Grover MD   10 mL at 10/25/22 0599     traZODone tablet 50 mg  50 mg Oral Nightly PRN Raghu Grover MD        vitamin D 1000 units tablet 1,000 Units  1,000 Units Oral Daily Raghu Grover MD   1,000 Units at 10/25/22 0908     Current Discharge Medication List        START taking these medications    Details   azithromycin (Z-CLARITA) 250 MG tablet Take 1 tablet (250 mg total) by mouth once daily. for 3 days  Qty: 3 tablet, Refills: 0      cefdinir (OMNICEF) 300 MG capsule Take 1 capsule (300 mg total) by mouth 2 (two) times daily. for 3 days  Qty: 6 capsule, Refills: 0      fluticasone furoate-vilanteroL (BREO) 200-25 mcg/dose DsDv diskus inhaler Inhale 1 puff into the lungs once daily. Controller  Qty: 60 each, Refills: 1      predniSONE (DELTASONE) 5 MG tablet Take 8 tablets (40 mg total) by mouth once daily for 3 days, THEN 4 tablets (20 mg total) once daily for 2 days, THEN 2 tablets (10 mg total) once daily for 2 days, THEN 1 tablet (5 mg total) once daily for 2 days.  Qty: 38 tablet, Refills: 0           CONTINUE these medications which have CHANGED    Details   nicotine (NICODERM CQ) 14 mg/24 hr Place 1 patch onto the skin once daily.  Qty: 30 patch, Refills: 1           CONTINUE these medications which have NOT CHANGED    Details   albuterol (PROVENTIL/VENTOLIN HFA) 90 mcg/actuation inhaler Inhale 2 puffs into the lungs every 6 (six) hours. Rescue  Qty: 54 g, Refills: 3    Comments: **Patient requests 90 days supply**  Associated Diagnoses: Bronchospasm, acute; Chronic obstructive pulmonary disease with hypoxia      albuterol-ipratropium (DUO-NEB) 2.5 mg-0.5 mg/3 mL nebulizer solution Take 3 mLs by nebulization every 4 (four) hours as needed for Wheezing. Rescue  Qty: 1080 mL, Refills: 3    Associated Diagnoses: At high risk for respiratory infection      amLODIPine (NORVASC) 5 MG tablet Take 1 tablet (5 mg total) by mouth once daily.  Qty: 90 tablet, Refills: 3    Comments: .      aspirin (ECOTRIN) 81 MG EC tablet Take 81 mg by mouth once  daily. Low Dose      atorvastatin (LIPITOR) 10 MG tablet Take 1 tablet (10 mg total) by mouth every evening.  Qty: 90 tablet, Refills: 3      buPROPion (WELLBUTRIN XL) 150 MG TB24 tablet Take 1 tablet (150 mg total) by mouth once daily.  Qty: 90 tablet, Refills: 3      cholecalciferol, vitamin D3, (VITAMIN D3) 25 mcg (1,000 unit) capsule Take 1,000 Units by mouth once daily.      gabapentin (NEURONTIN) 300 MG capsule Take 1 capsule (300 mg total) by mouth 3 (three) times daily.  Qty: 270 capsule, Refills: 2      hydroCHLOROthiazide (HYDRODIURIL) 12.5 MG Tab Take 1 tablet (12.5 mg total) by mouth once daily.  Qty: 90 tablet, Refills: 3    Comments: .      losartan (COZAAR) 100 MG tablet Take 1 tablet (100 mg total) by mouth once daily.  Qty: 90 tablet, Refills: 3    Comments: .      magnesium 250 mg Tab Take 250 mg by mouth once.      potassium chloride SA (K-DUR,KLOR-CON) 20 MEQ tablet TAKE 1 TABLET(20 MEQ) BY MOUTH EVERY DAY  Qty: 30 tablet, Refills: 7      traZODone (DESYREL) 50 MG tablet Take 1 tablet (50 mg total) by mouth nightly as needed for Insomnia.  Qty: 30 tablet, Refills: 11      ascorbic acid, vitamin C, (VITAMIN C) 500 MG tablet Take 500 mg by mouth once daily.      glycerin adult suppository Place 1 suppository rectally as needed for Constipation.           STOP taking these medications       nicotine (NICODERM CQ) 7 mg/24 hr Comments:   Reason for Stopping:         fluticasone-salmeterol 230-21 mcg/dose (ADVAIR HFA) 230-21 mcg/actuation HFAA inhaler Comments:   Reason for Stopping:                 I have seen and examined this patient within the last 30 days. My clinical findings that support the need for the home health skilled services and home bound status are the following:no   Weakness/numbness causing balance and gait disturbance due to COPD Exacerbation making it taxing to leave home.     Diet:   cardiac diet    Labs:  Report Lab results to PCP.    Referrals/ Consults  Physical Therapy to  evaluate and treat. Evaluate for home safety and equipment needs; Establish/upgrade home exercise program. Perform / instruct on therapeutic exercises, gait training, transfer training, and Range of Motion.   to evaluate for community resources/long-range planning.  Aide to provide assistance with personal care, ADLs, and vital signs.    Activities:   activity as tolerated    Nursing:   Agency to admit patient within 24 hours of hospital discharge unless specified on physician order or at patient request    SN to complete comprehensive assessment including routine vital signs. Instruct on disease process and s/s of complications to report to MD. Review/verify medication list sent home with the patient at time of discharge  and instruct patient/caregiver as needed. Frequency may be adjusted depending on start of care date.     Skilled nurse to perform up to 3 visits PRN for symptoms related to diagnosis    Notify MD if SBP > 160 or < 90; DBP > 90 or < 50; HR > 120 or < 50; Temp > 101; O2 < 88%    Ok to schedule additional visits based on staff availability and patient request on consecutive days within the home health episode.    When multiple disciplines ordered:    Start of Care occurs on Sunday - Wednesday schedule remaining discipline evaluations as ordered on separate consecutive days following the start of care.    Thursday SOC -schedule subsequent evaluations Friday and Monday the following week.     Friday - Saturday SOC - schedule subsequent discipline evaluations on consecutive days starting Monday of the following week.    For all post-discharge communication and subsequent orders please contact patient's primary care physician.   Miscellaneous   Home Oxygen:  Oxygen at 4 L/min nasal canula to be used:  Continuously.    Home Health Aide:  Nursing Three times weekly, Physical Therapy Three times weekly, Medical Social Work Three times weekly, Respiratory Therapy Three times weekly, and Home  Health Aide Three times weekly    Wound Care Orders  no    I certify that this patient is confined to her home and needs intermittent skilled nursing care and physical therapy.        Raghu Grover MD  Attending Physician  Department of Hospital Medicine  [facility] secure chat preferred, or ext. [phone]  10/25/2022

## 2022-10-25 NOTE — PLAN OF CARE
Pt agreeable to [place]. SW sent referral.     10/25/22 0742   Post-Acute Status   Post-Acute Authorization Home Health   Home Health Status Referrals Sent   Discharge Delays None known at this time   Discharge Plan   Discharge Plan A Home;Home with family;Home Health   Discharge Plan B Home with family;Home     Tina Reyes LMSW  PRN-  [facility]  Ext. [phone]

## 2022-10-26 NOTE — PLAN OF CARE
[facility] - Observation  Discharge Final Note    Primary Care Provider: Yosi Samuels MD    Expected Discharge Date: 10/25/2022    Pt accepted by [facility].  Pt's family provided transportation home.      Final Discharge Note (most recent)       Final Note - 10/26/22 1545          Final Note    Assessment Type Final Discharge Note     Anticipated Discharge Disposition [place] [facility]     Hospital Resources/Appts/Education Provided Appointments scheduled and added to AVS        Post-Acute Status    Post-Acute Authorization Home Health     Home Health Status Set-up Complete/Auth obtained     Discharge Delays None known at this time                     Important Message from Medicare             Contact Info       Yosi Samuels MD   Specialty: Internal Medicine   Relationship: PCP - General  Hyperlipidemia Digital Medicine Responsible Provider    [address]  [facility] [address]   Phone: [phone]       Next Steps: Schedule an appointment as soon as possible for a visit          Future Appointments   Date Time Provider Department Center   10/28/2022 11:30 AM ELVIRA Isaacs II, MD [facility] IM [facility] PCW   11/30/2022 11:00 AM NOMALEXEI, DEXA1 [facility] BMD [facility]   11/30/2022  2:00 PM Ishan Caceres MD [facility] PULMSVC [facility]   1/27/2023  9:00 AM Yosi Samuels Jr., MD [facility] W     Tian Reyes LMSW  PRN-  [facility]  Ext. [phone]

## 2022-10-27 NOTE — PROGRESS NOTES
HOSPITAL FOLLOWUP NOTE    Discharge note information (in italics) was reviewed in detail and discussed with the patient:   Hospital Course:   [place] [facility], and was started on azithro/rocephin and prednisone. COVID, RIP, and flu all negative. O2 sats and symptoms improved into 10/24, back to 3-4L NC. She continued to improve into 10/25, and was deemed appropriate for discharge. To complete abx and steroid taper at home. Plan discussed with pt, who was agreeable and amenable; medications were discussed and reviewed, outpatient follow-up scheduled, ER precautions were given, all questions were answered to the pt's satisfaction, and Ms. Espinoza was subsequently discharged.    [facility]: all information reviewed and updated as necessary during this encounter.  Medication list reviewed and reconciled.    She states she is feeling much better than before hospitalization.  She has been tobacco free for 1 week now and doesn't plan to restart. No cough or congestion today. No new complaints today.      Review of Systems   Constitutional: Negative.    Respiratory: Negative.     Cardiovascular: Negative.    Physical Exam  Constitutional:       General: She is not in acute distress.     Appearance: She is normal weight. She is not ill-appearing, toxic-appearing or diaphoretic.   Cardiovascular:      Rate and Rhythm: Normal rate and regular rhythm.   Pulmonary:      Effort: Pulmonary effort is normal.      Breath sounds: Normal breath sounds. No wheezing.   Neurological:      Mental Status: She is alert.      Assessment/plan:  1. Hospital discharge follow-up    2. Chronic obstructive pulmonary disease, unspecified COPD type  Continue prednisone taper until out.  Stay off tobacco. Continue chronic O2 use. F/u with PCP and pulmonary as they had previously instructed.      3. Hypertension.  Stay off losartan for now.  Her BP is on the low side and she was getting dizzy.  She hasn't been on this since her  discharge form this recent hospitalization.   Monitor BP at home and restart if BP >130/85 consisently.        No follow-ups on file.  Medication List with Changes/Refills   Current Medications    ALBUTEROL (PROVENTIL/VENTOLIN HFA) 90 MCG/ACTUATION INHALER    Inhale 2 puffs into the lungs every 6 (six) hours. Rescue    ALBUTEROL-IPRATROPIUM (DUO-NEB) 2.5 MG-0.5 MG/3 ML NEBULIZER SOLUTION    Take 3 mLs by nebulization every 4 (four) hours as needed for Wheezing. Rescue    AMLODIPINE (NORVASC) 5 MG TABLET    Take 1 tablet (5 mg total) by mouth once daily.    ASCORBIC ACID, VITAMIN C, (VITAMIN C) 500 MG TABLET    Take 500 mg by mouth once daily.    ASPIRIN (ECOTRIN) 81 MG EC TABLET    Take 81 mg by mouth once daily. Low Dose    ATORVASTATIN (LIPITOR) 10 MG TABLET    Take 1 tablet (10 mg total) by mouth every evening.    AZITHROMYCIN (Z-CLARITA) 250 MG TABLET    Take 1 tablet (250 mg total) by mouth once daily. for 3 days    BUPROPION (WELLBUTRIN XL) 150 MG TB24 TABLET    Take 1 tablet (150 mg total) by mouth once daily.    CEFDINIR (OMNICEF) 300 MG CAPSULE    Take 1 capsule (300 mg total) by mouth 2 (two) times daily. for 3 days    CHOLECALCIFEROL, VITAMIN D3, (VITAMIN D3) 25 MCG (1,000 UNIT) CAPSULE    Take 1,000 Units by mouth once daily.    FLUTICASONE FUROATE-VILANTEROL (BREO) 200-25 MCG/DOSE DSDV DISKUS INHALER    Inhale 1 puff into the lungs once daily. Controller    GABAPENTIN (NEURONTIN) 300 MG CAPSULE    Take 1 capsule (300 mg total) by mouth 3 (three) times daily.    GLYCERIN ADULT SUPPOSITORY    Place 1 suppository rectally as needed for Constipation.    HYDROCHLOROTHIAZIDE (HYDRODIURIL) 12.5 MG TAB    Take 1 tablet (12.5 mg total) by mouth once daily.    LOSARTAN (COZAAR) 100 MG TABLET    Take 1 tablet (100 mg total) by mouth once daily.    MAGNESIUM 250 MG TAB    Take 250 mg by mouth once.    NICOTINE (NICODERM CQ) 14 MG/24 HR    Place 1 patch onto the skin once daily.    POTASSIUM CHLORIDE SA (K-DUR,KLOR-CON)  20 MEQ TABLET    TAKE 1 TABLET(20 MEQ) BY MOUTH EVERY DAY    PREDNISONE (DELTASONE) 5 MG TABLET    Take 8 tablets (40 mg total) by mouth once daily for 3 days, THEN 4 tablets (20 mg total) once daily for 2 days, THEN 2 tablets (10 mg total) once daily for 2 days, THEN 1 tablet (5 mg total) once daily for 2 days.    TRAZODONE (DESYREL) 50 MG TABLET    Take 1 tablet (50 mg total) by mouth nightly as needed for Insomnia.          I spent a total of 30 minutes on the day of the visit.This includes face to face time and non-face to face time preparing to see the patient (eg, review of tests), obtaining and/or reviewing separately obtained history, documenting clinical information in the electronic or other health record, independently interpreting results and communicating results to the patient/family/caregiver, and coordinating care.

## 2022-10-28 ENCOUNTER — OFFICE VISIT (OUTPATIENT)
Dept: INTERNAL MEDICINE | Facility: CLINIC | Age: 67
End: 2022-10-28
Payer: MEDICARE

## 2022-10-28 VITALS
SYSTOLIC BLOOD PRESSURE: 110 MMHG | WEIGHT: 134 LBS | BODY MASS INDEX: 23.74 KG/M2 | OXYGEN SATURATION: 95 % | HEIGHT: 63 IN | DIASTOLIC BLOOD PRESSURE: 64 MMHG | HEART RATE: 88 BPM

## 2022-10-28 DIAGNOSIS — J44.9 CHRONIC OBSTRUCTIVE PULMONARY DISEASE, UNSPECIFIED COPD TYPE: ICD-10-CM

## 2022-10-28 DIAGNOSIS — Z09 HOSPITAL DISCHARGE FOLLOW-UP: Primary | ICD-10-CM

## 2022-10-28 DIAGNOSIS — I10 ESSENTIAL HYPERTENSION: ICD-10-CM

## 2022-10-28 PROBLEM — J96.21 ACUTE ON CHRONIC RESPIRATORY FAILURE WITH HYPOXIA: Status: RESOLVED | Noted: 2022-07-30 | Resolved: 2022-10-28

## 2022-10-28 PROCEDURE — 3288F FALL RISK ASSESSMENT DOCD: CPT | Mod: CPTII,S$GLB,, | Performed by: INTERNAL MEDICINE

## 2022-10-28 PROCEDURE — 99999 PR PBB SHADOW E&M-EST. PATIENT-LVL IV: CPT | Mod: PBBFAC,,, | Performed by: INTERNAL MEDICINE

## 2022-10-28 PROCEDURE — 1160F PR REVIEW ALL MEDS BY PRESCRIBER/CLIN PHARMACIST DOCUMENTED: ICD-10-PCS | Mod: CPTII,S$GLB,, | Performed by: INTERNAL MEDICINE

## 2022-10-28 PROCEDURE — 1159F PR MEDICATION LIST DOCUMENTED IN MEDICAL RECORD: ICD-10-PCS | Mod: CPTII,S$GLB,, | Performed by: INTERNAL MEDICINE

## 2022-10-28 PROCEDURE — 1160F RVW MEDS BY RX/DR IN RCRD: CPT | Mod: CPTII,S$GLB,, | Performed by: INTERNAL MEDICINE

## 2022-10-28 PROCEDURE — 1126F AMNT PAIN NOTED NONE PRSNT: CPT | Mod: CPTII,S$GLB,, | Performed by: INTERNAL MEDICINE

## 2022-10-28 PROCEDURE — 1101F PT FALLS ASSESS-DOCD LE1/YR: CPT | Mod: CPTII,S$GLB,, | Performed by: INTERNAL MEDICINE

## 2022-10-28 PROCEDURE — 3078F DIAST BP <80 MM HG: CPT | Mod: CPTII,S$GLB,, | Performed by: INTERNAL MEDICINE

## 2022-10-28 PROCEDURE — 99214 PR OFFICE/OUTPT VISIT, EST, LEVL IV, 30-39 MIN: ICD-10-PCS | Mod: S$GLB,,, | Performed by: INTERNAL MEDICINE

## 2022-10-28 PROCEDURE — 3074F PR MOST RECENT SYSTOLIC BLOOD PRESSURE < 130 MM HG: ICD-10-PCS | Mod: CPTII,S$GLB,, | Performed by: INTERNAL MEDICINE

## 2022-10-28 PROCEDURE — 1159F MED LIST DOCD IN RCRD: CPT | Mod: CPTII,S$GLB,, | Performed by: INTERNAL MEDICINE

## 2022-10-28 PROCEDURE — 99214 OFFICE O/P EST MOD 30 MIN: CPT | Mod: S$GLB,,, | Performed by: INTERNAL MEDICINE

## 2022-10-28 PROCEDURE — 4010F ACE/ARB THERAPY RXD/TAKEN: CPT | Mod: CPTII,S$GLB,, | Performed by: INTERNAL MEDICINE

## 2022-10-28 PROCEDURE — 3288F PR FALLS RISK ASSESSMENT DOCUMENTED: ICD-10-PCS | Mod: CPTII,S$GLB,, | Performed by: INTERNAL MEDICINE

## 2022-10-28 PROCEDURE — 3074F SYST BP LT 130 MM HG: CPT | Mod: CPTII,S$GLB,, | Performed by: INTERNAL MEDICINE

## 2022-10-28 PROCEDURE — 4010F PR ACE/ARB THEARPY RXD/TAKEN: ICD-10-PCS | Mod: CPTII,S$GLB,, | Performed by: INTERNAL MEDICINE

## 2022-10-28 PROCEDURE — 1101F PR PT FALLS ASSESS DOC 0-1 FALLS W/OUT INJ PAST YR: ICD-10-PCS | Mod: CPTII,S$GLB,, | Performed by: INTERNAL MEDICINE

## 2022-10-28 PROCEDURE — 99999 PR PBB SHADOW E&M-EST. PATIENT-LVL IV: ICD-10-PCS | Mod: PBBFAC,,, | Performed by: INTERNAL MEDICINE

## 2022-10-28 PROCEDURE — 1126F PR PAIN SEVERITY QUANTIFIED, NO PAIN PRESENT: ICD-10-PCS | Mod: CPTII,S$GLB,, | Performed by: INTERNAL MEDICINE

## 2022-10-28 PROCEDURE — 3078F PR MOST RECENT DIASTOLIC BLOOD PRESSURE < 80 MM HG: ICD-10-PCS | Mod: CPTII,S$GLB,, | Performed by: INTERNAL MEDICINE

## 2022-11-16 ENCOUNTER — PATIENT MESSAGE (OUTPATIENT)
Dept: ADMINISTRATIVE | Facility: OTHER | Age: 67
End: 2022-11-16

## 2022-11-18 ENCOUNTER — PATIENT MESSAGE (OUTPATIENT)
Dept: ADMINISTRATIVE | Facility: OTHER | Age: 67
End: 2022-11-18

## 2022-11-21 DIAGNOSIS — Z12.31 VISIT FOR SCREENING MAMMOGRAM: Primary | ICD-10-CM

## 2022-11-21 RX ORDER — AMLODIPINE BESYLATE 5 MG/1
TABLET ORAL
Qty: 90 TABLET | Refills: 3 | Status: ON HOLD | OUTPATIENT
Start: 2022-11-21 | End: 2023-08-18 | Stop reason: HOSPADM

## 2022-11-21 NOTE — TELEPHONE ENCOUNTER
Care Due:                  Date            Visit Type   Department     Provider  --------------------------------------------------------------------------------                                HOSPITAL     [facility] INTERNAL  Last Visit: 10-      FOLLOW UP    MEDICINE       Jamil Isaacs                              EP -                              PRIMARY      [facility] INTERNAL  Next Visit: 01-      CARE ([facility])   MEDICINE       Yosi Samuels                                                            Last  Test          Frequency    Reason                     Performed    Due Date  --------------------------------------------------------------------------------    Lipid Panel.  12 months..  atorvastatin.............  10-   10-    Health [facility] Embedded Care Gaps. Reference number: [id number]. 11/21/2022   5:24:14 AM CST

## 2022-11-22 NOTE — TELEPHONE ENCOUNTER
----- Message from Caroline Pascual sent at 11/22/2022 12:40 PM CST -----  Rochelle Espinoza calling regarding Orders (message) for annual mammogram screening, call back [phone]

## 2023-01-17 ENCOUNTER — DOCUMENT SCAN (OUTPATIENT)
Dept: HOME HEALTH SERVICES | Facility: HOSPITAL | Age: 68
End: 2023-01-17

## 2023-01-25 ENCOUNTER — HOSPITAL ENCOUNTER (OUTPATIENT)
Dept: RADIOLOGY | Facility: HOSPITAL | Age: 68
Discharge: HOME | End: 2023-01-25
Attending: INTERNAL MEDICINE
Payer: MEDICARE

## 2023-01-25 DIAGNOSIS — Z12.31 VISIT FOR SCREENING MAMMOGRAM: ICD-10-CM

## 2023-01-25 PROCEDURE — 77063 MAMMO DIGITAL SCREENING BILAT WITH TOMO: ICD-10-PCS | Mod: 26,HCNC,, | Performed by: RADIOLOGY

## 2023-01-25 PROCEDURE — 77063 BREAST TOMOSYNTHESIS BI: CPT | Mod: 26,HCNC,, | Performed by: RADIOLOGY

## 2023-01-25 PROCEDURE — 77067 SCR MAMMO BI INCL CAD: CPT | Mod: 26,HCNC,, | Performed by: RADIOLOGY

## 2023-01-25 PROCEDURE — 77067 MAMMO DIGITAL SCREENING BILAT WITH TOMO: ICD-10-PCS | Mod: 26,HCNC,, | Performed by: RADIOLOGY

## 2023-01-25 PROCEDURE — 77067 SCR MAMMO BI INCL CAD: CPT | Mod: TC,HCNC

## 2023-01-27 ENCOUNTER — LAB VISIT (OUTPATIENT)
Dept: LAB | Facility: HOSPITAL | Age: 68
End: 2023-01-27
Attending: INTERNAL MEDICINE
Payer: MEDICARE

## 2023-01-27 ENCOUNTER — OFFICE VISIT (OUTPATIENT)
Dept: INTERNAL MEDICINE | Facility: CLINIC | Age: 68
End: 2023-01-27
Payer: MEDICARE

## 2023-01-27 VITALS
WEIGHT: 155 LBS | BODY MASS INDEX: 27.46 KG/M2 | HEIGHT: 63 IN | DIASTOLIC BLOOD PRESSURE: 74 MMHG | SYSTOLIC BLOOD PRESSURE: 126 MMHG | HEART RATE: 65 BPM

## 2023-01-27 DIAGNOSIS — I95.9 HYPOTENSION, UNSPECIFIED HYPOTENSION TYPE: ICD-10-CM

## 2023-01-27 DIAGNOSIS — I20.89 STABLE ANGINA: ICD-10-CM

## 2023-01-27 DIAGNOSIS — F33.41 MAJOR DEPRESSIVE DISORDER, RECURRENT, IN PARTIAL REMISSION: ICD-10-CM

## 2023-01-27 DIAGNOSIS — G95.9 CERVICAL MYELOPATHY: ICD-10-CM

## 2023-01-27 DIAGNOSIS — J44.9 CHRONIC OBSTRUCTIVE PULMONARY DISEASE, UNSPECIFIED COPD TYPE: ICD-10-CM

## 2023-01-27 DIAGNOSIS — I27.81 COR PULMONALE, CHRONIC: Chronic | ICD-10-CM

## 2023-01-27 DIAGNOSIS — I70.0 AORTIC CALCIFICATION: ICD-10-CM

## 2023-01-27 DIAGNOSIS — R79.9 ABNORMAL FINDING OF BLOOD CHEMISTRY, UNSPECIFIED: ICD-10-CM

## 2023-01-27 DIAGNOSIS — I50.32 CHRONIC DIASTOLIC HEART FAILURE: ICD-10-CM

## 2023-01-27 DIAGNOSIS — J96.11 CHRONIC HYPOXEMIC RESPIRATORY FAILURE: Chronic | ICD-10-CM

## 2023-01-27 DIAGNOSIS — I50.32 CHRONIC DIASTOLIC (CONGESTIVE) HEART FAILURE: ICD-10-CM

## 2023-01-27 DIAGNOSIS — R63.5 WEIGHT GAIN: ICD-10-CM

## 2023-01-27 DIAGNOSIS — J31.0 RHINITIS MEDICAMENTOSA: Primary | ICD-10-CM

## 2023-01-27 DIAGNOSIS — F17.210 NICOTINE DEPENDENCE, CIGARETTES, UNCOMPLICATED: Chronic | ICD-10-CM

## 2023-01-27 DIAGNOSIS — T48.5X5A RHINITIS MEDICAMENTOSA: Primary | ICD-10-CM

## 2023-01-27 LAB
ALBUMIN SERPL BCP-MCNC: 3.7 G/DL (ref 3.5–5.2)
ALP SERPL-CCNC: 51 U/L (ref 55–135)
ALT SERPL W/O P-5'-P-CCNC: 23 U/L (ref 10–44)
ANION GAP SERPL CALC-SCNC: 10 MMOL/L (ref 8–16)
AST SERPL-CCNC: 23 U/L (ref 10–40)
BILIRUB SERPL-MCNC: 0.5 MG/DL (ref 0.1–1)
BUN SERPL-MCNC: 7 MG/DL (ref 8–23)
CALCIUM SERPL-MCNC: 9.9 MG/DL (ref 8.7–10.5)
CHLORIDE SERPL-SCNC: 102 MMOL/L (ref 95–110)
CHOLEST SERPL-MCNC: 176 MG/DL (ref 120–199)
CHOLEST/HDLC SERPL: 2 {RATIO} (ref 2–5)
CO2 SERPL-SCNC: 32 MMOL/L (ref 23–29)
CREAT SERPL-MCNC: 0.7 MG/DL (ref 0.5–1.4)
EST. GFR  (NO RACE VARIABLE): >60 ML/MIN/1.73 M^2
ESTIMATED AVG GLUCOSE: 108 MG/DL (ref 68–131)
GLUCOSE SERPL-MCNC: 93 MG/DL (ref 70–110)
HBA1C MFR BLD: 5.4 % (ref 4–5.6)
HDLC SERPL-MCNC: 88 MG/DL (ref 40–75)
HDLC SERPL: 50 % (ref 20–50)
LDLC SERPL CALC-MCNC: 78.2 MG/DL (ref 63–159)
NONHDLC SERPL-MCNC: 88 MG/DL
POTASSIUM SERPL-SCNC: 4.1 MMOL/L (ref 3.5–5.1)
PROT SERPL-MCNC: 6.8 G/DL (ref 6–8.4)
SODIUM SERPL-SCNC: 144 MMOL/L (ref 136–145)
TRIGL SERPL-MCNC: 49 MG/DL (ref 30–150)

## 2023-01-27 PROCEDURE — 3288F PR FALLS RISK ASSESSMENT DOCUMENTED: ICD-10-PCS | Mod: HCNC,CPTII,S$GLB, | Performed by: INTERNAL MEDICINE

## 2023-01-27 PROCEDURE — 3008F BODY MASS INDEX DOCD: CPT | Mod: HCNC,CPTII,S$GLB, | Performed by: INTERNAL MEDICINE

## 2023-01-27 PROCEDURE — 1126F PR PAIN SEVERITY QUANTIFIED, NO PAIN PRESENT: ICD-10-PCS | Mod: HCNC,CPTII,S$GLB, | Performed by: INTERNAL MEDICINE

## 2023-01-27 PROCEDURE — 3078F PR MOST RECENT DIASTOLIC BLOOD PRESSURE < 80 MM HG: ICD-10-PCS | Mod: HCNC,CPTII,S$GLB, | Performed by: INTERNAL MEDICINE

## 2023-01-27 PROCEDURE — 3288F FALL RISK ASSESSMENT DOCD: CPT | Mod: HCNC,CPTII,S$GLB, | Performed by: INTERNAL MEDICINE

## 2023-01-27 PROCEDURE — 99214 OFFICE O/P EST MOD 30 MIN: CPT | Mod: HCNC,S$GLB,, | Performed by: INTERNAL MEDICINE

## 2023-01-27 PROCEDURE — 1101F PT FALLS ASSESS-DOCD LE1/YR: CPT | Mod: HCNC,CPTII,S$GLB, | Performed by: INTERNAL MEDICINE

## 2023-01-27 PROCEDURE — 1159F PR MEDICATION LIST DOCUMENTED IN MEDICAL RECORD: ICD-10-PCS | Mod: HCNC,CPTII,S$GLB, | Performed by: INTERNAL MEDICINE

## 2023-01-27 PROCEDURE — 1101F PR PT FALLS ASSESS DOC 0-1 FALLS W/OUT INJ PAST YR: ICD-10-PCS | Mod: HCNC,CPTII,S$GLB, | Performed by: INTERNAL MEDICINE

## 2023-01-27 PROCEDURE — 3074F PR MOST RECENT SYSTOLIC BLOOD PRESSURE < 130 MM HG: ICD-10-PCS | Mod: HCNC,CPTII,S$GLB, | Performed by: INTERNAL MEDICINE

## 2023-01-27 PROCEDURE — 36415 COLL VENOUS BLD VENIPUNCTURE: CPT | Mod: HCNC | Performed by: INTERNAL MEDICINE

## 2023-01-27 PROCEDURE — 3008F PR BODY MASS INDEX (BMI) DOCUMENTED: ICD-10-PCS | Mod: HCNC,CPTII,S$GLB, | Performed by: INTERNAL MEDICINE

## 2023-01-27 PROCEDURE — 99999 PR PBB SHADOW E&M-EST. PATIENT-LVL III: CPT | Mod: PBBFAC,HCNC,, | Performed by: INTERNAL MEDICINE

## 2023-01-27 PROCEDURE — 1159F MED LIST DOCD IN RCRD: CPT | Mod: HCNC,CPTII,S$GLB, | Performed by: INTERNAL MEDICINE

## 2023-01-27 PROCEDURE — 1126F AMNT PAIN NOTED NONE PRSNT: CPT | Mod: HCNC,CPTII,S$GLB, | Performed by: INTERNAL MEDICINE

## 2023-01-27 PROCEDURE — 3078F DIAST BP <80 MM HG: CPT | Mod: HCNC,CPTII,S$GLB, | Performed by: INTERNAL MEDICINE

## 2023-01-27 PROCEDURE — 80061 LIPID PANEL: CPT | Mod: HCNC | Performed by: INTERNAL MEDICINE

## 2023-01-27 PROCEDURE — 99999 PR PBB SHADOW E&M-EST. PATIENT-LVL III: ICD-10-PCS | Mod: PBBFAC,HCNC,, | Performed by: INTERNAL MEDICINE

## 2023-01-27 PROCEDURE — 83036 HEMOGLOBIN GLYCOSYLATED A1C: CPT | Mod: HCNC | Performed by: INTERNAL MEDICINE

## 2023-01-27 PROCEDURE — 80053 COMPREHEN METABOLIC PANEL: CPT | Mod: HCNC | Performed by: INTERNAL MEDICINE

## 2023-01-27 PROCEDURE — 3074F SYST BP LT 130 MM HG: CPT | Mod: HCNC,CPTII,S$GLB, | Performed by: INTERNAL MEDICINE

## 2023-01-27 PROCEDURE — 99214 PR OFFICE/OUTPT VISIT, EST, LEVL IV, 30-39 MIN: ICD-10-PCS | Mod: HCNC,S$GLB,, | Performed by: INTERNAL MEDICINE

## 2023-01-27 RX ORDER — FLUTICASONE PROPIONATE 50 MCG
1 SPRAY, SUSPENSION (ML) NASAL 2 TIMES DAILY
Qty: 18.2 ML | Refills: 5 | Status: SHIPPED | OUTPATIENT
Start: 2023-01-27 | End: 2023-03-08

## 2023-01-27 NOTE — PROGRESS NOTES
"  Ms. Rochelle Espinoza is a 67 y.o. female with COPD on 3 L of O2 -- she has stopped smoking since last time.  SHe was hospitalized in October for COPD  exacerbation, and was streated with  azithro/rocephin and prednisone.  SHe is back on O2 and dong well.  Recent cold snap has her nose stopped up.  She also has been using "nasal decongestant sapy 0.05 %  and it helps but she gets stopped right back up         She also has a history of  CHF, HTN, GERD.    Patients back pain is located in the midback, paraspinal, and spreads to the bilateral ribs     Insomnia has improved since introduction of trazodone. Patient reports have great difficulty sleeping prior.    SHe has been eating more since she has stopped smoking.  SHe has gained      Patient reports being increasingly depressed lately. When pressed on the matter, she began to cry. She struggles with episodic anxiety and reports having difficulty with the logistics/limitations of having COPD. She is unable to go out as much as she used to, and does not want to be a burden on her loved ones. She denies suicidal ideation.     Hypertension is stable, on amlodipine, HCTZ, Losartan  /78 on this visit     Review of Systems   Constitutional:  Negative for chills and fever. She has gained 20 # since October.    HENT:  Positive for nasal congestion. Negative for ear pain, sinus pain and sore throat.    Eyes:  Negative for blurred vision.   Respiratory:  Positive for sputum production (white/foamy) and shortness of breath (chronic stable). Negative for cough and hemoptysis.    Cardiovascular:  Negative for chest pain, palpitations, orthopnea and leg swelling.   Gastrointestinal:  Positive for heartburn. Negative for abdominal pain, blood in stool, constipation, diarrhea, nausea and vomiting.   Genitourinary:  Negative for dysuria, frequency, hematuria and urgency.   Musculoskeletal:  Negative for neck pain.   Neurological:  Negative for dizziness and headaches. " "  Psychiatric/Behavioral:  Negative for suicidal ideas. The patient has insomnia.    All other systems reviewed and are negative.     PAST HISTORY:           Past Medical History:   Diagnosis Date    CHF (congestive heart failure)      COPD (chronic obstructive pulmonary disease)      Herpes zoster without mention of complication 2/21/2011    Hypertension      Leg edema      Pulmonary hypertension      Sciatica                MEDICATIONS & ALLERGIES:           Current Outpatient Medications on File Prior to Visit   Medication Sig    albuterol (PROVENTIL/VENTOLIN HFA) 90 mcg/actuation inhaler Inhale 2 puffs into the lungs every 6 (six) hours. Rescue    albuterol-ipratropium (DUO-NEB) 2.5 mg-0.5 mg/3 mL nebulizer solution Take 3 mLs by nebulization every 4 (four) hours as needed for Wheezing. Rescue    ascorbic acid, vitamin C, (VITAMIN C) 500 MG tablet Take 500 mg by mouth once daily.    aspirin (ECOTRIN) 81 MG EC tablet Take 81 mg by mouth once daily. Low Dose    atorvastatin (LIPITOR) 10 MG tablet Take 1 tablet (10 mg total) by mouth every evening.    cholecalciferol, vitamin D3, (VITAMIN D3) 25 mcg (1,000 unit) capsule Take 1,000 Units by mouth once daily.         OBJECTIVE:      Vital Signs:       /74 (BP Location: Left arm, Patient Position: Sitting, BP Method: Medium (Manual))   Pulse 65   Ht 5' 3" (1.6 m)   Wt 70.3 kg (154 lb 15.7 oz)   LMP 11/21/2013   BMI 27.45 kg/m²        Physical Exam  Vitals reviewed.   Constitutional:       General: She is not in acute distress.     Appearance: Normal appearance. She is not ill-appearing.   HENT:      Head: Normocephalic and atraumatic.      Right Ear: External ear normal.      Left Ear: External ear normal.      Nose: Nose normal. No congestion or rhinorrhea.      Mouth/Throat:      Mouth: Mucous membranes are moist.   Eyes:      General:         Right eye: No discharge.         Left eye: No discharge.      Extraocular Movements: Extraocular movements " intact.   Neck:      Vascular: No carotid bruit.   Cardiovascular:      Rate and Rhythm: Normal rate and regular rhythm.      Heart sounds: Normal heart sounds. No murmur heard.    No gallop.   Pulmonary:      Effort: Pulmonary effort is normal. No respiratory distress.      Breath sounds: Normal breath sounds.      Comments: Nasal canula present  Abdominal:      General: Abdomen is flat. There is no distension.      Palpations: Abdomen is soft.      Tenderness: There is no abdominal tenderness. There is no guarding.   Musculoskeletal:      Cervical back: Normal range of motion. No tenderness.      Right lower leg: No edema.      Left lower leg: No edema.   Skin:     General: Skin is warm and dry.      Coloration: Skin is not jaundiced.   Neurological:      General: No focal deficit present.      Mental Status: She is alert and oriented to person, place, and time.   Psychiatric:         Mood and Affect: Mood normal.        Assessment and plan:    Rhinitis medicamentosa    Major depressive disorder, recurrent, in partial remission    Cervical myelopathy    Chronic diastolic (congestive) heart failure    Stable angina    Aortic calcification    Chronic obstructive pulmonary disease, unspecified COPD type    Chronic diastolic heart failure    Chronic hypoxemic respiratory failure    Cor pulmonale, chronic    Hypotension, unspecified hypotension type    Nicotine dependence, cigarettes, uncomplicated      Stop nasal decongestant-- will send rx for flonase-- education done .  She will work on exercising.   Congratulated her on stopping smoking.  Will follow up in 3 months. BP is good-- she is currently hold losartan. Depression is doing great.  Much better than last visit.  She is concerned about weight gain.         Monitor aortic calification .  Neck is doing well today

## 2023-02-07 DIAGNOSIS — Z00.00 ENCOUNTER FOR MEDICARE ANNUAL WELLNESS EXAM: ICD-10-CM

## 2023-02-09 DIAGNOSIS — Z00.00 ENCOUNTER FOR MEDICARE ANNUAL WELLNESS EXAM: ICD-10-CM

## 2023-03-03 ENCOUNTER — PES CALL (OUTPATIENT)
Dept: ADMINISTRATIVE | Facility: CLINIC | Age: 68
End: 2023-03-03
Payer: MEDICARE

## 2023-03-09 ENCOUNTER — TELEPHONE (OUTPATIENT)
Dept: INTERNAL MEDICINE | Facility: CLINIC | Age: 68
End: 2023-03-09
Payer: MEDICARE

## 2023-03-09 NOTE — TELEPHONE ENCOUNTER
----- Message from Bre Santa sent at 3/9/2023 11:05 AM CST -----  Contact: Milton lino/Talia [phone]  Milton is calling in regards to if pt has a chronic condition to stay on the chronic health plan with [employer]. Case# [id number].               Thank you

## 2023-03-11 ENCOUNTER — PATIENT MESSAGE (OUTPATIENT)
Dept: INTERNAL MEDICINE | Facility: CLINIC | Age: 68
End: 2023-03-11
Payer: MEDICARE

## 2023-03-17 ENCOUNTER — HOSPITAL ENCOUNTER (OUTPATIENT)
Dept: RADIOLOGY | Facility: CLINIC | Age: 68
Discharge: HOME | End: 2023-03-17
Attending: INTERNAL MEDICINE
Payer: MEDICARE

## 2023-03-17 DIAGNOSIS — Z78.0 MENOPAUSE: ICD-10-CM

## 2023-03-17 PROCEDURE — 77080 DEXA BONE DENSITY SPINE HIP: ICD-10-PCS | Mod: 26,,, | Performed by: INTERNAL MEDICINE

## 2023-03-17 PROCEDURE — 77080 DXA BONE DENSITY AXIAL: CPT | Mod: 26,,, | Performed by: INTERNAL MEDICINE

## 2023-03-17 PROCEDURE — 77080 DXA BONE DENSITY AXIAL: CPT | Mod: TC

## 2023-03-20 ENCOUNTER — TELEPHONE (OUTPATIENT)
Dept: INTERNAL MEDICINE | Facility: CLINIC | Age: 68
End: 2023-03-20
Payer: MEDICARE

## 2023-03-20 NOTE — TELEPHONE ENCOUNTER
----- Message from Cleo Avalos sent at 3/20/2023 12:36 PM CDT -----  Contact: Ms. Moreno @ [facility] # [phone]  MsAntonio Tiffanie from [facility] would like to verify the patients chronic condition, the patient is enrolled into [facility].

## 2023-04-11 NOTE — PROGRESS NOTES
"  Rochelle Espinoza presented for an initial Medicare AWV today. The following components were reviewed and updated:    Medical history  Family History  Social history  Allergies and Current Medications  Health Risk Assessment  Health Maintenance  Care Team    **See Completed Assessments for Annual Wellness visit with in the encounter summary    The following assessments were completed:  Depression Screening  Cognitive function Screening        Timed Get Up Test  Whisper Test    Vitals:    04/12/23 1114   BP: 108/64   BP Location: Left arm   Patient Position: Sitting   BP Method: Medium (Manual)   Pulse: 102   SpO2: 99%   Weight: 73.4 kg (161 lb 13.1 oz)   Height: 5' 3" (1.6 m)     Body mass index is 28.66 kg/m².   ]    Physical Exam  Vitals reviewed.   Constitutional:       Appearance: She is well-developed.   HENT:      Head: Normocephalic.      Right Ear: External ear normal.      Left Ear: External ear normal.      Nose: Nose normal.      Mouth/Throat:      Pharynx: No oropharyngeal exudate.   Eyes:      Pupils: Pupils are equal, round, and reactive to light.   Neck:      Thyroid: No thyromegaly.      Vascular: No JVD.      Trachea: No tracheal deviation.   Cardiovascular:      Rate and Rhythm: Normal rate and regular rhythm.      Heart sounds: Normal heart sounds. No murmur heard.    No friction rub. No gallop.   Pulmonary:      Effort: Pulmonary effort is normal. No respiratory distress.      Breath sounds: Normal breath sounds.      Comments: Diminished breath sounds   Abdominal:      General: Bowel sounds are normal. There is no distension.      Palpations: Abdomen is soft.      Tenderness: There is no abdominal tenderness.   Musculoskeletal:         General: No tenderness. Normal range of motion.      Cervical back: Neck supple.   Lymphadenopathy:      Cervical: No cervical adenopathy.   Skin:     General: Skin is warm and dry.      Capillary Refill: Capillary refill takes less than 2 seconds.      Findings: No " rash.   Neurological:      Mental Status: She is alert and oriented to person, place, and time.   Psychiatric:         Behavior: Behavior normal.         Diagnoses and health risks identified today and associated recommendations/orders:  1. Encounter for Medicare annual wellness exam  All age and gender related screenings discussed   - Ambulatory Referral/Consult to Enhanced Annual Wellness Visit (eAWV)    2. Essential hypertension  Problem is stable. Will continue current medication and treatment regimen. Follow up with PCP     3. Aortic calcification  Problem is stable. Will continue current medication and treatment regimen. Follow up with PCP and cardiology     4. Chronic diastolic heart failure  Problem is stable. Will continue current medication and treatment regimen. Follow up with PCP and cardiology     5. Mixed hyperlipidemia  Problem is stable. Will continue current medication and treatment regimen. Follow up with PCP     6. Stable angina  Problem is stable. Will continue current medication and treatment regimen. Follow up with PCP and cardiology     7. Cor pulmonale, chronic  Problem is stable. Will continue current medication and treatment regimen. Follow up with PCP and cardiology     8. Chronic obstructive pulmonary disease with hypoxia  Problem is stable. Will continue current medication and treatment regimen. Follow up with PCP and pulmonology     9. Chronic hypoxemic respiratory failure  Problem is stable. Will continue current medication and treatment regimen. Follow up with PCP and pulmonology     10. Recurrent major depressive disorder, in partial remission  Problem is stable. Will continue current medication and treatment regimen. Follow up with PCP     11. Other chronic pain  Problem is stable. Will continue current medication and treatment regimen. Follow up with PCP     12. Peripheral polyneuropathy  Problem is stable. Will continue current medication and treatment regimen. Follow up with PCP      13. Cervical myelopathy  Problem is stable. Will continue current medication and treatment regimen. Follow up with PCP     14. Cervical radiculopathy  Problem is stable. Will continue current medication and treatment regimen. Follow up with PCP     15. Lumbar radiculopathy  Problem is stable. Will continue current medication and treatment regimen. Follow up with PCP     16. Vitamin D deficiency  Problem is stable. Will continue current medication and treatment regimen. Follow up with PCP     17. Adenomatous polyp of colon, unspecified part of colon  Problem is stable. Will continue current medication and treatment regimen. Follow up with PCP     18. Nicotine dependence, cigarettes, uncomplicated   Problem is stable. Will continue current medication and treatment regimen. Follow up with PCP     19. Primary localized osteoarthrosis of lower leg, unspecified laterality  Problem is stable. Will continue current medication and treatment regimen. Follow up with PCP     20. Supplemental oxygen dependent  Problem is stable. Will continue current medication and treatment regimen. Follow up with PCP     21. Dependence on supplemental oxygen  Problem is stable. Will continue current medication and treatment regimen. Follow up with PCP     22. Encounter for preventive health examination  All age and gender related screenings discussed       Provided Rochelle with a 5-10 year written screening schedule and personal prevention plan. Recommendations were developed using the USPSTF age appropriate recommendations. Education, counseling, and referrals were provided as needed.  After Visit Summary printed and given to patient which includes a list of additional screenings\tests needed.    Post Discharge Follow-up Today:   Future Appointments:  Future Appointments   Date Time Provider Department Center   4/27/2023 10:40 AM Yosi Samuels Jr., MD [facility]          I offered to discuss advanced care planning, including how  to pick a person who would make decisions for you if you were unable to make them for yourself, called a health care power of , and what kind of decisions you might make such as use of life sustaining treatments such as ventilators and tube feeding when faced with a life limiting illness recorded on a living will that they will need to know. (How you want to be cared for as you near the end of your natural life)     X Patient is interested in learning more about how to make advanced directives.  I provided them paperwork and offered to discuss this with them.    Theresa SCHWARTZ, APRN, FNP-c  Nurse Practitioner   Internal Medicine   [address] [facility] [id number] [phone]

## 2023-04-12 ENCOUNTER — OFFICE VISIT (OUTPATIENT)
Dept: INTERNAL MEDICINE | Facility: CLINIC | Age: 68
End: 2023-04-12
Payer: MEDICARE

## 2023-04-12 VITALS
HEART RATE: 102 BPM | HEIGHT: 63 IN | DIASTOLIC BLOOD PRESSURE: 64 MMHG | BODY MASS INDEX: 28.67 KG/M2 | WEIGHT: 161.81 LBS | SYSTOLIC BLOOD PRESSURE: 108 MMHG | OXYGEN SATURATION: 99 %

## 2023-04-12 DIAGNOSIS — I27.81 COR PULMONALE, CHRONIC: Chronic | ICD-10-CM

## 2023-04-12 DIAGNOSIS — Z99.81 DEPENDENCE ON SUPPLEMENTAL OXYGEN: ICD-10-CM

## 2023-04-12 DIAGNOSIS — F17.210 NICOTINE DEPENDENCE, CIGARETTES, UNCOMPLICATED: Chronic | ICD-10-CM

## 2023-04-12 DIAGNOSIS — D12.6 ADENOMATOUS POLYP OF COLON, UNSPECIFIED PART OF COLON: ICD-10-CM

## 2023-04-12 DIAGNOSIS — E78.2 MIXED HYPERLIPIDEMIA: ICD-10-CM

## 2023-04-12 DIAGNOSIS — G95.9 CERVICAL MYELOPATHY: ICD-10-CM

## 2023-04-12 DIAGNOSIS — M54.12 CERVICAL RADICULOPATHY: ICD-10-CM

## 2023-04-12 DIAGNOSIS — J96.11 CHRONIC HYPOXEMIC RESPIRATORY FAILURE: Chronic | ICD-10-CM

## 2023-04-12 DIAGNOSIS — G89.29 OTHER CHRONIC PAIN: ICD-10-CM

## 2023-04-12 DIAGNOSIS — M17.10 PRIMARY LOCALIZED OSTEOARTHROSIS OF LOWER LEG, UNSPECIFIED LATERALITY: ICD-10-CM

## 2023-04-12 DIAGNOSIS — I20.89 STABLE ANGINA: ICD-10-CM

## 2023-04-12 DIAGNOSIS — I10 ESSENTIAL HYPERTENSION: ICD-10-CM

## 2023-04-12 DIAGNOSIS — Z00.00 ENCOUNTER FOR MEDICARE ANNUAL WELLNESS EXAM: Primary | ICD-10-CM

## 2023-04-12 DIAGNOSIS — J44.9 CHRONIC OBSTRUCTIVE PULMONARY DISEASE WITH HYPOXIA: Chronic | ICD-10-CM

## 2023-04-12 DIAGNOSIS — Z00.00 ENCOUNTER FOR PREVENTIVE HEALTH EXAMINATION: ICD-10-CM

## 2023-04-12 DIAGNOSIS — I50.32 CHRONIC DIASTOLIC HEART FAILURE: ICD-10-CM

## 2023-04-12 DIAGNOSIS — F33.41 RECURRENT MAJOR DEPRESSIVE DISORDER, IN PARTIAL REMISSION: ICD-10-CM

## 2023-04-12 DIAGNOSIS — I70.0 AORTIC CALCIFICATION: ICD-10-CM

## 2023-04-12 DIAGNOSIS — M54.16 LUMBAR RADICULOPATHY: ICD-10-CM

## 2023-04-12 DIAGNOSIS — E55.9 VITAMIN D DEFICIENCY: ICD-10-CM

## 2023-04-12 DIAGNOSIS — G62.9 PERIPHERAL POLYNEUROPATHY: ICD-10-CM

## 2023-04-12 DIAGNOSIS — Z99.81 SUPPLEMENTAL OXYGEN DEPENDENT: ICD-10-CM

## 2023-04-12 PROCEDURE — 3078F DIAST BP <80 MM HG: CPT | Mod: CPTII,S$GLB,, | Performed by: NURSE PRACTITIONER

## 2023-04-12 PROCEDURE — 3078F PR MOST RECENT DIASTOLIC BLOOD PRESSURE < 80 MM HG: ICD-10-PCS | Mod: CPTII,S$GLB,, | Performed by: NURSE PRACTITIONER

## 2023-04-12 PROCEDURE — 3074F SYST BP LT 130 MM HG: CPT | Mod: CPTII,S$GLB,, | Performed by: NURSE PRACTITIONER

## 2023-04-12 PROCEDURE — 1159F MED LIST DOCD IN RCRD: CPT | Mod: CPTII,S$GLB,, | Performed by: NURSE PRACTITIONER

## 2023-04-12 PROCEDURE — 4010F PR ACE/ARB THEARPY RXD/TAKEN: ICD-10-PCS | Mod: CPTII,S$GLB,, | Performed by: NURSE PRACTITIONER

## 2023-04-12 PROCEDURE — G0439 PR MEDICARE ANNUAL WELLNESS SUBSEQUENT VISIT: ICD-10-PCS | Mod: S$GLB,,, | Performed by: NURSE PRACTITIONER

## 2023-04-12 PROCEDURE — 3288F PR FALLS RISK ASSESSMENT DOCUMENTED: ICD-10-PCS | Mod: CPTII,S$GLB,, | Performed by: NURSE PRACTITIONER

## 2023-04-12 PROCEDURE — 3008F BODY MASS INDEX DOCD: CPT | Mod: CPTII,S$GLB,, | Performed by: NURSE PRACTITIONER

## 2023-04-12 PROCEDURE — 99999 PR PBB SHADOW E&M-EST. PATIENT-LVL V: CPT | Mod: PBBFAC,,, | Performed by: NURSE PRACTITIONER

## 2023-04-12 PROCEDURE — 1126F PR PAIN SEVERITY QUANTIFIED, NO PAIN PRESENT: ICD-10-PCS | Mod: CPTII,S$GLB,, | Performed by: NURSE PRACTITIONER

## 2023-04-12 PROCEDURE — 4010F ACE/ARB THERAPY RXD/TAKEN: CPT | Mod: CPTII,S$GLB,, | Performed by: NURSE PRACTITIONER

## 2023-04-12 PROCEDURE — 3008F PR BODY MASS INDEX (BMI) DOCUMENTED: ICD-10-PCS | Mod: CPTII,S$GLB,, | Performed by: NURSE PRACTITIONER

## 2023-04-12 PROCEDURE — 1159F PR MEDICATION LIST DOCUMENTED IN MEDICAL RECORD: ICD-10-PCS | Mod: CPTII,S$GLB,, | Performed by: NURSE PRACTITIONER

## 2023-04-12 PROCEDURE — 99999 PR PBB SHADOW E&M-EST. PATIENT-LVL V: ICD-10-PCS | Mod: PBBFAC,,, | Performed by: NURSE PRACTITIONER

## 2023-04-12 PROCEDURE — 1170F PR FUNCTIONAL STATUS ASSESSED: ICD-10-PCS | Mod: CPTII,S$GLB,, | Performed by: NURSE PRACTITIONER

## 2023-04-12 PROCEDURE — 3044F HG A1C LEVEL LT 7.0%: CPT | Mod: CPTII,S$GLB,, | Performed by: NURSE PRACTITIONER

## 2023-04-12 PROCEDURE — 1101F PR PT FALLS ASSESS DOC 0-1 FALLS W/OUT INJ PAST YR: ICD-10-PCS | Mod: CPTII,S$GLB,, | Performed by: NURSE PRACTITIONER

## 2023-04-12 PROCEDURE — G0439 PPPS, SUBSEQ VISIT: HCPCS | Mod: S$GLB,,, | Performed by: NURSE PRACTITIONER

## 2023-04-12 PROCEDURE — 3288F FALL RISK ASSESSMENT DOCD: CPT | Mod: CPTII,S$GLB,, | Performed by: NURSE PRACTITIONER

## 2023-04-12 PROCEDURE — 1170F FXNL STATUS ASSESSED: CPT | Mod: CPTII,S$GLB,, | Performed by: NURSE PRACTITIONER

## 2023-04-12 PROCEDURE — 3074F PR MOST RECENT SYSTOLIC BLOOD PRESSURE < 130 MM HG: ICD-10-PCS | Mod: CPTII,S$GLB,, | Performed by: NURSE PRACTITIONER

## 2023-04-12 PROCEDURE — 1101F PT FALLS ASSESS-DOCD LE1/YR: CPT | Mod: CPTII,S$GLB,, | Performed by: NURSE PRACTITIONER

## 2023-04-12 PROCEDURE — 1126F AMNT PAIN NOTED NONE PRSNT: CPT | Mod: CPTII,S$GLB,, | Performed by: NURSE PRACTITIONER

## 2023-04-12 PROCEDURE — 3044F PR MOST RECENT HEMOGLOBIN A1C LEVEL <7.0%: ICD-10-PCS | Mod: CPTII,S$GLB,, | Performed by: NURSE PRACTITIONER

## 2023-04-12 NOTE — PATIENT INSTRUCTIONS
Counseling and Referral of Other Preventative  (Italic type indicates deductible and co-insurance are waived)    Patient Name: Rochelle Espinoza  Today's Date: 4/12/2023    Health Maintenance       Date Due Completion Date    COVID-19 Vaccine (4 - Booster for Pfizer series) 05/31/2022 4/5/2022    Mammogram 01/25/2024 1/25/2023    DEXA Scan 03/17/2025 3/17/2023    Hemoglobin A1c (Diabetic Prevention Screening) 01/27/2026 1/27/2023    Lipid Panel 01/27/2028 1/27/2023    TETANUS VACCINE 03/16/2029 3/16/2019    Colorectal Cancer Screening 12/19/2029 12/19/2019        No orders of the defined types were placed in this encounter.    The following information is provided to all patients.  This information is to help you find resources for any of the problems found today that may be affecting your health:                Living healthy guide: www.[facility].[place].[facility]      Understanding Diabetes: www.diabetes.org      Eating healthy: www.cdc.gov/healthyweight      CDC home safety checklist: www.cdc.gov/steadi/patient.html      Agency on Aging: www.goea.[place].[facility]      Alcoholics anonymous (AA): www.aa.org      Physical Activity: www.margy.nih.gov/qi1ijez      Tobacco use: www.quitwithusla.org

## 2023-05-15 ENCOUNTER — PATIENT MESSAGE (OUTPATIENT)
Dept: INTERNAL MEDICINE | Facility: CLINIC | Age: 68
End: 2023-05-15
Payer: MEDICARE

## 2023-06-19 ENCOUNTER — TELEPHONE (OUTPATIENT)
Dept: ADMINISTRATIVE | Facility: HOSPITAL | Age: 68
End: 2023-06-19
Payer: MEDICARE

## 2023-06-19 ENCOUNTER — TELEPHONE (OUTPATIENT)
Dept: PULMONOLOGY | Facility: CLINIC | Age: 68
End: 2023-06-19
Payer: MEDICARE

## 2023-06-19 DIAGNOSIS — Z87.891 HISTORY OF TOBACCO USE: Primary | ICD-10-CM

## 2023-06-30 RX ORDER — POTASSIUM CHLORIDE 20 MEQ/1
TABLET, EXTENDED RELEASE ORAL
Qty: 30 TABLET | Refills: 7 | Status: ON HOLD | OUTPATIENT
Start: 2023-06-30 | End: 2023-08-18 | Stop reason: HOSPADM

## 2023-06-30 NOTE — TELEPHONE ENCOUNTER
No care due was identified.  Health [facility] Embedded Care Due Messages. Reference number: [id number].   6/30/2023 1:25:18 PM CDT

## 2023-07-12 ENCOUNTER — HOSPITAL ENCOUNTER (OUTPATIENT)
Dept: RADIOLOGY | Facility: HOSPITAL | Age: 68
Discharge: HOME | End: 2023-07-12
Attending: INTERNAL MEDICINE
Payer: MEDICARE

## 2023-07-12 ENCOUNTER — OFFICE VISIT (OUTPATIENT)
Dept: PULMONOLOGY | Facility: CLINIC | Age: 68
End: 2023-07-12
Payer: MEDICARE

## 2023-07-12 VITALS
DIASTOLIC BLOOD PRESSURE: 76 MMHG | BODY MASS INDEX: 26.72 KG/M2 | HEART RATE: 89 BPM | HEIGHT: 63 IN | WEIGHT: 150.81 LBS | OXYGEN SATURATION: 91 % | SYSTOLIC BLOOD PRESSURE: 126 MMHG

## 2023-07-12 DIAGNOSIS — J44.9 CHRONIC OBSTRUCTIVE PULMONARY DISEASE WITH HYPOXIA: ICD-10-CM

## 2023-07-12 DIAGNOSIS — J96.11 CHRONIC HYPOXEMIC RESPIRATORY FAILURE: Chronic | ICD-10-CM

## 2023-07-12 DIAGNOSIS — F17.210 NICOTINE DEPENDENCE, CIGARETTES, UNCOMPLICATED: Chronic | ICD-10-CM

## 2023-07-12 DIAGNOSIS — J98.01 BRONCHOSPASM, ACUTE: ICD-10-CM

## 2023-07-12 DIAGNOSIS — Z87.891 HISTORY OF TOBACCO USE: ICD-10-CM

## 2023-07-12 PROBLEM — J44.1 COPD EXACERBATION: Status: RESOLVED | Noted: 2022-10-23 | Resolved: 2023-07-12

## 2023-07-12 PROBLEM — Z99.81 SUPPLEMENTAL OXYGEN DEPENDENT: Status: RESOLVED | Noted: 2019-03-16 | Resolved: 2023-07-12

## 2023-07-12 PROCEDURE — 3078F DIAST BP <80 MM HG: CPT | Mod: CPTII,S$GLB,, | Performed by: INTERNAL MEDICINE

## 2023-07-12 PROCEDURE — 4010F ACE/ARB THERAPY RXD/TAKEN: CPT | Mod: CPTII,S$GLB,, | Performed by: INTERNAL MEDICINE

## 2023-07-12 PROCEDURE — 71271 CT THORAX LUNG CANCER SCR C-: CPT | Mod: 26,,, | Performed by: RADIOLOGY

## 2023-07-12 PROCEDURE — 3074F PR MOST RECENT SYSTOLIC BLOOD PRESSURE < 130 MM HG: ICD-10-PCS | Mod: CPTII,S$GLB,, | Performed by: INTERNAL MEDICINE

## 2023-07-12 PROCEDURE — 3044F HG A1C LEVEL LT 7.0%: CPT | Mod: CPTII,S$GLB,, | Performed by: INTERNAL MEDICINE

## 2023-07-12 PROCEDURE — 99999 PR PBB SHADOW E&M-EST. PATIENT-LVL III: CPT | Mod: PBBFAC,,, | Performed by: INTERNAL MEDICINE

## 2023-07-12 PROCEDURE — 1126F PR PAIN SEVERITY QUANTIFIED, NO PAIN PRESENT: ICD-10-PCS | Mod: CPTII,S$GLB,, | Performed by: INTERNAL MEDICINE

## 2023-07-12 PROCEDURE — 71271 CT THORAX LUNG CANCER SCR C-: CPT | Mod: TC

## 2023-07-12 PROCEDURE — 1160F PR REVIEW ALL MEDS BY PRESCRIBER/CLIN PHARMACIST DOCUMENTED: ICD-10-PCS | Mod: CPTII,S$GLB,, | Performed by: INTERNAL MEDICINE

## 2023-07-12 PROCEDURE — 3288F FALL RISK ASSESSMENT DOCD: CPT | Mod: CPTII,S$GLB,, | Performed by: INTERNAL MEDICINE

## 2023-07-12 PROCEDURE — 4010F PR ACE/ARB THEARPY RXD/TAKEN: ICD-10-PCS | Mod: CPTII,S$GLB,, | Performed by: INTERNAL MEDICINE

## 2023-07-12 PROCEDURE — 1101F PT FALLS ASSESS-DOCD LE1/YR: CPT | Mod: CPTII,S$GLB,, | Performed by: INTERNAL MEDICINE

## 2023-07-12 PROCEDURE — 71271 CT CHEST LUNG SCREENING LOW DOSE: ICD-10-PCS | Mod: 26,,, | Performed by: RADIOLOGY

## 2023-07-12 PROCEDURE — 3078F PR MOST RECENT DIASTOLIC BLOOD PRESSURE < 80 MM HG: ICD-10-PCS | Mod: CPTII,S$GLB,, | Performed by: INTERNAL MEDICINE

## 2023-07-12 PROCEDURE — 99214 OFFICE O/P EST MOD 30 MIN: CPT | Mod: S$GLB,,, | Performed by: INTERNAL MEDICINE

## 2023-07-12 PROCEDURE — 3288F PR FALLS RISK ASSESSMENT DOCUMENTED: ICD-10-PCS | Mod: CPTII,S$GLB,, | Performed by: INTERNAL MEDICINE

## 2023-07-12 PROCEDURE — 99214 PR OFFICE/OUTPT VISIT, EST, LEVL IV, 30-39 MIN: ICD-10-PCS | Mod: S$GLB,,, | Performed by: INTERNAL MEDICINE

## 2023-07-12 PROCEDURE — 3044F PR MOST RECENT HEMOGLOBIN A1C LEVEL <7.0%: ICD-10-PCS | Mod: CPTII,S$GLB,, | Performed by: INTERNAL MEDICINE

## 2023-07-12 PROCEDURE — 1160F RVW MEDS BY RX/DR IN RCRD: CPT | Mod: CPTII,S$GLB,, | Performed by: INTERNAL MEDICINE

## 2023-07-12 PROCEDURE — 1159F MED LIST DOCD IN RCRD: CPT | Mod: CPTII,S$GLB,, | Performed by: INTERNAL MEDICINE

## 2023-07-12 PROCEDURE — 1101F PR PT FALLS ASSESS DOC 0-1 FALLS W/OUT INJ PAST YR: ICD-10-PCS | Mod: CPTII,S$GLB,, | Performed by: INTERNAL MEDICINE

## 2023-07-12 PROCEDURE — 1159F PR MEDICATION LIST DOCUMENTED IN MEDICAL RECORD: ICD-10-PCS | Mod: CPTII,S$GLB,, | Performed by: INTERNAL MEDICINE

## 2023-07-12 PROCEDURE — 3008F PR BODY MASS INDEX (BMI) DOCUMENTED: ICD-10-PCS | Mod: CPTII,S$GLB,, | Performed by: INTERNAL MEDICINE

## 2023-07-12 PROCEDURE — 1126F AMNT PAIN NOTED NONE PRSNT: CPT | Mod: CPTII,S$GLB,, | Performed by: INTERNAL MEDICINE

## 2023-07-12 PROCEDURE — 3008F BODY MASS INDEX DOCD: CPT | Mod: CPTII,S$GLB,, | Performed by: INTERNAL MEDICINE

## 2023-07-12 PROCEDURE — 99999 PR PBB SHADOW E&M-EST. PATIENT-LVL III: ICD-10-PCS | Mod: PBBFAC,,, | Performed by: INTERNAL MEDICINE

## 2023-07-12 PROCEDURE — 3074F SYST BP LT 130 MM HG: CPT | Mod: CPTII,S$GLB,, | Performed by: INTERNAL MEDICINE

## 2023-07-12 RX ORDER — ALBUTEROL SULFATE 90 UG/1
2 AEROSOL, METERED RESPIRATORY (INHALATION) EVERY 6 HOURS
Qty: 8 G | Refills: 11 | Status: ON HOLD | OUTPATIENT
Start: 2023-07-12 | End: 2023-08-18 | Stop reason: SDUPTHER

## 2023-07-12 RX ORDER — DOXYCYCLINE HYCLATE 100 MG
100 TABLET ORAL 2 TIMES DAILY
Qty: 14 TABLET | Refills: 0 | Status: SHIPPED | OUTPATIENT
Start: 2023-07-12 | End: 2023-07-19

## 2023-07-12 RX ORDER — ALBUTEROL SULFATE 90 UG/1
2 AEROSOL, METERED RESPIRATORY (INHALATION) EVERY 6 HOURS
Qty: 8 G | Refills: 11 | Status: SHIPPED | OUTPATIENT
Start: 2023-07-12 | End: 2023-07-12

## 2023-07-12 RX ORDER — VARENICLINE TARTRATE 1 MG/1
1 TABLET, FILM COATED ORAL DAILY
Qty: 30 TABLET | Refills: 3 | Status: ON HOLD | OUTPATIENT
Start: 2023-07-12 | End: 2023-08-18 | Stop reason: HOSPADM

## 2023-07-12 RX ORDER — CETIRIZINE HYDROCHLORIDE 10 MG/1
10 TABLET ORAL DAILY
Qty: 90 TABLET | Refills: 3 | Status: SHIPPED | OUTPATIENT
Start: 2023-07-12 | End: 2024-07-11

## 2023-07-12 RX ORDER — VARENICLINE TARTRATE 0.5 (11)-1
KIT ORAL
Qty: 1 EACH | Refills: 0 | Status: ON HOLD | OUTPATIENT
Start: 2023-07-12 | End: 2023-08-18 | Stop reason: HOSPADM

## 2023-07-12 RX ORDER — FLUTICASONE FUROATE, UMECLIDINIUM BROMIDE AND VILANTEROL TRIFENATATE 200; 62.5; 25 UG/1; UG/1; UG/1
1 POWDER RESPIRATORY (INHALATION) DAILY
Qty: 3 EACH | Refills: 4 | Status: SHIPPED | OUTPATIENT
Start: 2023-07-12

## 2023-07-12 RX ORDER — PREDNISONE 20 MG/1
40 TABLET ORAL DAILY
Qty: 10 TABLET | Refills: 0 | Status: SHIPPED | OUTPATIENT
Start: 2023-07-12 | End: 2023-07-17
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
## 2023-07-12 NOTE — ASSESSMENT & PLAN NOTE
Continue Trelegy 1 inhalation daily  Continue albuterol hfa and nebs prn  Continue flonase and start zyrtec  Check CBC, IgE    Given her increased cough and rescue inhalers, will treat with a short course of steroids and abx.

## 2023-07-12 NOTE — ASSESSMENT & PLAN NOTE
Dangers of cigarette smoking were reviewed with patient in detail. Patient was Counseled for 3-10 minutes. Nicotine replacement options were discussed. Chantix was prescribed.

## 2023-07-12 NOTE — PATIENT INSTRUCTIONS
Continue Trelegy 1 inhalation daily.  Continue albuterol rescue inhaler or nebulizers as needed.   Start prednisone and continue for 5 days.  Start doxycyline and complete 7 days.

## 2023-07-13 DIAGNOSIS — Z72.0 TOBACCO ABUSE: Primary | ICD-10-CM

## 2023-07-13 DIAGNOSIS — F17.210 NICOTINE DEPENDENCE, CIGARETTES, UNCOMPLICATED: ICD-10-CM

## 2023-08-06 ENCOUNTER — HOSPITAL ENCOUNTER (INPATIENT)
Facility: HOSPITAL | Age: 68
LOS: 12 days | Discharge: HOME HEALTH | DRG: 190 | End: 2023-08-18
Attending: EMERGENCY MEDICINE | Admitting: INTERNAL MEDICINE
Payer: MEDICARE

## 2023-08-06 DIAGNOSIS — G62.9 PERIPHERAL POLYNEUROPATHY: ICD-10-CM

## 2023-08-06 DIAGNOSIS — J44.1 COPD EXACERBATION: ICD-10-CM

## 2023-08-06 DIAGNOSIS — R06.02 SHORTNESS OF BREATH: ICD-10-CM

## 2023-08-06 DIAGNOSIS — R07.9 CHEST PAIN: ICD-10-CM

## 2023-08-06 DIAGNOSIS — J98.01 BRONCHOSPASM, ACUTE: ICD-10-CM

## 2023-08-06 DIAGNOSIS — R06.89 HYPERCAPNIA: ICD-10-CM

## 2023-08-06 DIAGNOSIS — I50.810 RIGHT-SIDED HEART FAILURE: ICD-10-CM

## 2023-08-06 DIAGNOSIS — I10 ESSENTIAL HYPERTENSION: ICD-10-CM

## 2023-08-06 DIAGNOSIS — J96.01 ACUTE HYPOXEMIC RESPIRATORY FAILURE: ICD-10-CM

## 2023-08-06 DIAGNOSIS — J96.21 ACUTE ON CHRONIC RESPIRATORY FAILURE WITH HYPOXIA: Primary | ICD-10-CM

## 2023-08-06 DIAGNOSIS — J96.11 CHRONIC HYPOXEMIC RESPIRATORY FAILURE: ICD-10-CM

## 2023-08-06 DIAGNOSIS — J44.9 CHRONIC OBSTRUCTIVE PULMONARY DISEASE WITH HYPOXIA: Chronic | ICD-10-CM

## 2023-08-06 PROBLEM — F32.9 MAJOR DEPRESSIVE DISORDER: Status: ACTIVE | Noted: 2023-08-06

## 2023-08-06 LAB
ALBUMIN SERPL BCP-MCNC: 3.9 G/DL (ref 3.5–5.2)
ALLENS TEST: ABNORMAL
ALP SERPL-CCNC: 51 U/L (ref 55–135)
ALT SERPL W/O P-5'-P-CCNC: 14 U/L (ref 10–44)
ANION GAP SERPL CALC-SCNC: 13 MMOL/L (ref 8–16)
AST SERPL-CCNC: 19 U/L (ref 10–40)
BASOPHILS # BLD AUTO: 0.02 K/UL (ref 0–0.2)
BASOPHILS NFR BLD: 0.3 % (ref 0–1.9)
BILIRUB SERPL-MCNC: 0.4 MG/DL (ref 0.1–1)
BNP SERPL-MCNC: <10 PG/ML (ref 0–99)
BUN SERPL-MCNC: 4 MG/DL (ref 8–23)
CALCIUM SERPL-MCNC: 9.3 MG/DL (ref 8.7–10.5)
CHLORIDE SERPL-SCNC: 100 MMOL/L (ref 95–110)
CO2 SERPL-SCNC: 30 MMOL/L (ref 23–29)
CREAT SERPL-MCNC: 0.7 MG/DL (ref 0.5–1.4)
DELSYS: ABNORMAL
DIFFERENTIAL METHOD: ABNORMAL
EOSINOPHIL # BLD AUTO: 0.1 K/UL (ref 0–0.5)
EOSINOPHIL NFR BLD: 1.7 % (ref 0–8)
EP: 6
ERYTHROCYTE [DISTWIDTH] IN BLOOD BY AUTOMATED COUNT: 15.7 % (ref 11.5–14.5)
ERYTHROCYTE [SEDIMENTATION RATE] IN BLOOD BY WESTERGREN METHOD: 12 MM/H
EST. GFR  (NO RACE VARIABLE): >60 ML/MIN/1.73 M^2
FIO2: 30
GLUCOSE SERPL-MCNC: 114 MG/DL (ref 70–110)
HCO3 UR-SCNC: 38.6 MMOL/L (ref 24–28)
HCO3 UR-SCNC: 39.2 MMOL/L (ref 24–28)
HCO3 UR-SCNC: 40.5 MMOL/L (ref 24–28)
HCT VFR BLD AUTO: 42.3 % (ref 37–48.5)
HGB BLD-MCNC: 13 G/DL (ref 12–16)
IMM GRANULOCYTES # BLD AUTO: 0.01 K/UL (ref 0–0.04)
IMM GRANULOCYTES NFR BLD AUTO: 0.1 % (ref 0–0.5)
IP: 16
LYMPHOCYTES # BLD AUTO: 2.5 K/UL (ref 1–4.8)
LYMPHOCYTES NFR BLD: 36 % (ref 18–48)
MCH RBC QN AUTO: 27.9 PG (ref 27–31)
MCHC RBC AUTO-ENTMCNC: 30.7 G/DL (ref 32–36)
MCV RBC AUTO: 91 FL (ref 82–98)
MIN VOL: 7.9
MODE: ABNORMAL
MONOCYTES # BLD AUTO: 0.6 K/UL (ref 0.3–1)
MONOCYTES NFR BLD: 8.9 % (ref 4–15)
NEUTROPHILS # BLD AUTO: 3.7 K/UL (ref 1.8–7.7)
NEUTROPHILS NFR BLD: 53 % (ref 38–73)
NRBC BLD-RTO: 0 /100 WBC
PCO2 BLDA: 69.9 MMHG (ref 35–45)
PCO2 BLDA: 81.8 MMHG (ref 35–45)
PCO2 BLDA: 87.2 MMHG (ref 35–45)
PH SMN: 7.28 [PH] (ref 7.35–7.45)
PH SMN: 7.29 [PH] (ref 7.35–7.45)
PH SMN: 7.35 [PH] (ref 7.35–7.45)
PLATELET # BLD AUTO: 261 K/UL (ref 150–450)
PMV BLD AUTO: 11.5 FL (ref 9.2–12.9)
PO2 BLDA: 25 MMHG (ref 40–60)
PO2 BLDA: 40 MMHG (ref 40–60)
PO2 BLDA: 71 MMHG (ref 80–100)
POC BE: 13 MMOL/L
POC BE: 13 MMOL/L
POC BE: 14 MMOL/L
POC SATURATED O2: 34 % (ref 95–100)
POC SATURATED O2: 65 % (ref 95–100)
POC SATURATED O2: 92 % (ref 95–100)
POC TCO2: 41 MMOL/L (ref 23–27)
POC TCO2: 42 MMOL/L (ref 24–29)
POC TCO2: 43 MMOL/L (ref 24–29)
POTASSIUM SERPL-SCNC: 4 MMOL/L (ref 3.5–5.1)
PROT SERPL-MCNC: 7.2 G/DL (ref 6–8.4)
RBC # BLD AUTO: 4.66 M/UL (ref 4–5.4)
SAMPLE: ABNORMAL
SARS-COV-2 RDRP RESP QL NAA+PROBE: NEGATIVE
SITE: ABNORMAL
SODIUM SERPL-SCNC: 143 MMOL/L (ref 136–145)
SP02: 95
SPONT RATE: 22
TROPONIN I SERPL DL<=0.01 NG/ML-MCNC: <0.006 NG/ML (ref 0–0.03)
WBC # BLD AUTO: 7.05 K/UL (ref 3.9–12.7)

## 2023-08-06 PROCEDURE — 25000003 PHARM REV CODE 250: Performed by: COMMUNITY HEALTH WORKER

## 2023-08-06 PROCEDURE — 85025 COMPLETE CBC W/AUTO DIFF WBC: CPT | Performed by: EMERGENCY MEDICINE

## 2023-08-06 PROCEDURE — 25000003 PHARM REV CODE 250

## 2023-08-06 PROCEDURE — 84484 ASSAY OF TROPONIN QUANT: CPT | Performed by: EMERGENCY MEDICINE

## 2023-08-06 PROCEDURE — 27100171 HC OXYGEN HIGH FLOW UP TO 24 HOURS

## 2023-08-06 PROCEDURE — U0002 COVID-19 LAB TEST NON-CDC: HCPCS | Performed by: EMERGENCY MEDICINE

## 2023-08-06 PROCEDURE — 99900035 HC TECH TIME PER 15 MIN (STAT)

## 2023-08-06 PROCEDURE — 25000242 PHARM REV CODE 250 ALT 637 W/ HCPCS: Performed by: COMMUNITY HEALTH WORKER

## 2023-08-06 PROCEDURE — 80053 COMPREHEN METABOLIC PANEL: CPT | Performed by: EMERGENCY MEDICINE

## 2023-08-06 PROCEDURE — 93010 ELECTROCARDIOGRAM REPORT: CPT | Mod: ,,, | Performed by: INTERNAL MEDICINE

## 2023-08-06 PROCEDURE — 63600175 PHARM REV CODE 636 W HCPCS: Performed by: COMMUNITY HEALTH WORKER

## 2023-08-06 PROCEDURE — 94645 CONT INHLJ TX EACH ADDL HOUR: CPT

## 2023-08-06 PROCEDURE — 96365 THER/PROPH/DIAG IV INF INIT: CPT

## 2023-08-06 PROCEDURE — 94761 N-INVAS EAR/PLS OXIMETRY MLT: CPT

## 2023-08-06 PROCEDURE — 94660 CPAP INITIATION&MGMT: CPT

## 2023-08-06 PROCEDURE — 82803 BLOOD GASES ANY COMBINATION: CPT

## 2023-08-06 PROCEDURE — 20000000 HC ICU ROOM

## 2023-08-06 PROCEDURE — 94640 AIRWAY INHALATION TREATMENT: CPT

## 2023-08-06 PROCEDURE — 96375 TX/PRO/DX INJ NEW DRUG ADDON: CPT

## 2023-08-06 PROCEDURE — 93005 ELECTROCARDIOGRAM TRACING: CPT

## 2023-08-06 PROCEDURE — 25000242 PHARM REV CODE 250 ALT 637 W/ HCPCS: Performed by: EMERGENCY MEDICINE

## 2023-08-06 PROCEDURE — 99291 CRITICAL CARE FIRST HOUR: CPT

## 2023-08-06 PROCEDURE — 93010 EKG 12-LEAD: ICD-10-PCS | Mod: ,,, | Performed by: INTERNAL MEDICINE

## 2023-08-06 PROCEDURE — 94644 CONT INHLJ TX 1ST HOUR: CPT

## 2023-08-06 PROCEDURE — 99291 PR CRITICAL CARE, E/M 30-74 MINUTES: ICD-10-PCS | Mod: GC,,, | Performed by: INTERNAL MEDICINE

## 2023-08-06 PROCEDURE — 63600175 PHARM REV CODE 636 W HCPCS: Performed by: EMERGENCY MEDICINE

## 2023-08-06 PROCEDURE — 27000221 HC OXYGEN, UP TO 24 HOURS

## 2023-08-06 PROCEDURE — 36600 WITHDRAWAL OF ARTERIAL BLOOD: CPT

## 2023-08-06 PROCEDURE — 99291 CRITICAL CARE FIRST HOUR: CPT | Mod: GC,,, | Performed by: INTERNAL MEDICINE

## 2023-08-06 PROCEDURE — 27000190 HC CPAP FULL FACE MASK W/VALVE

## 2023-08-06 PROCEDURE — 99900031 HC PATIENT EDUCATION (STAT)

## 2023-08-06 PROCEDURE — 82800 BLOOD PH: CPT

## 2023-08-06 PROCEDURE — 83880 ASSAY OF NATRIURETIC PEPTIDE: CPT | Performed by: EMERGENCY MEDICINE

## 2023-08-06 RX ORDER — GABAPENTIN 300 MG/1
300 CAPSULE ORAL 3 TIMES DAILY
Status: DISCONTINUED | OUTPATIENT
Start: 2023-08-06 | End: 2023-08-18 | Stop reason: HOSPADM

## 2023-08-06 RX ORDER — FAMOTIDINE 20 MG/1
20 TABLET, FILM COATED ORAL 2 TIMES DAILY
Status: DISCONTINUED | OUTPATIENT
Start: 2023-08-06 | End: 2023-08-07

## 2023-08-06 RX ORDER — ALBUTEROL SULFATE 2.5 MG/.5ML
15 SOLUTION RESPIRATORY (INHALATION)
Status: COMPLETED | OUTPATIENT
Start: 2023-08-06 | End: 2023-08-06

## 2023-08-06 RX ORDER — MAGNESIUM SULFATE HEPTAHYDRATE 40 MG/ML
INJECTION, SOLUTION INTRAVENOUS
Status: DISPENSED
Start: 2023-08-06 | End: 2023-08-06

## 2023-08-06 RX ORDER — PREDNISONE 1 MG/1
TABLET ORAL
Status: CANCELLED | OUTPATIENT
Start: 2023-08-06

## 2023-08-06 RX ORDER — CEFTRIAXONE 1 G/1
INJECTION, POWDER, FOR SOLUTION INTRAMUSCULAR; INTRAVENOUS
Status: DISPENSED
Start: 2023-08-06 | End: 2023-08-06

## 2023-08-06 RX ORDER — ENOXAPARIN SODIUM 100 MG/ML
40 INJECTION SUBCUTANEOUS EVERY 24 HOURS
Status: DISCONTINUED | OUTPATIENT
Start: 2023-08-06 | End: 2023-08-18 | Stop reason: HOSPADM

## 2023-08-06 RX ORDER — IPRATROPIUM BROMIDE AND ALBUTEROL SULFATE 2.5; .5 MG/3ML; MG/3ML
3 SOLUTION RESPIRATORY (INHALATION) EVERY 4 HOURS PRN
Status: DISCONTINUED | OUTPATIENT
Start: 2023-08-06 | End: 2023-08-18 | Stop reason: HOSPADM

## 2023-08-06 RX ORDER — AMLODIPINE BESYLATE 5 MG/1
5 TABLET ORAL DAILY
Status: DISCONTINUED | OUTPATIENT
Start: 2023-08-06 | End: 2023-08-08

## 2023-08-06 RX ORDER — FLUTICASONE FUROATE AND VILANTEROL 200; 25 UG/1; UG/1
1 POWDER RESPIRATORY (INHALATION) DAILY
Status: DISCONTINUED | OUTPATIENT
Start: 2023-08-07 | End: 2023-08-18 | Stop reason: HOSPADM

## 2023-08-06 RX ORDER — BUPROPION HYDROCHLORIDE 150 MG/1
150 TABLET ORAL DAILY
Status: DISCONTINUED | OUTPATIENT
Start: 2023-08-06 | End: 2023-08-07

## 2023-08-06 RX ORDER — SIMETHICONE 80 MG
1 TABLET,CHEWABLE ORAL 3 TIMES DAILY PRN
Status: DISCONTINUED | OUTPATIENT
Start: 2023-08-06 | End: 2023-08-18 | Stop reason: HOSPADM

## 2023-08-06 RX ORDER — MAGNESIUM SULFATE HEPTAHYDRATE 40 MG/ML
2 INJECTION, SOLUTION INTRAVENOUS
Status: COMPLETED | OUTPATIENT
Start: 2023-08-06 | End: 2023-08-06

## 2023-08-06 RX ORDER — AZITHROMYCIN 100 MG/ML
INJECTION, POWDER, LYOPHILIZED, FOR SOLUTION INTRAVENOUS
Status: DISPENSED
Start: 2023-08-06 | End: 2023-08-06

## 2023-08-06 RX ORDER — IPRATROPIUM BROMIDE AND ALBUTEROL SULFATE 2.5; .5 MG/3ML; MG/3ML
3 SOLUTION RESPIRATORY (INHALATION)
Status: DISCONTINUED | OUTPATIENT
Start: 2023-08-06 | End: 2023-08-06

## 2023-08-06 RX ORDER — IPRATROPIUM BROMIDE 0.5 MG/2.5ML
1 SOLUTION RESPIRATORY (INHALATION)
Status: COMPLETED | OUTPATIENT
Start: 2023-08-06 | End: 2023-08-06

## 2023-08-06 RX ORDER — ALBUTEROL SULFATE 5 MG/ML
SOLUTION RESPIRATORY (INHALATION)
Status: DISPENSED
Start: 2023-08-06 | End: 2023-08-06

## 2023-08-06 RX ORDER — METHYLPREDNISOLONE SOD SUCC 125 MG
125 VIAL (EA) INJECTION
Status: COMPLETED | OUTPATIENT
Start: 2023-08-06 | End: 2023-08-06

## 2023-08-06 RX ORDER — LOSARTAN POTASSIUM 50 MG/1
100 TABLET ORAL DAILY
Status: DISCONTINUED | OUTPATIENT
Start: 2023-08-06 | End: 2023-08-10

## 2023-08-06 RX ORDER — ATORVASTATIN CALCIUM 10 MG/1
10 TABLET, FILM COATED ORAL NIGHTLY
Status: DISCONTINUED | OUTPATIENT
Start: 2023-08-06 | End: 2023-08-18 | Stop reason: HOSPADM

## 2023-08-06 RX ORDER — IPRATROPIUM BROMIDE AND ALBUTEROL SULFATE 2.5; .5 MG/3ML; MG/3ML
3 SOLUTION RESPIRATORY (INHALATION)
Status: DISCONTINUED | OUTPATIENT
Start: 2023-08-06 | End: 2023-08-18 | Stop reason: HOSPADM

## 2023-08-06 RX ORDER — METHYLPREDNISOLONE SOD SUCC 125 MG
VIAL (EA) INJECTION
Status: DISPENSED
Start: 2023-08-06 | End: 2023-08-06

## 2023-08-06 RX ORDER — AMOXICILLIN 250 MG
1 CAPSULE ORAL DAILY PRN
Status: DISCONTINUED | OUTPATIENT
Start: 2023-08-06 | End: 2023-08-18 | Stop reason: HOSPADM

## 2023-08-06 RX ORDER — IPRATROPIUM BROMIDE 0.5 MG/2.5ML
SOLUTION RESPIRATORY (INHALATION)
Status: DISPENSED
Start: 2023-08-06 | End: 2023-08-06

## 2023-08-06 RX ORDER — PREDNISONE 20 MG/1
40 TABLET ORAL DAILY
Status: COMPLETED | OUTPATIENT
Start: 2023-08-06 | End: 2023-08-10

## 2023-08-06 RX ORDER — IBUPROFEN 200 MG
1 TABLET ORAL DAILY PRN
Status: DISCONTINUED | OUTPATIENT
Start: 2023-08-06 | End: 2023-08-18 | Stop reason: HOSPADM

## 2023-08-06 RX ADMIN — MAGNESIUM SULFATE HEPTAHYDRATE 2 G: 40 INJECTION, SOLUTION INTRAVENOUS at 03:08

## 2023-08-06 RX ADMIN — ENOXAPARIN SODIUM 40 MG: 40 INJECTION SUBCUTANEOUS at 06:08

## 2023-08-06 RX ADMIN — GABAPENTIN 300 MG: 300 CAPSULE ORAL at 08:08

## 2023-08-06 RX ADMIN — IPRATROPIUM BROMIDE AND ALBUTEROL SULFATE 3 ML: .5; 3 SOLUTION RESPIRATORY (INHALATION) at 07:08

## 2023-08-06 RX ADMIN — IPRATROPIUM BROMIDE AND ALBUTEROL SULFATE 3 ML: .5; 3 SOLUTION RESPIRATORY (INHALATION) at 02:08

## 2023-08-06 RX ADMIN — METHYLPREDNISOLONE SODIUM SUCCINATE 125 MG: 125 INJECTION, POWDER, FOR SOLUTION INTRAMUSCULAR; INTRAVENOUS at 03:08

## 2023-08-06 RX ADMIN — ALBUTEROL SULFATE 15 MG: 2.5 SOLUTION RESPIRATORY (INHALATION) at 03:08

## 2023-08-06 RX ADMIN — BUPROPION HYDROCHLORIDE 150 MG: 150 TABLET, FILM COATED, EXTENDED RELEASE ORAL at 09:08

## 2023-08-06 RX ADMIN — AZITHROMYCIN MONOHYDRATE 500 MG: 500 INJECTION, POWDER, LYOPHILIZED, FOR SOLUTION INTRAVENOUS at 05:08

## 2023-08-06 RX ADMIN — ATORVASTATIN CALCIUM 10 MG: 10 TABLET, FILM COATED ORAL at 08:08

## 2023-08-06 RX ADMIN — IPRATROPIUM BROMIDE 1 MG: 0.5 SOLUTION RESPIRATORY (INHALATION) at 02:08

## 2023-08-06 RX ADMIN — IPRATROPIUM BROMIDE 1 MG: 0.5 SOLUTION RESPIRATORY (INHALATION) at 03:08

## 2023-08-06 RX ADMIN — GABAPENTIN 300 MG: 300 CAPSULE ORAL at 09:08

## 2023-08-06 RX ADMIN — CEFTRIAXONE 1 G: 1 INJECTION, POWDER, FOR SOLUTION INTRAMUSCULAR; INTRAVENOUS at 05:08

## 2023-08-06 RX ADMIN — ALBUTEROL SULFATE 15 MG: 2.5 SOLUTION RESPIRATORY (INHALATION) at 02:08

## 2023-08-06 RX ADMIN — PREDNISONE 40 MG: 20 TABLET ORAL at 09:08

## 2023-08-06 RX ADMIN — AMLODIPINE BESYLATE 5 MG: 5 TABLET ORAL at 09:08

## 2023-08-06 RX ADMIN — FAMOTIDINE 20 MG: 20 TABLET, FILM COATED ORAL at 08:08

## 2023-08-06 NOTE — PLAN OF CARE
MICU DAILY GOALS     Family/Goals of care/Code Status   Code Status: Full Code    24H Vital Sign Range  Temp:  [97.2 °F (36.2 °C)-98.5 °F (36.9 °C)]   Pulse:  []   Resp:  [18-45]   BP: ()/(57-65)   SpO2:  [74 %-99 %]      Shift Events   No acute events throughout shift    AWAKE RASS: Goal -    Actual -      Restraint necessity: Not necessary   BREATHE SBT: Pass    Coordinate A & B, analgesics/sedatives Pain: managed   SAT: Not intubated   Delirium CAM-ICU: Overall CAM-ICU: Negative   Early(intubated/ Progressive (non-intubated) Mobility MOVE Screen: Pass   Activity: Activity Management: Ambulated -L4   Feeding/Nutrition Diet order: Diet/Nutrition Received: regular,     Thrombus DVT prophylaxis: VTE Required Core Measure: Pharmacological prophylaxis initiated/maintained   HOB Elevation Head of Bed (HOB) Positioning: HOB elevated, HOB at 30 degrees   Ulcer Prophylaxis GI: yes   Glucose control managed     Skin Skin assessed during daily assessment:   Yes, No altered skin integrity present.   Bowel Function no issues    Indwelling Catheter Necessity            De-escalation Antibiotics No       VS and assessment per flow sheet, patient progressing towards goals as tolerated, plan of care reviewed with family, all concerns addressed, will continue to monitor.

## 2023-08-06 NOTE — ASSESSMENT & PLAN NOTE
Right sided HF secondary to Pulmonary Hypertension  - Trop and BNP 8/6 within normal limits  - Chest x ray with no signs of pulmonary edema/ pleural effusions

## 2023-08-06 NOTE — ED TRIAGE NOTES
Rochelle FREDA Espinoza, an 67 y.o. female presents to the ED from home with c/o SOB x 2 days. Pt was 70% on 3L in triage. Pt wears 3L NC baseline. Hx COPD      Chief Complaint   Patient presents with    Shortness of Breath     SOB since Friday, hx COPD and wears 3L NC at baseline. Also complains of back pain. 75% on 3L in triage. Placed on nonrebreather in triage     Review of patient's allergies indicates:   Allergen Reactions    Doxycycline Other (See Comments)     Acid reflux    Orange juice      Swelling in pt tongue       Past Medical History:   Diagnosis Date    CHF (congestive heart failure)     COPD (chronic obstructive pulmonary disease)     Herpes zoster without mention of complication 2/21/2011    Hypertension     Leg edema     Pulmonary hypertension     Sciatica

## 2023-08-06 NOTE — H&P
[facility] - Emergency Dept  Critical Care Medicine  History & Physical    Patient Name: Rochelle Espinoza  MRN: [id number]  Admission Date: 8/6/2023  Hospital Length of Stay: 0 days  Code Status: Prior  Attending Physician: Barak Hough MD   Primary Care Provider: Yosi Samuels Jr., MD   Principal Problem: COPD exacerbation    Subjective:     HPI:  Rochelle Espinoza is a 67 y.o. female with history of COPD, chronic hypoxemic respiratory failure on 3 L nasal cannula at baseline, hyperlipidemia, diastolic heart failure, nicotine dependence, hypertension, presenting to emergency department with complaint of shortness of breath.  Patient was found to be hypoxic to 75% in triage on her 3 liters/minute.  She is had shortness of breath for 3 days.  Increased chest pressure and wheezing.  She also has pain in her back.  She denies fevers.  Originally her cough was productive of dark sputum, now productive of clear sputum. Patient also reports needing to use her rescue inhaler and increased frequency and using her nebulized inhaler with last use yesterday around 5 pm. Patient was brought into the ED by daughter. While in the ED patient was given dounebsx1 and solumedrol 125 mg. Initial VBG Ph 7.288 PCO2 of 81. Patient remained  stable on 3L however repeat VBG displayed worsening acidosis and Hypercarbia and placed on BIPAP. ICU consulted due to need for BIPAP and worsening hypercarbia and acidosis.     2020:    Normal left ventricular systolic function. The estimated ejection fraction is 60%.   Septal wall has abnormal motion.   Local segmental wall motion abnormalities.   Normal LV diastolic function.   Normal right ventricular systolic function.   Normal central venous pressure (3 mmHg).   The estimated PA systolic pressure is 27 mmHg.      Hospital/ICU Course:  No notes on file     Past Medical History:   Diagnosis Date    CHF (congestive heart failure)     COPD (chronic obstructive pulmonary disease)     Herpes  zoster without mention of complication 2011    Hypertension     Leg edema     Pulmonary hypertension     Sciatica        Past Surgical History:   Procedure Laterality Date    BREAST BIOPSY Right     core     SECTION      COLONOSCOPY N/A 2019    Procedure: COLONOSCOPY;  Surgeon: Rui Holder MD;  Location: [facility] (2ND FLR);  Service: Endoscopy;  Laterality: N/A;    EPIDURAL STEROID INJECTION N/A 2020    Procedure: CERVICAL BLAKE;  Surgeon: Papito High MD;  Location: [facility] T;  Service: Pain Management;  Laterality: N/A;  NEEDS CONSENT    ESOPHAGOGASTRODUODENOSCOPY N/A 2019    Procedure: EGD (ESOPHAGOGASTRODUODENOSCOPY);  Surgeon: Rui Holder MD;  Location: [facility]O (2ND FLR);  Service: Endoscopy;  Laterality: N/A;  2L continuous O2 via NC, COPD, pulm HTN    TRANSFORAMINAL EPIDURAL INJECTION OF STEROID Right 6/3/2021    Procedure: INJECTION, STEROID, EPIDURAL, TRANSFORAMINAL APPROACH, L4-L5;  Surgeon: Anibal Robles MD;  Location: [facility] PAIN T;  Service: Pain Management;  Laterality: Right;       Review of patient's allergies indicates:   Allergen Reactions    Doxycycline Other (See Comments)     Acid reflux    Orange juice      Swelling in pt tongue         Family History       Problem Relation (Age of Onset)    Heart disease Mother, Paternal Uncle          Tobacco Use    Smoking status: Light Smoker     Current packs/day: 0.25     Average packs/day: 0.3 packs/day for 40.0 years (10.0 ttl pk-yrs)     Types: Cigarettes    Smokeless tobacco: Never   Substance and Sexual Activity    Alcohol use: Not Currently     Comment: beer daily 2-3 daily, none today    Drug use: No    Sexual activity: Not Currently     Birth control/protection: None      Review of Systems   HENT:  Positive for congestion.    Respiratory:  Positive for cough, shortness of breath and wheezing.         Pleuritic pain   BIPAP    Cardiovascular:  Negative for chest pain.   Gastrointestinal:   Negative for abdominal distention, abdominal pain, nausea and vomiting.   Genitourinary:  Negative for difficulty urinating and urgency.   Neurological:  Positive for headaches.     Objective:     Vital Signs (Most Recent):  Temp: 98.5 °F (36.9 °C) (08/06/23 0229)  Pulse: 94 (08/06/23 0353)  Resp: (!) 23 (08/06/23 0353)  BP: 125/65 (08/06/23 0300)  SpO2: (!) 94 % (08/06/23 0353) Vital Signs (24h Range):  Temp:  [98.5 °F (36.9 °C)] 98.5 °F (36.9 °C)  Pulse:  [] 94  Resp:  [20-26] 23  SpO2:  [74 %-98 %] 94 %  BP: (118-125)/(64-65) 125/65      There is no height or weight on file to calculate BMI.    No intake or output data in the 24 hours ending 08/06/23 0414       Physical Exam  Vitals and nursing note reviewed.   HENT:      Head: Normocephalic and atraumatic.      Mouth/Throat:      Mouth: Mucous membranes are moist.   Eyes:      Extraocular Movements: Extraocular movements intact.      Pupils: Pupils are equal, round, and reactive to light.   Cardiovascular:      Rate and Rhythm: Normal rate and regular rhythm.   Pulmonary:      Effort: Tachypnea present.      Breath sounds: Wheezing present.      Comments: Poor bilateral breaths sounds with inspiratory and expiratory wheezing noted   Abdominal:      General: There is no distension.      Tenderness: There is no abdominal tenderness.   Skin:     General: Skin is warm.   Neurological:      General: No focal deficit present.      Mental Status: She is alert and oriented to person, place, and time.            Vents:  Oxygen Concentration (%): 30 (08/06/23 0353)  Lines/Drains/Airways       Peripheral Intravenous Line  Duration                  Peripheral IV - Single Lumen 08/06/23 0318 20 G Right Antecubital <1 day                  Significant Labs:    CBC/Anemia Profile:  Recent Labs   Lab 08/06/23 0209   WBC 7.05   HGB 13.0   HCT 42.3      MCV 91   RDW 15.7*        Chemistries:  Recent Labs   Lab 08/06/23 0209      K 4.0      CO2 30*   BUN  4*   CREATININE 0.7   CALCIUM 9.3   ALBUMIN 3.9   PROT 7.2   BILITOT 0.4   ALKPHOS 51*   ALT 14   AST 19       All pertinent labs within the past 24 hours have been reviewed.    Significant Imaging: I have reviewed all pertinent imaging results/findings within the past 24 hours.    Assessment/Plan:     Neuro  Peripheral polyneuropathy  Continue Home Medications   - Gabapentin 300 mg BID     Psychiatric  Major depressive disorder  Continue Home medication   - Bupropion 125 mg daily     Pulmonary  * COPD exacerbation  Patient with increased work of breathing with worsening Hypercarbia   - BIPAP  - CAP coverage Ceftriaxone and Azithromycin  - Dounebs q4 hr while awake   - Dounebs PRN   - 40 mg Prednisone daily       Chronic hypoxemic respiratory failure  Patient with Hypercapnic Respiratory failure which is Acute on chronic.  she is on home oxygen at 3 LPM. Supplemental oxygen was provided and noted- Oxygen Concentration (%):  [6-30] 30    .   Signs/symptoms of respiratory failure include- tachypnea and increased work of breathing. Contributing diagnoses includes - COPD Labs and images were reviewed. Patient Has not had a recent ABG. Will treat underlying causes and adjust management of respiratory failure. See copd exacerbation     Cardiac/Vascular  Chronic diastolic heart failure  Right sided HF secondary to Pulmonary Hypertension  - Trop and BNP 8/6 within normal limits  - Chest x ray with no signs of pulmonary edema/ pleural effusions      Mixed hyperlipidemia  Continue Home medications  -Atorvastatin 10 mg daily    Essential hypertension  Continue home meds   - Amlodipine 5 mg daily   - Losartan 100 mg daily     Other  Nicotine dependence, cigarettes, uncomplicated   PRN nicotine Patch          Critical Care Daily Checklist:    A: Awake: RASS Goal/Actual Goal:    Actual:     B: Spontaneous Breathing Trial Performed?     C: SAT & SBT Coordinated?  NA                      D: Delirium: CAM-ICU     E: Early Mobility  Performed? Yes   F: Feeding Goal:    Status:     Current Diet Order   No orders of the defined types were placed in this encounter.      AS: Analgesia/Sedation NA   T: Thromboembolic Prophylaxis Lovenox   H: HOB > 300 Yes   U: Stress Ulcer Prophylaxis (if needed) NA   G: Glucose Control NA   B: Bowel Function     I: Indwelling Catheter (Lines & Bergman) Necessity PIV   D: De-escalation of Antimicrobials/Pharmacotherapies Ceftriaxone  Azithromyocin    Plan for the day/ETD     Code Status:  Family/Goals of Care: Prior  FULL       Critical secondary to Patient has a condition that poses threat to life and bodily function: Severe Respiratory Distress     Critical care was time spent personally by me on the following activities: development of treatment plan with patient or surrogate and bedside caregivers, discussions with consultants, evaluation of patient's response to treatment, examination of patient, ordering and performing treatments and interventions, ordering and review of laboratory studies, ordering and review of radiographic studies, pulse oximetry, re-evaluation of patient's condition. This critical care time did not overlap with that of any other provider or involve time for any procedures.     Foreign Vee MD  Critical Care Medicine  [facility] - Emergency Dept

## 2023-08-06 NOTE — ED PROVIDER NOTES
Source of History:  Patient  Chart    Chief complaint:  Shortness of Breath (SOB since Friday, hx COPD and wears 3L NC at baseline. Also complains of back pain. 75% on 3L in triage. Placed on nonrebreather in triage)      HPI:  Rochelle Espinoza is a 67 y.o. female with history of COPD, chronic hypoxemic respiratory failure on 3 L nasal cannula at baseline, hyperlipidemia, diastolic heart failure, nicotine dependence, hypertension, presenting to emergency department with complaint of shortness of breath.      Patient was found to be hypoxic to 75% in triage on her 3 liters/minute.  She is had shortness of breath for 3 days.  Increased chest pressure and wheezing.  She also has pain in her back.  She denies fevers.  Originally her cough was productive of dark sputum, now productive of clear sputum.    Review of patient's allergies indicates:   Allergen Reactions    Doxycycline Other (See Comments)     Acid reflux    Orange juice      Swelling in pt tongue         No current facility-administered medications on file prior to encounter.     Current Outpatient Medications on File Prior to Encounter   Medication Sig Dispense Refill    albuterol (PROVENTIL/VENTOLIN HFA) 90 mcg/actuation inhaler Inhale 2 puffs into the lungs every 6 (six) hours. Rescue 8 g 11    albuterol-ipratropium (DUO-NEB) 2.5 mg-0.5 mg/3 mL nebulizer solution Take 3 mLs by nebulization every 4 (four) hours as needed for Wheezing. Rescue 1080 mL 3    amLODIPine (NORVASC) 5 MG tablet TAKE 1 TABLET(5 MG) BY MOUTH EVERY DAY 90 tablet 3    ascorbic acid, vitamin C, (VITAMIN C) 500 MG tablet Take 500 mg by mouth once daily.      aspirin (ECOTRIN) 81 MG EC tablet Take 81 mg by mouth once daily. Low Dose      atorvastatin (LIPITOR) 10 MG tablet Take 1 tablet (10 mg total) by mouth every evening. 90 tablet 3    buPROPion (WELLBUTRIN XL) 150 MG TB24 tablet Take 1 tablet (150 mg total) by mouth once daily. 90 tablet 3    cetirizine (ZYRTEC) 10 MG tablet Take 1  tablet (10 mg total) by mouth once daily. 90 tablet 3    cholecalciferol, vitamin D3, (VITAMIN D3) 25 mcg (1,000 unit) capsule Take 1,000 Units by mouth once daily.      fluticasone propionate (FLONASE) 50 mcg/actuation nasal spray SHAKE LIQUID AND USE 1 SPRAY (50MCG) IN EACH NOSTRIL TWICE DAILY 48 g 3    fluticasone-umeclidin-vilanter (TRELEGY ELLIPTA) 200-62.5-25 mcg inhaler Inhale 1 puff into the lungs once daily. 3 each 4    gabapentin (NEURONTIN) 300 MG capsule TAKE 1 CAPSULE THREE TIMES DAILY 270 capsule 2    glycerin adult suppository Place 1 suppository rectally as needed for Constipation.      hydroCHLOROthiazide (HYDRODIURIL) 12.5 MG Tab Take 1 tablet (12.5 mg total) by mouth once daily. 90 tablet 3    losartan (COZAAR) 100 MG tablet TAKE 1 TABLET EVERY DAY 90 tablet 3    magnesium 250 mg Tab Take 250 mg by mouth once.      potassium chloride SA (K-DUR,KLOR-CON) 20 MEQ tablet TAKE 1 TABLET(20 MEQ) BY MOUTH EVERY DAY 30 tablet 7    traZODone (DESYREL) 50 MG tablet Take 1 tablet (50 mg total) by mouth nightly as needed for Insomnia. 30 tablet 11    varenicline (CHANTIX STARTING MONTH BOX) 0.5 mg (11)- 1 mg (42) tablet Take one 0.5mg tab by mouth once daily X3 days,then increase to one 0.5mg tab twice daily X4 days,then increase to one 1mg tab twice daily 1 each 0    varenicline (CHANTIX) 1 mg Tab Take 1 tablet (1 mg total) by mouth once daily. 30 tablet 3       PMH:  As per HPI and below:  Past Medical History:   Diagnosis Date    CHF (congestive heart failure)     COPD (chronic obstructive pulmonary disease)     Herpes zoster without mention of complication 2011    Hypertension     Leg edema     Pulmonary hypertension     Sciatica      Past Surgical History:   Procedure Laterality Date    BREAST BIOPSY Right     core     SECTION      COLONOSCOPY N/A 2019    Procedure: COLONOSCOPY;  Surgeon: Rui Holder MD;  Location: [address];  Service: Endoscopy;  Laterality: N/A;     EPIDURAL STEROID INJECTION N/A 1/2/2020    Procedure: CERVICAL BLAKE;  Surgeon: Papito High MD;  Location: [facility] PAIN MGT;  Service: Pain Management;  Laterality: N/A;  NEEDS CONSENT    ESOPHAGOGASTRODUODENOSCOPY N/A 12/19/2019    Procedure: EGD (ESOPHAGOGASTRODUODENOSCOPY);  Surgeon: Rui Holder MD;  Location: [facility] ENDO (2ND FLR);  Service: Endoscopy;  Laterality: N/A;  2L continuous O2 via NC, COPD, pulm HTN    TRANSFORAMINAL EPIDURAL INJECTION OF STEROID Right 6/3/2021    Procedure: INJECTION, STEROID, EPIDURAL, TRANSFORAMINAL APPROACH, L4-L5;  Surgeon: Anibal Robles MD;  Location: [facility] PAIN [facility]GT;  Service: Pain Management;  Laterality: Right;       Social History     Socioeconomic History    Marital status:    Occupational History    Occupation:    Tobacco Use    Smoking status: Light Smoker     Current packs/day: 0.25     Average packs/day: 0.3 packs/day for 40.0 years (10.0 ttl pk-yrs)     Types: Cigarettes    Smokeless tobacco: Never   Substance and Sexual Activity    Alcohol use: Not Currently     Comment: beer daily 2-3 daily, none today    Drug use: No    Sexual activity: Not Currently     Birth control/protection: None     Social Determinants of Health     Financial Resource Strain: Medium Risk (7/30/2023)    Overall Financial Resource Strain (CARDIA)     Difficulty of Paying Living Expenses: Somewhat hard   Food Insecurity: Food Insecurity Present (7/30/2023)    Hunger Vital Sign     Worried About Running Out of Food in the Last Year: Often true     Ran Out of Food in the Last Year: Often true   Transportation Needs: Unmet Transportation Needs (7/30/2023)    PRAPARE - Transportation     Lack of Transportation (Medical): Yes     Lack of Transportation (Non-Medical): Yes   Physical Activity: Inactive (7/30/2023)    Exercise Vital Sign     Days of Exercise per Week: 0 days     Minutes of Exercise per Session: 10 min   Stress: Stress Concern Present (7/30/2023)    [employer] - Occupational Stress Questionnaire     Feeling of Stress : To some extent   Social Connections: Socially Isolated (7/30/2023)    Social Connection and Isolation Panel [NHANES]     Frequency of Communication with Friends and Family: More than three times a week     Frequency of Social Gatherings with Friends and Family: Once a week     Attends Restoration Services: Never     Active Member of Clubs or Organizations: No     Attends Club or Organization Meetings: Never     Marital Status:    Housing Stability: High Risk (7/30/2023)    Housing Stability Vital Sign     Unable to Pay for Housing in the Last Year: Yes     Number of Places Lived in the Last Year: 1     Unstable Housing in the Last Year: No       Family History   Problem Relation Age of Onset    Heart disease Mother     Heart disease Paternal Uncle     Celiac disease Neg Hx     Colon cancer Neg Hx     Colon polyps Neg Hx     Crohn's disease Neg Hx     Cystic fibrosis Neg Hx     Esophageal cancer Neg Hx     Inflammatory bowel disease Neg Hx     Irritable bowel syndrome Neg Hx     Liver cancer Neg Hx     Liver disease Neg Hx     Rectal cancer Neg Hx     Stomach cancer Neg Hx     Ulcerative colitis Neg Hx        Physical Exam:      Vitals:    08/06/23 0229   BP:    Pulse:    Resp:    Temp: 98.5 °F (36.9 °C)     Gen:  Moderate respiratory distress.  Mental Status:  Alert and oriented .  Appropriate, conversant.  Skin: Warm, dry. No rashes seen.  Eyes: No conjunctival injection.  Pulm:  Tachypneic, hypoxic.  No conversational dyspnea.  Diminished air entry in all fields with expiratory wheezing noted diffusely.  CV: Regular rate. Regular rhythm.   Abd: Soft.  Not distended.  Nontender.   MSK: Good range of motion all joints.  No deformities.    Neuro: Awake. Speech normal. No focal neuro deficit observed.    Laboratory Studies:  Labs Reviewed   CBC W/ AUTO DIFFERENTIAL - Abnormal; Notable for the following components:        Result Value    MCHC 30.7 (*)     RDW 15.7 (*)     All other components within normal limits   COMPREHENSIVE METABOLIC PANEL - Abnormal; Notable for the following components:    CO2 30 (*)     Glucose 114 (*)     BUN 4 (*)     Alkaline Phosphatase 51 (*)     All other components within normal limits   ISTAT PROCEDURE - Abnormal; Notable for the following components:    POC PH 7.288 (*)     POC PCO2 81.8 (*)     POC HCO3 39.2 (*)     POC SATURATED O2 65 (*)     POC TCO2 42 (*)     All other components within normal limits   TROPONIN I   B-TYPE NATRIURETIC PEPTIDE   SARS-COV-2 RNA AMPLIFICATION, QUAL       EKG (independently interpreted by me):  Normal sinus rhythm, rate 86.  No STEMI.  QRS 74 milliseconds.   milliseconds.    X-rays (independently interpreted by me):  No acute abnormality    Chart reviewed.     Imaging Results              X-Ray Chest AP Portable (Final result)  Result time 08/06/23 02:35:37      Final result by Ciro Apple MD (08/06/23 02:35:37)                   Impression:      Stable radiographic appearance.      Electronically signed by: Ciro Apple  Date:    08/06/2023  Time:    02:35               Narrative:    EXAMINATION:  XR CHEST AP PORTABLE    CLINICAL HISTORY:  Asthma;    TECHNIQUE:  Single frontal view of the chest was performed.    COMPARISON:  Chest radiograph October 23, 2022    FINDINGS:  Single portable chest view is submitted.  The cardiomediastinal silhouette appears stable.    Mild accentuated attenuation at the lung bases is consistent with overlying soft tissue attenuation.  The appearance of the pulmonary bronchovascular markings is stable, mild prominence of the pulmonary vascular appears stable.    There is no evidence for confluent infiltrate or consolidation, significant pleural effusion or pneumothorax.    The osseous structures appear intact.                                      Medications Given:  Medications   methylPREDNISolone sodium succinate  injection 125 mg (has no administration in time range)   magnesium sulfate 2g in water 50mL IVPB (premix) (has no administration in time range)   albuterol sulfate nebulizer solution 15 mg (15 mg Nebulization Given 8/6/23 0218)   ipratropium 0.02 % nebulizer solution 1 mg (1 mg Nebulization Given 8/6/23 0218)       Discussed with: MICU    MDM:    67 y.o. female with acute on chronic hypoxemic respiratory failure with initial sats in the 70s on her baseline 3 L oxygen.  She is afebrile, tachycardic.  She was extremely tight, with diffuse expiratory wheezing.  She was placed on 6 L nasal cannula with improvement of her hypoxia.  Will give DuoNeb, steroids, Mag.  Will obtain labs, chest x-ray, EKG.    Labs reviewed.  No leukocytosis.  CMP is remarkable for chronic alkalosis.  Blood glucose is 114.  No electrolyte disturbance or acute kidney injury.  Normal BNP and troponin.    X-ray without acute abnormality.    Initial VBG which was obtained on presentation demonstrating acute on chronic hypercapnic respiratory failure.  Her work of breathing is very reassuring, and she is mentating appropriately, so I gave her 2 hour long DuoNebs, and repeated VBG prior to initiating BiPAP.  Unfortunately repeat VBG is worse.  She was started on BiPAP.  MICU consulted.  Plan admission to their service.      Critical Care Procedure Note    Authorized and Performed by: Chelsea Hinson    Total critical care time: Approximately 45 minutes    Due to a high probability of clinically significant, life threatening deterioration, the patient required my highest level of preparedness to intervene emergently and I personally spent this critical care time directly and personally managing the patient. This critical care time included obtaining a history; examining the patient; pulse oximetry; ordering and review of studies; arranging urgent treatment with development of a management plan; evaluation of patient's response to treatment; frequent  reassessment; and, discussions with other providers.    This critical care time was performed to assess and manage the high probability of imminent, life-threatening deterioration that could result in multi-organ failure. It was exclusive of separately billable procedures and treating other patients and teaching time.      Diagnostic Impression:    1. Acute hypoxemic respiratory failure    2. Shortness of breath    3. Chest pain    4. COPD exacerbation               Patient understands the plan and is in agreement, verbalized understanding, questions answered    Chelsea Hinson MD  Emergency Medicine         Chelsea Hinson MD  08/06/23 035

## 2023-08-06 NOTE — NURSING
Nurses Note -- 4 Eyes      8/6/2023   9:48 AM      Skin assessed during: Admit      [x] No Altered Skin Integrity Present    [x]Prevention Measures Documented      [] Yes- Altered Skin Integrity Present or Discovered   [] LDA Added if Not in Epic (Describe Wound)   [] New Altered Skin Integrity was Present on Admit and Documented in LDA   [] Wound Image Taken    Wound Care Consulted? No    Attending Nurse:  Hakeem Calero RN    Second RN/Staff Member:   Reyna Schrader RN

## 2023-08-06 NOTE — ASSESSMENT & PLAN NOTE
Patient with increased work of breathing with worsening Hypercarbia   - BIPAP  - CAP coverage Ceftriaxone and Azithromycin  - Dounebs q4 hr while awake   - Dounebs PRN   - 40 mg Prednisone daily

## 2023-08-06 NOTE — ASSESSMENT & PLAN NOTE
Patient with Hypercapnic Respiratory failure which is Acute on chronic.  she is on home oxygen at 3 LPM. Supplemental oxygen was provided and noted- Oxygen Concentration (%):  [6-30] 30    .   Signs/symptoms of respiratory failure include- tachypnea and increased work of breathing. Contributing diagnoses includes - COPD Labs and images were reviewed. Patient Has not had a recent ABG. Will treat underlying causes and adjust management of respiratory failure. See copd exacerbation

## 2023-08-06 NOTE — SUBJECTIVE & OBJECTIVE
Past Medical History:   Diagnosis Date    CHF (congestive heart failure)     COPD (chronic obstructive pulmonary disease)     Herpes zoster without mention of complication 2011    Hypertension     Leg edema     Pulmonary hypertension     Sciatica        Past Surgical History:   Procedure Laterality Date    BREAST BIOPSY Right     core     SECTION      COLONOSCOPY N/A 2019    Procedure: COLONOSCOPY;  Surgeon: Rui Holder MD;  Location: [facility] (2ND FLR);  Service: Endoscopy;  Laterality: N/A;    EPIDURAL STEROID INJECTION N/A 2020    Procedure: CERVICAL BLAKE;  Surgeon: Papito High MD;  [facility] [facility] [place]  Service: Pain Management;  Laterality: N/A;  NEEDS CONSENT    ESOPHAGOGASTRODUODENOSCOPY N/A 2019    Procedure: EGD (ESOPHAGOGASTRODUODENOSCOPY);  Surgeon: Rui Holder MD;  [facility]  Service: Endoscopy;  Laterality: N/A;  2L continuous O2 via NC, COPD, pulm HTN    TRANSFORAMINAL EPIDURAL INJECTION OF STEROID Right 6/3/2021    Procedure: INJECTION, STEROID, EPIDURAL, TRANSFORAMINAL APPROACH, L4-L5;  Surgeon: Anibal Robles MD;  Location: [facility] [place];  Service: Pain Management;  Laterality: Right;       Review of patient's allergies indicates:   Allergen Reactions    Doxycycline Other (See Comments)     Acid reflux    Orange juice      Swelling in pt tongue         Family History       Problem Relation (Age of Onset)    Heart disease Mother, Paternal Uncle          Tobacco Use    Smoking status: Light Smoker     Current packs/day: 0.25     Average packs/day: 0.3 packs/day for 40.0 years (10.0 ttl pk-yrs)     Types: Cigarettes    Smokeless tobacco: Never   Substance and Sexual Activity    Alcohol use: Not Currently     Comment: beer daily 2-3 daily, none today    Drug use: No    Sexual activity: Not Currently     Birth control/protection: None      Review of Systems   HENT:  Positive for congestion.    Respiratory:  Positive for cough, shortness of  breath and wheezing.         Pleuritic pain   BIPAP    Cardiovascular:  Negative for chest pain.   Gastrointestinal:  Negative for abdominal distention, abdominal pain, nausea and vomiting.   Genitourinary:  Negative for difficulty urinating and urgency.   Neurological:  Positive for headaches.     Objective:     Vital Signs (Most Recent):  Temp: 98.5 °F (36.9 °C) (08/06/23 0229)  Pulse: 94 (08/06/23 0353)  Resp: (!) 23 (08/06/23 0353)  BP: 125/65 (08/06/23 0300)  SpO2: (!) 94 % (08/06/23 0353) Vital Signs (24h Range):  Temp:  [98.5 °F (36.9 °C)] 98.5 °F (36.9 °C)  Pulse:  [] 94  Resp:  [20-26] 23  SpO2:  [74 %-98 %] 94 %  BP: (118-125)/(64-65) 125/65      There is no height or weight on file to calculate BMI.    No intake or output data in the 24 hours ending 08/06/23 0414       Physical Exam  Vitals and nursing note reviewed.   HENT:      Head: Normocephalic and atraumatic.      Mouth/Throat:      Mouth: Mucous membranes are moist.   Eyes:      Extraocular Movements: Extraocular movements intact.      Pupils: Pupils are equal, round, and reactive to light.   Cardiovascular:      Rate and Rhythm: Normal rate and regular rhythm.   Pulmonary:      Effort: Tachypnea present.      Breath sounds: Wheezing present.      Comments: Poor bilateral breaths sounds with inspiratory and expiratory wheezing noted   Abdominal:      General: There is no distension.      Tenderness: There is no abdominal tenderness.   Skin:     General: Skin is warm.   Neurological:      General: No focal deficit present.      Mental Status: She is alert and oriented to person, place, and time.            Vents:  Oxygen Concentration (%): 30 (08/06/23 0353)  Lines/Drains/Airways       Peripheral Intravenous Line  Duration                  Peripheral IV - Single Lumen 08/06/23 0318 20 G Right Antecubital <1 day                  Significant Labs:    CBC/Anemia Profile:  Recent Labs   Lab 08/06/23  0209   WBC 7.05   HGB 13.0   HCT 42.3       MCV 91   RDW 15.7*        Chemistries:  Recent Labs   Lab 08/06/23  0209      K 4.0      CO2 30*   BUN 4*   CREATININE 0.7   CALCIUM 9.3   ALBUMIN 3.9   PROT 7.2   BILITOT 0.4   ALKPHOS 51*   ALT 14   AST 19       All pertinent labs within the past 24 hours have been reviewed.    Significant Imaging: I have reviewed all pertinent imaging results/findings within the past 24 hours.

## 2023-08-06 NOTE — CARE UPDATE
Plan of care update:    67F admitted to MICU for acute on chronic hypoxic respirator acidosis w/ Hx of right sided diastolic HF (2020), COPD, HTN, HLD and tobacco dependence.  During admittion, the patient was on sating in the mid 90's on 16 iPAP/ 6 ePAP @ 30% o2. Her initial ABG was 7.24/87.2/25/40.5.  H/H was normal, BNP <10, Trop normal, UA neg. And lactate of 4.3, and AG of 13. CXR and EKG were normal. After the 3rd round of duonebs and addition of steroids the patient improved weaned off BiPAP to 5L NC w/ stable sats in the mid 90's.     Itemized orders:   -- Echo  -- Fluticasone furoate-vilanteroL 200-25 mcg/dose diskus   -- Simethicone  -- Famotidine

## 2023-08-06 NOTE — HPI
Rochelle Espinoza is a 67 y.o. female with history of COPD, chronic hypoxemic respiratory failure on 3 L nasal cannula at baseline, hyperlipidemia, diastolic heart failure, nicotine dependence, hypertension, presenting to emergency department with complaint of shortness of breath.  Patient was found to be hypoxic to 75% in triage on her 3 liters/minute.  She is had shortness of breath for 3 days.  Increased chest pressure and wheezing.  She also has pain in her back.  She denies fevers.  Originally her cough was productive of dark sputum, now productive of clear sputum. Patient also reports needing to use her rescue inhaler and increased frequency and using her nebulized inhaler with last use yesterday around 5 pm. Patient was brought into the ED by daughter. While in the ED patient was given dounebsx1 and solumedrol 125 mg. Initial VBG Ph 7.288 PCO2 of 81. Patient remained  stable on 3L however repeat VBG displayed worsening acidosis and Hypercarbia and placed on BIPAP. ICU consulted due to need for BIPAP and worsening hypercarbia and acidosis.     2020:   Normal left ventricular systolic function. The estimated ejection fraction is 60%.  Septal wall has abnormal motion.  Local segmental wall motion abnormalities.  Normal LV diastolic function.  Normal right ventricular systolic function.  Normal central venous pressure (3 mmHg).  The estimated PA systolic pressure is 27 mmHg.

## 2023-08-07 LAB
ALBUMIN SERPL BCP-MCNC: 3.3 G/DL (ref 3.5–5.2)
ALP SERPL-CCNC: 41 U/L (ref 55–135)
ALT SERPL W/O P-5'-P-CCNC: 14 U/L (ref 10–44)
ANION GAP SERPL CALC-SCNC: 7 MMOL/L (ref 8–16)
AST SERPL-CCNC: 16 U/L (ref 10–40)
AV INDEX (PROSTH): 0.8
AV MEAN GRADIENT: 4 MMHG
AV PEAK GRADIENT: 7 MMHG
AV VALVE AREA BY VELOCITY RATIO: 3.13 CM²
AV VALVE AREA: 2.89 CM²
AV VELOCITY RATIO: 0.86
BASOPHILS # BLD AUTO: 0.02 K/UL (ref 0–0.2)
BASOPHILS NFR BLD: 0.1 % (ref 0–1.9)
BILIRUB SERPL-MCNC: 0.3 MG/DL (ref 0.1–1)
BSA FOR ECHO PROCEDURE: 1.76 M2
BUN SERPL-MCNC: 9 MG/DL (ref 8–23)
CALCIUM SERPL-MCNC: 8.9 MG/DL (ref 8.7–10.5)
CHLORIDE SERPL-SCNC: 101 MMOL/L (ref 95–110)
CO2 SERPL-SCNC: 33 MMOL/L (ref 23–29)
CREAT SERPL-MCNC: 0.6 MG/DL (ref 0.5–1.4)
CV ECHO LV RWT: 0.25 CM
DIFFERENTIAL METHOD: ABNORMAL
DOP CALC AO PEAK VEL: 1.32 M/S
DOP CALC AO VTI: 27.93 CM
DOP CALC LVOT AREA: 3.6 CM2
DOP CALC LVOT DIAMETER: 2.15 CM
DOP CALC LVOT PEAK VEL: 1.14 M/S
DOP CALC LVOT STROKE VOLUME: 80.81 CM3
DOP CALCLVOT PEAK VEL VTI: 22.27 CM
E WAVE DECELERATION TIME: 229.54 MSEC
E/A RATIO: 0.89
E/E' RATIO: 10.11 M/S
ECHO LV POSTERIOR WALL: 0.66 CM (ref 0.6–1.1)
EOSINOPHIL # BLD AUTO: 0 K/UL (ref 0–0.5)
EOSINOPHIL NFR BLD: 0.1 % (ref 0–8)
ERYTHROCYTE [DISTWIDTH] IN BLOOD BY AUTOMATED COUNT: 15.9 % (ref 11.5–14.5)
EST. GFR  (NO RACE VARIABLE): >60 ML/MIN/1.73 M^2
FRACTIONAL SHORTENING: 33 % (ref 28–44)
GLUCOSE SERPL-MCNC: 94 MG/DL (ref 70–110)
HCT VFR BLD AUTO: 36.6 % (ref 37–48.5)
HGB BLD-MCNC: 11.4 G/DL (ref 12–16)
IMM GRANULOCYTES # BLD AUTO: 0.06 K/UL (ref 0–0.04)
IMM GRANULOCYTES NFR BLD AUTO: 0.3 % (ref 0–0.5)
INFLUENZA A, MOLECULAR: NOT DETECTED
INFLUENZA B, MOLECULAR: NOT DETECTED
INTERVENTRICULAR SEPTUM: 0.76 CM (ref 0.6–1.1)
LA MAJOR: 5.21 CM
LA MINOR: 4.81 CM
LA WIDTH: 3.53 CM
LEFT ATRIUM SIZE: 3.12 CM
LEFT ATRIUM VOLUME INDEX MOD: 21.9 ML/M2
LEFT ATRIUM VOLUME INDEX: 27.1 ML/M2
LEFT ATRIUM VOLUME MOD: 37.83 CM3
LEFT ATRIUM VOLUME: 46.83 CM3
LEFT INTERNAL DIMENSION IN SYSTOLE: 3.5 CM (ref 2.1–4)
LEFT VENTRICLE DIASTOLIC VOLUME INDEX: 44.34 ML/M2
LEFT VENTRICLE DIASTOLIC VOLUME: 76.7 ML
LEFT VENTRICLE MASS INDEX: 72 G/M2
LEFT VENTRICLE SYSTOLIC VOLUME INDEX: 16.6 ML/M2
LEFT VENTRICLE SYSTOLIC VOLUME: 28.7 ML
LEFT VENTRICULAR INTERNAL DIMENSION IN DIASTOLE: 5.2 CM (ref 3.5–6)
LEFT VENTRICULAR MASS: 124.99 G
LV LATERAL E/E' RATIO: 8.27 M/S
LV SEPTAL E/E' RATIO: 13 M/S
LYMPHOCYTES # BLD AUTO: 2.4 K/UL (ref 1–4.8)
LYMPHOCYTES NFR BLD: 13.7 % (ref 18–48)
MAGNESIUM SERPL-MCNC: 1.9 MG/DL (ref 1.6–2.6)
MCH RBC QN AUTO: 27.7 PG (ref 27–31)
MCHC RBC AUTO-ENTMCNC: 31.1 G/DL (ref 32–36)
MCV RBC AUTO: 89 FL (ref 82–98)
MONOCYTES # BLD AUTO: 1 K/UL (ref 0.3–1)
MONOCYTES NFR BLD: 5.9 % (ref 4–15)
MV A" WAVE DURATION": 6.85 MSEC
MV PEAK A VEL: 1.02 M/S
MV PEAK E VEL: 0.91 M/S
MV STENOSIS PRESSURE HALF TIME: 66.57 MS
MV VALVE AREA P 1/2 METHOD: 3.3 CM2
NEUTROPHILS # BLD AUTO: 13.8 K/UL (ref 1.8–7.7)
NEUTROPHILS NFR BLD: 79.9 % (ref 38–73)
NRBC BLD-RTO: 0 /100 WBC
PLATELET # BLD AUTO: 241 K/UL (ref 150–450)
PMV BLD AUTO: 11.9 FL (ref 9.2–12.9)
POCT GLUCOSE: 126 MG/DL (ref 70–110)
POTASSIUM SERPL-SCNC: 4.2 MMOL/L (ref 3.5–5.1)
PROT SERPL-MCNC: 6.2 G/DL (ref 6–8.4)
PULM VEIN S/D RATIO: 1.21
PV PEAK D VEL: 0.68 M/S
PV PEAK S VEL: 0.82 M/S
RA MAJOR: 4.88 CM
RA PRESSURE ESTIMATED: 3 MMHG
RA WIDTH: 2.82 CM
RBC # BLD AUTO: 4.12 M/UL (ref 4–5.4)
RSV AG BY MOLECULAR METHOD: NOT DETECTED
RV TISSUE DOPPLER FREE WALL SYSTOLIC VELOCITY 1 (APICAL 4 CHAMBER VIEW): 12.91 CM/S
SARS-COV-2 RNA RESP QL NAA+PROBE: NOT DETECTED
SINUS: 2.99 CM
SODIUM SERPL-SCNC: 141 MMOL/L (ref 136–145)
TDI LATERAL: 0.11 M/S
TDI SEPTAL: 0.07 M/S
TDI: 0.09 M/S
TRICUSPID ANNULAR PLANE SYSTOLIC EXCURSION: 2.9 CM
WBC # BLD AUTO: 17.27 K/UL (ref 3.9–12.7)
Z-SCORE OF LEFT VENTRICULAR DIMENSION IN END DIASTOLE: 0.81
Z-SCORE OF LEFT VENTRICULAR DIMENSION IN END SYSTOLE: 1.31

## 2023-08-07 PROCEDURE — 25000003 PHARM REV CODE 250: Performed by: COMMUNITY HEALTH WORKER

## 2023-08-07 PROCEDURE — 27100171 HC OXYGEN HIGH FLOW UP TO 24 HOURS

## 2023-08-07 PROCEDURE — 80053 COMPREHEN METABOLIC PANEL: CPT | Performed by: INTERNAL MEDICINE

## 2023-08-07 PROCEDURE — 25000003 PHARM REV CODE 250

## 2023-08-07 PROCEDURE — 25000242 PHARM REV CODE 250 ALT 637 W/ HCPCS: Performed by: COMMUNITY HEALTH WORKER

## 2023-08-07 PROCEDURE — 94660 CPAP INITIATION&MGMT: CPT

## 2023-08-07 PROCEDURE — 63600175 PHARM REV CODE 636 W HCPCS: Performed by: COMMUNITY HEALTH WORKER

## 2023-08-07 PROCEDURE — 0241U SARS-COV2 (COVID) WITH FLU/RSV BY PCR: CPT

## 2023-08-07 PROCEDURE — 94640 AIRWAY INHALATION TREATMENT: CPT

## 2023-08-07 PROCEDURE — 20600001 HC STEP DOWN PRIVATE ROOM

## 2023-08-07 PROCEDURE — 99291 PR CRITICAL CARE, E/M 30-74 MINUTES: ICD-10-PCS | Mod: GC,,, | Performed by: STUDENT IN AN ORGANIZED HEALTH CARE EDUCATION/TRAINING PROGRAM

## 2023-08-07 PROCEDURE — 99900035 HC TECH TIME PER 15 MIN (STAT)

## 2023-08-07 PROCEDURE — 25000242 PHARM REV CODE 250 ALT 637 W/ HCPCS

## 2023-08-07 PROCEDURE — 99291 CRITICAL CARE FIRST HOUR: CPT | Mod: GC,,, | Performed by: STUDENT IN AN ORGANIZED HEALTH CARE EDUCATION/TRAINING PROGRAM

## 2023-08-07 PROCEDURE — 85025 COMPLETE CBC W/AUTO DIFF WBC: CPT | Performed by: COMMUNITY HEALTH WORKER

## 2023-08-07 PROCEDURE — 83735 ASSAY OF MAGNESIUM: CPT | Performed by: INTERNAL MEDICINE

## 2023-08-07 PROCEDURE — 94761 N-INVAS EAR/PLS OXIMETRY MLT: CPT

## 2023-08-07 RX ORDER — ACETAMINOPHEN 325 MG/1
650 TABLET ORAL EVERY 6 HOURS PRN
Status: DISCONTINUED | OUTPATIENT
Start: 2023-08-07 | End: 2023-08-18 | Stop reason: HOSPADM

## 2023-08-07 RX ADMIN — ENOXAPARIN SODIUM 40 MG: 40 INJECTION SUBCUTANEOUS at 04:08

## 2023-08-07 RX ADMIN — PREDNISONE 40 MG: 20 TABLET ORAL at 08:08

## 2023-08-07 RX ADMIN — GABAPENTIN 300 MG: 300 CAPSULE ORAL at 08:08

## 2023-08-07 RX ADMIN — BUPROPION HYDROCHLORIDE 150 MG: 150 TABLET, FILM COATED, EXTENDED RELEASE ORAL at 08:08

## 2023-08-07 RX ADMIN — IPRATROPIUM BROMIDE AND ALBUTEROL SULFATE 3 ML: .5; 3 SOLUTION RESPIRATORY (INHALATION) at 07:08

## 2023-08-07 RX ADMIN — CEFTRIAXONE 1 G: 1 INJECTION, POWDER, FOR SOLUTION INTRAMUSCULAR; INTRAVENOUS at 04:08

## 2023-08-07 RX ADMIN — AMLODIPINE BESYLATE 5 MG: 5 TABLET ORAL at 08:08

## 2023-08-07 RX ADMIN — LOSARTAN POTASSIUM 100 MG: 50 TABLET, FILM COATED ORAL at 08:08

## 2023-08-07 RX ADMIN — AZITHROMYCIN MONOHYDRATE 500 MG: 500 INJECTION, POWDER, LYOPHILIZED, FOR SOLUTION INTRAVENOUS at 05:08

## 2023-08-07 RX ADMIN — IPRATROPIUM BROMIDE AND ALBUTEROL SULFATE 3 ML: .5; 3 SOLUTION RESPIRATORY (INHALATION) at 09:08

## 2023-08-07 RX ADMIN — GABAPENTIN 300 MG: 300 CAPSULE ORAL at 04:08

## 2023-08-07 RX ADMIN — IPRATROPIUM BROMIDE AND ALBUTEROL SULFATE 3 ML: .5; 3 SOLUTION RESPIRATORY (INHALATION) at 03:08

## 2023-08-07 RX ADMIN — FLUTICASONE FUROATE AND VILANTEROL TRIFENATATE 1 PUFF: 200; 25 POWDER RESPIRATORY (INHALATION) at 08:08

## 2023-08-07 RX ADMIN — ATORVASTATIN CALCIUM 10 MG: 10 TABLET, FILM COATED ORAL at 08:08

## 2023-08-07 RX ADMIN — FAMOTIDINE 20 MG: 20 TABLET, FILM COATED ORAL at 08:08

## 2023-08-07 NOTE — PLAN OF CARE
CMICU DAILY GOALS       A: Awake    RASS: Goal -  0  Actual -  0   Restraint necessity:   N/A  B: Breathe   SBT: Not intubated   C: Coordinate A & B, analgesics/sedatives   Pain: managed    SAT: Not intubated  D: Delirium   CAM-ICU: Overall CAM-ICU: Negative  E: Early(intubated/ Progressive (non-intubated) Mobility   MOVE Screen: Pass   Activity: Activity Management: Rolling - L1  FAS: Feeding/Nutrition   Diet order: Diet/Nutrition Received: regular,    T: Thrombus   DVT prophylaxis: VTE Required Core Measure: Pharmacological prophylaxis initiated/maintained  H: HOB Elevation   Head of Bed (HOB) Positioning: HOB elevated  U: Ulcer Prophylaxis   GI: yes  G: Glucose control   managed    S: Skin   Bathing/Skin Care: bath, complete  Device Skin Pressure Protection: absorbent pad utilized/changed, adhesive use limited, positioning supports utilized, pressure points protected  Pressure Reduction Devices: foam padding utilized  Pressure Reduction Techniques: frequent weight shift encouraged, heels elevated off bed  Skin Protection: adhesive use limited, incontinence pads utilized  B: Bowel Function   no issues   I: Indwelling Catheters   Bergman necessity:     CVC necessity: No  D: De-escalation Antibiotics   No    Family/Goals of care/Code Status   Code Status: Full Code    24H Vital Sign Range  Temp:  [97.2 °F (36.2 °C)-98.4 °F (36.9 °C)]   Pulse:  []   Resp:  [18-45]   BP: ()/(57-78)   SpO2:  [90 %-99 %]      Shift Events   No acute events throughout shift. Pt placed on BiPAP 30% FiO2 at 2230 and able to tolerate without issues throughout night.     VS and assessment per flow sheet, patient progressing towards goals as tolerated, plan of care reviewed with family, all concerns addressed, will continue to monitor.    Payton Russo      Problem: Adult Inpatient Plan of Care  Goal: Plan of Care Review  Outcome: Ongoing, Progressing     Problem: Fall Injury Risk  Goal: Absence of Fall and Fall-Related  Injury  Outcome: Ongoing, Progressing     Problem: Functional Ability Impaired COPD (Chronic Obstructive Pulmonary Disease)  Goal: Optimal Level of Functional [place]  Outcome: Ongoing, Progressing     Problem: Infection COPD (Chronic Obstructive Pulmonary Disease)  Goal: Absence of Infection Signs and Symptoms  Outcome: Ongoing, Progressing

## 2023-08-07 NOTE — ASSESSMENT & PLAN NOTE
Patient with Hypercapnic Respiratory failure which is Acute on chronic.  she is on home oxygen at 3 LPM. Supplemental oxygen was provided and noted- Oxygen Concentration (%):  [30] 30  Resp Rate Total:  [22 br/min] 22 br/min  Signs/symptoms of respiratory failure include- tachypnea and increased work of breathing. Contributing diagnoses includes - COPD Labs and images were reviewed. Patient Has not had a recent ABG. Will treat underlying causes and adjust management of respiratory failure. See copd exacerbation

## 2023-08-07 NOTE — RESIDENT HANDOFF
Handoff     Primary Team: Networked reference to record [facility]  Room Number: 75559/77396 A     Patient Name: Rochelle Espinoza MRN: [id number]     Date of Birth: [birth year] Allergies: Doxycycline and Orange juice     Age: 67 y.o. Admit Date: 8/6/2023     Sex: female  BMI: Body mass index is 27.1 kg/m².     Code Status: Full Code        Illness Level (current clinical status): Watcher - No    Reason for Admission: COPD exacerbation    Brief HPI (pertinent PMH and diagnosis or differential diagnosis): Rochelle Espinoza is a 67 y.o. female with history of COPD, chronic hypoxemic respiratory failure on 3 L nasal cannula at baseline, hyperlipidemia, diastolic heart failure, nicotine dependence, hypertension, presenting to emergency department with complaint of shortness of breath.  Patient was found to be hypoxic to 75% in triage on her 3 liters/minute.  She is had shortness of breath for 3 days.  Increased chest pressure and wheezing.  She also has pain in her back.  She denies fevers.  Originally her cough was productive of dark sputum, now productive of clear sputum. Patient also reports needing to use her rescue inhaler and increased frequency and using her nebulized inhaler with last use yesterday around 5 pm. Patient was brought into the ED by daughter. While in the ED patient was given dounebsx1 and solumedrol 125 mg. Initial VBG Ph 7.288 PCO2 of 81. Patient remained  stable on 3L however repeat VBG displayed worsening acidosis and Hypercarbia and placed on BIPAP. ICU consulted due to need for BIPAP and worsening hypercarbia and acidosis.     Procedure Date:   None performed during this visit    Hospital Course (updated, brief assessment by system or problem, significant events):   67F admitted to MICU for acute on chronic hypoxic respirator acidosis w/ Hx of right sided diastolic HF (2020), COPD, HTN, HLD and tobacco dependence.  During admittion, the patient was on sating in the mid 90's on 16 iPAP/ 6 ePAP @ 30% o2. Her initial  ABG was 7.24/87.2/25/40.5.  H/H was normal, BNP <10, Trop normal, UA neg. And lactate of 4.3, and AG of 13. CXR and EKG were normal. After the 3rd round of duonebs and addition of steroids the patient improved weaned off BiPAP to 5L NC w/ stable sats in the mid 90's. Fluticasone furoate-vilanteroL 200-25 mcg/dose diskus, Simethicone, Famotidine were added.  On 8/7, Echo cardiogram was preformed with nominal change from prior in 2020. Patient's oxygen requirements have been titrated down to only requiring 3L NC. Patient is now stable for stepdown.    Contingency Plan (special circumstances anticipated and plan): If patient becomes clinically, hemodynamically or otherwise unstable for hospital level care, please readmit to MICU or to higher level care.     Estimated Discharge Date: 8/6-8    Discharge Disposition: Home or Self Care    Mentored By: Roland Roberts MD

## 2023-08-07 NOTE — PLAN OF CARE
08/07/23 1631   Post-Acute Status   Post-Acute Authorization Home Health   Home Health Status Referrals Sent   Discharge Delays None known at this time   Discharge Plan   Discharge Plan A Home Health   Discharge Plan B Home       HH referrals sent to the following   [facility] Phone: [phone]    [facility] Phone: [phone]  [facility] Phone: [phone]  [facility] Phone: [phone]   [facility] Phone: [phone]  [facility] Phone: [phone]   [facility] Phone: [phone]        Patients choice for HH is   [facility] Phone: [phone]        HME: BiPap    Referrals made : [facility] ready [facility] 8/7/23        Jahaira Lehman RN  Case Management  [facility]  [phone]

## 2023-08-07 NOTE — ASSESSMENT & PLAN NOTE
Patient with increased work of breathing with worsening Hypercarbia   - BIPAP QHS  - CAP coverage Ceftriaxone and Azithromycin; day 2 of 11  - Dounebs q4 hr while awake   - Dounebs PRN   - 40 mg Prednisone daily   -- Fluticasone furoate-vilanteroL 200-25 mcg/dose diskus   For contributing reflux symptoms, as discussed with pulmonary attending:  -- Simethicone  -- Famotidine

## 2023-08-07 NOTE — SUBJECTIVE & OBJECTIVE
Past Medical History:   Diagnosis Date    CHF (congestive heart failure)     COPD (chronic obstructive pulmonary disease)     Herpes zoster without mention of complication 2011    Hypertension     Leg edema     Pulmonary hypertension     Sciatica        Past Surgical History:   Procedure Laterality Date    BREAST BIOPSY Right     core     SECTION      COLONOSCOPY N/A 2019    Procedure: COLONOSCOPY;  Surgeon: Rui Holder MD;  Location: [facility] (2ND FLR);  Service: Endoscopy;  Laterality: N/A;    EPIDURAL STEROID INJECTION N/A 2020    Procedure: CERVICAL BLAKE;  Surgeon: Papito High MD;  [facility] [facility] [place]  Service: Pain Management;  Laterality: N/A;  NEEDS CONSENT    ESOPHAGOGASTRODUODENOSCOPY N/A 2019    Procedure: EGD (ESOPHAGOGASTRODUODENOSCOPY);  Surgeon: Rui Holder MD;  [facility]  Service: Endoscopy;  Laterality: N/A;  2L continuous O2 via NC, COPD, pulm HTN    TRANSFORAMINAL EPIDURAL INJECTION OF STEROID Right 6/3/2021    Procedure: INJECTION, STEROID, EPIDURAL, TRANSFORAMINAL APPROACH, L4-L5;  Surgeon: Anibal Robles MD;  [place] [facility] [place]  Service: Pain Management;  Laterality: Right;       Review of patient's allergies indicates:   Allergen Reactions    Doxycycline Other (See Comments)     Acid reflux    Orange juice      Swelling in pt tongue         Family History       Problem Relation (Age of Onset)    Heart disease Mother, Paternal Uncle          Tobacco Use    Smoking status: Light Smoker     Current packs/day: 0.25     Average packs/day: 0.3 packs/day for 40.0 years (10.0 ttl pk-yrs)     Types: Cigarettes    Smokeless tobacco: Never   Substance and Sexual Activity    Alcohol use: Not Currently     Comment: beer daily 2-3 daily, none today    Drug use: No    Sexual activity: Not Currently     Birth control/protection: None      Review of Systems   HENT:  Positive for congestion.    Respiratory:  Positive for cough, shortness of  breath and wheezing.         Pleuritic pain   BIPAP    Cardiovascular:  Negative for chest pain.   Gastrointestinal:  Negative for abdominal distention, abdominal pain, nausea and vomiting.   Genitourinary:  Negative for difficulty urinating and urgency.   Neurological:  Positive for headaches.     Objective:     Vital Signs (Most Recent):  Temp: 98 °F (36.7 °C) (08/07/23 1200)  Pulse: 92 (08/07/23 1200)  Resp: 20 (08/07/23 1200)  BP: (!) 115/59 (08/07/23 1200)  SpO2: (!) 93 % (08/07/23 1200) Vital Signs (24h Range):  Temp:  [97.2 °F (36.2 °C)-98.1 °F (36.7 °C)] 98 °F (36.7 °C)  Pulse:  [] 92  Resp:  [19-44] 20  SpO2:  [84 %-99 %] 93 %  BP: (100-138)/(57-92) 115/59   Weight: 69.4 kg (153 lb)  Body mass index is 27.1 kg/m².      Intake/Output Summary (Last 24 hours) at 8/7/2023 1409  Last data filed at 8/7/2023 1100  Gross per 24 hour   Intake 141.4 ml   Output 1100 ml   Net -958.6 ml          Physical Exam  Vitals and nursing note reviewed.   HENT:      Head: Normocephalic and atraumatic.      Mouth/Throat:      Mouth: Mucous membranes are moist.   Eyes:      Extraocular Movements: Extraocular movements intact.      Conjunctiva/sclera: Conjunctivae normal.      Pupils: Pupils are equal, round, and reactive to light.   Cardiovascular:      Rate and Rhythm: Normal rate and regular rhythm.   Pulmonary:      Effort: Tachypnea present.      Breath sounds: Wheezing present.      Comments: Poor bilateral breaths sounds with inspiratory and expiratory wheezing noted   Abdominal:      General: There is no distension.      Tenderness: There is no abdominal tenderness.   Musculoskeletal:         General: Normal range of motion.      Cervical back: Normal range of motion and neck supple.   Skin:     General: Skin is warm.   Neurological:      General: No focal deficit present.      Mental Status: She is alert and oriented to person, place, and time.   Psychiatric:         Mood and Affect: Mood normal.         Behavior:  Behavior normal.         Thought Content: Thought content normal.         Judgment: Judgment normal.            Vents:  Oxygen Concentration (%): 30 (08/07/23 0715)  Lines/Drains/Airways       Peripheral Intravenous Line  Duration                  Peripheral IV - Single Lumen 08/06/23 0318 20 G Anterior;Left;Proximal Forearm 1 day         Peripheral IV - Single Lumen 08/06/23 0318 20 G Right Antecubital 1 day                  Significant Labs:    CBC/Anemia Profile:  Recent Labs   Lab 08/06/23  0209 08/07/23  0349   WBC 7.05 17.27*   HGB 13.0 11.4*   HCT 42.3 36.6*    241   MCV 91 89   RDW 15.7* 15.9*          Chemistries:  Recent Labs   Lab 08/06/23  0209 08/07/23  0450    141   K 4.0 4.2    101   CO2 30* 33*   BUN 4* 9   CREATININE 0.7 0.6   CALCIUM 9.3 8.9   ALBUMIN 3.9 3.3*   PROT 7.2 6.2   BILITOT 0.4 0.3   ALKPHOS 51* 41*   ALT 14 14   AST 19 16   MG  --  1.9         All pertinent labs within the past 24 hours have been reviewed.    Significant Imaging: I have reviewed all pertinent imaging results/findings within the past 24 hours.

## 2023-08-07 NOTE — NURSING TRANSFER
86  Nursing Transfer Note      8/7/2023   11:49 AM    Nurse giving handoff:Nancy Alcala/ALEM  Nurse receiving handoff:Gadiel WILSON    Reason patient is being transferred: Stepdown    Transfer To: 57330    Transfer via wheelchair    Transfer with 4LNC to O2, cardiac monitoring    Transported by Sawyer/RN    Transfer Vital Signs:  Blood Pressure:111/70  Heart Rate:86  O2:94% 4 L NC  Temperature:98.0  Respirations:26    Telemetry: Box Number [id number]  Order for Tele Monitor? Yes    Additional Lines: Oxygen    4eyes on Skin: yes    Medicines sent: Inhaler    Any special needs or follow-up needed: BIPAP QHS    Patient belongings transferred with patient: Yes    Chart send with patient: Yes    Notified: pt will notify daughter    Patient reassessed at: 08/07/23@11:35am  1  Upon arrival to floor: cardiac monitor applied, patient oriented to room, call bell in reach, and bed in lowest position

## 2023-08-07 NOTE — PROGRESS NOTES
[facility] - Int[facility] ([place] [address])  Critical Care Medicine  Progress Note    Patient Name: Rochelle Espinoza  MRN: [id number]  Admission Date: 8/6/2023  Hospital Length of Stay: 1 days  Code Status: Full Code  Attending Provider: Khanh Corona MD  Primary Care Provider: Yosi Samuels Jr., MD   Principal Problem: COPD exacerbation    Subjective:     HPI:  Rochelle Espinoza is a 67 y.o. female with history of COPD, chronic hypoxemic respiratory failure on 3 L nasal cannula at baseline, hyperlipidemia, diastolic heart failure, nicotine dependence, hypertension, presenting to emergency department with complaint of shortness of breath.  Patient was found to be hypoxic to 75% in triage on her 3 liters/minute.  She is had shortness of breath for 3 days.  Increased chest pressure and wheezing.  She also has pain in her back.  She denies fevers.  Originally her cough was productive of dark sputum, now productive of clear sputum. Patient also reports needing to use her rescue inhaler and increased frequency and using her nebulized inhaler with last use yesterday around 5 pm. Patient was brought into the ED by daughter. While in the ED patient was given dounebsx1 and solumedrol 125 mg. Initial VBG Ph 7.288 PCO2 of 81. Patient remained  stable on 3L however repeat VBG displayed worsening acidosis and Hypercarbia and placed on BIPAP. ICU consulted due to need for BIPAP and worsening hypercarbia and acidosis.     2020:   Normal left ventricular systolic function. The estimated ejection fraction is 60%.  Septal wall has abnormal motion.  Local segmental wall motion abnormalities.  Normal LV diastolic function.  Normal right ventricular systolic function.  Normal central venous pressure (3 mmHg).  The estimated PA systolic pressure is 27 mmHg.      Hospital/ICU Course:  67F admitted to MICU for acute on chronic hypoxic respirator acidosis w/ Hx of right sided diastolic HF (2020), COPD, HTN, HLD and tobacco dependence.  During  admittion, the patient was on sating in the mid 90's on 16 iPAP/ 6 ePAP @ 30% o2. Her initial ABG was 7.24/87.2/25/40.5.  H/H was normal, BNP <10, Trop normal, UA neg. And lactate of 4.3, and AG of 13. CXR and EKG were normal. After the 3rd round of duonebs and addition of steroids the patient improved weaned off BiPAP to 5L NC w/ stable sats in the mid s. Fluticasone furoate-vilanteroL 200-25 mcg/dose diskus, Simethicone, Famotidine were added.  On , Echo cardiogram was preformed with nominal change from prior in . Patient's oxygen requirements have been titrated down to only requiring 3L NC. Patient is now stable for stepdown.        Past Medical History:   Diagnosis Date    CHF (congestive heart failure)     COPD (chronic obstructive pulmonary disease)     Herpes zoster without mention of complication 2011    Hypertension     Leg edema     Pulmonary hypertension     Sciatica        Past Surgical History:   Procedure Laterality Date    BREAST BIOPSY Right     core     SECTION      COLONOSCOPY N/A 2019    Procedure: COLONOSCOPY;  Surgeon: Rui Holder MD;  Location: [address]);  Service: Endoscopy;  Laterality: N/A;    EPIDURAL STEROID INJECTION N/A 2020    Procedure: CERVICAL BLAKE;  Surgeon: Papito High MD;  Location: [facility];  Service: Pain Management;  Laterality: N/A;  NEEDS CONSENT    ESOPHAGOGASTRODUODENOSCOPY N/A 2019    Procedure: EGD (ESOPHAGOGASTRODUODENOSCOPY);  Surgeon: Rui Holder MD;  Location: [address]);  Service: Endoscopy;  Laterality: N/A;  2L continuous O2 via NC, COPD, pulm HTN    TRANSFORAMINAL EPIDURAL INJECTION OF STEROID Right 6/3/2021    Procedure: INJECTION, STEROID, EPIDURAL, TRANSFORAMINAL APPROACH, L4-L5;  Surgeon: Anibal Robles MD;  Location: [facility];  Service: Pain Management;  Laterality: Right;       Review of patient's allergies indicates:   Allergen Reactions    Doxycycline Other (See Comments)      Acid reflux    Orange juice      Swelling in pt tongue         Family History       Problem Relation (Age of Onset)    Heart disease Mother, Paternal Uncle          Tobacco Use    Smoking status: Light Smoker     Current packs/day: 0.25     Average packs/day: 0.3 packs/day for 40.0 years (10.0 ttl pk-yrs)     Types: Cigarettes    Smokeless tobacco: Never   Substance and Sexual Activity    Alcohol use: Not Currently     Comment: beer daily 2-3 daily, none today    Drug use: No    Sexual activity: Not Currently     Birth control/protection: None      Review of Systems   HENT:  Positive for congestion.    Respiratory:  Positive for cough, shortness of breath and wheezing.         Pleuritic pain   BIPAP    Cardiovascular:  Negative for chest pain.   Gastrointestinal:  Negative for abdominal distention, abdominal pain, nausea and vomiting.   Genitourinary:  Negative for difficulty urinating and urgency.   Neurological:  Positive for headaches.     Objective:     Vital Signs (Most Recent):  Temp: 98 °F (36.7 °C) (08/07/23 1200)  Pulse: 92 (08/07/23 1200)  Resp: 20 (08/07/23 1200)  BP: (!) 115/59 (08/07/23 1200)  SpO2: (!) 93 % (08/07/23 1200) Vital Signs (24h Range):  Temp:  [97.2 °F (36.2 °C)-98.1 °F (36.7 °C)] 98 °F (36.7 °C)  Pulse:  [] 92  Resp:  [19-44] 20  SpO2:  [84 %-99 %] 93 %  BP: (100-138)/(57-92) 115/59   Weight: 69.4 kg (153 lb)  Body mass index is 27.1 kg/m².      Intake/Output Summary (Last 24 hours) at 8/7/2023 1409  Last data filed at 8/7/2023 1100  Gross per 24 hour   Intake 141.4 ml   Output 1100 ml   Net -958.6 ml          Physical Exam  Vitals and nursing note reviewed.   HENT:      Head: Normocephalic and atraumatic.      Mouth/Throat:      Mouth: Mucous membranes are moist.   Eyes:      Extraocular Movements: Extraocular movements intact.      Conjunctiva/sclera: Conjunctivae normal.      Pupils: Pupils are equal, round, and reactive to light.   Cardiovascular:      Rate and Rhythm: Normal  rate and regular rhythm.   Pulmonary:      Effort: Tachypnea present.      Breath sounds: Wheezing present.      Comments: Poor bilateral breaths sounds with inspiratory and expiratory wheezing noted   Abdominal:      General: There is no distension.      Tenderness: There is no abdominal tenderness.   Musculoskeletal:         General: Normal range of motion.      Cervical back: Normal range of motion and neck supple.   Skin:     General: Skin is warm.   Neurological:      General: No focal deficit present.      Mental Status: She is alert and oriented to person, place, and time.   Psychiatric:         Mood and Affect: Mood normal.         Behavior: Behavior normal.         Thought Content: Thought content normal.         Judgment: Judgment normal.            Vents:  Oxygen Concentration (%): 30 (08/07/23 0715)  Lines/Drains/Airways       Peripheral Intravenous Line  Duration                  Peripheral IV - Single Lumen 08/06/23 0318 20 G Anterior;Left;Proximal Forearm 1 day         Peripheral IV - Single Lumen 08/06/23 0318 20 G Right Antecubital 1 day                  Significant Labs:    CBC/Anemia Profile:  Recent Labs   Lab 08/06/23  0209 08/07/23  0349   WBC 7.05 17.27*   HGB 13.0 11.4*   HCT 42.3 36.6*    241   MCV 91 89   RDW 15.7* 15.9*          Chemistries:  Recent Labs   Lab 08/06/23  0209 08/07/23  0450    141   K 4.0 4.2    101   CO2 30* 33*   BUN 4* 9   CREATININE 0.7 0.6   CALCIUM 9.3 8.9   ALBUMIN 3.9 3.3*   PROT 7.2 6.2   BILITOT 0.4 0.3   ALKPHOS 51* 41*   ALT 14 14   AST 19 16   MG  --  1.9         All pertinent labs within the past 24 hours have been reviewed.    Significant Imaging: I have reviewed all pertinent imaging results/findings within the past 24 hours.      ABG  Recent Labs   Lab 08/06/23  0901   PH 7.350   PO2 71*   PCO2 69.9*   HCO3 38.6*   BE 13     Assessment/Plan:     Neuro  Peripheral polyneuropathy  Continue Home Medications   - Gabapentin 300 mg BID      Psychiatric  Major depressive disorder  Continue Home medication   - Bupropion 125 mg daily     Pulmonary  * COPD exacerbation  Patient with increased work of breathing with worsening Hypercarbia   - BIPAP QHS  - CAP coverage Ceftriaxone and Azithromycin; day 2 of 11  - Dounebs q4 hr while awake   - Dounebs PRN   - 40 mg Prednisone daily   -- Fluticasone furoate-vilanteroL 200-25 mcg/dose diskus   For contributing reflux symptoms, as discussed with pulmonary attending:  -- Simethicone  -- Famotidine      Chronic hypoxemic respiratory failure  Patient with Hypercapnic Respiratory failure which is Acute on chronic.  she is on home oxygen at 3 LPM. Supplemental oxygen was provided and noted- Oxygen Concentration (%):  [30] 30  Resp Rate Total:  [22 br/min] 22 br/min  Signs/symptoms of respiratory failure include- tachypnea and increased work of breathing. Contributing diagnoses includes - COPD Labs and images were reviewed. Patient Has not had a recent ABG. Will treat underlying causes and adjust management of respiratory failure. See copd exacerbation.  - Ordering statement. Will order trilogy ventilator for nocturnal and daytime use as needed to decrease risk of exacerbation; home BiPAP insufficient due to severity of condition.         Cardiac/Vascular  Chronic diastolic heart failure  Right sided HF secondary to Pulmonary Hypertension. Last echo was in 2020 with findings of moderate hypokinesis and right HF.   - Trop and BNP 8/6 within normal limits  - Chest x ray with no signs of pulmonary edema/ pleural effusions  - Echo performed 8/7, pending read    Mixed hyperlipidemia  Continue Home medications  -Atorvastatin 10 mg daily    Essential hypertension  Continue home meds   - Amlodipine 5 mg daily   - Losartan 100 mg daily     Other  Nicotine dependence, cigarettes, uncomplicated   PRN nicotine Patch         Critical Care Daily Checklist:    A: Awake: RASS Goal/Actual Goal:    Actual:     B: Spontaneous  Breathing Trial Performed?     C: SAT & SBT Coordinated?  Patient only requiring 3L nc                      D: Delirium: CAM-ICU Overall CAM-ICU: Negative   E: Early Mobility Performed? Yes   F: Feeding Goal:    Status:     Current Diet Order   Procedures    Diet Adult Regular (IDDSI Level 7)      AS: Analgesia/Sedation n/a   T: Thromboembolic Prophylaxis Enoxaparin    H: HOB > 300 Yes   U: Stress Ulcer Prophylaxis (if needed) famotidine    G: Glucose Control At goal   B: Bowel Function Stool Occurrence: 0   I: Indwelling Catheter (Lines & Bergman) Necessity PIV   D: De-escalation of Antimicrobials/Pharmacotherapies none    Plan for the day/ETD Step down to hospital medicine    Code Status:  Family/Goals of Care: Full Code         Critical secondary to Patient has a condition that poses threat to life and bodily function: Severe Respiratory Distress      Critical care was time spent personally by me on the following activities: development of treatment plan with patient or surrogate and bedside caregivers, discussions with consultants, evaluation of patient's response to treatment, examination of patient, ordering and performing treatments and interventions, ordering and review of laboratory studies, ordering and review of radiographic studies, pulse oximetry, re-evaluation of patient's condition. This critical care time did not overlap with that of any other provider or involve time for any procedures.     Elias Avery MD PGY1  Critical Care Medicine  [facility] - Intensive Care ([place]

## 2023-08-07 NOTE — PLAN OF CARE
[facility] - Intensive Care ([place] [address])  Initial Discharge Assessment       Primary Care Provider: Yosi Samuels Jr., MD    Admission Diagnosis: Shortness of breath [R06.02]  Hypercapnia [R06.89]  COPD exacerbation [J44.1]  Chest pain [R07.9]  Acute hypoxemic respiratory failure [J96.01]    Admission Date: 8/6/2023  Expected Discharge Date:     Transition of Care Barriers: None    Payor: HUMANA MANAGED MEDICARE / Plan: HUMANA MEDICARE HMO / Product Type: Capitation /     Extended Emergency Contact Information  Primary Emergency Contact: Hipolito Espinoza  Address: [address] [place] [address] [place] [facility]  Mobile Phone: [phone]  Relation: Daughter    Discharge Plan A: Home Health  Discharge Plan B: Home      [id number] [place] [address] BLAYNE SAUCEDO AT [facility] BLAYNE [address] [address]  Phone: [phone] Fax: [phone]    [facility] - [facility] [address] [facility]  [address] [facility] [address]  Phone: [phone] Fax: [phone]      Initial Assessment (most recent)       Adult Discharge Assessment - 08/07/23 1530          Discharge Assessment    Assessment Type Discharge Planning Assessment     Confirmed/corrected address, phone number and insurance Yes     Confirmed Demographics Correct on Facesheet     Source of Information patient     When was your last doctors appointment? 08/12/23   Dr Yosi Samuels    Communicated ELIZABETH with patient/caregiver Yes     Reason For Admission COPD exacerbation     People in Home alone     Do you expect to return to your current living situation? Yes     Do you have help at home or someone to help you manage your care at home? No   will refer to Care at Home    Prior to hospitilization cognitive status: Alert/Oriented     Current cognitive status: Alert/Oriented     Home Accessibility stairs to enter home     Number of  Stairs, Main Entrance one     Equipment Currently Used at Home oxygen;nebulizer   [facility] [phone]    Readmission within 30 days? No     Patient currently being followed by outpatient case management? No     Do you currently have service(s) that help you manage your care at home? No     Do you take prescription medications? Yes     Do you have prescription coverage? Yes     Who is going to help you get home at discharge? HUMANA MANAGED MEDICARE - HUMANA MEDICARE HMO     How do you get to doctors appointments? health plan transportation   [employer] transportation    Are you on dialysis? No     Do you take coumadin? No     Discharge Plan A Home Health     Discharge Plan B Home     DME Needed Upon Discharge  BIPAP     Discharge Plan discussed with: Patient     Transition of Care Barriers None        Physical Activity    On average, how many days per week do you engage in moderate to strenuous exercise (like a brisk walk)? 4 days     On average, how many minutes do you engage in exercise at this level? 20 min        Financial Resource Strain    How hard is it for you to pay for the very basics like food, housing, medical care, and heating? Not hard at all        Housing Stability    In the last 12 months, was there a time when you were not able to pay the mortgage or rent on time? No     In the last 12 months, was there a time when you did not have a steady place to sleep or slept in a shelter (including now)? No        Transportation Needs    In the past 12 months, has lack of transportation kept you from medical appointments or from getting medications? No     In the past 12 months, has lack of transportation kept you from meetings, work, or from getting things needed for daily living? No        Food Insecurity    Within the past 12 months, you worried that your food would run out before you got the money to buy more. Never true     Within the past 12 months, the food you bought just didn't last and you didn't  "have money to get more. Never true        Stress    Do you feel stress - tense, restless, nervous, or anxious, or unable to sleep at night because your mind is troubled all the time - these days? To some extent        Social Connections    In a typical week, how many times do you talk on the phone with family, friends, or neighbors? Once a week     How often do you get together with friends or relatives? Once a week     How often do you attend Druze or Evangelical services? 1 to 4 times per year     Do you belong to any clubs or organizations such as Druze groups, unions, fraternal or athletic groups, or school groups? No     How often do you attend meetings of the clubs or organizations you belong to? Never     Are you , , , , never , or living with a partner?         Alcohol Use    Q1: How often do you have a drink containing alcohol? Never     Q2: How many drinks containing alcohol do you have on a typical day when you are drinking? Patient does not drink     Q3: How often do you have six or more drinks on one occasion? Never                          CM met with patient at bedside to complete discharge planning assessment.  Patient alert and oriented xs 4.  Patient verified all demographic information on facesheet is correct.  Patient verified PCP is Dr Yosi Samuels  Patient verified primary health insurance is [employer].   Patient  is not active  home health and has listed DME.   Patient is on home oxygen , 3 L at home.  Home oxygen is provided through [facility]  Patient reported, " Her physician is going to order a BIPAP.  Patient with NO POA or Living Will.  Patient not on dialysis or medication coumadin.  Patient with no 30 day admission.  Patient with no financial issues at this time.  Patient  does not drive and has not driven since 2019. Patient will need transportation upon discharge from facility.  Patient independent with ADLs, lives alone. Patients " daughter lives of out  of town.     Jahaira Lehman RN  Case Management  [facility]  [phone]

## 2023-08-07 NOTE — NURSING
Patient arrived to room in stable condition. Patient on 4L NC. Patient requesting to shower. Items provided and patient showering independently. Will connect to tele box once out of shower.

## 2023-08-07 NOTE — HOSPITAL COURSE
67F admitted to MICU for acute on chronic hypoxic respirator acidosis w/ Hx of right sided diastolic HF (2020), COPD, HTN, HLD and tobacco dependence.  During admittion, the patient was on sating in the mid 90's on 16 iPAP/ 6 ePAP @ 30% o2. Her initial ABG was 7.24/87.2/25/40.5.  H/H was normal, BNP <10, Trop normal, UA neg. And lactate of 4.3, and AG of 13. CXR and EKG were normal. After the 3rd round of duonebs and addition of steroids the patient improved weaned off BiPAP to 5L NC w/ stable sats in the mid 90's. Fluticasone furoate-vilanteroL 200-25 mcg/dose diskus, Simethicone, Famotidine were added.  On 8/7, Echo cardiogram was preformed with nominal change from prior in 2020. Patient's oxygen requirements have been titrated down to only requiring 3L NC. Patient is now stable for stepdown.

## 2023-08-07 NOTE — ASSESSMENT & PLAN NOTE
Right sided HF secondary to Pulmonary Hypertension. Last echo was in 2020 with findings of moderate hypokinesis and right HF.   - Trop and BNP 8/6 within normal limits  - Chest x ray with no signs of pulmonary edema/ pleural effusions  - Echo performed 8/7, pending read

## 2023-08-07 NOTE — PLAN OF CARE
Patient transferred from ICU to 14W today in stable condition. Patient A/O x4 on home O2 of 3-4L. Patient showered independently. Patient denies needs at this time. Call light and personal belongings within reach.           Problem: Adult Inpatient Plan of Care  Goal: Plan of Care Review  Outcome: Ongoing, Progressing  Goal: Optimal Comfort and Wellbeing  Outcome: Ongoing, Progressing     Problem: Fall Injury Risk  Goal: Absence of Fall and Fall-Related Injury  Outcome: Ongoing, Progressing     Problem: Functional Ability Impaired COPD (Chronic Obstructive Pulmonary Disease)  Goal: Optimal Level of Functional [place]  Outcome: Ongoing, Progressing

## 2023-08-08 LAB
ALBUMIN SERPL BCP-MCNC: 3.3 G/DL (ref 3.5–5.2)
ALP SERPL-CCNC: 48 U/L (ref 55–135)
ALT SERPL W/O P-5'-P-CCNC: 13 U/L (ref 10–44)
ANION GAP SERPL CALC-SCNC: 12 MMOL/L (ref 8–16)
AST SERPL-CCNC: 16 U/L (ref 10–40)
BASOPHILS # BLD AUTO: 0.02 K/UL (ref 0–0.2)
BASOPHILS NFR BLD: 0.2 % (ref 0–1.9)
BILIRUB SERPL-MCNC: 0.2 MG/DL (ref 0.1–1)
BUN SERPL-MCNC: 12 MG/DL (ref 8–23)
CALCIUM SERPL-MCNC: 8.9 MG/DL (ref 8.7–10.5)
CHLORIDE SERPL-SCNC: 100 MMOL/L (ref 95–110)
CO2 SERPL-SCNC: 28 MMOL/L (ref 23–29)
CREAT SERPL-MCNC: 0.7 MG/DL (ref 0.5–1.4)
DIFFERENTIAL METHOD: ABNORMAL
EOSINOPHIL # BLD AUTO: 0.1 K/UL (ref 0–0.5)
EOSINOPHIL NFR BLD: 0.6 % (ref 0–8)
ERYTHROCYTE [DISTWIDTH] IN BLOOD BY AUTOMATED COUNT: 15.6 % (ref 11.5–14.5)
EST. GFR  (NO RACE VARIABLE): >60 ML/MIN/1.73 M^2
GLUCOSE SERPL-MCNC: 111 MG/DL (ref 70–110)
HCT VFR BLD AUTO: 37 % (ref 37–48.5)
HGB BLD-MCNC: 11.5 G/DL (ref 12–16)
IMM GRANULOCYTES # BLD AUTO: 0.03 K/UL (ref 0–0.04)
IMM GRANULOCYTES NFR BLD AUTO: 0.2 % (ref 0–0.5)
LYMPHOCYTES # BLD AUTO: 4 K/UL (ref 1–4.8)
LYMPHOCYTES NFR BLD: 31.7 % (ref 18–48)
MCH RBC QN AUTO: 28.1 PG (ref 27–31)
MCHC RBC AUTO-ENTMCNC: 31.1 G/DL (ref 32–36)
MCV RBC AUTO: 91 FL (ref 82–98)
MONOCYTES # BLD AUTO: 0.9 K/UL (ref 0.3–1)
MONOCYTES NFR BLD: 7.1 % (ref 4–15)
NEUTROPHILS # BLD AUTO: 7.5 K/UL (ref 1.8–7.7)
NEUTROPHILS NFR BLD: 60.2 % (ref 38–73)
NRBC BLD-RTO: 0 /100 WBC
PLATELET # BLD AUTO: 259 K/UL (ref 150–450)
PMV BLD AUTO: 12.5 FL (ref 9.2–12.9)
POTASSIUM SERPL-SCNC: 3.7 MMOL/L (ref 3.5–5.1)
PROT SERPL-MCNC: 6.1 G/DL (ref 6–8.4)
RBC # BLD AUTO: 4.09 M/UL (ref 4–5.4)
SODIUM SERPL-SCNC: 140 MMOL/L (ref 136–145)
WBC # BLD AUTO: 12.52 K/UL (ref 3.9–12.7)

## 2023-08-08 PROCEDURE — 63600175 PHARM REV CODE 636 W HCPCS: Performed by: COMMUNITY HEALTH WORKER

## 2023-08-08 PROCEDURE — 36415 COLL VENOUS BLD VENIPUNCTURE: CPT | Performed by: COMMUNITY HEALTH WORKER

## 2023-08-08 PROCEDURE — 94640 AIRWAY INHALATION TREATMENT: CPT

## 2023-08-08 PROCEDURE — 99232 PR SUBSEQUENT HOSPITAL CARE,LEVL II: ICD-10-PCS | Mod: ,,, | Performed by: INTERNAL MEDICINE

## 2023-08-08 PROCEDURE — 97161 PT EVAL LOW COMPLEX 20 MIN: CPT

## 2023-08-08 PROCEDURE — 85025 COMPLETE CBC W/AUTO DIFF WBC: CPT | Performed by: COMMUNITY HEALTH WORKER

## 2023-08-08 PROCEDURE — 20600001 HC STEP DOWN PRIVATE ROOM

## 2023-08-08 PROCEDURE — 25000003 PHARM REV CODE 250: Performed by: COMMUNITY HEALTH WORKER

## 2023-08-08 PROCEDURE — 25000242 PHARM REV CODE 250 ALT 637 W/ HCPCS: Performed by: COMMUNITY HEALTH WORKER

## 2023-08-08 PROCEDURE — 99900035 HC TECH TIME PER 15 MIN (STAT)

## 2023-08-08 PROCEDURE — 80053 COMPREHEN METABOLIC PANEL: CPT | Performed by: COMMUNITY HEALTH WORKER

## 2023-08-08 PROCEDURE — 97110 THERAPEUTIC EXERCISES: CPT

## 2023-08-08 PROCEDURE — 25000003 PHARM REV CODE 250

## 2023-08-08 PROCEDURE — 27100171 HC OXYGEN HIGH FLOW UP TO 24 HOURS

## 2023-08-08 PROCEDURE — 94660 CPAP INITIATION&MGMT: CPT

## 2023-08-08 PROCEDURE — 97165 OT EVAL LOW COMPLEX 30 MIN: CPT

## 2023-08-08 PROCEDURE — 99232 SBSQ HOSP IP/OBS MODERATE 35: CPT | Mod: ,,, | Performed by: INTERNAL MEDICINE

## 2023-08-08 PROCEDURE — 94761 N-INVAS EAR/PLS OXIMETRY MLT: CPT

## 2023-08-08 RX ORDER — FUROSEMIDE 20 MG/1
20 TABLET ORAL DAILY
Status: DISCONTINUED | OUTPATIENT
Start: 2023-08-09 | End: 2023-08-18 | Stop reason: HOSPADM

## 2023-08-08 RX ORDER — FUROSEMIDE 20 MG/1
20 TABLET ORAL DAILY
Status: DISCONTINUED | OUTPATIENT
Start: 2023-08-09 | End: 2023-08-08

## 2023-08-08 RX ADMIN — ATORVASTATIN CALCIUM 10 MG: 10 TABLET, FILM COATED ORAL at 08:08

## 2023-08-08 RX ADMIN — IPRATROPIUM BROMIDE AND ALBUTEROL SULFATE 3 ML: .5; 3 SOLUTION RESPIRATORY (INHALATION) at 07:08

## 2023-08-08 RX ADMIN — IPRATROPIUM BROMIDE AND ALBUTEROL SULFATE 3 ML: .5; 3 SOLUTION RESPIRATORY (INHALATION) at 08:08

## 2023-08-08 RX ADMIN — PREDNISONE 40 MG: 20 TABLET ORAL at 08:08

## 2023-08-08 RX ADMIN — GABAPENTIN 300 MG: 300 CAPSULE ORAL at 08:08

## 2023-08-08 RX ADMIN — IPRATROPIUM BROMIDE AND ALBUTEROL SULFATE 3 ML: .5; 3 SOLUTION RESPIRATORY (INHALATION) at 04:08

## 2023-08-08 RX ADMIN — FLUTICASONE FUROATE AND VILANTEROL TRIFENATATE 1 PUFF: 200; 25 POWDER RESPIRATORY (INHALATION) at 07:08

## 2023-08-08 RX ADMIN — IPRATROPIUM BROMIDE AND ALBUTEROL SULFATE 3 ML: .5; 3 SOLUTION RESPIRATORY (INHALATION) at 11:08

## 2023-08-08 RX ADMIN — AMLODIPINE BESYLATE 5 MG: 5 TABLET ORAL at 08:08

## 2023-08-08 RX ADMIN — CEFTRIAXONE 1 G: 1 INJECTION, POWDER, FOR SOLUTION INTRAMUSCULAR; INTRAVENOUS at 04:08

## 2023-08-08 RX ADMIN — ENOXAPARIN SODIUM 40 MG: 40 INJECTION SUBCUTANEOUS at 04:08

## 2023-08-08 RX ADMIN — AZITHROMYCIN MONOHYDRATE 500 MG: 500 INJECTION, POWDER, LYOPHILIZED, FOR SOLUTION INTRAVENOUS at 05:08

## 2023-08-08 RX ADMIN — GABAPENTIN 300 MG: 300 CAPSULE ORAL at 04:08

## 2023-08-08 NOTE — PROGRESS NOTES
[facility] - Intensive Care ([address] Medicine  Progress Note    Patient Name: Rochelle Espinoza  MRN: [id number]  Patient Class: IP- Inpatient   Admission Date: 8/6/2023  Length of Stay: 2 days  Attending Physician: Khanh Corona MD  Primary Care Provider: Yosi Samuels Jr., MD        Subjective:     Principal Problem:COPD exacerbation        HPI:  No notes on file    Overview/Hospital Course:  No notes on file    Interval History:  Patient's shortness of breath has improved however not at baseline.  She was approved for trilogy.    Review of Systems   Respiratory:  Positive for shortness of breath.    Gastrointestinal:  Negative for abdominal pain and vomiting.     Objective:     Vital Signs (Most Recent):  Temp: 98.1 °F (36.7 °C) (08/08/23 1125)  Pulse: 85 (08/08/23 1138)  Resp: 16 (08/08/23 1138)  BP: (!) 107/57 (08/08/23 1125)  SpO2: 95 % (08/08/23 1138) Vital Signs (24h Range):  Temp:  [97.9 °F (36.6 °C)-98.5 °F (36.9 °C)] 98.1 °F (36.7 °C)  Pulse:  [] 85  Resp:  [16-24] 16  SpO2:  [92 %-97 %] 95 %  BP: (106-108)/(55-68) 107/57     Weight: 69.4 kg (153 lb)  Body mass index is 27.1 kg/m².  No intake or output data in the 24 hours ending 08/08/23 1309   Physical Exam  Constitutional:       General: She is not in acute distress.     Appearance: She is obese.   HENT:      Head: Normocephalic.      Right Ear: External ear normal.      Left Ear: External ear normal.      Nose: Nose normal.   Cardiovascular:      Rate and Rhythm: Normal rate.   Pulmonary:      Comments: No significant rales or rhonchi  Abdominal:      Palpations: Abdomen is soft.      Tenderness: There is no abdominal tenderness.   Skin:     General: Skin is warm.   Neurological:      General: No focal deficit present.      Mental Status: She is alert and oriented to person, place, and time.   Psychiatric:         Mood and Affect: Mood normal.         Computed MELD 3.0 unavailable. Necessary lab results were not found in the last  "year.  Computed MELD-Na unavailable. Necessary lab results were not found in the last year.      Significant Labs:  CBC:  Recent Labs   Lab 08/07/23  0349 08/08/23  0541   WBC 17.27* 12.52   HGB 11.4* 11.5*   HCT 36.6* 37.0    259     CMP:  Recent Labs   Lab 08/07/23  0450 08/08/23  0541    140   K 4.2 3.7    100   CO2 33* 28   GLU 94 111*   BUN 9 12   CREATININE 0.6 0.7   CALCIUM 8.9 8.9   PROT 6.2 6.1   ALBUMIN 3.3* 3.3*   BILITOT 0.3 0.2   ALKPHOS 41* 48*   AST 16 16   ALT 14 13   ANIONGAP 7* 12     PTINR:  No results for input(s): "INR" in the last 48 hours.    Significant Procedures:   Dobutamine Stress Test with Color Flow: No results found for this or any previous visit.          Assessment/Plan:      * COPD exacerbation  Improving but not yet at baseline.  Doing well on 3 L of home oxygen.  Approved for trilogy at night.  We will continue with nebs around the clock and oral prednisone.  We will discontinue antibiotics at this time      Chronic diastolic heart failure  No evidence of decompensation at this time.  We will discontinue amlodipine and start Lasix 20 mg daily tomorrow.      Essential hypertension  Continue home meds        VTE Risk Mitigation (From admission, onward)         Ordered     enoxaparin injection 40 mg  Every 24 hours         08/06/23 0502                Discharge Planning   ELIZABETH: 8/9/2023     Code Status: Full Code   Is the patient medically ready for discharge?: No    Reason for patient still in hospital (select all that apply): Patient trending condition  Discharge Plan A: Home Health ([facility] Phone: [phone])   Discharge Delays: None known at this time              Khanh Corona MD  Department of Hospital Medicine   [facility] - Intensive Care ([place] [address])    "

## 2023-08-08 NOTE — PLAN OF CARE
PT Eval complete and POC established.    2023    Problem: Physical Therapy  Goal: Physical Therapy Goal  Description: Goals to be met by: 2023     Patient will increase functional independence with mobility by performin. Sit to stand transfer with [place]  2. Bed to chair transfer with [place] using No Assistive Device  3. Gait  x 200 feet with [place] using No Assistive Device.   4. Lower extremity exercise program x15 reps per handout, with supervision    Outcome: Ongoing, Progressing

## 2023-08-08 NOTE — PLAN OF CARE
08/08/23 0824   Post-Acute Status   Post-Acute Authorization Home Health   Home Health Status Set-up Complete/Auth obtained   Discharge Delays None known at this time   Discharge Plan   Discharge Plan A Home Health  ([facility] Phone: [phone])     1118 am  CM spoke with Chelsea Bashir from [facility], patient will need a BiPAP and will follow up with  orders and supportive documentation.  Chelsea HAILE Only needs the attending signature to submit orders to patients insurance company.  Chelsea Bashir contact number [phone]     referrals sent via [facility];  [facility] Phone: [phone]  willing to accept    Jahaira Lehman RN  Case Management  [facility]  [phone]

## 2023-08-08 NOTE — PT/OT/SLP EVAL
Physical Therapy Evaluation    Patient Name:  Rochelle Espinoza   MRN:  [id number]    Recommendations:     Discharge Recommendations: other (see comments)   Discharge Equipment Recommendations: none   Barriers to discharge: None    Assessment:     Rochelle Espinoza is a 67 y.o. female admitted with a medical diagnosis of COPD exacerbation.  She presents with the following impairments/functional limitations: weakness, impaired endurance, decreased lower extremity function, impaired cardiopulmonary response to activity.    Rehab Prognosis: Good; patient would benefit from acute skilled PT services to address these deficits and reach maximum level of function.    Recent Surgery: * No surgery found *      Plan:     During this hospitalization, patient to be seen 3 x/week to address the identified rehab impairments via therapeutic activities, gait training, therapeutic exercises, neuromuscular re-education and progress toward the following goals:    Plan of Care Expires:  09/07/23    Subjective     Chief Complaint: none verbalized  Patient/Family Comments/goals: pt reports she is feeling better since she arrived  Pain/Comfort:  Pain Rating 1: 0/10  Pain Rating Post-Intervention 1: 0/10    Patients cultural, spiritual, Baptist conflicts given the current situation: no    Living Environment:  Pt lives alone in a town house with 0STE. Pt has 14 steps to the upstairs with a L HR. Pt has a tub/shower with no grab bars and no seat.   Prior to admission, patients level of function was independent for all functional mobility and ADLs. Pt is retired, doesn't drive, and enjoys playing phone games and watching TV.   Equipment used at home: oxygen.  DME owned (not currently used): none.  Upon discharge, patient will have assistance from her daughter who is currently staying with her.    Objective:     Communicated with RN prior to session.  Patient found HOB elevated with telemetry, oxygen, pulse ox (continuous)  upon PT entry to  room.    General Precautions: Standard, fall  Orthopedic Precautions:N/A   Braces: N/A  Respiratory Status: Nasal cannula, flow 4 L/min    Exams:  Cognitive Exam:  Patient is oriented to Person, Place, Time, and Situation  Gross Motor Coordination:  WFL  Postural Exam:  Patient presented with the following abnormalities:    -       Rounded shoulders  Sensation:  WFL, pt does report some tingling in R hand and R foot that she takes gabapentin for (pt reports origin is from her neck)  RLE ROM: WFL  RLE Strength: hip flexion 4/5, knee extension 5/5, knee flexion 5/5, DF 5/5  LLE ROM: WFL  LLE Strength: hip flexion 3+/5, knee extension 4/5, knee flexion 5/5, DF 5/5    Functional Mobility:  Bed Mobility:     Scooting: independence  Supine to Sit: independence  Transfers:     Sit to Stand:  supervision with no AD  Gait: 64 ft, no AD, Supervision progressed to SBA with fatigue and SOB  Balance:   Static Sitting: independent  Dynamic Sitting: independent  Static Standing: Supervision  Dynamic Standing: Supervision to SBA      AM-PAC 6 CLICK MOBILITY  Total Score:20       Treatment & Education:  Patient educated on role of therapy, goals of session, and benefits of mobilizing.   Discussed PT plan of care during hospitalization.   Patient educated on calling for assistance.   Patient educated on how their diagnosis impacts their mobility within PT scope of practice.   All questions answered within PT scope of practice.    Patient left sitting edge of bed with all lines intact and call button in reach.    GOALS:   Multidisciplinary Problems       Physical Therapy Goals          Problem: Physical Therapy    Goal Priority Disciplines Outcome Goal Variances Interventions   Physical Therapy Goal     PT, PT/OT Ongoing, Progressing     Description: Goals to be met by: 2023     Patient will increase functional independence with mobility by performin. Sit to stand transfer with [place]  2. Bed to chair transfer with  [place] using No Assistive Device  3. Gait  x 200 feet with [place] using No Assistive Device.   4. Lower extremity exercise program x15 reps per handout, with supervision                         History:     Past Medical History:   Diagnosis Date    CHF (congestive heart failure)     COPD (chronic obstructive pulmonary disease)     Herpes zoster without mention of complication 2011    Hypertension     Leg edema     Pulmonary hypertension     Sciatica        Past Surgical History:   Procedure Laterality Date    BREAST BIOPSY Right     core     SECTION      COLONOSCOPY N/A 2019    Procedure: COLONOSCOPY;  Surgeon: Rui Holder MD;  [facility]  Service: Endoscopy;  Laterality: N/A;    EPIDURAL STEROID INJECTION N/A 2020    Procedure: CERVICAL BLAKE;  Surgeon: Papito High MD;  Location: [facility];  Service: Pain Management;  Laterality: N/A;  NEEDS CONSENT    ESOPHAGOGASTRODUODENOSCOPY N/A 2019    Procedure: EGD (ESOPHAGOGASTRODUODENOSCOPY);  Surgeon: Rui Holder MD;  Location: [facility] ([address] [place] [address]);  Service: Endoscopy;  Laterality: N/A;  2L continuous O2 via NC, COPD, pulm HTN    TRANSFORAMINAL EPIDURAL INJECTION OF STEROID Right 6/3/2021    Procedure: INJECTION, STEROID, EPIDURAL, TRANSFORAMINAL APPROACH, L4-L5;  Surgeon: Anibal Robles MD;  Location: [facility];  Service: Pain Management;  Laterality: Right;       Time Tracking:     PT Received On: 23  PT Start Time: 922     PT Stop Time: 0940  PT Total Time (min): 18 min     Billable Minutes: Evaluation 10 and Gait Training 8      2023

## 2023-08-08 NOTE — PLAN OF CARE
Patient remains awake, A/O x4 on 3L NC (home O2 amount). Patient still desats when moving around but with rest and slow deep breaths patient's sats increase. No s/s of distress. Patient denies needs at this time. Belongings within reach. Possible discharge tomorrow 8/9/23        Problem: Adult Inpatient Plan of Care  Goal: Plan of Care Review  Outcome: Ongoing, Progressing     Problem: Fall Injury Risk  Goal: Absence of Fall and Fall-Related Injury  Outcome: Ongoing, Progressing     Problem: Functional Ability Impaired COPD (Chronic Obstructive Pulmonary Disease)  Goal: Optimal Level of Functional [place]  Outcome: Ongoing, Progressing     Problem: Infection COPD (Chronic Obstructive Pulmonary Disease)  Goal: Absence of Infection Signs and Symptoms  Outcome: Ongoing, Progressing

## 2023-08-08 NOTE — ASSESSMENT & PLAN NOTE
Improving but not yet at baseline.  Doing well on 3 L of home oxygen.  Approved for trilogy at night.  We will continue with nebs around the clock and oral prednisone.  We will discontinue antibiotics at this time

## 2023-08-08 NOTE — SUBJECTIVE & OBJECTIVE
Interval History:  Patient's shortness of breath has improved however not at baseline.  She was approved for trilogy.    Review of Systems   Respiratory:  Positive for shortness of breath.    Gastrointestinal:  Negative for abdominal pain and vomiting.     Objective:     Vital Signs (Most Recent):  Temp: 98.1 °F (36.7 °C) (08/08/23 1125)  Pulse: 85 (08/08/23 1138)  Resp: 16 (08/08/23 1138)  BP: (!) 107/57 (08/08/23 1125)  SpO2: 95 % (08/08/23 1138) Vital Signs (24h Range):  Temp:  [97.9 °F (36.6 °C)-98.5 °F (36.9 °C)] 98.1 °F (36.7 °C)  Pulse:  [] 85  Resp:  [16-24] 16  SpO2:  [92 %-97 %] 95 %  BP: (106-108)/(55-68) 107/57     Weight: 69.4 kg (153 lb)  Body mass index is 27.1 kg/m².  No intake or output data in the 24 hours ending 08/08/23 1309   Physical Exam  Constitutional:       General: She is not in acute distress.     Appearance: She is obese.   HENT:      Head: Normocephalic.      Right Ear: External ear normal.      Left Ear: External ear normal.      Nose: Nose normal.   Cardiovascular:      Rate and Rhythm: Normal rate.   Pulmonary:      Comments: No significant rales or rhonchi  Abdominal:      Palpations: Abdomen is soft.      Tenderness: There is no abdominal tenderness.   Skin:     General: Skin is warm.   Neurological:      General: No focal deficit present.      Mental Status: She is alert and oriented to person, place, and time.   Psychiatric:         Mood and Affect: Mood normal.         Computed MELD 3.0 unavailable. Necessary lab results were not found in the last year.  Computed MELD-Na unavailable. Necessary lab results were not found in the last year.      Significant Labs:  CBC:  Recent Labs   Lab 08/07/23  0349 08/08/23  0541   WBC 17.27* 12.52   HGB 11.4* 11.5*   HCT 36.6* 37.0    259     CMP:  Recent Labs   Lab 08/07/23  0450 08/08/23  0541    140   K 4.2 3.7    100   CO2 33* 28   GLU 94 111*   BUN 9 12   CREATININE 0.6 0.7   CALCIUM 8.9 8.9   PROT 6.2 6.1  "  ALBUMIN 3.3* 3.3*   BILITOT 0.3 0.2   ALKPHOS 41* 48*   AST 16 16   ALT 14 13   ANIONGAP 7* 12     PTINR:  No results for input(s): "INR" in the last 48 hours.    Significant Procedures:   Dobutamine Stress Test with Color Flow: No results found for this or any previous visit.      "

## 2023-08-08 NOTE — PLAN OF CARE
Client is alert and oriented x 3 to person place and time. Rr even and unlabored. Vitals stable. No significant changes noted.    Problem: Adult Inpatient Plan of Care  Goal: Plan of Care Review  Outcome: Ongoing, Progressing  Goal: Patient-Specific Goal (Individualized)  Outcome: Ongoing, Progressing  Goal: Absence of Hospital-Acquired Illness or Injury  Outcome: Ongoing, Progressing  Goal: Optimal Comfort and Wellbeing  Outcome: Ongoing, Progressing  Goal: Readiness for Transition of Care  Outcome: Ongoing, Progressing

## 2023-08-08 NOTE — ASSESSMENT & PLAN NOTE
No evidence of decompensation at this time.  We will discontinue amlodipine and start Lasix 20 mg daily tomorrow.

## 2023-08-08 NOTE — PT/OT/SLP EVAL
"Occupational Therapy   Evaluation and Discharge Note    Name: Rochelle Espinoza  MRN: [id number]  Admitting Diagnosis: COPD exacerbation  Recent Surgery: * No surgery found *      Recommendations:     Discharge Recommendations: other   Discharge Equipment Recommendations: none  Barriers to discharge:  None    Assessment:     Rochelle Espinoza is a 67 y.o. female with a medical diagnosis of COPD exacerbation. At this time, patient is functioning at their prior level of function and does not require further acute OT services.     Plan:     During this hospitalization, patient does not require further acute OT services.  Please re-consult if situation changes.    Plan of Care Reviewed with: patient    Subjective     Chief Complaint: none stated  Patient/Family Comments/goals: "I took a shower by myself."    Occupational Profile:  Living Environment: daughter, granddaughter; (daughter works during the day, granddaughter is away at college)  Previous level of function: indep  Roles and Routines: retired, mother  Equipment Used at home: oxygen    Pain/Comfort:  Pain Rating 1: 0/10  Pain Rating Post-Intervention 1: 0/10    Patients cultural, spiritual, Taoist conflicts given the current situation: no    Objective:     Communicated with: RN prior to session.  Patient found supine with telemetry, oxygen upon OT entry to room.    General Precautions: Standard, fall  Orthopedic Precautions: N/A  Braces: N/A  Respiratory Status: Nasal cannula, flow 4 L/min     Occupational Performance:    Bed Mobility:    Patient completed Rolling/Turning to Right with independence  Patient completed Scooting/Bridging with independence  Patient completed Supine to Sit with independence    Functional Mobility/Transfers:  Patient completed Sit <> Stand Transfer with independence  with  no assistive device   Patient completed Toilet Transfer Stand Pivot technique with independence with  no AD  Functional Mobility: pt ambulated indep from bed to bathroom, " back to bed; ~24 ft    Activities of Daily Living:  Grooming: setup: pt washed hands and face at sink with no AE; initiated, sequenced, and completed with no cueing  Lower Body Dressing: setup: pt donned socks eob; initiated, sequenced, and completed with no cueing    Cognitive/Visual Perceptual:  Cognitive/Psychosocial Skills:     -       Oriented to: Person, Place, Time, and Situation   -       Follows Commands/attention:Follows multistep  commands  -       Safety awareness/insight to disability: intact     Physical Exam:  Upper Extremity Range of Motion:     -       Right Upper Extremity: WFL  -       Left Upper Extremity: WFL  Upper Extremity Strength:    -       Right Upper Extremity: WFL  -       Left Upper Extremity: WFL   Strength:    -       Right Upper Extremity: WFL  -       Left Upper Extremity: WFL    AMPAC 6 Click ADL:  AMPAC Total Score: 24    Treatment & Education:  Pt edu on role of OT, POC, safety when performing self care tasks , benefit of performing OOB activity, and safety when performing functional transfers and mobility.    - Self care tasks completed-- as noted above      Patient left sitting edge of bed with all lines intact, call button in reach, and RN notified    GOALS:   Multidisciplinary Problems       Occupational Therapy Goals       Not on file                    History:     Past Medical History:   Diagnosis Date    CHF (congestive heart failure)     COPD (chronic obstructive pulmonary disease)     Herpes zoster without mention of complication 2011    Hypertension     Leg edema     Pulmonary hypertension     Sciatica          Past Surgical History:   Procedure Laterality Date    BREAST BIOPSY Right     core     SECTION      COLONOSCOPY N/A 2019    Procedure: COLONOSCOPY;  Surgeon: Rui Holder MD;  Location: [address];  Service: Endoscopy;  Laterality: N/A;    EPIDURAL STEROID INJECTION N/A 2020    Procedure: CERVICAL BLAKE;  Surgeon: Papito  MD Anila;  Location: [facility];  Service: Pain Management;  Laterality: N/A;  NEEDS CONSENT    ESOPHAGOGASTRODUODENOSCOPY N/A 12/19/2019    Procedure: EGD (ESOPHAGOGASTRODUODENOSCOPY);  Surgeon: Rui Holder MD;  Location: [facility] ([address] [place] [address]);  Service: Endoscopy;  Laterality: N/A;  2L continuous O2 via NC, COPD, pulm HTN    TRANSFORAMINAL EPIDURAL INJECTION OF STEROID Right 6/3/2021    Procedure: INJECTION, STEROID, EPIDURAL, TRANSFORAMINAL APPROACH, L4-L5;  Surgeon: Anibal Robles MD;  Location: [facility];  Service: Pain Management;  Laterality: Right;       Time Tracking:     OT Date of Treatment: 08/08/23  OT Start Time: 1135  OT Stop Time: 1150  OT Total Time (min): 15 min    Billable Minutes:Evaluation 15 min    8/8/2023

## 2023-08-08 NOTE — CARE UPDATE
CHW met with patient/family at bedside. Patient experience rounding completed and reviewed the following.     Do you know your discharge plan? Yes, Home Health    Have you discussed your needs and preferences with your SW/CM? Yes     If you are discharging home, do you have help at home? Yes    Do you think you will need help additional at home at discharge? Yes, Home Health     Do you currently have difficulty keeping up with bills, affording medicine or buying food? No    Assigned SW/CM notified of any patient/family needs or concerns. Appropriate resources provided to address patient's needs.

## 2023-08-09 LAB
ALBUMIN SERPL BCP-MCNC: 3.2 G/DL (ref 3.5–5.2)
ALP SERPL-CCNC: 43 U/L (ref 55–135)
ALT SERPL W/O P-5'-P-CCNC: 14 U/L (ref 10–44)
ANION GAP SERPL CALC-SCNC: 7 MMOL/L (ref 8–16)
AST SERPL-CCNC: 14 U/L (ref 10–40)
BASOPHILS # BLD AUTO: 0.02 K/UL (ref 0–0.2)
BASOPHILS NFR BLD: 0.2 % (ref 0–1.9)
BILIRUB SERPL-MCNC: 0.3 MG/DL (ref 0.1–1)
BUN SERPL-MCNC: 10 MG/DL (ref 8–23)
CALCIUM SERPL-MCNC: 8.8 MG/DL (ref 8.7–10.5)
CHLORIDE SERPL-SCNC: 101 MMOL/L (ref 95–110)
CO2 SERPL-SCNC: 30 MMOL/L (ref 23–29)
CREAT SERPL-MCNC: 0.6 MG/DL (ref 0.5–1.4)
DIFFERENTIAL METHOD: ABNORMAL
EOSINOPHIL # BLD AUTO: 0.1 K/UL (ref 0–0.5)
EOSINOPHIL NFR BLD: 0.5 % (ref 0–8)
ERYTHROCYTE [DISTWIDTH] IN BLOOD BY AUTOMATED COUNT: 15.7 % (ref 11.5–14.5)
EST. GFR  (NO RACE VARIABLE): >60 ML/MIN/1.73 M^2
GLUCOSE SERPL-MCNC: 84 MG/DL (ref 70–110)
HCT VFR BLD AUTO: 35.3 % (ref 37–48.5)
HGB BLD-MCNC: 11.3 G/DL (ref 12–16)
IMM GRANULOCYTES # BLD AUTO: 0.03 K/UL (ref 0–0.04)
IMM GRANULOCYTES NFR BLD AUTO: 0.3 % (ref 0–0.5)
LYMPHOCYTES # BLD AUTO: 4.5 K/UL (ref 1–4.8)
LYMPHOCYTES NFR BLD: 41.5 % (ref 18–48)
MCH RBC QN AUTO: 28.1 PG (ref 27–31)
MCHC RBC AUTO-ENTMCNC: 32 G/DL (ref 32–36)
MCV RBC AUTO: 88 FL (ref 82–98)
MONOCYTES # BLD AUTO: 0.8 K/UL (ref 0.3–1)
MONOCYTES NFR BLD: 7.6 % (ref 4–15)
NEUTROPHILS # BLD AUTO: 5.4 K/UL (ref 1.8–7.7)
NEUTROPHILS NFR BLD: 49.9 % (ref 38–73)
NRBC BLD-RTO: 0 /100 WBC
PLATELET # BLD AUTO: 265 K/UL (ref 150–450)
PMV BLD AUTO: 11.8 FL (ref 9.2–12.9)
POTASSIUM SERPL-SCNC: 3.5 MMOL/L (ref 3.5–5.1)
PROT SERPL-MCNC: 5.9 G/DL (ref 6–8.4)
RBC # BLD AUTO: 4.02 M/UL (ref 4–5.4)
SODIUM SERPL-SCNC: 138 MMOL/L (ref 136–145)
WBC # BLD AUTO: 10.71 K/UL (ref 3.9–12.7)

## 2023-08-09 PROCEDURE — 25000242 PHARM REV CODE 250 ALT 637 W/ HCPCS: Performed by: COMMUNITY HEALTH WORKER

## 2023-08-09 PROCEDURE — 27100171 HC OXYGEN HIGH FLOW UP TO 24 HOURS

## 2023-08-09 PROCEDURE — 99900035 HC TECH TIME PER 15 MIN (STAT)

## 2023-08-09 PROCEDURE — 63600175 PHARM REV CODE 636 W HCPCS: Performed by: COMMUNITY HEALTH WORKER

## 2023-08-09 PROCEDURE — 94660 CPAP INITIATION&MGMT: CPT

## 2023-08-09 PROCEDURE — 85025 COMPLETE CBC W/AUTO DIFF WBC: CPT | Performed by: COMMUNITY HEALTH WORKER

## 2023-08-09 PROCEDURE — 21400001 HC TELEMETRY ROOM

## 2023-08-09 PROCEDURE — 94640 AIRWAY INHALATION TREATMENT: CPT

## 2023-08-09 PROCEDURE — 25000003 PHARM REV CODE 250

## 2023-08-09 PROCEDURE — 99232 SBSQ HOSP IP/OBS MODERATE 35: CPT | Mod: ,,, | Performed by: INTERNAL MEDICINE

## 2023-08-09 PROCEDURE — 80053 COMPREHEN METABOLIC PANEL: CPT | Performed by: COMMUNITY HEALTH WORKER

## 2023-08-09 PROCEDURE — S4991 NICOTINE PATCH NONLEGEND: HCPCS | Performed by: COMMUNITY HEALTH WORKER

## 2023-08-09 PROCEDURE — 94761 N-INVAS EAR/PLS OXIMETRY MLT: CPT

## 2023-08-09 PROCEDURE — 25000003 PHARM REV CODE 250: Performed by: COMMUNITY HEALTH WORKER

## 2023-08-09 PROCEDURE — 36415 COLL VENOUS BLD VENIPUNCTURE: CPT | Performed by: COMMUNITY HEALTH WORKER

## 2023-08-09 PROCEDURE — 12000002 HC ACUTE/MED SURGE SEMI-PRIVATE ROOM

## 2023-08-09 PROCEDURE — 99232 PR SUBSEQUENT HOSPITAL CARE,LEVL II: ICD-10-PCS | Mod: ,,, | Performed by: INTERNAL MEDICINE

## 2023-08-09 PROCEDURE — 25000003 PHARM REV CODE 250: Performed by: INTERNAL MEDICINE

## 2023-08-09 RX ADMIN — FUROSEMIDE 20 MG: 20 TABLET ORAL at 08:08

## 2023-08-09 RX ADMIN — GABAPENTIN 300 MG: 300 CAPSULE ORAL at 08:08

## 2023-08-09 RX ADMIN — ACETAMINOPHEN 650 MG: 325 TABLET ORAL at 02:08

## 2023-08-09 RX ADMIN — LOSARTAN POTASSIUM 100 MG: 50 TABLET, FILM COATED ORAL at 08:08

## 2023-08-09 RX ADMIN — GABAPENTIN 300 MG: 300 CAPSULE ORAL at 09:08

## 2023-08-09 RX ADMIN — GABAPENTIN 300 MG: 300 CAPSULE ORAL at 02:08

## 2023-08-09 RX ADMIN — ATORVASTATIN CALCIUM 10 MG: 10 TABLET, FILM COATED ORAL at 09:08

## 2023-08-09 RX ADMIN — PREDNISONE 40 MG: 20 TABLET ORAL at 08:08

## 2023-08-09 RX ADMIN — IPRATROPIUM BROMIDE AND ALBUTEROL SULFATE 3 ML: .5; 3 SOLUTION RESPIRATORY (INHALATION) at 07:08

## 2023-08-09 RX ADMIN — Medication 1 PATCH: at 02:08

## 2023-08-09 RX ADMIN — IPRATROPIUM BROMIDE AND ALBUTEROL SULFATE 3 ML: .5; 3 SOLUTION RESPIRATORY (INHALATION) at 11:08

## 2023-08-09 RX ADMIN — ENOXAPARIN SODIUM 40 MG: 40 INJECTION SUBCUTANEOUS at 04:08

## 2023-08-09 RX ADMIN — FLUTICASONE FUROATE AND VILANTEROL TRIFENATATE 1 PUFF: 200; 25 POWDER RESPIRATORY (INHALATION) at 08:08

## 2023-08-09 RX ADMIN — IPRATROPIUM BROMIDE AND ALBUTEROL SULFATE 3 ML: .5; 3 SOLUTION RESPIRATORY (INHALATION) at 03:08

## 2023-08-09 NOTE — PROGRESS NOTES
[facility] - Int[facility] ([address] Medicine  Progress Note    Patient Name: oRchelle Espinoza  MRN: [id number]  Patient Class: IP- Inpatient   Admission Date: 8/6/2023  Length of Stay: 3 days  Attending Physician: Khanh Corona MD  Primary Care Provider: Yosi Samuels Jr., MD        Subjective:     Principal Problem:COPD exacerbation        HPI:  No notes on file    Overview/Hospital Course:  67F admitted to MICU for acute on chronic hypoxic respirator acidosis w/ Hx of right sided diastolic HF (2020), COPD on 3L O2, HTN, HLD and tobacco dependence.  During admittion, the patient was on sating in the mid 90's on 16 iPAP/ 6 ePAP @ 30% o2. Her initial ABG was 7.24/87.2/25/40.5.  H/H was normal, BNP <10, Trop normal, UA neg. And lactate of 4.3, and AG of 13. CXR and EKG were normal. After the 3rd round of duonebs and addition of steroids the patient improved weaned off BiPAP to 5L NC w/ stable sats in the mid 90's. Fluticasone furoate-vilanteroL 200-25 mcg/dose diskus, Simethicone, Famotidine were added.  On 8/7, Echo cardiogram was preformed with nominal change from prior in 2020. Patient's oxygen requirements have been titrated down to only requiring 3L NC. Patient was stepdown to .  She was approved for Trilogy at night from [facility].      Interval History:  Patient's shortness of breath continues to improve however not at baseline.  Doing well on 3 L    Review of Systems   Respiratory:  Positive for shortness of breath. Negative for cough.    Cardiovascular:  Negative for chest pain.     Objective:     Vital Signs (Most Recent):  Temp: 98.4 °F (36.9 °C) (08/09/23 0729)  Pulse: 67 (08/09/23 0815)  Resp: 17 (08/09/23 0815)  BP: 113/64 (08/09/23 0729)  SpO2: (!) 91 % (08/09/23 0851) Vital Signs (24h Range):  Temp:  [98.1 °F (36.7 °C)-98.8 °F (37.1 °C)] 98.4 °F (36.9 °C)  Pulse:  [65-92] 67  Resp:  [16-20] 17  SpO2:  [91 %-100 %] 91 %  BP: (107-124)/(56-66) 113/64     Weight: 74.3 kg (163 lb 12.8 oz)  Body  "mass index is 29.02 kg/m².  No intake or output data in the 24 hours ending 08/09/23 0935   Physical Exam  Constitutional:       Appearance: She is ill-appearing (chronic).   HENT:      Head: Normocephalic.      Right Ear: External ear normal.      Left Ear: External ear normal.      Nose: Nose normal.      Comments: Nasal cannula  Cardiovascular:      Rate and Rhythm: Normal rate.   Pulmonary:      Comments: No significant rales or rhonchi  Abdominal:      Palpations: Abdomen is soft.      Tenderness: There is no abdominal tenderness.   Musculoskeletal:      Right lower leg: No edema.      Left lower leg: No edema.   Skin:     General: Skin is warm.   Neurological:      Mental Status: She is alert and oriented to person, place, and time.   Psychiatric:         Mood and Affect: Mood normal.         Computed MELD 3.0 unavailable. Necessary lab results were not found in the last year.  Computed MELD-Na unavailable. Necessary lab results were not found in the last year.      Significant Labs:  CBC:  Recent Labs   Lab 08/08/23  0541 08/09/23  0722   WBC 12.52 10.71   HGB 11.5* 11.3*   HCT 37.0 35.3*    265     CMP:  Recent Labs   Lab 08/08/23  0541 08/09/23  0722    138   K 3.7 3.5    101   CO2 28 30*   * 84   BUN 12 10   CREATININE 0.7 0.6   CALCIUM 8.9 8.8   PROT 6.1 5.9*   ALBUMIN 3.3* 3.2*   BILITOT 0.2 0.3   ALKPHOS 48* 43*   AST 16 14   ALT 13 14   ANIONGAP 12 7*     PTINR:  No results for input(s): "INR" in the last 48 hours.    Significant Procedures:   Dobutamine Stress Test with Color Flow: No results found for this or any previous visit.          Assessment/Plan:      * COPD exacerbation  Improving but not yet at baseline.  Doing well on 3 L of home oxygen.  Approved for trilogy at night.  We will continue with nebs around the clock and oral prednisone.  Possible discharge tomorrow      Chronic diastolic heart failure  No evidence of decompensation at this time.  We will discontinue " amlodipine and start Lasix 20 mg daily     Essential hypertension  Continue home meds        VTE Risk Mitigation (From admission, onward)         Ordered     enoxaparin injection 40 mg  Every 24 hours         08/06/23 0502                Discharge Planning   ELIZABETH: 8/9/2023     Code Status: Full Code   Is the patient medically ready for discharge?: No    Reason for patient still in hospital (select all that apply): Patient trending condition  Discharge Plan A: Home Health ([facility] Phone: [phone])   Discharge Delays: None known at this time              Khanh Corona MD  Department of Hospital Medicine   [facility] - Intensive Care ([place]-[facility])

## 2023-08-09 NOTE — PLAN OF CARE
CM spoke with Chelsea GIORDANO from [employer] and waiting on insurance approval for BiPAP.  The patient may need a BiPAP loaner from [employer].  Chelsea has updated the patient and SALEEM  has spoken with patient as well to confirm discharge plans home with [facility] Phone: [phone]).  CM updated attending.    4:15 pm  CM spoke with Chelsea and [employer] does not provide Trilogy loaners and it will take up to 14 days for approval of trio logy per [employer] insurance.    4:25 pm  CM called [facility] for updates on prior authorization for Trilogy.  CPT code: J44.9 for COPD.  CM provided patients identifiers and [facility] representative entered, Pauly and the patient approved for Triology , auth number [id number]. Will forward to Chelsea Bashir with [employer].     Jahaira Lehman RN  Case Management  [facility]  [phone]

## 2023-08-09 NOTE — ASSESSMENT & PLAN NOTE
Improving but not yet at baseline.  Doing well on 3 L of home oxygen.  Approved for trilogy at night.  We will continue with nebs around the clock and oral prednisone.  Possible discharge tomorrow

## 2023-08-09 NOTE — PLAN OF CARE
Problem: Adult Inpatient Plan of Care  Goal: Plan of Care Review  Outcome: Ongoing, Progressing  Goal: Patient-Specific Goal (Individualized)  Outcome: Ongoing, Progressing  Goal: Absence of Hospital-Acquired Illness or Injury  Outcome: Ongoing, Progressing  Goal: Optimal Comfort and Wellbeing  Outcome: Ongoing, Progressing  Goal: Readiness for Transition of Care  Outcome: Ongoing, Progressing     Problem: Fall Injury Risk  Goal: Absence of Fall and Fall-Related Injury  Outcome: Ongoing, Progressing     Problem: Functional Ability Impaired COPD (Chronic Obstructive Pulmonary Disease)  Goal: Optimal Level of Functional [place]  Outcome: Ongoing, Progressing     Problem: Infection COPD (Chronic Obstructive Pulmonary Disease)  Goal: Absence of Infection Signs and Symptoms  Outcome: Ongoing, Progressing    Pt slept well this shift. A&Ox4. On 3L NC. NSR on tele. VSS. Up independently. BIPAP at night. Safety precautions in place. Call light and personal items in reach. No further concerns noted at this time. NADN this shift. Will continue to monitor.

## 2023-08-09 NOTE — ASSESSMENT & PLAN NOTE
No evidence of decompensation at this time.  We will discontinue amlodipine and start Lasix 20 mg daily

## 2023-08-09 NOTE — NURSING
Pt requested to eat dinner before transport, awaiting dinner tray then will schedule transportation.

## 2023-08-09 NOTE — NURSING
Pt AAO x 4, no c/o pain at this time. Pt ambulates appropriately in room, educated to call for assistance if needed. Pt on NC @ 3L sating at 90. Pt has no c/o SOB or distress at this time. Pt is continent of B&B. Pt has PIV to Left FA and to Right AC, both are clean, dry and intact. No redness or swelling, saline locked @ this time. Bed is low and locked, call light in reach. Monitoring.

## 2023-08-09 NOTE — NURSING
Pt to transfer to 16th floor, report called to CHEIKH Byers. Awaiting transport. Pt aware of transfer.

## 2023-08-09 NOTE — PLAN OF CARE
Problem: Adult Inpatient Plan of Care  Goal: Plan of Care Review  Outcome: Ongoing, Progressing  Goal: Patient-Specific Goal (Individualized)  Outcome: Ongoing, Progressing  Goal: Absence of Hospital-Acquired Illness or Injury  Outcome: Ongoing, Progressing  Goal: Optimal Comfort and Wellbeing  Outcome: Ongoing, Progressing  Goal: Readiness for Transition of Care  Outcome: Ongoing, Progressing     Problem: Fall Injury Risk  Goal: Absence of Fall and Fall-Related Injury  Outcome: Ongoing, Progressing     Problem: Functional Ability Impaired COPD (Chronic Obstructive Pulmonary Disease)  Goal: Optimal Level of Functional [place]  Outcome: Ongoing, Progressing     Problem: Infection COPD (Chronic Obstructive Pulmonary Disease)  Goal: Absence of Infection Signs and Symptoms  Outcome: Ongoing, Progressing     Problem: Adult Inpatient Plan of Care  Goal: Plan of Care Review  Outcome: Ongoing, Progressing  Goal: Patient-Specific Goal (Individualized)  Outcome: Ongoing, Progressing  Goal: Absence of Hospital-Acquired Illness or Injury  Outcome: Ongoing, Progressing  Goal: Optimal Comfort and Wellbeing  Outcome: Ongoing, Progressing  Goal: Readiness for Transition of Care  Outcome: Ongoing, Progressing     Problem: Fall Injury Risk  Goal: Absence of Fall and Fall-Related Injury  Outcome: Ongoing, Progressing     Problem: Functional Ability Impaired COPD (Chronic Obstructive Pulmonary Disease)  Goal: Optimal Level of Functional [place]  Outcome: Ongoing, Progressing     Problem: Infection COPD (Chronic Obstructive Pulmonary Disease)  Goal: Absence of Infection Signs and Symptoms  Outcome: Ongoing, Progressing     Problem: Adult Inpatient Plan of Care  Goal: Plan of Care Review  Outcome: Ongoing, Progressing  Goal: Patient-Specific Goal (Individualized)  Outcome: Ongoing, Progressing  Goal: Absence of Hospital-Acquired Illness or Injury  Outcome: Ongoing, Progressing  Goal: Optimal Comfort and Wellbeing  Outcome:  Ongoing, Progressing  Goal: Readiness for Transition of Care  Outcome: Ongoing, Progressing     Problem: Fall Injury Risk  Goal: Absence of Fall and Fall-Related Injury  Outcome: Ongoing, Progressing     Problem: Functional Ability Impaired COPD (Chronic Obstructive Pulmonary Disease)  Goal: Optimal Level of Functional [place]  Outcome: Ongoing, Progressing     Problem: Infection COPD (Chronic Obstructive Pulmonary Disease)  Goal: Absence of Infection Signs and Symptoms  Outcome: Ongoing, Progressing

## 2023-08-09 NOTE — HOSPITAL COURSE
67F admitted to MICU for acute on chronic hypoxic respiratory acidosis w/ Hx of diastolic HF (2020), COPD on 3L O2, HTN, HLD and tobacco dependence.  During admission, the patient had saturations in the mid 90's on 16 iPAP/ 6 ePAP @ 30% o2. Her initial ABG was 7.24/87.2/25/40.5.  H/H was normal, BNP <10, Trop normal, UA neg. And lactate of 4.3, and AG of 13. CXR and EKG were normal. After the 3rd round of duonebs and addition of steroids the patient improved weaned off BiPAP to 5L NC w/ stable sats in the mid 90's. Fluticasone furoate-vilanteroL 200-25 mcg/dose diskus, Simethicone, Famotidine were added.  On 8/7, Echocardiogram with preserved EF and minimal change from prior in 2020. Patient's oxygen requirements titrated down to only requiring home oxygen of 3L NC. Patient has remained on her home oxygen with resolved shortness of breath.  Wellbutrin and nicotine patches started for smoking cessation  She was approved for Trilogy at night from [employer].  Awaiting insurance authorization; they did not approve Trilogy. Auth submitted for BiPAP. Approved on 8/18 with planned delivery to bedside. Patient without further hospitalization needs.  Patient medically stable for discharge. F/u w/ PCP and pulm. Return precautions advised; patient's questions answered.  Patient in agreement with discharge plan.    Vitals:    08/18/23 0905 08/18/23 1112 08/18/23 1150 08/18/23 1312   BP:   (!) 108/49    BP Location:   Left arm    Patient Position:   Lying    Pulse: 88 89 75 94   Resp: 16 18 16   Temp:   98.2 °F (36.8 °C)    TempSrc:   Oral    SpO2: 96%  (!) 92% 96%   Weight:       Height:         Physical Exam  Constitutional:       General: She is not in acute distress.  HENT:      Head: Normocephalic.      Nose:      Comments: Nasal cannula     Mouth/Throat:      Mouth: Mucous membranes are moist.   Eyes:      General: No scleral icterus.     Conjunctiva/sclera: Conjunctivae normal.   Pulmonary:      Effort: No respiratory  distress.   Abdominal:      General: There is no distension.   Musculoskeletal:      Right lower leg: No edema.      Left lower leg: No edema.   Skin:     General: Skin is warm.   Neurological:      General: No focal deficit present.      Mental Status: She is alert.   Psychiatric:         Mood and Affect: Mood normal.         Behavior: Behavior normal.

## 2023-08-09 NOTE — SUBJECTIVE & OBJECTIVE
Interval History:  Patient's shortness of breath continues to improve however not at baseline.  Doing well on 3 L    Review of Systems   Respiratory:  Positive for shortness of breath. Negative for cough.    Cardiovascular:  Negative for chest pain.     Objective:     Vital Signs (Most Recent):  Temp: 98.4 °F (36.9 °C) (08/09/23 0729)  Pulse: 67 (08/09/23 0815)  Resp: 17 (08/09/23 0815)  BP: 113/64 (08/09/23 0729)  SpO2: (!) 91 % (08/09/23 0851) Vital Signs (24h Range):  Temp:  [98.1 °F (36.7 °C)-98.8 °F (37.1 °C)] 98.4 °F (36.9 °C)  Pulse:  [65-92] 67  Resp:  [16-20] 17  SpO2:  [91 %-100 %] 91 %  BP: (107-124)/(56-66) 113/64     Weight: 74.3 kg (163 lb 12.8 oz)  Body mass index is 29.02 kg/m².  No intake or output data in the 24 hours ending 08/09/23 0935   Physical Exam  Constitutional:       Appearance: She is ill-appearing (chronic).   HENT:      Head: Normocephalic.      Right Ear: External ear normal.      Left Ear: External ear normal.      Nose: Nose normal.      Comments: Nasal cannula  Cardiovascular:      Rate and Rhythm: Normal rate.   Pulmonary:      Comments: No significant rales or rhonchi  Abdominal:      Palpations: Abdomen is soft.      Tenderness: There is no abdominal tenderness.   Musculoskeletal:      Right lower leg: No edema.      Left lower leg: No edema.   Skin:     General: Skin is warm.   Neurological:      Mental Status: She is alert and oriented to person, place, and time.   Psychiatric:         Mood and Affect: Mood normal.         Computed MELD 3.0 unavailable. Necessary lab results were not found in the last year.  Computed MELD-Na unavailable. Necessary lab results were not found in the last year.      Significant Labs:  CBC:  Recent Labs   Lab 08/08/23 0541 08/09/23 0722   WBC 12.52 10.71   HGB 11.5* 11.3*   HCT 37.0 35.3*    265     CMP:  Recent Labs   Lab 08/08/23  0541 08/09/23  0722    138   K 3.7 3.5    101   CO2 28 30*   * 84   BUN 12 10  "  CREATININE 0.7 0.6   CALCIUM 8.9 8.8   PROT 6.1 5.9*   ALBUMIN 3.3* 3.2*   BILITOT 0.2 0.3   ALKPHOS 48* 43*   AST 16 14   ALT 13 14   ANIONGAP 12 7*     PTINR:  No results for input(s): "INR" in the last 48 hours.    Significant Procedures:   Dobutamine Stress Test with Color Flow: No results found for this or any previous visit.      "

## 2023-08-10 PROBLEM — J96.20 ACUTE ON CHRONIC RESPIRATORY FAILURE: Status: ACTIVE | Noted: 2020-07-18

## 2023-08-10 LAB
ALBUMIN SERPL BCP-MCNC: 3.3 G/DL (ref 3.5–5.2)
ALP SERPL-CCNC: 116 U/L (ref 55–135)
ALT SERPL W/O P-5'-P-CCNC: 14 U/L (ref 10–44)
ANION GAP SERPL CALC-SCNC: 10 MMOL/L (ref 8–16)
AST SERPL-CCNC: 14 U/L (ref 10–40)
BASOPHILS # BLD AUTO: 0.02 K/UL (ref 0–0.2)
BASOPHILS NFR BLD: 0.2 % (ref 0–1.9)
BILIRUB SERPL-MCNC: 0.2 MG/DL (ref 0.1–1)
BUN SERPL-MCNC: 10 MG/DL (ref 8–23)
CALCIUM SERPL-MCNC: 8.6 MG/DL (ref 8.7–10.5)
CHLORIDE SERPL-SCNC: 102 MMOL/L (ref 95–110)
CO2 SERPL-SCNC: 29 MMOL/L (ref 23–29)
CREAT SERPL-MCNC: 0.6 MG/DL (ref 0.5–1.4)
DIFFERENTIAL METHOD: ABNORMAL
EOSINOPHIL # BLD AUTO: 0.1 K/UL (ref 0–0.5)
EOSINOPHIL NFR BLD: 0.5 % (ref 0–8)
ERYTHROCYTE [DISTWIDTH] IN BLOOD BY AUTOMATED COUNT: 15.7 % (ref 11.5–14.5)
EST. GFR  (NO RACE VARIABLE): >60 ML/MIN/1.73 M^2
GLUCOSE SERPL-MCNC: 90 MG/DL (ref 70–110)
HCT VFR BLD AUTO: 36 % (ref 37–48.5)
HGB BLD-MCNC: 11.3 G/DL (ref 12–16)
IMM GRANULOCYTES # BLD AUTO: 0.03 K/UL (ref 0–0.04)
IMM GRANULOCYTES NFR BLD AUTO: 0.2 % (ref 0–0.5)
LYMPHOCYTES # BLD AUTO: 4.2 K/UL (ref 1–4.8)
LYMPHOCYTES NFR BLD: 33.1 % (ref 18–48)
MCH RBC QN AUTO: 28.3 PG (ref 27–31)
MCHC RBC AUTO-ENTMCNC: 31.4 G/DL (ref 32–36)
MCV RBC AUTO: 90 FL (ref 82–98)
MONOCYTES # BLD AUTO: 0.9 K/UL (ref 0.3–1)
MONOCYTES NFR BLD: 7.2 % (ref 4–15)
NEUTROPHILS # BLD AUTO: 7.4 K/UL (ref 1.8–7.7)
NEUTROPHILS NFR BLD: 58.8 % (ref 38–73)
NRBC BLD-RTO: 0 /100 WBC
PLATELET # BLD AUTO: 265 K/UL (ref 150–450)
PMV BLD AUTO: 12 FL (ref 9.2–12.9)
POTASSIUM SERPL-SCNC: 3.5 MMOL/L (ref 3.5–5.1)
PROT SERPL-MCNC: 6 G/DL (ref 6–8.4)
RBC # BLD AUTO: 4 M/UL (ref 4–5.4)
SODIUM SERPL-SCNC: 141 MMOL/L (ref 136–145)
WBC # BLD AUTO: 12.53 K/UL (ref 3.9–12.7)

## 2023-08-10 PROCEDURE — 94640 AIRWAY INHALATION TREATMENT: CPT

## 2023-08-10 PROCEDURE — 25000242 PHARM REV CODE 250 ALT 637 W/ HCPCS: Performed by: COMMUNITY HEALTH WORKER

## 2023-08-10 PROCEDURE — 99900035 HC TECH TIME PER 15 MIN (STAT)

## 2023-08-10 PROCEDURE — 25000003 PHARM REV CODE 250

## 2023-08-10 PROCEDURE — 21400001 HC TELEMETRY ROOM

## 2023-08-10 PROCEDURE — S4991 NICOTINE PATCH NONLEGEND: HCPCS | Performed by: COMMUNITY HEALTH WORKER

## 2023-08-10 PROCEDURE — 25000003 PHARM REV CODE 250: Performed by: INTERNAL MEDICINE

## 2023-08-10 PROCEDURE — 36415 COLL VENOUS BLD VENIPUNCTURE: CPT | Performed by: COMMUNITY HEALTH WORKER

## 2023-08-10 PROCEDURE — 27100171 HC OXYGEN HIGH FLOW UP TO 24 HOURS

## 2023-08-10 PROCEDURE — 25000003 PHARM REV CODE 250: Performed by: COMMUNITY HEALTH WORKER

## 2023-08-10 PROCEDURE — 99233 PR SUBSEQUENT HOSPITAL CARE,LEVL III: ICD-10-PCS | Mod: ,,, | Performed by: FAMILY MEDICINE

## 2023-08-10 PROCEDURE — 63600175 PHARM REV CODE 636 W HCPCS: Performed by: COMMUNITY HEALTH WORKER

## 2023-08-10 PROCEDURE — 94761 N-INVAS EAR/PLS OXIMETRY MLT: CPT

## 2023-08-10 PROCEDURE — 80053 COMPREHEN METABOLIC PANEL: CPT | Performed by: COMMUNITY HEALTH WORKER

## 2023-08-10 PROCEDURE — 97116 GAIT TRAINING THERAPY: CPT

## 2023-08-10 PROCEDURE — 85025 COMPLETE CBC W/AUTO DIFF WBC: CPT | Performed by: COMMUNITY HEALTH WORKER

## 2023-08-10 PROCEDURE — 94660 CPAP INITIATION&MGMT: CPT

## 2023-08-10 PROCEDURE — 99233 SBSQ HOSP IP/OBS HIGH 50: CPT | Mod: ,,, | Performed by: FAMILY MEDICINE

## 2023-08-10 RX ORDER — LOSARTAN POTASSIUM 50 MG/1
50 TABLET ORAL DAILY
Status: DISCONTINUED | OUTPATIENT
Start: 2023-08-10 | End: 2023-08-17

## 2023-08-10 RX ORDER — LOSARTAN POTASSIUM 50 MG/1
50 TABLET ORAL DAILY
Status: DISCONTINUED | OUTPATIENT
Start: 2023-08-11 | End: 2023-08-10

## 2023-08-10 RX ADMIN — ENOXAPARIN SODIUM 40 MG: 40 INJECTION SUBCUTANEOUS at 04:08

## 2023-08-10 RX ADMIN — Medication 1 PATCH: at 04:08

## 2023-08-10 RX ADMIN — FUROSEMIDE 20 MG: 20 TABLET ORAL at 08:08

## 2023-08-10 RX ADMIN — ATORVASTATIN CALCIUM 10 MG: 10 TABLET, FILM COATED ORAL at 08:08

## 2023-08-10 RX ADMIN — FLUTICASONE FUROATE AND VILANTEROL TRIFENATATE 1 PUFF: 200; 25 POWDER RESPIRATORY (INHALATION) at 08:08

## 2023-08-10 RX ADMIN — GABAPENTIN 300 MG: 300 CAPSULE ORAL at 08:08

## 2023-08-10 RX ADMIN — GABAPENTIN 300 MG: 300 CAPSULE ORAL at 04:08

## 2023-08-10 RX ADMIN — IPRATROPIUM BROMIDE AND ALBUTEROL SULFATE 3 ML: .5; 3 SOLUTION RESPIRATORY (INHALATION) at 08:08

## 2023-08-10 RX ADMIN — ACETAMINOPHEN 650 MG: 325 TABLET ORAL at 04:08

## 2023-08-10 RX ADMIN — IPRATROPIUM BROMIDE AND ALBUTEROL SULFATE 3 ML: .5; 3 SOLUTION RESPIRATORY (INHALATION) at 01:08

## 2023-08-10 RX ADMIN — PREDNISONE 40 MG: 20 TABLET ORAL at 08:08

## 2023-08-10 RX ADMIN — IPRATROPIUM BROMIDE AND ALBUTEROL SULFATE 3 ML: .5; 3 SOLUTION RESPIRATORY (INHALATION) at 04:08

## 2023-08-10 RX ADMIN — IPRATROPIUM BROMIDE AND ALBUTEROL SULFATE 3 ML: .5; 3 SOLUTION RESPIRATORY (INHALATION) at 09:08

## 2023-08-10 NOTE — PLAN OF CARE
[facility] - Int[facility] Care ([facility])  Discharge Reassessment    Primary Care Provider: Yosi Samuels Jr., MD    Expected Discharge Date: 8/11/2023    Reassessment (most recent)       Discharge Reassessment - 08/10/23 1153          Discharge Reassessment    Assessment Type Discharge Planning Reassessment (P)      Did the patient's condition or plan change since previous assessment? No (P)      Discharge Plan discussed with: Patient (P)      Communicated ELIZABETH with patient/caregiver No (P)      Discharge Plan A Home Health (P)      Discharge Plan B Home with family (P)      DME Needed Upon Discharge  BIPAP (P)      Transition of Care Barriers None (P)      Why the patient remains in the hospital Requires continued medical care (P)         Post-Acute Status    Post-Acute Authorization Home Health (P)      Home Health Status Referrals Sent (P)      Discharge Delays Home Medical Equipment (Insurance, Delivery) (P)                  CM spoke with pt in room.  She states she has O2 at home.  Informed she was accepted by [facility].  Pt provided number for [employer] N[employer]aye [phone]. CM spoke with [employer], provided auth number from notes: auth number [id number].  Digna states she will make some calls to verify and call CM back.    Pt requested cane.  CM requested MD and SHRAVAN for order/eval.    .now  Per [facility] [place] cane to be delivered bedside.    CM calling [employer]: They are waiting on auth, she spoke with Jahaira, auth that they got is for inpatient, not outpatient.  She can go home and they will f/u with her at home.      SARAH DanielN, BS, RN, CCM

## 2023-08-10 NOTE — PLAN OF CARE
Patient stable throughout shift. No reports of SOB or difficulty breathing. Nad present. No other concerns at this time. Safety measures in place

## 2023-08-10 NOTE — PT/OT/SLP PROGRESS
Physical Therapy Treatment/Discharge    Patient Name:  Rochelle Espinoza   MRN:  [id number]    Recommendations:     Discharge Recommendations: other (see comments)  Discharge Equipment Recommendations: none  Barriers to discharge: None    Assessment:     Rochelle Espinoza is a 67 y.o. female admitted with a medical diagnosis of COPD exacerbation.  She presents with the following impairments/functional limitations: impaired endurance Pt. cooperative and tolerated treatment well. Pt. progressing with mobility and has met goals for acute PT. Pt. Should not require further PT upon discharge.    Rehab Prognosis: Good; patient would benefit from acute skilled PT services to address these deficits and reach maximum level of function.    Recent Surgery: * No surgery found *      Plan:      (Discontinue acute PT)     Plan of Care Expires:  09/07/23    Subjective     Chief Complaint: no new c/o  Patient/Family Comments/goals: pt. Agreeable to PT  Pain/Comfort:  Pain Rating 1: 0/10  Pain Rating Post-Intervention 1: 0/10      Objective:     Communicated with nursing prior to session.  Patient found sitting edge of bed with  oxygen upon PT entry to room.     General Precautions: Standard, fall  Orthopedic Precautions: N/A  Braces: N/A  Respiratory Status: Nasal cannula, flow 3 L/min     Functional Mobility:  Transfers:     Sit to Stand:  supervision with no AD  Gait: >250' with Supervision and portable oxygen @ 3L without AD or LOB. Pt. amb. with slow, steady gait  Balance: good  Stairs:  Pt ascended/descended 12 stair(s) with No Assistive Device with left handrail with Stand-by Assistance.       AM-PAC 6 CLICK MOBILITY  Turning over in bed (including adjusting bedclothes, sheets and blankets)?: 4  Sitting down on and standing up from a chair with arms (e.g., wheelchair, bedside commode, etc.): 4  Moving from lying on back to sitting on the side of the bed?: 4  Moving to and from a bed to a chair (including a wheelchair)?: 4  Need to walk  in hospital room?: 3  Climbing 3-5 steps with a railing?: 3  Basic Mobility Total Score: 22       Treatment & Education:  Discussed pt.'s progress, goals, and POC.    Patient left sitting edge of bed with all lines intact and call button in reach..    GOALS:   Multidisciplinary Problems       Physical Therapy Goals          Problem: Physical Therapy    Goal Priority Disciplines Outcome Goal Variances Interventions   Physical Therapy Goal     PT, PT/OT Ongoing, Progressing     Description: Goals to be met by: 2023     Patient will increase functional independence with mobility by performin. Sit to stand transfer with [place]  2. Bed to chair transfer with [place] using No Assistive Device  3. Gait  x 200 feet with [place] using No Assistive Device.   4. Lower extremity exercise program x15 reps per handout, with supervision                         Time Tracking:     PT Received On: 08/10/23  PT Start Time: 1316     PT Stop Time: 1329  PT Total Time (min): 13 min     Billable Minutes: Gait Training 13    Treatment Type: Treatment  PT/PTA: PT     Number of PTA visits since last PT visit: 0     08/10/2023

## 2023-08-10 NOTE — PLAN OF CARE
Problem: Adult Inpatient Plan of Care  Goal: Plan of Care Review  Outcome: Ongoing, Progressing  Goal: Patient-Specific Goal (Individualized)  Outcome: Ongoing, Progressing  Goal: Absence of Hospital-Acquired Illness or Injury  Outcome: Ongoing, Progressing  Goal: Optimal Comfort and Wellbeing  Outcome: Ongoing, Progressing  Goal: Readiness for Transition of Care  Outcome: Ongoing, Progressing     Problem: Fall Injury Risk  Goal: Absence of Fall and Fall-Related Injury  Outcome: Ongoing, Progressing     Problem: Functional Ability Impaired COPD (Chronic Obstructive Pulmonary Disease)  Goal: Optimal Level of Functional [place]  Outcome: Ongoing, Progressing     Problem: Infection COPD (Chronic Obstructive Pulmonary Disease)  Goal: Absence of Infection Signs and Symptoms  Outcome: Ongoing, Progressing

## 2023-08-10 NOTE — PLAN OF CARE
Patient stable. AAOx4. Oriented to room. O2 applied to humidifier at 3L/NC. Nad present. Safety measures in place

## 2023-08-11 ENCOUNTER — PATIENT MESSAGE (OUTPATIENT)
Dept: PULMONOLOGY | Facility: CLINIC | Age: 68
End: 2023-08-11
Payer: MEDICARE

## 2023-08-11 LAB
ALBUMIN SERPL BCP-MCNC: 3.1 G/DL (ref 3.5–5.2)
ALP SERPL-CCNC: 43 U/L (ref 55–135)
ALT SERPL W/O P-5'-P-CCNC: 16 U/L (ref 10–44)
ANION GAP SERPL CALC-SCNC: 8 MMOL/L (ref 8–16)
AST SERPL-CCNC: 16 U/L (ref 10–40)
BASOPHILS # BLD AUTO: 0.03 K/UL (ref 0–0.2)
BASOPHILS NFR BLD: 0.3 % (ref 0–1.9)
BILIRUB SERPL-MCNC: 0.2 MG/DL (ref 0.1–1)
BUN SERPL-MCNC: 12 MG/DL (ref 8–23)
CALCIUM SERPL-MCNC: 8.8 MG/DL (ref 8.7–10.5)
CHLORIDE SERPL-SCNC: 104 MMOL/L (ref 95–110)
CO2 SERPL-SCNC: 30 MMOL/L (ref 23–29)
CREAT SERPL-MCNC: 0.6 MG/DL (ref 0.5–1.4)
DIFFERENTIAL METHOD: ABNORMAL
EOSINOPHIL # BLD AUTO: 0.1 K/UL (ref 0–0.5)
EOSINOPHIL NFR BLD: 0.5 % (ref 0–8)
ERYTHROCYTE [DISTWIDTH] IN BLOOD BY AUTOMATED COUNT: 15.8 % (ref 11.5–14.5)
EST. GFR  (NO RACE VARIABLE): >60 ML/MIN/1.73 M^2
GLUCOSE SERPL-MCNC: 82 MG/DL (ref 70–110)
HCT VFR BLD AUTO: 34.3 % (ref 37–48.5)
HGB BLD-MCNC: 10.9 G/DL (ref 12–16)
IMM GRANULOCYTES # BLD AUTO: 0.05 K/UL (ref 0–0.04)
IMM GRANULOCYTES NFR BLD AUTO: 0.4 % (ref 0–0.5)
LYMPHOCYTES # BLD AUTO: 4 K/UL (ref 1–4.8)
LYMPHOCYTES NFR BLD: 33.8 % (ref 18–48)
MCH RBC QN AUTO: 27.8 PG (ref 27–31)
MCHC RBC AUTO-ENTMCNC: 31.8 G/DL (ref 32–36)
MCV RBC AUTO: 88 FL (ref 82–98)
MONOCYTES # BLD AUTO: 1 K/UL (ref 0.3–1)
MONOCYTES NFR BLD: 8.2 % (ref 4–15)
NEUTROPHILS # BLD AUTO: 6.8 K/UL (ref 1.8–7.7)
NEUTROPHILS NFR BLD: 56.8 % (ref 38–73)
NRBC BLD-RTO: 0 /100 WBC
PLATELET # BLD AUTO: 266 K/UL (ref 150–450)
PMV BLD AUTO: 11.7 FL (ref 9.2–12.9)
POTASSIUM SERPL-SCNC: 3.7 MMOL/L (ref 3.5–5.1)
PROT SERPL-MCNC: 5.7 G/DL (ref 6–8.4)
RBC # BLD AUTO: 3.92 M/UL (ref 4–5.4)
SODIUM SERPL-SCNC: 142 MMOL/L (ref 136–145)
WBC # BLD AUTO: 11.88 K/UL (ref 3.9–12.7)

## 2023-08-11 PROCEDURE — 27100171 HC OXYGEN HIGH FLOW UP TO 24 HOURS

## 2023-08-11 PROCEDURE — 80053 COMPREHEN METABOLIC PANEL: CPT | Performed by: COMMUNITY HEALTH WORKER

## 2023-08-11 PROCEDURE — 25000003 PHARM REV CODE 250

## 2023-08-11 PROCEDURE — 94640 AIRWAY INHALATION TREATMENT: CPT

## 2023-08-11 PROCEDURE — 99232 SBSQ HOSP IP/OBS MODERATE 35: CPT | Mod: ,,, | Performed by: FAMILY MEDICINE

## 2023-08-11 PROCEDURE — 36415 COLL VENOUS BLD VENIPUNCTURE: CPT | Performed by: COMMUNITY HEALTH WORKER

## 2023-08-11 PROCEDURE — 94761 N-INVAS EAR/PLS OXIMETRY MLT: CPT

## 2023-08-11 PROCEDURE — 25000003 PHARM REV CODE 250: Performed by: FAMILY MEDICINE

## 2023-08-11 PROCEDURE — 21400001 HC TELEMETRY ROOM

## 2023-08-11 PROCEDURE — 85025 COMPLETE CBC W/AUTO DIFF WBC: CPT | Performed by: COMMUNITY HEALTH WORKER

## 2023-08-11 PROCEDURE — 99900035 HC TECH TIME PER 15 MIN (STAT)

## 2023-08-11 PROCEDURE — 25000003 PHARM REV CODE 250: Performed by: COMMUNITY HEALTH WORKER

## 2023-08-11 PROCEDURE — 25000242 PHARM REV CODE 250 ALT 637 W/ HCPCS: Performed by: COMMUNITY HEALTH WORKER

## 2023-08-11 PROCEDURE — 94660 CPAP INITIATION&MGMT: CPT

## 2023-08-11 PROCEDURE — 25000003 PHARM REV CODE 250: Performed by: INTERNAL MEDICINE

## 2023-08-11 PROCEDURE — 99232 PR SUBSEQUENT HOSPITAL CARE,LEVL II: ICD-10-PCS | Mod: ,,, | Performed by: FAMILY MEDICINE

## 2023-08-11 RX ORDER — BUPROPION HYDROCHLORIDE 150 MG/1
150 TABLET ORAL DAILY
Status: COMPLETED | OUTPATIENT
Start: 2023-08-11 | End: 2023-08-13

## 2023-08-11 RX ADMIN — IPRATROPIUM BROMIDE AND ALBUTEROL SULFATE 3 ML: .5; 3 SOLUTION RESPIRATORY (INHALATION) at 04:08

## 2023-08-11 RX ADMIN — LOSARTAN POTASSIUM 50 MG: 50 TABLET, FILM COATED ORAL at 08:08

## 2023-08-11 RX ADMIN — FUROSEMIDE 20 MG: 20 TABLET ORAL at 08:08

## 2023-08-11 RX ADMIN — GABAPENTIN 300 MG: 300 CAPSULE ORAL at 08:08

## 2023-08-11 RX ADMIN — IPRATROPIUM BROMIDE AND ALBUTEROL SULFATE 3 ML: .5; 3 SOLUTION RESPIRATORY (INHALATION) at 11:08

## 2023-08-11 RX ADMIN — BUPROPION HYDROCHLORIDE 150 MG: 150 TABLET, FILM COATED, EXTENDED RELEASE ORAL at 12:08

## 2023-08-11 RX ADMIN — GABAPENTIN 300 MG: 300 CAPSULE ORAL at 04:08

## 2023-08-11 RX ADMIN — FLUTICASONE FUROATE AND VILANTEROL TRIFENATATE 1 PUFF: 200; 25 POWDER RESPIRATORY (INHALATION) at 08:08

## 2023-08-11 RX ADMIN — ATORVASTATIN CALCIUM 10 MG: 10 TABLET, FILM COATED ORAL at 09:08

## 2023-08-11 RX ADMIN — IPRATROPIUM BROMIDE AND ALBUTEROL SULFATE 3 ML: .5; 3 SOLUTION RESPIRATORY (INHALATION) at 10:08

## 2023-08-11 RX ADMIN — IPRATROPIUM BROMIDE AND ALBUTEROL SULFATE 3 ML: .5; 3 SOLUTION RESPIRATORY (INHALATION) at 08:08

## 2023-08-11 RX ADMIN — GABAPENTIN 300 MG: 300 CAPSULE ORAL at 09:08

## 2023-08-11 NOTE — PLAN OF CARE
Spoke with [facility].  They were going to admit pt Monday; informed that Bipap is waiting on auth, and MD doesn't want her to go home without it.    Tracy Weber, SARAHN, BS, RN, CCM

## 2023-08-11 NOTE — PLAN OF CARE
Problem: Adult Inpatient Plan of Care  Goal: Plan of Care Review  Outcome: Ongoing, Progressing  Goal: Absence of Hospital-Acquired Illness or Injury  Outcome: Ongoing, Progressing  Goal: Optimal Comfort and Wellbeing  Outcome: Ongoing, Progressing  Goal: Readiness for Transition of Care  Outcome: Ongoing, Progressing     Problem: Fall Injury Risk  Goal: Absence of Fall and Fall-Related Injury  Outcome: Ongoing, Progressing     Problem: Functional Ability Impaired COPD (Chronic Obstructive Pulmonary Disease)  Goal: Optimal Level of Functional [place]  Outcome: Ongoing, Progressing     Patient stable. AAOx4. 3L NC O2 with humidifier. Slept comfortably on bipap machine. No acute incidents or events this shift.

## 2023-08-11 NOTE — ASSESSMENT & PLAN NOTE
Dangers of cigarette smoking were reviewed with patient in detail.  Plan for discharge with nicotine patches and Wellbutrin.

## 2023-08-11 NOTE — ASSESSMENT & PLAN NOTE
Acute on chronic hypoxic hypercapnic respiratory failure  · Improving and close to baseline.  Doing well on 3 L of home oxygen.  Approved for trilogy at night.    · Will continue with scheduled nebs oral prednisone.  Possible discharge 8/11 with authorization of trilogy.  S/p short course of Rocephin azithromycin.  · Outpatient pulmonology follow up

## 2023-08-11 NOTE — SUBJECTIVE & OBJECTIVE
Interval History:  Patient says shortness breath overall continues to improve.  Reports intermittent dyspnea on exertion.  Currently on 3 L by nasal cannula which is her home oxygen.  Denies any chest pain, fevers, chills.  Patient reports mild cough that has overall improved.    Labs reviewed:  WBC 12.5, sodium 141, potassium 3.5, creatinine 0.6    Review of Systems: As above  Objective:     Vital Signs (Most Recent):  Temp: 97 °F (36.1 °C) (08/10/23 2019)  Pulse: 82 (08/10/23 2113)  Resp: 20 (08/10/23 2113)  BP: (!) 104/55 (08/10/23 2019)  SpO2: 97 % (08/10/23 2113) Vital Signs (24h Range):  Temp:  [97 °F (36.1 °C)-98.8 °F (37.1 °C)] 97 °F (36.1 °C)  Pulse:  [70-94] 82  Resp:  [15-20] 20  SpO2:  [91 %-98 %] 97 %  BP: (104-130)/(55-70) 104/55     Weight: 74.3 kg (163 lb 12.8 oz)  Body mass index is 29.02 kg/m².    Intake/Output Summary (Last 24 hours) at 8/10/2023 2148  Last data filed at 8/10/2023 0900  Gross per 24 hour   Intake 120 ml   Output --   Net 120 ml      Physical Exam  Constitutional:       General: She is not in acute distress.  HENT:      Head: Normocephalic.      Nose:      Comments: Nasal cannula     Mouth/Throat:      Mouth: Mucous membranes are moist.   Eyes:      General: No scleral icterus.     Conjunctiva/sclera: Conjunctivae normal.   Cardiovascular:      Rate and Rhythm: Normal rate and regular rhythm.   Pulmonary:      Effort: No respiratory distress.      Breath sounds: No wheezing or rales.      Comments: No significant rales or rhonchi  Abdominal:      General: There is no distension.      Palpations: Abdomen is soft.      Tenderness: There is no abdominal tenderness. There is no guarding or rebound.   Musculoskeletal:      Right lower leg: No edema.      Left lower leg: No edema.   Skin:     General: Skin is warm.   Neurological:      General: No focal deficit present.      Mental Status: She is alert.   Psychiatric:         Mood and Affect: Mood normal.         Behavior: Behavior  normal.         Significant Labs:  CBC:  Recent Labs   Lab 08/09/23  0722 08/10/23  0419   WBC 10.71 12.53   HGB 11.3* 11.3*   HCT 35.3* 36.0*    265       CMP:  Recent Labs   Lab 08/09/23  0722 08/10/23  0419    141   K 3.5 3.5    102   CO2 30* 29   GLU 84 90   BUN 10 10   CREATININE 0.6 0.6   CALCIUM 8.8 8.6*   PROT 5.9* 6.0   ALBUMIN 3.2* 3.3*   BILITOT 0.3 0.2   ALKPHOS 43* 116   AST 14 14   ALT 14 14   ANIONGAP 7* 10

## 2023-08-11 NOTE — PROGRESS NOTES
[facility] - Int[facility] ([address] Medicine  Progress Note    Patient Name: Rochelle Espinoza  MRN: [id number]  Patient Class: IP- Inpatient   Admission Date: 8/6/2023  Length of Stay: 4 days  Attending Physician: Jose Marcus III*  Primary Care Provider: Yosi Samuels Jr., MD        Subjective:     Principal Problem:COPD exacerbation        HPI:  No notes on file    Overview/Hospital Course:  67F admitted to MICU for acute on chronic hypoxic respirator acidosis w/ Hx of right sided diastolic HF (2020), COPD on 3L O2, HTN, HLD and tobacco dependence.  During admittion, the patient was on sating in the mid 90's on 16 iPAP/ 6 ePAP @ 30% o2. Her initial ABG was 7.24/87.2/25/40.5.  H/H was normal, BNP <10, Trop normal, UA neg. And lactate of 4.3, and AG of 13. CXR and EKG were normal. After the 3rd round of duonebs and addition of steroids the patient improved weaned off BiPAP to 5L NC w/ stable sats in the mid 90's. Fluticasone furoate-vilanteroL 200-25 mcg/dose diskus, Simethicone, Famotidine were added.  On 8/7, Echo cardiogram was preformed with nominal change from prior in 2020. Patient's oxygen requirements have been titrated down to only requiring 3L NC. Patient was stepdown to .  She was approved for Trilogy at night from [employer].  Awaiting insurance authorization      Interval History:  Patient says shortness breath overall continues to improve.  Reports intermittent dyspnea on exertion.  Currently on 3 L by nasal cannula which is her home oxygen.  Denies any chest pain, fevers, chills.  Patient reports mild cough that has overall improved.    Labs reviewed:  WBC 12.5, sodium 141, potassium 3.5, creatinine 0.6    Review of Systems: As above  Objective:     Vital Signs (Most Recent):  Temp: 97 °F (36.1 °C) (08/10/23 2019)  Pulse: 82 (08/10/23 2113)  Resp: 20 (08/10/23 2113)  BP: (!) 104/55 (08/10/23 2019)  SpO2: 97 % (08/10/23 2113) Vital Signs (24h Range):  Temp:  [97 °F (36.1 °C)-98.8 °F  (37.1 °C)] 97 °F (36.1 °C)  Pulse:  [70-94] 82  Resp:  [15-20] 20  SpO2:  [91 %-98 %] 97 %  BP: (104-130)/(55-70) 104/55     Weight: 74.3 kg (163 lb 12.8 oz)  Body mass index is 29.02 kg/m².    Intake/Output Summary (Last 24 hours) at 8/10/2023 2148  Last data filed at 8/10/2023 0900  Gross per 24 hour   Intake 120 ml   Output --   Net 120 ml      Physical Exam  Constitutional:       General: She is not in acute distress.  HENT:      Head: Normocephalic.      Nose:      Comments: Nasal cannula     Mouth/Throat:      Mouth: Mucous membranes are moist.   Eyes:      General: No scleral icterus.     Conjunctiva/sclera: Conjunctivae normal.   Cardiovascular:      Rate and Rhythm: Normal rate and regular rhythm.   Pulmonary:      Effort: No respiratory distress.      Breath sounds: No wheezing or rales.      Comments: No significant rales or rhonchi  Abdominal:      General: There is no distension.      Palpations: Abdomen is soft.      Tenderness: There is no abdominal tenderness. There is no guarding or rebound.   Musculoskeletal:      Right lower leg: No edema.      Left lower leg: No edema.   Skin:     General: Skin is warm.   Neurological:      General: No focal deficit present.      Mental Status: She is alert.   Psychiatric:         Mood and Affect: Mood normal.         Behavior: Behavior normal.         Significant Labs:  CBC:  Recent Labs   Lab 08/09/23  0722 08/10/23  0419   WBC 10.71 12.53   HGB 11.3* 11.3*   HCT 35.3* 36.0*    265       CMP:  Recent Labs   Lab 08/09/23  0722 08/10/23  0419    141   K 3.5 3.5    102   CO2 30* 29   GLU 84 90   BUN 10 10   CREATININE 0.6 0.6   CALCIUM 8.8 8.6*   PROT 5.9* 6.0   ALBUMIN 3.2* 3.3*   BILITOT 0.3 0.2   ALKPHOS 43* 116   AST 14 14   ALT 14 14   ANIONGAP 7* 10               Assessment/Plan:      * COPD exacerbation  Acute on chronic hypoxic hypercapnic respiratory failure  · Improving and close to baseline.  Doing well on 3 L of home oxygen.   Approved for trilogy at night.    · Will continue with scheduled nebs oral prednisone.  Possible discharge 8/11 with authorization of trilogy.  S/p short course of Rocephin azithromycin.  · Outpatient pulmonology follow up      Chronic diastolic heart failure  No evidence of decompensation at this time.  Amlodipine discontinued.  Lasix 20 mg daily     Nicotine dependence, cigarettes, uncomplicated   Dangers of cigarette smoking were reviewed with patient in detail.  Plan for discharge with nicotine patches and Wellbutrin.    Essential hypertension  Amlodipine and HCTZ discontinued, losartan dose decreased to 50 mg daily due to low normal BP.        VTE Risk Mitigation (From admission, onward)         Ordered     enoxaparin injection 40 mg  Every 24 hours         08/06/23 0502                Discharge Planning   ELIZABETH: 8/11/2023     Code Status: Full Code   Is the patient medically ready for discharge?: No    Reason for patient still in hospital (select all that apply): Patient trending condition and Pending disposition  Discharge Plan A: Home Health   Discharge Delays: (!) Home Medical Equipment (Insurance, Delivery)              Jose Marcus III, MD  Department of Hospital Medicine   [facility] - Intensive Care ([place]-16)

## 2023-08-12 PROCEDURE — 94640 AIRWAY INHALATION TREATMENT: CPT

## 2023-08-12 PROCEDURE — 99900035 HC TECH TIME PER 15 MIN (STAT)

## 2023-08-12 PROCEDURE — 99231 PR SUBSEQUENT HOSPITAL CARE,LEVL I: ICD-10-PCS | Mod: ,,, | Performed by: FAMILY MEDICINE

## 2023-08-12 PROCEDURE — 99231 SBSQ HOSP IP/OBS SF/LOW 25: CPT | Mod: ,,, | Performed by: FAMILY MEDICINE

## 2023-08-12 PROCEDURE — 25000003 PHARM REV CODE 250: Performed by: COMMUNITY HEALTH WORKER

## 2023-08-12 PROCEDURE — 21400001 HC TELEMETRY ROOM

## 2023-08-12 PROCEDURE — 25000003 PHARM REV CODE 250

## 2023-08-12 PROCEDURE — 25000242 PHARM REV CODE 250 ALT 637 W/ HCPCS: Performed by: COMMUNITY HEALTH WORKER

## 2023-08-12 PROCEDURE — 94761 N-INVAS EAR/PLS OXIMETRY MLT: CPT

## 2023-08-12 PROCEDURE — 94660 CPAP INITIATION&MGMT: CPT

## 2023-08-12 PROCEDURE — 27000221 HC OXYGEN, UP TO 24 HOURS

## 2023-08-12 PROCEDURE — 27100171 HC OXYGEN HIGH FLOW UP TO 24 HOURS

## 2023-08-12 PROCEDURE — 25000003 PHARM REV CODE 250: Performed by: FAMILY MEDICINE

## 2023-08-12 PROCEDURE — 63600175 PHARM REV CODE 636 W HCPCS: Performed by: COMMUNITY HEALTH WORKER

## 2023-08-12 PROCEDURE — 25000003 PHARM REV CODE 250: Performed by: INTERNAL MEDICINE

## 2023-08-12 RX ADMIN — IPRATROPIUM BROMIDE AND ALBUTEROL SULFATE 3 ML: .5; 3 SOLUTION RESPIRATORY (INHALATION) at 03:08

## 2023-08-12 RX ADMIN — ENOXAPARIN SODIUM 40 MG: 40 INJECTION SUBCUTANEOUS at 05:08

## 2023-08-12 RX ADMIN — FLUTICASONE FUROATE AND VILANTEROL TRIFENATATE 1 PUFF: 200; 25 POWDER RESPIRATORY (INHALATION) at 07:08

## 2023-08-12 RX ADMIN — IPRATROPIUM BROMIDE AND ALBUTEROL SULFATE 3 ML: .5; 3 SOLUTION RESPIRATORY (INHALATION) at 08:08

## 2023-08-12 RX ADMIN — BUPROPION HYDROCHLORIDE 150 MG: 150 TABLET, FILM COATED, EXTENDED RELEASE ORAL at 09:08

## 2023-08-12 RX ADMIN — GABAPENTIN 300 MG: 300 CAPSULE ORAL at 03:08

## 2023-08-12 RX ADMIN — GABAPENTIN 300 MG: 300 CAPSULE ORAL at 10:08

## 2023-08-12 RX ADMIN — FUROSEMIDE 20 MG: 20 TABLET ORAL at 09:08

## 2023-08-12 RX ADMIN — ATORVASTATIN CALCIUM 10 MG: 10 TABLET, FILM COATED ORAL at 10:08

## 2023-08-12 RX ADMIN — IPRATROPIUM BROMIDE AND ALBUTEROL SULFATE 3 ML: .5; 3 SOLUTION RESPIRATORY (INHALATION) at 12:08

## 2023-08-12 RX ADMIN — GABAPENTIN 300 MG: 300 CAPSULE ORAL at 09:08

## 2023-08-12 RX ADMIN — IPRATROPIUM BROMIDE AND ALBUTEROL SULFATE 3 ML: .5; 3 SOLUTION RESPIRATORY (INHALATION) at 07:08

## 2023-08-12 RX ADMIN — LOSARTAN POTASSIUM 50 MG: 50 TABLET, FILM COATED ORAL at 09:08

## 2023-08-12 NOTE — PLAN OF CARE
Problem: Adult Inpatient Plan of Care  Goal: Plan of Care Review  Outcome: Ongoing, Progressing  Goal: Patient-Specific Goal (Individualized)  Outcome: Ongoing, Progressing  Goal: Absence of Hospital-Acquired Illness or Injury  Outcome: Ongoing, Progressing  Goal: Optimal Comfort and Wellbeing  Outcome: Ongoing, Progressing  Goal: Readiness for Transition of Care  Outcome: Ongoing, Progressing     Problem: Fall Injury Risk  Goal: Absence of Fall and Fall-Related Injury  Outcome: Ongoing, Progressing     Problem: Functional Ability Impaired COPD (Chronic Obstructive Pulmonary Disease)  Goal: Optimal Level of Functional [place]  Outcome: Ongoing, Progressing

## 2023-08-12 NOTE — PROGRESS NOTES
[facility] - [facility] ([address] Medicine  Progress Note    Patient Name: Rochelle Espinoza  MRN: [id number]  Patient Class: IP- Inpatient   Admission Date: 8/6/2023  Length of Stay: 6 days  Attending Physician: Jose Marcus III*  Primary Care Provider: Yosi Samuels Jr., MD        Subjective:     Principal Problem:COPD exacerbation        HPI:  No notes on file    Overview/Hospital Course:  67F admitted to MICU for acute on chronic hypoxic respirator acidosis w/ Hx of right sided diastolic HF (2020), COPD on 3L O2, HTN, HLD and tobacco dependence.  During admittion, the patient was on sating in the mid 90's on 16 iPAP/ 6 ePAP @ 30% o2. Her initial ABG was 7.24/87.2/25/40.5.  H/H was normal, BNP <10, Trop normal, UA neg. And lactate of 4.3, and AG of 13. CXR and EKG were normal. After the 3rd round of duonebs and addition of steroids the patient improved weaned off BiPAP to 5L NC w/ stable sats in the mid 90's. Fluticasone furoate-vilanteroL 200-25 mcg/dose diskus, Simethicone, Famotidine were added.  On 8/7, Echo cardiogram was preformed with nominal change from prior in 2020. Patient's oxygen requirements have been titrated down to only requiring 3L NC. Patient was stepdown to .  She was approved for Trilogy at night from [employer].  Awaiting insurance authorization      Interval History:  Patient denies any shortness of breath today.  Tolerated but Wellbutrin.      Review of Systems: As above  Objective:     Vital Signs (Most Recent):  Temp: 98.8 °F (37.1 °C) (08/12/23 1613)  Pulse: 98 (08/12/23 1613)  Resp: 18 (08/12/23 1613)  BP: (!) 101/57 (08/12/23 1613)  SpO2: (!) 91 % (08/12/23 1613) Vital Signs (24h Range):  Temp:  [97.6 °F (36.4 °C)-98.8 °F (37.1 °C)] 98.8 °F (37.1 °C)  Pulse:  [] 98  Resp:  [16-20] 18  SpO2:  [91 %-98 %] 91 %  BP: (101-122)/(52-71) 101/57     Weight: 74.3 kg (163 lb 12.8 oz)  Body mass index is 29.02 kg/m².    Intake/Output Summary (Last 24 hours) at 8/12/2023  1749  Last data filed at 8/12/2023 1706  Gross per 24 hour   Intake 720 ml   Output 400 ml   Net 320 ml        Physical Exam  Constitutional:       General: She is not in acute distress.  HENT:      Head: Normocephalic.      Nose:      Comments: Nasal cannula     Mouth/Throat:      Mouth: Mucous membranes are moist.   Eyes:      General: No scleral icterus.     Conjunctiva/sclera: Conjunctivae normal.   Pulmonary:      Effort: No respiratory distress.   Abdominal:      General: There is no distension.   Musculoskeletal:      Right lower leg: No edema.      Left lower leg: No edema.   Skin:     General: Skin is warm.   Neurological:      General: No focal deficit present.      Mental Status: She is alert.   Psychiatric:         Mood and Affect: Mood normal.         Behavior: Behavior normal.         Significant Labs:  CBC:  Recent Labs   Lab 08/11/23  0443   WBC 11.88   HGB 10.9*   HCT 34.3*          CMP:  Recent Labs   Lab 08/11/23  0443      K 3.7      CO2 30*   GLU 82   BUN 12   CREATININE 0.6   CALCIUM 8.8   PROT 5.7*   ALBUMIN 3.1*   BILITOT 0.2   ALKPHOS 43*   AST 16   ALT 16   ANIONGAP 8               Assessment/Plan:      * COPD exacerbation  Acute on chronic hypoxic hypercapnic respiratory failure  · Improving and close to baseline.  Doing well on 3 L of home oxygen.  Approved for trilogy at night.    · Will continue with scheduled nebs, s/p prednisone.  Discharge once authorization of trilogy obtained.  S/p short course of Rocephin azithromycin.  · Outpatient pulmonology follow up      Major depressive disorder  · Patient not previously on medication but wants to try Wellbutrin for smoking cessation as well.      Chronic diastolic heart failure  No evidence of decompensation at this time.  Amlodipine discontinued.  Lasix 20 mg daily     Nicotine dependence, cigarettes, uncomplicated   Dangers of cigarette smoking were reviewed with patient in detail.  Plan for discharge with nicotine  patches and Wellbutrin.  Started Wellbutrin while inpatient    Essential hypertension  Amlodipine and HCTZ discontinued, losartan dose decreased to 50 mg daily due to low normal BP.        VTE Risk Mitigation (From admission, onward)         Ordered     enoxaparin injection 40 mg  Every 24 hours         08/06/23 0502                Discharge Planning   ELIZABETH: 8/13/2023     Code Status: Full Code   Is the patient medically ready for discharge?: No    Reason for patient still in hospital (select all that apply): Pending disposition  Discharge Plan A: Home Health   Discharge Delays: (!) Home Medical Equipment (Insurance, Delivery)              Jose Marcus III, MD  Department of Hospital Medicine   [facility] - Intensive Care ([place]-16)

## 2023-08-12 NOTE — PROGRESS NOTES
[facility] ([address] Medicine  Progress Note    Patient Name: Rochelle Espinoza  MRN: [id number]  Patient Class: IP- Inpatient   Admission Date: 8/6/2023  Length of Stay: 5 days  Attending Physician: Jose Marcus III*  Primary Care Provider: Yosi Samuels Jr., MD        Subjective:     Principal Problem:COPD exacerbation        HPI:  No notes on file    Overview/Hospital Course:  67F admitted to MICU for acute on chronic hypoxic respirator acidosis w/ Hx of right sided diastolic HF (2020), COPD on 3L O2, HTN, HLD and tobacco dependence.  During admittion, the patient was on sating in the mid 90's on 16 iPAP/ 6 ePAP @ 30% o2. Her initial ABG was 7.24/87.2/25/40.5.  H/H was normal, BNP <10, Trop normal, UA neg. And lactate of 4.3, and AG of 13. CXR and EKG were normal. After the 3rd round of duonebs and addition of steroids the patient improved weaned off BiPAP to 5L NC w/ stable sats in the mid 90's. Fluticasone furoate-vilanteroL 200-25 mcg/dose diskus, Simethicone, Famotidine were added.  On 8/7, Echo cardiogram was preformed with nominal change from prior in 2020. Patient's oxygen requirements have been titrated down to only requiring 3L NC. Patient was stepdown to .  She was approved for Trilogy at night from [employer].  Awaiting insurance authorization      Interval History:  Patient says overall shortness of breath continues to improve.  She is close to her baseline and is on home 3 L by nasal cannula.  Awaiting authorization for trilogy machine.  Patient requests to start Wellbutrin for smoking cessation in hospital in addition to nicotine patches.    Labs reviewed:  WBC 11.88, hemoglobin 10.9, sodium 142, potassium 3.7, creatinine 0.6    Review of Systems: As above  Objective:     Vital Signs (Most Recent):  Temp: 98.7 °F (37.1 °C) (08/11/23 1943)  Pulse: 74 (08/11/23 2224)  Resp: 16 (08/11/23 2201)  BP: 105/65 (08/11/23 1943)  SpO2: 97 % (08/11/23 2224) Vital Signs (24h  Range):  Temp:  [97 °F (36.1 °C)-98.7 °F (37.1 °C)] 98.7 °F (37.1 °C)  Pulse:  [62-91] 74  Resp:  [16-20] 16  SpO2:  [92 %-100 %] 97 %  BP: ()/(56-74) 105/65     Weight: 74.3 kg (163 lb 12.8 oz)  Body mass index is 29.02 kg/m².  No intake or output data in the 24 hours ending 08/11/23 2251     Physical Exam  Constitutional:       General: She is not in acute distress.  HENT:      Head: Normocephalic.      Nose:      Comments: Nasal cannula     Mouth/Throat:      Mouth: Mucous membranes are moist.   Eyes:      General: No scleral icterus.     Conjunctiva/sclera: Conjunctivae normal.   Pulmonary:      Effort: No respiratory distress.   Abdominal:      General: There is no distension.   Musculoskeletal:      Right lower leg: No edema.      Left lower leg: No edema.   Skin:     General: Skin is warm.   Neurological:      General: No focal deficit present.      Mental Status: She is alert.   Psychiatric:         Mood and Affect: Mood normal.         Behavior: Behavior normal.         Significant Labs:  CBC:  Recent Labs   Lab 08/10/23  0419 08/11/23  0443   WBC 12.53 11.88   HGB 11.3* 10.9*   HCT 36.0* 34.3*    266       CMP:  Recent Labs   Lab 08/10/23  0419 08/11/23  0443    142   K 3.5 3.7    104   CO2 29 30*   GLU 90 82   BUN 10 12   CREATININE 0.6 0.6   CALCIUM 8.6* 8.8   PROT 6.0 5.7*   ALBUMIN 3.3* 3.1*   BILITOT 0.2 0.2   ALKPHOS 116 43*   AST 14 16   ALT 14 16   ANIONGAP 10 8               Assessment/Plan:      * COPD exacerbation  Acute on chronic hypoxic hypercapnic respiratory failure  · Improving and close to baseline.  Doing well on 3 L of home oxygen.  Approved for trilogy at night.    · Will continue with scheduled nebs, s/p prednisone.  Possible discharge 8/12 with authorization of trilogy.  S/p short course of Rocephin azithromycin.  · Outpatient pulmonology follow up      Chronic diastolic heart failure  No evidence of decompensation at this time.  Amlodipine discontinued.   Lasix 20 mg daily     Nicotine dependence, cigarettes, uncomplicated   Dangers of cigarette smoking were reviewed with patient in detail.  Plan for discharge with nicotine patches and Wellbutrin.  Started Wellbutrin while inpatient    Essential hypertension  Amlodipine and HCTZ discontinued, losartan dose decreased to 50 mg daily due to low normal BP.        VTE Risk Mitigation (From admission, onward)         Ordered     enoxaparin injection 40 mg  Every 24 hours         08/06/23 0502                Discharge Planning   ELIZABETH: 8/12/2023     Code Status: Full Code   Is the patient medically ready for discharge?: No    Reason for patient still in hospital (select all that apply): Pending disposition  Discharge Plan A: Home Health   Discharge Delays: (!) Home Medical Equipment (Insurance, Delivery)              Jose Marcus III, MD  Department of Hospital Medicine   [facility] - Intensive Care ([place]-[facility])

## 2023-08-12 NOTE — ASSESSMENT & PLAN NOTE
Dangers of cigarette smoking were reviewed with patient in detail.  Plan for discharge with nicotine patches and Wellbutrin.  Started Wellbutrin while inpatient

## 2023-08-12 NOTE — SUBJECTIVE & OBJECTIVE
Interval History:  Patient denies any shortness of breath today.  Tolerated but Wellbutrin.      Review of Systems: As above  Objective:     Vital Signs (Most Recent):  Temp: 98.8 °F (37.1 °C) (08/12/23 1613)  Pulse: 98 (08/12/23 1613)  Resp: 18 (08/12/23 1613)  BP: (!) 101/57 (08/12/23 1613)  SpO2: (!) 91 % (08/12/23 1613) Vital Signs (24h Range):  Temp:  [97.6 °F (36.4 °C)-98.8 °F (37.1 °C)] 98.8 °F (37.1 °C)  Pulse:  [] 98  Resp:  [16-20] 18  SpO2:  [91 %-98 %] 91 %  BP: (101-122)/(52-71) 101/57     Weight: 74.3 kg (163 lb 12.8 oz)  Body mass index is 29.02 kg/m².    Intake/Output Summary (Last 24 hours) at 8/12/2023 1749  Last data filed at 8/12/2023 1706  Gross per 24 hour   Intake 720 ml   Output 400 ml   Net 320 ml        Physical Exam  Constitutional:       General: She is not in acute distress.  HENT:      Head: Normocephalic.      Nose:      Comments: Nasal cannula     Mouth/Throat:      Mouth: Mucous membranes are moist.   Eyes:      General: No scleral icterus.     Conjunctiva/sclera: Conjunctivae normal.   Pulmonary:      Effort: No respiratory distress.   Abdominal:      General: There is no distension.   Musculoskeletal:      Right lower leg: No edema.      Left lower leg: No edema.   Skin:     General: Skin is warm.   Neurological:      General: No focal deficit present.      Mental Status: She is alert.   Psychiatric:         Mood and Affect: Mood normal.         Behavior: Behavior normal.         Significant Labs:  CBC:  Recent Labs   Lab 08/11/23 0443   WBC 11.88   HGB 10.9*   HCT 34.3*          CMP:  Recent Labs   Lab 08/11/23 0443      K 3.7      CO2 30*   GLU 82   BUN 12   CREATININE 0.6   CALCIUM 8.8   PROT 5.7*   ALBUMIN 3.1*   BILITOT 0.2   ALKPHOS 43*   AST 16   ALT 16   ANIONGAP 8

## 2023-08-12 NOTE — ASSESSMENT & PLAN NOTE
Acute on chronic hypoxic hypercapnic respiratory failure  · Improving and close to baseline.  Doing well on 3 L of home oxygen.  Approved for trilogy at night.    · Will continue with scheduled nebs, s/p prednisone.  Discharge once authorization of trilogy obtained.  S/p short course of Rocephin azithromycin.  · Outpatient pulmonology follow up

## 2023-08-12 NOTE — ASSESSMENT & PLAN NOTE
Acute on chronic hypoxic hypercapnic respiratory failure  · Improving and close to baseline.  Doing well on 3 L of home oxygen.  Approved for trilogy at night.    · Will continue with scheduled nebs, s/p prednisone.  Possible discharge 8/12 with authorization of trilogy.  S/p short course of Rocephin azithromycin.  · Outpatient pulmonology follow up

## 2023-08-12 NOTE — PLAN OF CARE
Problem: Adult Inpatient Plan of Care  Goal: Plan of Care Review  Outcome: Ongoing, Progressing     Problem: Adult Inpatient Plan of Care  Goal: Patient-Specific Goal (Individualized)  Outcome: Ongoing, Progressing     Problem: Adult Inpatient Plan of Care  Goal: Absence of Hospital-Acquired Illness or Injury  Outcome: Ongoing, Progressing     Problem: Adult Inpatient Plan of Care  Goal: Optimal Comfort and Wellbeing  Outcome: Ongoing, Progressing     Problem: Adult Inpatient Plan of Care  Goal: Readiness for Transition of Care  Outcome: Ongoing, Progressing     Problem: Fall Injury Risk  Goal: Absence of Fall and Fall-Related Injury  Outcome: Ongoing, Progressing     Problem: Functional Ability Impaired COPD (Chronic Obstructive Pulmonary Disease)  Goal: Optimal Level of Functional [place]  Outcome: Ongoing, Progressing     Problem: Infection COPD (Chronic Obstructive Pulmonary Disease)  Goal: Absence of Infection Signs and Symptoms  Outcome: Ongoing, Progressing   Patient AAOX4 no acute distress noted VSS RA no complaints of pain or discomfort noted. Safety precautions maintained remains injury free throughout this shift. Will cont to monitor.

## 2023-08-12 NOTE — SUBJECTIVE & OBJECTIVE
Interval History:  Patient says overall shortness of breath continues to improve.  She is close to her baseline and is on home 3 L by nasal cannula.  Awaiting authorization for trilogy machine.  Patient requests to start Wellbutrin for smoking cessation in hospital in addition to nicotine patches.    Labs reviewed:  WBC 11.88, hemoglobin 10.9, sodium 142, potassium 3.7, creatinine 0.6    Review of Systems: As above  Objective:     Vital Signs (Most Recent):  Temp: 98.7 °F (37.1 °C) (08/11/23 1943)  Pulse: 74 (08/11/23 2224)  Resp: 16 (08/11/23 2201)  BP: 105/65 (08/11/23 1943)  SpO2: 97 % (08/11/23 2224) Vital Signs (24h Range):  Temp:  [97 °F (36.1 °C)-98.7 °F (37.1 °C)] 98.7 °F (37.1 °C)  Pulse:  [62-91] 74  Resp:  [16-20] 16  SpO2:  [92 %-100 %] 97 %  BP: ()/(56-74) 105/65     Weight: 74.3 kg (163 lb 12.8 oz)  Body mass index is 29.02 kg/m².  No intake or output data in the 24 hours ending 08/11/23 2251     Physical Exam  Constitutional:       General: She is not in acute distress.  HENT:      Head: Normocephalic.      Nose:      Comments: Nasal cannula     Mouth/Throat:      Mouth: Mucous membranes are moist.   Eyes:      General: No scleral icterus.     Conjunctiva/sclera: Conjunctivae normal.   Pulmonary:      Effort: No respiratory distress.   Abdominal:      General: There is no distension.   Musculoskeletal:      Right lower leg: No edema.      Left lower leg: No edema.   Skin:     General: Skin is warm.   Neurological:      General: No focal deficit present.      Mental Status: She is alert.   Psychiatric:         Mood and Affect: Mood normal.         Behavior: Behavior normal.         Significant Labs:  CBC:  Recent Labs   Lab 08/10/23  0419 08/11/23  0443   WBC 12.53 11.88   HGB 11.3* 10.9*   HCT 36.0* 34.3*    266       CMP:  Recent Labs   Lab 08/10/23  0419 08/11/23  0443    142   K 3.5 3.7    104   CO2 29 30*   GLU 90 82   BUN 10 12   CREATININE 0.6 0.6   CALCIUM 8.6* 8.8    PROT 6.0 5.7*   ALBUMIN 3.3* 3.1*   BILITOT 0.2 0.2   ALKPHOS 116 43*   AST 14 16   ALT 14 16   ANIONGAP 10 8

## 2023-08-13 LAB
ANION GAP SERPL CALC-SCNC: 12 MMOL/L (ref 8–16)
BASOPHILS # BLD AUTO: 0.03 K/UL (ref 0–0.2)
BASOPHILS NFR BLD: 0.4 % (ref 0–1.9)
BUN SERPL-MCNC: 13 MG/DL (ref 8–23)
CALCIUM SERPL-MCNC: 8.9 MG/DL (ref 8.7–10.5)
CHLORIDE SERPL-SCNC: 100 MMOL/L (ref 95–110)
CO2 SERPL-SCNC: 29 MMOL/L (ref 23–29)
CREAT SERPL-MCNC: 0.7 MG/DL (ref 0.5–1.4)
DIFFERENTIAL METHOD: ABNORMAL
EOSINOPHIL # BLD AUTO: 0.1 K/UL (ref 0–0.5)
EOSINOPHIL NFR BLD: 1.1 % (ref 0–8)
ERYTHROCYTE [DISTWIDTH] IN BLOOD BY AUTOMATED COUNT: 15.3 % (ref 11.5–14.5)
EST. GFR  (NO RACE VARIABLE): >60 ML/MIN/1.73 M^2
GLUCOSE SERPL-MCNC: 87 MG/DL (ref 70–110)
HCT VFR BLD AUTO: 40.1 % (ref 37–48.5)
HGB BLD-MCNC: 12.2 G/DL (ref 12–16)
IMM GRANULOCYTES # BLD AUTO: 0.06 K/UL (ref 0–0.04)
IMM GRANULOCYTES NFR BLD AUTO: 0.7 % (ref 0–0.5)
LYMPHOCYTES # BLD AUTO: 2.7 K/UL (ref 1–4.8)
LYMPHOCYTES NFR BLD: 32.3 % (ref 18–48)
MAGNESIUM SERPL-MCNC: 2 MG/DL (ref 1.6–2.6)
MCH RBC QN AUTO: 27.4 PG (ref 27–31)
MCHC RBC AUTO-ENTMCNC: 30.4 G/DL (ref 32–36)
MCV RBC AUTO: 90 FL (ref 82–98)
MONOCYTES # BLD AUTO: 0.8 K/UL (ref 0.3–1)
MONOCYTES NFR BLD: 9.7 % (ref 4–15)
NEUTROPHILS # BLD AUTO: 4.7 K/UL (ref 1.8–7.7)
NEUTROPHILS NFR BLD: 55.8 % (ref 38–73)
NRBC BLD-RTO: 0 /100 WBC
PHOSPHATE SERPL-MCNC: 4.4 MG/DL (ref 2.7–4.5)
PLATELET # BLD AUTO: 290 K/UL (ref 150–450)
PMV BLD AUTO: 11.5 FL (ref 9.2–12.9)
POTASSIUM SERPL-SCNC: 4 MMOL/L (ref 3.5–5.1)
RBC # BLD AUTO: 4.46 M/UL (ref 4–5.4)
SODIUM SERPL-SCNC: 141 MMOL/L (ref 136–145)
WBC # BLD AUTO: 8.35 K/UL (ref 3.9–12.7)

## 2023-08-13 PROCEDURE — 25000003 PHARM REV CODE 250: Performed by: INTERNAL MEDICINE

## 2023-08-13 PROCEDURE — 36415 COLL VENOUS BLD VENIPUNCTURE: CPT | Performed by: FAMILY MEDICINE

## 2023-08-13 PROCEDURE — 25000003 PHARM REV CODE 250

## 2023-08-13 PROCEDURE — 25000242 PHARM REV CODE 250 ALT 637 W/ HCPCS: Performed by: COMMUNITY HEALTH WORKER

## 2023-08-13 PROCEDURE — 27100171 HC OXYGEN HIGH FLOW UP TO 24 HOURS

## 2023-08-13 PROCEDURE — 94640 AIRWAY INHALATION TREATMENT: CPT

## 2023-08-13 PROCEDURE — 99232 PR SUBSEQUENT HOSPITAL CARE,LEVL II: ICD-10-PCS | Mod: GC,,, | Performed by: FAMILY MEDICINE

## 2023-08-13 PROCEDURE — 99232 SBSQ HOSP IP/OBS MODERATE 35: CPT | Mod: GC,,, | Performed by: FAMILY MEDICINE

## 2023-08-13 PROCEDURE — 80048 BASIC METABOLIC PNL TOTAL CA: CPT | Performed by: FAMILY MEDICINE

## 2023-08-13 PROCEDURE — 94660 CPAP INITIATION&MGMT: CPT

## 2023-08-13 PROCEDURE — 84100 ASSAY OF PHOSPHORUS: CPT | Performed by: FAMILY MEDICINE

## 2023-08-13 PROCEDURE — 63600175 PHARM REV CODE 636 W HCPCS: Performed by: COMMUNITY HEALTH WORKER

## 2023-08-13 PROCEDURE — 94761 N-INVAS EAR/PLS OXIMETRY MLT: CPT

## 2023-08-13 PROCEDURE — 25000003 PHARM REV CODE 250: Performed by: FAMILY MEDICINE

## 2023-08-13 PROCEDURE — 25000003 PHARM REV CODE 250: Performed by: COMMUNITY HEALTH WORKER

## 2023-08-13 PROCEDURE — 85025 COMPLETE CBC W/AUTO DIFF WBC: CPT | Performed by: FAMILY MEDICINE

## 2023-08-13 PROCEDURE — 99900035 HC TECH TIME PER 15 MIN (STAT)

## 2023-08-13 PROCEDURE — 21400001 HC TELEMETRY ROOM

## 2023-08-13 PROCEDURE — 83735 ASSAY OF MAGNESIUM: CPT | Performed by: FAMILY MEDICINE

## 2023-08-13 RX ORDER — BUPROPION HYDROCHLORIDE 150 MG/1
150 TABLET ORAL DAILY
Status: DISCONTINUED | OUTPATIENT
Start: 2023-08-14 | End: 2023-08-18 | Stop reason: HOSPADM

## 2023-08-13 RX ORDER — BUPROPION HYDROCHLORIDE 150 MG/1
300 TABLET ORAL DAILY
Status: DISCONTINUED | OUTPATIENT
Start: 2023-08-14 | End: 2023-08-13

## 2023-08-13 RX ORDER — BUPROPION HYDROCHLORIDE 150 MG/1
150 TABLET ORAL 2 TIMES DAILY
Status: DISCONTINUED | OUTPATIENT
Start: 2023-08-14 | End: 2023-08-13

## 2023-08-13 RX ADMIN — IPRATROPIUM BROMIDE AND ALBUTEROL SULFATE 3 ML: .5; 3 SOLUTION RESPIRATORY (INHALATION) at 04:08

## 2023-08-13 RX ADMIN — IPRATROPIUM BROMIDE AND ALBUTEROL SULFATE 3 ML: .5; 3 SOLUTION RESPIRATORY (INHALATION) at 11:08

## 2023-08-13 RX ADMIN — FLUTICASONE FUROATE AND VILANTEROL TRIFENATATE 1 PUFF: 200; 25 POWDER RESPIRATORY (INHALATION) at 07:08

## 2023-08-13 RX ADMIN — ATORVASTATIN CALCIUM 10 MG: 10 TABLET, FILM COATED ORAL at 09:08

## 2023-08-13 RX ADMIN — IPRATROPIUM BROMIDE AND ALBUTEROL SULFATE 3 ML: .5; 3 SOLUTION RESPIRATORY (INHALATION) at 09:08

## 2023-08-13 RX ADMIN — BUPROPION HYDROCHLORIDE 150 MG: 150 TABLET, FILM COATED, EXTENDED RELEASE ORAL at 09:08

## 2023-08-13 RX ADMIN — GABAPENTIN 300 MG: 300 CAPSULE ORAL at 03:08

## 2023-08-13 RX ADMIN — GABAPENTIN 300 MG: 300 CAPSULE ORAL at 09:08

## 2023-08-13 RX ADMIN — ENOXAPARIN SODIUM 40 MG: 40 INJECTION SUBCUTANEOUS at 06:08

## 2023-08-13 RX ADMIN — LOSARTAN POTASSIUM 50 MG: 50 TABLET, FILM COATED ORAL at 09:08

## 2023-08-13 RX ADMIN — FUROSEMIDE 20 MG: 20 TABLET ORAL at 09:08

## 2023-08-13 RX ADMIN — IPRATROPIUM BROMIDE AND ALBUTEROL SULFATE 3 ML: .5; 3 SOLUTION RESPIRATORY (INHALATION) at 07:08

## 2023-08-13 NOTE — PROGRESS NOTES
[facility] - [facility] ([address] Medicine  Progress Note    Patient Name: Rochelle Espinoza  MRN: [id number]  Patient Class: IP- Inpatient   Admission Date: 8/6/2023  Length of Stay: 7 days  Attending Physician: Jose Marcus III*  Primary Care Provider: Yosi Samuels Jr., MD        Subjective:     Principal Problem:COPD exacerbation        HPI:  No notes on file    Overview/Hospital Course:  67F admitted to MICU for acute on chronic hypoxic respirator acidosis w/ Hx of right sided diastolic HF (2020), COPD on 3L O2, HTN, HLD and tobacco dependence.  During admittion, the patient was on sating in the mid 90's on 16 iPAP/ 6 ePAP @ 30% o2. Her initial ABG was 7.24/87.2/25/40.5.  H/H was normal, BNP <10, Trop normal, UA neg. And lactate of 4.3, and AG of 13. CXR and EKG were normal. After the 3rd round of duonebs and addition of steroids the patient improved weaned off BiPAP to 5L NC w/ stable sats in the mid 90's. Fluticasone furoate-vilanteroL 200-25 mcg/dose diskus, Simethicone, Famotidine were added.  On 8/7, Echo cardiogram was preformed with nominal change from prior in 2020. Patient's oxygen requirements have been titrated down to only requiring 3L NC. Patient was stepdown to .  She was approved for Trilogy at night from [employer].  Awaiting insurance authorization      Interval History:  Patient sleeping when I entered the room and somewhat difficult to wake up.  Patient says her breathing is great today.  Patient feels that she is really getting comfortable with the BiPAP at night.  She does report a mild headache.  Associates difficulty waking up and mild headache to Wellbutrin.  Request to not increase dosage as she feels that she is getting used to the current dosage    Labs reviewed:  WBC 8.35, hemoglobin 12.2, sodium 141, potassium 4.0, creatinine 0.7    Review of Systems: As above  Objective:     Vital Signs (Most Recent):  Temp: 98.4 °F (36.9 °C) (08/13/23 1518)  Pulse: 85  (08/13/23 1613)  Resp: 16 (08/13/23 1613)  BP: 120/80 (08/13/23 1518)  SpO2: 95 % (08/13/23 1613) Vital Signs (24h Range):  Temp:  [97.2 °F (36.2 °C)-99 °F (37.2 °C)] 98.4 °F (36.9 °C)  Pulse:  [67-91] 85  Resp:  [16-29] 16  SpO2:  [90 %-100 %] 95 %  BP: ()/(55-80) 120/80     Weight: 74.3 kg (163 lb 12.8 oz)  Body mass index is 29.02 kg/m².    Intake/Output Summary (Last 24 hours) at 8/13/2023 1803  Last data filed at 8/13/2023 1724  Gross per 24 hour   Intake 720 ml   Output 300 ml   Net 420 ml      Physical Exam  Constitutional:       General: She is not in acute distress.  HENT:      Head: Normocephalic.      Nose:      Comments: Nasal cannula     Mouth/Throat:      Mouth: Mucous membranes are moist.   Eyes:      General: No scleral icterus.     Conjunctiva/sclera: Conjunctivae normal.   Pulmonary:      Effort: No respiratory distress.   Abdominal:      General: There is no distension.   Musculoskeletal:      Right lower leg: No edema.      Left lower leg: No edema.   Skin:     General: Skin is warm.   Neurological:      General: No focal deficit present.      Mental Status: She is alert.   Psychiatric:         Mood and Affect: Mood normal.         Behavior: Behavior normal.             Significant Labs:  CBC:  Recent Labs   Lab 08/13/23  0718   WBC 8.35   HGB 12.2   HCT 40.1        CMP:  Recent Labs   Lab 08/13/23  0718      K 4.0      CO2 29   GLU 87   BUN 13   CREATININE 0.7   CALCIUM 8.9   ANIONGAP 12       Assessment/Plan:      * COPD exacerbation  Acute on chronic hypoxic hypercapnic respiratory failure  · Improving and close to baseline.  Doing well on 3 L of home oxygen.  Approved for trilogy at night.    · Will continue with scheduled nebs, s/p prednisone.  Discharge once authorization of trilogy obtained.  S/p short course of Rocephin azithromycin.  · Outpatient pulmonology follow up      Major depressive disorder  · Patient not previously on medication but wants to try  Wellbutrin for smoking cessation as well.      Chronic diastolic heart failure  No evidence of decompensation at this time.  Amlodipine discontinued.  Lasix 20 mg daily     Nicotine dependence, cigarettes, uncomplicated   Dangers of cigarette smoking were reviewed with patient in detail.  Plan for discharge with nicotine patches and Wellbutrin.  Started Wellbutrin while inpatient    Essential hypertension  Amlodipine and HCTZ discontinued, losartan dose decreased to 50 mg daily due to low normal BP.        VTE Risk Mitigation (From admission, onward)         Ordered     enoxaparin injection 40 mg  Every 24 hours         08/06/23 0502                Discharge Planning   ELIZABETH: 8/14/2023     Code Status: Full Code   Is the patient medically ready for discharge?: Yes    Reason for patient still in hospital (select all that apply): Pending disposition  Discharge Plan A: Home Health   Discharge Delays: (!) Home Medical Equipment (Insurance, Delivery)              Jose Marcus III, MD  Department of Hospital Medicine   [facility] - Intensive Care ([place]-16)

## 2023-08-13 NOTE — SUBJECTIVE & OBJECTIVE
Interval History:  Patient sleeping when I entered the room and somewhat difficult to wake up.  Patient says her breathing is great today.  Patient feels that she is really getting comfortable with the BiPAP at night.  She does report a mild headache.  Associates difficulty waking up and mild headache to Wellbutrin.  Request to not increase dosage as she feels that she is getting used to the current dosage    Labs reviewed:  WBC 8.35, hemoglobin 12.2, sodium 141, potassium 4.0, creatinine 0.7    Review of Systems: As above  Objective:     Vital Signs (Most Recent):  Temp: 98.4 °F (36.9 °C) (08/13/23 1518)  Pulse: 85 (08/13/23 1613)  Resp: 16 (08/13/23 1613)  BP: 120/80 (08/13/23 1518)  SpO2: 95 % (08/13/23 1613) Vital Signs (24h Range):  Temp:  [97.2 °F (36.2 °C)-99 °F (37.2 °C)] 98.4 °F (36.9 °C)  Pulse:  [67-91] 85  Resp:  [16-29] 16  SpO2:  [90 %-100 %] 95 %  BP: ()/(55-80) 120/80     Weight: 74.3 kg (163 lb 12.8 oz)  Body mass index is 29.02 kg/m².    Intake/Output Summary (Last 24 hours) at 8/13/2023 1803  Last data filed at 8/13/2023 1724  Gross per 24 hour   Intake 720 ml   Output 300 ml   Net 420 ml      Physical Exam  Constitutional:       General: She is not in acute distress.  HENT:      Head: Normocephalic.      Nose:      Comments: Nasal cannula     Mouth/Throat:      Mouth: Mucous membranes are moist.   Eyes:      General: No scleral icterus.     Conjunctiva/sclera: Conjunctivae normal.   Pulmonary:      Effort: No respiratory distress.   Abdominal:      General: There is no distension.   Musculoskeletal:      Right lower leg: No edema.      Left lower leg: No edema.   Skin:     General: Skin is warm.   Neurological:      General: No focal deficit present.      Mental Status: She is alert.   Psychiatric:         Mood and Affect: Mood normal.         Behavior: Behavior normal.             Significant Labs:  CBC:  Recent Labs   Lab 08/13/23 0718   WBC 8.35   HGB 12.2   HCT 40.1         CMP:  Recent Labs   Lab 08/13/23  0718      K 4.0      CO2 29   GLU 87   BUN 13   CREATININE 0.7   CALCIUM 8.9   ANIONGAP 12

## 2023-08-14 PROCEDURE — 25000003 PHARM REV CODE 250: Performed by: FAMILY MEDICINE

## 2023-08-14 PROCEDURE — 25000003 PHARM REV CODE 250: Performed by: COMMUNITY HEALTH WORKER

## 2023-08-14 PROCEDURE — 63600175 PHARM REV CODE 636 W HCPCS: Performed by: COMMUNITY HEALTH WORKER

## 2023-08-14 PROCEDURE — 99900035 HC TECH TIME PER 15 MIN (STAT)

## 2023-08-14 PROCEDURE — 94640 AIRWAY INHALATION TREATMENT: CPT

## 2023-08-14 PROCEDURE — 27100171 HC OXYGEN HIGH FLOW UP TO 24 HOURS

## 2023-08-14 PROCEDURE — 21400001 HC TELEMETRY ROOM

## 2023-08-14 PROCEDURE — 25000003 PHARM REV CODE 250

## 2023-08-14 PROCEDURE — 94660 CPAP INITIATION&MGMT: CPT

## 2023-08-14 PROCEDURE — 25000242 PHARM REV CODE 250 ALT 637 W/ HCPCS: Performed by: COMMUNITY HEALTH WORKER

## 2023-08-14 PROCEDURE — 27000221 HC OXYGEN, UP TO 24 HOURS

## 2023-08-14 PROCEDURE — 25000003 PHARM REV CODE 250: Performed by: INTERNAL MEDICINE

## 2023-08-14 PROCEDURE — 94761 N-INVAS EAR/PLS OXIMETRY MLT: CPT

## 2023-08-14 RX ADMIN — GABAPENTIN 300 MG: 300 CAPSULE ORAL at 08:08

## 2023-08-14 RX ADMIN — IPRATROPIUM BROMIDE AND ALBUTEROL SULFATE 3 ML: .5; 3 SOLUTION RESPIRATORY (INHALATION) at 07:08

## 2023-08-14 RX ADMIN — IPRATROPIUM BROMIDE AND ALBUTEROL SULFATE 3 ML: .5; 3 SOLUTION RESPIRATORY (INHALATION) at 08:08

## 2023-08-14 RX ADMIN — ENOXAPARIN SODIUM 40 MG: 40 INJECTION SUBCUTANEOUS at 04:08

## 2023-08-14 RX ADMIN — IPRATROPIUM BROMIDE AND ALBUTEROL SULFATE 3 ML: .5; 3 SOLUTION RESPIRATORY (INHALATION) at 03:08

## 2023-08-14 RX ADMIN — ATORVASTATIN CALCIUM 10 MG: 10 TABLET, FILM COATED ORAL at 08:08

## 2023-08-14 RX ADMIN — LOSARTAN POTASSIUM 50 MG: 50 TABLET, FILM COATED ORAL at 08:08

## 2023-08-14 RX ADMIN — GABAPENTIN 300 MG: 300 CAPSULE ORAL at 02:08

## 2023-08-14 RX ADMIN — IPRATROPIUM BROMIDE AND ALBUTEROL SULFATE 3 ML: .5; 3 SOLUTION RESPIRATORY (INHALATION) at 11:08

## 2023-08-14 RX ADMIN — BUPROPION HYDROCHLORIDE 150 MG: 150 TABLET, FILM COATED, EXTENDED RELEASE ORAL at 08:08

## 2023-08-14 RX ADMIN — FUROSEMIDE 20 MG: 20 TABLET ORAL at 08:08

## 2023-08-14 RX ADMIN — FLUTICASONE FUROATE AND VILANTEROL TRIFENATATE 1 PUFF: 200; 25 POWDER RESPIRATORY (INHALATION) at 08:08

## 2023-08-14 NOTE — PLAN OF CARE
Per Digna at [facility] [phone] as of most recent notes, they are still waiting on auth.    12:33 PM  Per Digna, they are still waiting on auth - pre auth number is [id number].    SARAH DanielN, BS, RN, [facility]

## 2023-08-14 NOTE — PLAN OF CARE
Problem: Adult Inpatient Plan of Care  Goal: Plan of Care Review  Outcome: Ongoing, Progressing  Goal: Patient-Specific Goal (Individualized)  Outcome: Ongoing, Progressing  Goal: Absence of Hospital-Acquired Illness or Injury  Outcome: Ongoing, Progressing  Goal: Optimal Comfort and Wellbeing  Outcome: Ongoing, Progressing  Goal: Readiness for Transition of Care  Outcome: Ongoing, Progressing     Problem: Fall Injury Risk  Goal: Absence of Fall and Fall-Related Injury  Outcome: Ongoing, Progressing     Problem: Functional Ability Impaired COPD (Chronic Obstructive Pulmonary Disease)  Goal: Optimal Level of Functional [place]  Outcome: Ongoing, Progressing     Problem: Functional Ability Impaired COPD (Chronic Obstructive Pulmonary Disease)  Goal: Optimal Level of Functional [place]  Outcome: Ongoing, Progressing     Problem: Infection COPD (Chronic Obstructive Pulmonary Disease)  Goal: Absence of Infection Signs and Symptoms  Outcome: Ongoing, Progressing

## 2023-08-14 NOTE — NURSING
Home Oxygen Evaluation    Date Performed: 8/14/2023    1) Patient's Home O2 Sat on room air, while at rest: 87%         If O2 sats on room air at rest are 88% or below, patient qualifies. No additional testing needed. Document N/A in steps 2 and 3. If 89% or above, complete steps 2.      2) Patient's O2 Sat on room air while exercising: N/A        If O2 sats on room air while exercising remain 89% or above patient does not qualify, no further testing needed Document N/A in step 3. If O2 sats on room air while exercising are 88% or below, continue to step 3.      3) Patient's O2 Sat while exercising on O2: N/A        (Must show improvement from #2 for patients to qualify)    If O2 sats improve on oxygen, patient qualifies for portable oxygen. If not, the patient does not qualify.

## 2023-08-14 NOTE — NURSING
End of Shift Note    Diagnosis: COPD Exacerbation    Plan:  D/C home with BIPAP machine, 3L nasal cannula (home dose), insurance auth pending for Trilogy.      Mentation:  AAO x 4, glasses on    Medications:  Lasix 20 mg oral daily (diurese)    Respiratory:  3L nasal cannula (Home Dose), BIPAP QHS.  6-minute walk completed, 88% on room air, oxygen required for discharge.    Cardio: NSR    GI: Last BM:  8/14/23    :  Voids in toilet    Mobility:  Independent    Skin:  clear    Lines, Drains, & Tubes:  No PIV access.  Approved by CALE Marcus M.D.    Antibiotic Therapy:  None    Family:  No visitors today.

## 2023-08-14 NOTE — PLAN OF CARE
[facility] - Int[facility] Care ([facility])  Discharge Reassessment    Primary Care Provider: Yosi Samuels Jr., MD    Expected Discharge Date: 8/14/2023    Reassessment (most recent)       Discharge Reassessment - 08/14/23 1519          Discharge Reassessment    Assessment Type Discharge Planning Reassessment (P)      Did the patient's condition or plan change since previous assessment? No (P)      Discharge Plan discussed with: Patient (P)      Communicated ELIZABETH with patient/caregiver No (P)      Discharge Plan A Home (P)      Discharge Plan B Home with family (P)      DME Needed Upon Discharge  other (see comments) (P)    Trilogy bipap    Why the patient remains in the hospital Requires continued medical care (P)         Post-Acute Status    Post-Acute Authorization HME (P)      HME Status Referrals Sent (P)      Discharge Delays Home Medical Equipment (Insurance, Delivery) (P)                  Spoke with pt in room.  Instructed that we are still waiting on trilogy auth.  Pt v/u.    SARAH DanielN, BS, RN, CCM

## 2023-08-14 NOTE — NURSING
PIV dtd 8/6/23 removed.  Patient does not need new PIV placement at this time.  CALE Marcus M.D. approved.

## 2023-08-14 NOTE — PLAN OF CARE
Problem: Adult Inpatient Plan of Care  Goal: Plan of Care Review  Outcome: Ongoing, Progressing     Problem: Adult Inpatient Plan of Care  Goal: Patient-Specific Goal (Individualized)  Outcome: Ongoing, Progressing     Problem: Adult Inpatient Plan of Care  Goal: Absence of Hospital-Acquired Illness or Injury  Outcome: Ongoing, Progressing     Problem: Adult Inpatient Plan of Care  Goal: Optimal Comfort and Wellbeing  Outcome: Ongoing, Progressing     Problem: Adult Inpatient Plan of Care  Goal: Readiness for Transition of Care  Outcome: Ongoing, Progressing     Problem: Fall Injury Risk  Goal: Absence of Fall and Fall-Related Injury  Outcome: Ongoing, Progressing     Problem: Functional Ability Impaired COPD (Chronic Obstructive Pulmonary Disease)  Goal: Optimal Level of Functional [place]  Outcome: Ongoing, Progressing     Problem: Infection COPD (Chronic Obstructive Pulmonary Disease)  Goal: Absence of Infection Signs and Symptoms  Outcome: Ongoing, Progressing   Patient AAOX4 no acute distress noted VSS denies any complaints at this time safety measures maintained. Will cont to monitor.

## 2023-08-14 NOTE — NURSING
Patient requested golf-sized lump below umbilicus assessed/addressed.  Patient stated that she has had lump for years, and it does not hurt.  Patient reportedly holds lump when she coughs. CALE Marcus M.D. notified via secure chat.

## 2023-08-15 PROCEDURE — 99231 PR SUBSEQUENT HOSPITAL CARE,LEVL I: ICD-10-PCS | Mod: ,,, | Performed by: FAMILY MEDICINE

## 2023-08-15 PROCEDURE — 63600175 PHARM REV CODE 636 W HCPCS: Performed by: COMMUNITY HEALTH WORKER

## 2023-08-15 PROCEDURE — 25000003 PHARM REV CODE 250: Performed by: COMMUNITY HEALTH WORKER

## 2023-08-15 PROCEDURE — 94761 N-INVAS EAR/PLS OXIMETRY MLT: CPT

## 2023-08-15 PROCEDURE — 94660 CPAP INITIATION&MGMT: CPT

## 2023-08-15 PROCEDURE — 21400001 HC TELEMETRY ROOM

## 2023-08-15 PROCEDURE — 25000242 PHARM REV CODE 250 ALT 637 W/ HCPCS: Performed by: COMMUNITY HEALTH WORKER

## 2023-08-15 PROCEDURE — 94640 AIRWAY INHALATION TREATMENT: CPT

## 2023-08-15 PROCEDURE — 27100171 HC OXYGEN HIGH FLOW UP TO 24 HOURS

## 2023-08-15 PROCEDURE — 99900035 HC TECH TIME PER 15 MIN (STAT)

## 2023-08-15 PROCEDURE — 99231 SBSQ HOSP IP/OBS SF/LOW 25: CPT | Mod: ,,, | Performed by: FAMILY MEDICINE

## 2023-08-15 PROCEDURE — 25000003 PHARM REV CODE 250: Performed by: INTERNAL MEDICINE

## 2023-08-15 PROCEDURE — 25000003 PHARM REV CODE 250

## 2023-08-15 PROCEDURE — 25000003 PHARM REV CODE 250: Performed by: FAMILY MEDICINE

## 2023-08-15 RX ADMIN — FLUTICASONE FUROATE AND VILANTEROL TRIFENATATE 1 PUFF: 200; 25 POWDER RESPIRATORY (INHALATION) at 10:08

## 2023-08-15 RX ADMIN — GABAPENTIN 300 MG: 300 CAPSULE ORAL at 09:08

## 2023-08-15 RX ADMIN — IPRATROPIUM BROMIDE AND ALBUTEROL SULFATE 3 ML: .5; 3 SOLUTION RESPIRATORY (INHALATION) at 04:08

## 2023-08-15 RX ADMIN — ATORVASTATIN CALCIUM 10 MG: 10 TABLET, FILM COATED ORAL at 09:08

## 2023-08-15 RX ADMIN — IPRATROPIUM BROMIDE AND ALBUTEROL SULFATE 3 ML: .5; 3 SOLUTION RESPIRATORY (INHALATION) at 08:08

## 2023-08-15 RX ADMIN — ENOXAPARIN SODIUM 40 MG: 40 INJECTION SUBCUTANEOUS at 04:08

## 2023-08-15 RX ADMIN — IPRATROPIUM BROMIDE AND ALBUTEROL SULFATE 3 ML: .5; 3 SOLUTION RESPIRATORY (INHALATION) at 12:08

## 2023-08-15 RX ADMIN — FUROSEMIDE 20 MG: 20 TABLET ORAL at 10:08

## 2023-08-15 RX ADMIN — GABAPENTIN 300 MG: 300 CAPSULE ORAL at 04:08

## 2023-08-15 RX ADMIN — IPRATROPIUM BROMIDE AND ALBUTEROL SULFATE 3 ML: .5; 3 SOLUTION RESPIRATORY (INHALATION) at 07:08

## 2023-08-15 RX ADMIN — GABAPENTIN 300 MG: 300 CAPSULE ORAL at 10:08

## 2023-08-15 RX ADMIN — BUPROPION HYDROCHLORIDE 150 MG: 150 TABLET, FILM COATED, EXTENDED RELEASE ORAL at 10:08

## 2023-08-15 NOTE — PROGRESS NOTES
[facility] - [facility] ([address] Medicine  Progress Note    Patient Name: Rochelle Espinoza  MRN: [id number]  Patient Class: IP- Inpatient   Admission Date: 8/6/2023  Length of Stay: 8 days  Attending Physician: Jose Marcus III*  Primary Care Provider: Yosi Samuels Jr., MD        Subjective:     Principal Problem:COPD exacerbation        HPI:  No notes on file    Overview/Hospital Course:  67F admitted to MICU for acute on chronic hypoxic respirator acidosis w/ Hx of right sided diastolic HF (2020), COPD on 3L O2, HTN, HLD and tobacco dependence.  During admittion, the patient was on sating in the mid 90's on 16 iPAP/ 6 ePAP @ 30% o2. Her initial ABG was 7.24/87.2/25/40.5.  H/H was normal, BNP <10, Trop normal, UA neg. And lactate of 4.3, and AG of 13. CXR and EKG were normal. After the 3rd round of duonebs and addition of steroids the patient improved weaned off BiPAP to 5L NC w/ stable sats in the mid 90's. Fluticasone furoate-vilanteroL 200-25 mcg/dose diskus, Simethicone, Famotidine were added.  On 8/7, Echo cardiogram was preformed with nominal change from prior in 2020. Patient's oxygen requirements have been titrated down to only requiring 3L NC. Patient was stepdown to .  She was approved for Trilogy at night from [employer].  Awaiting insurance authorization      Interval History:  Patient says breathing is much better overall.  Back to baseline.  Currently on 3 L by nasal cannula.  This is her home oxygen.      Review of Systems: As above  Objective:     Vital signs reviewed    Weight: 74.3 kg (163 lb 12.8 oz)  Body mass index is 29.02 kg/m².    Intake/Output Summary (Last 24 hours) at 8/14/2023 2237  Last data filed at 8/14/2023 1806  Gross per 24 hour   Intake 684 ml   Output --   Net 684 ml        Physical Exam  Constitutional:       General: She is not in acute distress.  HENT:      Head: Normocephalic.      Nose:      Comments: Nasal cannula     Mouth/Throat:      Mouth: Mucous  membranes are moist.   Eyes:      General: No scleral icterus.     Conjunctiva/sclera: Conjunctivae normal.   Pulmonary:      Effort: No respiratory distress.   Abdominal:      General: There is no distension.   Musculoskeletal:      Right lower leg: No edema.      Left lower leg: No edema.   Skin:     General: Skin is warm.   Neurological:      General: No focal deficit present.      Mental Status: She is alert.   Psychiatric:         Mood and Affect: Mood normal.         Behavior: Behavior normal.             Significant Labs:  CBC:  Recent Labs   Lab 08/13/23  0718   WBC 8.35   HGB 12.2   HCT 40.1          CMP:  Recent Labs   Lab 08/13/23 0718      K 4.0      CO2 29   GLU 87   BUN 13   CREATININE 0.7   CALCIUM 8.9   ANIONGAP 12           Assessment/Plan:      * COPD exacerbation  Acute on chronic hypoxic hypercapnic respiratory failure  · Improving and close to baseline.  Doing well on 3 L of home oxygen.  Approved for trilogy at night.    · Will continue with scheduled nebs, s/p prednisone.  Discharge once authorization of trilogy obtained.  S/p short course of Rocephin azithromycin.  · Outpatient pulmonology follow up      Major depressive disorder  · Patient not previously on medication but wants to try Wellbutrin for smoking cessation as well.      Chronic diastolic heart failure  No evidence of decompensation at this time.  Amlodipine discontinued.  Lasix 20 mg daily     Nicotine dependence, cigarettes, uncomplicated   Dangers of cigarette smoking were reviewed with patient in detail.  Plan for discharge with nicotine patches and Wellbutrin.  Started Wellbutrin while inpatient    Essential hypertension  Amlodipine and HCTZ discontinued, losartan dose decreased to 50 mg daily due to low normal BP.        VTE Risk Mitigation (From admission, onward)         Ordered     enoxaparin injection 40 mg  Every 24 hours         08/06/23 0502                Discharge Planning   ELIZABETH: 8/14/2023      Code Status: Full Code   Is the patient medically ready for discharge?: Yes    Reason for patient still in hospital (select all that apply): Pending disposition  Discharge Plan A: Home   Discharge Delays: (!) Home Medical Equipment (Insurance, Delivery)              Jose Marcus III, MD  Department of Hospital Medicine   [facility] - Intensive Care ([place]-16)

## 2023-08-15 NOTE — NURSING
Pt 02 saturation reading were incorrect due to patient bipap displacing the 02 sensor probe on patient ear patient O2 saturation 96%

## 2023-08-15 NOTE — SUBJECTIVE & OBJECTIVE
Interval History:  Patient says breathing is much better overall.  Back to baseline.  Currently on 3 L by nasal cannula.  This is her home oxygen.      Review of Systems: As above  Objective:     Vital signs reviewed    Weight: 74.3 kg (163 lb 12.8 oz)  Body mass index is 29.02 kg/m².    Intake/Output Summary (Last 24 hours) at 8/14/2023 2237  Last data filed at 8/14/2023 1806  Gross per 24 hour   Intake 684 ml   Output --   Net 684 ml        Physical Exam  Constitutional:       General: She is not in acute distress.  HENT:      Head: Normocephalic.      Nose:      Comments: Nasal cannula     Mouth/Throat:      Mouth: Mucous membranes are moist.   Eyes:      General: No scleral icterus.     Conjunctiva/sclera: Conjunctivae normal.   Pulmonary:      Effort: No respiratory distress.   Abdominal:      General: There is no distension.   Musculoskeletal:      Right lower leg: No edema.      Left lower leg: No edema.   Skin:     General: Skin is warm.   Neurological:      General: No focal deficit present.      Mental Status: She is alert.   Psychiatric:         Mood and Affect: Mood normal.         Behavior: Behavior normal.             Significant Labs:  CBC:  Recent Labs   Lab 08/13/23  0718   WBC 8.35   HGB 12.2   HCT 40.1          CMP:  Recent Labs   Lab 08/13/23  0718      K 4.0      CO2 29   GLU 87   BUN 13   CREATININE 0.7   CALCIUM 8.9   ANIONGAP 12

## 2023-08-15 NOTE — PLAN OF CARE
Spoke with [facility].  Informed that we are still waiting on trilogy auth.  Kassy states she is on their schedule for next Monday.    2:52 PM  CM called Digna Bashir with [employer] [phone], [facility] requesting call back.    Tracy Weber, SARAHN, BS, RN, CCM

## 2023-08-16 PROCEDURE — 99231 SBSQ HOSP IP/OBS SF/LOW 25: CPT | Mod: ,,, | Performed by: FAMILY MEDICINE

## 2023-08-16 PROCEDURE — 99231 PR SUBSEQUENT HOSPITAL CARE,LEVL I: ICD-10-PCS | Mod: ,,, | Performed by: FAMILY MEDICINE

## 2023-08-16 PROCEDURE — 25000003 PHARM REV CODE 250: Performed by: COMMUNITY HEALTH WORKER

## 2023-08-16 PROCEDURE — 94660 CPAP INITIATION&MGMT: CPT

## 2023-08-16 PROCEDURE — 21400001 HC TELEMETRY ROOM

## 2023-08-16 PROCEDURE — 25000003 PHARM REV CODE 250: Performed by: FAMILY MEDICINE

## 2023-08-16 PROCEDURE — 25000242 PHARM REV CODE 250 ALT 637 W/ HCPCS: Performed by: COMMUNITY HEALTH WORKER

## 2023-08-16 PROCEDURE — 94761 N-INVAS EAR/PLS OXIMETRY MLT: CPT

## 2023-08-16 PROCEDURE — 99900035 HC TECH TIME PER 15 MIN (STAT)

## 2023-08-16 PROCEDURE — 94640 AIRWAY INHALATION TREATMENT: CPT

## 2023-08-16 PROCEDURE — 25000003 PHARM REV CODE 250

## 2023-08-16 PROCEDURE — 27100171 HC OXYGEN HIGH FLOW UP TO 24 HOURS

## 2023-08-16 PROCEDURE — 25000003 PHARM REV CODE 250: Performed by: INTERNAL MEDICINE

## 2023-08-16 PROCEDURE — 63600175 PHARM REV CODE 636 W HCPCS: Performed by: COMMUNITY HEALTH WORKER

## 2023-08-16 RX ADMIN — IPRATROPIUM BROMIDE AND ALBUTEROL SULFATE 3 ML: .5; 3 SOLUTION RESPIRATORY (INHALATION) at 09:08

## 2023-08-16 RX ADMIN — BUPROPION HYDROCHLORIDE 150 MG: 150 TABLET, FILM COATED, EXTENDED RELEASE ORAL at 10:08

## 2023-08-16 RX ADMIN — IPRATROPIUM BROMIDE AND ALBUTEROL SULFATE 3 ML: .5; 3 SOLUTION RESPIRATORY (INHALATION) at 01:08

## 2023-08-16 RX ADMIN — IPRATROPIUM BROMIDE AND ALBUTEROL SULFATE 3 ML: .5; 3 SOLUTION RESPIRATORY (INHALATION) at 08:08

## 2023-08-16 RX ADMIN — GABAPENTIN 300 MG: 300 CAPSULE ORAL at 10:08

## 2023-08-16 RX ADMIN — FLUTICASONE FUROATE AND VILANTEROL TRIFENATATE 1 PUFF: 200; 25 POWDER RESPIRATORY (INHALATION) at 09:08

## 2023-08-16 RX ADMIN — ATORVASTATIN CALCIUM 10 MG: 10 TABLET, FILM COATED ORAL at 08:08

## 2023-08-16 RX ADMIN — GABAPENTIN 300 MG: 300 CAPSULE ORAL at 02:08

## 2023-08-16 RX ADMIN — ENOXAPARIN SODIUM 40 MG: 40 INJECTION SUBCUTANEOUS at 06:08

## 2023-08-16 RX ADMIN — GABAPENTIN 300 MG: 300 CAPSULE ORAL at 08:08

## 2023-08-16 RX ADMIN — FUROSEMIDE 20 MG: 20 TABLET ORAL at 10:08

## 2023-08-16 NOTE — SUBJECTIVE & OBJECTIVE
Interval History:  Patient says breathing is back to baseline.  Tolerating Wellbutrin.  Currently on 3 L by nasal cannula no acute distress.    Review of Systems: As above  Objective:     Vital signs reviewed    Weight: 74.3 kg (163 lb 12.8 oz)  Body mass index is 29.02 kg/m².  No intake or output data in the 24 hours ending 08/15/23 2005     Physical Exam  Constitutional:       General: She is not in acute distress.  HENT:      Head: Normocephalic.      Nose:      Comments: Nasal cannula     Mouth/Throat:      Mouth: Mucous membranes are moist.   Eyes:      General: No scleral icterus.     Conjunctiva/sclera: Conjunctivae normal.   Pulmonary:      Effort: No respiratory distress.   Abdominal:      General: There is no distension.   Musculoskeletal:      Right lower leg: No edema.      Left lower leg: No edema.   Skin:     General: Skin is warm.   Neurological:      General: No focal deficit present.      Mental Status: She is alert.   Psychiatric:         Mood and Affect: Mood normal.         Behavior: Behavior normal.

## 2023-08-16 NOTE — PLAN OF CARE
Recommendations     1.) Recommend continuing with Regular diet as tolerated.      2.) RD to monitor wt, PO intake, skin, labs.        Goals: to meet % of EEN/EPN by next RD f/u  Nutrition Goal Status: new  Communication of RD Recs:  (POC)

## 2023-08-16 NOTE — PLAN OF CARE
SALEEM called Digna Bashir with [employer] [phone]; she's not gotten any updates on auth for trilogy.  She's not heard anything about possible peer to peer.  She will f/u and call CM back.    3:36 PM  Per Digna Bashir, still no updated on Trilogy.  The [employer] P2P line is [phone].  Pending auth number [id number].  [employer] #[id number],  55.  [facility] doesn't supply regular BiPap machines.    SALEEM spoke with pt in room.  She doesn't have a BiPap at home.    SARAH DanielN, BS, RN, CCM

## 2023-08-16 NOTE — PROGRESS NOTES
[facility] - Intensive Care ([address] Medicine  Progress Note    Patient Name: Rochelle Espinoza  MRN: [id number]  Patient Class: IP- Inpatient   Admission Date: 8/6/2023  Length of Stay: 9 days  Attending Physician: Jose Marcus III*  Primary Care Provider: Yosi Samuels Jr., MD        Subjective:     Principal Problem:COPD exacerbation        HPI:  No notes on file    Overview/Hospital Course:  67F admitted to MICU for acute on chronic hypoxic respirator acidosis w/ Hx of right sided diastolic HF (2020), COPD on 3L O2, HTN, HLD and tobacco dependence.  During admittion, the patient was on sating in the mid 90's on 16 iPAP/ 6 ePAP @ 30% o2. Her initial ABG was 7.24/87.2/25/40.5.  H/H was normal, BNP <10, Trop normal, UA neg. And lactate of 4.3, and AG of 13. CXR and EKG were normal. After the 3rd round of duonebs and addition of steroids the patient improved weaned off BiPAP to 5L NC w/ stable sats in the mid 90's. Fluticasone furoate-vilanteroL 200-25 mcg/dose diskus, Simethicone, Famotidine were added.  On 8/7, Echo cardiogram was preformed with nominal change from prior in 2020. Patient's oxygen requirements have been titrated down to only requiring 3L NC. Patient was stepdown to .  She was approved for Trilogy at night from [employer].  Awaiting insurance authorization      Interval History:  Patient says breathing is back to baseline.  Tolerating Wellbutrin.  Currently on 3 L by nasal cannula no acute distress.    Review of Systems: As above  Objective:     Vital signs reviewed    Weight: 74.3 kg (163 lb 12.8 oz)  Body mass index is 29.02 kg/m².  No intake or output data in the 24 hours ending 08/15/23 2005     Physical Exam  Constitutional:       General: She is not in acute distress.  HENT:      Head: Normocephalic.      Nose:      Comments: Nasal cannula     Mouth/Throat:      Mouth: Mucous membranes are moist.   Eyes:      General: No scleral icterus.     Conjunctiva/sclera: Conjunctivae  normal.   Pulmonary:      Effort: No respiratory distress.   Abdominal:      General: There is no distension.   Musculoskeletal:      Right lower leg: No edema.      Left lower leg: No edema.   Skin:     General: Skin is warm.   Neurological:      General: No focal deficit present.      Mental Status: She is alert.   Psychiatric:         Mood and Affect: Mood normal.         Behavior: Behavior normal.               Assessment/Plan:      * COPD exacerbation  Acute on chronic hypoxic hypercapnic respiratory failure  · Improving and close to baseline.  Doing well on 3 L of home oxygen.  Approved for trilogy at night.    · Will continue with scheduled nebs, s/p prednisone.  Discharge once authorization of trilogy obtained.  S/p short course of Rocephin azithromycin.  · Outpatient pulmonology follow up      Major depressive disorder  · Patient not previously on medication but wants to try Wellbutrin for smoking cessation as well.      Chronic diastolic heart failure  No evidence of decompensation at this time.  Amlodipine discontinued.  Lasix 20 mg daily     Nicotine dependence, cigarettes, uncomplicated   Dangers of cigarette smoking were reviewed with patient in detail.  Plan for discharge with nicotine patches and Wellbutrin.  Started Wellbutrin while inpatient    Essential hypertension  Amlodipine and HCTZ discontinued, losartan dose decreased to 50 mg daily due to low normal BP.        VTE Risk Mitigation (From admission, onward)         Ordered     enoxaparin injection 40 mg  Every 24 hours         08/06/23 0502                Discharge Planning   ELIZABETH: 8/16/2023     Code Status: Full Code   Is the patient medically ready for discharge?: Yes    Reason for patient still in hospital (select all that apply): Pending disposition  Discharge Plan A: Home   Discharge Delays: (!) Home Medical Equipment (Insurance, Delivery)              Jose Marcus III, MD  Department of Hospital Medicine   [facility]  [facility]

## 2023-08-16 NOTE — PROGRESS NOTES
"[facility] - Intensive Care ([facility]-)  Adult Nutrition  Progress Note    SUMMARY       Recommendations    1.) Recommend continuing with Regular diet as tolerated.     2.) RD to monitor wt, PO intake, skin, labs.      Goals: to meet % of EEN/EPN by next RD f/u  Nutrition Goal Status: new  Communication of RD Recs:  (POC)    Assessment and Plan    Nutrition Problem  Excessive Kcal intake    Related to (etiology):   Excessive eating after quitting smoking    Signs and Symptoms (as evidenced by):    +15% wt gain x 1 year    Interventions/Recommendations (treatment strategy):  Collaboration of nutritional care with other providers.    Nutrition Diagnosis Status:   New    Reason for Assessment    Reason For Assessment: length of stay  Diagnosis:  (COPD exacerbation)  Relevant Medical History: HTN, CHF, COPD, tobacco use  Interdisciplinary Rounds: did not attend  General Information Comments: Pt was seen for LOS: Pt denies n/v/d/c. Pt endorses good appetite, stating to RD that they are eating % of their meals. Pt stated that they had gained wt d/t pt quitting smoking. RD discussed methods to try to curb over eating, ie cutting up/consuming raw vegetables, unsalted popcorn or chewing gum, to try to curb oral fixation. Pt stated that they follow a Low Na diet at home for HTN. Pt appears nourished, NFPE not warranted.  Nutrition Discharge Planning: adequate PO intake    Nutrition Risk Screen    Nutrition Risk Screen: no indicators present    Nutrition/Diet History    Spiritual, Cultural Beliefs, Baptism Practices, Values that Affect Care: no    Anthropometrics    Temp: 98 °F (36.7 °C)  Height: 5' 3" (160 cm)  Height (inches): 63 in  Weight Method: Bed Scale  Weight: 74.3 kg (163 lb 12.8 oz)  Weight (lb): 163.8 lb  Ideal Body Weight (IBW), Female: 115 lb  % Ideal Body Weight, Female (lb): 133.04 %  BMI (Calculated): 29  BMI Grade: 25 - 29.9 - overweight     Lab/Procedures/Meds    Pertinent Labs Reviewed: " reviewed  Pertinent Labs Comments: alk phos: 43, PRO: 5.7, alb: 3.1 (all labs 8/11)  Pertinent Medications Reviewed: reviewed  Pertinent Medications Comments: atorvastatin, enoxaparin, lasix    Estimated/Assessed Needs    Weight Used For Calorie Calculations: 74.3 kg (163 lb 12.8 oz)  Energy Calorie Requirements (kcal): 1486kcal  Energy Need Method: Kcal/kg (20kcal/kg)  Protein Requirements: 74- 82g (1.0-1.1g/kg)  Weight Used For Protein Calculations: 74.3 kg (163 lb 12.8 oz)  Fluid Requirements (mL): 1ml/1kcal or per MD  Estimated Fluid Requirement Method: RDA Method  RDA Method (mL): 1486     Nutrition Prescription Ordered    Current Diet Order: Regular diet    Evaluation of Received Nutrient/Fluid Intake    I/O: +1.2L since admit  Energy Calories Required: meeting needs  Protein Required: meeting needs  Fluid Required:  (as per MD)  Comments: LBM 8/14  Tolerance: tolerating  % Intake of Estimated Energy Needs: 75 - 100 %  % Meal Intake: 75 - 100 %    Nutrition Risk    Level of Risk/Frequency of Follow-up:  (RD to f/u x1/week)     Monitor and Evaluation    Food and Nutrient Intake: energy intake, food and beverage intake  Food and Nutrient Adminstration: diet order  Physical Activity and Function: nutrition-related ADLs and IADLs  Anthropometric Measurements: weight, weight change, body mass index  Biochemical Data, Medical Tests and Procedures: electrolyte and renal panel, gastrointestinal profile, glucose/endocrine profile, inflammatory profile, lipid profile  Nutrition-Focused Physical Findings: overall appearance, skin     Nutrition Follow-Up    RD Follow-up?: Yes

## 2023-08-17 ENCOUNTER — TELEPHONE (OUTPATIENT)
Dept: INTERNAL MEDICINE | Facility: CLINIC | Age: 68
End: 2023-08-17
Payer: MEDICARE

## 2023-08-17 PROCEDURE — 25000003 PHARM REV CODE 250: Performed by: STUDENT IN AN ORGANIZED HEALTH CARE EDUCATION/TRAINING PROGRAM

## 2023-08-17 PROCEDURE — 94640 AIRWAY INHALATION TREATMENT: CPT

## 2023-08-17 PROCEDURE — 94761 N-INVAS EAR/PLS OXIMETRY MLT: CPT

## 2023-08-17 PROCEDURE — 21400001 HC TELEMETRY ROOM

## 2023-08-17 PROCEDURE — 99900035 HC TECH TIME PER 15 MIN (STAT)

## 2023-08-17 PROCEDURE — 99231 PR SUBSEQUENT HOSPITAL CARE,LEVL I: ICD-10-PCS | Mod: ,,, | Performed by: STUDENT IN AN ORGANIZED HEALTH CARE EDUCATION/TRAINING PROGRAM

## 2023-08-17 PROCEDURE — 25000242 PHARM REV CODE 250 ALT 637 W/ HCPCS: Performed by: COMMUNITY HEALTH WORKER

## 2023-08-17 PROCEDURE — 99231 SBSQ HOSP IP/OBS SF/LOW 25: CPT | Mod: ,,, | Performed by: STUDENT IN AN ORGANIZED HEALTH CARE EDUCATION/TRAINING PROGRAM

## 2023-08-17 PROCEDURE — 27100171 HC OXYGEN HIGH FLOW UP TO 24 HOURS

## 2023-08-17 PROCEDURE — 25000003 PHARM REV CODE 250: Performed by: INTERNAL MEDICINE

## 2023-08-17 PROCEDURE — 25000003 PHARM REV CODE 250: Performed by: FAMILY MEDICINE

## 2023-08-17 PROCEDURE — 94660 CPAP INITIATION&MGMT: CPT

## 2023-08-17 PROCEDURE — 25000003 PHARM REV CODE 250

## 2023-08-17 PROCEDURE — 63600175 PHARM REV CODE 636 W HCPCS: Performed by: COMMUNITY HEALTH WORKER

## 2023-08-17 PROCEDURE — 25000003 PHARM REV CODE 250: Performed by: COMMUNITY HEALTH WORKER

## 2023-08-17 RX ORDER — LOSARTAN POTASSIUM 25 MG/1
25 TABLET ORAL DAILY
Status: DISCONTINUED | OUTPATIENT
Start: 2023-08-17 | End: 2023-08-18

## 2023-08-17 RX ADMIN — ENOXAPARIN SODIUM 40 MG: 40 INJECTION SUBCUTANEOUS at 05:08

## 2023-08-17 RX ADMIN — ATORVASTATIN CALCIUM 10 MG: 10 TABLET, FILM COATED ORAL at 09:08

## 2023-08-17 RX ADMIN — IPRATROPIUM BROMIDE AND ALBUTEROL SULFATE 3 ML: .5; 3 SOLUTION RESPIRATORY (INHALATION) at 01:08

## 2023-08-17 RX ADMIN — GABAPENTIN 300 MG: 300 CAPSULE ORAL at 09:08

## 2023-08-17 RX ADMIN — FUROSEMIDE 20 MG: 20 TABLET ORAL at 08:08

## 2023-08-17 RX ADMIN — GABAPENTIN 300 MG: 300 CAPSULE ORAL at 03:08

## 2023-08-17 RX ADMIN — FLUTICASONE FUROATE AND VILANTEROL TRIFENATATE 1 PUFF: 200; 25 POWDER RESPIRATORY (INHALATION) at 08:08

## 2023-08-17 RX ADMIN — BUPROPION HYDROCHLORIDE 150 MG: 150 TABLET, FILM COATED, EXTENDED RELEASE ORAL at 08:08

## 2023-08-17 RX ADMIN — LOSARTAN POTASSIUM 25 MG: 25 TABLET, FILM COATED ORAL at 08:08

## 2023-08-17 RX ADMIN — IPRATROPIUM BROMIDE AND ALBUTEROL SULFATE 3 ML: .5; 3 SOLUTION RESPIRATORY (INHALATION) at 09:08

## 2023-08-17 RX ADMIN — GABAPENTIN 300 MG: 300 CAPSULE ORAL at 08:08

## 2023-08-17 RX ADMIN — IPRATROPIUM BROMIDE AND ALBUTEROL SULFATE 3 ML: .5; 3 SOLUTION RESPIRATORY (INHALATION) at 10:08

## 2023-08-17 RX ADMIN — IPRATROPIUM BROMIDE AND ALBUTEROL SULFATE 3 ML: .5; 3 SOLUTION RESPIRATORY (INHALATION) at 04:08

## 2023-08-17 NOTE — PLAN OF CARE
[facility] - Intensive Care ([facility]-16)  Discharge Reassessment    Primary Care Provider: Yosi Samuels Jr., MD    Expected Discharge Date: 8/17/2023    Reassessment (most recent)       Discharge Reassessment - 08/17/23 1038          Discharge Reassessment    Assessment Type Discharge Planning Reassessment (P)      Did the patient's condition or plan change since previous assessment? No (P)      Discharge Plan discussed with: Patient (P)      Communicated ELIZABETH with patient/caregiver No (P)      Discharge Plan A Home (P)      Discharge Plan B Home (P)      DME Needed Upon Discharge  BIPAP (P)      Transition of Care Barriers None (P)      Why the patient remains in the hospital Insurance issues (P)    Waiting on trilogy auth.       Post-Acute Status    Post-Acute Authorization HME (P)      HME Status Referrals Sent (P)      Discharge Delays None known at this time (P)                    Spoke with pt in room, inst that we are still waiting on Trilogy auth, are pursuing another kind of BiPap.    11:52 AM  Spoke with Yarely at [facility] [place] - they do not accept pt's insurance; advises to call [facility] [phone].    Called [facility], spoke with Nate who states COPD is difficult dx to get approval for Bipap.  They must have tried Cpap and failed.  SALEEM sent orders, facesheet and recent note to [facility] at [phone]; MD notified, who states that cPap not appropriate for this pt, that's why she needs BiPap.      1:05 PM  Called [facility], spoke with Nate, she states what she rec'd is not sufficient to get approval for Bipap for this pt - they need sleep apnea dx, or for COPD, they need detailed notes of why pt needs Bipap, notes that say Cpap has been tried and failed, ABG's.  MD notified.  SALEEM sent  updated plan of care note to [facility].    Saleem called [facility], spoke with Nora who states they did receive updated plan of care note; pt is in the system, they will not be able to start working on processing this until tomorrow morning, if  they need anything else, they will contact CM.      SARAH DanielN, BS, RN, CCM

## 2023-08-17 NOTE — PLAN OF CARE
Problem: Adult Inpatient Plan of Care  Goal: Patient-Specific Goal (Individualized)  Outcome: Ongoing, Progressing  Goal: Readiness for Transition of Care  Outcome: Ongoing, Progressing     Problem: Functional Ability Impaired COPD (Chronic Obstructive Pulmonary Disease)  Goal: Optimal Level of Functional [place]  Outcome: Ongoing, Progressing     Problem: Infection COPD (Chronic Obstructive Pulmonary Disease)  Goal: Absence of Infection Signs and Symptoms  Outcome: Ongoing, Progressing

## 2023-08-17 NOTE — PLAN OF CARE
CHW unable to schedule the PCP hospital follow up, nothing available within 7 days. A message was sent to the provider requesting an appointment on the patients behalf. I added this information to the AVS as a reminder for the patient.

## 2023-08-17 NOTE — PROGRESS NOTES
[facility] - Intensive Care ([address] Medicine  Progress Note    Patient Name: Rochelle Espinoza  MRN: [id number]  Patient Class: IP- Inpatient   Admission Date: 8/6/2023  Length of Stay: 11 days  Attending Physician: Isabela Hendrix MD  Primary Care Provider: Yosi Samuels Jr., MD        Subjective:     Principal Problem:COPD exacerbation        HPI:  Rochelle Espinoaz is a 67 y.o. female with history of COPD, chronic hypoxemic respiratory failure on 3 L nasal cannula at baseline, hyperlipidemia, diastolic heart failure, nicotine dependence, hypertension, presenting to emergency department with complaint of shortness of breath.  Patient was found to be hypoxic to 75% in triage on her 3 liters/minute.  She is had shortness of breath for 3 days.  Increased chest pressure and wheezing.  She also has pain in her back.  She denies fevers.  Originally her cough was productive of dark sputum, now productive of clear sputum. Patient also reports needing to use her rescue inhaler and increased frequency and using her nebulized inhaler with last use yesterday around 5 pm. Patient was brought into the ED by daughter. While in the ED patient was given dounebsx1 and solumedrol 125 mg. Initial VBG Ph 7.288 PCO2 of 81. Patient remained  stable on 3L however repeat VBG displayed worsening acidosis and Hypercarbia and placed on BIPAP. ICU consulted due to need for BIPAP and worsening hypercarbia and acidosis.       Overview/Hospital Course:  67F admitted to MICU for acute on chronic hypoxic respiratory acidosis w/ Hx of diastolic HF (2020), COPD on 3L O2, HTN, HLD and tobacco dependence.  During admission, the patient had saturations in the mid 90's on 16 iPAP/ 6 ePAP @ 30% o2. Her initial ABG was 7.24/87.2/25/40.5.  H/H was normal, BNP <10, Trop normal, UA neg. And lactate of 4.3, and AG of 13. CXR and EKG were normal. After the 3rd round of duonebs and addition of steroids the patient improved weaned off BiPAP to 5L NC w/  stable sats in the mid 90's. Fluticasone furoate-vilanteroL 200-25 mcg/dose diskus, Simethicone, Famotidine were added.  On 8/7, Echocardiogram with preserved EF and minimal change from prior in 2020. Patient's oxygen requirements titrated down to only requiring home oxygen of 3L NC. Patient has remained on her home oxygen with resolved shortness of breath.  Wellbutrin and nicotine patches started for smoking cessation  She was approved for Trilogy at night from [email or web address].  Awaiting insurance authorization; they did not approve Trilogy. Auth submitted for BiPAP.       Interval History:  Patient resting comfortably in bed.  Breathing at baseline.  No wheezing or shortness of breath noted for several days.  Tolerating BiPAP at night.  Tolerating Wellbutrin.  Still awaiting NIV approval.    Review of Systems: As above      Objective:    Temp: 98.9 °F (37.2 °C) (08/17/23 1228)  Pulse: 90 (08/17/23 1312)  Resp: 18 (08/17/23 1312)  BP: (!) 98/57 (08/17/23 1228)  SpO2: 98 % (08/17/23 1312)    Weight: 74.3 kg (163 lb 12.8 oz) (08/08/23 2230)    Body mass index is 29.02 kg/m².    Physical Exam  Constitutional:       General: She is not in acute distress.  HENT:      Head: Normocephalic.      Nose:      Comments: Nasal cannula     Mouth/Throat:      Mouth: Mucous membranes are moist.   Eyes:      General: No scleral icterus.     Conjunctiva/sclera: Conjunctivae normal.   Pulmonary:      Effort: No respiratory distress.   Abdominal:      General: There is no distension.   Musculoskeletal:      Right lower leg: No edema.      Left lower leg: No edema.   Skin:     General: Skin is warm.   Neurological:      General: No focal deficit present.      Mental Status: She is alert.   Psychiatric:         Mood and Affect: Mood normal.         Behavior: Behavior normal.         Significant Labs: All pertinent labs within the past 24 hours have been reviewed.    No results found for this or any previous visit (from the past 24  hour(s)).    Significant Imaging: I have reviewed all pertinent imaging results/findings within the past 24 hours.    Imaging Results              X-Ray Chest AP Portable (Final result)  Result time 08/06/23 02:35:37      Final result by Ciro Apple MD (08/06/23 02:35:37)                   Impression:      Stable radiographic appearance.      Electronically signed by: Ciro Apple  Date:    08/06/2023  Time:    02:35               Narrative:    EXAMINATION:  XR CHEST AP PORTABLE    CLINICAL HISTORY:  Asthma;    TECHNIQUE:  Single frontal view of the chest was performed.    COMPARISON:  Chest radiograph October 23, 2022    FINDINGS:  Single portable chest view is submitted.  The cardiomediastinal silhouette appears stable.    Mild accentuated attenuation at the lung bases is consistent with overlying soft tissue attenuation.  The appearance of the pulmonary bronchovascular markings is stable, mild prominence of the pulmonary vascular appears stable.    There is no evidence for confluent infiltrate or consolidation, significant pleural effusion or pneumothorax.    The osseous structures appear intact.                                          Assessment/Plan:      * COPD exacerbation  Acute on chronic hypoxic hypercapnic respiratory failure  Nicotine dependent  · Improved to baseline.  Doing well on 3 L of home oxygen.   · Will continue with scheduled nebs, s/p prednisone.   S/p short course of Rocephin azithromycin.  · Discharge once authorization of NIV obtained.   · Outpatient pulmonology follow up  · Patient will need refills of bronchodilators  · Will need prescription for Wellbutrin 150 mg daily and nicotine patches.      Major depressive disorder  · Patient not previously on medication but wants to try Wellbutrin for smoking cessation as well.      Chronic diastolic heart failure  Chronic. Stable.  No evidence of decompensation at this time.   Amlodipine discontinued.   Continue Lasix 20 mg daily      Nicotine dependence, cigarettes, uncomplicated   Dangers of cigarette smoking were reviewed with patient in detail.  Plan for discharge with nicotine patches and Wellbutrin.  Started Wellbutrin while inpatient    Essential hypertension  Amlodipine and HCTZ discontinued, losartan dose decreased to 25  mg daily due to low normal BP.  Will continue to monitor blood pressure trend closely and adjust antihypertensive regimen as clinically indicated and tolerated.            VTE Risk Mitigation (From admission, onward)         Ordered     enoxaparin injection 40 mg  Every 24 hours         08/06/23 0502                Discharge Planning   ELIZABETH: 8/21/2023     Code Status: Full Code   Is the patient medically ready for discharge?: Yes    Reason for patient still in hospital (select all that apply): Pending disposition  Discharge Plan A: Home   Discharge Delays: None known at this time              Isabela Hendrix MD  Department of Hospital Medicine   [facility] - Intensive Care ([place]-16)

## 2023-08-17 NOTE — SUBJECTIVE & OBJECTIVE
Interval History:  Patient resting comfortably in bed.  Breathing back to baseline.  No wheezing or shortness of breath noted.  Tolerating BiPAP at night.  Tolerating Wellbutrin.  Still awaiting trilogy approval.    Review of Systems: As above  Objective:     Vital signs reviewed    Weight: 74.3 kg (163 lb 12.8 oz)  Body mass index is 29.02 kg/m².    Intake/Output Summary (Last 24 hours) at 8/16/2023 2209  Last data filed at 8/16/2023 1349  Gross per 24 hour   Intake 240 ml   Output --   Net 240 ml        Physical Exam  Constitutional:       General: She is not in acute distress.  HENT:      Head: Normocephalic.      Nose:      Comments: Nasal cannula     Mouth/Throat:      Mouth: Mucous membranes are moist.   Eyes:      General: No scleral icterus.     Conjunctiva/sclera: Conjunctivae normal.   Pulmonary:      Effort: No respiratory distress.   Abdominal:      General: There is no distension.   Musculoskeletal:      Right lower leg: No edema.      Left lower leg: No edema.   Skin:     General: Skin is warm.   Neurological:      General: No focal deficit present.      Mental Status: She is alert.   Psychiatric:         Mood and Affect: Mood normal.         Behavior: Behavior normal.

## 2023-08-17 NOTE — ASSESSMENT & PLAN NOTE
Chronic. Stable.  No evidence of decompensation at this time.   Amlodipine discontinued.   Continue Lasix 20 mg daily

## 2023-08-17 NOTE — PLAN OF CARE
Problem: Adult Inpatient Plan of Care  Goal: Plan of Care Review  Outcome: Ongoing, Progressing     Problem: Adult Inpatient Plan of Care  Goal: Plan of Care Review  Outcome: Ongoing, Progressing     Problem: Functional Ability Impaired COPD (Chronic Obstructive Pulmonary Disease)  Goal: Optimal Level of Functional [place]  Outcome: Ongoing, Progressing     Problem: Functional Ability Impaired COPD (Chronic Obstructive Pulmonary Disease)  Goal: Optimal Level of Functional [place]  Outcome: Ongoing, Progressing     Problem: Infection COPD (Chronic Obstructive Pulmonary Disease)  Goal: Absence of Infection Signs and Symptoms  Outcome: Ongoing, Progressing     Problem: Infection COPD (Chronic Obstructive Pulmonary Disease)  Goal: Absence of Infection Signs and Symptoms  Outcome: Ongoing, Progressing  Pt AAO X 4; able to express needs.   No c/o pain.  Possible discharge home if  C-Pap machine is available for home use.   Safety maintained.  Bed in low position,  call  light in reach.

## 2023-08-17 NOTE — PLAN OF CARE
CHW met with patient/family at bedside. Patient experience rounding completed and reviewed the following.     Do you know your discharge plan? Yes home with family     Have you discussed your needs and preferences with your SW/CM? Yes    If you are discharging home, do you have help at home? Yes Family     Do you think you will need help additional at home at discharge? No     Do you currently have difficulty keeping up with bills, affording medicine or buying food? No    Assigned SW/CM notified of any patient/family needs or concerns. Appropriate resources provided to address patient's needs.

## 2023-08-17 NOTE — TELEPHONE ENCOUNTER
----- Message from Kassy Thrasher sent at 8/17/2023  2:08 PM CDT -----  Contact: [phone] l  Patient  Patient needs a Hosp follow up appt with their PCP only.       When is the next available appointment:  12/21    Symptoms:      Discharge date:08/17/2023    Needs to be seen by:08/24/2023    Would you prefer an answer via [facility]?: Call Back Patient with appointment please. Thank you     Comments:

## 2023-08-17 NOTE — PROGRESS NOTES
[facility] ([address] Medicine  Progress Note    Patient Name: Rochelle Espinoza  MRN: [id number]  Patient Class: IP- Inpatient   Admission Date: 8/6/2023  Length of Stay: 10 days  Attending Physician: Jose Marcus III*  Primary Care Provider: Yosi Samuels Jr., MD        Subjective:     Principal Problem:COPD exacerbation        HPI:  Rochelle Espinoza is a 67 y.o. female with history of COPD, chronic hypoxemic respiratory failure on 3 L nasal cannula at baseline, hyperlipidemia, diastolic heart failure, nicotine dependence, hypertension, presenting to emergency department with complaint of shortness of breath.  Patient was found to be hypoxic to 75% in triage on her 3 liters/minute.  She is had shortness of breath for 3 days.  Increased chest pressure and wheezing.  She also has pain in her back.  She denies fevers.  Originally her cough was productive of dark sputum, now productive of clear sputum. Patient also reports needing to use her rescue inhaler and increased frequency and using her nebulized inhaler with last use yesterday around 5 pm. Patient was brought into the ED by daughter. While in the ED patient was given dounebsx1 and solumedrol 125 mg. Initial VBG Ph 7.288 PCO2 of 81. Patient remained  stable on 3L however repeat VBG displayed worsening acidosis and Hypercarbia and placed on BIPAP. ICU consulted due to need for BIPAP and worsening hypercarbia and acidosis.       Overview/Hospital Course:  67F admitted to MICU for acute on chronic hypoxic respiratory acidosis w/ Hx of diastolic HF (2020), COPD on 3L O2, HTN, HLD and tobacco dependence.  During admission, the patient had saturations in the mid 90's on 16 iPAP/ 6 ePAP @ 30% o2. Her initial ABG was 7.24/87.2/25/40.5.  H/H was normal, BNP <10, Trop normal, UA neg. And lactate of 4.3, and AG of 13. CXR and EKG were normal. After the 3rd round of duonebs and addition of steroids the patient improved weaned off BiPAP to 5L  NC w/ stable sats in the mid 90's. Fluticasone furoate-vilanteroL 200-25 mcg/dose diskus, Simethicone, Famotidine were added.  On 8/7, Echocardiogram with preserved EF and minimal change from prior in 2020. Patient's oxygen requirements titrated down to only requiring home oxygen of 3L NC. Patient has remained on her home oxygen with resolved shortness of breath.  Wellbutrin and nicotine patches started for smoking cessation  She was approved for Trilogy at night from [employer].  Awaiting insurance authorization      Interval History:  Patient resting comfortably in bed.  Breathing back to baseline.  No wheezing or shortness of breath noted.  Tolerating BiPAP at night.  Tolerating Wellbutrin.  Still awaiting trilogy approval.    Review of Systems: As above  Objective:     Vital signs reviewed    Weight: 74.3 kg (163 lb 12.8 oz)  Body mass index is 29.02 kg/m².    Intake/Output Summary (Last 24 hours) at 8/16/2023 2209  Last data filed at 8/16/2023 1349  Gross per 24 hour   Intake 240 ml   Output --   Net 240 ml        Physical Exam  Constitutional:       General: She is not in acute distress.  HENT:      Head: Normocephalic.      Nose:      Comments: Nasal cannula     Mouth/Throat:      Mouth: Mucous membranes are moist.   Eyes:      General: No scleral icterus.     Conjunctiva/sclera: Conjunctivae normal.   Pulmonary:      Effort: No respiratory distress.   Abdominal:      General: There is no distension.   Musculoskeletal:      Right lower leg: No edema.      Left lower leg: No edema.   Skin:     General: Skin is warm.   Neurological:      General: No focal deficit present.      Mental Status: She is alert.   Psychiatric:         Mood and Affect: Mood normal.         Behavior: Behavior normal.             Assessment/Plan:      * COPD exacerbation  Acute on chronic hypoxic hypercapnic respiratory failure  Nicotine dependent  Improving and close to baseline.  Doing well on 3 L of home oxygen.  Approved for trilogy at  night.    Will continue with scheduled nebs, s/p prednisone.  Discharge once authorization of trilogy obtained.  S/p short course of Rocephin azithromycin.  Outpatient pulmonology follow up  Patient will need refills of bronchodilators  Will need prescription for Wellbutrin 150 mg daily and nicotine patches.      Major depressive disorder  Patient not previously on medication but wants to try Wellbutrin for smoking cessation as well.      Chronic diastolic heart failure  No evidence of decompensation at this time.  Amlodipine discontinued.  Lasix 20 mg daily     Nicotine dependence, cigarettes, uncomplicated   Dangers of cigarette smoking were reviewed with patient in detail.  Plan for discharge with nicotine patches and Wellbutrin.  Started Wellbutrin while inpatient    Essential hypertension  Amlodipine and HCTZ discontinued, losartan dose decreased to 50 mg daily due to low normal BP.        VTE Risk Mitigation (From admission, onward)           Ordered     enoxaparin injection 40 mg  Every 24 hours         08/06/23 0502                    Discharge Planning   ELIZABETH: 8/17/2023     Code Status: Full Code   Is the patient medically ready for discharge?: Yes    Reason for patient still in hospital (select all that apply): Pending disposition  Discharge Plan A: Home   Discharge Delays: (!) Home Medical Equipment (Insurance, Delivery)              Jose Marcus III, MD  Department of Hospital Medicine   [facility] - Intensive Care ([place])

## 2023-08-17 NOTE — SUBJECTIVE & OBJECTIVE
Interval History:  Patient resting comfortably in bed.  Breathing at baseline.  No wheezing or shortness of breath noted for several days.  Tolerating BiPAP at night.  Tolerating Wellbutrin.  Still awaiting NIV approval.    Review of Systems: As above      Objective:    Temp: 98.9 °F (37.2 °C) (08/17/23 1228)  Pulse: 90 (08/17/23 1312)  Resp: 18 (08/17/23 1312)  BP: (!) 98/57 (08/17/23 1228)  SpO2: 98 % (08/17/23 1312)    Weight: 74.3 kg (163 lb 12.8 oz) (08/08/23 2230)    Body mass index is 29.02 kg/m².    Physical Exam  Constitutional:       General: She is not in acute distress.  HENT:      Head: Normocephalic.      Nose:      Comments: Nasal cannula     Mouth/Throat:      Mouth: Mucous membranes are moist.   Eyes:      General: No scleral icterus.     Conjunctiva/sclera: Conjunctivae normal.   Pulmonary:      Effort: No respiratory distress.   Abdominal:      General: There is no distension.   Musculoskeletal:      Right lower leg: No edema.      Left lower leg: No edema.   Skin:     General: Skin is warm.   Neurological:      General: No focal deficit present.      Mental Status: She is alert.   Psychiatric:         Mood and Affect: Mood normal.         Behavior: Behavior normal.         Significant Labs: All pertinent labs within the past 24 hours have been reviewed.    No results found for this or any previous visit (from the past 24 hour(s)).    Significant Imaging: I have reviewed all pertinent imaging results/findings within the past 24 hours.    Imaging Results              X-Ray Chest AP Portable (Final result)  Result time 08/06/23 02:35:37      Final result by Ciro Apple MD (08/06/23 02:35:37)                   Impression:      Stable radiographic appearance.      Electronically signed by: Ciro Apple  Date:    08/06/2023  Time:    02:35               Narrative:    EXAMINATION:  XR CHEST AP PORTABLE    CLINICAL HISTORY:  Asthma;    TECHNIQUE:  Single frontal view of the chest was  performed.    COMPARISON:  Chest radiograph October 23, 2022    FINDINGS:  Single portable chest view is submitted.  The cardiomediastinal silhouette appears stable.    Mild accentuated attenuation at the lung bases is consistent with overlying soft tissue attenuation.  The appearance of the pulmonary bronchovascular markings is stable, mild prominence of the pulmonary vascular appears stable.    There is no evidence for confluent infiltrate or consolidation, significant pleural effusion or pneumothorax.    The osseous structures appear intact.

## 2023-08-17 NOTE — ASSESSMENT & PLAN NOTE
Acute on chronic hypoxic hypercapnic respiratory failure  Nicotine dependent  · Improving and close to baseline.  Doing well on 3 L of home oxygen.  Approved for trilogy at night.    · Will continue with scheduled nebs, s/p prednisone.  Discharge once authorization of trilogy obtained.  S/p short course of Rocephin azithromycin.  · Outpatient pulmonology follow up  · Patient will need refills of bronchodilators  · Will need prescription for Wellbutrin 150 mg daily and nicotine patches.

## 2023-08-17 NOTE — ASSESSMENT & PLAN NOTE
Amlodipine and HCTZ discontinued, losartan dose decreased to 25  mg daily due to low normal BP.  Will continue to monitor blood pressure trend closely and adjust antihypertensive regimen as clinically indicated and tolerated.

## 2023-08-17 NOTE — PLAN OF CARE
Rochelle Espinoza is a 67yo F w/ COPD complicated by chronic hypoxic and hypercapnic respiratory failure admitted to critical care medicine on 08/06 for acute on chronic hypoxic and hypercapnic respiratory failure due to COPD exacerbation. With treatment, she has since recovered from her COPD exacerbation, however, due to her ongoing respiratory failure and underlying COPD, requires nightly noninvasive ventilation with BiPAP to address her hypercapnia. A CPAP will not address her hypercapnia. Without a BiPAP, she is at risk of readmission for worsening hypercapnia and sequelae up to and including coma. Dr. Roland Roberts with Pulmonary medicine is in agreement with this recommendation as per his attestation on 08/08. Blood gases from current admission.        Latest Reference Range & Units 08/06/23 02:10 08/06/23 03:39 08/06/23 09:01   POC PH 7.35 - 7.45  7.288 (L) 7.275 (L) 7.350   POC PCO2 35 - 45 mmHg 81.8 (H) 87.2 (H) 69.9 (HH)   POC PO2 80 - 100 mmHg 40 25 (L) 71 (L)   POC HCO3 24 - 28 mmol/L 39.2 (H) 40.5 (H) 38.6 (H)   POC SATURATED O2 95 - 100 % 65 (L) 34 (L) 92 (L)   Sample  VENOUS VENOUS ARTERIAL   POC TCO2 23 - 27 mmol/L 42 (H) 43 (H) 41 (H)   POC BE -2 to 2 mmol/L 13 14 13   FiO2    30   DelSys    CPAP/BiPAP   Site  Other Other RR   Mode    BiPAP   Rate    12   (HH): Data is critically high  (L): Data is abnormally low  (H): Data is abnormally high

## 2023-08-17 NOTE — ASSESSMENT & PLAN NOTE
Acute on chronic hypoxic hypercapnic respiratory failure  Nicotine dependent  · Improved to baseline.  Doing well on 3 L of home oxygen.   · Will continue with scheduled nebs, s/p prednisone.   S/p short course of Rocephin azithromycin.  · Discharge once authorization of NIV obtained.   · Outpatient pulmonology follow up  · Patient will need refills of bronchodilators  · Will need prescription for Wellbutrin 150 mg daily and nicotine patches.

## 2023-08-17 NOTE — HPI
Rochelle Espinoza is a 67 y.o. female with history of COPD, chronic hypoxemic respiratory failure on 3 L nasal cannula at baseline, hyperlipidemia, diastolic heart failure, nicotine dependence, hypertension, presenting to emergency department with complaint of shortness of breath.  Patient was found to be hypoxic to 75% in triage on her 3 liters/minute.  She is had shortness of breath for 3 days.  Increased chest pressure and wheezing.  She also has pain in her back.  She denies fevers.  Originally her cough was productive of dark sputum, now productive of clear sputum. Patient also reports needing to use her rescue inhaler and increased frequency and using her nebulized inhaler with last use yesterday around 5 pm. Patient was brought into the ED by daughter. While in the ED patient was given dounebsx1 and solumedrol 125 mg. Initial VBG Ph 7.288 PCO2 of 81. Patient remained  stable on 3L however repeat VBG displayed worsening acidosis and Hypercarbia and placed on BIPAP. ICU consulted due to need for BIPAP and worsening hypercarbia and acidosis.

## 2023-08-18 VITALS
HEIGHT: 63 IN | OXYGEN SATURATION: 91 % | BODY MASS INDEX: 29.02 KG/M2 | WEIGHT: 163.81 LBS | HEART RATE: 107 BPM | RESPIRATION RATE: 17 BRPM | SYSTOLIC BLOOD PRESSURE: 109 MMHG | TEMPERATURE: 98 F | DIASTOLIC BLOOD PRESSURE: 67 MMHG

## 2023-08-18 PROCEDURE — 1111F PR DISCHARGE MEDS RECONCILED W/ CURRENT OUTPATIENT MED LIST: ICD-10-PCS | Mod: CPTII,,, | Performed by: STUDENT IN AN ORGANIZED HEALTH CARE EDUCATION/TRAINING PROGRAM

## 2023-08-18 PROCEDURE — 94640 AIRWAY INHALATION TREATMENT: CPT

## 2023-08-18 PROCEDURE — 99239 HOSP IP/OBS DSCHRG MGMT >30: CPT | Mod: ,,, | Performed by: STUDENT IN AN ORGANIZED HEALTH CARE EDUCATION/TRAINING PROGRAM

## 2023-08-18 PROCEDURE — 94761 N-INVAS EAR/PLS OXIMETRY MLT: CPT

## 2023-08-18 PROCEDURE — 94660 CPAP INITIATION&MGMT: CPT

## 2023-08-18 PROCEDURE — 1111F DSCHRG MED/CURRENT MED MERGE: CPT | Mod: CPTII,,, | Performed by: STUDENT IN AN ORGANIZED HEALTH CARE EDUCATION/TRAINING PROGRAM

## 2023-08-18 PROCEDURE — 25000003 PHARM REV CODE 250: Performed by: COMMUNITY HEALTH WORKER

## 2023-08-18 PROCEDURE — 99900035 HC TECH TIME PER 15 MIN (STAT)

## 2023-08-18 PROCEDURE — 25000242 PHARM REV CODE 250 ALT 637 W/ HCPCS: Performed by: COMMUNITY HEALTH WORKER

## 2023-08-18 PROCEDURE — 27100171 HC OXYGEN HIGH FLOW UP TO 24 HOURS

## 2023-08-18 PROCEDURE — 25000003 PHARM REV CODE 250: Performed by: FAMILY MEDICINE

## 2023-08-18 PROCEDURE — 99239 PR HOSPITAL DISCHARGE DAY,>30 MIN: ICD-10-PCS | Mod: ,,, | Performed by: STUDENT IN AN ORGANIZED HEALTH CARE EDUCATION/TRAINING PROGRAM

## 2023-08-18 RX ORDER — BUPROPION HYDROCHLORIDE 150 MG/1
150 TABLET ORAL DAILY
Qty: 30 TABLET | Refills: 1 | Status: SHIPPED | OUTPATIENT
Start: 2023-08-19 | End: 2023-10-31

## 2023-08-18 RX ORDER — FUROSEMIDE 20 MG/1
20 TABLET ORAL DAILY
Qty: 30 TABLET | Refills: 1 | Status: SHIPPED | OUTPATIENT
Start: 2023-08-19 | End: 2023-08-23

## 2023-08-18 RX ORDER — ALBUTEROL SULFATE 90 UG/1
2 AEROSOL, METERED RESPIRATORY (INHALATION) EVERY 4 HOURS PRN
Qty: 8 G | Refills: 11 | Status: SHIPPED | OUTPATIENT
Start: 2023-08-18 | End: 2023-10-31 | Stop reason: SDUPTHER

## 2023-08-18 RX ADMIN — BUPROPION HYDROCHLORIDE 150 MG: 150 TABLET, FILM COATED, EXTENDED RELEASE ORAL at 08:08

## 2023-08-18 RX ADMIN — IPRATROPIUM BROMIDE AND ALBUTEROL SULFATE 3 ML: .5; 3 SOLUTION RESPIRATORY (INHALATION) at 09:08

## 2023-08-18 RX ADMIN — GABAPENTIN 300 MG: 300 CAPSULE ORAL at 08:08

## 2023-08-18 RX ADMIN — FLUTICASONE FUROATE AND VILANTEROL TRIFENATATE 1 PUFF: 200; 25 POWDER RESPIRATORY (INHALATION) at 09:08

## 2023-08-18 RX ADMIN — IPRATROPIUM BROMIDE AND ALBUTEROL SULFATE 3 ML: .5; 3 SOLUTION RESPIRATORY (INHALATION) at 01:08

## 2023-08-18 NOTE — PLAN OF CARE
[facility] ([place] [address])      HOME HEALTH ORDERS  FACE TO FACE ENCOUNTER    Patient Name: Rochelle Espinoza  YOB: 1955    PCP: Yosi Samuels Jr., MD   PCP Address: [address] [facility] [place] [address]  PCP Phone Number: [phone]  PCP Fax: [phone]    Encounter Date: 8/6/23    Admit to Home Health    Diagnoses:  Active Hospital Problems    Diagnosis  POA    *COPD exacerbation [J44.1]  Yes    Major depressive disorder [F32.9]  Yes    Chronic diastolic heart failure [I50.32]  Yes    Peripheral polyneuropathy [G62.9]  Yes    Nicotine dependence, cigarettes, uncomplicated  [F17.210]  Yes     Chronic    Mixed hyperlipidemia [E78.2]  Yes    Acute on chronic respiratory failure [J96.20]  Yes     Patient is on 3L via NC continuously.      Essential hypertension [I10]  Yes      Resolved Hospital Problems   No resolved problems to display.       Follow Up Appointments:  Future Appointments   Date Time Provider Department Center   8/23/2023  3:20 PM Yosi Samuels Jr., MD [facility]       Allergies:  Review of patient's allergies indicates:   Allergen Reactions    Doxycycline Other (See Comments)     Acid reflux    Orange juice      Swelling in pt tongue         Medications: Review discharge medications with patient and family and provide education.    Current Facility-Administered Medications   Medication Dose Route Frequency Provider Last Rate Last Admin    acetaminophen tablet 650 mg  650 mg Oral Q6H PRN Khanh Corona MD   650 mg at 08/10/23 1630    albuterol-ipratropium 2.5 mg-0.5 mg/3 mL nebulizer solution 3 mL  3 mL Nebulization Q4H PRN Flakito Chairez MD        albuterol-ipratropium 2.5 mg-0.5 mg/3 mL nebulizer solution 3 mL  3 mL Nebulization QID WAKE Flakito Chairez MD   3 mL at 08/18/23 1312    atorvastatin tablet 10 mg  10 mg Oral QHS Foreign Vee MD   10 mg at 08/17/23 2106    buPROPion TB24 tablet 150 mg  150 mg Oral Daily Jose Marcus III, MD   150 mg  at 08/18/23 0833    enoxaparin injection 40 mg  40 mg Subcutaneous Q24H (prophylaxis, 1700) Flakito Chairez MD   40 mg at 08/17/23 1706    fluticasone furoate-vilanteroL 200-25 mcg/dose diskus inhaler 1 puff  1 puff Inhalation Daily Elias Avery MD   1 puff at 08/18/23 0905    furosemide tablet 20 mg  20 mg Oral Daily Khanh Corona MD   20 mg at 08/17/23 0856    gabapentin capsule 300 mg  300 mg Oral TID Flakito Chairez MD   300 mg at 08/18/23 0833    nicotine 21 mg/24 hr 1 patch  1 patch Transdermal Daily PRN Flakito Chairez MD   1 patch at 08/10/23 1630    senna-docusate 8.6-50 mg per tablet 1 tablet  1 tablet Oral Daily PRN Flakito Chairez MD        simethicone chewable tablet 80 mg  1 tablet Oral TID PRN Elias Avery MD         Current Outpatient Medications   Medication Sig Dispense Refill    atorvastatin (LIPITOR) 10 MG tablet Take 1 tablet (10 mg total) by mouth every evening. 90 tablet 3    fluticasone propionate (FLONASE) 50 mcg/actuation nasal spray SHAKE LIQUID AND USE 1 SPRAY (50MCG) IN EACH NOSTRIL TWICE DAILY 48 g 3    fluticasone-umeclidin-vilanter (TRELEGY ELLIPTA) 200-62.5-25 mcg inhaler Inhale 1 puff into the lungs once daily. 3 each 4    gabapentin (NEURONTIN) 300 MG capsule TAKE 1 CAPSULE THREE TIMES DAILY 270 capsule 2    albuterol (PROVENTIL/VENTOLIN HFA) 90 mcg/actuation inhaler Inhale 2 puffs into the lungs every 4 (four) hours as needed for Wheezing or Shortness of Breath. Rescue 8 g 11    albuterol-ipratropium (DUO-NEB) 2.5 mg-0.5 mg/3 mL nebulizer solution Take 3 mLs by nebulization every 4 (four) hours as needed for Wheezing. Rescue 1080 mL 3    ascorbic acid, vitamin C, (VITAMIN C) 500 MG tablet Take 500 mg by mouth once daily.      [START ON 8/19/2023] buPROPion (WELLBUTRIN XL) 150 MG TB24 tablet Take 1 tablet (150 mg total) by mouth once daily. 30 tablet 1    cetirizine (ZYRTEC) 10 MG tablet Take 1 tablet (10 mg total) by mouth once daily. 90 tablet 3     cholecalciferol, vitamin D3, (VITAMIN D3) 25 mcg (1,000 unit) capsule Take 1,000 Units by mouth once daily.      [START ON 8/19/2023] furosemide (LASIX) 20 MG tablet Take 1 tablet (20 mg total) by mouth once daily. 30 tablet 1    glycerin adult suppository Place 1 suppository rectally as needed for Constipation.      magnesium 250 mg Tab Take 250 mg by mouth once.       Discharge Medication List as of 8/18/2023  2:51 PM        START taking these medications    Details   buPROPion (WELLBUTRIN XL) 150 MG TB24 tablet Take 1 tablet (150 mg total) by mouth once daily., Starting Sat 8/19/2023, Normal      furosemide (LASIX) 20 MG tablet Take 1 tablet (20 mg total) by mouth once daily., Starting Sat 8/19/2023, Until Sun 8/18/2024, Normal           CONTINUE these medications which have CHANGED    Details   albuterol (PROVENTIL/VENTOLIN HFA) 90 mcg/actuation inhaler Inhale 2 puffs into the lungs every 4 (four) hours as needed for Wheezing or Shortness of Breath. Rescue, Starting Fri 8/18/2023, Normal           CONTINUE these medications which have NOT CHANGED    Details   atorvastatin (LIPITOR) 10 MG tablet Take 1 tablet (10 mg total) by mouth every evening., Starting Tue 8/30/2022, Until Wed 8/30/2023, Normal      fluticasone propionate (FLONASE) 50 mcg/actuation nasal spray SHAKE LIQUID AND USE 1 SPRAY (50MCG) IN EACH NOSTRIL TWICE DAILY, Normal      fluticasone-umeclidin-vilanter (TRELEGY ELLIPTA) 200-62.5-25 mcg inhaler Inhale 1 puff into the lungs once daily., Starting Wed 7/12/2023, Normal      gabapentin (NEURONTIN) 300 MG capsule TAKE 1 CAPSULE THREE TIMES DAILY, Normal      albuterol-ipratropium (DUO-NEB) 2.5 mg-0.5 mg/3 mL nebulizer solution Take 3 mLs by nebulization every 4 (four) hours as needed for Wheezing. Rescue, Starting Mon 6/20/2022, Normal      ascorbic acid, vitamin C, (VITAMIN C) 500 MG tablet Take 500 mg by mouth once daily., Historical Med      cetirizine (ZYRTEC) 10 MG tablet Take 1 tablet (10 mg  total) by mouth once daily., Starting Wed 7/12/2023, Until Thu 7/11/2024, Normal      cholecalciferol, vitamin D3, (VITAMIN D3) 25 mcg (1,000 unit) capsule Take 1,000 Units by mouth once daily., Historical Med      glycerin adult suppository Place 1 suppository rectally as needed for Constipation., Historical Med      magnesium 250 mg Tab Take 250 mg by mouth once., Historical Med           STOP taking these medications       amLODIPine (NORVASC) 5 MG tablet Comments:   Reason for Stopping:         hydroCHLOROthiazide (HYDRODIURIL) 12.5 MG Tab Comments:   Reason for Stopping:         losartan (COZAAR) 100 MG tablet Comments:   Reason for Stopping:         potassium chloride SA (K-DUR,KLOR-CON) 20 MEQ tablet Comments:   Reason for Stopping:         varenicline (CHANTIX) 1 mg Tab Comments:   Reason for Stopping:         aspirin (ECOTRIN) 81 MG EC tablet Comments:   Reason for Stopping:         varenicline (CHANTIX STARTING MONTH BOX) 0.5 mg (11)- 1 mg (42) tablet Comments:   Reason for Stopping:                 I have seen and examined this patient within the last 30 days. My clinical findings that support the need for the home health skilled services and home bound status are the following:no   Weakness/numbness causing balance and gait disturbance due to COPD Exacerbation making it taxing to leave home.     Diet:   cardiac diet and 2 gram sodium diet    Labs:  N/a    Referrals/ Consults  Physical Therapy to evaluate and treat. Evaluate for home safety and equipment needs; Establish/upgrade home exercise program. Perform / instruct on therapeutic exercises, gait training, transfer training, and Range of Motion.  Occupational Therapy to evaluate and treat. Evaluate home environment for safety and equipment needs. Perform/Instruct on transfers, ADL training, ROM, and therapeutic exercises.    Activities:   activity as tolerated    Nursing:   Agency to admit patient within 24 hours of hospital discharge unless specified  on physician order or at patient request    SN to complete comprehensive assessment including routine vital signs. Instruct on disease process and s/s of complications to report to MD. Review/verify medication list sent home with the patient at time of discharge  and instruct patient/caregiver as needed. Frequency may be adjusted depending on start of care date.     Skilled nurse to perform up to 3 visits PRN for symptoms related to diagnosis    Notify MD if SBP > 160 or < 90; DBP > 90 or < 50; HR > 120 or < 50; Temp > 101; O2 < 88%    Ok to schedule additional visits based on staff availability and patient request on consecutive days within the home health episode.    When multiple disciplines ordered:    Start of Care occurs on Sunday - Wednesday schedule remaining discipline evaluations as ordered on separate consecutive days following the start of care.    Thursday SOC -schedule subsequent evaluations Friday and Monday the following week.     Friday - Saturday SOC - schedule subsequent discipline evaluations on consecutive days starting Monday of the following week.    For all post-discharge communication and subsequent orders please contact patient's primary care physician. If unable to reach primary care physician or do not receive response within 30 minutes, please contact Dr. Yosi Samuels for clinical staff order clarification    Miscellaneous   Routine Skin for Bedridden Patients: Instruct patient/caregiver to apply moisture barrier cream to all skin folds and wet areas in perineal area daily and after baths and all bowel movements.  Home Oxygen:  Oxygen at 3 L/min nasal canula to be used:  Continuously. BiPAP qhs.    Home Health Aide:  Nursing Three times weekly, Physical Therapy Three times weekly, Occupational Therapy Three times weekly, and Respiratory Therapy Three times weekly    Wound Care Orders  no    I certify that this patient is confined to her home and needs intermittent skilled nursing care,  physical therapy, speech therapy, and occupational therapy.

## 2023-08-18 NOTE — DISCHARGE SUMMARY
[facility] - Intensive Care ([place] [address])  [facility] Medicine  Discharge Summary      Patient Name: Rochelle Espinoza  MRN: [id number]  HOANG: [id number]  Patient Class: IP- Inpatient  Admission Date: 8/6/2023  Hospital Length of Stay: 12 days  Discharge Date and Time:  08/18/2023 2:06 PM  Attending Physician: Isabela Hendrix MD   Discharging Provider: Isabela Hendrix MD  Primary Care Provider: Yosi Samuels Jr., MD  [facility] Medicine [place] [facility] [place] [facility] [place] Isabela Hendrix MD  Primary Care Team[place] [facility] [place] [facility]    HPI:   Rochelle Espinoza is a 67 y.o. female with history of COPD, chronic hypoxemic respiratory failure on 3 L nasal cannula at baseline, hyperlipidemia, diastolic heart failure, nicotine dependence, hypertension, presenting to emergency department with complaint of shortness of breath.  Patient was found to be hypoxic to 75% in triage on her 3 liters/minute.  She is had shortness of breath for 3 days.  Increased chest pressure and wheezing.  She also has pain in her back.  She denies fevers.  Originally her cough was productive of dark sputum, now productive of clear sputum. Patient also reports needing to use her rescue inhaler and increased frequency and using her nebulized inhaler with last use yesterday around 5 pm. Patient was brought into the ED by daughter. While in the ED patient was given dounebsx1 and solumedrol 125 mg. Initial VBG Ph 7.288 PCO2 of 81. Patient remained  stable on 3L however repeat VBG displayed worsening acidosis and Hypercarbia and placed on BIPAP. ICU consulted due to need for BIPAP and worsening hypercarbia and acidosis.       * No surgery found *      Hospital Course:   67F admitted to MICU for acute on chronic hypoxic respiratory acidosis w/ Hx of diastolic HF (2020), COPD on 3L O2, HTN, HLD and tobacco dependence.  During admission, the patient had saturations in the mid 90's on 16 iPAP/ 6 ePAP @ 30% o2. Her initial ABG was 7.24/87.2/25/40.5.  H/H was normal, BNP <10, Trop normal, UA neg.  And lactate of 4.3, and AG of 13. CXR and EKG were normal. After the 3rd round of duonebs and addition of steroids the patient improved weaned off BiPAP to 5L NC w/ stable sats in the mid 90's. Fluticasone furoate-vilanteroL 200-25 mcg/dose diskus, Simethicone, Famotidine were added.  On 8/7, Echocardiogram with preserved EF and minimal change from prior in 2020. Patient's oxygen requirements titrated down to only requiring home oxygen of 3L NC. Patient has remained on her home oxygen with resolved shortness of breath.  Wellbutrin and nicotine patches started for smoking cessation  She was approved for Trilogy at night from [employer].  Awaiting insurance authorization; they did not approve Trilogy. Auth submitted for BiPAP. Approved on 8/18 with planned delivery to bedside. Patient without further hospitalization needs.  Patient medically stable for discharge. F/u w/ PCP and pulm. Return precautions advised; patient's questions answered.  Patient in agreement with discharge plan.    Vitals:    08/18/23 0905 08/18/23 1112 08/18/23 1150 08/18/23 1312   BP:   (!) 108/49    BP Location:   Left arm    Patient Position:   Lying    Pulse: 88 89 75 94   Resp: 16  18 16   Temp:   98.2 °F (36.8 °C)    TempSrc:   Oral    SpO2: 96%  (!) 92% 96%   Weight:       Height:         Physical Exam  Constitutional:       General: She is not in acute distress.  HENT:      Head: Normocephalic.      Nose:      Comments: Nasal cannula     Mouth/Throat:      Mouth: Mucous membranes are moist.   Eyes:      General: No scleral icterus.     Conjunctiva/sclera: Conjunctivae normal.   Pulmonary:      Effort: No respiratory distress.   Abdominal:      General: There is no distension.   Musculoskeletal:      Right lower leg: No edema.      Left lower leg: No edema.   Skin:     General: Skin is warm.   Neurological:      General: No focal deficit present.      Mental Status: She is alert.   Psychiatric:         Mood and Affect: Mood normal.          "Behavior: Behavior normal.             Goals of Care Treatment Preferences:  Code Status: Full Code      Consults:   Consults (From admission, onward)        Status Ordering Provider     Inpatient virtual consult to Hospital Medicine  Once        Provider:  (Not yet assigned)    Completed MAMIE MARTINEZ     Inpatient consult to Critical Care Medicine  Once        Provider:  (Not yet assigned)    Completed SUZY POLLOCK          No new Assessment & Plan notes have been filed under this hospital service since the last note was generated.  Service: Hospital Medicine    Final Active Diagnoses:    Diagnosis Date Noted POA    PRINCIPAL PROBLEM:  COPD exacerbation [J44.1] 10/23/2022 Yes    Major depressive disorder [F32.9] 08/06/2023 Yes    Chronic diastolic heart failure [I50.32] 10/23/2022 Yes    Peripheral polyneuropathy [G62.9] 10/23/2022 Yes    Nicotine dependence, cigarettes, uncomplicated  [F17.210] 06/20/2022 Yes     Chronic    Mixed hyperlipidemia [E78.2] 12/30/2021 Yes    Acute on chronic respiratory failure [J96.20] 07/18/2020 Yes    Essential hypertension [I10]  Yes      Problems Resolved During this Admission:       Discharged Condition: stable    Disposition: Home or Self Care    Follow Up:   Follow-up Information     Yosi Samuels Jr., MD Follow up.    Specialty: Internal Medicine  Why: A message has been sent to your PCP, the nurse will contact you to schedule this hospital follow up visit. However, if you do not hear from them within 24 to 48 hours of discharge please call to schedule the appointment.  Contact information:  [address]  [facility] [id number] [phone]                       Patient Instructions:      CANE FOR HOME USE     Order Specific Question Answer Comments   Type of Cane: Straight    Height: 5' 3" (1.6 m)    Weight: 74.3 kg (163 lb 12.8 oz)    Does patient have medical equipment at home? oxygen    Length of need (1-99 months): 12    Please check all that apply: " "Patient's condition impairs ambulation.      BIPAP FOR HOME USE     Order Specific Question Answer Comments   Length of need (1-99 months): 99    Type:  BiPap per insurance   BiPap setting (cmH20) Inspiratory: 16    BiPap setting (cmH20) Expiratory: 8    Humidification (): Heated    Choose ONE mask type and its corresponding cushions and/or pillows:  Full Face Mask, 1 per 90 days:  Full Face Cushion, (3 per 90 days) per insurance   Choose EITHER Heated or Non-Heated Tubjing  Non-Heated Tubing, 1 per 90 days per insurance   Number of Days Needed: 99    All other supplies as needed as listed below:  Headgear, 1 per 180 days    All other supplies as needed as listed below:  Non-Disposable Filter, 1 per 180 days    All other supplies as needed as listed below:  Chin Strap, 1 per 180 days    All other supplies as needed as listed below:  Disposable Filter, 6 per 90 days    All other supplies as needed as listed below:  Exhalation Port, contact payer for quantity/frequency    All other supplies as needed as listed below:  Humidifier Chamber, 1 per 180 days    Vendor: Other (use comments) [employer]   CRM Resolution Date: 8/18/2023      Ambulatory referral/consult to [facility]   Standing Status: Future   Referral Priority: Routine Referral Type: Consultation   Referral Reason: Specialty Services Required   Number of Visits Requested: 1     Ambulatory Request for Ready Responder Services   Standing Status: Future Standing Exp. Date: 08/07/24     Order Specific Question Answer Comments   Program referred to: COVID-19 Vaccine        Significant Diagnostic Studies: Labs: CMP No results for input(s): "NA", "K", "CL", "CO2", "GLU", "BUN", "CREATININE", "CALCIUM", "PROT", "ALBUMIN", "BILITOT", "ALKPHOS", "AST", "ALT", "ANIONGAP", "ESTGFRAFRICA", "EGFRNONAA" in the last 48 hours. and CBC No results for input(s): "WBC", "HGB", "HCT", "PLT" in the last 48 " hours.    Pending Diagnostic Studies:     None         Medications:  Reconciled Home Medications:      Medication List      START taking these medications    buPROPion 150 MG TB24 tablet  Commonly known as: WELLBUTRIN XL  Take 1 tablet (150 mg total) by mouth once daily.  Start taking on: August 19, 2023     furosemide 20 MG tablet  Commonly known as: LASIX  Take 1 tablet (20 mg total) by mouth once daily.  Start taking on: August 19, 2023        CHANGE how you take these medications    albuterol 90 mcg/actuation inhaler  Commonly known as: PROVENTIL/VENTOLIN HFA  Inhale 2 puffs into the lungs every 4 (four) hours as needed for Wheezing or Shortness of Breath. Rescue  What changed:   · when to take this  · reasons to take this        CONTINUE taking these medications    albuterol-ipratropium 2.5 mg-0.5 mg/3 mL nebulizer solution  Commonly known as: DUO-NEB  Take 3 mLs by nebulization every 4 (four) hours as needed for Wheezing. Rescue     ascorbic acid (vitamin C) 500 MG tablet  Commonly known as: VITAMIN C  Take 500 mg by mouth once daily.     aspirin 81 MG EC tablet  Commonly known as: ECOTRIN  Take 81 mg by mouth once daily. Low Dose     atorvastatin 10 MG tablet  Commonly known as: LIPITOR  Take 1 tablet (10 mg total) by mouth every evening.     cetirizine 10 MG tablet  Commonly known as: ZYRTEC  Take 1 tablet (10 mg total) by mouth once daily.     cholecalciferol (vitamin D3) 25 mcg (1,000 unit) capsule  Commonly known as: VITAMIN D3  Take 1,000 Units by mouth once daily.     fluticasone propionate 50 mcg/actuation nasal spray  Commonly known as: FLONASE  SHAKE LIQUID AND USE 1 SPRAY (50MCG) IN EACH NOSTRIL TWICE DAILY     gabapentin 300 MG capsule  Commonly known as: NEURONTIN  TAKE 1 CAPSULE THREE TIMES DAILY     glycerin adult suppository  Place 1 suppository rectally as needed for Constipation.     magnesium 250 mg Tab  Take 250 mg by mouth once.     TRELEGY ELLIPTA 200-62.5-25 mcg inhaler  Generic drug:  fluticasone-umeclidin-vilanter  Inhale 1 puff into the lungs once daily.        STOP taking these medications    amLODIPine 5 MG tablet  Commonly known as: NORVASC     hydroCHLOROthiazide 12.5 MG Tab  Commonly known as: HYDRODIURIL     losartan 100 MG tablet  Commonly known as: COZAAR     potassium chloride SA 20 MEQ tablet  Commonly known as: K-DUR,KLOR-CON     varenicline 0.5 mg (11)- 1 mg (42) tablet  Commonly known as: CHANTIX STARTING MONTH BOX     varenicline 1 mg Tab  Commonly known as: CHANTIX            Indwelling Lines/Drains at time of discharge:   Lines/Drains/Airways     None                 Time spent on the discharge of patient: 35 minutes         Isabela Hendrix MD  Department of Hospital Medicine  [facility] - Intensive Care ([place]-[address])

## 2023-08-18 NOTE — PLAN OF CARE
Problem: Adult Inpatient Plan of Care  Goal: Plan of Care Review  Outcome: Ongoing, Progressing     Problem: Adult Inpatient Plan of Care  Goal: Plan of Care Review  Outcome: Ongoing, Progressing   Pt AAO X 4; able to express needs.  No c/o pain.  Discharge pending Bi Pap Machine delivery to use at home.  Independent to bathroom and for self care needs.  Safety maintained.  Bed in low position,  call  light in reach.

## 2023-08-18 NOTE — PLAN OF CARE
Per pt, she would prefer Bipap delivered to hospital. Called Nate at [facility] [phone], informed that pt would like bipap delivered to hospital.  She's not sure what time bipap will be delivered; she will call  and call CM back.    2:24 PM  Spoke with pt in room.  She has rec'd BiPap.  Notified nurse and MD.  Notified Malka with [facility].    SARAH DanielN, BS, RN, CCM

## 2023-08-18 NOTE — CONSULTS
[facility] - Intensive Care ([place] [address])  [facility] Medicine  Telemedicine Consult Note    Patient Name: Rochelle Espinoza  MRN: [id number]  Admission Date: 8/6/2023  Hospital Length of Stay: 11 days  Attending Physician: Isabela Hendrix MD   Primary Care Provider: Yosi Samuels Jr., MD         Thank you for your consult to [facility]. We have reviewed the patient chart. This patient does not meet criteria for [facility] service at this time due to Patient has a condition likely to benefit from bedside evaluation such as advanced COPD and respiratory failure.  Will not assume care of the patient at this time.          Surekha Conner MD  Department of Hospital Medicine   [facility] - Intensive [facility] ([place]-16)

## 2023-08-18 NOTE — PROGRESS NOTES
Pt being discharged home per MD orders.  VS stable, pt afebrile and no c/o pain at this time.  Reviewed discharge instructions, follow-up's and meds with pt, ANDREW.  Bipap and portable oxygen tank with patient belongings packed at bedside.  Ride to be provided by family upon discharge and will transport per W/C.

## 2023-08-21 ENCOUNTER — PATIENT OUTREACH (OUTPATIENT)
Dept: ADMINISTRATIVE | Facility: CLINIC | Age: 68
End: 2023-08-21
Payer: MEDICARE

## 2023-08-21 NOTE — PROGRESS NOTES
C3 nurse spoke with Rochelle Espinoza  for a TCC post hospital discharge follow up call. The patient has a scheduled HOSFU appointment with Yosi Samuels Jr., MD on 08/23/2023 @ 4190.    Message sent to PCP staff.

## 2023-08-21 NOTE — PLAN OF CARE
[facility] - Intensive Care ([facility])  Discharge Final Note    Primary Care Provider: Yosi Samuels Jr., MD    Expected Discharge Date: 8/18/2023    Final Discharge Note (most recent)       Final Note - 08/21/23 0814          Final Note    Assessment Type Final Discharge Note (P)      Anticipated Discharge Disposition Home-Health Care Svc (P)         Post-Acute Status    Discharge Delays None known at this time (P)                      Important Message from Medicare  Important Message from Medicare regarding Discharge Appeal Rights: Explained to patient/caregiver, Signed/date by patient/caregiver     Date IMM was signed: 08/17/23  Time IMM was signed: 0924    Contact Info       Yosi Samuels Jr., MD   Specialty: Internal Medicine   Relationship: PCP - General  Hyperlipidemia Digital Medicine Responsible Provider    [address]  [facility] [address]   Phone: [phone]       Next Steps: Follow up    Instructions: A message has been sent to your PCP, the nurse will contact you to schedule this hospital follow up visit. However, if you do not hear from them within 24 to 48 hours of discharge please call to schedule the appointment.        Pt d/c'd with biplolis and [place].    SARAH DanielN, BS, RN, CCM

## 2023-08-23 ENCOUNTER — OFFICE VISIT (OUTPATIENT)
Dept: INTERNAL MEDICINE | Facility: CLINIC | Age: 68
End: 2023-08-23
Payer: MEDICARE

## 2023-08-23 VITALS
WEIGHT: 157.63 LBS | HEIGHT: 63 IN | BODY MASS INDEX: 27.93 KG/M2 | DIASTOLIC BLOOD PRESSURE: 84 MMHG | HEART RATE: 76 BPM | SYSTOLIC BLOOD PRESSURE: 118 MMHG | OXYGEN SATURATION: 95 %

## 2023-08-23 DIAGNOSIS — I27.81 COR PULMONALE, CHRONIC: Primary | Chronic | ICD-10-CM

## 2023-08-23 DIAGNOSIS — I50.32 CHRONIC DIASTOLIC HEART FAILURE: ICD-10-CM

## 2023-08-23 DIAGNOSIS — E78.2 MIXED HYPERLIPIDEMIA: ICD-10-CM

## 2023-08-23 DIAGNOSIS — F33.41 RECURRENT MAJOR DEPRESSIVE DISORDER, IN PARTIAL REMISSION: ICD-10-CM

## 2023-08-23 DIAGNOSIS — J44.9 CHRONIC OBSTRUCTIVE PULMONARY DISEASE WITH HYPOXIA: Chronic | ICD-10-CM

## 2023-08-23 DIAGNOSIS — I10 ESSENTIAL HYPERTENSION: ICD-10-CM

## 2023-08-23 PROBLEM — J96.20 ACUTE ON CHRONIC RESPIRATORY FAILURE: Status: RESOLVED | Noted: 2020-07-18 | Resolved: 2023-08-23

## 2023-08-23 PROBLEM — J96.21 ACUTE ON CHRONIC RESPIRATORY FAILURE WITH HYPOXIA: Status: RESOLVED | Noted: 2022-07-30 | Resolved: 2023-08-23

## 2023-08-23 PROBLEM — J44.1 COPD EXACERBATION: Status: RESOLVED | Noted: 2022-10-23 | Resolved: 2023-08-23

## 2023-08-23 PROBLEM — I95.9 HYPOTENSION: Status: RESOLVED | Noted: 2020-07-18 | Resolved: 2023-08-23

## 2023-08-23 PROCEDURE — 99999 PR PBB SHADOW E&M-EST. PATIENT-LVL III: ICD-10-PCS | Mod: PBBFAC,,, | Performed by: INTERNAL MEDICINE

## 2023-08-23 PROCEDURE — 1111F DSCHRG MED/CURRENT MED MERGE: CPT | Mod: CPTII,S$GLB,, | Performed by: INTERNAL MEDICINE

## 2023-08-23 PROCEDURE — 3044F PR MOST RECENT HEMOGLOBIN A1C LEVEL <7.0%: ICD-10-PCS | Mod: CPTII,S$GLB,, | Performed by: INTERNAL MEDICINE

## 2023-08-23 PROCEDURE — 1101F PT FALLS ASSESS-DOCD LE1/YR: CPT | Mod: CPTII,S$GLB,, | Performed by: INTERNAL MEDICINE

## 2023-08-23 PROCEDURE — 99495 TCM SERVICES (MODERATE COMPLEXITY): ICD-10-PCS | Mod: S$GLB,,, | Performed by: INTERNAL MEDICINE

## 2023-08-23 PROCEDURE — 4010F PR ACE/ARB THEARPY RXD/TAKEN: ICD-10-PCS | Mod: CPTII,S$GLB,, | Performed by: INTERNAL MEDICINE

## 2023-08-23 PROCEDURE — 3288F PR FALLS RISK ASSESSMENT DOCUMENTED: ICD-10-PCS | Mod: CPTII,S$GLB,, | Performed by: INTERNAL MEDICINE

## 2023-08-23 PROCEDURE — 3079F DIAST BP 80-89 MM HG: CPT | Mod: CPTII,S$GLB,, | Performed by: INTERNAL MEDICINE

## 2023-08-23 PROCEDURE — 1126F AMNT PAIN NOTED NONE PRSNT: CPT | Mod: CPTII,S$GLB,, | Performed by: INTERNAL MEDICINE

## 2023-08-23 PROCEDURE — 3288F FALL RISK ASSESSMENT DOCD: CPT | Mod: CPTII,S$GLB,, | Performed by: INTERNAL MEDICINE

## 2023-08-23 PROCEDURE — 4010F ACE/ARB THERAPY RXD/TAKEN: CPT | Mod: CPTII,S$GLB,, | Performed by: INTERNAL MEDICINE

## 2023-08-23 PROCEDURE — 99495 TRANSJ CARE MGMT MOD F2F 14D: CPT | Mod: S$GLB,,, | Performed by: INTERNAL MEDICINE

## 2023-08-23 PROCEDURE — 3074F PR MOST RECENT SYSTOLIC BLOOD PRESSURE < 130 MM HG: ICD-10-PCS | Mod: CPTII,S$GLB,, | Performed by: INTERNAL MEDICINE

## 2023-08-23 PROCEDURE — 1101F PR PT FALLS ASSESS DOC 0-1 FALLS W/OUT INJ PAST YR: ICD-10-PCS | Mod: CPTII,S$GLB,, | Performed by: INTERNAL MEDICINE

## 2023-08-23 PROCEDURE — 3079F PR MOST RECENT DIASTOLIC BLOOD PRESSURE 80-89 MM HG: ICD-10-PCS | Mod: CPTII,S$GLB,, | Performed by: INTERNAL MEDICINE

## 2023-08-23 PROCEDURE — 3008F BODY MASS INDEX DOCD: CPT | Mod: CPTII,S$GLB,, | Performed by: INTERNAL MEDICINE

## 2023-08-23 PROCEDURE — 3044F HG A1C LEVEL LT 7.0%: CPT | Mod: CPTII,S$GLB,, | Performed by: INTERNAL MEDICINE

## 2023-08-23 PROCEDURE — 1159F MED LIST DOCD IN RCRD: CPT | Mod: CPTII,S$GLB,, | Performed by: INTERNAL MEDICINE

## 2023-08-23 PROCEDURE — 1111F PR DISCHARGE MEDS RECONCILED W/ CURRENT OUTPATIENT MED LIST: ICD-10-PCS | Mod: CPTII,S$GLB,, | Performed by: INTERNAL MEDICINE

## 2023-08-23 PROCEDURE — 3008F PR BODY MASS INDEX (BMI) DOCUMENTED: ICD-10-PCS | Mod: CPTII,S$GLB,, | Performed by: INTERNAL MEDICINE

## 2023-08-23 PROCEDURE — 3074F SYST BP LT 130 MM HG: CPT | Mod: CPTII,S$GLB,, | Performed by: INTERNAL MEDICINE

## 2023-08-23 PROCEDURE — 1159F PR MEDICATION LIST DOCUMENTED IN MEDICAL RECORD: ICD-10-PCS | Mod: CPTII,S$GLB,, | Performed by: INTERNAL MEDICINE

## 2023-08-23 PROCEDURE — 99999 PR PBB SHADOW E&M-EST. PATIENT-LVL III: CPT | Mod: PBBFAC,,, | Performed by: INTERNAL MEDICINE

## 2023-08-23 PROCEDURE — 1126F PR PAIN SEVERITY QUANTIFIED, NO PAIN PRESENT: ICD-10-PCS | Mod: CPTII,S$GLB,, | Performed by: INTERNAL MEDICINE

## 2023-08-23 RX ORDER — FUROSEMIDE 20 MG/1
TABLET ORAL
Qty: 180 TABLET | Refills: 1 | Status: SHIPPED | OUTPATIENT
Start: 2023-08-23

## 2023-08-23 NOTE — PROGRESS NOTES
Transitional Care Note  Subjective:       Patient ID: Rochelle Espinoza is a 68 y.o. female.  Chief Complaint: Follow-up    Family and/or Caretaker present at visit?  No.  Diagnostic tests reviewed/disposition: No diagnosic tests pending after this hospitalization.  Disease/illness education: Heart failure  Home health/community services discussion/referrals: Patient has home health established at [facility] .   Establishment or re-establishment of referral orders for community resources: No other necessary community resources.   Discussion with other health care providers: No discussion with other health care providers necessary.   HPI  Review of Systems    Objective:      Physical Exam    Assessment:       1. Cor pulmonale, chronic    2. Chronic obstructive pulmonary disease with hypoxia    3. Essential hypertension    4. Mixed hyperlipidemia    5. Chronic diastolic heart failure    6. Recurrent major depressive disorder, in partial remission        Plan:       Increase lasix to 1-2 po q day-- follow upin 2 months with labs    Follow up with pulmonary

## 2023-08-23 NOTE — PROGRESS NOTES
"          Ms. Rochelle Espinoza is a 68 y.o. female who  I a seeing following hospitalization 8/6/2023 with copd exacerbation.  She  was hospitalized for  12 days-- 8/6-8/18.        Summary  snippets  ::     "HPI:   Rochelle Espinoza is a 67 y.o. female with history of COPD, chronic hypoxemic respiratory failure on 3 L nasal cannula at baseline, hyperlipidemia, diastolic heart failure, nicotine dependence, hypertension, presenting to emergency department with complaint of shortness of breath.  Patient was found to be hypoxic to 75% in triage on her 3 liters/minute.  She is had shortness of breath for 3 days.  Increased chest pressure and wheezing.  She also has pain in her back.  She denies fevers.  Originally her cough was productive of dark sputum, now productive of clear sputum. Patient also reports needing to use her rescue inhaler and increased frequency and using her nebulized inhaler with last use yesterday around 5 pm. Patient was brought into the ED by daughter. While in the ED patient was given dounebsx1 and solumedrol 125 mg. Initial VBG Ph 7.288 PCO2 of 81. Patient remained  stable on 3L however repeat VBG displayed worsening acidosis and Hypercarbia and placed on BIPAP. ICU consulted due to need for BIPAP and worsening hypercarbia and acidosis.         * No surgery found *       Hospital Course:   67F admitted to MICU for acute on chronic hypoxic respiratory acidosis w/ Hx of diastolic HF (2020), COPD on 3L O2, HTN, HLD and tobacco dependence.  During admission, the patient had saturations in the mid 90's on 16 iPAP/ 6 ePAP @ 30% o2. Her initial ABG was 7.24/87.2/25/40.5.  H/H was normal, BNP <10, Trop normal, UA neg. And lactate of 4.3, and AG of 13. CXR and EKG were normal. After the 3rd round of duonebs and addition of steroids the patient improved weaned off BiPAP to 5L NC w/ stable sats in the mid 90's. Fluticasone furoate-vilanteroL 200-25 mcg/dose diskus, Simethicone, Famotidine were added.  On 8/7, " "Echocardiogram with preserved EF and minimal change from prior in 2020. Patient's oxygen requirements titrated down to only requiring home oxygen of 3L NC. Patient has remained on her home oxygen with resolved shortness of breath.  Wellbutrin and nicotine patches started for smoking cessation  She was approved for Trilogy at night from [employer].  Awaiting insurance authorization; they did not approve Trilogy. Auth submitted for BiPAP. Approved on 8/18 with planned delivery to bedside. Patient without further hospitalization needs.  Patient medically stable for discharge. F/u w/ PCP and pulm. Return precautions advised; patient's questions answered.  Patient in agreement with discharge plan."                  She is on 3 l /min.  She is getting use to the Bipap at night.  She has once again  she   stopped smoking.  SHe was hospitalized in October 2022 and August 2023.     SHe is back on O2 and dong well.  Recent cold snap has her nose stopped up.  She also has been using "nasal decongestant sapy 0.05 %  and it helps but she gets stopped right back up           She also has a history of  CHF, HTN, GERD.    Patients back pain is located in the midback, paraspinal, and spreads to the bilateral ribs               Hypertension is stable, on amlodipine, HCTZ, Losartan  /84 on this visit     Review of Systems   Constitutional:  Negative for chills and fever. She has gained 20 # since October.    HENT:  Positive for nasal congestion. Negative for ear pain, sinus pain and sore throat.    Eyes:  Negative for blurred vision.   Respiratory:  Positive for sputum production (white/foamy) and shortness of breath (chronic stable). Negative for cough and hemoptysis.    Cardiovascular:  Negative for chest pain, palpitations, orthopnea and leg swelling.   Gastrointestinal:  Positive for heartburn. Negative for abdominal pain, blood in stool, constipation, diarrhea, nausea and vomiting.   Genitourinary:  Negative for dysuria, " "frequency, hematuria and urgency.   Musculoskeletal:  Negative for neck pain.   Neurological:  Negative for dizziness and headaches.   Psychiatric/Behavioral:  Negative for suicidal ideas. The patient has insomnia.    All other systems reviewed and are negative.     PAST HISTORY:              Past Medical History:   Diagnosis Date    CHF (congestive heart failure)      COPD (chronic obstructive pulmonary disease)      Herpes zoster without mention of complication 2/21/2011    Hypertension      Leg edema      Pulmonary hypertension      Sciatica                MEDICATIONS & ALLERGIES:              Current Outpatient Medications on File Prior to Visit   Medication Sig    albuterol (PROVENTIL/VENTOLIN HFA) 90 mcg/actuation inhaler Inhale 2 puffs into the lungs every 6 (six) hours. Rescue    albuterol-ipratropium (DUO-NEB) 2.5 mg-0.5 mg/3 mL nebulizer solution Take 3 mLs by nebulization every 4 (four) hours as needed for Wheezing. Rescue    ascorbic acid, vitamin C, (VITAMIN C) 500 MG tablet Take 500 mg by mouth once daily.    aspirin (ECOTRIN) 81 MG EC tablet Take 81 mg by mouth once daily. Low Dose    atorvastatin (LIPITOR) 10 MG tablet Take 1 tablet (10 mg total) by mouth every evening.    cholecalciferol, vitamin D3, (VITAMIN D3) 25 mcg (1,000 unit) capsule Take 1,000 Units by mouth once daily.         OBJECTIVE:      Vital Signs:       /84 (BP Location: Right arm, Patient Position: Sitting, BP Method: Medium (Manual))   Pulse 76   Ht 5' 3" (1.6 m)   Wt 71.5 kg (157 lb 10.1 oz)   LMP 11/21/2013   SpO2 95%   BMI 27.92 kg/m²           Physical Exam  Vitals reviewed.   Constitutional:       General: She is not in acute distress.     Appearance: Normal appearance. She is not ill-appearing.   HENT:      Head: Normocephalic and atraumatic.      Right Ear: External ear normal.      Left Ear: External ear normal.      Nose: Nose normal. No congestion or rhinorrhea.      Mouth/Throat:      Mouth: Mucous membranes " are moist.   Eyes:      General:         Right eye: No discharge.         Left eye: No discharge.      Extraocular Movements: Extraocular movements intact.   Neck:      Vascular: No carotid bruit.   Cardiovascular:      Rate and Rhythm: Normal rate and regular rhythm.      Heart sounds: Normal heart sounds. No murmur heard.    No gallop.   Pulmonary:      Effort: Pulmonary effort is normal. No respiratory distress.      Breath sounds: Normal breath sounds.      Comments: Nasal canula present  Abdominal:      General: Abdomen is flat. There is no distension.      Palpations: Abdomen is soft.      Tenderness: There is no abdominal tenderness. There is no guarding.   Musculoskeletal:      Cervical back: Normal range of motion. No tenderness.      Right lower leg: No edema.      Left lower leg: No edema.   Skin:     General: Skin is warm and dry.      Coloration: Skin is not jaundiced.   Neurological:      General: No focal deficit present.      Mental Status: She is alert and oriented to person, place, and time.   Psychiatric:         Mood and Affect: Mood normal.       Legs- trace edema        Assessment and plan:     Rhinitis medicamentosa     Major depressive disorder, recurrent, in partial remission     Cervical myelopathy     Chronic diastolic (congestive) heart failure     Stable angina     Aortic calcification     Chronic obstructive pulmonary disease, unspecified COPD type     Chronic diastolic heart failure     Chronic hypoxemic respiratory failure     Cor pulmonale, chronic     Hypotension, unspecified hypotension type     Nicotine dependence, cigarettes, uncomplicated       increase lasix to 20- 24 po qday-- watch salt.  Stay on bipap and follow up  with me in 2 months.  Follow up with pulmonary and covid booster today

## 2023-08-27 ENCOUNTER — TELEPHONE (OUTPATIENT)
Dept: ADMINISTRATIVE | Facility: CLINIC | Age: 68
End: 2023-08-27
Payer: MEDICARE

## 2023-08-27 NOTE — PROGRESS NOTES
Ms. Espinoza stated she has just recently release from the hospital and will be receiving Mom meals since her discharged. She dos believe she will hospital bills and would be interested in receiving assistance with those bills. CHW informed of patients account customer service department number to complete and finical assistance application.

## 2023-09-16 ENCOUNTER — OFFICE VISIT (OUTPATIENT)
Dept: URGENT CARE | Facility: CLINIC | Age: 68
End: 2023-09-16
Payer: MEDICARE

## 2023-09-16 VITALS
BODY MASS INDEX: 27.82 KG/M2 | HEART RATE: 76 BPM | SYSTOLIC BLOOD PRESSURE: 111 MMHG | WEIGHT: 157 LBS | DIASTOLIC BLOOD PRESSURE: 68 MMHG | HEIGHT: 63 IN | OXYGEN SATURATION: 98 % | RESPIRATION RATE: 18 BRPM | TEMPERATURE: 100 F

## 2023-09-16 DIAGNOSIS — U07.1 COVID-19 VIRUS INFECTION: Primary | ICD-10-CM

## 2023-09-16 LAB
CTP QC/QA: YES
SARS-COV-2 AG RESP QL IA.RAPID: POSITIVE

## 2023-09-16 PROCEDURE — 99213 OFFICE O/P EST LOW 20 MIN: CPT | Mod: S$GLB,,, | Performed by: FAMILY MEDICINE

## 2023-09-16 PROCEDURE — 87811 SARS CORONAVIRUS 2 ANTIGEN POCT, MANUAL READ: ICD-10-PCS | Mod: QW,S$GLB,, | Performed by: FAMILY MEDICINE

## 2023-09-16 PROCEDURE — 99213 PR OFFICE/OUTPT VISIT, EST, LEVL III, 20-29 MIN: ICD-10-PCS | Mod: S$GLB,,, | Performed by: FAMILY MEDICINE

## 2023-09-16 PROCEDURE — 87811 SARS-COV-2 COVID19 W/OPTIC: CPT | Mod: QW,S$GLB,, | Performed by: FAMILY MEDICINE

## 2023-09-16 NOTE — PROGRESS NOTES
"Subjective:      Patient ID: Rochelle Espinoza is a 68 y.o. female.    Vitals:  height is 5' 3" (1.6 m) and weight is 71.2 kg (157 lb). Her oral temperature is 100 °F (37.8 °C). Her blood pressure is 111/68 and her pulse is 76. Her respiration is 18 and oxygen saturation is 98%.     Chief Complaint: Cough    This is a 68 y.o. female who presents today with a chief complaint of  dry cough, congestion, food taste horrible, chills/sweats, weakness, body aches -started late Thursday night got really worse yesterday     Pt was hospitalized last month for respiratory issues. Reports she is vaccinated against COVID-19.     PMH: COPD     Cough  This is a new problem. The current episode started yesterday. The problem has been gradually worsening. The cough is Non-productive. Associated symptoms include chills, headaches, nasal congestion, postnasal drip and sweats. Pertinent negatives include no fever, shortness of breath or wheezing. She has tried OTC cough suppressant for the symptoms. Her past medical history is significant for COPD. There is no history of asthma, bronchitis or pneumonia.       Constitution: Positive for chills. Negative for fever.   HENT:  Positive for postnasal drip.    Respiratory:  Positive for cough. Negative for shortness of breath and wheezing.    Neurological:  Positive for headaches.      Objective:     Physical Exam   Constitutional: She appears ill. No distress. normal  HENT:   Head: Normocephalic and atraumatic.   Cardiovascular: Normal rate, regular rhythm, normal heart sounds and normal pulses.   Pulmonary/Chest: She is in respiratory distress (mild).   Abdominal: Normal appearance.   Neurological: She is alert.   Nursing note and vitals reviewed.    Results for orders placed or performed in visit on 09/16/23   SARS Coronavirus 2 Antigen, POCT Manual Read   Result Value Ref Range    SARS Coronavirus 2 Antigen Positive (A) Negative     Acceptable Yes         Assessment:     1. " COVID-19 virus infection        Plan:       COVID-19 virus infection  -     SARS Coronavirus 2 Antigen, POCT Manual Read  -     nirmatrelvir-ritonavir 300 mg (150 mg x 2)-100 mg copackaged tablets (EUA); Take 3 tablets by mouth 2 (two) times daily for 5 days. Each dose contains 2 nirmatrelvir (pink tablets) and 1 ritonavir (white tablet). Take all 3 tablets together  Dispense: 30 tablet; Refill: 0    Discussed symptom monitoring, contact notification and isolation period of 5 days. ER precautions reviewed

## 2023-09-21 ENCOUNTER — EXTERNAL HOME HEALTH (OUTPATIENT)
Dept: HOME HEALTH SERVICES | Facility: HOSPITAL | Age: 68
End: 2023-09-21
Payer: MEDICARE

## 2023-09-28 RX ORDER — FLUTICASONE FUROATE, UMECLIDINIUM BROMIDE AND VILANTEROL TRIFENATATE 100; 62.5; 25 UG/1; UG/1; UG/1
1 POWDER RESPIRATORY (INHALATION)
Qty: 3 EACH | Refills: 4 | Status: SHIPPED | OUTPATIENT
Start: 2023-09-28

## 2023-10-04 ENCOUNTER — PATIENT MESSAGE (OUTPATIENT)
Dept: ADMINISTRATIVE | Facility: OTHER | Age: 68
End: 2023-10-04
Payer: MEDICARE

## 2023-10-05 ENCOUNTER — PATIENT MESSAGE (OUTPATIENT)
Dept: ADMINISTRATIVE | Facility: OTHER | Age: 68
End: 2023-10-05
Payer: MEDICARE

## 2023-10-20 PROCEDURE — G0179 PR HOME HEALTH MD RECERTIFICATION: ICD-10-PCS | Mod: ,,, | Performed by: STUDENT IN AN ORGANIZED HEALTH CARE EDUCATION/TRAINING PROGRAM

## 2023-10-20 PROCEDURE — G0179 MD RECERTIFICATION HHA PT: HCPCS | Mod: ,,, | Performed by: STUDENT IN AN ORGANIZED HEALTH CARE EDUCATION/TRAINING PROGRAM

## 2023-10-23 ENCOUNTER — LAB VISIT (OUTPATIENT)
Dept: LAB | Facility: HOSPITAL | Age: 68
End: 2023-10-23
Attending: INTERNAL MEDICINE
Payer: MEDICARE

## 2023-10-23 DIAGNOSIS — I50.32 CHRONIC DIASTOLIC HEART FAILURE: ICD-10-CM

## 2023-10-23 LAB
ALBUMIN SERPL BCP-MCNC: 4 G/DL (ref 3.5–5.2)
ALP SERPL-CCNC: 59 U/L (ref 55–135)
ALT SERPL W/O P-5'-P-CCNC: 17 U/L (ref 10–44)
ANION GAP SERPL CALC-SCNC: 9 MMOL/L (ref 8–16)
AST SERPL-CCNC: 22 U/L (ref 10–40)
BILIRUB SERPL-MCNC: 0.4 MG/DL (ref 0.1–1)
BUN SERPL-MCNC: 7 MG/DL (ref 8–23)
CALCIUM SERPL-MCNC: 9.5 MG/DL (ref 8.7–10.5)
CHLORIDE SERPL-SCNC: 104 MMOL/L (ref 95–110)
CO2 SERPL-SCNC: 32 MMOL/L (ref 23–29)
CREAT SERPL-MCNC: 0.8 MG/DL (ref 0.5–1.4)
EST. GFR  (NO RACE VARIABLE): >60 ML/MIN/1.73 M^2
GLUCOSE SERPL-MCNC: 81 MG/DL (ref 70–110)
POTASSIUM SERPL-SCNC: 4.3 MMOL/L (ref 3.5–5.1)
PROT SERPL-MCNC: 7.4 G/DL (ref 6–8.4)
SODIUM SERPL-SCNC: 145 MMOL/L (ref 136–145)

## 2023-10-23 PROCEDURE — 36415 COLL VENOUS BLD VENIPUNCTURE: CPT | Performed by: INTERNAL MEDICINE

## 2023-10-23 PROCEDURE — 80053 COMPREHEN METABOLIC PANEL: CPT | Performed by: INTERNAL MEDICINE

## 2023-10-31 ENCOUNTER — IMMUNIZATION (OUTPATIENT)
Dept: INTERNAL MEDICINE | Facility: CLINIC | Age: 68
End: 2023-10-31
Payer: MEDICARE

## 2023-10-31 ENCOUNTER — OFFICE VISIT (OUTPATIENT)
Dept: INTERNAL MEDICINE | Facility: CLINIC | Age: 68
End: 2023-10-31
Payer: MEDICARE

## 2023-10-31 VITALS
WEIGHT: 160.94 LBS | BODY MASS INDEX: 28.52 KG/M2 | SYSTOLIC BLOOD PRESSURE: 110 MMHG | DIASTOLIC BLOOD PRESSURE: 76 MMHG | HEIGHT: 63 IN | HEART RATE: 76 BPM

## 2023-10-31 DIAGNOSIS — Z23 NEEDS FLU SHOT: Primary | ICD-10-CM

## 2023-10-31 DIAGNOSIS — J44.9 CHRONIC OBSTRUCTIVE PULMONARY DISEASE WITH HYPOXIA: Chronic | ICD-10-CM

## 2023-10-31 DIAGNOSIS — M19.90 ARTHRITIS: ICD-10-CM

## 2023-10-31 DIAGNOSIS — F41.9 ANXIETY: ICD-10-CM

## 2023-10-31 DIAGNOSIS — E78.5 HYPERLIPIDEMIA, UNSPECIFIED HYPERLIPIDEMIA TYPE: ICD-10-CM

## 2023-10-31 DIAGNOSIS — J98.01 BRONCHOSPASM, ACUTE: ICD-10-CM

## 2023-10-31 DIAGNOSIS — K21.9 GASTROESOPHAGEAL REFLUX DISEASE, UNSPECIFIED WHETHER ESOPHAGITIS PRESENT: Primary | ICD-10-CM

## 2023-10-31 PROCEDURE — 3078F DIAST BP <80 MM HG: CPT | Mod: CPTII,S$GLB,, | Performed by: INTERNAL MEDICINE

## 2023-10-31 PROCEDURE — 3044F HG A1C LEVEL LT 7.0%: CPT | Mod: CPTII,S$GLB,, | Performed by: INTERNAL MEDICINE

## 2023-10-31 PROCEDURE — 1101F PT FALLS ASSESS-DOCD LE1/YR: CPT | Mod: CPTII,S$GLB,, | Performed by: INTERNAL MEDICINE

## 2023-10-31 PROCEDURE — 4010F PR ACE/ARB THEARPY RXD/TAKEN: ICD-10-PCS | Mod: CPTII,S$GLB,, | Performed by: INTERNAL MEDICINE

## 2023-10-31 PROCEDURE — 3078F PR MOST RECENT DIASTOLIC BLOOD PRESSURE < 80 MM HG: ICD-10-PCS | Mod: CPTII,S$GLB,, | Performed by: INTERNAL MEDICINE

## 2023-10-31 PROCEDURE — 99214 PR OFFICE/OUTPT VISIT, EST, LEVL IV, 30-39 MIN: ICD-10-PCS | Mod: S$GLB,,, | Performed by: INTERNAL MEDICINE

## 2023-10-31 PROCEDURE — 3288F FALL RISK ASSESSMENT DOCD: CPT | Mod: CPTII,S$GLB,, | Performed by: INTERNAL MEDICINE

## 2023-10-31 PROCEDURE — 1126F AMNT PAIN NOTED NONE PRSNT: CPT | Mod: CPTII,S$GLB,, | Performed by: INTERNAL MEDICINE

## 2023-10-31 PROCEDURE — 3008F PR BODY MASS INDEX (BMI) DOCUMENTED: ICD-10-PCS | Mod: CPTII,S$GLB,, | Performed by: INTERNAL MEDICINE

## 2023-10-31 PROCEDURE — 90694 FLU VACCINE - QUADRIVALENT - ADJUVANTED: ICD-10-PCS | Mod: S$GLB,,, | Performed by: INTERNAL MEDICINE

## 2023-10-31 PROCEDURE — 1159F MED LIST DOCD IN RCRD: CPT | Mod: CPTII,S$GLB,, | Performed by: INTERNAL MEDICINE

## 2023-10-31 PROCEDURE — 3044F PR MOST RECENT HEMOGLOBIN A1C LEVEL <7.0%: ICD-10-PCS | Mod: CPTII,S$GLB,, | Performed by: INTERNAL MEDICINE

## 2023-10-31 PROCEDURE — 3074F PR MOST RECENT SYSTOLIC BLOOD PRESSURE < 130 MM HG: ICD-10-PCS | Mod: CPTII,S$GLB,, | Performed by: INTERNAL MEDICINE

## 2023-10-31 PROCEDURE — 90694 VACC AIIV4 NO PRSRV 0.5ML IM: CPT | Mod: S$GLB,,, | Performed by: INTERNAL MEDICINE

## 2023-10-31 PROCEDURE — 99999 PR PBB SHADOW E&M-EST. PATIENT-LVL III: ICD-10-PCS | Mod: PBBFAC,,, | Performed by: INTERNAL MEDICINE

## 2023-10-31 PROCEDURE — G0008 ADMIN INFLUENZA VIRUS VAC: HCPCS | Mod: S$GLB,,, | Performed by: INTERNAL MEDICINE

## 2023-10-31 PROCEDURE — 99999 PR PBB SHADOW E&M-EST. PATIENT-LVL III: CPT | Mod: PBBFAC,,, | Performed by: INTERNAL MEDICINE

## 2023-10-31 PROCEDURE — 1126F PR PAIN SEVERITY QUANTIFIED, NO PAIN PRESENT: ICD-10-PCS | Mod: CPTII,S$GLB,, | Performed by: INTERNAL MEDICINE

## 2023-10-31 PROCEDURE — 1159F PR MEDICATION LIST DOCUMENTED IN MEDICAL RECORD: ICD-10-PCS | Mod: CPTII,S$GLB,, | Performed by: INTERNAL MEDICINE

## 2023-10-31 PROCEDURE — 3074F SYST BP LT 130 MM HG: CPT | Mod: CPTII,S$GLB,, | Performed by: INTERNAL MEDICINE

## 2023-10-31 PROCEDURE — 4010F ACE/ARB THERAPY RXD/TAKEN: CPT | Mod: CPTII,S$GLB,, | Performed by: INTERNAL MEDICINE

## 2023-10-31 PROCEDURE — 1101F PR PT FALLS ASSESS DOC 0-1 FALLS W/OUT INJ PAST YR: ICD-10-PCS | Mod: CPTII,S$GLB,, | Performed by: INTERNAL MEDICINE

## 2023-10-31 PROCEDURE — G0008 FLU VACCINE - QUADRIVALENT - ADJUVANTED: ICD-10-PCS | Mod: S$GLB,,, | Performed by: INTERNAL MEDICINE

## 2023-10-31 PROCEDURE — 3288F PR FALLS RISK ASSESSMENT DOCUMENTED: ICD-10-PCS | Mod: CPTII,S$GLB,, | Performed by: INTERNAL MEDICINE

## 2023-10-31 PROCEDURE — 99214 OFFICE O/P EST MOD 30 MIN: CPT | Mod: S$GLB,,, | Performed by: INTERNAL MEDICINE

## 2023-10-31 PROCEDURE — 3008F BODY MASS INDEX DOCD: CPT | Mod: CPTII,S$GLB,, | Performed by: INTERNAL MEDICINE

## 2023-10-31 RX ORDER — DICLOFENAC SODIUM 50 MG/1
50 TABLET, DELAYED RELEASE ORAL 2 TIMES DAILY PRN
Qty: 90 TABLET | Refills: 1 | Status: SHIPPED | OUTPATIENT
Start: 2023-10-31

## 2023-10-31 RX ORDER — PANTOPRAZOLE SODIUM 40 MG/1
40 TABLET, DELAYED RELEASE ORAL DAILY
Qty: 90 TABLET | Refills: 3 | Status: SHIPPED | OUTPATIENT
Start: 2023-10-31

## 2023-10-31 RX ORDER — ALBUTEROL SULFATE 90 UG/1
2 AEROSOL, METERED RESPIRATORY (INHALATION) EVERY 4 HOURS PRN
Qty: 8 G | Refills: 11 | Status: SHIPPED | OUTPATIENT
Start: 2023-10-31

## 2023-10-31 RX ORDER — ATORVASTATIN CALCIUM 10 MG/1
10 TABLET, FILM COATED ORAL DAILY
Qty: 90 TABLET | Refills: 3 | Status: SHIPPED | OUTPATIENT
Start: 2023-10-31

## 2023-10-31 RX ORDER — ESCITALOPRAM OXALATE 10 MG/1
10 TABLET ORAL DAILY
Qty: 90 TABLET | Refills: 3 | Status: SHIPPED | OUTPATIENT
Start: 2023-10-31

## 2023-10-31 NOTE — PROGRESS NOTES
RESIDENT CLINIC PROGRESS NOTE    Name: Rochelle Espinoza  : 1955  Date of Service: 2023   PCP: Yosi Samuels Jr., MD    Reason for visit:   Chief Complaint   Patient presents with    Follow-up       HPI: Rochelle Espinoza is a 68 y.o. female with PMHx COPD, chronic hypoxemic respiratory failure on 3 L nasal cannula at baseline, hyperlipidemia, diastolic heart failure, nicotine dependence, hypertension who presents to clinic for 2 month follow up.     GERD - pt states she has been having epigastric pain with eating and has been taking a medication which has helped. Likely a OTC ppi. Pt states pain makes her feel like belching. Pt would like to be prescribed a GERD medication.    COPD - tolerating bipap at night. Wears her bipap for 6 hrs. Had recent covid 23 which she took paxlovid for. Still endorses congestion and fatigue. Baseline SOB. Requesting refille for albuterol.     CHF - takes lasix 20mg as needed. Denies leg swelling, cp, palpitations. States she is breathing well.     HTN - 110/76 today. Pt no longer on amlodipine and HCTZ. Still on losartan. Denies HA.     HLD - stopped atorvastatin after recent covid and treatment due to medication interactions. Will recommend restarting.    Arthritis - b/l knee and low back pain. Difficulty moving around with some stiffness.     Anxiety - previously on wellbutrin. No longer taking. Wants to know options medication options since she has been feeling down and non productive since her recent lifestyle change due to her conditions.         Medications:   Current Outpatient Medications:     albuterol-ipratropium (DUO-NEB) 2.5 mg-0.5 mg/3 mL nebulizer solution, Take 3 mLs by nebulization every 4 (four) hours as needed for Wheezing. Rescue, Disp: 1080 mL, Rfl: 3    ascorbic acid, vitamin C, (VITAMIN C) 500 MG tablet, Take 500 mg by mouth once daily., Disp: , Rfl:     cetirizine (ZYRTEC) 10 MG tablet, Take 1 tablet (10 mg total) by mouth once daily., Disp: 90  tablet, Rfl: 3    cholecalciferol, vitamin D3, (VITAMIN D3) 25 mcg (1,000 unit) capsule, Take 1,000 Units by mouth once daily., Disp: , Rfl:     fluticasone propionate (FLONASE) 50 mcg/actuation nasal spray, SHAKE LIQUID AND USE 1 SPRAY (50MCG) IN EACH NOSTRIL TWICE DAILY, Disp: 48 g, Rfl: 3    fluticasone-umeclidin-vilanter (TRELEGY ELLIPTA) 100-62.5-25 mcg DsDv, INHALE 1 PUFF EVERY DAY, Disp: 3 each, Rfl: 4    fluticasone-umeclidin-vilanter (TRELEGY ELLIPTA) 200-62.5-25 mcg inhaler, Inhale 1 puff into the lungs once daily., Disp: 3 each, Rfl: 4    furosemide (LASIX) 20 MG tablet, Take 1-2 po q day, Disp: 180 tablet, Rfl: 1    gabapentin (NEURONTIN) 300 MG capsule, TAKE 1 CAPSULE THREE TIMES DAILY, Disp: 270 capsule, Rfl: 2    glycerin adult suppository, Place 1 suppository rectally as needed for Constipation., Disp: , Rfl:     magnesium 250 mg Tab, Take 250 mg by mouth once., Disp: , Rfl:     albuterol (PROVENTIL/VENTOLIN HFA) 90 mcg/actuation inhaler, Inhale 2 puffs into the lungs every 4 (four) hours as needed for Wheezing or Shortness of Breath. Rescue, Disp: 8 g, Rfl: 11    atorvastatin (LIPITOR) 10 MG tablet, Take 1 tablet (10 mg total) by mouth once daily., Disp: 90 tablet, Rfl: 3    diclofenac (VOLTAREN) 50 MG EC tablet, Take 1 tablet (50 mg total) by mouth 2 (two) times daily as needed (arthritis pain)., Disp: 90 tablet, Rfl: 1    EScitalopram oxalate (LEXAPRO) 10 MG tablet, Take 1 tablet (10 mg total) by mouth once daily., Disp: 90 tablet, Rfl: 3    pantoprazole (PROTONIX) 40 MG tablet, Take 1 tablet (40 mg total) by mouth once daily., Disp: 90 tablet, Rfl: 3     Review of Systems:   Review of Systems   Constitutional:  Positive for malaise/fatigue. Negative for chills and fever.   HENT:  Positive for congestion. Negative for sore throat.    Eyes:  Negative for blurred vision.   Respiratory:  Positive for shortness of breath (baseline). Negative for cough.    Cardiovascular:  Negative for  "chest pain, palpitations and leg swelling.   Gastrointestinal:  Negative for abdominal pain, heartburn, nausea and vomiting.   Genitourinary:  Negative for dysuria.   Musculoskeletal:  Positive for back pain and joint pain. Negative for myalgias.   Skin:  Negative for rash.   Neurological:  Negative for headaches.   Psychiatric/Behavioral:  The patient is nervous/anxious.        Vitals:   Vitals:    10/31/23 0949   BP: 110/76   BP Location: Right arm   Patient Position: Sitting   BP Method: Medium (Manual)   Pulse: 76   SpO2: (P) 95%   Weight: 73 kg (160 lb 15 oz)   Height: 5' 3" (1.6 m)     Body mass index is 28.51 kg/m².    Physical Exam:   Physical Exam  Vitals reviewed.   Constitutional:       General: She is not in acute distress.     Appearance: Normal appearance.   HENT:      Head: Normocephalic and atraumatic.      Right Ear: External ear normal.      Left Ear: External ear normal.      Nose: Nose normal.      Mouth/Throat:      Mouth: Mucous membranes are moist.   Eyes:      Extraocular Movements: Extraocular movements intact.      Conjunctiva/sclera: Conjunctivae normal.      Pupils: Pupils are equal, round, and reactive to light.   Cardiovascular:      Rate and Rhythm: Normal rate and regular rhythm.   Pulmonary:      Effort: Pulmonary effort is normal. No respiratory distress.      Breath sounds: Normal breath sounds. No wheezing.      Comments: On NC  Abdominal:      General: Abdomen is flat. Bowel sounds are normal.      Palpations: Abdomen is soft.      Tenderness: There is no abdominal tenderness.   Musculoskeletal:         General: Normal range of motion.      Cervical back: Normal range of motion and neck supple.      Comments: Trace edema b/l LE   Skin:     General: Skin is warm and dry.   Neurological:      General: No focal deficit present.      Mental Status: She is alert and oriented to person, place, and time.   Psychiatric:         Mood and Affect: Mood is anxious and depressed.       Labs: " Previous labs reviewed.    Imaging: Previous imaging reviewed.     Assessment/Plan:   Gastroesophageal reflux disease, unspecified whether esophagitis present    Chronic obstructive pulmonary disease with hypoxia  -     albuterol (PROVENTIL/VENTOLIN HFA) 90 mcg/actuation inhaler; Inhale 2 puffs into the lungs every 4 (four) hours as needed for Wheezing or Shortness of Breath. Rescue  Dispense: 8 g; Refill: 11    Bronchospasm, acute  -     albuterol (PROVENTIL/VENTOLIN HFA) 90 mcg/actuation inhaler; Inhale 2 puffs into the lungs every 4 (four) hours as needed for Wheezing or Shortness of Breath. Rescue  Dispense: 8 g; Refill: 11    Hyperlipidemia, unspecified hyperlipidemia type    Arthritis    Anxiety    Other orders  -     atorvastatin (LIPITOR) 10 MG tablet; Take 1 tablet (10 mg total) by mouth once daily.  Dispense: 90 tablet; Refill: 3  -     EScitalopram oxalate (LEXAPRO) 10 MG tablet; Take 1 tablet (10 mg total) by mouth once daily.  Dispense: 90 tablet; Refill: 3  -     pantoprazole (PROTONIX) 40 MG tablet; Take 1 tablet (40 mg total) by mouth once daily.  Dispense: 90 tablet; Refill: 3  -     diclofenac (VOLTAREN) 50 MG EC tablet; Take 1 tablet (50 mg total) by mouth 2 (two) times daily as needed (arthritis pain).  Dispense: 90 tablet; Refill: 1      Restarted patient on atorvastatin. Protonix ordered for GERD. Diclofenac for arthritis. Refilled albuterol inhaler. Discussed with patient that her recent life changes have been hard on her. Encouraged her to seek out help and let us know how we can help. Started lexapro, d/emeka wellbutrin.     Preventative Health: Discussed receiving vaccines today. Given flu vaccine.       Discussed with Dr. Samuels.

## 2023-10-31 NOTE — PROGRESS NOTES
"I have personally taken the history and examined this patient and agree with the resident's note as stated above with the following thoughts:  /76 (BP Location: Right arm, Patient Position: Sitting, BP Method: Medium (Manual))   Pulse 76   Ht 5' 3" (1.6 m)   Wt 73 kg (160 lb 15 oz)   LMP 11/21/2013   SpO2 (P) 95%   BMI 28.51 kg/m²     Discussed getting vaccines up to date.  Will restart atorvastatin.   WIll try  diclofenac for arthritis and some lexapro for her dysthymia.  - FOllow up in 4 month.    "

## 2023-11-13 ENCOUNTER — PATIENT OUTREACH (OUTPATIENT)
Dept: ADMINISTRATIVE | Facility: HOSPITAL | Age: 68
End: 2023-11-13
Payer: MEDICARE

## 2023-11-13 ENCOUNTER — PATIENT MESSAGE (OUTPATIENT)
Dept: ADMINISTRATIVE | Facility: HOSPITAL | Age: 68
End: 2023-11-13
Payer: MEDICARE

## 2023-11-13 DIAGNOSIS — Z12.31 ENCOUNTER FOR SCREENING MAMMOGRAM FOR BREAST CANCER: Primary | ICD-10-CM

## 2023-11-13 NOTE — PROGRESS NOTES
Health Maintenance Due   Topic Date Due    RSV Vaccine (Age 60+) (1 - 1-dose 60+ series) Never done    COVID-19 Vaccine (4 - 2023-24 season) 09/01/2023    Mammogram  01/25/2024     Triggered LINKS and reconciled immunizations. Updated Care Everywhere. Pt responded to campaigns asking to schedule mammogram- order placed and pt contacted to schedule. Chart review completed.

## 2023-11-20 ENCOUNTER — EXTERNAL HOME HEALTH (OUTPATIENT)
Dept: HOME HEALTH SERVICES | Facility: HOSPITAL | Age: 68
End: 2023-11-20
Payer: MEDICARE

## 2023-12-05 ENCOUNTER — PATIENT MESSAGE (OUTPATIENT)
Dept: INTERNAL MEDICINE | Facility: CLINIC | Age: 68
End: 2023-12-05
Payer: MEDICARE

## 2023-12-05 RX ORDER — BUPROPION HYDROCHLORIDE 150 MG/1
150 TABLET, EXTENDED RELEASE ORAL 2 TIMES DAILY
Qty: 60 TABLET | Refills: 4 | Status: SHIPPED | OUTPATIENT
Start: 2023-12-05

## 2023-12-06 ENCOUNTER — TELEPHONE (OUTPATIENT)
Dept: PHARMACY | Facility: CLINIC | Age: 68
End: 2023-12-06
Payer: MEDICARE

## 2023-12-06 NOTE — TELEPHONE ENCOUNTER
Assessed patient medication adherence for population health /[facility] medication adherence project

## 2023-12-28 RX ORDER — GABAPENTIN 300 MG/1
CAPSULE ORAL
Qty: 270 CAPSULE | Refills: 3 | Status: SHIPPED | OUTPATIENT
Start: 2023-12-28

## 2023-12-28 NOTE — TELEPHONE ENCOUNTER
Care Due:                  Date            Visit Type   Department     Provider  --------------------------------------------------------------------------------                                EP -                              PRIMARY      [facility] INTER[facility]  Last Visit: 10-      CARE ([facility])   MEDICINE       Yosi Samuels  Next Visit: None Scheduled  None         None Found                                                            Last  Test          Frequency    Reason                     Performed    Due Date  --------------------------------------------------------------------------------    Lipid Panel.  12 months..  atorvastatin.............  01- 01-    Health [facility] Embedded Care Due Messages. Reference number: [id number].   12/28/2023 1:18:05 AM CST

## 2024-01-02 ENCOUNTER — PATIENT MESSAGE (OUTPATIENT)
Dept: INTERNAL MEDICINE | Facility: CLINIC | Age: 69
End: 2024-01-02
Payer: MEDICARE

## 2024-01-02 RX ORDER — LOSARTAN POTASSIUM 100 MG/1
100 TABLET ORAL DAILY
Qty: 90 TABLET | Refills: 3 | Status: SHIPPED | OUTPATIENT
Start: 2024-01-02

## 2024-01-04 RX ORDER — LOSARTAN POTASSIUM 100 MG/1
TABLET ORAL
Refills: 0 | OUTPATIENT
Start: 2024-01-04

## 2024-01-04 NOTE — TELEPHONE ENCOUNTER
Refill Decision Note   Rochelle Espinoza  is requesting a refill authorization.  Brief Assessment and Rationale for Refill:  Quick Discontinue     Medication Therapy Plan:    Pharmacy is requesting new scripts for the following medications without required information, (sig/ frequency/qty/etc)      Medication Reconciliation Completed: No     Comments: Pharmacies have been requesting medications for patients without required information, (sig, frequency, qty, etc.). In addition, requests are sent for medication(s) pt. are currently not taking, and medications patients have never taken.    We have spoken to the pharmacies about these request types and advised their teams previously that we are unable to assess these New Script requests and require all details for these requests. This is a known issue and has been reported.     Note composed:3:52 PM 01/04/2024

## 2024-01-04 NOTE — TELEPHONE ENCOUNTER
No care due was identified.  [facility] Embedded Care Due Messages. Reference number: [id number].   1/04/2024 1:49:03 PM CST

## 2024-01-25 ENCOUNTER — PATIENT MESSAGE (OUTPATIENT)
Dept: RESEARCH | Facility: HOSPITAL | Age: 69
End: 2024-01-25
Payer: MEDICARE

## 2024-01-29 ENCOUNTER — TELEPHONE (OUTPATIENT)
Dept: RESEARCH | Facility: HOSPITAL | Age: 69
End: 2024-01-29
Payer: MEDICARE

## 2024-01-29 NOTE — TELEPHONE ENCOUNTER
"The research team has attempted to contact Ms. Espinoza regarding the study below.    Three attempts (on different dates) were made, but we were unable to get in touch with her and unable to leave a voicemail.    "A multi-center, randomized, 48-month, parallelgroup, non-inferiority, phase III study to compare the effectiveness of 500 mcg QD or alternate regimen of roflumilast (Daliresp) therapy versus 250 mg QD, 500 mg QD three times per week, or alternate regimen of azithromycin therapy to prevent COPD exacerbations (RELIANCE)."  "

## 2024-03-31 NOTE — TELEPHONE ENCOUNTER
No care due was identified.  [facility] Embedded Care Due Messages. Reference number: [id number].   3/31/2024 2:03:01 AM CDT

## 2024-04-01 RX ORDER — LOSARTAN POTASSIUM 100 MG/1
100 TABLET ORAL DAILY
Qty: 90 TABLET | Refills: 3 | Status: SHIPPED | OUTPATIENT
Start: 2024-04-01

## 2024-04-01 RX ORDER — FLUTICASONE PROPIONATE 50 MCG
SPRAY, SUSPENSION (ML) NASAL
Qty: 48 G | Refills: 1 | Status: SHIPPED | OUTPATIENT
Start: 2024-04-01

## 2024-04-01 NOTE — TELEPHONE ENCOUNTER
No care due was identified.  Health [facility] Embedded Care Due Messages. Reference number: [id number].   4/01/2024 10:27:57 AM CDT

## 2024-04-01 NOTE — TELEPHONE ENCOUNTER
Refill Decision Note   Rochelle Alexis  is requesting a refill authorization.  Brief Assessment and Rationale for Refill:  Approve     Medication Therapy Plan:         Comments:     Note composed:12:59 PM 04/01/2024

## 2024-05-09 ENCOUNTER — LAB VISIT (OUTPATIENT)
Dept: LAB | Facility: HOSPITAL | Age: 69
End: 2024-05-09
Attending: INTERNAL MEDICINE
Payer: MEDICARE

## 2024-05-09 ENCOUNTER — OFFICE VISIT (OUTPATIENT)
Dept: INTERNAL MEDICINE | Facility: CLINIC | Age: 69
End: 2024-05-09
Payer: MEDICARE

## 2024-05-09 ENCOUNTER — TELEPHONE (OUTPATIENT)
Dept: INTERNAL MEDICINE | Facility: CLINIC | Age: 69
End: 2024-05-09

## 2024-05-09 VITALS
HEART RATE: 72 BPM | SYSTOLIC BLOOD PRESSURE: 132 MMHG | WEIGHT: 152.31 LBS | BODY MASS INDEX: 26.99 KG/M2 | DIASTOLIC BLOOD PRESSURE: 74 MMHG | HEIGHT: 63 IN | OXYGEN SATURATION: 80 %

## 2024-05-09 DIAGNOSIS — I10 ESSENTIAL HYPERTENSION: Chronic | ICD-10-CM

## 2024-05-09 DIAGNOSIS — F17.210 NICOTINE DEPENDENCE, CIGARETTES, UNCOMPLICATED: ICD-10-CM

## 2024-05-09 DIAGNOSIS — F33.41 RECURRENT MAJOR DEPRESSIVE DISORDER, IN PARTIAL REMISSION: Chronic | ICD-10-CM

## 2024-05-09 DIAGNOSIS — I70.0 AORTIC CALCIFICATION: ICD-10-CM

## 2024-05-09 DIAGNOSIS — H53.8 BLURRY VISION: ICD-10-CM

## 2024-05-09 DIAGNOSIS — J44.9 CHRONIC OBSTRUCTIVE PULMONARY DISEASE WITH HYPOXIA: ICD-10-CM

## 2024-05-09 DIAGNOSIS — J44.9 CHRONIC OBSTRUCTIVE PULMONARY DISEASE WITH HYPOXIA: Primary | ICD-10-CM

## 2024-05-09 DIAGNOSIS — M19.90 ARTHRITIS: Chronic | ICD-10-CM

## 2024-05-09 DIAGNOSIS — J96.11 CHRONIC HYPOXEMIC RESPIRATORY FAILURE: ICD-10-CM

## 2024-05-09 DIAGNOSIS — G95.9 CERVICAL MYELOPATHY: ICD-10-CM

## 2024-05-09 DIAGNOSIS — J98.01 BRONCHOSPASM, ACUTE: Chronic | ICD-10-CM

## 2024-05-09 DIAGNOSIS — I50.32 CHRONIC DIASTOLIC HEART FAILURE: Chronic | ICD-10-CM

## 2024-05-09 PROBLEM — I20.89 STABLE ANGINA: Status: RESOLVED | Noted: 2021-03-22 | Resolved: 2024-05-09

## 2024-05-09 LAB
ALBUMIN SERPL BCP-MCNC: 4 G/DL (ref 3.5–5.2)
ALP SERPL-CCNC: 51 U/L (ref 55–135)
ALT SERPL W/O P-5'-P-CCNC: 14 U/L (ref 10–44)
ANION GAP SERPL CALC-SCNC: 9 MMOL/L (ref 8–16)
AST SERPL-CCNC: 22 U/L (ref 10–40)
BASOPHILS # BLD AUTO: 0.02 K/UL (ref 0–0.2)
BASOPHILS NFR BLD: 0.2 % (ref 0–1.9)
BILIRUB SERPL-MCNC: 0.7 MG/DL (ref 0.1–1)
BUN SERPL-MCNC: 5 MG/DL (ref 8–23)
CALCIUM SERPL-MCNC: 9.9 MG/DL (ref 8.7–10.5)
CHLORIDE SERPL-SCNC: 89 MMOL/L (ref 95–110)
CO2 SERPL-SCNC: 41 MMOL/L (ref 23–29)
CREAT SERPL-MCNC: 0.7 MG/DL (ref 0.5–1.4)
DIFFERENTIAL METHOD BLD: ABNORMAL
EOSINOPHIL # BLD AUTO: 0 K/UL (ref 0–0.5)
EOSINOPHIL NFR BLD: 0.5 % (ref 0–8)
ERYTHROCYTE [DISTWIDTH] IN BLOOD BY AUTOMATED COUNT: 14.6 % (ref 11.5–14.5)
EST. GFR  (NO RACE VARIABLE): >60 ML/MIN/1.73 M^2
GLUCOSE SERPL-MCNC: 82 MG/DL (ref 70–110)
HCT VFR BLD AUTO: 45.1 % (ref 37–48.5)
HGB BLD-MCNC: 13.3 G/DL (ref 12–16)
IMM GRANULOCYTES # BLD AUTO: 0.02 K/UL (ref 0–0.04)
IMM GRANULOCYTES NFR BLD AUTO: 0.2 % (ref 0–0.5)
LYMPHOCYTES # BLD AUTO: 2.4 K/UL (ref 1–4.8)
LYMPHOCYTES NFR BLD: 28.2 % (ref 18–48)
MCH RBC QN AUTO: 28.2 PG (ref 27–31)
MCHC RBC AUTO-ENTMCNC: 29.5 G/DL (ref 32–36)
MCV RBC AUTO: 96 FL (ref 82–98)
MONOCYTES # BLD AUTO: 0.6 K/UL (ref 0.3–1)
MONOCYTES NFR BLD: 6.7 % (ref 4–15)
NEUTROPHILS # BLD AUTO: 5.3 K/UL (ref 1.8–7.7)
NEUTROPHILS NFR BLD: 64.2 % (ref 38–73)
NRBC BLD-RTO: 0 /100 WBC
PLATELET # BLD AUTO: 213 K/UL (ref 150–450)
PMV BLD AUTO: 12.1 FL (ref 9.2–12.9)
POTASSIUM SERPL-SCNC: 3.7 MMOL/L (ref 3.5–5.1)
PROT SERPL-MCNC: 7.5 G/DL (ref 6–8.4)
RBC # BLD AUTO: 4.71 M/UL (ref 4–5.4)
SODIUM SERPL-SCNC: 139 MMOL/L (ref 136–145)
WBC # BLD AUTO: 8.33 K/UL (ref 3.9–12.7)

## 2024-05-09 PROCEDURE — 85025 COMPLETE CBC W/AUTO DIFF WBC: CPT | Mod: HCNC | Performed by: INTERNAL MEDICINE

## 2024-05-09 PROCEDURE — 3008F BODY MASS INDEX DOCD: CPT | Mod: HCNC,CPTII,S$GLB, | Performed by: INTERNAL MEDICINE

## 2024-05-09 PROCEDURE — 1125F AMNT PAIN NOTED PAIN PRSNT: CPT | Mod: HCNC,CPTII,S$GLB, | Performed by: INTERNAL MEDICINE

## 2024-05-09 PROCEDURE — 80053 COMPREHEN METABOLIC PANEL: CPT | Mod: HCNC | Performed by: INTERNAL MEDICINE

## 2024-05-09 PROCEDURE — 99999 PR PBB SHADOW E&M-EST. PATIENT-LVL IV: CPT | Mod: PBBFAC,HCNC,, | Performed by: INTERNAL MEDICINE

## 2024-05-09 PROCEDURE — 3288F FALL RISK ASSESSMENT DOCD: CPT | Mod: HCNC,CPTII,S$GLB, | Performed by: INTERNAL MEDICINE

## 2024-05-09 PROCEDURE — 3078F DIAST BP <80 MM HG: CPT | Mod: HCNC,CPTII,S$GLB, | Performed by: INTERNAL MEDICINE

## 2024-05-09 PROCEDURE — 4010F ACE/ARB THERAPY RXD/TAKEN: CPT | Mod: HCNC,CPTII,S$GLB, | Performed by: INTERNAL MEDICINE

## 2024-05-09 PROCEDURE — 1159F MED LIST DOCD IN RCRD: CPT | Mod: HCNC,CPTII,S$GLB, | Performed by: INTERNAL MEDICINE

## 2024-05-09 PROCEDURE — 99214 OFFICE O/P EST MOD 30 MIN: CPT | Mod: HCNC,S$GLB,, | Performed by: INTERNAL MEDICINE

## 2024-05-09 PROCEDURE — 36415 COLL VENOUS BLD VENIPUNCTURE: CPT | Mod: HCNC | Performed by: INTERNAL MEDICINE

## 2024-05-09 PROCEDURE — 1101F PT FALLS ASSESS-DOCD LE1/YR: CPT | Mod: HCNC,CPTII,S$GLB, | Performed by: INTERNAL MEDICINE

## 2024-05-09 PROCEDURE — 3075F SYST BP GE 130 - 139MM HG: CPT | Mod: HCNC,CPTII,S$GLB, | Performed by: INTERNAL MEDICINE

## 2024-05-09 RX ORDER — FUROSEMIDE 20 MG/1
TABLET ORAL
Qty: 180 TABLET | Refills: 2 | Status: CANCELLED | OUTPATIENT
Start: 2024-05-09

## 2024-05-09 RX ORDER — ALBUTEROL SULFATE 90 UG/1
2 AEROSOL, METERED RESPIRATORY (INHALATION) EVERY 4 HOURS PRN
Qty: 8 G | Refills: 11 | Status: SHIPPED | OUTPATIENT
Start: 2024-05-09

## 2024-05-09 RX ORDER — BUPROPION HYDROCHLORIDE 150 MG/1
150 TABLET, EXTENDED RELEASE ORAL 2 TIMES DAILY
Qty: 180 TABLET | Refills: 4 | Status: SHIPPED | OUTPATIENT
Start: 2024-05-09

## 2024-05-09 RX ORDER — FUROSEMIDE 20 MG/1
TABLET ORAL
Qty: 180 TABLET | Refills: 1 | Status: ON HOLD | OUTPATIENT
Start: 2024-05-09 | End: 2024-05-15

## 2024-05-09 RX ORDER — ATORVASTATIN CALCIUM 10 MG/1
10 TABLET, FILM COATED ORAL DAILY
Qty: 90 TABLET | Refills: 3 | Status: SHIPPED | OUTPATIENT
Start: 2024-05-09

## 2024-05-09 RX ORDER — ATORVASTATIN CALCIUM 10 MG/1
10 TABLET, FILM COATED ORAL DAILY
Qty: 90 TABLET | Refills: 3 | Status: CANCELLED | OUTPATIENT
Start: 2024-05-09

## 2024-05-09 RX ORDER — FLUTICASONE FUROATE, UMECLIDINIUM BROMIDE AND VILANTEROL TRIFENATATE 200; 62.5; 25 UG/1; UG/1; UG/1
1 POWDER RESPIRATORY (INHALATION) DAILY
Qty: 3 EACH | Refills: 4 | Status: CANCELLED | OUTPATIENT
Start: 2024-05-09

## 2024-05-09 NOTE — TELEPHONE ENCOUNTER
Critical value report  CO2 41. Reported by CLINTON swartz/Chem lab- I asked him to release the result for provider view.

## 2024-05-09 NOTE — PROGRESS NOTES
Subjective     Chief Complaint: 4mo f/u    History of Present Illness:  Ms. Rochelle Espinoza is a 68 y.o. female with diastolic CHF, COPD on 3L O2, pHTN here for 4mo follow-up.   States that she feels she has had a harder time breathing since last Thursday (5/3/24), characterized by increased sputum production (ongoing for 2 weeks, mainly clear, sometimes brownish), feeling overall slower, harder to complete tasks. Started smoking half a pack per day about 1mo ago. Independent in ADLs. Lives with daughter and granddaughter. Enjoys playing with her dogs, online shopping, playing games on phone.    Also states that her arthritis has worsened. Arthritis pain mainly in knees, lower back, and hips. Appropriately relieved with Voltaren PO.   GERD well controlled with Protonix, states that when she tried to pause it per a pharmacist's recommendations, her epigastric pain and belching returned.   Mood slightly depressed due to the advancement of her chronic illnesses and feeling like she has slowed down overall. Has joined a COPD support group which she believes is helpful.     Review of Systems   Constitutional:  Negative for chills and fever.   HENT: Negative.     Eyes: Negative.    Respiratory:  Positive for cough and sputum production. Negative for shortness of breath and wheezing.    Cardiovascular:  Negative for chest pain and palpitations.   Gastrointestinal:  Negative for nausea and vomiting.   Genitourinary:  Negative for dysuria and frequency.   Musculoskeletal:  Negative for joint pain and myalgias.   Skin: Negative.    Neurological: Negative.    Endo/Heme/Allergies: Negative.    Psychiatric/Behavioral:          Some depressed mood       PAST HISTORY:     Past Medical History:   Diagnosis Date    CHF (congestive heart failure)     COPD (chronic obstructive pulmonary disease)     Herpes zoster without mention of complication 2/21/2011    Hypertension     Leg edema     Pulmonary hypertension     Sciatica        Past  Surgical History:   Procedure Laterality Date    BREAST BIOPSY Right     core     SECTION      COLONOSCOPY N/A 2019    Procedure: COLONOSCOPY;  Surgeon: Rui Holder MD;  [facility]  Service: Endoscopy;  Laterality: N/A;    EPIDURAL STEROID INJECTION N/A 2020    Procedure: CERVICAL BLAKE;  Surgeon: Papito High MD;  Location: [facility] PAIN MGT;  Service: Pain Management;  Laterality: N/A;  NEEDS CONSENT    ESOPHAGOGASTRODUODENOSCOPY N/A 2019    Procedure: EGD (ESOPHAGOGASTRODUODENOSCOPY);  Surgeon: Rui Holder MD;  Location: [facility]);  Service: Endoscopy;  Laterality: N/A;  2L continuous O2 via NC, COPD, pulm HTN    TRANSFORAMINAL EPIDURAL INJECTION OF STEROID Right 6/3/2021    Procedure: INJECTION, STEROID, EPIDURAL, TRANSFORAMINAL APPROACH, L4-L5;  Surgeon: Anibal Robles MD;  Location: [facility] PAIN MGT;  Service: Pain Management;  Laterality: Right;       Family History   Problem Relation Name Age of Onset    Heart disease Mother      Heart disease Paternal Uncle      Celiac disease Neg Hx      Colon cancer Neg Hx      Colon polyps Neg Hx      Crohn's disease Neg Hx      Cystic fibrosis Neg Hx      Esophageal cancer Neg Hx      Inflammatory bowel disease Neg Hx      Irritable bowel syndrome Neg Hx      Liver cancer Neg Hx      Liver disease Neg Hx      Rectal cancer Neg Hx      Stomach cancer Neg Hx      Ulcerative colitis Neg Hx         Social History     Tobacco Use    Smoking status: Former     Current packs/day: 0.25     Average packs/day: 0.3 packs/day for 40.0 years (10.0 ttl pk-yrs)     Types: Cigarettes    Smokeless tobacco: Never    Tobacco comments:     Quit over a month ago    Substance Use Topics    Alcohol use: Not Currently     Comment: beer daily 2-3 daily, none today    Drug use: No       MEDICATIONS & ALLERGIES:     Current Outpatient Medications on File Prior to Visit   Medication Sig    albuterol-ipratropium (DUO-NEB) 2.5 mg-0.5 mg/3 mL  nebulizer solution Take 3 mLs by nebulization every 4 (four) hours as needed for Wheezing. Rescue    ascorbic acid, vitamin C, (VITAMIN C) 500 MG tablet Take 500 mg by mouth once daily.    cetirizine (ZYRTEC) 10 MG tablet Take 1 tablet (10 mg total) by mouth once daily.    cholecalciferol, vitamin D3, (VITAMIN D3) 25 mcg (1,000 unit) capsule Take 1,000 Units by mouth once daily.    diclofenac (VOLTAREN) 50 MG EC tablet Take 1 tablet (50 mg total) by mouth 2 (two) times daily as needed (arthritis pain).    fluticasone propionate (FLONASE) 50 mcg/actuation nasal spray SHAKE LIQUID AND USE 1 SPRAY (50MCG) IN EACH NOSTRIL TWICE DAILY    fluticasone-umeclidin-vilanter (TRELEGY ELLIPTA) 200-62.5-25 mcg inhaler Inhale 1 puff into the lungs once daily.    gabapentin (NEURONTIN) 300 MG capsule TAKE 1 CAPSULE THREE TIMES DAILY    glycerin adult suppository Place 1 suppository rectally as needed for Constipation.    losartan (COZAAR) 100 MG tablet Take 1 tablet (100 mg total) by mouth once daily.    magnesium 250 mg Tab Take 250 mg by mouth once.    pantoprazole (PROTONIX) 40 MG tablet Take 1 tablet (40 mg total) by mouth once daily.    [DISCONTINUED] albuterol (PROVENTIL/VENTOLIN HFA) 90 mcg/actuation inhaler Inhale 2 puffs into the lungs every 4 (four) hours as needed for Wheezing or Shortness of Breath. Rescue    [DISCONTINUED] atorvastatin (LIPITOR) 10 MG tablet Take 1 tablet (10 mg total) by mouth once daily.    [DISCONTINUED] buPROPion (WELLBUTRIN SR) 150 MG TBSR 12 hr tablet Take 1 tablet (150 mg total) by mouth 2 (two) times daily.    [DISCONTINUED] EScitalopram oxalate (LEXAPRO) 10 MG tablet Take 1 tablet (10 mg total) by mouth once daily.    [DISCONTINUED] fluticasone-umeclidin-vilanter (TRELEGY ELLIPTA) 100-62.5-25 mcg DsDv INHALE 1 PUFF EVERY DAY    [DISCONTINUED] furosemide (LASIX) 20 MG tablet Take 1-2 po q day     No current facility-administered medications on file prior to visit.       Review of patient's  "allergies indicates:   Allergen Reactions    Doxycycline Other (See Comments)     Acid reflux    Orange juice      Swelling in pt tongue      8/23/23 - pt stated she had in hosp and it didn't effect her.       OBJECTIVE:     Vital Signs:  Vitals:    05/09/24 0821   BP: 132/74   BP Location: Right arm   Patient Position: Sitting   BP Method: Medium (Manual)   Pulse: 72   SpO2: (!) 80%  Comment: pt has oxygen   Weight: 69.1 kg (152 lb 5.4 oz)   Height: 5' 3" (1.6 m)       Body mass index is 26.99 kg/m².     Physical Exam  Constitutional:       General: She is not in acute distress.     Appearance: Normal appearance. She is not ill-appearing.   HENT:      Head: Normocephalic and atraumatic.      Right Ear: External ear normal. There is no impacted cerumen.      Left Ear: External ear normal. There is no impacted cerumen.      Nose: Nose normal. No congestion.      Mouth/Throat:      Mouth: Mucous membranes are dry.      Pharynx: Oropharynx is clear.   Eyes:      Extraocular Movements: Extraocular movements intact.      Pupils: Pupils are equal, round, and reactive to light.   Cardiovascular:      Rate and Rhythm: Normal rate and regular rhythm.      Pulses: Normal pulses.      Heart sounds: Normal heart sounds.   Pulmonary:      Effort: Pulmonary effort is normal.      Breath sounds: Normal breath sounds. No wheezing or rales.   Abdominal:      General: Abdomen is flat.      Palpations: Abdomen is soft.   Musculoskeletal:         General: No swelling or deformity. Normal range of motion.      Cervical back: Normal range of motion and neck supple.   Skin:     General: Skin is warm and dry.      Capillary Refill: Capillary refill takes less than 2 seconds.   Neurological:      General: No focal deficit present.      Mental Status: She is alert and oriented to person, place, and time.      Motor: No weakness.   Psychiatric:         Behavior: Behavior normal.      Comments: Some depressed mood, but no SI. Feels slightly " overwhelmed at times due to her chronic illnesses.        Health Maintenance Due   Topic Date Due    RSV Vaccine (Age 60+ and Pregnant patients) (1 - 1-dose 60+ series) Never done    COVID-19 Vaccine (4 - 2023-24 season) 09/01/2023    Mammogram  01/25/2024         ASSESSMENT & PLAN:   Ms. Rochelle Espinoza is a 68 y.o. female with diastolic CHF, COPD on 3L O2, pHTN here for 4mo follow-up.     pHTN - continue O2 therapy and inhaler regimen, likely combo of group II and III    Chronic obstructive pulmonary disease with hypoxia  -     albuterol (PROVENTIL/VENTOLIN HFA) 90 mcg/actuation inhaler; Inhale 2 puffs into the lungs every 4 (four) hours as needed for Wheezing or Shortness of Breath. Rescue  Dispense: 8 g; Refill: 11  -     Comprehensive Metabolic Panel; Future; Expected date: 05/09/2024  -     CBC Auto Differential; Future; Expected date: 05/09/2024    Chronic hypoxemic respiratory failure    Nicotine dependence, cigarettes, uncomplicated   Comments:  Restarted 1/2 ppd about 1mo ago. Advised to use nicotine patches and to restart wellbutrin.    Arthritis  Comments:  Stable. Waxes and wanes, flares controlled well with Voltaren PO. Advised to continue voltaren as needed    Chronic diastolic heart failure  Comments:  NHYA I. Edema well controlled with Lasix 20mg qd. Advised to reduce salt intake, can drink to thirst.    Recurrent major depressive disorder, in partial remission  Comments:  Slightly worsened depressed mood related to chronic illness. Not taking Lexapro or wellbutrin. Advised to restart wellbutrin, will stop lexapro.    Essential hypertension  Comments:  Stable on Losartan. No HA, CP.    Bronchospasm, acute  Comments:  Continue inhaler regimen and prn albuterol nebs  Orders:  -     albuterol (PROVENTIL/VENTOLIN HFA) 90 mcg/actuation inhaler; Inhale 2 puffs into the lungs every 4 (four) hours as needed for Wheezing or Shortness of Breath. Rescue  Dispense: 8 g; Refill: 11    Blurry  vision  Comments:  Sent in optometry referral  Orders:  -     Ambulatory referral/consult to Optometry; Future; Expected date: 05/16/2024    Other orders  -     atorvastatin (LIPITOR) 10 MG tablet; Take 1 tablet (10 mg total) by mouth once daily.  Dispense: 90 tablet; Refill: 3  -     buPROPion (WELLBUTRIN SR) 150 MG TBSR 12 hr tablet; Take 1 tablet (150 mg total) by mouth 2 (two) times daily.  Dispense: 180 tablet; Refill: 4  -     fluticasone-umeclidin-vilanter (TRELEGY ELLIPTA) 100-62.5-25 mcg DsDv; Take 1 puff by mouth once daily.  Dispense: 3 each; Refill: 4  -     furosemide (LASIX) 20 MG tablet; Take 1-2 po q day  Dispense: 180 tablet; Refill: 1        RTC in 4mo    Discussed with Dr. Samuels - staff attestation to follow    Chelsea Zaldivar MD  Internal Medicine, PGY-I  [facility]  [address] [place] [address] [id number] [phone]

## 2024-05-09 NOTE — PROGRESS NOTES
"I have personally taken the history and examined this patient and agree with the resident's note as stated above with the following thoughts:  /74 (BP Location: Right arm, Patient Position: Sitting, BP Method: Medium (Manual))   Pulse 72   Ht 5' 3" (1.6 m)   Wt 69.1 kg (152 lb 5.4 oz)   LMP 11/21/2013   SpO2 (!) 80% Comment: pt has oxygen  BMI 26.99 kg/m²     Discussed getting vaccines up to date.  Discussed importance of low fat diet and exercise.  Biggest issue this visit is we need to get her to stops smoking.  We discussed this in detail today.  She is going to start back her nicotine patches and we are going to put her on some Wellbutrin SR.  sHe needs to make sure she is wearing her oxygen all the time.   I will see her back again in about 3 months  "

## 2024-05-12 ENCOUNTER — HOSPITAL ENCOUNTER (INPATIENT)
Facility: HOSPITAL | Age: 69
LOS: 2 days | Discharge: HOME HEALTH | DRG: 189 | End: 2024-05-15
Attending: EMERGENCY MEDICINE | Admitting: STUDENT IN AN ORGANIZED HEALTH CARE EDUCATION/TRAINING PROGRAM
Payer: MEDICARE

## 2024-05-12 DIAGNOSIS — Z91.89 AT HIGH RISK FOR RESPIRATORY INFECTION: ICD-10-CM

## 2024-05-12 DIAGNOSIS — R07.9 CHEST PAIN: ICD-10-CM

## 2024-05-12 DIAGNOSIS — J96.02 ACUTE RESPIRATORY FAILURE WITH HYPOXIA AND HYPERCAPNIA: Primary | ICD-10-CM

## 2024-05-12 DIAGNOSIS — J96.01 ACUTE RESPIRATORY FAILURE WITH HYPOXIA AND HYPERCAPNIA: Primary | ICD-10-CM

## 2024-05-12 PROBLEM — J44.1 COPD EXACERBATION: Status: ACTIVE | Noted: 2021-12-30

## 2024-05-12 LAB
ALBUMIN SERPL BCP-MCNC: 3.7 G/DL (ref 3.5–5.2)
ALLENS TEST: ABNORMAL
ALP SERPL-CCNC: 53 U/L (ref 55–135)
ALT SERPL W/O P-5'-P-CCNC: 13 U/L (ref 10–44)
ANION GAP SERPL CALC-SCNC: 9 MMOL/L (ref 8–16)
AST SERPL-CCNC: 22 U/L (ref 10–40)
BASOPHILS # BLD AUTO: 0.02 K/UL (ref 0–0.2)
BASOPHILS NFR BLD: 0.3 % (ref 0–1.9)
BILIRUB SERPL-MCNC: 0.4 MG/DL (ref 0.1–1)
BNP SERPL-MCNC: <10 PG/ML (ref 0–99)
BUN SERPL-MCNC: <3 MG/DL (ref 8–23)
CALCIUM SERPL-MCNC: 9.3 MG/DL (ref 8.7–10.5)
CHLORIDE SERPL-SCNC: 93 MMOL/L (ref 95–110)
CO2 SERPL-SCNC: 38 MMOL/L (ref 23–29)
CREAT SERPL-MCNC: 0.7 MG/DL (ref 0.5–1.4)
DIFFERENTIAL METHOD BLD: ABNORMAL
EOSINOPHIL # BLD AUTO: 0.1 K/UL (ref 0–0.5)
EOSINOPHIL NFR BLD: 1.1 % (ref 0–8)
ERYTHROCYTE [DISTWIDTH] IN BLOOD BY AUTOMATED COUNT: 14.6 % (ref 11.5–14.5)
EST. GFR  (NO RACE VARIABLE): >60 ML/MIN/1.73 M^2
GLUCOSE SERPL-MCNC: 92 MG/DL (ref 70–110)
HCO3 UR-SCNC: 42.7 MMOL/L (ref 24–28)
HCO3 UR-SCNC: 45 MMOL/L (ref 24–28)
HCO3 UR-SCNC: 48.9 MMOL/L (ref 24–28)
HCO3 UR-SCNC: 48.9 MMOL/L (ref 24–28)
HCT VFR BLD AUTO: 41.4 % (ref 37–48.5)
HCV AB SERPL QL IA: NORMAL
HGB BLD-MCNC: 12.8 G/DL (ref 12–16)
HIV 1+2 AB+HIV1 P24 AG SERPL QL IA: NORMAL
IMM GRANULOCYTES # BLD AUTO: 0.06 K/UL (ref 0–0.04)
IMM GRANULOCYTES NFR BLD AUTO: 0.9 % (ref 0–0.5)
INFLUENZA A, MOLECULAR: NOT DETECTED
INFLUENZA B, MOLECULAR: NOT DETECTED
LYMPHOCYTES # BLD AUTO: 2.3 K/UL (ref 1–4.8)
LYMPHOCYTES NFR BLD: 34.4 % (ref 18–48)
MCH RBC QN AUTO: 28.9 PG (ref 27–31)
MCHC RBC AUTO-ENTMCNC: 30.9 G/DL (ref 32–36)
MCV RBC AUTO: 94 FL (ref 82–98)
MONOCYTES # BLD AUTO: 0.6 K/UL (ref 0.3–1)
MONOCYTES NFR BLD: 9 % (ref 4–15)
NEUTROPHILS # BLD AUTO: 3.6 K/UL (ref 1.8–7.7)
NEUTROPHILS NFR BLD: 54.3 % (ref 38–73)
NRBC BLD-RTO: 0 /100 WBC
OHS QRS DURATION: 76 MS
OHS QTC CALCULATION: 437 MS
PCO2 BLDA: 82.6 MMHG (ref 35–45)
PCO2 BLDA: 82.6 MMHG (ref 35–45)
PCO2 BLDA: 83.7 MMHG (ref 35–45)
PCO2 BLDA: 88.6 MMHG (ref 35–45)
PH SMN: 7.31 [PH] (ref 7.35–7.45)
PH SMN: 7.32 [PH] (ref 7.35–7.45)
PH SMN: 7.38 [PH] (ref 7.35–7.45)
PH SMN: 7.38 [PH] (ref 7.35–7.45)
PLATELET # BLD AUTO: 212 K/UL (ref 150–450)
PMV BLD AUTO: 12.1 FL (ref 9.2–12.9)
PO2 BLDA: 21 MMHG (ref 40–60)
PO2 BLDA: 21 MMHG (ref 40–60)
PO2 BLDA: 28 MMHG (ref 40–60)
PO2 BLDA: 52 MMHG (ref 40–60)
POC BE: 17 MMOL/L
POC BE: 19 MMOL/L
POC BE: 24 MMOL/L
POC BE: 24 MMOL/L
POC SATURATED O2: 30 % (ref 95–100)
POC SATURATED O2: 30 % (ref 95–100)
POC SATURATED O2: 43 % (ref 95–100)
POC SATURATED O2: 80 % (ref 95–100)
POC TCO2: 45 MMOL/L (ref 24–29)
POC TCO2: 48 MMOL/L (ref 24–29)
POC TCO2: >50 MMOL/L (ref 24–29)
POC TCO2: >50 MMOL/L (ref 24–29)
POTASSIUM SERPL-SCNC: 3.8 MMOL/L (ref 3.5–5.1)
PROCALCITONIN SERPL IA-MCNC: <0.02 NG/ML
PROT SERPL-MCNC: 6.9 G/DL (ref 6–8.4)
RBC # BLD AUTO: 4.43 M/UL (ref 4–5.4)
RSV AG BY MOLECULAR METHOD: NOT DETECTED
SAMPLE: ABNORMAL
SARS-COV-2 RNA RESP QL NAA+PROBE: NOT DETECTED
SITE: ABNORMAL
SODIUM SERPL-SCNC: 140 MMOL/L (ref 136–145)
TROPONIN I SERPL DL<=0.01 NG/ML-MCNC: <0.006 NG/ML (ref 0–0.03)
WBC # BLD AUTO: 6.59 K/UL (ref 3.9–12.7)

## 2024-05-12 PROCEDURE — S4991 NICOTINE PATCH NONLEGEND: HCPCS | Mod: HCNC | Performed by: STUDENT IN AN ORGANIZED HEALTH CARE EDUCATION/TRAINING PROGRAM

## 2024-05-12 PROCEDURE — G0378 HOSPITAL OBSERVATION PER HR: HCPCS | Mod: HCNC

## 2024-05-12 PROCEDURE — 96367 TX/PROPH/DG ADDL SEQ IV INF: CPT | Mod: HCNC

## 2024-05-12 PROCEDURE — 63600175 PHARM REV CODE 636 W HCPCS: Mod: HCNC | Performed by: STUDENT IN AN ORGANIZED HEALTH CARE EDUCATION/TRAINING PROGRAM

## 2024-05-12 PROCEDURE — 27000190 HC CPAP FULL FACE MASK W/VALVE: Mod: HCNC

## 2024-05-12 PROCEDURE — 94761 N-INVAS EAR/PLS OXIMETRY MLT: CPT | Mod: HCNC,XB

## 2024-05-12 PROCEDURE — 86803 HEPATITIS C AB TEST: CPT | Mod: HCNC | Performed by: PHYSICIAN ASSISTANT

## 2024-05-12 PROCEDURE — 63600175 PHARM REV CODE 636 W HCPCS: Mod: HCNC | Performed by: EMERGENCY MEDICINE

## 2024-05-12 PROCEDURE — 94640 AIRWAY INHALATION TREATMENT: CPT | Mod: HCNC,XB

## 2024-05-12 PROCEDURE — 94640 AIRWAY INHALATION TREATMENT: CPT | Mod: HCNC

## 2024-05-12 PROCEDURE — 82803 BLOOD GASES ANY COMBINATION: CPT | Mod: HCNC

## 2024-05-12 PROCEDURE — 87389 HIV-1 AG W/HIV-1&-2 AB AG IA: CPT | Mod: HCNC | Performed by: PHYSICIAN ASSISTANT

## 2024-05-12 PROCEDURE — 0241U SARS-COV2 (COVID) WITH FLU/RSV BY PCR: CPT | Mod: HCNC

## 2024-05-12 PROCEDURE — 80053 COMPREHEN METABOLIC PANEL: CPT | Mod: HCNC | Performed by: EMERGENCY MEDICINE

## 2024-05-12 PROCEDURE — 96372 THER/PROPH/DIAG INJ SC/IM: CPT | Performed by: STUDENT IN AN ORGANIZED HEALTH CARE EDUCATION/TRAINING PROGRAM

## 2024-05-12 PROCEDURE — 96375 TX/PRO/DX INJ NEW DRUG ADDON: CPT | Mod: HCNC

## 2024-05-12 PROCEDURE — 99900035 HC TECH TIME PER 15 MIN (STAT): Mod: HCNC

## 2024-05-12 PROCEDURE — 83880 ASSAY OF NATRIURETIC PEPTIDE: CPT | Mod: HCNC | Performed by: EMERGENCY MEDICINE

## 2024-05-12 PROCEDURE — 25000003 PHARM REV CODE 250: Mod: HCNC | Performed by: STUDENT IN AN ORGANIZED HEALTH CARE EDUCATION/TRAINING PROGRAM

## 2024-05-12 PROCEDURE — 94644 CONT INHLJ TX 1ST HOUR: CPT | Mod: HCNC

## 2024-05-12 PROCEDURE — 25000242 PHARM REV CODE 250 ALT 637 W/ HCPCS: Mod: HCNC | Performed by: EMERGENCY MEDICINE

## 2024-05-12 PROCEDURE — 93010 ELECTROCARDIOGRAM REPORT: CPT | Mod: HCNC,,, | Performed by: INTERNAL MEDICINE

## 2024-05-12 PROCEDURE — 84484 ASSAY OF TROPONIN QUANT: CPT | Mod: HCNC | Performed by: EMERGENCY MEDICINE

## 2024-05-12 PROCEDURE — 25000242 PHARM REV CODE 250 ALT 637 W/ HCPCS: Mod: HCNC | Performed by: STUDENT IN AN ORGANIZED HEALTH CARE EDUCATION/TRAINING PROGRAM

## 2024-05-12 PROCEDURE — 96365 THER/PROPH/DIAG IV INF INIT: CPT | Mod: HCNC

## 2024-05-12 PROCEDURE — 85025 COMPLETE CBC W/AUTO DIFF WBC: CPT | Mod: HCNC | Performed by: EMERGENCY MEDICINE

## 2024-05-12 PROCEDURE — 93005 ELECTROCARDIOGRAM TRACING: CPT | Mod: HCNC

## 2024-05-12 PROCEDURE — 94660 CPAP INITIATION&MGMT: CPT | Mod: HCNC

## 2024-05-12 PROCEDURE — 94799 UNLISTED PULMONARY SVC/PX: CPT | Mod: HCNC,XB

## 2024-05-12 PROCEDURE — 84145 PROCALCITONIN (PCT): CPT | Mod: HCNC | Performed by: STUDENT IN AN ORGANIZED HEALTH CARE EDUCATION/TRAINING PROGRAM

## 2024-05-12 PROCEDURE — 5A09357 ASSISTANCE WITH RESPIRATORY VENTILATION, LESS THAN 24 CONSECUTIVE HOURS, CONTINUOUS POSITIVE AIRWAY PRESSURE: ICD-10-PCS | Performed by: EMERGENCY MEDICINE

## 2024-05-12 PROCEDURE — 27100171 HC OXYGEN HIGH FLOW UP TO 24 HOURS: Mod: HCNC

## 2024-05-12 PROCEDURE — 99291 CRITICAL CARE FIRST HOUR: CPT | Mod: HCNC

## 2024-05-12 RX ORDER — IPRATROPIUM BROMIDE AND ALBUTEROL SULFATE 2.5; .5 MG/3ML; MG/3ML
3 SOLUTION RESPIRATORY (INHALATION) EVERY 4 HOURS
Status: DISCONTINUED | OUTPATIENT
Start: 2024-05-12 | End: 2024-05-15 | Stop reason: HOSPADM

## 2024-05-12 RX ORDER — METHYLPREDNISOLONE SOD SUCC 125 MG
125 VIAL (EA) INJECTION
Status: COMPLETED | OUTPATIENT
Start: 2024-05-12 | End: 2024-05-12

## 2024-05-12 RX ORDER — IBUPROFEN 200 MG
16 TABLET ORAL
Status: DISCONTINUED | OUTPATIENT
Start: 2024-05-12 | End: 2024-05-15 | Stop reason: HOSPADM

## 2024-05-12 RX ORDER — FLUTICASONE FUROATE AND VILANTEROL 100; 25 UG/1; UG/1
1 POWDER RESPIRATORY (INHALATION) DAILY
Status: DISCONTINUED | OUTPATIENT
Start: 2024-05-13 | End: 2024-05-15 | Stop reason: HOSPADM

## 2024-05-12 RX ORDER — SODIUM CHLORIDE 0.9 % (FLUSH) 0.9 %
3 SYRINGE (ML) INJECTION
Status: DISCONTINUED | OUTPATIENT
Start: 2024-05-13 | End: 2024-05-15 | Stop reason: HOSPADM

## 2024-05-12 RX ORDER — ACETAMINOPHEN 325 MG/1
650 TABLET ORAL EVERY 4 HOURS PRN
Status: DISCONTINUED | OUTPATIENT
Start: 2024-05-12 | End: 2024-05-15 | Stop reason: HOSPADM

## 2024-05-12 RX ORDER — CETIRIZINE HYDROCHLORIDE 10 MG/1
10 TABLET ORAL DAILY
Status: DISCONTINUED | OUTPATIENT
Start: 2024-05-13 | End: 2024-05-15 | Stop reason: HOSPADM

## 2024-05-12 RX ORDER — ONDANSETRON 8 MG/1
8 TABLET, ORALLY DISINTEGRATING ORAL EVERY 8 HOURS PRN
Status: DISCONTINUED | OUTPATIENT
Start: 2024-05-12 | End: 2024-05-15 | Stop reason: HOSPADM

## 2024-05-12 RX ORDER — CHOLECALCIFEROL (VITAMIN D3) 25 MCG
1000 TABLET ORAL DAILY
Status: DISCONTINUED | OUTPATIENT
Start: 2024-05-13 | End: 2024-05-15 | Stop reason: HOSPADM

## 2024-05-12 RX ORDER — BUPROPION HYDROCHLORIDE 75 MG/1
150 TABLET ORAL 2 TIMES DAILY
Status: DISCONTINUED | OUTPATIENT
Start: 2024-05-12 | End: 2024-05-15 | Stop reason: HOSPADM

## 2024-05-12 RX ORDER — ALUMINUM HYDROXIDE, MAGNESIUM HYDROXIDE, AND SIMETHICONE 1200; 120; 1200 MG/30ML; MG/30ML; MG/30ML
30 SUSPENSION ORAL 4 TIMES DAILY PRN
Status: DISCONTINUED | OUTPATIENT
Start: 2024-05-12 | End: 2024-05-15 | Stop reason: HOSPADM

## 2024-05-12 RX ORDER — BENZONATATE 100 MG/1
100 CAPSULE ORAL 3 TIMES DAILY PRN
Status: DISCONTINUED | OUTPATIENT
Start: 2024-05-12 | End: 2024-05-15 | Stop reason: HOSPADM

## 2024-05-12 RX ORDER — IBUPROFEN 200 MG
24 TABLET ORAL
Status: DISCONTINUED | OUTPATIENT
Start: 2024-05-12 | End: 2024-05-15 | Stop reason: HOSPADM

## 2024-05-12 RX ORDER — PREDNISONE 20 MG/1
40 TABLET ORAL DAILY
Status: DISCONTINUED | OUTPATIENT
Start: 2024-05-12 | End: 2024-05-15 | Stop reason: HOSPADM

## 2024-05-12 RX ORDER — LOSARTAN POTASSIUM 50 MG/1
100 TABLET ORAL DAILY
Status: DISCONTINUED | OUTPATIENT
Start: 2024-05-13 | End: 2024-05-15 | Stop reason: HOSPADM

## 2024-05-12 RX ORDER — NALOXONE HCL 0.4 MG/ML
0.02 VIAL (ML) INJECTION
Status: DISCONTINUED | OUTPATIENT
Start: 2024-05-12 | End: 2024-05-15 | Stop reason: HOSPADM

## 2024-05-12 RX ORDER — ASCORBIC ACID 500 MG
500 TABLET ORAL DAILY
Status: DISCONTINUED | OUTPATIENT
Start: 2024-05-13 | End: 2024-05-15 | Stop reason: HOSPADM

## 2024-05-12 RX ORDER — ENOXAPARIN SODIUM 100 MG/ML
40 INJECTION SUBCUTANEOUS EVERY 24 HOURS
Status: DISCONTINUED | OUTPATIENT
Start: 2024-05-12 | End: 2024-05-15 | Stop reason: HOSPADM

## 2024-05-12 RX ORDER — TALC
6 POWDER (GRAM) TOPICAL NIGHTLY PRN
Status: DISCONTINUED | OUTPATIENT
Start: 2024-05-12 | End: 2024-05-15 | Stop reason: HOSPADM

## 2024-05-12 RX ORDER — PROCHLORPERAZINE EDISYLATE 5 MG/ML
5 INJECTION INTRAMUSCULAR; INTRAVENOUS EVERY 6 HOURS PRN
Status: DISCONTINUED | OUTPATIENT
Start: 2024-05-12 | End: 2024-05-15 | Stop reason: HOSPADM

## 2024-05-12 RX ORDER — GABAPENTIN 300 MG/1
300 CAPSULE ORAL 3 TIMES DAILY
Status: DISCONTINUED | OUTPATIENT
Start: 2024-05-12 | End: 2024-05-15 | Stop reason: HOSPADM

## 2024-05-12 RX ORDER — SODIUM CHLORIDE 0.9 % (FLUSH) 0.9 %
5 SYRINGE (ML) INJECTION
Status: DISCONTINUED | OUTPATIENT
Start: 2024-05-12 | End: 2024-05-15 | Stop reason: HOSPADM

## 2024-05-12 RX ORDER — ALBUTEROL SULFATE 2.5 MG/.5ML
10 SOLUTION RESPIRATORY (INHALATION)
Status: COMPLETED | OUTPATIENT
Start: 2024-05-12 | End: 2024-05-12

## 2024-05-12 RX ORDER — POLYETHYLENE GLYCOL 3350 17 G/17G
17 POWDER, FOR SOLUTION ORAL DAILY PRN
Status: DISCONTINUED | OUTPATIENT
Start: 2024-05-12 | End: 2024-05-15 | Stop reason: HOSPADM

## 2024-05-12 RX ORDER — FLUTICASONE PROPIONATE 50 MCG
1 SPRAY, SUSPENSION (ML) NASAL DAILY
Status: DISCONTINUED | OUTPATIENT
Start: 2024-05-13 | End: 2024-05-15 | Stop reason: HOSPADM

## 2024-05-12 RX ORDER — ACETAMINOPHEN 500 MG
1000 TABLET ORAL EVERY 8 HOURS PRN
Status: DISCONTINUED | OUTPATIENT
Start: 2024-05-12 | End: 2024-05-15 | Stop reason: HOSPADM

## 2024-05-12 RX ORDER — GUAIFENESIN 600 MG/1
600 TABLET, EXTENDED RELEASE ORAL 2 TIMES DAILY
Status: DISCONTINUED | OUTPATIENT
Start: 2024-05-12 | End: 2024-05-15 | Stop reason: HOSPADM

## 2024-05-12 RX ORDER — ATORVASTATIN CALCIUM 10 MG/1
10 TABLET, FILM COATED ORAL DAILY
Status: DISCONTINUED | OUTPATIENT
Start: 2024-05-13 | End: 2024-05-15 | Stop reason: HOSPADM

## 2024-05-12 RX ORDER — PANTOPRAZOLE SODIUM 40 MG/1
40 TABLET, DELAYED RELEASE ORAL DAILY
Status: DISCONTINUED | OUTPATIENT
Start: 2024-05-13 | End: 2024-05-15 | Stop reason: HOSPADM

## 2024-05-12 RX ORDER — SODIUM CHLORIDE 0.9 % (FLUSH) 0.9 %
10 SYRINGE (ML) INJECTION
Status: DISCONTINUED | OUTPATIENT
Start: 2024-05-12 | End: 2024-05-15 | Stop reason: HOSPADM

## 2024-05-12 RX ORDER — GLUCAGON 1 MG
1 KIT INJECTION
Status: DISCONTINUED | OUTPATIENT
Start: 2024-05-12 | End: 2024-05-15 | Stop reason: HOSPADM

## 2024-05-12 RX ORDER — IBUPROFEN 200 MG
1 TABLET ORAL DAILY
Status: DISCONTINUED | OUTPATIENT
Start: 2024-05-12 | End: 2024-05-15 | Stop reason: HOSPADM

## 2024-05-12 RX ORDER — BISACODYL 10 MG/1
10 SUPPOSITORY RECTAL DAILY PRN
Status: DISCONTINUED | OUTPATIENT
Start: 2024-05-12 | End: 2024-05-15 | Stop reason: HOSPADM

## 2024-05-12 RX ORDER — SIMETHICONE 80 MG
1 TABLET,CHEWABLE ORAL 4 TIMES DAILY PRN
Status: DISCONTINUED | OUTPATIENT
Start: 2024-05-12 | End: 2024-05-15 | Stop reason: HOSPADM

## 2024-05-12 RX ORDER — FUROSEMIDE 10 MG/ML
40 INJECTION INTRAMUSCULAR; INTRAVENOUS DAILY
Status: DISCONTINUED | OUTPATIENT
Start: 2024-05-12 | End: 2024-05-13

## 2024-05-12 RX ADMIN — ONDANSETRON 8 MG: 8 TABLET, ORALLY DISINTEGRATING ORAL at 07:05

## 2024-05-12 RX ADMIN — METHYLPREDNISOLONE SODIUM SUCCINATE 125 MG: 125 INJECTION, POWDER, FOR SOLUTION INTRAMUSCULAR; INTRAVENOUS at 09:05

## 2024-05-12 RX ADMIN — BUPROPION HYDROCHLORIDE 150 MG: 75 TABLET, FILM COATED ORAL at 09:05

## 2024-05-12 RX ADMIN — CEFTRIAXONE 2 G: 2 INJECTION, POWDER, FOR SOLUTION INTRAMUSCULAR; INTRAVENOUS at 06:05

## 2024-05-12 RX ADMIN — PREDNISONE 40 MG: 20 TABLET ORAL at 06:05

## 2024-05-12 RX ADMIN — AZITHROMYCIN MONOHYDRATE 500 MG: 500 INJECTION, POWDER, LYOPHILIZED, FOR SOLUTION INTRAVENOUS at 07:05

## 2024-05-12 RX ADMIN — GABAPENTIN 300 MG: 300 CAPSULE ORAL at 09:05

## 2024-05-12 RX ADMIN — FUROSEMIDE 40 MG: 10 INJECTION, SOLUTION INTRAVENOUS at 06:05

## 2024-05-12 RX ADMIN — IPRATROPIUM BROMIDE AND ALBUTEROL SULFATE 3 ML: 2.5; .5 SOLUTION RESPIRATORY (INHALATION) at 08:05

## 2024-05-12 RX ADMIN — NICOTINE 1 PATCH: 14 PATCH, EXTENDED RELEASE TRANSDERMAL at 07:05

## 2024-05-12 RX ADMIN — ENOXAPARIN SODIUM 40 MG: 40 INJECTION SUBCUTANEOUS at 06:05

## 2024-05-12 RX ADMIN — ALBUTEROL SULFATE 10 MG: 2.5 SOLUTION RESPIRATORY (INHALATION) at 07:05

## 2024-05-12 RX ADMIN — GUAIFENESIN 600 MG: 600 TABLET, EXTENDED RELEASE ORAL at 09:05

## 2024-05-12 NOTE — SUBJECTIVE & OBJECTIVE
Past Medical History:   Diagnosis Date    CHF (congestive heart failure)     COPD (chronic obstructive pulmonary disease)     Herpes zoster without mention of complication 2011    Hypertension     Leg edema     Pulmonary hypertension     Sciatica        Past Surgical History:   Procedure Laterality Date    BREAST BIOPSY Right     core     SECTION      COLONOSCOPY N/A 2019    Procedure: COLONOSCOPY;  Surgeon: Rui Holder MD;  Location: [facility] ENDO (2ND FLR);  Service: Endoscopy;  Laterality: N/A;    EPIDURAL STEROID INJECTION N/A 2020    Procedure: CERVICAL BLAKE;  Surgeon: Papito High MD;  [place] [facility] [place] [facility]  Service: Pain Management;  Laterality: N/A;  NEEDS CONSENT    ESOPHAGOGASTRODUODENOSCOPY N/A 2019    Procedure: EGD (ESOPHAGOGASTRODUODENOSCOPY);  Surgeon: Rui Holder MD;  Location: [facility] (2ND FLR);  Service: Endoscopy;  Laterality: N/A;  2L continuous O2 via NC, COPD, pulm HTN    TRANSFORAMINAL EPIDURAL INJECTION OF STEROID Right 6/3/2021    Procedure: INJECTION, STEROID, EPIDURAL, TRANSFORAMINAL APPROACH, L4-L5;  Surgeon: Anibal Robles MD;  [place] [facility] [place] [facility]  Service: Pain Management;  Laterality: Right;       Review of patient's allergies indicates:   Allergen Reactions    Doxycycline Other (See Comments)     Acid reflux    Orange juice      Swelling in pt tongue      23 - pt stated she had in hosp and it didn't effect her.       Family History       Problem Relation (Age of Onset)    Heart disease Mother, Paternal Uncle          Tobacco Use    Smoking status: Former     Current packs/day: 0.25     Average packs/day: 0.3 packs/day for 40.0 years (10.0 ttl pk-yrs)     Types: Cigarettes    Smokeless tobacco: Never    Tobacco comments:     Quit over a month ago    Substance and Sexual Activity    Alcohol use: Not Currently     Comment: beer daily 2-3 daily, none today    Drug use: No    Sexual activity: Not Currently     Birth control/protection: None       Review of Systems   Constitutional:  Positive for activity change and fatigue. Negative for chills and fever.   Respiratory:  Positive for cough, shortness of breath and wheezing.    Gastrointestinal:  Positive for abdominal distention and abdominal pain. Negative for diarrhea, nausea and vomiting.   Genitourinary:  Negative for difficulty urinating, dysuria and frequency.   Neurological:  Positive for weakness.     Objective:     Vital Signs (Most Recent):  Temp: 99.3 °F (37.4 °C) (05/12/24 0630)  Pulse: 93 (05/12/24 1404)  Resp: (!) 43 (05/12/24 1404)  BP: 132/67 (05/12/24 1404)  SpO2: (!) 94 % (05/12/24 1404) Vital Signs (24h Range):  Temp:  [99.3 °F (37.4 °C)] 99.3 °F (37.4 °C)  Pulse:  [84-99] 93  Resp:  [21-43] 43  SpO2:  [78 %-98 %] 94 %  BP: (121-159)/(61-80) 132/67   Weight: 69.4 kg (153 lb)  Body mass index is 27.1 kg/m².      Intake/Output Summary (Last 24 hours) at 5/12/2024 1445  Last data filed at 5/12/2024 1249  Gross per 24 hour   Intake --   Output 600 ml   Net -600 ml          Physical Exam  Vitals and nursing note reviewed.   Constitutional:       General: She is not in acute distress.     Appearance: She is ill-appearing. She is not toxic-appearing or diaphoretic.   HENT:      Head: Normocephalic and atraumatic.      Nose: Nose normal. No congestion.      Mouth/Throat:      Mouth: Mucous membranes are dry.      Pharynx: Oropharynx is clear.   Eyes:      Extraocular Movements: Extraocular movements intact.      Conjunctiva/sclera: Conjunctivae normal.      Pupils: Pupils are equal, round, and reactive to light.   Cardiovascular:      Rate and Rhythm: Normal rate and regular rhythm.      Pulses: Normal pulses.      Heart sounds: Normal heart sounds. No murmur heard.     No friction rub. No gallop.   Pulmonary:      Effort: No respiratory distress.      Breath sounds: Decreased air movement present. Decreased breath sounds present. No wheezing, rhonchi or rales.      Comments: Satting 95% on  3LNC  Abdominal:      General: There is distension.      Tenderness: There is no abdominal tenderness.   Musculoskeletal:         General: Normal range of motion.      Cervical back: Normal range of motion and neck supple.      Right lower leg: Edema (1+ pitting) present.      Left lower leg: Edema (1+ pitting) present.   Skin:     General: Skin is warm and dry.   Neurological:      General: No focal deficit present.      Mental Status: She is alert and oriented to person, place, and time.   Psychiatric:         Mood and Affect: Mood normal.         Behavior: Behavior normal.            Vents:  Oxygen Concentration (%): 35 (05/12/24 1152)  Lines/Drains/Airways       Drain  Duration             Female External Urinary Catheter w/ Suction 05/12/24 1120 <1 day              Peripheral Intravenous Line  Duration                  Peripheral IV - Single Lumen 05/12/24 0647 20 G Right Antecubital <1 day                  Significant Labs:    CBC/Anemia Profile:  Recent Labs   Lab 05/12/24  0653   WBC 6.59   HGB 12.8   HCT 41.4      MCV 94   RDW 14.6*        Chemistries:  Recent Labs   Lab 05/12/24  0816      K 3.8   CL 93*   CO2 38*   BUN <3*   CREATININE 0.7   CALCIUM 9.3   ALBUMIN 3.7   PROT 6.9   BILITOT 0.4   ALKPHOS 53*   ALT 13   AST 22       ABGs:   Recent Labs   Lab 05/12/24  1222   PH 7.313*   PCO2 88.6*   HCO3 45.0*   POCSATURATED 80   BE 19*       Significant Imaging: I have reviewed all pertinent imaging results/findings within the past 24 hours.    X-Ray Chest AP Portable   Final Result      No acute intrathoracic abnormality.         Electronically signed by: Kem Dooley   Date:    05/12/2024   Time:    08:11

## 2024-05-12 NOTE — ED PROVIDER NOTES
Emergency Department Provider Note    Rochelle Espinoza   68 y.o. female   [id number]      2024       History     This history was obtained from the patient without limitations.  She was driven to the ER by her daughter.      She is a 68-year-old with the below past medical history.  That history includes COPD and CHF.  She still smokes a half pack of cigarettes daily.  She complains of bilateral lower extremity edema that began yesterday.  She takes Lasix and reports no decreased in urine output.  She has chronic cough and shortness of breath.  She reports increased sputum production for 2 weeks.  She reports increased lethargy and shortness of breath for one week.  She is on 3 L continuous supplemental oxygen and uses a BiPAP when she sleeps.  She has been performing nebulized albuterol treatments every 4 hours for one week which improves her shortness of breath.  She denies fever and chills.  She denies chest pain.  She denies abdominal pain, nausea, and vomiting.  She denies headache and dizziness.         Past Medical History:   Diagnosis Date    CHF (congestive heart failure)     COPD (chronic obstructive pulmonary disease)     Herpes zoster without mention of complication 2011    Hypertension     Leg edema     Pulmonary hypertension     Sciatica       Past Surgical History:   Procedure Laterality Date    BREAST BIOPSY Right     core     SECTION      COLONOSCOPY N/A 2019    Procedure: COLONOSCOPY;  Surgeon: Rui Holder MD;  Location: [facility] ([address] [place] [address]);  Service: Endoscopy;  Laterality: N/A;    EPIDURAL STEROID INJECTION N/A 2020    Procedure: CERVICAL BLAKE;  Surgeon: Papito High MD;  Location: [facility];  Service: Pain Management;  Laterality: N/A;  NEEDS CONSENT    ESOPHAGOGASTRODUODENOSCOPY N/A 2019    Procedure: EGD (ESOPHAGOGASTRODUODENOSCOPY);  Surgeon: Rui Holder MD;  Location: [facility] ([address] [place] [address]);  Service: Endoscopy;  Laterality: N/A;  2L continuous O2 via NC,  COPD, pulm HTN    TRANSFORAMINAL EPIDURAL INJECTION OF STEROID Right 6/3/2021    Procedure: INJECTION, STEROID, EPIDURAL, TRANSFORAMINAL APPROACH, L4-L5;  Surgeon: Anibal Robles MD;  [place] [facility] [place];  Service: Pain Management;  Laterality: Right;      Family History   Problem Relation Name Age of Onset    Heart disease Mother      Heart disease Paternal Uncle      Celiac disease Neg Hx      Colon cancer Neg Hx      Colon polyps Neg Hx      Crohn's disease Neg Hx      Cystic fibrosis Neg Hx      Esophageal cancer Neg Hx      Inflammatory bowel disease Neg Hx      Irritable bowel syndrome Neg Hx      Liver cancer Neg Hx      Liver disease Neg Hx      Rectal cancer Neg Hx      Stomach cancer Neg Hx      Ulcerative colitis Neg Hx        Social History     Socioeconomic History    Marital status:    Occupational History    Occupation:    Tobacco Use    Smoking status: Former     Current packs/day: 0.25     Average packs/day: 0.3 packs/day for 40.0 years (10.0 ttl pk-yrs)     Types: Cigarettes    Smokeless tobacco: Never    Tobacco comments:     Quit over a month ago    Substance and Sexual Activity    Alcohol use: Not Currently     Comment: beer daily 2-3 daily, none today    Drug use: No    Sexual activity: Not Currently     Birth control/protection: None     Social Determinants of Health     Financial Resource Strain: Medium Risk (5/12/2024)    Overall Financial Resource Strain (CARDIA)     Difficulty of Paying Living Expenses: Somewhat hard   Food Insecurity: Patient Declined (5/12/2024)    Hunger Vital Sign     Worried About Running Out of Food in the Last Year: Patient declined     Ran Out of Food in the Last Year: Patient declined   Recent Concern: Food Insecurity - Food Insecurity Present (5/8/2024)    Hunger Vital Sign     Worried About Running Out of Food in the Last Year: Sometimes true     Ran Out of Food in the Last Year: Sometimes true   Transportation Needs: No  Transportation Needs (5/12/2024)    TRANSPORTATION NEEDS     Transportation : No   Recent Concern: Transportation Needs - Unmet Transportation Needs (5/8/2024)    PRAPARE - Transportation     Lack of Transportation (Medical): Yes     Lack of Transportation (Non-Medical): Yes   Physical Activity: Inactive (5/12/2024)    Exercise Vital Sign     Days of Exercise per Week: 0 days     Minutes of Exercise per Session: 0 min   Stress: Stress Concern Present (5/12/2024)    [employer] - Occupational Stress Questionnaire     Feeling of Stress : To some extent      Review of patient's allergies indicates:   Allergen Reactions    Doxycycline Other (See Comments)     Acid reflux    Orange juice      Swelling in pt tongue      8/23/23 - pt stated she had in hosp and it didn't effect her.           Physical Examination     Initial Vitals [05/12/24 0630]   BP Pulse Resp Temp SpO2   (!) 159/75 99 (!) 26 99.3 °F (37.4 °C) (!) 78 %      MAP       --           Physical Exam    Nursing note and vitals reviewed.  Constitutional: She is not diaphoretic. She appears distressed.   Cardiovascular:  Normal rate, regular rhythm and normal heart sounds.           No murmur heard.  Pulmonary/Chest: No accessory muscle usage or stridor. Tachypnea noted. She has decreased breath sounds. She has no wheezes. She has no rhonchi. She has rales.   Diminished breath sounds throughout all lung fields.  Bibasilar rales.   Abdominal: Abdomen is soft. There is no abdominal tenderness.     Neurological: She is alert and oriented to person, place, and time. GCS score is 15. GCS eye subscore is 4. GCS verbal subscore is 5. GCS motor subscore is 6.   Skin: Skin is warm and dry. No pallor.   Psychiatric: She has a normal mood and affect. She is not actively hallucinating. She is attentive.            Labs     Labs Reviewed   CBC W/ AUTO DIFFERENTIAL - Abnormal; Notable for the following components:       Result Value    MCHC 30.9  (*)     RDW 14.6 (*)     Immature Granulocytes 0.9 (*)     Immature Grans (Abs) 0.06 (*)     All other components within normal limits    Narrative:     Release to patient->Immediate   COMPREHENSIVE METABOLIC PANEL - Abnormal; Notable for the following components:    Chloride 93 (*)     CO2 38 (*)     BUN <3 (*)     Alkaline Phosphatase 53 (*)     All other components within normal limits   ISTAT PROCEDURE - Abnormal; Notable for the following components:    POC PCO2 82.6 (*)     POC PO2 21 (*)     POC HCO3 48.9 (*)     POC BE 24 (*)     POC TCO2 >50 (*)     All other components within normal limits   ISTAT PROCEDURE - Abnormal; Notable for the following components:    POC PH 7.313 (*)     POC PCO2 88.6 (*)     POC HCO3 45.0 (*)     POC BE 19 (*)     POC TCO2 48 (*)     All other components within normal limits   ISTAT PROCEDURE - Abnormal; Notable for the following components:    POC PCO2 82.6 (*)     POC PO2 21 (*)     POC HCO3 48.9 (*)     POC BE 24 (*)     POC TCO2 >50 (*)     All other components within normal limits   ISTAT PROCEDURE - Abnormal; Notable for the following components:    POC PH 7.316 (*)     POC PCO2 83.7 (*)     POC PO2 28 (*)     POC HCO3 42.7 (*)     POC BE 17 (*)     POC TCO2 45 (*)     All other components within normal limits   HIV 1 / 2 ANTIBODY    Narrative:     Release to patient->Immediate   HEPATITIS C ANTIBODY    Narrative:     Release to patient->Immediate   TROPONIN I    Narrative:     Release to patient->Immediate   B-TYPE NATRIURETIC PEPTIDE    Narrative:     Release to patient->Immediate   SARS-COV2 (COVID) WITH FLU/RSV BY PCR   PROCALCITONIN        Imaging     Imaging Results              X-Ray Chest AP Portable (Final result)  Result time 05/12/24 08:11:14      Final result by Kem Dooley IV, MD (05/12/24 08:11:14)                   Impression:      No acute intrathoracic abnormality.      Electronically signed by: Kem Dooley  Date:    05/12/2024  Time:    08:11                Narrative:    EXAMINATION:  XR CHEST AP PORTABLE    CLINICAL HISTORY:  Shortness of breath;    TECHNIQUE:  Single frontal view of the chest was performed.    COMPARISON:  Chest radiograph 08/06/2023.    FINDINGS:  Mediastinal structures are midline.  Normal mediastinal and hilar contours.  Atherosclerotic plaque at the aortic arch.  Normal size cardiac silhouette.    Lungs are symmetrically expanded and well aerated.  No focal consolidation.  No pneumothorax.  No significant pleural fluid.    Osseous structures appear intact.                                        ED Course     The patient received the following medications:  Medications   sodium chloride 0.9% flush 10 mL (has no administration in time range)   melatonin tablet 6 mg (has no administration in time range)   albuterol-ipratropium 2.5 mg-0.5 mg/3 mL nebulizer solution 3 mL (3 mLs Nebulization Given 5/13/24 2000)   atorvastatin tablet 10 mg (10 mg Oral Given 5/13/24 0851)   buPROPion tablet 150 mg (150 mg Oral Given 5/13/24 2031)   cetirizine tablet 10 mg (10 mg Oral Given 5/13/24 0851)   fluticasone propionate 50 mcg/actuation nasal spray 50 mcg (50 mcg Each Nostril Given 5/13/24 0850)   gabapentin capsule 300 mg (300 mg Oral Given 5/13/24 2031)   losartan tablet 100 mg (100 mg Oral Not Given 5/13/24 0900)   pantoprazole EC tablet 40 mg (40 mg Oral Given 5/13/24 0851)   ascorbic acid (vitamin C) tablet 500 mg (500 mg Oral Given 5/13/24 0851)   vitamin D 1000 units tablet 1,000 Units (1,000 Units Oral Given 5/13/24 0851)   predniSONE tablet 40 mg (40 mg Oral Given 5/13/24 0851)   azithromycin (ZITHROMAX) 500 mg in dextrose 5 % (D5W) 250 mL IVPB (Vial-Mate) (0 mg Intravenous Stopped 5/13/24 2044)   cefTRIAXone (ROCEPHIN) 2 g in dextrose 5 % in water (D5W) 100 mL IVPB (MB+) (0 g Intravenous Stopped 5/13/24 1853)   fluticasone furoate-vilanteroL 100-25 mcg/dose diskus inhaler 1 puff (1 puff Inhalation Given 5/13/24 0847)   sodium chloride 0.9%  flush 5 mL (has no administration in time range)   ondansetron disintegrating tablet 8 mg (8 mg Oral Given 5/12/24 1941)   prochlorperazine injection Soln 5 mg (has no administration in time range)   polyethylene glycol packet 17 g (has no administration in time range)   bisacodyL suppository 10 mg (has no administration in time range)   simethicone chewable tablet 80 mg (has no administration in time range)   aluminum-magnesium hydroxide-simethicone 200-200-20 mg/5 mL suspension 30 mL (30 mLs Oral Given 5/13/24 2034)   acetaminophen tablet 650 mg (has no administration in time range)   acetaminophen tablet 1,000 mg (1,000 mg Oral Given 5/13/24 1207)   naloxone 0.4 mg/mL injection 0.02 mg (has no administration in time range)   glucose chewable tablet 16 g (has no administration in time range)   glucose chewable tablet 24 g (has no administration in time range)   glucagon (human recombinant) injection 1 mg (has no administration in time range)   enoxaparin injection 40 mg (40 mg Subcutaneous Given 5/13/24 1600)   dextrose 10% bolus 125 mL 125 mL (has no administration in time range)   dextrose 10% bolus 250 mL 250 mL (has no administration in time range)   nicotine 14 mg/24 hr 1 patch (1 patch Transdermal Patch Applied 5/13/24 0850)   sodium chloride 0.9% flush 3 mL (has no administration in time range)   guaiFENesin 12 hr tablet 600 mg (600 mg Oral Given 5/13/24 2031)   benzonatate capsule 100 mg (has no administration in time range)   furosemide tablet 40 mg (has no administration in time range)   albuterol sulfate nebulizer solution 10 mg (10 mg Nebulization Given 5/12/24 0725)   methylPREDNISolone sodium succinate injection 125 mg (125 mg Intravenous Given 5/12/24 0930)           ED Course as of 05/13/24 2133   Sun May 12, 2024   0708 EKG 12-lead  Independently interpreted by me:   Normal sinus rhythm.  Ventricular rate 83 beats per minute.  Normal axis.  Normal QRS duration and QT interval.  No ST segment  elevation or depression.  Poor anteroseptal R-wave progression. [LP]   0836 WBC: 6.59 [LP]   0836 Hemoglobin: 12.8 [LP]   0836 Hematocrit: 41.4 [LP]   0836 Platelet Count: 212 [LP]   0836 Troponin I: <0.006 [LP]   0836 BNP: <10 [LP]      ED Course User Index  [LP] Randolph Mendoza III, MD        Medical Decision Making                 Medical Decision Making  Differential diagnoses:  COPD exacerbation, CHF exacerbation, arrhythmia, acute coronary syndromes, pneumonia    She was tachypneic and hypoxic with mild blood pressure elevation on arrival.  Her heart rate was normal.  She had diminished breath sounds throughout all lung fields.  EKG was negative for arrhythmia.  Chest x-ray was negative for acute processes.  Venous blood gas revealed hypercarbia with a pH of 7.313.  She was started on BiPAP for hypoxia, hypercarbia, and work of breathing.  She had subjective improvement and improvement in her work of breathing and oxygenation.  Influenza and COVID-19 tests were negative.  Critical care was consulted and felt that she was appropriate for floor admission.  She was admitted to Hospital Medicine.      Amount and/or Complexity of Data Reviewed  Labs: ordered. Decision-making details documented in ED Course.  Radiology: ordered.  ECG/medicine tests:  Decision-making details documented in ED Course.    Risk  OTC drugs.  Prescription drug management.          Critical Care   Date: 05/13/2024  Performed by: Randolph Mendoza III, MD   Authorized by: Randolph Mendoza III, MD    Total critical care time (exclusive of procedural time) : 36 minutes  Critical care was necessary to treat or prevent imminent or life-threatening deterioration of the following conditions:  Acute respiratory failure with hypoxia and hypercapnia       Diagnoses       ICD-10-CM ICD-9-CM   1. Acute respiratory failure with hypoxia and hypercapnia  J96.01 518.81    J96.02    2. Chest pain  R07.9 786.50         Dispostion      ED Disposition Condition     Observation Stable             Randolph Mendoza III, MD  05/13/24 0310

## 2024-05-12 NOTE — HPI
Ms. Rochelle Espinoza is a 69 y/o F with hx of COPD, chronic respiratory failure on 3LNC at home/bipap qhs, HFpEF, tobacco use, HTN, HLD, MDD, VDD who presented to the ED for evaluation of worsening SOB and LE swelling.  Patient states over the past couple of days she has noticed worsening LE swelling to the point that she can't walk due to pain and discomfort in her feet. She takes lasix daily at home consistently with normally good UOP however she has not noticed any significant increase over the past couple of days. She also reports a worsening cough and increased sputum production. She denies any recent sick contacts though was at a social gathering a few days ago. At baseline patient reports notable JACKSON and orthopnea as well as abdominal swelling; she sleeps sitting up in a chair with bipap.     in the ED, patient afebrile, SBP 120s, HR 90s. Initially hypoxic and placed on bipap. CBC grossly unremarkable, no leukocytosis, worsening anemia, or thrombocytopenia. Troponin/BNP wnl. Initial VBG demonstrating a compensated respiratory acidosis with pH 7.38 and pCO2 82, pO2 21. repeat VBG with pH 7.31 and CO2 88. MICU consulted for acute on chronic respiratory failure requiring bipap.

## 2024-05-12 NOTE — CONSULTS
[facility] Emergency Dept  Critical Care Medicine  Consult Note    Patient Name: Rochelle Espinoza  MRN: [id number]  Admission Date: 5/12/2024  Hospital Length of Stay: 0 days  Code Status: Prior  Attending Physician: Randolph Mendoza III, MD   Primary Care Provider: Yosi Samuels Jr., MD   Principal Problem: Acute on chronic respiratory failure with hypoxia and hypercapnia    Inpatient consult to Critical Care Medicine  Consult performed by: Hernan Russo MD  Consult ordered by: Randolph Mendoza III, MD        Subjective:     HPI:  Ms. Rochelle Espinoza is a 69 y/o F with hx of COPD, chronic respiratory failure on 3LNC at home/bipap qhs, HFpEF, tobacco use, HTN, HLD, MDD, VDD who presented to the ED for evaluation of worsening SOB and LE swelling.  Patient states over the past couple of days she has noticed worsening LE swelling to the point that she can't walk due to pain and discomfort in her feet. She takes lasix daily at home consistently with normally good UOP however she has not noticed any significant increase over the past couple of days. She also reports a worsening cough and increased sputum production. She denies any recent sick contacts though was at a social gathering a few days ago. At baseline patient reports notable JACKSON and orthopnea as well as abdominal swelling; she sleeps sitting up in a chair with bipap.     in the ED, patient afebrile, SBP 120s, HR 90s. Initially hypoxic and placed on bipap. CBC grossly unremarkable, no leukocytosis, worsening anemia, or thrombocytopenia. Troponin/BNP wnl. Initial VBG demonstrating a compensated respiratory acidosis with pH 7.38 and pCO2 82, pO2 21. repeat VBG with pH 7.31 and CO2 88. MICU consulted for acute on chronic respiratory failure requiring bipap.     Hospital/ICU Course:  No notes on file    Past Medical History:   Diagnosis Date    CHF (congestive heart failure)     COPD (chronic obstructive pulmonary disease)     Herpes zoster without mention of complication  2011    Hypertension     Leg edema     Pulmonary hypertension     Sciatica        Past Surgical History:   Procedure Laterality Date    BREAST BIOPSY Right     core     SECTION      COLONOSCOPY N/A 2019    Procedure: COLONOSCOPY;  Surgeon: Rui Holder MD;  Location: [facility] ENDO (2ND FLR);  Service: Endoscopy;  Laterality: N/A;    EPIDURAL STEROID INJECTION N/A 2020    Procedure: CERVICAL BLAKE;  Surgeon: Papito High MD;  Location: [facility], [place] PAIN MGT;  Service: Pain Management;  Laterality: N/A;  NEEDS CONSENT    ESOPHAGOGASTRODUODENOSCOPY N/A 2019    Procedure: EGD (ESOPHAGOGASTRODUODENOSCOPY);  Surgeon: Rui Holder MD;  Location: [facility] ENDO (2ND FLR);  Service: Endoscopy;  Laterality: N/A;  2L continuous O2 via NC, COPD, pulm HTN    TRANSFORAMINAL EPIDURAL INJECTION OF STEROID Right 6/3/2021    Procedure: INJECTION, STEROID, EPIDURAL, TRANSFORAMINAL APPROACH, L4-L5;  Surgeon: Anibal Robles MD;  Location: [facility], [place] PAIN MGT;  Service: Pain Management;  Laterality: Right;       Review of patient's allergies indicates:   Allergen Reactions    Doxycycline Other (See Comments)     Acid reflux    Orange juice      Swelling in pt tongue      23 - pt stated she had in hosp and it didn't effect her.       Family History       Problem Relation (Age of Onset)    Heart disease Mother, Paternal Uncle          Tobacco Use    Smoking status: Former     Current packs/day: 0.25     Average packs/day: 0.3 packs/day for 40.0 years (10.0 ttl pk-yrs)     Types: Cigarettes    Smokeless tobacco: Never    Tobacco comments:     Quit over a month ago    Substance and Sexual Activity    Alcohol use: Not Currently     Comment: beer daily 2-3 daily, none today    Drug use: No    Sexual activity: Not Currently     Birth control/protection: None      Review of Systems   Constitutional:  Positive for activity change and fatigue. Negative for chills and fever.   Respiratory:  Positive for cough, shortness of breath  and wheezing.    Gastrointestinal:  Positive for abdominal distention and abdominal pain. Negative for diarrhea, nausea and vomiting.   Genitourinary:  Negative for difficulty urinating, dysuria and frequency.   Neurological:  Positive for weakness.     Objective:     Vital Signs (Most Recent):  Temp: 99.3 °F (37.4 °C) (05/12/24 0630)  Pulse: 93 (05/12/24 1404)  Resp: (!) 43 (05/12/24 1404)  BP: 132/67 (05/12/24 1404)  SpO2: (!) 94 % (05/12/24 1404) Vital Signs (24h Range):  Temp:  [99.3 °F (37.4 °C)] 99.3 °F (37.4 °C)  Pulse:  [84-99] 93  Resp:  [21-43] 43  SpO2:  [78 %-98 %] 94 %  BP: (121-159)/(61-80) 132/67   Weight: 69.4 kg (153 lb)  Body mass index is 27.1 kg/m².      Intake/Output Summary (Last 24 hours) at 5/12/2024 1445  Last data filed at 5/12/2024 1249  Gross per 24 hour   Intake --   Output 600 ml   Net -600 ml          Physical Exam  Vitals and nursing note reviewed.   Constitutional:       General: She is not in acute distress.     Appearance: She is ill-appearing. She is not toxic-appearing or diaphoretic.   HENT:      Head: Normocephalic and atraumatic.      Nose: Nose normal. No congestion.      Mouth/Throat:      Mouth: Mucous membranes are dry.      Pharynx: Oropharynx is clear.   Eyes:      Extraocular Movements: Extraocular movements intact.      Conjunctiva/sclera: Conjunctivae normal.      Pupils: Pupils are equal, round, and reactive to light.   Cardiovascular:      Rate and Rhythm: Normal rate and regular rhythm.      Pulses: Normal pulses.      Heart sounds: Normal heart sounds. No murmur heard.     No friction rub. No gallop.   Pulmonary:      Effort: No respiratory distress.      Breath sounds: Decreased air movement present. Decreased breath sounds present. No wheezing, rhonchi or rales.      Comments: Satting 95% on 3LNC  Abdominal:      General: There is distension.      Tenderness: There is no abdominal tenderness.   Musculoskeletal:         General: Normal range of motion.       Cervical back: Normal range of motion and neck supple.      Right lower leg: Edema (1+ pitting) present.      Left lower leg: Edema (1+ pitting) present.   Skin:     General: Skin is warm and dry.   Neurological:      General: No focal deficit present.      Mental Status: She is alert and oriented to person, place, and time.   Psychiatric:         Mood and Affect: Mood normal.         Behavior: Behavior normal.            Vents:  Oxygen Concentration (%): 35 (05/12/24 1152)  Lines/Drains/Airways       Drain  Duration             Female External Urinary Catheter w/ Suction 05/12/24 1120 <1 day              Peripheral Intravenous Line  Duration                  Peripheral IV - Single Lumen 05/12/24 0647 20 G Right Antecubital <1 day                  Significant Labs:    CBC/Anemia Profile:  Recent Labs   Lab 05/12/24  0653   WBC 6.59   HGB 12.8   HCT 41.4      MCV 94   RDW 14.6*        Chemistries:  Recent Labs   Lab 05/12/24  0816      K 3.8   CL 93*   CO2 38*   BUN <3*   CREATININE 0.7   CALCIUM 9.3   ALBUMIN 3.7   PROT 6.9   BILITOT 0.4   ALKPHOS 53*   ALT 13   AST 22       ABGs:   Recent Labs   Lab 05/12/24  1222   PH 7.313*   PCO2 88.6*   HCO3 45.0*   POCSATURATED 80   BE 19*       Significant Imaging: I have reviewed all pertinent imaging results/findings within the past 24 hours.    X-Ray Chest AP Portable   Final Result      No acute intrathoracic abnormality.         Electronically signed by: Kem Dooley   Date:    05/12/2024   Time:    08:11            ABG  Recent Labs   Lab 05/12/24  1503   PH 7.316*   PO2 28*   PCO2 83.7*   HCO3 42.7*   BE 17*     Assessment/Plan:     Pulmonary  * Acute on chronic respiratory failure with hypoxia and hypercapnia  67 y/o F with hx of COPD on 3LNC and Bipap qhs, HFpEF presenting with worsening LE swelling, cough and increased sputum production, and some confusion/lethargy. Patient hypoxic and hypercarbic on VBG. Placed on bipap initially given alerted  mentation and hypercapnia. On exam patient with poor air movement on lung auscultation.     Patient with Hypercapnic and Hypoxic Respiratory failure which is Acute on chronic.  she is on home oxygen at 3 LPM. Supplemental oxygen was provided and noted- Oxygen Concentration (%):  [35] 35     Signs/symptoms of respiratory failure include- tachypnea, increased work of breathing, respiratory distress, and lethargy. Contributing diagnoses includes - CHF and COPD Labs and images were reviewed. Patient Has recent ABG, which has been reviewed. Will treat underlying causes and adjust management of respiratory failure as follows-     C/f acute exacerbation of COPD as well as exacerbation of HFpEF as etiology of patient's acute on chronic respiratory failure. Patient appears mildly hypervolemic though BNP wnl. At time of MICU consultation, patient had been taken off bipap with notable improvement in mental status and subjective sxs improvement. Patient awake and alert and oriented x4. Chronically hypercapnic at baseline (pCO2 80s) with slight increase in pCO2 and mild acidemia on VBG to 7.31.     Plan:  -- patient stable for admission to the floor for continued management of respiratory failure  -- continue home LFNC and qhs bipap  -- recommend trial of IV diuresis and assess response  -- recommend prednisone 40mg PO x5 days  -- recommend CAP coverage given increased sputum production  -- prn duo-nebs   -- continue home inhalers while inpatient   -- If concern for worsening lethargy, stat VBG         Thank you for your consult. I will sign off. Please contact us if you have any additional questions.     Hernan Russo MD  Critical Care Medicine  [facility] - Emergency Dept

## 2024-05-12 NOTE — ED NOTES
Rochelle Espinoza, a 68 y.o. female presents to the ED w/ complaint of worsening SOB beginning last night worse when laying flat and on exertion. Pt also endorses cough w/ more mucus production than normal. Pt admits to smoking 1/2 pack/ day. Pt denies chest pain, abd pain, N/V/D, fevers, chills and/ or any othe c/o pain/ discomfort.    Triage note:  Chief Complaint   Patient presents with    Shortness of Breath     SOB since Thursday, reports leg swelling that began yesterday, hx of COPD and CHF, on 3L home oxygen     Review of patient's allergies indicates:   Allergen Reactions    Doxycycline Other (See Comments)     Acid reflux    Orange juice      Swelling in pt tongue      8/23/23 - pt stated she had in hosp and it didn't effect her.     Past Medical History:   Diagnosis Date    CHF (congestive heart failure)     COPD (chronic obstructive pulmonary disease)     Herpes zoster without mention of complication 2/21/2011    Hypertension     Leg edema     Pulmonary hypertension     Sciatica

## 2024-05-12 NOTE — PROVIDER PROGRESS NOTES - EMERGENCY DEPT.
Encounter Date: 5/12/2024    ED Physician Progress Notes        Physician Note:   Signed out to me.  History of CHF but here with COPD exacerbation on BiPAP with increasing pCO2.  Pending ICU consult.  BiPAP settings have been adjusted.    Update:  Patient re-evaluated, she was taken off BiPAP and feeling much better.    PCO2 consistent with chronic retainer.   Patient was evaluated by MICU who have cleared her for admission to hospital medicine.  She has been doing well off BiPAP and CO2 is trending down again.

## 2024-05-12 NOTE — ED NOTES
Received report from ALEM Mei    LOC: The patient is awake, alert and aware of environment with an appropriate affect, the patient is oriented x 3 and speaking appropriately.   APPEARANCE: Patient appears comfortable and in no acute distress, patient is clean and well groomed.  SKIN: The skin is warm and dry, color consistent with ethnicity, patient has normal skin turgor and moist mucus membranes, skin intact, no breakdown or bruising noted.   MUSCULOSKELETAL: Patient moving all extremities spontaneously, no swelling noted.  RESPIRATORY: Airway is open and patent, respirations are spontaneous, patient has a normal effort and rate, no accessory muscle. No report os SOB, currently on 3L via NC SATs 96%.  CARDIAC: Pt placed on cardiac monitor. Patient has a normal rate and regular rhythm, no edema noted, capillary refill < 3 seconds.   GASTRO: Soft and non tender to palpation, no distention noted.  : Pt denies any pain or frequency with urination.  NEURO: Pt opens eyes spontaneously, behavior appropriate to situation, follows commands, facial expression symmetrical, bilateral hand grasp equal and even, purposeful motor response noted, normal sensation in all extremities when touched with a finger.

## 2024-05-12 NOTE — ASSESSMENT & PLAN NOTE
67 y/o F with hx of COPD on 3LNC and Bipap qhs, HFpEF presenting with worsening LE swelling, cough and increased sputum production, and some confusion/lethargy. Patient hypoxic and hypercarbic on VBG. Placed on bipap initially given alerted mentation and hypercapnia. On exam patient with poor air movement on lung auscultation.     Patient with Hypercapnic and Hypoxic Respiratory failure which is Acute on chronic.  she is on home oxygen at 3 LPM. Supplemental oxygen was provided and noted- Oxygen Concentration (%):  [35] 35     Signs/symptoms of respiratory failure include- tachypnea, increased work of breathing, respiratory distress, and lethargy. Contributing diagnoses includes - CHF and COPD Labs and images were reviewed. Patient Has recent ABG, which has been reviewed. Will treat underlying causes and adjust management of respiratory failure as follows-     C/f acute exacerbation of COPD as well as exacerbation of HFpEF as etiology of patient's acute on chronic respiratory failure. Patient appears mildly hypervolemic though BNP wnl. At time of MICU consultation, patient had been taken off bipap with notable improvement in mental status and subjective sxs improvement. Patient awake and alert and oriented x4. Chronically hypercapnic at baseline (pCO2 80s) with slight increase in pCO2 and mild acidemia on VBG to 7.31.     Plan:  -- patient stable for admission to the floor for continued management of respiratory failure  -- continue home LFNC and qhs bipap  -- recommend trial of IV diuresis and assess response  -- recommend prednisone 40mg PO x5 days  -- recommend CAP coverage given increased sputum production  -- prn duo-nebs   -- continue home inhalers while inpatient   -- If concern for worsening lethargy, stat VBG

## 2024-05-13 LAB
ALBUMIN SERPL BCP-MCNC: 3.5 G/DL (ref 3.5–5.2)
ALP SERPL-CCNC: 50 U/L (ref 55–135)
ALT SERPL W/O P-5'-P-CCNC: 14 U/L (ref 10–44)
ANION GAP SERPL CALC-SCNC: 9 MMOL/L (ref 8–16)
AST SERPL-CCNC: 17 U/L (ref 10–40)
BACTERIA SPEC AEROBE CULT: NORMAL
BASOPHILS # BLD AUTO: 0.01 K/UL (ref 0–0.2)
BASOPHILS NFR BLD: 0.1 % (ref 0–1.9)
BILIRUB SERPL-MCNC: 0.3 MG/DL (ref 0.1–1)
BUN SERPL-MCNC: 7 MG/DL (ref 8–23)
CALCIUM SERPL-MCNC: 9.5 MG/DL (ref 8.7–10.5)
CHLORIDE SERPL-SCNC: 90 MMOL/L (ref 95–110)
CO2 SERPL-SCNC: 40 MMOL/L (ref 23–29)
CREAT SERPL-MCNC: 0.7 MG/DL (ref 0.5–1.4)
DIFFERENTIAL METHOD BLD: ABNORMAL
EOSINOPHIL # BLD AUTO: 0 K/UL (ref 0–0.5)
EOSINOPHIL NFR BLD: 0 % (ref 0–8)
ERYTHROCYTE [DISTWIDTH] IN BLOOD BY AUTOMATED COUNT: 14.4 % (ref 11.5–14.5)
EST. GFR  (NO RACE VARIABLE): >60 ML/MIN/1.73 M^2
GLUCOSE SERPL-MCNC: 104 MG/DL (ref 70–110)
GRAM STN SPEC: NORMAL
GRAM STN SPEC: NORMAL
HCT VFR BLD AUTO: 42.5 % (ref 37–48.5)
HGB BLD-MCNC: 12.6 G/DL (ref 12–16)
IMM GRANULOCYTES # BLD AUTO: 0.04 K/UL (ref 0–0.04)
IMM GRANULOCYTES NFR BLD AUTO: 0.3 % (ref 0–0.5)
LYMPHOCYTES # BLD AUTO: 1 K/UL (ref 1–4.8)
LYMPHOCYTES NFR BLD: 8.3 % (ref 18–48)
MAGNESIUM SERPL-MCNC: 1.7 MG/DL (ref 1.6–2.6)
MCH RBC QN AUTO: 28.3 PG (ref 27–31)
MCHC RBC AUTO-ENTMCNC: 29.6 G/DL (ref 32–36)
MCV RBC AUTO: 96 FL (ref 82–98)
MONOCYTES # BLD AUTO: 0.4 K/UL (ref 0.3–1)
MONOCYTES NFR BLD: 3.2 % (ref 4–15)
NEUTROPHILS # BLD AUTO: 10.8 K/UL (ref 1.8–7.7)
NEUTROPHILS NFR BLD: 88.1 % (ref 38–73)
NRBC BLD-RTO: 0 /100 WBC
PHOSPHATE SERPL-MCNC: 3.7 MG/DL (ref 2.7–4.5)
PLATELET # BLD AUTO: 204 K/UL (ref 150–450)
PMV BLD AUTO: 12.4 FL (ref 9.2–12.9)
POTASSIUM SERPL-SCNC: 4.1 MMOL/L (ref 3.5–5.1)
PROT SERPL-MCNC: 6.8 G/DL (ref 6–8.4)
RBC # BLD AUTO: 4.45 M/UL (ref 4–5.4)
SODIUM SERPL-SCNC: 139 MMOL/L (ref 136–145)
WBC # BLD AUTO: 12.23 K/UL (ref 3.9–12.7)

## 2024-05-13 PROCEDURE — 20600001 HC STEP DOWN PRIVATE ROOM: Mod: HCNC

## 2024-05-13 PROCEDURE — S4991 NICOTINE PATCH NONLEGEND: HCPCS | Mod: HCNC | Performed by: STUDENT IN AN ORGANIZED HEALTH CARE EDUCATION/TRAINING PROGRAM

## 2024-05-13 PROCEDURE — 84100 ASSAY OF PHOSPHORUS: CPT | Mod: HCNC | Performed by: STUDENT IN AN ORGANIZED HEALTH CARE EDUCATION/TRAINING PROGRAM

## 2024-05-13 PROCEDURE — 87205 SMEAR GRAM STAIN: CPT | Mod: HCNC | Performed by: STUDENT IN AN ORGANIZED HEALTH CARE EDUCATION/TRAINING PROGRAM

## 2024-05-13 PROCEDURE — 94640 AIRWAY INHALATION TREATMENT: CPT | Mod: HCNC

## 2024-05-13 PROCEDURE — 83735 ASSAY OF MAGNESIUM: CPT | Mod: HCNC | Performed by: STUDENT IN AN ORGANIZED HEALTH CARE EDUCATION/TRAINING PROGRAM

## 2024-05-13 PROCEDURE — 25000003 PHARM REV CODE 250: Mod: HCNC | Performed by: STUDENT IN AN ORGANIZED HEALTH CARE EDUCATION/TRAINING PROGRAM

## 2024-05-13 PROCEDURE — 94640 AIRWAY INHALATION TREATMENT: CPT | Mod: HCNC,XB

## 2024-05-13 PROCEDURE — 25000242 PHARM REV CODE 250 ALT 637 W/ HCPCS: Mod: HCNC | Performed by: STUDENT IN AN ORGANIZED HEALTH CARE EDUCATION/TRAINING PROGRAM

## 2024-05-13 PROCEDURE — 27000221 HC OXYGEN, UP TO 24 HOURS: Mod: HCNC

## 2024-05-13 PROCEDURE — 63600175 PHARM REV CODE 636 W HCPCS: Mod: HCNC | Performed by: STUDENT IN AN ORGANIZED HEALTH CARE EDUCATION/TRAINING PROGRAM

## 2024-05-13 PROCEDURE — 80053 COMPREHEN METABOLIC PANEL: CPT | Mod: HCNC | Performed by: STUDENT IN AN ORGANIZED HEALTH CARE EDUCATION/TRAINING PROGRAM

## 2024-05-13 PROCEDURE — 94660 CPAP INITIATION&MGMT: CPT | Mod: HCNC

## 2024-05-13 PROCEDURE — 96376 TX/PRO/DX INJ SAME DRUG ADON: CPT

## 2024-05-13 PROCEDURE — 99900035 HC TECH TIME PER 15 MIN (STAT): Mod: HCNC

## 2024-05-13 PROCEDURE — 85025 COMPLETE CBC W/AUTO DIFF WBC: CPT | Mod: HCNC | Performed by: STUDENT IN AN ORGANIZED HEALTH CARE EDUCATION/TRAINING PROGRAM

## 2024-05-13 PROCEDURE — 36415 COLL VENOUS BLD VENIPUNCTURE: CPT | Mod: HCNC | Performed by: STUDENT IN AN ORGANIZED HEALTH CARE EDUCATION/TRAINING PROGRAM

## 2024-05-13 PROCEDURE — 94761 N-INVAS EAR/PLS OXIMETRY MLT: CPT | Mod: HCNC

## 2024-05-13 RX ORDER — FUROSEMIDE 40 MG/1
40 TABLET ORAL DAILY
Status: DISCONTINUED | OUTPATIENT
Start: 2024-05-13 | End: 2024-05-13

## 2024-05-13 RX ORDER — FUROSEMIDE 40 MG/1
40 TABLET ORAL DAILY
Status: DISCONTINUED | OUTPATIENT
Start: 2024-05-14 | End: 2024-05-15 | Stop reason: HOSPADM

## 2024-05-13 RX ADMIN — ENOXAPARIN SODIUM 40 MG: 40 INJECTION SUBCUTANEOUS at 04:05

## 2024-05-13 RX ADMIN — PANTOPRAZOLE SODIUM 40 MG: 40 TABLET, DELAYED RELEASE ORAL at 08:05

## 2024-05-13 RX ADMIN — BUPROPION HYDROCHLORIDE 150 MG: 75 TABLET, FILM COATED ORAL at 08:05

## 2024-05-13 RX ADMIN — NICOTINE 1 PATCH: 14 PATCH, EXTENDED RELEASE TRANSDERMAL at 08:05

## 2024-05-13 RX ADMIN — GUAIFENESIN 600 MG: 600 TABLET, EXTENDED RELEASE ORAL at 08:05

## 2024-05-13 RX ADMIN — GABAPENTIN 300 MG: 300 CAPSULE ORAL at 08:05

## 2024-05-13 RX ADMIN — IPRATROPIUM BROMIDE AND ALBUTEROL SULFATE 3 ML: 2.5; .5 SOLUTION RESPIRATORY (INHALATION) at 12:05

## 2024-05-13 RX ADMIN — CETIRIZINE HYDROCHLORIDE 10 MG: 10 TABLET, FILM COATED ORAL at 08:05

## 2024-05-13 RX ADMIN — OXYCODONE HYDROCHLORIDE AND ACETAMINOPHEN 500 MG: 500 TABLET ORAL at 08:05

## 2024-05-13 RX ADMIN — FUROSEMIDE 40 MG: 10 INJECTION, SOLUTION INTRAVENOUS at 08:05

## 2024-05-13 RX ADMIN — GABAPENTIN 300 MG: 300 CAPSULE ORAL at 04:05

## 2024-05-13 RX ADMIN — IPRATROPIUM BROMIDE AND ALBUTEROL SULFATE 3 ML: 2.5; .5 SOLUTION RESPIRATORY (INHALATION) at 03:05

## 2024-05-13 RX ADMIN — FLUTICASONE FUROATE AND VILANTEROL TRIFENATATE 1 PUFF: 100; 25 POWDER RESPIRATORY (INHALATION) at 08:05

## 2024-05-13 RX ADMIN — CEFTRIAXONE 2 G: 2 INJECTION, POWDER, FOR SOLUTION INTRAMUSCULAR; INTRAVENOUS at 06:05

## 2024-05-13 RX ADMIN — ATORVASTATIN CALCIUM 10 MG: 10 TABLET, FILM COATED ORAL at 08:05

## 2024-05-13 RX ADMIN — ALUMINUM HYDROXIDE, MAGNESIUM HYDROXIDE, AND SIMETHICONE 30 ML: 1200; 120; 1200 SUSPENSION ORAL at 08:05

## 2024-05-13 RX ADMIN — FLUTICASONE PROPIONATE 50 MCG: 50 SPRAY, METERED NASAL at 08:05

## 2024-05-13 RX ADMIN — IPRATROPIUM BROMIDE AND ALBUTEROL SULFATE 3 ML: 2.5; .5 SOLUTION RESPIRATORY (INHALATION) at 08:05

## 2024-05-13 RX ADMIN — ACETAMINOPHEN 1000 MG: 500 TABLET ORAL at 12:05

## 2024-05-13 RX ADMIN — AZITHROMYCIN MONOHYDRATE 500 MG: 500 INJECTION, POWDER, LYOPHILIZED, FOR SOLUTION INTRAVENOUS at 07:05

## 2024-05-13 RX ADMIN — PREDNISONE 40 MG: 20 TABLET ORAL at 08:05

## 2024-05-13 RX ADMIN — CHOLECALCIFEROL TAB 25 MCG (1000 UNIT) 1000 UNITS: 25 TAB at 08:05

## 2024-05-13 NOTE — PROGRESS NOTES
[facility] ([place] [id number])  [facility] Medicine  Progress Note    Patient Name: Rochelle Espinoza  MRN: [id number]  Patient Class: OP- Observation   Admission Date: 5/12/2024  Length of Stay: 0 days  Attending Physician: Mayra Chahal MD  Primary Care Provider: Yosi Samuels Jr., MD        Subjective:     Principal Problem:Acute on chronic respiratory failure with hypoxia and hypercapnia        HPI:  Ms. Rochelle Espinoza is a 69 y/o F with hx of COPD, chronic respiratory failure on 3LNC at home/bipap qhs, HFpEF, tobacco use, HTN, HLD, MDD, VDD who presented to the ED for evaluation of worsening SOB and LE swelling.  Patient states over the past couple of days she has noticed worsening LE swelling to the point that she can't walk due to pain and discomfort in her feet. She takes lasix daily at home consistently with normally good UOP however she has not noticed any significant increase over the past couple of days. She also reports a worsening cough and increased sputum production. Denies fever. She denies any recent sick contacts though was at a social gathering a few days ago. At baseline patient reports notable JACKSON and orthopnea as well as abdominal swelling; she sleeps sitting up in a chair with bipap.      in the ED, patient afebrile, SBP 120s, HR 90s. Initially hypoxic and placed on bipap. CBC grossly unremarkable, no leukocytosis, worsening anemia, or thrombocytopenia. Troponin/BNP wnl. Initial VBG demonstrating a compensated respiratory acidosis with pH 7.38 and pCO2 82, pO2 21. repeat VBG with pH 7.31 and CO2 88. MICU consulted for acute on chronic respiratory failure requiring bipap and evaluated patient- deemed stable for floor.     Also reports fatigue and hallucination- saws she saw a miller on the way to the ED.   Denies recent abx or chronic steroid use  Weaned off of bipap and back on 3 L NC with stable sats >88, but drops to 84% when speaking           Overview/Hospital Course:  Admitted for  hypoxic respiratory failure 2/2 COPDe. Initially on Bipap in ED, weaned to 3L NC (home O2) with bipap qhs. Started on steroids, IV abx, scheduled breathing treatments. Resp culture in process.   Mild swelling in ankles, BNP <10. IV lasix started, UOP >2L, swelling improved and switched back to PO lasix.     Interval History: Stable on 3L NC. Reports breathing improving. IV lasix- UOP >2L, swelling resolved  Resp culture collected    Review of Systems   Constitutional:  Negative for fever.   Respiratory:  Positive for cough and shortness of breath.    Cardiovascular:  Negative for leg swelling.   Psychiatric/Behavioral:  Negative for hallucinations.      Objective:     Vital Signs (Most Recent):  Temp: 98.1 °F (36.7 °C) (05/13/24 1231)  Pulse: 84 (05/13/24 1231)  Resp: 17 (05/13/24 1231)  BP: 96/66 (05/13/24 1121)  SpO2: 98 % (05/13/24 1231) Vital Signs (24h Range):  Temp:  [98 °F (36.7 °C)-98.7 °F (37.1 °C)] 98.1 °F (36.7 °C)  Pulse:  [] 84  Resp:  [15-43] 17  SpO2:  [85 %-98 %] 98 %  BP: ()/(55-82) 96/66     Weight: 69.4 kg (153 lb)  Body mass index is 27.1 kg/m².    Intake/Output Summary (Last 24 hours) at 5/13/2024 1342  Last data filed at 5/13/2024 0934  Gross per 24 hour   Intake --   Output 2000 ml   Net -2000 ml      Physical Exam  Vitals and nursing note reviewed.   Constitutional:       General: She is not in acute distress.     Appearance: Normal appearance.   HENT:      Head: Normocephalic and atraumatic.      Nose: Nose normal.      Comments: NC in place     Mouth/Throat:      Mouth: Mucous membranes are moist.      Pharynx: Oropharynx is clear.   Eyes:      Extraocular Movements: Extraocular movements intact.      Conjunctiva/sclera: Conjunctivae normal.      Pupils: Pupils are equal, round, and reactive to light.   Cardiovascular:      Rate and Rhythm: Normal rate and regular rhythm.      Pulses: Normal pulses.      Heart sounds: Normal heart sounds.   Pulmonary:      Effort: Pulmonary  effort is normal. No respiratory distress.      Breath sounds: Wheezing and rhonchi present.      Comments: expiratory wheezing and rhonchii heard. Speaking in full sentences, air movement improving   Abdominal:      General: Abdomen is flat. Bowel sounds are normal. There is no distension.      Palpations: Abdomen is soft.      Tenderness: There is no abdominal tenderness.   Musculoskeletal:         General: Normal range of motion.      Cervical back: Normal range of motion and neck supple.      Right lower leg: No edema.      Left lower leg: No edema.      Comments: Edema resolved   Skin:     General: Skin is warm and dry.   Neurological:      General: No focal deficit present.      Mental Status: She is alert and oriented to person, place, and time.   Psychiatric:         Mood and Affect: Mood normal.         Behavior: Behavior normal.           Significant Labs:  CBC:  Recent Labs   Lab 05/12/24  0653 05/13/24  0527   WBC 6.59 12.23   HGB 12.8 12.6   HCT 41.4 42.5    204     CMP:  Recent Labs   Lab 05/12/24  0816 05/13/24  0527    139   K 3.8 4.1   CL 93* 90*   CO2 38* 40*   GLU 92 104   BUN <3* 7*   CREATININE 0.7 0.7   CALCIUM 9.3 9.5   PROT 6.9 6.8   ALBUMIN 3.7 3.5   BILITOT 0.4 0.3   ALKPHOS 53* 50*   AST 22 17   ALT 13 14   ANIONGAP 9 9       Assessment/Plan:      * Acute on chronic respiratory failure with hypoxia and hypercapnia  Patient with Hypercapnic and Hypoxic Respiratory failure which is Acute on chronic.  she is on home oxygen at 3 LPM. Supplemental oxygen was provided and noted- Oxygen Concentration (%):  [28-32] 28    .   Signs/symptoms of respiratory failure include- tachypnea, increased work of breathing, wheezing, and shortness of breath . Contributing diagnoses includes - CHF and COPD Labs and images were reviewed. Patient Has recent ABG, which has been reviewed. CXR without acute process. Will treat underlying causes and adjust management of respiratory failure as follows-      - known history of diastolic HF- Grade II diastolic dysfunction. BNP <10.   - minimal edema on exam at ankles- switch to IV lasix and monitor UOP- UOP >2L and edema resolved, transition to PO lasix  - known history of COPD on chronic 3L NC and bipap qhs- cont supplemental O2, goal sats >88%, cont bipap qhs  - treatment of COPDe with steroids, abx, breathing treatments  - home inhaler trelegy- breo while admitted and scheduled duoneb  - cont bipap qhs  - resp culture in process- cont IV abx pending cultures    COPD exacerbation  Patient's COPD is with exacerbation noted by worsening of baseline hypoxia currently, increased sputum and cough.  Patient is currently on COPD Pathway. Continue scheduled inhalers Steroids, Antibiotics, and Supplemental oxygen and monitor respiratory status closely.   - cont supplemental O2, goal sats >88%  - resp culture in process- cont IV abx pending culture  - scheduled duonebs and breo  - cont CTX/azithro no hx of pseudomonas but will follow resp culture  - COVID /Flu/ RSV negative  - CXR no acute process  - current smoker- cessation and nicotine patch   - mucinex and tesselon     Chronic diastolic heart failure  - last ECHO with Grade II diastolic dysfunction   - BNP admit <10  - mild pitting edema at ankles- resolved  - start IV lasix and monitor UOP- transition back to PO lasix        Major depressive disorder  Patient has persistent depression which is mild and is currently controlled. Will Continue anti-depressant medications. We will not consult psychiatry at this time. Patient does not display psychosis at this time. Continue to monitor closely and adjust plan of care as needed.  - reported hallucination on the way to hospital- no signs of psychosis on exam/interview, monitor  - no further hallucinations reported        Vitamin D deficiency  - cont supplementation       Peripheral polyneuropathy  - cont gabapentin      Nicotine dependence, cigarettes, uncomplicated   Dangers  of cigarette smoking were reviewed with patient in detail. Patient was  refer to smoking cessation at discharge  Nicotine replacement options were discussed. Nicotine replacement was discussed- prescribed    Mixed hyperlipidemia  - cont statin      Essential hypertension  Chronic, controlled. Latest blood pressure and vitals reviewed-     Temp:  [98 °F (36.7 °C)-98.7 °F (37.1 °C)]   Pulse:  []   Resp:  [15-43]   BP: ()/(55-82)   SpO2:  [85 %-98 %] .   Home meds for hypertension were reviewed and noted below.   Hypertension Medications               furosemide (LASIX) 20 MG tablet Take 1-2 po q day    losartan (COZAAR) 100 MG tablet Take 1 tablet (100 mg total) by mouth once daily.            While in the hospital, will manage blood pressure as follows; Adjust home antihypertensive regimen as follows- switch to IV lasix- transition back to PO    Will utilize p.r.n. blood pressure medication only if patient's blood pressure greater than 180/110 and she develops symptoms such as worsening chest pain or shortness of breath.      VTE Risk Mitigation (From admission, onward)           Ordered     enoxaparin injection 40 mg  Daily         05/12/24 1809     IP VTE HIGH RISK PATIENT  Once         05/12/24 1809     Place sequential compression device  Until discontinued         05/12/24 1809                    Discharge Planning   ELIZABETH: 5/14/2024     Code Status: Full Code   Is the patient medically ready for discharge?:     Reason for patient still in hospital (select all that apply): Patient trending condition  Discharge Plan A: Home with family   Discharge Delays: None known at this time              Mayra Chahal MD  Department of Hospital Medicine   [facility] - [facility] ([place])

## 2024-05-13 NOTE — NURSING
Nurses Note -- 4 Eyes      5/13/2024   3:30 AM      Skin assessed during: Admit      [x] No Altered Skin Integrity Present    [x]Prevention Measures Documented      [] Yes- Altered Skin Integrity Present or Discovered   [] LDA Added if Not in Epic (Describe Wound)   [] New Altered Skin Integrity was Present on Admit and Documented in LDA   [] Wound Image Taken    Wound Care Consulted? No    Attending Nurse:  Shaneka Robles RN/Staff Member:   Anjana

## 2024-05-13 NOTE — ASSESSMENT & PLAN NOTE
Chronic, controlled. Latest blood pressure and vitals reviewed-     Temp:  [98 °F (36.7 °C)-98.7 °F (37.1 °C)]   Pulse:  []   Resp:  [15-43]   BP: ()/(55-82)   SpO2:  [85 %-98 %] .   Home meds for hypertension were reviewed and noted below.   Hypertension Medications               furosemide (LASIX) 20 MG tablet Take 1-2 po q day    losartan (COZAAR) 100 MG tablet Take 1 tablet (100 mg total) by mouth once daily.            While in the hospital, will manage blood pressure as follows; Adjust home antihypertensive regimen as follows- switch to IV lasix- transition back to PO    Will utilize p.r.n. blood pressure medication only if patient's blood pressure greater than 180/110 and she develops symptoms such as worsening chest pain or shortness of breath.

## 2024-05-13 NOTE — SUBJECTIVE & OBJECTIVE
Past Medical History:   Diagnosis Date    CHF (congestive heart failure)     COPD (chronic obstructive pulmonary disease)     Herpes zoster without mention of complication 2011    Hypertension     Leg edema     Pulmonary hypertension     Sciatica        Past Surgical History:   Procedure Laterality Date    BREAST BIOPSY Right     core     SECTION      COLONOSCOPY N/A 2019    Procedure: COLONOSCOPY;  Surgeon: Rui Holder MD;  Location: [facility] (2ND FLR);  Service: Endoscopy;  Laterality: N/A;    EPIDURAL STEROID INJECTION N/A 2020    Procedure: CERVICAL BLAKE;  Surgeon: Papito High MD;  Location: [facility] PAIN [facility];  Service: Pain Management;  Laterality: N/A;  NEEDS CONSENT    ESOPHAGOGASTRODUODENOSCOPY N/A 2019    Procedure: EGD (ESOPHAGOGASTRODUODENOSCOPY);  Surgeon: Rui Holder MD;  Location: [facility] (2ND FLR);  Service: Endoscopy;  Laterality: N/A;  2L continuous O2 via NC, COPD, pulm HTN    TRANSFORAMINAL EPIDURAL INJECTION OF STEROID Right 6/3/2021    Procedure: INJECTION, STEROID, EPIDURAL, TRANSFORAMINAL APPROACH, L4-L5;  Surgeon: Anibal Robles MD;  Location: [facility];  Service: Pain Management;  Laterality: Right;       Review of patient's allergies indicates:   Allergen Reactions    Doxycycline Other (See Comments)     Acid reflux    Orange juice      Swelling in pt tongue      23 - pt stated she had in hosp and it didn't effect her.       No current facility-administered medications on file prior to encounter.     Current Outpatient Medications on File Prior to Encounter   Medication Sig    albuterol (PROVENTIL/VENTOLIN HFA) 90 mcg/actuation inhaler Inhale 2 puffs into the lungs every 4 (four) hours as needed for Wheezing or Shortness of Breath. Rescue    albuterol-ipratropium (DUO-NEB) 2.5 mg-0.5 mg/3 mL nebulizer solution Take 3 mLs by nebulization every 4 (four) hours as needed for Wheezing. Rescue    ascorbic acid, vitamin C, (VITAMIN C) 500 MG tablet  Take 500 mg by mouth once daily.    atorvastatin (LIPITOR) 10 MG tablet Take 1 tablet (10 mg total) by mouth once daily.    buPROPion (WELLBUTRIN SR) 150 MG TBSR 12 hr tablet Take 1 tablet (150 mg total) by mouth 2 (two) times daily.    cetirizine (ZYRTEC) 10 MG tablet Take 1 tablet (10 mg total) by mouth once daily.    cholecalciferol, vitamin D3, (VITAMIN D3) 25 mcg (1,000 unit) capsule Take 1,000 Units by mouth once daily.    diclofenac (VOLTAREN) 50 MG EC tablet Take 1 tablet (50 mg total) by mouth 2 (two) times daily as needed (arthritis pain).    fluticasone propionate (FLONASE) 50 mcg/actuation nasal spray SHAKE LIQUID AND USE 1 SPRAY (50MCG) IN EACH NOSTRIL TWICE DAILY    fluticasone-umeclidin-vilanter (TRELEGY ELLIPTA) 100-62.5-25 mcg DsDv Take 1 puff by mouth once daily.    fluticasone-umeclidin-vilanter (TRELEGY ELLIPTA) 200-62.5-25 mcg inhaler Inhale 1 puff into the lungs once daily.    furosemide (LASIX) 20 MG tablet Take 1-2 po q day    gabapentin (NEURONTIN) 300 MG capsule TAKE 1 CAPSULE THREE TIMES DAILY    glycerin adult suppository Place 1 suppository rectally as needed for Constipation.    losartan (COZAAR) 100 MG tablet Take 1 tablet (100 mg total) by mouth once daily.    magnesium 250 mg Tab Take 250 mg by mouth once.    pantoprazole (PROTONIX) 40 MG tablet Take 1 tablet (40 mg total) by mouth once daily.     Family History       Problem Relation (Age of Onset)    Heart disease Mother, Paternal Uncle          Tobacco Use    Smoking status: Former     Current packs/day: 0.25     Average packs/day: 0.3 packs/day for 40.0 years (10.0 ttl pk-yrs)     Types: Cigarettes    Smokeless tobacco: Never    Tobacco comments:     Quit over a month ago    Substance and Sexual Activity    Alcohol use: Not Currently     Comment: beer daily 2-3 daily, none today    Drug use: No    Sexual activity: Not Currently     Birth control/protection: None     Review of Systems   Constitutional:  Positive for fatigue.  Negative for fever.   HENT:  Negative for trouble swallowing.    Eyes:  Negative for visual disturbance.   Respiratory:  Positive for cough and shortness of breath.    Cardiovascular:  Positive for leg swelling. Negative for chest pain.   Gastrointestinal:  Positive for abdominal distention. Negative for abdominal pain, diarrhea and vomiting.   Genitourinary:  Negative for decreased urine volume and dysuria.   Musculoskeletal:  Negative for neck stiffness.   Skin:  Negative for rash.   Neurological:  Positive for light-headedness.   Psychiatric/Behavioral:  Positive for hallucinations.      Objective:     Vital Signs (Most Recent):  Temp: 99.3 °F (37.4 °C) (05/12/24 0630)  Pulse: 94 (05/12/24 1847)  Resp: (!) 25 (05/12/24 1700)  BP: (!) 143/79 (05/12/24 1847)  SpO2: 95 % (05/12/24 1813) Vital Signs (24h Range):  Temp:  [99.3 °F (37.4 °C)] 99.3 °F (37.4 °C)  Pulse:  [84-99] 94  Resp:  [15-43] 25  SpO2:  [78 %-98 %] 95 %  BP: (121-159)/(61-82) 143/79     Weight: 69.4 kg (153 lb)  Body mass index is 27.1 kg/m².     Physical Exam  Vitals and nursing note reviewed.   Constitutional:       General: She is not in acute distress.     Appearance: Normal appearance.      Comments: Up in bed eating    HENT:      Head: Normocephalic and atraumatic.      Nose: Nose normal.      Comments: NC in place     Mouth/Throat:      Mouth: Mucous membranes are moist.      Pharynx: Oropharynx is clear.   Eyes:      Extraocular Movements: Extraocular movements intact.      Conjunctiva/sclera: Conjunctivae normal.      Pupils: Pupils are equal, round, and reactive to light.   Cardiovascular:      Rate and Rhythm: Normal rate and regular rhythm.      Pulses: Normal pulses.      Heart sounds: Normal heart sounds.   Pulmonary:      Effort: Pulmonary effort is normal. No respiratory distress.      Breath sounds: Wheezing present.      Comments: Decreased air movement and expiratory wheezing heard. Speaking in full sentences, drops to 84% on 3L  when speaking  Abdominal:      General: Abdomen is flat. Bowel sounds are normal. There is distension.      Palpations: Abdomen is soft.      Tenderness: There is no abdominal tenderness.   Musculoskeletal:         General: Normal range of motion.      Cervical back: Normal range of motion and neck supple.      Right lower leg: Edema present.      Left lower leg: Edema present.      Comments: Minimal pitting edema at ankles   Skin:     General: Skin is warm and dry.   Neurological:      General: No focal deficit present.      Mental Status: She is alert and oriented to person, place, and time.   Psychiatric:         Mood and Affect: Mood normal.         Behavior: Behavior normal.              CRANIAL NERVES     CN III, IV, VI   Pupils are equal, round, and reactive to light.       Significant Labs: All pertinent labs within the past 24 hours have been reviewed.  CBC:   Recent Labs   Lab 05/12/24  0653   WBC 6.59   HGB 12.8   HCT 41.4        CMP:   Recent Labs   Lab 05/12/24  0816      K 3.8   CL 93*   CO2 38*   GLU 92   BUN <3*   CREATININE 0.7   CALCIUM 9.3   PROT 6.9   ALBUMIN 3.7   BILITOT 0.4   ALKPHOS 53*   AST 22   ALT 13   ANIONGAP 9     Cardiac Markers:   Recent Labs   Lab 05/12/24  0653   BNP <10       Significant Imaging: I have reviewed all pertinent imaging results/findings within the past 24 hours.  CXR without consolidation

## 2024-05-13 NOTE — ASSESSMENT & PLAN NOTE
Patient's COPD is with exacerbation noted by worsening of baseline hypoxia currently, increased sputum and cough.  Patient is currently on COPD Pathway. Continue scheduled inhalers Steroids, Antibiotics, and Supplemental oxygen and monitor respiratory status closely.   - cont supplemental O2, goal sats >88%  - resp culture in process- cont IV abx pending culture  - scheduled duonebs and breo  - cont CTX/azithro no hx of pseudomonas but will follow resp culture  - COVID /Flu/ RSV negative  - CXR no acute process  - current smoker- cessation and nicotine patch   - mucinex and viviana

## 2024-05-13 NOTE — HOSPITAL COURSE
Admitted for hypoxic respiratory failure 2/2 COPDe. Initially on Bipap in ED, weaned to 3L NC (home O2) with bipap qhs. Started on steroids, IV abx, scheduled breathing treatments. Resp culture too many epi cells.  Abx switched to PO levaquin.   Mild swelling in ankles, BNP <10. IV lasix started, swelling improved and switched back to PO lasix.   Lightheadedness with standing orthostatic vitals negative.   Breathing improved and back on home O2, stable for discharge with pulm followup. Complete steroid and abx course at discharge. Cont breathing treatments PRN and daily inhaler for COPD.  ordered for PT.OT  and respiratory.

## 2024-05-13 NOTE — ASSESSMENT & PLAN NOTE
Dangers of cigarette smoking were reviewed with patient in detail. Patient was  refer to smoking cessation at discharge  Nicotine replacement options were discussed. Nicotine replacement was discussed- prescribed

## 2024-05-13 NOTE — ASSESSMENT & PLAN NOTE
Patient's COPD is with exacerbation noted by worsening of baseline hypoxia currently, increased sputum and cough.  Patient is currently on COPD Pathway. Continue scheduled inhalers Steroids, Antibiotics, and Supplemental oxygen and monitor respiratory status closely.   - cont supplemental O2, goal sats >88%  - resp culture ordered  - scheduled duonebs and breo  - cont CTX/azithro no hx of pseudomonas but will follow resp culture  - COVID /Flu/ RSV negative  - CXR no acute process  - current smoker- cessation and nicotine patch   - mucinex and viviana

## 2024-05-13 NOTE — HPI
Ms. Rochelle Espinoza is a 67 y/o F with hx of COPD, chronic respiratory failure on 3LNC at home/bipap qhs, HFpEF, tobacco use, HTN, HLD, MDD, VDD who presented to the ED for evaluation of worsening SOB and LE swelling.  Patient states over the past couple of days she has noticed worsening LE swelling to the point that she can't walk due to pain and discomfort in her feet. She takes lasix daily at home consistently with normally good UOP however she has not noticed any significant increase over the past couple of days. She also reports a worsening cough and increased sputum production. Denies fever. She denies any recent sick contacts though was at a social gathering a few days ago. At baseline patient reports notable JACKSON and orthopnea as well as abdominal swelling; she sleeps sitting up in a chair with bipap.      in the ED, patient afebrile, SBP 120s, HR 90s. Initially hypoxic and placed on bipap. CBC grossly unremarkable, no leukocytosis, worsening anemia, or thrombocytopenia. Troponin/BNP wnl. Initial VBG demonstrating a compensated respiratory acidosis with pH 7.38 and pCO2 82, pO2 21. repeat VBG with pH 7.31 and CO2 88. MICU consulted for acute on chronic respiratory failure requiring bipap and evaluated patient- deemed stable for floor.     Also reports fatigue and hallucination- saws she saw a miller on the way to the ED.   Denies recent abx or chronic steroid use  Weaned off of bipap and back on 3 L NC with stable sats >88, but drops to 84% when speaking

## 2024-05-13 NOTE — PLAN OF CARE
Problem: Adult Inpatient Plan of Care  Goal: Plan of Care Review  Reactivated  Goal: Patient-Specific Goal (Individualized)  Reactivated  Goal: Absence of Hospital-Acquired Illness or Injury  Reactivated  Goal: Optimal Comfort and Wellbeing  Reactivated  Goal: Readiness for Transition of Care  Reactivated     Problem: Infection  Goal: Absence of Infection Signs and Symptoms  Reactivated     Problem: COPD (Chronic Obstructive Pulmonary Disease)  Goal: Optimal Chronic Illness Coping  Reactivated  Goal: Optimal Level of Functional [place]  Reactivated  Goal: Absence of Infection Signs and Symptoms  Reactivated  Goal: Improved Oral Intake  Reactivated  Goal: Effective Oxygenation and Ventilation  Reactivated   Rested through the night on BIPAP , francisco well, no injuries this shift, will continue to monitor.

## 2024-05-13 NOTE — ASSESSMENT & PLAN NOTE
Patient has persistent depression which is mild and is currently controlled. Will Continue anti-depressant medications. We will not consult psychiatry at this time. Patient does not display psychosis at this time. Continue to monitor closely and adjust plan of care as needed.  - reported hallucination on the way to hospital- no signs of psychosis on exam/interview, monitor  - no further hallucinations reported

## 2024-05-13 NOTE — PLAN OF CARE
Inpatient Upgrade Note    Rochelle Espinoza has warranted treatment spanning two or more midnights of hospital level care for the management of heart failure, COPD exacerbation, and Acute on chronic respiratory failure with hypoxia and hypercapnia  She continues to require IV antibiotics, daily labs, supplemental oxygen, and medication adjustments. Her condition is also complicated by the following comorbidities: COPD, chronic respiratory failure on 3LNC at home/bipap qhs, HFpEF, tobacco use, HTN, HLD, MDD, VDD .

## 2024-05-13 NOTE — ASSESSMENT & PLAN NOTE
Chronic, controlled. Latest blood pressure and vitals reviewed-     Temp:  [99.3 °F (37.4 °C)]   Pulse:  [84-99]   Resp:  [15-43]   BP: (121-159)/(61-82)   SpO2:  [78 %-98 %] .   Home meds for hypertension were reviewed and noted below.   Hypertension Medications               furosemide (LASIX) 20 MG tablet Take 1-2 po q day    losartan (COZAAR) 100 MG tablet Take 1 tablet (100 mg total) by mouth once daily.            While in the hospital, will manage blood pressure as follows; Adjust home antihypertensive regimen as follows- switch to IV lasix    Will utilize p.r.n. blood pressure medication only if patient's blood pressure greater than 180/110 and she develops symptoms such as worsening chest pain or shortness of breath.

## 2024-05-13 NOTE — ASSESSMENT & PLAN NOTE
Patient has persistent depression which is mild and is currently controlled. Will Continue anti-depressant medications. We will not consult psychiatry at this time. Patient does not display psychosis at this time. Continue to monitor closely and adjust plan of care as needed.  - reported hallucination on the way to hospital- no signs of psychosis on exam/interview, monitor

## 2024-05-13 NOTE — ASSESSMENT & PLAN NOTE
Patient with Hypercapnic and Hypoxic Respiratory failure which is Acute on chronic.  she is on home oxygen at 3 LPM. Supplemental oxygen was provided and noted- Oxygen Concentration (%):  [28-32] 28    .   Signs/symptoms of respiratory failure include- tachypnea, increased work of breathing, wheezing, and shortness of breath . Contributing diagnoses includes - CHF and COPD Labs and images were reviewed. Patient Has recent ABG, which has been reviewed. CXR without acute process. Will treat underlying causes and adjust management of respiratory failure as follows-     - known history of diastolic HF- Grade II diastolic dysfunction. BNP <10.   - minimal edema on exam at ankles- switch to IV lasix and monitor UOP- UOP >2L and edema resolved, transition to PO lasix  - known history of COPD on chronic 3L NC and bipap qhs- cont supplemental O2, goal sats >88%, cont bipap qhs  - treatment of COPDe with steroids, abx, breathing treatments  - home inhaler trelegy- breo while admitted and scheduled duoneb  - cont bipap qhs  - resp culture in process- cont IV abx pending cultures

## 2024-05-13 NOTE — ASSESSMENT & PLAN NOTE
- last ECHO with Grade II diastolic dysfunction   - BNP admit <10  - mild pitting edema at ankles- resolved  - start IV lasix and monitor UOP- transition back to PO lasix

## 2024-05-13 NOTE — PLAN OF CARE
[facility] - Emergency Dept  Discharge Assessment    Primary Care Provider: Yosi Samuels Jr., MD     Discharge Assessment (most recent)       BRIEF DISCHARGE ASSESSMENT - 05/12/24 2012          Discharge Planning    Assessment Type Discharge Planning Assessment (P)      Resource/Environmental Concerns none (P)      Support Systems Children (P)      Equipment Currently Used at Home BIPAP;cane, straight;oxygen (P)      Current Living Arrangements home (P)      Patient/Family Anticipates Transition to home (P)      Patient/Family Anticipated Services at Transition none (P)      DME Needed Upon Discharge  none (P)      Discharge Plan A Home with family (P)      Discharge Plan B Home with family;Home Health (P)         Physical Activity    On average, how many days per week do you engage in moderate to strenuous exercise (like a brisk walk)? 0 days (P)      On average, how many minutes do you engage in exercise at this level? 0 min (P)         Financial Resource Strain    How hard is it for you to pay for the very basics like food, housing, medical care, and heating? Somewhat hard (P)         Housing Stability    In the last 12 months, was there a time when you were not able to pay the mortgage or rent on time? No (P)      At any time in the past 12 months, were you homeless or living in a shelter (including now)? No (P)         Transportation Needs    Has the lack of transportation kept you from medical appointments, meetings, work or from getting things needed for daily living? No (P)         Food Insecurity    Within the past 12 months, you worried that your food would run out before you got the money to buy more. Patient declined (P)      Within the past 12 months, the food you bought just didn't last and you didn't have money to get more. Patient declined (P)         Stress    Do you feel stress - tense, restless, nervous, or anxious, or unable to sleep at night because your mind is troubled all the time - these  days? To some extent (P)         Social Isolation    How often do you feel lonely or isolated from those around you?  Never (P)         Alcohol Use    Q1: How often do you have a drink containing alcohol? Never (P)      Q2: How many drinks containing alcohol do you have on a typical day when you are drinking? Patient does not drink (P)      Q3: How often do you have six or more drinks on one occasion? Never (P)         Utilities    In the past 12 months has the electric, gas, oil, or water company threatened to shut off services in your home? No (P)         Health Literacy    How often do you need to have someone help you when you read instructions, pamphlets, or other written material from your doctor or pharmacy? Never (P)                      Brandon Sheth LMSW  Case Management  Emergency Department  [phone]

## 2024-05-13 NOTE — ASSESSMENT & PLAN NOTE
- last ECHO with Grade II diastolic dysfunction   - BNP admit <10  - mild pitting edema at ankles  - start IV lasix and monitor UOP

## 2024-05-13 NOTE — ED NOTES
Pt provided with sample cup for sputum culture. Pt educated on need for sputum, will collect once pt is complete.

## 2024-05-13 NOTE — ASSESSMENT & PLAN NOTE
Patient with Hypercapnic and Hypoxic Respiratory failure which is Acute on chronic.  she is on home oxygen at 3 LPM. Supplemental oxygen was provided and noted- Oxygen Concentration (%):  [35] 35    .   Signs/symptoms of respiratory failure include- tachypnea, increased work of breathing, wheezing, and shortness of breath . Contributing diagnoses includes - CHF and COPD Labs and images were reviewed. Patient Has recent ABG, which has been reviewed. CXR without acute process. Will treat underlying causes and adjust management of respiratory failure as follows-     - known history of diastolic HF- Grade II diastolic dysfunction. BNP <10.   - minimal edema on exam at ankles- switch to IV lasix and monitor UOP  - known history of COPD on chronic 3L NC and bipap qhs- cont supplemental O2, goal sats >88%, cont bipap qhs  - treatment of COPDe with steroids, abx, breathing treatments  - home inhaler trelegy- breo while admitted and scheduled duoneb  - cont bipap qhs

## 2024-05-13 NOTE — H&P
[facility] - Emergency Dept  [facility] Medicine  History & Physical    Patient Name: Rochelle Espinoza  MRN: [id number]  Patient Class: OP- Observation  Admission Date: 5/12/2024  Attending Physician: Mayra Chahal MD   Primary Care Provider: Yosi Samuels Jr., MD         Patient information was obtained from patient and ER records.     Subjective:     Principal Problem:Acute on chronic respiratory failure with hypoxia and hypercapnia    Chief Complaint:   Chief Complaint   Patient presents with    Shortness of Breath     SOB since Thursday, reports leg swelling that began yesterday, hx of COPD and CHF, on 3L home oxygen        HPI: Ms. Rochelle Espinoza is a 67 y/o F with hx of COPD, chronic respiratory failure on 3LNC at home/bipap qhs, HFpEF, tobacco use, HTN, HLD, MDD, VDD who presented to the ED for evaluation of worsening SOB and LE swelling.  Patient states over the past couple of days she has noticed worsening LE swelling to the point that she can't walk due to pain and discomfort in her feet. She takes lasix daily at home consistently with normally good UOP however she has not noticed any significant increase over the past couple of days. She also reports a worsening cough and increased sputum production. Denies fever. She denies any recent sick contacts though was at a social gathering a few days ago. At baseline patient reports notable JACKSON and orthopnea as well as abdominal swelling; she sleeps sitting up in a chair with bipap.      in the ED, patient afebrile, SBP 120s, HR 90s. Initially hypoxic and placed on bipap. CBC grossly unremarkable, no leukocytosis, worsening anemia, or thrombocytopenia. Troponin/BNP wnl. Initial VBG demonstrating a compensated respiratory acidosis with pH 7.38 and pCO2 82, pO2 21. repeat VBG with pH 7.31 and CO2 88. MICU consulted for acute on chronic respiratory failure requiring bipap and evaluated patient- deemed stable for floor.     Also reports fatigue and hallucination- saws she  saw a miller on the way to the ED.   Denies recent abx or chronic steroid use  Weaned off of bipap and back on 3 L NC with stable sats >88, but drops to 84% when speaking           Past Medical History:   Diagnosis Date    CHF (congestive heart failure)     COPD (chronic obstructive pulmonary disease)     Herpes zoster without mention of complication 2011    Hypertension     Leg edema     Pulmonary hypertension     Sciatica        Past Surgical History:   Procedure Laterality Date    BREAST BIOPSY Right     core     SECTION      COLONOSCOPY N/A 2019    Procedure: COLONOSCOPY;  Surgeon: Rui Holder MD;  Location: [facility] ENDO (2ND FLR);  Service: Endoscopy;  Laterality: N/A;    EPIDURAL STEROID INJECTION N/A 2020    Procedure: CERVICAL BLAKE;  Surgeon: Papito High MD;  Location: [facility] PAIN MGT;  Service: Pain Management;  Laterality: N/A;  NEEDS CONSENT    ESOPHAGOGASTRODUODENOSCOPY N/A 2019    Procedure: EGD (ESOPHAGOGASTRODUODENOSCOPY);  Surgeon: Rui Holder MD;  [facility]  Service: Endoscopy;  Laterality: N/A;  2L continuous O2 via NC, COPD, pulm HTN    TRANSFORAMINAL EPIDURAL INJECTION OF STEROID Right 6/3/2021    Procedure: INJECTION, STEROID, EPIDURAL, TRANSFORAMINAL APPROACH, L4-L5;  Surgeon: Anibal Robles MD;  Location: [facility] PAIN MGT;  Service: Pain Management;  Laterality: Right;       Review of patient's allergies indicates:   Allergen Reactions    Doxycycline Other (See Comments)     Acid reflux    Orange juice      Swelling in pt tongue      23 - pt stated she had in hosp and it didn't effect her.       No current facility-administered medications on file prior to encounter.     Current Outpatient Medications on File Prior to Encounter   Medication Sig    albuterol (PROVENTIL/VENTOLIN HFA) 90 mcg/actuation inhaler Inhale 2 puffs into the lungs every 4 (four) hours as needed for Wheezing or Shortness of Breath. Rescue    albuterol-ipratropium  (DUO-NEB) 2.5 mg-0.5 mg/3 mL nebulizer solution Take 3 mLs by nebulization every 4 (four) hours as needed for Wheezing. Rescue    ascorbic acid, vitamin C, (VITAMIN C) 500 MG tablet Take 500 mg by mouth once daily.    atorvastatin (LIPITOR) 10 MG tablet Take 1 tablet (10 mg total) by mouth once daily.    buPROPion (WELLBUTRIN SR) 150 MG TBSR 12 hr tablet Take 1 tablet (150 mg total) by mouth 2 (two) times daily.    cetirizine (ZYRTEC) 10 MG tablet Take 1 tablet (10 mg total) by mouth once daily.    cholecalciferol, vitamin D3, (VITAMIN D3) 25 mcg (1,000 unit) capsule Take 1,000 Units by mouth once daily.    diclofenac (VOLTAREN) 50 MG EC tablet Take 1 tablet (50 mg total) by mouth 2 (two) times daily as needed (arthritis pain).    fluticasone propionate (FLONASE) 50 mcg/actuation nasal spray SHAKE LIQUID AND USE 1 SPRAY (50MCG) IN EACH NOSTRIL TWICE DAILY    fluticasone-umeclidin-vilanter (TRELEGY ELLIPTA) 100-62.5-25 mcg DsDv Take 1 puff by mouth once daily.    fluticasone-umeclidin-vilanter (TRELEGY ELLIPTA) 200-62.5-25 mcg inhaler Inhale 1 puff into the lungs once daily.    furosemide (LASIX) 20 MG tablet Take 1-2 po q day    gabapentin (NEURONTIN) 300 MG capsule TAKE 1 CAPSULE THREE TIMES DAILY    glycerin adult suppository Place 1 suppository rectally as needed for Constipation.    losartan (COZAAR) 100 MG tablet Take 1 tablet (100 mg total) by mouth once daily.    magnesium 250 mg Tab Take 250 mg by mouth once.    pantoprazole (PROTONIX) 40 MG tablet Take 1 tablet (40 mg total) by mouth once daily.     Family History       Problem Relation (Age of Onset)    Heart disease Mother, Paternal Uncle          Tobacco Use    Smoking status: Former     Current packs/day: 0.25     Average packs/day: 0.3 packs/day for 40.0 years (10.0 ttl pk-yrs)     Types: Cigarettes    Smokeless tobacco: Never    Tobacco comments:     Quit over a month ago    Substance and Sexual Activity    Alcohol use: Not Currently     Comment: beer  daily 2-3 daily, none today    Drug use: No    Sexual activity: Not Currently     Birth control/protection: None     Review of Systems   Constitutional:  Positive for fatigue. Negative for fever.   HENT:  Negative for trouble swallowing.    Eyes:  Negative for visual disturbance.   Respiratory:  Positive for cough and shortness of breath.    Cardiovascular:  Positive for leg swelling. Negative for chest pain.   Gastrointestinal:  Positive for abdominal distention. Negative for abdominal pain, diarrhea and vomiting.   Genitourinary:  Negative for decreased urine volume and dysuria.   Musculoskeletal:  Negative for neck stiffness.   Skin:  Negative for rash.   Neurological:  Positive for light-headedness.   Psychiatric/Behavioral:  Positive for hallucinations.      Objective:     Vital Signs (Most Recent):  Temp: 99.3 °F (37.4 °C) (05/12/24 0630)  Pulse: 94 (05/12/24 1847)  Resp: (!) 25 (05/12/24 1700)  BP: (!) 143/79 (05/12/24 1847)  SpO2: 95 % (05/12/24 1813) Vital Signs (24h Range):  Temp:  [99.3 °F (37.4 °C)] 99.3 °F (37.4 °C)  Pulse:  [84-99] 94  Resp:  [15-43] 25  SpO2:  [78 %-98 %] 95 %  BP: (121-159)/(61-82) 143/79     Weight: 69.4 kg (153 lb)  Body mass index is 27.1 kg/m².     Physical Exam  Vitals and nursing note reviewed.   Constitutional:       General: She is not in acute distress.     Appearance: Normal appearance.      Comments: Up in bed eating    HENT:      Head: Normocephalic and atraumatic.      Nose: Nose normal.      Comments: NC in place     Mouth/Throat:      Mouth: Mucous membranes are moist.      Pharynx: Oropharynx is clear.   Eyes:      Extraocular Movements: Extraocular movements intact.      Conjunctiva/sclera: Conjunctivae normal.      Pupils: Pupils are equal, round, and reactive to light.   Cardiovascular:      Rate and Rhythm: Normal rate and regular rhythm.      Pulses: Normal pulses.      Heart sounds: Normal heart sounds.   Pulmonary:      Effort: Pulmonary effort is normal. No  respiratory distress.      Breath sounds: Wheezing present.      Comments: Decreased air movement and expiratory wheezing heard. Speaking in full sentences, drops to 84% on 3L when speaking  Abdominal:      General: Abdomen is flat. Bowel sounds are normal. There is distension.      Palpations: Abdomen is soft.      Tenderness: There is no abdominal tenderness.   Musculoskeletal:         General: Normal range of motion.      Cervical back: Normal range of motion and neck supple.      Right lower leg: Edema present.      Left lower leg: Edema present.      Comments: Minimal pitting edema at ankles   Skin:     General: Skin is warm and dry.   Neurological:      General: No focal deficit present.      Mental Status: She is alert and oriented to person, place, and time.   Psychiatric:         Mood and Affect: Mood normal.         Behavior: Behavior normal.              CRANIAL NERVES     CN III, IV, VI   Pupils are equal, round, and reactive to light.       Significant Labs: All pertinent labs within the past 24 hours have been reviewed.  CBC:   Recent Labs   Lab 05/12/24  0653   WBC 6.59   HGB 12.8   HCT 41.4        CMP:   Recent Labs   Lab 05/12/24  0816      K 3.8   CL 93*   CO2 38*   GLU 92   BUN <3*   CREATININE 0.7   CALCIUM 9.3   PROT 6.9   ALBUMIN 3.7   BILITOT 0.4   ALKPHOS 53*   AST 22   ALT 13   ANIONGAP 9     Cardiac Markers:   Recent Labs   Lab 05/12/24  0653   BNP <10       Significant Imaging: I have reviewed all pertinent imaging results/findings within the past 24 hours.  CXR without consolidation   Assessment/Plan:     * Acute on chronic respiratory failure with hypoxia and hypercapnia  Patient with Hypercapnic and Hypoxic Respiratory failure which is Acute on chronic.  she is on home oxygen at 3 LPM. Supplemental oxygen was provided and noted- Oxygen Concentration (%):  [35] 35    .   Signs/symptoms of respiratory failure include- tachypnea, increased work of breathing, wheezing, and  shortness of breath . Contributing diagnoses includes - CHF and COPD Labs and images were reviewed. Patient Has recent ABG, which has been reviewed. CXR without acute process. Will treat underlying causes and adjust management of respiratory failure as follows-     - known history of diastolic HF- Grade II diastolic dysfunction. BNP <10.   - minimal edema on exam at ankles- switch to IV lasix and monitor UOP  - known history of COPD on chronic 3L NC and bipap qhs- cont supplemental O2, goal sats >88%, cont bipap qhs  - treatment of COPDe with steroids, abx, breathing treatments  - home inhaler trelegy- breo while admitted and scheduled duoneb  - cont bipap qhs    COPD exacerbation  Patient's COPD is with exacerbation noted by worsening of baseline hypoxia currently, increased sputum and cough.  Patient is currently on COPD Pathway. Continue scheduled inhalers Steroids, Antibiotics, and Supplemental oxygen and monitor respiratory status closely.   - cont supplemental O2, goal sats >88%  - resp culture ordered  - scheduled duonebs and breo  - cont CTX/azithro no hx of pseudomonas but will follow resp culture  - COVID /Flu/ RSV negative  - CXR no acute process  - current smoker- cessation and nicotine patch   - mucinex and tesselon     Chronic diastolic heart failure  - last ECHO with Grade II diastolic dysfunction   - BNP admit <10  - mild pitting edema at ankles  - start IV lasix and monitor UOP      Major depressive disorder  Patient has persistent depression which is mild and is currently controlled. Will Continue anti-depressant medications. We will not consult psychiatry at this time. Patient does not display psychosis at this time. Continue to monitor closely and adjust plan of care as needed.  - reported hallucination on the way to hospital- no signs of psychosis on exam/interview, monitor        Vitamin D deficiency  - cont supplementation       Peripheral polyneuropathy  - cont gabapentin      Nicotine  dependence, cigarettes, uncomplicated   Dangers of cigarette smoking were reviewed with patient in detail. Patient was  refer to smoking cessation at discharge  Nicotine replacement options were discussed. Nicotine replacement was discussed- prescribed    Mixed hyperlipidemia  - cont statin      Essential hypertension  Chronic, controlled. Latest blood pressure and vitals reviewed-     Temp:  [99.3 °F (37.4 °C)]   Pulse:  [84-99]   Resp:  [15-43]   BP: (121-159)/(61-82)   SpO2:  [78 %-98 %] .   Home meds for hypertension were reviewed and noted below.   Hypertension Medications               furosemide (LASIX) 20 MG tablet Take 1-2 po q day    losartan (COZAAR) 100 MG tablet Take 1 tablet (100 mg total) by mouth once daily.            While in the hospital, will manage blood pressure as follows; Adjust home antihypertensive regimen as follows- switch to IV lasix    Will utilize p.r.n. blood pressure medication only if patient's blood pressure greater than 180/110 and she develops symptoms such as worsening chest pain or shortness of breath.      VTE Risk Mitigation (From admission, onward)           Ordered     enoxaparin injection 40 mg  Daily         05/12/24 1809     IP VTE HIGH RISK PATIENT  Once         05/12/24 1809     Place sequential compression device  Until discontinued         05/12/24 1809                       On 05/12/2024, patient should be placed in hospital observation services under my care.             Mayra Chahal MD  Department of Hospital Medicine  [facility] - Emergency Dept

## 2024-05-13 NOTE — PLAN OF CARE
05/12/24 2021   Post-Acute Status   Post-Acute Authorization Other;Home Health   Home Health Status Pending medical clearance/testing   Other Status Community Services   Hospital Resources/Appts/Education Provided Community resources provided   Discharge Delays None known at this time   Discharge Plan   Discharge Plan A Home with family   Discharge Plan B Home Health         Sw met pt at bedside. Pt stated her daughter is staying with pt but pt stated she does not know how long her daughter will stay. Pt stated one of her medication is very expensive and pt receives a fixed income. Pt stated the sw at her pulmonologist assisted pt in the past with pt medications and she will reach out to her next week. Pt stated she is agreeable with home health at discharge , after pt is medically cleared.         Discharge Plan A and Plan B have been determined by review of patient's clinical status, future medical and therapeutic needs, and coverage/benefits for post-acute care in coordination with multidisciplinary team members.   Brandon Sheth LMSW  Case Management  Emergency Department  [phone]

## 2024-05-13 NOTE — NURSING
Pt AAOX4, pt had no acute events this shift. Pt continues on 3L via NC, no SOB/ distress observed. Plan of care ongoing. Pt resting in bed, bed low and locked, call light and belongings in reach.

## 2024-05-13 NOTE — NURSING
Rec'd pt via stretcher to room 71. AAOx4, On 3LNC sat's 88-91% denies any SOB. on telebox NSR. Purewick in place francisco well. Independent of ADL's. Amb in room x 1 assist. PPP +2 , MAEW, skin W/D/I. See flowsheet. SRUP ,CLWR.

## 2024-05-13 NOTE — SUBJECTIVE & OBJECTIVE
Interval History: Stable on 3L NC. Reports breathing improving. IV lasix- UOP >2L, swelling resolved  Resp culture collected    Review of Systems   Constitutional:  Negative for fever.   Respiratory:  Positive for cough and shortness of breath.    Cardiovascular:  Negative for leg swelling.   Psychiatric/Behavioral:  Negative for hallucinations.      Objective:     Vital Signs (Most Recent):  Temp: 98.1 °F (36.7 °C) (05/13/24 1231)  Pulse: 84 (05/13/24 1231)  Resp: 17 (05/13/24 1231)  BP: 96/66 (05/13/24 1121)  SpO2: 98 % (05/13/24 1231) Vital Signs (24h Range):  Temp:  [98 °F (36.7 °C)-98.7 °F (37.1 °C)] 98.1 °F (36.7 °C)  Pulse:  [] 84  Resp:  [15-43] 17  SpO2:  [85 %-98 %] 98 %  BP: ()/(55-82) 96/66     Weight: 69.4 kg (153 lb)  Body mass index is 27.1 kg/m².    Intake/Output Summary (Last 24 hours) at 5/13/2024 1342  Last data filed at 5/13/2024 0934  Gross per 24 hour   Intake --   Output 2000 ml   Net -2000 ml      Physical Exam  Vitals and nursing note reviewed.   Constitutional:       General: She is not in acute distress.     Appearance: Normal appearance.   HENT:      Head: Normocephalic and atraumatic.      Nose: Nose normal.      Comments: NC in place     Mouth/Throat:      Mouth: Mucous membranes are moist.      Pharynx: Oropharynx is clear.   Eyes:      Extraocular Movements: Extraocular movements intact.      Conjunctiva/sclera: Conjunctivae normal.      Pupils: Pupils are equal, round, and reactive to light.   Cardiovascular:      Rate and Rhythm: Normal rate and regular rhythm.      Pulses: Normal pulses.      Heart sounds: Normal heart sounds.   Pulmonary:      Effort: Pulmonary effort is normal. No respiratory distress.      Breath sounds: Wheezing and rhonchi present.      Comments: expiratory wheezing and rhonchii heard. Speaking in full sentences, air movement improving   Abdominal:      General: Abdomen is flat. Bowel sounds are normal. There is no distension.      Palpations:  Abdomen is soft.      Tenderness: There is no abdominal tenderness.   Musculoskeletal:         General: Normal range of motion.      Cervical back: Normal range of motion and neck supple.      Right lower leg: No edema.      Left lower leg: No edema.      Comments: Edema resolved   Skin:     General: Skin is warm and dry.   Neurological:      General: No focal deficit present.      Mental Status: She is alert and oriented to person, place, and time.   Psychiatric:         Mood and Affect: Mood normal.         Behavior: Behavior normal.           Significant Labs:  CBC:  Recent Labs   Lab 05/12/24  0653 05/13/24  0527   WBC 6.59 12.23   HGB 12.8 12.6   HCT 41.4 42.5    204     CMP:  Recent Labs   Lab 05/12/24  0816 05/13/24  0527    139   K 3.8 4.1   CL 93* 90*   CO2 38* 40*   GLU 92 104   BUN <3* 7*   CREATININE 0.7 0.7   CALCIUM 9.3 9.5   PROT 6.9 6.8   ALBUMIN 3.7 3.5   BILITOT 0.4 0.3   ALKPHOS 53* 50*   AST 22 17   ALT 13 14   ANIONGAP 9 9

## 2024-05-14 PROBLEM — R42 LIGHTHEADEDNESS: Status: ACTIVE | Noted: 2024-05-14

## 2024-05-14 LAB
ALBUMIN SERPL BCP-MCNC: 3.3 G/DL (ref 3.5–5.2)
ALP SERPL-CCNC: 47 U/L (ref 55–135)
ALT SERPL W/O P-5'-P-CCNC: 11 U/L (ref 10–44)
ANION GAP SERPL CALC-SCNC: 8 MMOL/L (ref 8–16)
AST SERPL-CCNC: 16 U/L (ref 10–40)
BASOPHILS # BLD AUTO: 0.01 K/UL (ref 0–0.2)
BASOPHILS NFR BLD: 0.1 % (ref 0–1.9)
BILIRUB SERPL-MCNC: 0.2 MG/DL (ref 0.1–1)
BUN SERPL-MCNC: 10 MG/DL (ref 8–23)
CALCIUM SERPL-MCNC: 9.1 MG/DL (ref 8.7–10.5)
CHLORIDE SERPL-SCNC: 87 MMOL/L (ref 95–110)
CO2 SERPL-SCNC: 41 MMOL/L (ref 23–29)
CREAT SERPL-MCNC: 0.7 MG/DL (ref 0.5–1.4)
DIFFERENTIAL METHOD BLD: ABNORMAL
EOSINOPHIL # BLD AUTO: 0 K/UL (ref 0–0.5)
EOSINOPHIL NFR BLD: 0.2 % (ref 0–8)
ERYTHROCYTE [DISTWIDTH] IN BLOOD BY AUTOMATED COUNT: 14.5 % (ref 11.5–14.5)
EST. GFR  (NO RACE VARIABLE): >60 ML/MIN/1.73 M^2
GLUCOSE SERPL-MCNC: 95 MG/DL (ref 70–110)
HCT VFR BLD AUTO: 38.5 % (ref 37–48.5)
HGB BLD-MCNC: 11.9 G/DL (ref 12–16)
IMM GRANULOCYTES # BLD AUTO: 0.04 K/UL (ref 0–0.04)
IMM GRANULOCYTES NFR BLD AUTO: 0.4 % (ref 0–0.5)
LYMPHOCYTES # BLD AUTO: 2.4 K/UL (ref 1–4.8)
LYMPHOCYTES NFR BLD: 21.4 % (ref 18–48)
MAGNESIUM SERPL-MCNC: 2 MG/DL (ref 1.6–2.6)
MCH RBC QN AUTO: 28.6 PG (ref 27–31)
MCHC RBC AUTO-ENTMCNC: 30.9 G/DL (ref 32–36)
MCV RBC AUTO: 93 FL (ref 82–98)
MONOCYTES # BLD AUTO: 0.8 K/UL (ref 0.3–1)
MONOCYTES NFR BLD: 6.7 % (ref 4–15)
NEUTROPHILS # BLD AUTO: 8.1 K/UL (ref 1.8–7.7)
NEUTROPHILS NFR BLD: 71.2 % (ref 38–73)
NRBC BLD-RTO: 0 /100 WBC
PHOSPHATE SERPL-MCNC: 3 MG/DL (ref 2.7–4.5)
PLATELET # BLD AUTO: 199 K/UL (ref 150–450)
PMV BLD AUTO: 12.5 FL (ref 9.2–12.9)
POTASSIUM SERPL-SCNC: 3.8 MMOL/L (ref 3.5–5.1)
PROT SERPL-MCNC: 6.2 G/DL (ref 6–8.4)
RBC # BLD AUTO: 4.16 M/UL (ref 4–5.4)
SODIUM SERPL-SCNC: 136 MMOL/L (ref 136–145)
WBC # BLD AUTO: 11.38 K/UL (ref 3.9–12.7)

## 2024-05-14 PROCEDURE — 25000242 PHARM REV CODE 250 ALT 637 W/ HCPCS: Mod: HCNC | Performed by: STUDENT IN AN ORGANIZED HEALTH CARE EDUCATION/TRAINING PROGRAM

## 2024-05-14 PROCEDURE — S4991 NICOTINE PATCH NONLEGEND: HCPCS | Mod: HCNC | Performed by: STUDENT IN AN ORGANIZED HEALTH CARE EDUCATION/TRAINING PROGRAM

## 2024-05-14 PROCEDURE — 94640 AIRWAY INHALATION TREATMENT: CPT | Mod: HCNC

## 2024-05-14 PROCEDURE — 25000003 PHARM REV CODE 250: Mod: HCNC | Performed by: EMERGENCY MEDICINE

## 2024-05-14 PROCEDURE — 83735 ASSAY OF MAGNESIUM: CPT | Mod: HCNC | Performed by: STUDENT IN AN ORGANIZED HEALTH CARE EDUCATION/TRAINING PROGRAM

## 2024-05-14 PROCEDURE — 36415 COLL VENOUS BLD VENIPUNCTURE: CPT | Mod: HCNC | Performed by: STUDENT IN AN ORGANIZED HEALTH CARE EDUCATION/TRAINING PROGRAM

## 2024-05-14 PROCEDURE — 94660 CPAP INITIATION&MGMT: CPT | Mod: HCNC

## 2024-05-14 PROCEDURE — 80053 COMPREHEN METABOLIC PANEL: CPT | Mod: HCNC | Performed by: STUDENT IN AN ORGANIZED HEALTH CARE EDUCATION/TRAINING PROGRAM

## 2024-05-14 PROCEDURE — 84100 ASSAY OF PHOSPHORUS: CPT | Mod: HCNC | Performed by: STUDENT IN AN ORGANIZED HEALTH CARE EDUCATION/TRAINING PROGRAM

## 2024-05-14 PROCEDURE — 94761 N-INVAS EAR/PLS OXIMETRY MLT: CPT | Mod: HCNC

## 2024-05-14 PROCEDURE — 27100171 HC OXYGEN HIGH FLOW UP TO 24 HOURS: Mod: HCNC

## 2024-05-14 PROCEDURE — 63600175 PHARM REV CODE 636 W HCPCS: Mod: HCNC | Performed by: STUDENT IN AN ORGANIZED HEALTH CARE EDUCATION/TRAINING PROGRAM

## 2024-05-14 PROCEDURE — 99900035 HC TECH TIME PER 15 MIN (STAT): Mod: HCNC

## 2024-05-14 PROCEDURE — 20600001 HC STEP DOWN PRIVATE ROOM: Mod: HCNC

## 2024-05-14 PROCEDURE — 25000003 PHARM REV CODE 250: Mod: HCNC | Performed by: STUDENT IN AN ORGANIZED HEALTH CARE EDUCATION/TRAINING PROGRAM

## 2024-05-14 PROCEDURE — 85025 COMPLETE CBC W/AUTO DIFF WBC: CPT | Mod: HCNC | Performed by: STUDENT IN AN ORGANIZED HEALTH CARE EDUCATION/TRAINING PROGRAM

## 2024-05-14 RX ADMIN — ACETAMINOPHEN 650 MG: 325 TABLET ORAL at 02:05

## 2024-05-14 RX ADMIN — NICOTINE 1 PATCH: 14 PATCH, EXTENDED RELEASE TRANSDERMAL at 08:05

## 2024-05-14 RX ADMIN — GABAPENTIN 300 MG: 300 CAPSULE ORAL at 02:05

## 2024-05-14 RX ADMIN — AZITHROMYCIN MONOHYDRATE 500 MG: 500 INJECTION, POWDER, LYOPHILIZED, FOR SOLUTION INTRAVENOUS at 09:05

## 2024-05-14 RX ADMIN — BUPROPION HYDROCHLORIDE 150 MG: 75 TABLET, FILM COATED ORAL at 09:05

## 2024-05-14 RX ADMIN — FUROSEMIDE 40 MG: 40 TABLET ORAL at 08:05

## 2024-05-14 RX ADMIN — IPRATROPIUM BROMIDE AND ALBUTEROL SULFATE 3 ML: 2.5; .5 SOLUTION RESPIRATORY (INHALATION) at 07:05

## 2024-05-14 RX ADMIN — ACETAMINOPHEN 1000 MG: 500 TABLET ORAL at 08:05

## 2024-05-14 RX ADMIN — GABAPENTIN 300 MG: 300 CAPSULE ORAL at 08:05

## 2024-05-14 RX ADMIN — ATORVASTATIN CALCIUM 10 MG: 10 TABLET, FILM COATED ORAL at 08:05

## 2024-05-14 RX ADMIN — FLUTICASONE PROPIONATE 50 MCG: 50 SPRAY, METERED NASAL at 08:05

## 2024-05-14 RX ADMIN — GUAIFENESIN 600 MG: 600 TABLET, EXTENDED RELEASE ORAL at 08:05

## 2024-05-14 RX ADMIN — Medication 6 MG: at 09:05

## 2024-05-14 RX ADMIN — PREDNISONE 40 MG: 20 TABLET ORAL at 08:05

## 2024-05-14 RX ADMIN — GABAPENTIN 300 MG: 300 CAPSULE ORAL at 09:05

## 2024-05-14 RX ADMIN — IPRATROPIUM BROMIDE AND ALBUTEROL SULFATE 3 ML: 2.5; .5 SOLUTION RESPIRATORY (INHALATION) at 05:05

## 2024-05-14 RX ADMIN — CETIRIZINE HYDROCHLORIDE 10 MG: 10 TABLET, FILM COATED ORAL at 08:05

## 2024-05-14 RX ADMIN — IPRATROPIUM BROMIDE AND ALBUTEROL SULFATE 3 ML: 2.5; .5 SOLUTION RESPIRATORY (INHALATION) at 12:05

## 2024-05-14 RX ADMIN — OXYCODONE HYDROCHLORIDE AND ACETAMINOPHEN 500 MG: 500 TABLET ORAL at 08:05

## 2024-05-14 RX ADMIN — BUPROPION HYDROCHLORIDE 150 MG: 75 TABLET, FILM COATED ORAL at 08:05

## 2024-05-14 RX ADMIN — ACETAMINOPHEN 1000 MG: 500 TABLET ORAL at 09:05

## 2024-05-14 RX ADMIN — CEFTRIAXONE 2 G: 2 INJECTION, POWDER, FOR SOLUTION INTRAMUSCULAR; INTRAVENOUS at 06:05

## 2024-05-14 RX ADMIN — IPRATROPIUM BROMIDE AND ALBUTEROL SULFATE 3 ML: 2.5; .5 SOLUTION RESPIRATORY (INHALATION) at 11:05

## 2024-05-14 RX ADMIN — IPRATROPIUM BROMIDE AND ALBUTEROL SULFATE 3 ML: 2.5; .5 SOLUTION RESPIRATORY (INHALATION) at 08:05

## 2024-05-14 RX ADMIN — GUAIFENESIN 600 MG: 600 TABLET, EXTENDED RELEASE ORAL at 09:05

## 2024-05-14 RX ADMIN — CHOLECALCIFEROL TAB 25 MCG (1000 UNIT) 1000 UNITS: 25 TAB at 08:05

## 2024-05-14 RX ADMIN — FLUTICASONE FUROATE AND VILANTEROL TRIFENATATE 1 PUFF: 100; 25 POWDER RESPIRATORY (INHALATION) at 07:05

## 2024-05-14 RX ADMIN — PANTOPRAZOLE SODIUM 40 MG: 40 TABLET, DELAYED RELEASE ORAL at 08:05

## 2024-05-14 RX ADMIN — IPRATROPIUM BROMIDE AND ALBUTEROL SULFATE 3 ML: 2.5; .5 SOLUTION RESPIRATORY (INHALATION) at 03:05

## 2024-05-14 NOTE — PROGRESS NOTES
[facility] - Stepdown Flex ([place] [id number])  [facility] Medicine  Progress Note    Patient Name: Rochelle Espinoza  MRN: [id number]  Patient Class: IP- Inpatient   Admission Date: 5/12/2024  Length of Stay: 1 days  Attending Physician: Mayra Chahal MD  Primary Care Provider: Yosi Samuels Jr., MD        Subjective:     Principal Problem:Acute on chronic respiratory failure with hypoxia and hypercapnia        HPI:  Ms. Rochelle Espinoza is a 69 y/o F with hx of COPD, chronic respiratory failure on 3LNC at home/bipap qhs, HFpEF, tobacco use, HTN, HLD, MDD, VDD who presented to the ED for evaluation of worsening SOB and LE swelling.  Patient states over the past couple of days she has noticed worsening LE swelling to the point that she can't walk due to pain and discomfort in her feet. She takes lasix daily at home consistently with normally good UOP however she has not noticed any significant increase over the past couple of days. She also reports a worsening cough and increased sputum production. Denies fever. She denies any recent sick contacts though was at a social gathering a few days ago. At baseline patient reports notable JACKSON and orthopnea as well as abdominal swelling; she sleeps sitting up in a chair with bipap.      in the ED, patient afebrile, SBP 120s, HR 90s. Initially hypoxic and placed on bipap. CBC grossly unremarkable, no leukocytosis, worsening anemia, or thrombocytopenia. Troponin/BNP wnl. Initial VBG demonstrating a compensated respiratory acidosis with pH 7.38 and pCO2 82, pO2 21. repeat VBG with pH 7.31 and CO2 88. MICU consulted for acute on chronic respiratory failure requiring bipap and evaluated patient- deemed stable for floor.     Also reports fatigue and hallucination- saws she saw a miller on the way to the ED.   Denies recent abx or chronic steroid use  Weaned off of bipap and back on 3 L NC with stable sats >88, but drops to 84% when speaking           Overview/Hospital Course:  Admitted for hypoxic  "respiratory failure 2/2 COPDe. Initially on Bipap in ED, weaned to 3L NC (home O2) with bipap qhs. Started on steroids, IV abx, scheduled breathing treatments. Resp culture too many epi cells.    Mild swelling in ankles, BNP <10. IV lasix started, swelling improved and switched back to PO lasix.     Interval History: Stable on 3L, breathing improving. Reports "dizziness" upon standing and feeling weak. Discussed placement and she declined  Resp culture too many epi cells.     Review of Systems   Constitutional:  Negative for fever.   Respiratory:  Positive for cough and shortness of breath.    Cardiovascular:  Negative for leg swelling.   Neurological:  Positive for light-headedness.   Psychiatric/Behavioral:  Negative for hallucinations.      Objective:     Vital Signs (Most Recent):  Temp: 98.3 °F (36.8 °C) (05/14/24 0830)  Pulse: 87 (05/14/24 1555)  Resp: 20 (05/14/24 1524)  BP: 117/72 (05/14/24 1159)  SpO2: 100 % (05/14/24 1524) Vital Signs (24h Range):  Temp:  [98 °F (36.7 °C)-98.3 °F (36.8 °C)] 98.3 °F (36.8 °C)  Pulse:  [78-96] 87  Resp:  [16-20] 20  SpO2:  [94 %-100 %] 100 %  BP: ()/(59-74) 117/72     Weight: 69.4 kg (153 lb)  Body mass index is 27.1 kg/m².    Intake/Output Summary (Last 24 hours) at 5/14/2024 1621  Last data filed at 5/14/2024 1214  Gross per 24 hour   Intake 120 ml   Output 2300 ml   Net -2180 ml      Physical Exam  Vitals and nursing note reviewed.   Constitutional:       General: She is not in acute distress.     Appearance: Normal appearance.   HENT:      Head: Normocephalic and atraumatic.      Nose: Nose normal.      Comments: NC in place     Mouth/Throat:      Mouth: Mucous membranes are moist.      Pharynx: Oropharynx is clear.   Eyes:      Extraocular Movements: Extraocular movements intact.      Conjunctiva/sclera: Conjunctivae normal.      Pupils: Pupils are equal, round, and reactive to light.   Cardiovascular:      Rate and Rhythm: Normal rate and regular rhythm.      " Pulses: Normal pulses.      Heart sounds: Normal heart sounds.   Pulmonary:      Effort: Pulmonary effort is normal. No respiratory distress.      Breath sounds: Wheezing and rhonchi present.      Comments: expiratory wheezing and rhonchii heard. Speaking in full sentences, air movement improving   Abdominal:      General: Abdomen is flat. Bowel sounds are normal. There is no distension.      Palpations: Abdomen is soft.      Tenderness: There is no abdominal tenderness.   Musculoskeletal:         General: Normal range of motion.      Cervical back: Normal range of motion and neck supple.      Right lower leg: No edema.      Left lower leg: No edema.      Comments: Edema resolved   Skin:     General: Skin is warm and dry.   Neurological:      General: No focal deficit present.      Mental Status: She is alert and oriented to person, place, and time.   Psychiatric:         Mood and Affect: Mood normal.         Behavior: Behavior normal.           Significant Labs:  CBC:  Recent Labs   Lab 05/13/24 0527 05/14/24  0439   WBC 12.23 11.38   HGB 12.6 11.9*   HCT 42.5 38.5    199     CMP:  Recent Labs   Lab 05/13/24 0527 05/14/24  0439    136   K 4.1 3.8   CL 90* 87*   CO2 40* 41*    95   BUN 7* 10   CREATININE 0.7 0.7   CALCIUM 9.5 9.1   PROT 6.8 6.2   ALBUMIN 3.5 3.3*   BILITOT 0.3 0.2   ALKPHOS 50* 47*   AST 17 16   ALT 14 11   ANIONGAP 9 8       Assessment/Plan:      * Acute on chronic respiratory failure with hypoxia and hypercapnia  Patient with Hypercapnic and Hypoxic Respiratory failure which is Acute on chronic.  she is on home oxygen at 3 LPM. Supplemental oxygen was provided and noted-      .   Signs/symptoms of respiratory failure include- tachypnea, increased work of breathing, wheezing, and shortness of breath . Contributing diagnoses includes - CHF and COPD Labs and images were reviewed. Patient Has recent ABG, which has been reviewed. CXR without acute process. Will treat underlying  causes and adjust management of respiratory failure as follows-     - known history of diastolic HF- Grade II diastolic dysfunction. BNP <10.   - minimal edema on exam at ankles- switch to IV lasix and monitor UOP- UOP >2L and edema resolved, transition to PO lasix  - known history of COPD on chronic 3L NC and bipap qhs- cont supplemental O2, goal sats >88%, cont bipap qhs  - treatment of COPDe with steroids, abx, breathing treatments  - home inhaler trelegy- breo while admitted and scheduled duoneb  - cont bipap qhs  - resp culture too many epi cells    COPD exacerbation  Patient's COPD is with exacerbation noted by worsening of baseline hypoxia currently, increased sputum and cough.  Patient is currently on COPD Pathway. Continue scheduled inhalers Steroids, Antibiotics, and Supplemental oxygen and monitor respiratory status closely.   - cont supplemental O2, goal sats >88%  - resp culture too many epi cells- cont IV abx - switch to levaquin at discharge  - scheduled duonebs and breo  - cont CTX/azithro   - COVID /Flu/ RSV negative  - CXR no acute process  - current smoker- cessation and nicotine patch   - mucinex and tesselon     Chronic diastolic heart failure  - last ECHO with Grade II diastolic dysfunction   - BNP admit <10  - mild pitting edema at ankles- resolved  - start IV lasix and monitor UOP- transition back to PO lasix        Lightheadedness  - lightheaded upon standing and reports feeling weak- discussed placement and declined  - check orthostatic BP       Major depressive disorder  Patient has persistent depression which is mild and is currently controlled. Will Continue anti-depressant medications. We will not consult psychiatry at this time. Patient does not display psychosis at this time. Continue to monitor closely and adjust plan of care as needed.  - reported hallucination on the way to hospital- no signs of psychosis on exam/interview, monitor  - no further hallucinations  reported        Vitamin D deficiency  - cont supplementation       Peripheral polyneuropathy  - cont gabapentin      Nicotine dependence, cigarettes, uncomplicated   Dangers of cigarette smoking were reviewed with patient in detail. Patient was  refer to smoking cessation at discharge  Nicotine replacement options were discussed. Nicotine replacement was discussed- prescribed    Mixed hyperlipidemia  - cont statin      Essential hypertension  Chronic, controlled. Latest blood pressure and vitals reviewed-     Temp:  [98 °F (36.7 °C)-98.3 °F (36.8 °C)]   Pulse:  [78-96]   Resp:  [16-20]   BP: ()/(59-74)   SpO2:  [94 %-100 %] .   Home meds for hypertension were reviewed and noted below.   Hypertension Medications               furosemide (LASIX) 20 MG tablet Take 1-2 po q day    losartan (COZAAR) 100 MG tablet Take 1 tablet (100 mg total) by mouth once daily.            While in the hospital, will manage blood pressure as follows; Adjust home antihypertensive regimen as follows- switch to IV lasix- transition back to PO    Will utilize p.r.n. blood pressure medication only if patient's blood pressure greater than 180/110 and she develops symptoms such as worsening chest pain or shortness of breath.      VTE Risk Mitigation (From admission, onward)           Ordered     enoxaparin injection 40 mg  Daily         05/12/24 1809     IP VTE HIGH RISK PATIENT  Once         05/12/24 1809     Place sequential compression device  Until discontinued         05/12/24 1809                    Discharge Planning   ELIZABETH: 5/15/2024     Code Status: Full Code   Is the patient medically ready for discharge?:     Reason for patient still in hospital (select all that apply): Patient trending condition  Discharge Plan A: Home Health   Discharge Delays: None known at this time              Mayra Chahal MD  Department of Hospital Medicine   [facility] - Stepdown Flex ([place])

## 2024-05-14 NOTE — PLAN OF CARE
CHW met with patient/family at bedside. Patient experience rounding completed and reviewed the following.     Do you know your discharge plan? Yes,Home    Have you discussed your needs and preferences with your SW/CM? Yes     If you are discharging home, do you have help at home? Yes     Do you think you will need help additional at home at discharge? No     Do you currently have difficulty keeping up with bills, affording medicine or buying food?  No    Assigned SW/CM notified of any patient/family needs or concerns. Appropriate resources provided to address patient's needs.      Austin Chahal ANIL   Case Management

## 2024-05-14 NOTE — SUBJECTIVE & OBJECTIVE
"Interval History: Stable on 3L, breathing improving. Reports "dizziness" upon standing and feeling weak. Discussed placement and she declined  Resp culture too many epi cells.     Review of Systems   Constitutional:  Negative for fever.   Respiratory:  Positive for cough and shortness of breath.    Cardiovascular:  Negative for leg swelling.   Neurological:  Positive for light-headedness.   Psychiatric/Behavioral:  Negative for hallucinations.      Objective:     Vital Signs (Most Recent):  Temp: 98.3 °F (36.8 °C) (05/14/24 0830)  Pulse: 87 (05/14/24 1555)  Resp: 20 (05/14/24 1524)  BP: 117/72 (05/14/24 1159)  SpO2: 100 % (05/14/24 1524) Vital Signs (24h Range):  Temp:  [98 °F (36.7 °C)-98.3 °F (36.8 °C)] 98.3 °F (36.8 °C)  Pulse:  [78-96] 87  Resp:  [16-20] 20  SpO2:  [94 %-100 %] 100 %  BP: ()/(59-74) 117/72     Weight: 69.4 kg (153 lb)  Body mass index is 27.1 kg/m².    Intake/Output Summary (Last 24 hours) at 5/14/2024 1621  Last data filed at 5/14/2024 1214  Gross per 24 hour   Intake 120 ml   Output 2300 ml   Net -2180 ml      Physical Exam  Vitals and nursing note reviewed.   Constitutional:       General: She is not in acute distress.     Appearance: Normal appearance.   HENT:      Head: Normocephalic and atraumatic.      Nose: Nose normal.      Comments: NC in place     Mouth/Throat:      Mouth: Mucous membranes are moist.      Pharynx: Oropharynx is clear.   Eyes:      Extraocular Movements: Extraocular movements intact.      Conjunctiva/sclera: Conjunctivae normal.      Pupils: Pupils are equal, round, and reactive to light.   Cardiovascular:      Rate and Rhythm: Normal rate and regular rhythm.      Pulses: Normal pulses.      Heart sounds: Normal heart sounds.   Pulmonary:      Effort: Pulmonary effort is normal. No respiratory distress.      Breath sounds: Wheezing and rhonchi present.      Comments: expiratory wheezing and rhonchii heard. Speaking in full sentences, air movement improving "   Abdominal:      General: Abdomen is flat. Bowel sounds are normal. There is no distension.      Palpations: Abdomen is soft.      Tenderness: There is no abdominal tenderness.   Musculoskeletal:         General: Normal range of motion.      Cervical back: Normal range of motion and neck supple.      Right lower leg: No edema.      Left lower leg: No edema.      Comments: Edema resolved   Skin:     General: Skin is warm and dry.   Neurological:      General: No focal deficit present.      Mental Status: She is alert and oriented to person, place, and time.   Psychiatric:         Mood and Affect: Mood normal.         Behavior: Behavior normal.           Significant Labs:  CBC:  Recent Labs   Lab 05/13/24 0527 05/14/24 0439   WBC 12.23 11.38   HGB 12.6 11.9*   HCT 42.5 38.5    199     CMP:  Recent Labs   Lab 05/13/24 0527 05/14/24 0439    136   K 4.1 3.8   CL 90* 87*   CO2 40* 41*    95   BUN 7* 10   CREATININE 0.7 0.7   CALCIUM 9.5 9.1   PROT 6.8 6.2   ALBUMIN 3.5 3.3*   BILITOT 0.3 0.2   ALKPHOS 50* 47*   AST 17 16   ALT 14 11   ANIONGAP 9 8

## 2024-05-14 NOTE — ASSESSMENT & PLAN NOTE
- lightheaded upon standing and reports feeling weak- discussed placement and declined  - check orthostatic BP

## 2024-05-14 NOTE — NURSING
Pt AAOX4, pt had episode of dizziness and weakness with low BP. Md aware, orthostatic VS completed and charted per MD. No new orders. Pt had c/o headache relieved with PRN med, plan of care ongoing, pt resting in bed, bed low and locked, call light and belongings in reach.

## 2024-05-14 NOTE — ASSESSMENT & PLAN NOTE
Patient's COPD is with exacerbation noted by worsening of baseline hypoxia currently, increased sputum and cough.  Patient is currently on COPD Pathway. Continue scheduled inhalers Steroids, Antibiotics, and Supplemental oxygen and monitor respiratory status closely.   - cont supplemental O2, goal sats >88%  - resp culture too many epi cells- cont IV abx - switch to levaquin at discharge  - scheduled duonebs and breo  - cont CTX/azithro   - COVID /Flu/ RSV negative  - CXR no acute process  - current smoker- cessation and nicotine patch   - mucinex and viviana

## 2024-05-14 NOTE — ASSESSMENT & PLAN NOTE
Chronic, controlled. Latest blood pressure and vitals reviewed-     Temp:  [98 °F (36.7 °C)-98.3 °F (36.8 °C)]   Pulse:  [78-96]   Resp:  [16-20]   BP: ()/(59-74)   SpO2:  [94 %-100 %] .   Home meds for hypertension were reviewed and noted below.   Hypertension Medications               furosemide (LASIX) 20 MG tablet Take 1-2 po q day    losartan (COZAAR) 100 MG tablet Take 1 tablet (100 mg total) by mouth once daily.            While in the hospital, will manage blood pressure as follows; Adjust home antihypertensive regimen as follows- switch to IV lasix- transition back to PO    Will utilize p.r.n. blood pressure medication only if patient's blood pressure greater than 180/110 and she develops symptoms such as worsening chest pain or shortness of breath.

## 2024-05-14 NOTE — PLAN OF CARE
Discharge Plan A and Plan B have been determined by review of patient's clinical status, future medical and therapeutic needs, and coverage/benefits for post-acute care in coordination with multidisciplinary team members.    05/14/24 0949   Post-Acute Status   Post-Acute Authorization Home Health   Home Health Status Referrals Sent   Other Status Community Services  (Care at home)   Hospital Resources/Appts/Education Provided Appointments scheduled and added to AVS   Discharge Delays None known at this time   Discharge Plan   Discharge Plan A Home Health   Discharge Plan B Home with family     CM met with patient to discuss any changes in discharge planning.  Patients plan is to  dc home  with home health   No changes in DC plans. ELIZABETH: 5/14/24    Met with patient   to review discharge recommendation of  home health  and is agreeable to plan    Patient/family provided list of facilities in-network with patient's payor plan. Providers that are owned, operated, or affiliated with [facility] are included on the list.     Notified that referral sent to below listed facilities from in-network list based on proximity to home/family support:     1.[facility] Phone: [phone]   Response: No, unable to accept patient Response: No, unable to accept patient     2.[facility] Phone: [phone] Response: No, unable to accept patient Response: No, unable to accept patient     3.[facility] Phone: [phone]   Response: No, unable to accept patient     4.[facility] Phone: [phone]       5.[facility] Phone: [phone]   Response: Interested, but need more information  Comments: We can accept with SN/PT only.     6.[facility] Phone: [phone]  Response: No, unable to accept patient         Patient/family instructed to identify  preference.    Preferred Facility: (if more than 1, listed in order of descending preference)  No preference verbalized     If an additional preferred facility not listed above is identified, additional referral to be sent. If above facilities unable to accept, will send additional referrals to in-network providers.      2:52 pm  Patient has additional oxygen to transport home    Jahaira Lehman RN  Case Management  [facility]  [phone]

## 2024-05-14 NOTE — ASSESSMENT & PLAN NOTE
Patient with Hypercapnic and Hypoxic Respiratory failure which is Acute on chronic.  she is on home oxygen at 3 LPM. Supplemental oxygen was provided and noted-      .   Signs/symptoms of respiratory failure include- tachypnea, increased work of breathing, wheezing, and shortness of breath . Contributing diagnoses includes - CHF and COPD Labs and images were reviewed. Patient Has recent ABG, which has been reviewed. CXR without acute process. Will treat underlying causes and adjust management of respiratory failure as follows-     - known history of diastolic HF- Grade II diastolic dysfunction. BNP <10.   - minimal edema on exam at ankles- switch to IV lasix and monitor UOP- UOP >2L and edema resolved, transition to PO lasix  - known history of COPD on chronic 3L NC and bipap qhs- cont supplemental O2, goal sats >88%, cont bipap qhs  - treatment of COPDe with steroids, abx, breathing treatments  - home inhaler trelegy- breo while admitted and scheduled duoneb  - cont bipap qhs  - resp culture too many epi cells

## 2024-05-15 ENCOUNTER — TELEPHONE (OUTPATIENT)
Dept: PULMONOLOGY | Facility: CLINIC | Age: 69
End: 2024-05-15
Payer: MEDICARE

## 2024-05-15 VITALS
HEART RATE: 84 BPM | TEMPERATURE: 99 F | BODY MASS INDEX: 27.11 KG/M2 | OXYGEN SATURATION: 100 % | WEIGHT: 153 LBS | HEIGHT: 63 IN | SYSTOLIC BLOOD PRESSURE: 103 MMHG | RESPIRATION RATE: 18 BRPM | DIASTOLIC BLOOD PRESSURE: 60 MMHG

## 2024-05-15 LAB
ALBUMIN SERPL BCP-MCNC: 3.4 G/DL (ref 3.5–5.2)
ALP SERPL-CCNC: 45 U/L (ref 55–135)
ALT SERPL W/O P-5'-P-CCNC: 11 U/L (ref 10–44)
ANION GAP SERPL CALC-SCNC: 12 MMOL/L (ref 8–16)
AST SERPL-CCNC: 14 U/L (ref 10–40)
BASOPHILS # BLD AUTO: 0.02 K/UL (ref 0–0.2)
BASOPHILS NFR BLD: 0.2 % (ref 0–1.9)
BILIRUB SERPL-MCNC: 0.2 MG/DL (ref 0.1–1)
BUN SERPL-MCNC: 10 MG/DL (ref 8–23)
CALCIUM SERPL-MCNC: 9.4 MG/DL (ref 8.7–10.5)
CHLORIDE SERPL-SCNC: 93 MMOL/L (ref 95–110)
CO2 SERPL-SCNC: 34 MMOL/L (ref 23–29)
CREAT SERPL-MCNC: 0.7 MG/DL (ref 0.5–1.4)
DIFFERENTIAL METHOD BLD: ABNORMAL
EOSINOPHIL # BLD AUTO: 0 K/UL (ref 0–0.5)
EOSINOPHIL NFR BLD: 0.2 % (ref 0–8)
ERYTHROCYTE [DISTWIDTH] IN BLOOD BY AUTOMATED COUNT: 15.3 % (ref 11.5–14.5)
EST. GFR  (NO RACE VARIABLE): >60 ML/MIN/1.73 M^2
GLUCOSE SERPL-MCNC: 68 MG/DL (ref 70–110)
HCT VFR BLD AUTO: 39.9 % (ref 37–48.5)
HGB BLD-MCNC: 12.5 G/DL (ref 12–16)
IMM GRANULOCYTES # BLD AUTO: 0.03 K/UL (ref 0–0.04)
IMM GRANULOCYTES NFR BLD AUTO: 0.3 % (ref 0–0.5)
LYMPHOCYTES # BLD AUTO: 2.6 K/UL (ref 1–4.8)
LYMPHOCYTES NFR BLD: 23.7 % (ref 18–48)
MAGNESIUM SERPL-MCNC: 2 MG/DL (ref 1.6–2.6)
MCH RBC QN AUTO: 28.7 PG (ref 27–31)
MCHC RBC AUTO-ENTMCNC: 31.3 G/DL (ref 32–36)
MCV RBC AUTO: 92 FL (ref 82–98)
MONOCYTES # BLD AUTO: 0.8 K/UL (ref 0.3–1)
MONOCYTES NFR BLD: 7.2 % (ref 4–15)
NEUTROPHILS # BLD AUTO: 7.4 K/UL (ref 1.8–7.7)
NEUTROPHILS NFR BLD: 68.4 % (ref 38–73)
NRBC BLD-RTO: 0 /100 WBC
PHOSPHATE SERPL-MCNC: 3.5 MG/DL (ref 2.7–4.5)
PLATELET # BLD AUTO: 177 K/UL (ref 150–450)
PMV BLD AUTO: 12.4 FL (ref 9.2–12.9)
POTASSIUM SERPL-SCNC: 4.1 MMOL/L (ref 3.5–5.1)
PROT SERPL-MCNC: 6.4 G/DL (ref 6–8.4)
RBC # BLD AUTO: 4.36 M/UL (ref 4–5.4)
SODIUM SERPL-SCNC: 139 MMOL/L (ref 136–145)
WBC # BLD AUTO: 10.77 K/UL (ref 3.9–12.7)

## 2024-05-15 PROCEDURE — 94660 CPAP INITIATION&MGMT: CPT | Mod: HCNC

## 2024-05-15 PROCEDURE — 94761 N-INVAS EAR/PLS OXIMETRY MLT: CPT | Mod: HCNC

## 2024-05-15 PROCEDURE — 63600175 PHARM REV CODE 636 W HCPCS: Mod: HCNC | Performed by: STUDENT IN AN ORGANIZED HEALTH CARE EDUCATION/TRAINING PROGRAM

## 2024-05-15 PROCEDURE — 83735 ASSAY OF MAGNESIUM: CPT | Mod: HCNC | Performed by: STUDENT IN AN ORGANIZED HEALTH CARE EDUCATION/TRAINING PROGRAM

## 2024-05-15 PROCEDURE — 25000242 PHARM REV CODE 250 ALT 637 W/ HCPCS: Mod: HCNC | Performed by: STUDENT IN AN ORGANIZED HEALTH CARE EDUCATION/TRAINING PROGRAM

## 2024-05-15 PROCEDURE — 85025 COMPLETE CBC W/AUTO DIFF WBC: CPT | Mod: HCNC | Performed by: STUDENT IN AN ORGANIZED HEALTH CARE EDUCATION/TRAINING PROGRAM

## 2024-05-15 PROCEDURE — 94640 AIRWAY INHALATION TREATMENT: CPT | Mod: HCNC

## 2024-05-15 PROCEDURE — 27100171 HC OXYGEN HIGH FLOW UP TO 24 HOURS: Mod: HCNC

## 2024-05-15 PROCEDURE — 80053 COMPREHEN METABOLIC PANEL: CPT | Mod: HCNC | Performed by: STUDENT IN AN ORGANIZED HEALTH CARE EDUCATION/TRAINING PROGRAM

## 2024-05-15 PROCEDURE — 84100 ASSAY OF PHOSPHORUS: CPT | Mod: HCNC | Performed by: STUDENT IN AN ORGANIZED HEALTH CARE EDUCATION/TRAINING PROGRAM

## 2024-05-15 PROCEDURE — 25000003 PHARM REV CODE 250: Mod: HCNC | Performed by: STUDENT IN AN ORGANIZED HEALTH CARE EDUCATION/TRAINING PROGRAM

## 2024-05-15 PROCEDURE — 36415 COLL VENOUS BLD VENIPUNCTURE: CPT | Mod: HCNC | Performed by: STUDENT IN AN ORGANIZED HEALTH CARE EDUCATION/TRAINING PROGRAM

## 2024-05-15 PROCEDURE — 99900035 HC TECH TIME PER 15 MIN (STAT): Mod: HCNC

## 2024-05-15 PROCEDURE — 94760 N-INVAS EAR/PLS OXIMETRY 1: CPT | Mod: HCNC

## 2024-05-15 PROCEDURE — S4991 NICOTINE PATCH NONLEGEND: HCPCS | Mod: HCNC | Performed by: STUDENT IN AN ORGANIZED HEALTH CARE EDUCATION/TRAINING PROGRAM

## 2024-05-15 RX ORDER — GUAIFENESIN 600 MG/1
600 TABLET, EXTENDED RELEASE ORAL 2 TIMES DAILY
Qty: 14 TABLET | Refills: 0 | Status: SHIPPED | OUTPATIENT
Start: 2024-05-15 | End: 2024-05-22

## 2024-05-15 RX ORDER — IPRATROPIUM BROMIDE AND ALBUTEROL SULFATE 2.5; .5 MG/3ML; MG/3ML
3 SOLUTION RESPIRATORY (INHALATION) EVERY 4 HOURS PRN
Qty: 1080 ML | Refills: 3 | Status: SHIPPED | OUTPATIENT
Start: 2024-05-15

## 2024-05-15 RX ORDER — PREDNISONE 20 MG/1
40 TABLET ORAL DAILY
Qty: 2 TABLET | Refills: 0 | Status: SHIPPED | OUTPATIENT
Start: 2024-05-15 | End: 2024-05-16

## 2024-05-15 RX ORDER — FUROSEMIDE 20 MG/1
20 TABLET ORAL DAILY
Qty: 180 TABLET | Refills: 1 | Status: SHIPPED | OUTPATIENT
Start: 2024-05-15 | End: 2024-05-23 | Stop reason: SDUPTHER

## 2024-05-15 RX ORDER — BENZONATATE 100 MG/1
100 CAPSULE ORAL 3 TIMES DAILY PRN
Qty: 30 CAPSULE | Refills: 0 | Status: SHIPPED | OUTPATIENT
Start: 2024-05-15 | End: 2024-05-25

## 2024-05-15 RX ORDER — LEVOFLOXACIN 750 MG/1
750 TABLET ORAL DAILY
Qty: 4 TABLET | Refills: 0 | Status: SHIPPED | OUTPATIENT
Start: 2024-05-15 | End: 2024-05-19

## 2024-05-15 RX ORDER — IBUPROFEN 200 MG
1 TABLET ORAL DAILY
Qty: 28 PATCH | Refills: 3 | Status: SHIPPED | OUTPATIENT
Start: 2024-05-15 | End: 2024-05-29 | Stop reason: SDUPTHER

## 2024-05-15 RX ADMIN — GUAIFENESIN 600 MG: 600 TABLET, EXTENDED RELEASE ORAL at 08:05

## 2024-05-15 RX ADMIN — ATORVASTATIN CALCIUM 10 MG: 10 TABLET, FILM COATED ORAL at 08:05

## 2024-05-15 RX ADMIN — CHOLECALCIFEROL TAB 25 MCG (1000 UNIT) 1000 UNITS: 25 TAB at 08:05

## 2024-05-15 RX ADMIN — IPRATROPIUM BROMIDE AND ALBUTEROL SULFATE 3 ML: 2.5; .5 SOLUTION RESPIRATORY (INHALATION) at 12:05

## 2024-05-15 RX ADMIN — FLUTICASONE FUROATE AND VILANTEROL TRIFENATATE 1 PUFF: 100; 25 POWDER RESPIRATORY (INHALATION) at 08:05

## 2024-05-15 RX ADMIN — NICOTINE 1 PATCH: 14 PATCH, EXTENDED RELEASE TRANSDERMAL at 08:05

## 2024-05-15 RX ADMIN — IPRATROPIUM BROMIDE AND ALBUTEROL SULFATE 3 ML: 2.5; .5 SOLUTION RESPIRATORY (INHALATION) at 08:05

## 2024-05-15 RX ADMIN — BUPROPION HYDROCHLORIDE 150 MG: 75 TABLET, FILM COATED ORAL at 08:05

## 2024-05-15 RX ADMIN — ACETAMINOPHEN 1000 MG: 500 TABLET ORAL at 08:05

## 2024-05-15 RX ADMIN — GABAPENTIN 300 MG: 300 CAPSULE ORAL at 08:05

## 2024-05-15 RX ADMIN — LOSARTAN POTASSIUM 100 MG: 50 TABLET, FILM COATED ORAL at 08:05

## 2024-05-15 RX ADMIN — PREDNISONE 40 MG: 20 TABLET ORAL at 08:05

## 2024-05-15 RX ADMIN — FLUTICASONE PROPIONATE 50 MCG: 50 SPRAY, METERED NASAL at 08:05

## 2024-05-15 RX ADMIN — IPRATROPIUM BROMIDE AND ALBUTEROL SULFATE 3 ML: 2.5; .5 SOLUTION RESPIRATORY (INHALATION) at 05:05

## 2024-05-15 RX ADMIN — OXYCODONE HYDROCHLORIDE AND ACETAMINOPHEN 500 MG: 500 TABLET ORAL at 08:05

## 2024-05-15 RX ADMIN — PANTOPRAZOLE SODIUM 40 MG: 40 TABLET, DELAYED RELEASE ORAL at 08:05

## 2024-05-15 RX ADMIN — CETIRIZINE HYDROCHLORIDE 10 MG: 10 TABLET, FILM COATED ORAL at 08:05

## 2024-05-15 RX ADMIN — FUROSEMIDE 40 MG: 40 TABLET ORAL at 08:05

## 2024-05-15 NOTE — PLAN OF CARE
Problem: Adult Inpatient Plan of Care  Goal: Plan of Care Review  Outcome: Met  Goal: Patient-Specific Goal (Individualized)  Outcome: Met  Goal: Absence of Hospital-Acquired Illness or Injury  Outcome: Met  Goal: Optimal Comfort and Wellbeing  Outcome: Met  Goal: Readiness for Transition of Care  Outcome: Met     Problem: Infection  Goal: Absence of Infection Signs and Symptoms  Outcome: Met     Problem: COPD (Chronic Obstructive Pulmonary Disease)  Goal: Optimal Chronic Illness Coping  Outcome: Met  Goal: Optimal Level of Functional [place]  Outcome: Met  Goal: Absence of Infection Signs and Symptoms  Outcome: Met  Goal: Improved Oral Intake  Outcome: Met  Goal: Effective Oxygenation and Ventilation  Outcome: Met

## 2024-05-15 NOTE — PLAN OF CARE
[facility] - Stepdown Flex ([place] [id number])      HOME HEALTH ORDERS  FACE TO FACE ENCOUNTER    Patient Name: Rochelle Espinoza  YOB: 1955    PCP: Yosi Samuels Jr., MD   PCP Address: [address] [facility] [place] [address]  PCP Phone Number: [phone]  PCP Fax: [phone]    Encounter Date: 5/12/24    Admit to Home Health    Diagnoses:  Active Hospital Problems    Diagnosis  POA    *Acute on chronic respiratory failure with hypoxia and hypercapnia [J96.21, J96.22]  Yes    COPD exacerbation [J44.1]  Yes     Priority: 2     Chronic diastolic heart failure [I50.32]  Yes     Priority: 3     Lightheadedness [R42]  Unknown    Major depressive disorder [F32.9]  Yes    Peripheral polyneuropathy [G62.9]  Yes    Vitamin D deficiency [E55.9]  Yes    Nicotine dependence, cigarettes, uncomplicated  [F17.210]  Yes     Chronic    Mixed hyperlipidemia [E78.2]  Yes    Essential hypertension [I10]  Yes      Resolved Hospital Problems   No resolved problems to display.       Follow Up Appointments:  Future Appointments   Date Time Provider Department Center   5/20/2024  9:30 AM Mare Tubbs MD [facility] IM [facility] PCW   6/10/2024  9:30 AM Shima Paulino MD [facility]   6/18/2024  8:30 AM [facility] OI-MAMMO2 [facility] MAMMOIC Imaging Ctr   9/6/2024  9:40 AM Yosi Samuels Jr., MD [facility] PCW   9/9/2024  8:20 AM [facility] [place]       Allergies:  Review of patient's allergies indicates:   Allergen Reactions    Doxycycline Other (See Comments)     Acid reflux    Orange juice      Swelling in pt tongue      8/23/23 - pt stated she had in hosp and it didn't effect her.       Medications: Review discharge medications with patient and family and provide education.    Current Facility-Administered Medications   Medication Dose Route Frequency Provider Last Rate Last Admin    acetaminophen tablet 1,000 mg  1,000 mg Oral Q8H PRN Mayra Chahal MD   1,000 mg at 05/15/24 0889     acetaminophen tablet 650 mg  650 mg Oral Q4H PRN Mayra Chahal MD   650 mg at 05/14/24 1442    albuterol-ipratropium 2.5 mg-0.5 mg/3 mL nebulizer solution 3 mL  3 mL Nebulization Q4H Mayra Chahal MD   3 mL at 05/15/24 0803    aluminum-magnesium hydroxide-simethicone 200-200-20 mg/5 mL suspension 30 mL  30 mL Oral QID PRN Mayra Chahal MD   30 mL at 05/13/24 2034    ascorbic acid (vitamin C) tablet 500 mg  500 mg Oral Daily Mayra Chahal MD   500 mg at 05/15/24 0828    atorvastatin tablet 10 mg  10 mg Oral Daily Mayra Chahal MD   10 mg at 05/15/24 0829    benzonatate capsule 100 mg  100 mg Oral TID PRN Mayra Chahal MD        bisacodyL suppository 10 mg  10 mg Rectal Daily PRN Mayra Chahal MD        buPROPion tablet 150 mg  150 mg Oral BID Mayra Chahal MD   150 mg at 05/15/24 0828    cefTRIAXone (ROCEPHIN) 2 g in dextrose 5 % in water (D5W) 100 mL IVPB (MB+)  2 g Intravenous Q24H Mayra Chahal MD   Stopped at 05/14/24 1852    cetirizine tablet 10 mg  10 mg Oral Daily Mayra Chahal MD   10 mg at 05/15/24 0828    dextrose 10% bolus 125 mL 125 mL  12.5 g Intravenous PRN Mayra Chahal MD        dextrose 10% bolus 250 mL 250 mL  25 g Intravenous PRMayra Woodall MD        enoxaparin injection 40 mg  40 mg Subcutaneous Daily Mayra Chahal MD   40 mg at 05/13/24 1600    fluticasone furoate-vilanteroL 100-25 mcg/dose diskus inhaler 1 puff  1 puff Inhalation Daily Mayra Chahal MD   1 puff at 05/15/24 0807    fluticasone propionate 50 mcg/actuation nasal spray 50 mcg  1 spray Each Nostril Daily Mayra Chahal MD   50 mcg at 05/15/24 0829    furosemide tablet 40 mg  40 mg Oral Daily Mayra Chahal MD   40 mg at 05/15/24 0828    gabapentin capsule 300 mg  300 mg Oral TID Mayra Chahal MD   300 mg at 05/15/24 0829    glucagon (human recombinant) injection 1 mg  1 mg Intramuscular PRN Mayra Chahal MD        glucose chewable tablet 16 g  16 g Oral PRN Mayra Chahal MD         glucose chewable tablet 24 g  24 g Oral PRN Mayra Chahal MD        guaiFENesin 12 hr tablet 600 mg  600 mg Oral BID Mayra Chahal MD   600 mg at 05/15/24 0828    losartan tablet 100 mg  100 mg Oral Daily Mayra Chahal MD   100 mg at 05/15/24 0828    melatonin tablet 6 mg  6 mg Oral Nightly PRDeyvi Cevallos MD   6 mg at 05/14/24 2116    naloxone 0.4 mg/mL injection 0.02 mg  0.02 mg Intravenous PRN Mayra Chahal MD        nicotine 14 mg/24 hr 1 patch  1 patch Transdermal Daily Mayra Chahal MD   1 patch at 05/15/24 0828    ondansetron disintegrating tablet 8 mg  8 mg Oral Q8H PRMayra Woodall MD   8 mg at 05/12/24 1941    pantoprazole EC tablet 40 mg  40 mg Oral Daily Mayra Chahal MD   40 mg at 05/15/24 0828    polyethylene glycol packet 17 g  17 g Oral Daily PRN Mayra Chahal MD        predniSONE tablet 40 mg  40 mg Oral Daily Mayra Chahal MD   40 mg at 05/15/24 0829    prochlorperazine injection Soln 5 mg  5 mg Intravenous Q6H PRMayra Woodall MD        simethicone chewable tablet 80 mg  1 tablet Oral QID PRN Mayra Chahal MD        sodium chloride 0.9% flush 10 mL  10 mL Intravenous PRN Deyvi Suazo MD        sodium chloride 0.9% flush 3 mL  3 mL Intravenous PRN Myara Chahal MD        sodium chloride 0.9% flush 5 mL  5 mL Intravenous PRN Mayra Chahal MD        vitamin D 1000 units tablet 1,000 Units  1,000 Units Oral Daily Mayra Chahal MD   1,000 Units at 05/15/24 0828     Current Discharge Medication List        START taking these medications    Details   benzonatate (TESSALON) 100 MG capsule Take 1 capsule (100 mg total) by mouth 3 (three) times daily as needed for Cough.  Qty: 30 capsule, Refills: 0      guaiFENesin (MUCINEX) 600 mg 12 hr tablet Take 1 tablet (600 mg total) by mouth 2 (two) times daily. for 7 days  Qty: 14 tablet, Refills: 0      levoFLOXacin (LEVAQUIN) 750 MG tablet Take 1 tablet (750 mg total) by mouth once daily. for 4  days  Qty: 4 tablet, Refills: 0      nicotine (NICODERM CQ) 14 mg/24 hr Place 1 patch onto the skin once daily.  Qty: 28 patch, Refills: 3      predniSONE (DELTASONE) 20 MG tablet Take 2 tablets (40 mg total) by mouth once daily. for 1 day  Qty: 2 tablet, Refills: 0           CONTINUE these medications which have CHANGED    Details   albuterol-ipratropium (DUO-NEB) 2.5 mg-0.5 mg/3 mL nebulizer solution Take 3 mLs by nebulization every 4 (four) hours as needed for Wheezing or Shortness of Breath. Rescue  Qty: 1080 mL, Refills: 3    Associated Diagnoses: At high risk for respiratory infection      furosemide (LASIX) 20 MG tablet Take 1 tablet (20 mg total) by mouth once daily. Take 1-2 by mouth everyday  Qty: 180 tablet, Refills: 1    Comments: Increase to 40 mg for leg swelling or weight gain           CONTINUE these medications which have NOT CHANGED    Details   albuterol (PROVENTIL/VENTOLIN HFA) 90 mcg/actuation inhaler Inhale 2 puffs into the lungs every 4 (four) hours as needed for Wheezing or Shortness of Breath. Rescue  Qty: 8 g, Refills: 11    Associated Diagnoses: Chronic obstructive pulmonary disease with hypoxia; Bronchospasm, acute      ascorbic acid, vitamin C, (VITAMIN C) 500 MG tablet Take 500 mg by mouth once daily.      atorvastatin (LIPITOR) 10 MG tablet Take 1 tablet (10 mg total) by mouth once daily.  Qty: 90 tablet, Refills: 3      buPROPion (WELLBUTRIN SR) 150 MG TBSR 12 hr tablet Take 1 tablet (150 mg total) by mouth 2 (two) times daily.  Qty: 180 tablet, Refills: 4      cetirizine (ZYRTEC) 10 MG tablet Take 1 tablet (10 mg total) by mouth once daily.  Qty: 90 tablet, Refills: 3      cholecalciferol, vitamin D3, (VITAMIN D3) 25 mcg (1,000 unit) capsule Take 1,000 Units by mouth once daily.      diclofenac (VOLTAREN) 50 MG EC tablet Take 1 tablet (50 mg total) by mouth 2 (two) times daily as needed (arthritis pain).  Qty: 90 tablet, Refills: 1      fluticasone propionate (FLONASE) 50  mcg/actuation nasal spray SHAKE LIQUID AND USE 1 SPRAY (50MCG) IN EACH NOSTRIL TWICE DAILY  Qty: 48 g, Refills: 1      fluticasone-umeclidin-vilanter (TRELEGY ELLIPTA) 100-62.5-25 mcg DsDv Take 1 puff by mouth once daily.  Qty: 3 each, Refills: 4      fluticasone-umeclidin-vilanter (TRELEGY ELLIPTA) 200-62.5-25 mcg inhaler Inhale 1 puff into the lungs once daily.  Qty: 3 each, Refills: 4      gabapentin (NEURONTIN) 300 MG capsule TAKE 1 CAPSULE THREE TIMES DAILY  Qty: 270 capsule, Refills: 3      glycerin adult suppository Place 1 suppository rectally as needed for Constipation.      losartan (COZAAR) 100 MG tablet Take 1 tablet (100 mg total) by mouth once daily.  Qty: 90 tablet, Refills: 3    Comments: .      magnesium 250 mg Tab Take 250 mg by mouth once.      pantoprazole (PROTONIX) 40 MG tablet Take 1 tablet (40 mg total) by mouth once daily.  Qty: 90 tablet, Refills: 3               I have seen and examined this patient within the last 30 days. My clinical findings that support the need for the home health skilled services and home bound status are the following:no   Weakness/numbness causing balance and gait disturbance due to COPD Exacerbation making it taxing to leave home.     Diet:   cardiac diet    Labs:  none    Referrals/ Consults  Physical Therapy to evaluate and treat. Evaluate for home safety and equipment needs; Establish/upgrade home exercise program. Perform / instruct on therapeutic exercises, gait training, transfer training, and Range of Motion.  Occupational Therapy to evaluate and treat. Evaluate home environment for safety and equipment needs. Perform/Instruct on transfers, ADL training, ROM, and therapeutic exercises.   to evaluate for community resources/long-range planning.  Aide to provide assistance with personal care, ADLs, and vital signs.    Activities:   activity as tolerated    Nursing:   Agency to admit patient within 24 hours of hospital discharge unless specified  on physician order or at patient request    SN to complete comprehensive assessment including routine vital signs. Instruct on disease process and s/s of complications to report to MD. Review/verify medication list sent home with the patient at time of discharge  and instruct patient/caregiver as needed. Frequency may be adjusted depending on start of care date.     Skilled nurse to perform up to 3 visits PRN for symptoms related to diagnosis    Notify MD if SBP > 160 or < 90; DBP > 90 or < 50; HR > 120 or < 50; Temp > 101; O2 < 88%; Other:       Ok to schedule additional visits based on staff availability and patient request on consecutive days within the home health episode.    When multiple disciplines ordered:    Start of Care occurs on Sunday - Wednesday schedule remaining discipline evaluations as ordered on separate consecutive days following the start of care.    Thursday SOC -schedule subsequent evaluations Friday and Monday the following week.     Friday - Saturday SOC - schedule subsequent discipline evaluations on consecutive days starting Monday of the following week.    For all post-discharge communication and subsequent orders please contact patient's primary care physician. If unable to reach primary care physician or do not receive response within 30 minutes, please contact pcp for clinical staff order clarification    Miscellaneous   Heart Failure:      SN to instruct on the following:    Instruct on the definition of CHF.   Instruct on the signs/sympoms of CHF to be reported.   Instruct on and monitor daily weights.   Instruct on factors that cause exacerbation.   Instruct on action, dose, schedule, and side effects of medications.   Instruct on diet as prescribed.   Instruct on activity allowed.   Instruct on life-style modifications for life long management of CHF   SN to assess compliance with daily weights, diet, medications, fluid retention,    safety precautions, activities permitted and  life-style modifications.   Additional 1-2 SN visits per week as needed for signs and symptoms     of CHF exacerbation.      For Weight Gain > 2-3 lbs in 1 day or 4-6 lbs over 1 week notify PCP:  CHF DIURETIC SLIDING SCALE     Skilled nurse visits daily to instruct and monitor medication adherence until target weight. Then resume prior order frequency.     If weight gain exceeds 5 lbs over target weight, call MD  If weight gain of 3-4 lbs over target weight, then:     Increase Furosemide - current dose (mg/day) to 40 double dose and frequency of daily until target weight achieved or maximum 3 days.     If already on max oral daily dose, see IV diuretic instructions.    After 3 days of increased oral diuretic dose, get BMP. If patient is on increased oral diuretic dose greater than 5 days, repeat BMP at day 7 of increased dose.     Potassium supplementation   Scr > 1.5 mg/dl  Scr  1.5 mg/dl    K < 3.0 - NOTIFY MD and 40 mEq bid  40 mEq tid   K- 3.1-3.3  20 mEq bid  20 mEq tid    K 3.4-3.7  10 mEq bid  10 mEq tid      If target weight not reached after 5 days of increased oral diuretic, proceed to IV diuretic with daily patient contact to include face-to-face visit and telephone encounters.       Home Oxygen:  Oxygen at 3 L/min nasal canula to be used:  Continuously., Assess oxygen saturation via pulse oximeter as needed for increase in SOB., Notify physician if oxygen saturation less than 88%, and Consult respiratory therapy for instruction on home oxygen use  [facility]  Home Health Aide:  Nursing Three times weekly, Physical Therapy Three times weekly, Occupational Therapy Three times weekly, Respiratory Therapy Three times weekly, and Home Health Aide Three times weekly    Wound Care Orders  no    I certify that this patient is confined to her home and needs intermittent skilled nursing care, physical therapy, and occupational therapy.

## 2024-05-15 NOTE — NURSING
Pt D/C home. Pt D/C home with family. D/C instructions reviewed with pt, pt voiced understanding. Home meds delivered from pharmacy.

## 2024-05-15 NOTE — PLAN OF CARE
Problem: Adult Inpatient Plan of Care  Goal: Plan of Care Review  Outcome: Progressing  Goal: Patient-Specific Goal (Individualized)  Outcome: Progressing  Goal: Absence of Hospital-Acquired Illness or Injury  Outcome: Progressing  Goal: Optimal Comfort and Wellbeing  Outcome: Progressing  Goal: Readiness for Transition of Care  Outcome: Progressing     Problem: Infection  Goal: Absence of Infection Signs and Symptoms  Outcome: Progressing     Problem: COPD (Chronic Obstructive Pulmonary Disease)  Goal: Optimal Chronic Illness Coping  Outcome: Progressing  Goal: Optimal Level of Functional [place]  Outcome: Progressing  Goal: Absence of Infection Signs and Symptoms  Outcome: Progressing  Goal: Improved Oral Intake  Outcome: Progressing  Goal: Effective Oxygenation and Ventilation  Outcome: Progressing

## 2024-05-15 NOTE — PLAN OF CARE
[facility] - Stepdown Flex ([place])  Discharge Final Note    Primary Care Provider: Yosi Samuels Jr., MD    Expected Discharge Date: 5/15/2024    Final Discharge Note (most recent)       Final Note - 05/15/24 1352          Final Note    Hospital Resources/Appts/Education Provided Appointments scheduled and added to AVS        Post-Acute Status    Post-Acute Authorization Home Health     Home Health Status Set-up Complete/Auth obtained     Other Status Community Services     Discharge Delays None known at this time                     Important Message from Medicare  Important Message from Medicare regarding Discharge Appeal Rights: Given to patient/caregiver, Explained to patient/caregiver, Signed/date by patient/caregiver     Date IMM was signed: 05/14/24  Time IMM was signed: 1010    Contact Info       Yosi Samuels Jr., MD   Specialty: Internal Medicine   Relationship: PCP - General  Hyperlipidemia Digital Medicine Responsible Provider    [address]  [facility] [address]   Phone: [phone]       Next Steps: Schedule an appointment as soon as possible for a visit in 1 week(s)          Future Appointments   Date Time Provider Department Center   5/20/2024  9:30 AM Mare Tubbs MD [facility] IM [facility] PCW   6/10/2024  9:30 AM Shima Paulino MD [facility]   6/18/2024  8:30 AM [facility] OIC-MAMMO2 [facility] MAMMO[facility] Imaging Ctr   9/6/2024  9:40 AM Yosi Samuels Jr., MD [facility] PCW   9/9/2024  8:20 AM Karla Ni OD [facility] OPTOMTY [place]         CM met with patient  and discussed discharge plans, patient to [place] home with  .  Patients  medications delivered to bedside. No  HME/DME delivered  to bedside and follow up appointment[s] scheduled.   Transportation provided by family.      Jahaira Lehman RN  Case Management  [facility]  [phone]

## 2024-05-15 NOTE — NURSING
Pt AAOx4, ambulatory without assistance, VSS. Pt complained of abx giving her a headache. Pt was given tylenol and 100% relief was obtained. Pt believes she is ready to go home today.

## 2024-05-15 NOTE — TELEPHONE ENCOUNTER
Patient scheduled for hospital follow up with Dr Paulino on 6/10/24 at 9:30am. Appointment mailed.

## 2024-05-15 NOTE — PLAN OF CARE
Discharge Plan A and Plan B have been determined by review of patient's clinical status, future medical and therapeutic needs, and coverage/benefits for post-acute care in coordination with multidisciplinary team members.      05/15/24 1352   Post-Acute Status   Post-Acute Authorization Home Health   Home Health Status Set-up Complete/Auth obtained   Other Status [facility]   Hospital Resources/Appts/Education Provided Appointments scheduled and added to AVS   Discharge Delays None known at this time   Discharge Plan   Discharge Plan A Home Health  (STAT  [phone])   Discharge Plan B Home with family     CM met with patient  to discuss any changes in discharge planning.  Patients plan is to do dc home with home health services.   No changes in DC plans. ELIZABETH: 5/15/24    1:45 pm   uploaded in Care Julius Lehman RN  Case Management  [facility]  [phone]

## 2024-05-16 ENCOUNTER — PATIENT OUTREACH (OUTPATIENT)
Dept: ADMINISTRATIVE | Facility: CLINIC | Age: 69
End: 2024-05-16
Payer: MEDICARE

## 2024-05-16 NOTE — DISCHARGE SUMMARY
[facility] ([place] [id number])  [facility] Medicine  Discharge Summary      Patient Name: Rochelle Espinoza  MRN: [id number]  HOANG: [id number]  Patient Class: IP- Inpatient  Admission Date: 5/12/2024  Hospital Length of Stay: 2 days  Discharge Date and Time: 5/15/2024  2:30 PM  Attending Physician: Shweta att. providers found   Discharging Provider: Mayra Chahal MD  Primary Care Provider: Yosi Samuels Jr., MD  [facility] Medicine Team: [facility] HOSP MED A Mayra Chahal MD  Primary Care Team: [facility] HOSP MED A    HPI:   Ms. Rochelle Espinoza is a 69 y/o F with hx of COPD, chronic respiratory failure on 3LNC at home/bipap qhs, HFpEF, tobacco use, HTN, HLD, MDD, VDD who presented to the ED for evaluation of worsening SOB and LE swelling.  Patient states over the past couple of days she has noticed worsening LE swelling to the point that she can't walk due to pain and discomfort in her feet. She takes lasix daily at home consistently with normally good UOP however she has not noticed any significant increase over the past couple of days. She also reports a worsening cough and increased sputum production. Denies fever. She denies any recent sick contacts though was at a social gathering a few days ago. At baseline patient reports notable JACKSON and orthopnea as well as abdominal swelling; she sleeps sitting up in a chair with bipap.      in the ED, patient afebrile, SBP 120s, HR 90s. Initially hypoxic and placed on bipap. CBC grossly unremarkable, no leukocytosis, worsening anemia, or thrombocytopenia. Troponin/BNP wnl. Initial VBG demonstrating a compensated respiratory acidosis with pH 7.38 and pCO2 82, pO2 21. repeat VBG with pH 7.31 and CO2 88. MICU consulted for acute on chronic respiratory failure requiring bipap and evaluated patient- deemed stable for floor.     Also reports fatigue and hallucination- saws she saw a miller on the way to the ED.   Denies recent abx or chronic steroid use  Weaned off of bipap and back on 3 L NC with  stable sats >88, but drops to 84% when speaking           * No surgery found *      Hospital Course:   Admitted for hypoxic respiratory failure 2/2 COPDe. Initially on Bipap in ED, weaned to 3L NC (home O2) with bipap qhs. Started on steroids, IV abx, scheduled breathing treatments. Resp culture too many epi cells.  Abx switched to PO levaquin.   Mild swelling in ankles, BNP <10. IV lasix started, swelling improved and switched back to PO lasix.   Lightheadedness with standing orthostatic vitals negative.   Breathing improved and back on home O2, stable for discharge with pulm followup. Complete steroid and abx course at discharge. Cont breathing treatments PRN and daily inhaler for COPD.  ordered for PT.OT  and respiratory.      Goals of Care Treatment Preferences:  Code Status: Full Code      Consults:   Consults (From admission, onward)          Status Ordering Provider     Inpatient consult to Critical Care Medicine  Once        Provider:  (Not yet assigned)    IRENE Wynn III            Neuro  Peripheral polyneuropathy  - cont gabapentin      Psychiatric  Major depressive disorder  Patient has persistent depression which is mild and is currently controlled. Will Continue anti-depressant medications. We will not consult psychiatry at this time. Patient does not display psychosis at this time. Continue to monitor closely and adjust plan of care as needed.  - reported hallucination on the way to hospital- no signs of psychosis on exam/interview, monitor  - no further hallucinations reported        Pulmonary  * Acute on chronic respiratory failure with hypoxia and hypercapnia  Patient with Hypercapnic and Hypoxic Respiratory failure which is Acute on chronic.  she is on home oxygen at 3 LPM. Supplemental oxygen was provided and noted-      .   Signs/symptoms of respiratory failure include- tachypnea, increased work of breathing, wheezing, and shortness of breath . Contributing diagnoses includes -  CHF and COPD Labs and images were reviewed. Patient Has recent ABG, which has been reviewed. CXR without acute process. Will treat underlying causes and adjust management of respiratory failure as follows-     - known history of diastolic HF- Grade II diastolic dysfunction. BNP <10.   - minimal edema on exam at ankles- switch to IV lasix and monitor UOP- UOP >2L and edema resolved, transition to PO lasix  - known history of COPD on chronic 3L NC and bipap qhs- cont supplemental O2, goal sats >88%, cont bipap qhs  - treatment of COPDe with steroids, abx, breathing treatments  - home inhaler trelegy- breo while admitted and scheduled duoneb  - cont bipap qhs  - resp culture too many epi cells- complete levaquin course at discharge     COPD exacerbation  Patient's COPD is with exacerbation noted by worsening of baseline hypoxia currently, increased sputum and cough.  Patient is currently on COPD Pathway. Continue scheduled inhalers Steroids, Antibiotics, and Supplemental oxygen and monitor respiratory status closely.   - cont supplemental O2, goal sats >88%  - resp culture too many epi cells- cont IV abx - switch to levaquin at discharge  - scheduled duonebs and breo  - cont CTX/azithro - switch to levaquin at discharge   - COVID /Flu/ RSV negative  - CXR no acute process  - current smoker- cessation and nicotine patch   - mucinex and tesselon     Cardiac/Vascular  Mixed hyperlipidemia  - cont statin      Essential hypertension  Chronic, controlled. Latest blood pressure and vitals reviewed-      .   Home meds for hypertension were reviewed and noted below.   Hypertension Medications               furosemide (LASIX) 20 MG tablet Take 1-2 po q day    losartan (COZAAR) 100 MG tablet Take 1 tablet (100 mg total) by mouth once daily.            While in the hospital, will manage blood pressure as follows; Adjust home antihypertensive regimen as follows- switch to IV lasix- transition back to PO    Will utilize p.r.n. blood  pressure medication only if patient's blood pressure greater than 180/110 and she develops symptoms such as worsening chest pain or shortness of breath.    Chronic diastolic heart failure  - last ECHO with Grade II diastolic dysfunction   - BNP admit <10  - mild pitting edema at ankles- resolved  - start IV lasix and monitor UOP- transition back to PO lasix        Endocrine  Vitamin D deficiency  - cont supplementation       Other  Lightheadedness  - lightheaded upon standing and reports feeling weak- discussed placement and declined  - check orthostatic BP - negative   - PT/OT with       Nicotine dependence, cigarettes, uncomplicated   Dangers of cigarette smoking were reviewed with patient in detail. Patient was  refer to smoking cessation at discharge  Nicotine replacement options were discussed. Nicotine replacement was discussed- prescribed      Final Active Diagnoses:    Diagnosis Date Noted POA    PRINCIPAL PROBLEM:  Acute on chronic respiratory failure with hypoxia and hypercapnia [J96.21, J96.22] 02/07/2016 Yes    COPD exacerbation [J44.1] 12/30/2021 Yes    Chronic diastolic heart failure [I50.32] 10/23/2022 Yes    Lightheadedness [R42] 05/14/2024 No    Major depressive disorder [F32.9] 08/06/2023 Yes    Peripheral polyneuropathy [G62.9] 10/23/2022 Yes    Vitamin D deficiency [E55.9] 10/23/2022 Yes    Nicotine dependence, cigarettes, uncomplicated  [F17.210] 06/20/2022 Yes     Chronic    Mixed hyperlipidemia [E78.2] 12/30/2021 Yes    Essential hypertension [I10]  Yes      Problems Resolved During this Admission:       Discharged Condition: stable    Disposition: Home or Self Care    Follow Up:   Follow-up Information       Yosi Samuels Jr., MD. Schedule an appointment as soon as possible for a visit in 1 week(s).    Specialty: Internal Medicine  Contact information:  [address] [facility] [id number] [phone]                           Patient Instructions:      Ambulatory referral/consult  to Pulmonary Rehab   Standing Status: Future   Referral Priority: Routine Referral Type: Consultation   Referral Reason: Specialty Services Required   Requested Specialty: Pulmonary Disease   Number of Visits Requested: 1     Ambulatory referral/consult to COPD Discharge Clinic   Standing Status: Future   Referral Priority: Routine Referral Type: Consultation   Referral Reason: Specialty Services Required   Requested Specialty: Pulmonary Disease   Number of Visits Requested: 1     Ambulatory referral/consult to Pulmonology   Standing Status: Future   Referral Priority: Urgent Referral Type: Consultation   Referral Reason: Specialty Services Required   Requested Specialty: Pulmonary Disease   Number of Visits Requested: 1     Ambulatory referral/consult to Smoking Cessation Program   Standing Status: Future   Referral Priority: Routine Referral Type: Consultation   Referral Reason: Specialty Services Required   Requested Specialty: CTTS   Number of Visits Requested: 1     Diet Cardiac     Diet Cardiac     Notify your health care provider if you experience any of the following:  temperature >100.4     Notify your health care provider if you experience any of the following:  severe uncontrolled pain     Notify your health care provider if you experience any of the following:  difficulty breathing or increased cough     Notify your health care provider if you experience any of the following:  increased confusion or weakness     Notify your health care provider if you experience any of the following:  temperature >100.4     Notify your health care provider if you experience any of the following:  redness, tenderness, or signs of infection (pain, swelling, redness, odor or green/yellow discharge around incision site)     Notify your health care provider if you experience any of the following:  difficulty breathing or increased cough     Notify your health care provider if you experience any of the following:  persistent  dizziness, light-headedness, or visual disturbances     Notify your health care provider if you experience any of the following:  increased confusion or weakness     Activity as tolerated     Activity as tolerated       Significant Diagnostic Studies: Labs: All labs within the past 24 hours have been reviewed    Pending Diagnostic Studies:       None           Medications:  Reconciled Home Medications:      Medication List        START taking these medications      benzonatate 100 MG capsule  Commonly known as: TESSALON  Take 1 capsule (100 mg total) by mouth 3 (three) times daily as needed for Cough.     levoFLOXacin 750 MG tablet  Commonly known as: LEVAQUIN  Take 1 tablet (750 mg total) by mouth once daily. for 4 days     MUCUS RELIEF  mg 12 hr tablet  Generic drug: guaiFENesin  Take 1 tablet (600 mg total) by mouth 2 (two) times daily. for 7 days     nicotine 14 mg/24 hr  Commonly known as: NICODERM CQ  Place 1 patch onto the skin once daily.     predniSONE 20 MG tablet  Commonly known as: DELTASONE  Take 2 tablets (40 mg total) by mouth once daily. for 1 day            CHANGE how you take these medications      albuterol-ipratropium 2.5 mg-0.5 mg/3 mL nebulizer solution  Commonly known as: DUO-NEB  Take 3 mLs by nebulization every 4 (four) hours as needed for Wheezing or Shortness of Breath. Rescue  What changed: reasons to take this     furosemide 20 MG tablet  Commonly known as: LASIX  Take 1 tablet (20 mg total) by mouth once daily. Take 1-2 by mouth everyday  What changed:   how much to take  how to take this  when to take this  additional instructions            CONTINUE taking these medications      albuterol 90 mcg/actuation inhaler  Commonly known as: PROVENTIL/VENTOLIN HFA  Inhale 2 puffs into the lungs every 4 (four) hours as needed for Wheezing or Shortness of Breath. Rescue     ascorbic acid (vitamin C) 500 MG tablet  Commonly known as: VITAMIN C  Take 500 mg by mouth once daily.      atorvastatin 10 MG tablet  Commonly known as: LIPITOR  Take 1 tablet (10 mg total) by mouth once daily.     buPROPion 150 MG TBSR 12 hr tablet  Commonly known as: WELLBUTRIN SR  Take 1 tablet (150 mg total) by mouth 2 (two) times daily.     cetirizine 10 MG tablet  Commonly known as: ZYRTEC  Take 1 tablet (10 mg total) by mouth once daily.     cholecalciferol (vitamin D3) 25 mcg (1,000 unit) capsule  Commonly known as: VITAMIN D3  Take 1,000 Units by mouth once daily.     diclofenac 50 MG EC tablet  Commonly known as: VOLTAREN  Take 1 tablet (50 mg total) by mouth 2 (two) times daily as needed (arthritis pain).     fluticasone propionate 50 mcg/actuation nasal spray  Commonly known as: FLONASE  SHAKE LIQUID AND USE 1 SPRAY (50MCG) IN EACH NOSTRIL TWICE DAILY     gabapentin 300 MG capsule  Commonly known as: NEURONTIN  TAKE 1 CAPSULE THREE TIMES DAILY     glycerin adult suppository  Place 1 suppository rectally as needed for Constipation.     losartan 100 MG tablet  Commonly known as: COZAAR  Take 1 tablet (100 mg total) by mouth once daily.     magnesium 250 mg Tab  Take 250 mg by mouth once.     pantoprazole 40 MG tablet  Commonly known as: PROTONIX  Take 1 tablet (40 mg total) by mouth once daily.     * TRELEGY ELLIPTA 200-62.5-25 mcg inhaler  Generic drug: fluticasone-umeclidin-vilanter  Inhale 1 puff into the lungs once daily.     * fluticasone-umeclidin-vilanter 100-62.5-25 mcg Dsdv  Commonly known as: TRELEGY ELLIPTA  Take 1 puff by mouth once daily.           * This list has 2 medication(s) that are the same as other medications prescribed for you. Read the directions carefully, and ask your doctor or other care provider to review them with you.                  Indwelling Lines/Drains at time of discharge:   Lines/Drains/Airways       None                   Time spent on the discharge of patient: 45 minutes of time spent on discharge, including examining the patient, providing discharge instructions,  arranging followup and documentation.          Mayra Chahal MD  Department of Hospital Medicine  [facility] ([place])

## 2024-05-16 NOTE — ASSESSMENT & PLAN NOTE
Patient's COPD is with exacerbation noted by worsening of baseline hypoxia currently, increased sputum and cough.  Patient is currently on COPD Pathway. Continue scheduled inhalers Steroids, Antibiotics, and Supplemental oxygen and monitor respiratory status closely.   - cont supplemental O2, goal sats >88%  - resp culture too many epi cells- cont IV abx - switch to levaquin at discharge  - scheduled duonebs and breo  - cont CTX/azithro - switch to levaquin at discharge   - COVID /Flu/ RSV negative  - CXR no acute process  - current smoker- cessation and nicotine patch   - mucinex and viviana

## 2024-05-16 NOTE — ASSESSMENT & PLAN NOTE
Patient with Hypercapnic and Hypoxic Respiratory failure which is Acute on chronic.  she is on home oxygen at 3 LPM. Supplemental oxygen was provided and noted-      .   Signs/symptoms of respiratory failure include- tachypnea, increased work of breathing, wheezing, and shortness of breath . Contributing diagnoses includes - CHF and COPD Labs and images were reviewed. Patient Has recent ABG, which has been reviewed. CXR without acute process. Will treat underlying causes and adjust management of respiratory failure as follows-     - known history of diastolic HF- Grade II diastolic dysfunction. BNP <10.   - minimal edema on exam at ankles- switch to IV lasix and monitor UOP- UOP >2L and edema resolved, transition to PO lasix  - known history of COPD on chronic 3L NC and bipap qhs- cont supplemental O2, goal sats >88%, cont bipap qhs  - treatment of COPDe with steroids, abx, breathing treatments  - home inhaler trelegy- breo while admitted and scheduled duoneb  - cont bipap qhs  - resp culture too many epi cells- complete levaquin course at discharge

## 2024-05-16 NOTE — ASSESSMENT & PLAN NOTE
- lightheaded upon standing and reports feeling weak- discussed placement and declined  - check orthostatic BP - negative   - PT/OT with HH

## 2024-05-16 NOTE — ASSESSMENT & PLAN NOTE
Chronic, controlled. Latest blood pressure and vitals reviewed-      .   Home meds for hypertension were reviewed and noted below.   Hypertension Medications               furosemide (LASIX) 20 MG tablet Take 1-2 po q day    losartan (COZAAR) 100 MG tablet Take 1 tablet (100 mg total) by mouth once daily.            While in the hospital, will manage blood pressure as follows; Adjust home antihypertensive regimen as follows- switch to IV lasix- transition back to PO    Will utilize p.r.n. blood pressure medication only if patient's blood pressure greater than 180/110 and she develops symptoms such as worsening chest pain or shortness of breath.

## 2024-05-17 NOTE — PROGRESS NOTES
C3 nurse spoke with Rochelle Espinoza  for a TCC post hospital discharge follow up call. The patient has a scheduled HOSFU appointment with LIMA Tubbs on 05/20/2024 @ [id number].    Message sent to PCP staff

## 2024-05-17 NOTE — PROGRESS NOTES
C3 nurse attempted to contact Rochelle Espinoza  for a TCC post hospital discharge follow up call. No answer. Left voicemail with callback information. The patient has a scheduled HOSFU appointment with LIMA Tubbs on 05/20/2024 @ [id number].     Message sent to PCP staff.

## 2024-05-23 ENCOUNTER — OFFICE VISIT (OUTPATIENT)
Dept: INTERNAL MEDICINE | Facility: CLINIC | Age: 69
End: 2024-05-23
Payer: MEDICARE

## 2024-05-23 VITALS
DIASTOLIC BLOOD PRESSURE: 66 MMHG | WEIGHT: 150.56 LBS | HEART RATE: 76 BPM | OXYGEN SATURATION: 94 % | BODY MASS INDEX: 26.68 KG/M2 | HEIGHT: 63 IN | SYSTOLIC BLOOD PRESSURE: 118 MMHG

## 2024-05-23 DIAGNOSIS — Z09 HOSPITAL DISCHARGE FOLLOW-UP: ICD-10-CM

## 2024-05-23 DIAGNOSIS — J44.1 COPD EXACERBATION: Primary | ICD-10-CM

## 2024-05-23 DIAGNOSIS — Z76.0 ENCOUNTER FOR MEDICATION REFILL: ICD-10-CM

## 2024-05-23 PROCEDURE — 3288F FALL RISK ASSESSMENT DOCD: CPT | Mod: HCNC,CPTII,S$GLB, | Performed by: STUDENT IN AN ORGANIZED HEALTH CARE EDUCATION/TRAINING PROGRAM

## 2024-05-23 PROCEDURE — 1101F PT FALLS ASSESS-DOCD LE1/YR: CPT | Mod: HCNC,CPTII,S$GLB, | Performed by: STUDENT IN AN ORGANIZED HEALTH CARE EDUCATION/TRAINING PROGRAM

## 2024-05-23 PROCEDURE — 3074F SYST BP LT 130 MM HG: CPT | Mod: HCNC,CPTII,S$GLB, | Performed by: STUDENT IN AN ORGANIZED HEALTH CARE EDUCATION/TRAINING PROGRAM

## 2024-05-23 PROCEDURE — 4010F ACE/ARB THERAPY RXD/TAKEN: CPT | Mod: HCNC,CPTII,S$GLB, | Performed by: STUDENT IN AN ORGANIZED HEALTH CARE EDUCATION/TRAINING PROGRAM

## 2024-05-23 PROCEDURE — 1160F RVW MEDS BY RX/DR IN RCRD: CPT | Mod: HCNC,CPTII,S$GLB, | Performed by: STUDENT IN AN ORGANIZED HEALTH CARE EDUCATION/TRAINING PROGRAM

## 2024-05-23 PROCEDURE — 1126F AMNT PAIN NOTED NONE PRSNT: CPT | Mod: HCNC,CPTII,S$GLB, | Performed by: STUDENT IN AN ORGANIZED HEALTH CARE EDUCATION/TRAINING PROGRAM

## 2024-05-23 PROCEDURE — 3008F BODY MASS INDEX DOCD: CPT | Mod: HCNC,CPTII,S$GLB, | Performed by: STUDENT IN AN ORGANIZED HEALTH CARE EDUCATION/TRAINING PROGRAM

## 2024-05-23 PROCEDURE — 3078F DIAST BP <80 MM HG: CPT | Mod: HCNC,CPTII,S$GLB, | Performed by: STUDENT IN AN ORGANIZED HEALTH CARE EDUCATION/TRAINING PROGRAM

## 2024-05-23 PROCEDURE — 1159F MED LIST DOCD IN RCRD: CPT | Mod: HCNC,CPTII,S$GLB, | Performed by: STUDENT IN AN ORGANIZED HEALTH CARE EDUCATION/TRAINING PROGRAM

## 2024-05-23 PROCEDURE — 99213 OFFICE O/P EST LOW 20 MIN: CPT | Mod: HCNC,S$GLB,, | Performed by: STUDENT IN AN ORGANIZED HEALTH CARE EDUCATION/TRAINING PROGRAM

## 2024-05-23 PROCEDURE — 1111F DSCHRG MED/CURRENT MED MERGE: CPT | Mod: HCNC,CPTII,S$GLB, | Performed by: STUDENT IN AN ORGANIZED HEALTH CARE EDUCATION/TRAINING PROGRAM

## 2024-05-23 PROCEDURE — 99999 PR PBB SHADOW E&M-EST. PATIENT-LVL V: CPT | Mod: PBBFAC,HCNC,, | Performed by: STUDENT IN AN ORGANIZED HEALTH CARE EDUCATION/TRAINING PROGRAM

## 2024-05-23 RX ORDER — VIT C/E/ZN/COPPR/LUTEIN/ZEAXAN 250MG-90MG
1000 CAPSULE ORAL DAILY
Qty: 90 CAPSULE | Refills: 3 | Status: SHIPPED | OUTPATIENT
Start: 2024-05-23 | End: 2025-05-18

## 2024-05-23 RX ORDER — FUROSEMIDE 20 MG/1
20 TABLET ORAL DAILY
Qty: 90 TABLET | Refills: 0 | Status: SHIPPED | OUTPATIENT
Start: 2024-05-23 | End: 2024-08-21

## 2024-05-23 NOTE — PROGRESS NOTES
[facility] FOLLOW-UP VISIT      CHIEF COMPLAINT:   Chief Complaint   Patient presents with    Follow-up       HISTORY OF PRESENT ILLNESS: Rochelle Espinoza is a 68 y.o. female who presents here today for hospital follow-up. Patient was admitted 5/12-5/15 after presenting with worsening shortness of breath, found to have COPD exacerbation. She was treated with supplemental O2, IV abx > PO Levaquin. She was discharged with steroids, antibiotics, and advised to F/U with Pulm. Orders for Home health PT/OT and respiratory were placed.      Since discharge, patient has been feeling much better. She is breathing better. Patient denies any coughing, fever, chills, peripheral swelling. Patient has been compliant with medications - completed steroid and antibiotic courses. She is back to her baseline oxygen level of 3 L. She is requiring less frequent breathing treatments as well. She has not smoked since getting out of the hospital! She has appointments with smoking cessation on 05/29 and with Pulmonology 06/10.       REVIEW OF SYSTEMS:    ROS as in HPI.      MEDICAL HISTORY:    Past Medical History:   Diagnosis Date    CHF (congestive heart failure)     COPD (chronic obstructive pulmonary disease)     Herpes zoster without mention of complication 2/21/2011    Hypertension     Leg edema     Pulmonary hypertension     Sciatica        MEDICATIONS:    Current Outpatient Medications on File Prior to Visit   Medication Sig Dispense Refill    albuterol (PROVENTIL/VENTOLIN HFA) 90 mcg/actuation inhaler Inhale 2 puffs into the lungs every 4 (four) hours as needed for Wheezing or Shortness of Breath. Rescue 8 g 11    albuterol-ipratropium (DUO-NEB) 2.5 mg-0.5 mg/3 mL nebulizer solution Take 3 mLs by nebulization every 4 (four) hours as needed for Wheezing or Shortness of Breath. Rescue 1080 mL 3    atorvastatin (LIPITOR) 10 MG tablet Take 1 tablet (10 mg total) by mouth once daily. 90 tablet 3    benzonatate (TESSALON)  100 MG capsule Take 1 capsule (100 mg total) by mouth 3 (three) times daily as needed for Cough. 30 capsule 0    buPROPion (WELLBUTRIN SR) 150 MG TBSR 12 hr tablet Take 1 tablet (150 mg total) by mouth 2 (two) times daily. 180 tablet 4    cetirizine (ZYRTEC) 10 MG tablet Take 1 tablet (10 mg total) by mouth once daily. 90 tablet 3    diclofenac (VOLTAREN) 50 MG EC tablet Take 1 tablet (50 mg total) by mouth 2 (two) times daily as needed (arthritis pain). 90 tablet 1    fluticasone propionate (FLONASE) 50 mcg/actuation nasal spray SHAKE LIQUID AND USE 1 SPRAY (50MCG) IN EACH NOSTRIL TWICE DAILY 48 g 1    fluticasone-umeclidin-vilanter (TRELEGY ELLIPTA) 100-62.5-25 mcg DsDv Take 1 puff by mouth once daily. 3 each 4    fluticasone-umeclidin-vilanter (TRELEGY ELLIPTA) 200-62.5-25 mcg inhaler Inhale 1 puff into the lungs once daily. 3 each 4    furosemide (LASIX) 20 MG tablet Take 1 tablet (20 mg total) by mouth once daily. Take 1-2 by mouth everyday 180 tablet 1    gabapentin (NEURONTIN) 300 MG capsule TAKE 1 CAPSULE THREE TIMES DAILY 270 capsule 3    glycerin adult suppository Place 1 suppository rectally as needed for Constipation.      losartan (COZAAR) 100 MG tablet Take 1 tablet (100 mg total) by mouth once daily. 90 tablet 3    pantoprazole (PROTONIX) 40 MG tablet Take 1 tablet (40 mg total) by mouth once daily. 90 tablet 3    ascorbic acid, vitamin C, (VITAMIN C) 500 MG tablet Take 500 mg by mouth once daily. (Patient not taking: Reported on 2024)      cholecalciferol, vitamin D3, (VITAMIN D3) 25 mcg (1,000 unit) capsule Take 1,000 Units by mouth once daily. (Patient not taking: Reported on 2024)      [] guaiFENesin (MUCINEX) 600 mg 12 hr tablet Take 1 tablet (600 mg total) by mouth 2 (two) times daily. for 7 days 14 tablet 0    magnesium 250 mg Tab Take 250 mg by mouth once. (Patient not taking: Reported on 2024)      nicotine (NICODERM CQ) 14 mg/24 hr Place 1 patch onto the skin once  "daily. (Patient not taking: Reported on 5/17/2024) 28 patch 3     No current facility-administered medications on file prior to visit.         PHYSICAL EXAM:    /66 (BP Location: Right arm, Patient Position: Sitting, BP Method: Medium (Manual))   Pulse 76   Ht 5' 3" (1.6 m)   Wt 68.3 kg (150 lb 9.2 oz)   LMP 11/21/2013   SpO2 (!) 94%   BMI 26.67 kg/m²     Physical Exam  Vitals and nursing note reviewed.   Constitutional:       General: She is not in acute distress.     Appearance: Normal appearance. She is not ill-appearing, toxic-appearing or diaphoretic.   HENT:      Head: Normocephalic and atraumatic.      Nose: Nose normal.   Eyes:      Extraocular Movements: Extraocular movements intact.      Conjunctiva/sclera: Conjunctivae normal.      Pupils: Pupils are equal, round, and reactive to light.   Cardiovascular:      Rate and Rhythm: Normal rate and regular rhythm.      Heart sounds: Normal heart sounds. No murmur heard.  Pulmonary:      Effort: Pulmonary effort is normal. No respiratory distress.      Breath sounds: Normal breath sounds. No stridor. No wheezing, rhonchi or rales.      Comments: 3 L NC  Musculoskeletal:         General: No deformity. Normal range of motion.   Skin:     Findings: No lesion or rash.   Neurological:      General: No focal deficit present.      Mental Status: She is alert.      Motor: No weakness.      Gait: Gait normal.   Psychiatric:         Mood and Affect: Mood normal.         Behavior: Behavior normal.         Thought Content: Thought content normal.         Judgment: Judgment normal.             ASSESSMENT & PLAN:    Rochelle was seen today for follow-up.    Diagnoses and all orders for this visit:    COPD exacerbation  Hospital discharge follow-up  Recent hospitalization 5/12-5/15 for COPD exacerbation.  Completed antibiotics & steroids and feeling much better.  Has not smoked since leaving hospital!  Normal respiratory rate and effort, lungs clear today. SpO2 94% on 3 " L.  Continue current treatment plan.   F/U with Pulm & Smoking Cessation as scheduled.     Encounter for medication refill  -     cholecalciferol, vitamin D3, (VITAMIN D3) 25 mcg (1,000 unit) capsule; Take 1 capsule (1,000 Units total) by mouth once daily.  -     furosemide (LASIX) 20 MG tablet; Take 1 tablet (20 mg total) by mouth once daily. Take 1-2 by mouth everyday              Mare Tubbs MD  [facility]

## 2024-05-29 ENCOUNTER — CLINICAL SUPPORT (OUTPATIENT)
Dept: SMOKING CESSATION | Facility: CLINIC | Age: 69
End: 2024-05-29
Payer: COMMERCIAL

## 2024-05-29 DIAGNOSIS — F17.200 NICOTINE DEPENDENCE: Primary | ICD-10-CM

## 2024-05-29 PROCEDURE — 99404 PREV MED CNSL INDIV APPRX 60: CPT | Mod: S$GLB,,, | Performed by: DIETITIAN, REGISTERED

## 2024-05-29 PROCEDURE — 99999 PR PBB SHADOW E&M-EST. PATIENT-LVL III: CPT | Mod: PBBFAC,,, | Performed by: DIETITIAN, REGISTERED

## 2024-05-29 RX ORDER — IBUPROFEN 200 MG
1 TABLET ORAL DAILY
Qty: 14 PATCH | Refills: 0 | Status: SHIPPED | OUTPATIENT
Start: 2024-05-29

## 2024-05-29 NOTE — PROGRESS NOTES
Patient will be participating in biweekly tobacco cessation meetings and will begin the prescribed tobacco cessation medication regimen of  14 mg nicotine patch QD. Pt currently taking 150 mg Wellbutrin SR BID per PCP Rx.  Patient currently smokes 3 cigarettes per day.  Pt's exhaled carbon monoxide level was 10 ppm as per [employer]. (non- smoker = 0-5 ppm.)   Discussed the role of tobacco cessation program, role of nicotine replacement therapy  (NRT) and behavioral changes to assist the patient to reach her goal of being tobacco free.   Education and instruction on the role of the NRT, usage and  proper placement of the patch. Patient verbalized understanding and willingness to use patch. Provided patient with CTTS' contact information and pt understands she can call CTTS at any time.

## 2024-06-10 ENCOUNTER — PATIENT MESSAGE (OUTPATIENT)
Dept: INTERNAL MEDICINE | Facility: CLINIC | Age: 69
End: 2024-06-10
Payer: MEDICARE

## 2024-06-10 ENCOUNTER — OFFICE VISIT (OUTPATIENT)
Dept: PULMONOLOGY | Facility: CLINIC | Age: 69
End: 2024-06-10
Payer: MEDICARE

## 2024-06-10 VITALS
BODY MASS INDEX: 26.45 KG/M2 | OXYGEN SATURATION: 95 % | DIASTOLIC BLOOD PRESSURE: 78 MMHG | HEIGHT: 63 IN | SYSTOLIC BLOOD PRESSURE: 124 MMHG | WEIGHT: 149.25 LBS | HEART RATE: 93 BPM

## 2024-06-10 DIAGNOSIS — J96.12 CHRONIC RESPIRATORY FAILURE WITH HYPOXIA AND HYPERCAPNIA: ICD-10-CM

## 2024-06-10 DIAGNOSIS — J43.9 PULMONARY EMPHYSEMA, UNSPECIFIED EMPHYSEMA TYPE: ICD-10-CM

## 2024-06-10 DIAGNOSIS — F17.210 NICOTINE DEPENDENCE, CIGARETTES, UNCOMPLICATED: Chronic | ICD-10-CM

## 2024-06-10 DIAGNOSIS — J96.11 CHRONIC RESPIRATORY FAILURE WITH HYPOXIA AND HYPERCAPNIA: ICD-10-CM

## 2024-06-10 DIAGNOSIS — J44.9 CHRONIC OBSTRUCTIVE PULMONARY DISEASE WITH HYPOXIA: Primary | ICD-10-CM

## 2024-06-10 PROCEDURE — 99214 OFFICE O/P EST MOD 30 MIN: CPT | Mod: HCNC,S$GLB,, | Performed by: INTERNAL MEDICINE

## 2024-06-10 PROCEDURE — 3078F DIAST BP <80 MM HG: CPT | Mod: HCNC,CPTII,S$GLB, | Performed by: INTERNAL MEDICINE

## 2024-06-10 PROCEDURE — 3074F SYST BP LT 130 MM HG: CPT | Mod: HCNC,CPTII,S$GLB, | Performed by: INTERNAL MEDICINE

## 2024-06-10 PROCEDURE — 1159F MED LIST DOCD IN RCRD: CPT | Mod: HCNC,CPTII,S$GLB, | Performed by: INTERNAL MEDICINE

## 2024-06-10 PROCEDURE — 3288F FALL RISK ASSESSMENT DOCD: CPT | Mod: HCNC,CPTII,S$GLB, | Performed by: INTERNAL MEDICINE

## 2024-06-10 PROCEDURE — 1160F RVW MEDS BY RX/DR IN RCRD: CPT | Mod: HCNC,CPTII,S$GLB, | Performed by: INTERNAL MEDICINE

## 2024-06-10 PROCEDURE — 1101F PT FALLS ASSESS-DOCD LE1/YR: CPT | Mod: HCNC,CPTII,S$GLB, | Performed by: INTERNAL MEDICINE

## 2024-06-10 PROCEDURE — 4010F ACE/ARB THERAPY RXD/TAKEN: CPT | Mod: HCNC,CPTII,S$GLB, | Performed by: INTERNAL MEDICINE

## 2024-06-10 PROCEDURE — 99999 PR PBB SHADOW E&M-EST. PATIENT-LVL V: CPT | Mod: PBBFAC,HCNC,, | Performed by: INTERNAL MEDICINE

## 2024-06-10 PROCEDURE — 3008F BODY MASS INDEX DOCD: CPT | Mod: HCNC,CPTII,S$GLB, | Performed by: INTERNAL MEDICINE

## 2024-06-10 PROCEDURE — 1111F DSCHRG MED/CURRENT MED MERGE: CPT | Mod: HCNC,CPTII,S$GLB, | Performed by: INTERNAL MEDICINE

## 2024-06-10 RX ORDER — FLUTICASONE FUROATE, UMECLIDINIUM BROMIDE AND VILANTEROL TRIFENATATE 200; 62.5; 25 UG/1; UG/1; UG/1
1 POWDER RESPIRATORY (INHALATION) DAILY
Qty: 3 EACH | Refills: 4 | Status: SHIPPED | OUTPATIENT
Start: 2024-06-10

## 2024-06-10 NOTE — ASSESSMENT & PLAN NOTE
Dangers of cigarette smoking were reviewed with patient in detail. Patient was Counseled for 3-10 minutes. Nicotine replacement options were discussed. Nicotine replacement was discussed.   Encouraged patient's continued efforts.    Due for her lung cancer screening scan.

## 2024-06-10 NOTE — ASSESSMENT & PLAN NOTE
Increase to high dose Trelegy 1 inhalation daily.  Continue albuterol hfa and nebs prn.  Referral placed for pulmonary rehab.
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
## 2024-06-17 ENCOUNTER — TELEPHONE (OUTPATIENT)
Dept: PULMONOLOGY | Facility: CLINIC | Age: 69
End: 2024-06-17
Payer: MEDICARE

## 2024-06-17 ENCOUNTER — TELEPHONE (OUTPATIENT)
Dept: SMOKING CESSATION | Facility: CLINIC | Age: 69
End: 2024-06-17
Payer: MEDICARE

## 2024-06-17 NOTE — TELEPHONE ENCOUNTER
Smoking Cessation clinic - called pt for progress update and to reschedule last week's cancelled VIRTUAL follow up appt. Left voicemail with CTTS' contact info for pt to call back.  Roberto Andrade, [facility]  [phone]

## 2024-06-17 NOTE — TELEPHONE ENCOUNTER
I spoke with pharmacist, Huyen at [facility] and the rx for trelegy had been dispensed. Huyen verbalized understanding.

## 2024-06-17 NOTE — TELEPHONE ENCOUNTER
----- Message from Tasha Black sent at 6/17/2024 10:26 AM CDT -----  Regarding: Pharmacy Auth  Contact: [facility] [phone]  Shima/Maria Fernanda regarding two prescriptions for Trelegy. Would liek to clarify which prescription is correct. fluticasone-umeclidin-vilanter (TRELEGY ELLIPTA) 200-62.5-25 mcg inhaler or 100mcg. Please call [phone].

## 2024-06-18 ENCOUNTER — HOSPITAL ENCOUNTER (OUTPATIENT)
Dept: RADIOLOGY | Facility: HOSPITAL | Age: 69
Discharge: HOME | End: 2024-06-18
Attending: INTERNAL MEDICINE
Payer: MEDICARE

## 2024-06-18 DIAGNOSIS — Z12.31 ENCOUNTER FOR SCREENING MAMMOGRAM FOR BREAST CANCER: ICD-10-CM

## 2024-06-18 PROCEDURE — 77067 SCR MAMMO BI INCL CAD: CPT | Mod: TC,HCNC

## 2024-06-25 ENCOUNTER — PATIENT MESSAGE (OUTPATIENT)
Dept: INTERNAL MEDICINE | Facility: CLINIC | Age: 69
End: 2024-06-25
Payer: MEDICARE

## 2024-06-26 RX ORDER — DICLOFENAC SODIUM 50 MG/1
50 TABLET, DELAYED RELEASE ORAL 2 TIMES DAILY
Qty: 60 TABLET | Refills: 0 | Status: SHIPPED | OUTPATIENT
Start: 2024-06-26

## 2024-07-02 ENCOUNTER — OFFICE VISIT (OUTPATIENT)
Dept: INTERNAL MEDICINE | Facility: CLINIC | Age: 69
End: 2024-07-02
Payer: MEDICARE

## 2024-07-02 VITALS
WEIGHT: 149.06 LBS | DIASTOLIC BLOOD PRESSURE: 78 MMHG | HEIGHT: 63 IN | HEART RATE: 86 BPM | SYSTOLIC BLOOD PRESSURE: 126 MMHG | BODY MASS INDEX: 26.41 KG/M2

## 2024-07-02 DIAGNOSIS — M79.606 PAIN OF LOWER EXTREMITY, UNSPECIFIED LATERALITY: ICD-10-CM

## 2024-07-02 DIAGNOSIS — I10 ESSENTIAL HYPERTENSION: ICD-10-CM

## 2024-07-02 DIAGNOSIS — I27.81 COR PULMONALE, CHRONIC: Chronic | ICD-10-CM

## 2024-07-02 DIAGNOSIS — I50.32 CHRONIC DIASTOLIC HEART FAILURE: ICD-10-CM

## 2024-07-02 DIAGNOSIS — F33.41 RECURRENT MAJOR DEPRESSIVE DISORDER, IN PARTIAL REMISSION: ICD-10-CM

## 2024-07-02 DIAGNOSIS — K46.9 ABDOMINAL HERNIA WITHOUT OBSTRUCTION AND WITHOUT GANGRENE, RECURRENCE NOT SPECIFIED, UNSPECIFIED HERNIA TYPE: ICD-10-CM

## 2024-07-02 DIAGNOSIS — J43.9 PULMONARY EMPHYSEMA, UNSPECIFIED EMPHYSEMA TYPE: Primary | ICD-10-CM

## 2024-07-02 DIAGNOSIS — E78.2 MIXED HYPERLIPIDEMIA: ICD-10-CM

## 2024-07-02 PROBLEM — G89.29 CHRONIC PAIN: Status: RESOLVED | Noted: 2021-06-03 | Resolved: 2024-07-02

## 2024-07-02 PROCEDURE — 3288F FALL RISK ASSESSMENT DOCD: CPT | Mod: HCNC,CPTII,S$GLB, | Performed by: INTERNAL MEDICINE

## 2024-07-02 PROCEDURE — 3008F BODY MASS INDEX DOCD: CPT | Mod: HCNC,CPTII,S$GLB, | Performed by: INTERNAL MEDICINE

## 2024-07-02 PROCEDURE — 1159F MED LIST DOCD IN RCRD: CPT | Mod: HCNC,CPTII,S$GLB, | Performed by: INTERNAL MEDICINE

## 2024-07-02 PROCEDURE — 1101F PT FALLS ASSESS-DOCD LE1/YR: CPT | Mod: HCNC,CPTII,S$GLB, | Performed by: INTERNAL MEDICINE

## 2024-07-02 PROCEDURE — 1125F AMNT PAIN NOTED PAIN PRSNT: CPT | Mod: HCNC,CPTII,S$GLB, | Performed by: INTERNAL MEDICINE

## 2024-07-02 PROCEDURE — 99214 OFFICE O/P EST MOD 30 MIN: CPT | Mod: HCNC,S$GLB,, | Performed by: INTERNAL MEDICINE

## 2024-07-02 PROCEDURE — 99999 PR PBB SHADOW E&M-EST. PATIENT-LVL IV: CPT | Mod: PBBFAC,HCNC,, | Performed by: INTERNAL MEDICINE

## 2024-07-02 PROCEDURE — 3074F SYST BP LT 130 MM HG: CPT | Mod: HCNC,CPTII,S$GLB, | Performed by: INTERNAL MEDICINE

## 2024-07-02 PROCEDURE — 4010F ACE/ARB THERAPY RXD/TAKEN: CPT | Mod: HCNC,CPTII,S$GLB, | Performed by: INTERNAL MEDICINE

## 2024-07-02 PROCEDURE — 3078F DIAST BP <80 MM HG: CPT | Mod: HCNC,CPTII,S$GLB, | Performed by: INTERNAL MEDICINE

## 2024-07-02 RX ORDER — CELECOXIB 200 MG/1
200 CAPSULE ORAL DAILY
Qty: 30 CAPSULE | Refills: 3 | Status: SHIPPED | OUTPATIENT
Start: 2024-07-02

## 2024-07-02 RX ORDER — PANTOPRAZOLE SODIUM 40 MG/1
40 TABLET, DELAYED RELEASE ORAL DAILY
Qty: 90 TABLET | Refills: 3 | Status: SHIPPED | OUTPATIENT
Start: 2024-07-02

## 2024-07-02 NOTE — PROGRESS NOTES
HPI: Rochelle Espinoza is a 68 y.o. female with PMHx COPD, chronic hypoxemic respiratory failure on 3 L nasal cannula at baseline, hyperlipidemia, diastolic heart failure, nicotine dependence, hypertension who presents to clinic for 2 month follow up.       She complains of body aches- from back down leg and arms have been stiff.  For about 2 weeks-- heat did not help but ice has helped.       GERD - pt states she has been having epigastric pain with eating and has been taking a medication which has helped. Likely a OTC ppi. Pt states pain makes her feel like belching. Pt would like to be prescribed a GERD medication.     COPD - tolerating bipap at night. Wears her bipap for 6 hrs. Had recent covid 9/16/23 which she took paxlovid for. Still endorses congestion and fatigue. Baseline SOB. Saw Pulmonary  6/10/2024.-- trelegy was increased to high dose.  Had problem with  refill-      CHF - takes lasix 20mg as needed. Denies leg swelling, cp, palpitations. States she is breathing well.      HTN - 126/78   today. Pt no longer on amlodipine and HCTZ. Still on losartan. Denies HA.      HLD - on atorvastatin     Arthritis - b/l knee and low back pain. Difficulty moving around with some stiffness.      Anxiety - previously on wellbutrin. No longer taking. Wants to know options medication options since she has been feeling down and non productive since her recent lifestyle change due to her conditions.               Review of Systems:   Review of Systems   Constitutional:  Positive for malaise/fatigue. Negative for chills and fever.   HENT:  Positive for congestion. Negative for sore throat.    Eyes:  Negative for blurred vision.   Respiratory:  Positive for shortness of breath (baseline). Negative for cough.    Cardiovascular:  Negative for chest pain, palpitations and leg swelling.   Gastrointestinal:  Negative for abdominal pain, heartburn, nausea and vomiting.   Genitourinary:  Negative for dysuria.   Musculoskeletal:   "Positive for back pain and joint pain. Negative for myalgias.   Skin:  Negative for rash.   Neurological:  Negative for headaches.   Psychiatric/Behavioral:  The patient is nervous/anxious.          Vitals: /78 (BP Location: Left arm, Patient Position: Sitting, BP Method: Medium (Manual))   Pulse 86   Ht 5' 3" (1.6 m)   Wt 67.6 kg (149 lb 0.5 oz)   LMP 11/21/2013   SpO2 (!) (P) 91%   BMI 26.40 kg/m²          Physical Exam:     Vitals reviewed.   Constitutional:       General: She is not in acute distress.     Appearance: Normal appearance.   HENT:      Head: Normocephalic and atraumatic.      Right Ear: External ear normal.      Left Ear: External ear normal.      Nose: Nose normal.      Mouth/Throat:      Mouth: Mucous membranes are moist.   Eyes:      Extraocular Movements: Extraocular movements intact.      Conjunctiva/sclera: Conjunctivae normal.      Pupils: Pupils are equal, round, and reactive to light.   Cardiovascular:      Rate and Rhythm: Normal rate and regular rhythm.   Pulmonary:      Effort: Pulmonary effort is normal. No respiratory distress.      Breath sounds: Normal breath sounds. No wheezing.      Comments: On NC  Abdominal:      General: Abdomen is flat. Bowel sounds are normal.      Palpations: Abdomen is soft. She has an perumbical hernia- discussed the brace or compression.       Tenderness: There is no abdominal tenderness.   Musculoskeletal:         General: Normal range of motion.      Cervical back: Normal range of motion and neck supple.      Comments: Trace edema b/l LE   Skin:     General: Skin is warm and dry.   Neurological:      General: No focal deficit present.      Mental Status: She is alert and oriented to person, place, and time.   Psychiatric:         Mood and Affect: Mood is anxious and depressed.         Labs: Previous labs reviewed.     Imaging: Previous imaging reviewed.      Assessment/Plan:   Gastroesophageal reflux disease, unspecified whether esophagitis " present     Chronic obstructive pulmonary disease with hypoxia  -  stable-        Hyperlipidemia, unspecified hyperlipidemia type     Arthritis     Anxiety           Recheck the lipid panel  on atorvastatin. Protonix ordered for GERD.   Will set up some pt for labs,  will try some Celebrex for arthritic pain and set up PT           For hernia- needs to wear compression - not good surgical candidate

## 2024-07-09 NOTE — TELEPHONE ENCOUNTER
Care Due:                  Date            Visit Type   Department     Provider  --------------------------------------------------------------------------------                                EP -                              PRIMARY      [facility]  Last Visit: 07-      CARE ([facility])   MEDICINE       Yosi Samuels  Next Visit: None Scheduled  None         None Found                                                            Last  Test          Frequency    Reason                     Performed    Due Date  --------------------------------------------------------------------------------    Lipid Panel.  12 months..  atorvastatin.............  01- 01-    Vitamin D...  12 months..  cholecalciferol,.........  Not Found    Overdue    [employer] Embedded Care Due Messages. Reference number: [id number].   7/09/2024 2:27:10 PM CDT

## 2024-07-10 ENCOUNTER — CLINICAL SUPPORT (OUTPATIENT)
Dept: REHABILITATION | Facility: HOSPITAL | Age: 69
End: 2024-07-10
Attending: INTERNAL MEDICINE
Payer: MEDICARE

## 2024-07-10 DIAGNOSIS — M53.86 DECREASED ROM OF LUMBAR SPINE: ICD-10-CM

## 2024-07-10 DIAGNOSIS — M54.41 CHRONIC BILATERAL LOW BACK PAIN WITH BILATERAL SCIATICA: Primary | ICD-10-CM

## 2024-07-10 DIAGNOSIS — G89.29 CHRONIC BILATERAL LOW BACK PAIN WITH BILATERAL SCIATICA: Primary | ICD-10-CM

## 2024-07-10 DIAGNOSIS — M79.606 PAIN OF LOWER EXTREMITY, UNSPECIFIED LATERALITY: ICD-10-CM

## 2024-07-10 DIAGNOSIS — R29.898 WEAKNESS OF BOTH LOWER EXTREMITIES: ICD-10-CM

## 2024-07-10 DIAGNOSIS — M54.42 CHRONIC BILATERAL LOW BACK PAIN WITH BILATERAL SCIATICA: Primary | ICD-10-CM

## 2024-07-10 PROCEDURE — 97110 THERAPEUTIC EXERCISES: CPT | Mod: HCNC

## 2024-07-10 PROCEDURE — 97161 PT EVAL LOW COMPLEX 20 MIN: CPT | Mod: HCNC

## 2024-07-10 RX ORDER — CELECOXIB 200 MG/1
200 CAPSULE ORAL DAILY
Qty: 30 CAPSULE | Refills: 0 | Status: SHIPPED | OUTPATIENT
Start: 2024-07-10

## 2024-07-10 NOTE — PLAN OF CARE
[facility]   Physical Therapy Initial Evaluation     Date: 7/10/2024   Name: Rochelle Espinoza  Clinic Number: [id number]    Therapy Diagnosis:   Encounter Diagnoses   Name Primary?    Pain of lower extremity, unspecified laterality     Chronic bilateral low back pain with bilateral sciatica Yes    Decreased ROM of lumbar spine     Weakness of both lower extremities      Physician: Yosi Samuels Jr., MD    Physician Orders: PT Eval and Treat   Medical Diagnosis from Referral: M79.606 (ICD-10-CM) - Pain of lower extremity, unspecified laterality   Evaluation Date: 7/10/2024  Authorization Period Expiration: TBD  Plan of Care Expiration: 9/10/2024  Progress Note Due: 8/10/2024  Visit # / Visits authorized: 1/1   FOTO: 1/5    FOTO Initial Evaluation: 40  FOTO 2nd F/U: NA  FOTO Discharge: NA    Precautions: Standard DO NOT LAY FLAT DUE TO BREATHING RESTRICTIONS     Time In: 915  Time Out: 1000  Total Appointment Time (timed & untimed codes): 45 minutes      SUBJECTIVE     Date of onset: a few weeks     History of current condition - Rochelle reports: she has been having lower back for several years and was treated with injections in the past which helped. Patient reports as of recently her back started hurting again. Patient reports her back bothers her with sitting or standing. Patient reports she has no real pattern to her pain. Patient reports however she does notice if she is standing and cooking without her back brace she will have pain. Patient reports she is diagnosed with COPD and have been using supplemental oxygen since 2018. Patient reports she cannot lay flat due to breathing restriction. Patient reports she sleeps sitting up.     Falls: None    Imaging, Years    Prior Therapy: None  Social History:  lives with their family  Occupation: Retired   Prior Level of Function: Sedentary   Current Level of Function: Sedentary     Pain:  Current 3/10, worst 8/10, best 3/10   Location: bilateral  back    Description: Ache, sore, sharp down the left leg  Aggravating Factors: No pattern   Easing Factors: Heat and ice     Patients goals: to lessen the pain in her back      Medical History:   Past Medical History:   Diagnosis Date    CHF (congestive heart failure)     COPD (chronic obstructive pulmonary disease)     Herpes zoster without mention of complication 2011    Hypertension     Leg edema     Pulmonary hypertension     Sciatica        Surgical History:   Rochelle Espinoza  has a past surgical history that includes  section; Breast biopsy (Right); Esophagogastroduodenoscopy (N/A, 2019); Colonoscopy (N/A, 2019); Epidural steroid injection (N/A, 2020); and Transforaminal epidural injection of steroid (Right, 6/3/2021).    Medications:   Rochelle has a current medication list which includes the following prescription(s): albuterol, albuterol-ipratropium, ascorbic acid (vitamin c), atorvastatin, bupropion, celecoxib, cetirizine, cholecalciferol (vitamin d3), fluticasone propionate, trelegy ellipta, furosemide, gabapentin, glycerin adult, losartan, magnesium, nicotine, and pantoprazole.    Allergies:   Review of patient's allergies indicates:   Allergen Reactions    Doxycycline Other (See Comments)     Acid reflux    Orange juice      Swelling in pt tongue      23 - pt stated she had in hosp and it didn't effect her.          OBJECTIVE     Lumbar AROM: Pain/Dysfunction with Movement:   Flexion: 70 degrees Painful in lower middle back    Extension: 15 degrees Painful in lower middle back   Right side bendin degrees    Left side bendin degrees    Right rotation: 25% limited    Left rotation: 25% limited      Myotome / MMT Right Left Pain/Dysfunction with Movement   L1,2- Hip Flexion (Femoral Nerve) 4/5 4/5    L3,4- Knee Extension (Femoral Nerve) 4+/5 4+/5    L4,5- Ankle Dorsiflexion (Deep Fibular Nerve) 4+/5 4+/5    L5- Big Toe Extension (Deep Fibular Nerve) 4+/5 4+/5    L5,  S1- Ankle plantarflexion (Tibial Nerve) 4+/5 4+/5    Hip Abduction 4-/5 4-/5    Knee Flexion 4/5 4/5         Neural Tension Positive/Negative   Passive SLR w/ DF   + bilaterally          Palpation:  - tenderness to L and R Gluteal region and piriformis tightness         FOTO Lumbar Survey    Therapist reviewed FOTO scores for Rochelle Espinoza on 7/10/2024.   FOTO documents entered into [employer] - see Media section.    Intake Score: 40         TREATMENT     Total Treatment time (time-based codes) separate from Evaluation: 10 minutes      Rochelle received the treatments listed below:      therapeutic exercises to develop strength, endurance, ROM, and flexibility for 10 minutes including:    Access Code: [email or web address]  URL: [email or web address]  Date: 07/10/2024  Prepared by: Rudolph Hill    Exercises  - Supine Piriformis Stretch with Foot on Ground  - 2 x daily - 7 x weekly - 4 sets - 30 seconds hold  - Supine Lower Trunk Rotation  - 2 x daily - 7 x weekly - 15 reps - 3 seconds hold  - Hooklying Single Knee to Chest  - 2 x daily - 7 x weekly - 15 reps - 3 seconds hold  - Supine Posterior Pelvic Tilt  - 2 x daily - 7 x weekly - 20 reps - 3 seconds hold  - Hooklying Gluteal Sets  - 2 x daily - 7 x weekly - 20 reps - 3 seconds  hold      PATIENT EDUCATION AND HOME EXERCISES     Education provided:   - Purpose of PT  - HEP    Written Home Exercises Provided: Patient instructed to cont prior HEP. Exercises were reviewed and Rochelle was able to demonstrate them prior to the end of the session.  Rochelle demonstrated good  understanding of the education provided. See EMR under Patient Instructions for exercises provided during therapy sessions.    ASSESSMENT     Rochelle is a 68 y.o. female referred to outpatient Physical Therapy with a medical diagnosis of M79.606 (ICD-10-CM) - Pain of lower extremity, unspecified laterality. Patient presents with a chronic history of lower back pain, decreased lumbar range of motion, decreased lower ex  strength, and tenderness to trigger points. Patients symptoms present similarly to OA of the spine with potential radiculopathy bilaterally.    Patient prognosis is Good.   Patient will benefit from skilled outpatient Physical Therapy to address the deficits stated above and in the chart below, provide patient /family education, and to maximize patientt's level of independence.     Plan of care discussed with patient: Yes  Patient's spiritual, cultural and educational needs considered and patient is agreeable to the plan of care and goals as stated below:     Anticipated Barriers for therapy: Inability to lay flat for therapy     Medical Necessity is demonstrated by the following  History  Co-morbidities and personal factors that may impact the plan of care Co-morbidities:   See medical history     Personal Factors:   no deficits     high   Examination  Body Structures and Functions, activity limitations and participation restrictions that may impact the plan of care Body Regions:   back  lower extremities    Body Systems:    ROM  strength    Participation Restrictions:   None    Activity limitations:   Learning and applying knowledge  no deficits    General Tasks and Commands  no deficits    Communication  no deficits    Mobility  no deficits    Self care  no deficits    Domestic Life  no deficits    Interactions/Relationships  no deficits    Life Areas  no deficits    Community and Social Life  recreation and leisure         high   Clinical Presentation stable and uncomplicated low   Decision Making/ Complexity Score: low     Goals:  Short Term Goals (4 Weeks):  1. Pt will be compliant with HEP to supplement PT in decreasing pain with functional mobility.  2. Pt will perform pallof press with good control to demonstrate improved core strength.  3. Pt will improve lumbar ROM by 5-10 degrees in all planes to improve functional mobility.  4. Pt will improve impaired LE MMTs by 1/2 score to improve strength for  functional tasks.  Long Term Goals (8 Weeks):   1. Pt will improve FOTO score to >/= 55 to decrease perceived limitation with maintaining/changing body position.   2. Pt will perform squat with good form to reduce risk of re injury with lifting.  3. Pt will improve impaired LE MMTs by 1 score to improve strength for functional tasks.  4. Pt will report no pain during lumbar ROM to promote functional mobility.       PLAN   Plan of care Certification: 7/10/2024 to 9/10/2024.    Outpatient Physical Therapy 1-2 times weekly for 8 weeks to include the following interventions: Cervical/Lumbar Traction, Manual Therapy, Moist Heat/ Ice, Neuromuscular Re-ed, Patient Education, Self Care, Therapeutic Activities, Therapeutic Exercise, and FDN.     Rudolph Hill, PT      I CERTIFY THE NEED FOR THESE SERVICES FURNISHED UNDER THIS PLAN OF TREATMENT AND WHILE UNDER MY CARE   Physician's comments:     Physician's Signature: ___________________________________________________

## 2024-07-11 ENCOUNTER — TELEPHONE (OUTPATIENT)
Dept: PULMONOLOGY | Facility: CLINIC | Age: 69
End: 2024-07-11
Payer: MEDICARE

## 2024-07-11 NOTE — TELEPHONE ENCOUNTER
Called to rescheduled LDCT that has been cancelled several times, spoke with pt and currently is unable to schedule f/u due to death in her family.

## 2024-08-07 DIAGNOSIS — Z76.0 ENCOUNTER FOR MEDICATION REFILL: ICD-10-CM

## 2024-08-07 RX ORDER — CELECOXIB 200 MG/1
200 CAPSULE ORAL
Qty: 30 CAPSULE | Refills: 11 | Status: SHIPPED | OUTPATIENT
Start: 2024-08-07

## 2024-08-08 RX ORDER — FUROSEMIDE 20 MG/1
TABLET ORAL
Qty: 90 TABLET | Refills: 3 | Status: SHIPPED | OUTPATIENT
Start: 2024-08-08

## 2024-08-10 ENCOUNTER — OFFICE VISIT (OUTPATIENT)
Dept: URGENT CARE | Facility: CLINIC | Age: 69
End: 2024-08-10
Payer: MEDICARE

## 2024-08-10 VITALS
OXYGEN SATURATION: 88 % | SYSTOLIC BLOOD PRESSURE: 137 MMHG | RESPIRATION RATE: 20 BRPM | TEMPERATURE: 100 F | DIASTOLIC BLOOD PRESSURE: 74 MMHG | BODY MASS INDEX: 26.4 KG/M2 | HEIGHT: 63 IN | WEIGHT: 149 LBS | HEART RATE: 99 BPM

## 2024-08-10 DIAGNOSIS — R51.9 ACUTE NONINTRACTABLE HEADACHE, UNSPECIFIED HEADACHE TYPE: Primary | ICD-10-CM

## 2024-08-10 DIAGNOSIS — J06.9 URI, ACUTE: ICD-10-CM

## 2024-08-10 LAB
CTP QC/QA: YES
SARS-COV-2 AG RESP QL IA.RAPID: NEGATIVE

## 2024-08-10 PROCEDURE — 87811 SARS-COV-2 COVID19 W/OPTIC: CPT | Mod: QW,,, | Performed by: FAMILY MEDICINE

## 2024-08-10 PROCEDURE — 99213 OFFICE O/P EST LOW 20 MIN: CPT | Mod: ,,, | Performed by: FAMILY MEDICINE

## 2024-08-10 RX ORDER — BENZONATATE 100 MG/1
200 CAPSULE ORAL 3 TIMES DAILY PRN
Qty: 30 CAPSULE | Refills: 1 | Status: SHIPPED | OUTPATIENT
Start: 2024-08-10

## 2024-08-10 RX ORDER — LORATADINE 10 MG/1
10 TABLET ORAL DAILY
Qty: 30 TABLET | Refills: 2 | Status: SHIPPED | OUTPATIENT
Start: 2024-08-10 | End: 2025-08-10

## 2024-08-10 NOTE — PROGRESS NOTES
"Subjective:      Patient ID: Rochelle Espinoza is a 68 y.o. female.    Vitals:  height is 5' 3" (1.6 m) and weight is 67.6 kg (149 lb). Her temperature is 100.1 °F (37.8 °C). Her blood pressure is 137/74 and her pulse is 99. Her respiration is 20 and oxygen saturation is 88% (abnormal).     Chief Complaint: Headache    This is a 68 y.o. female who presents today with a chief complaint of Headache and fever(100.1) Sweats onset 2-3 days. Pt states she was exposed to covid 3 days ago. Pt does have COPD  Denies SOB  States has been exposed to GF with covid few days Prior, feels slightly ache       Headache   This is a new problem. The current episode started in the past 7 days. The problem occurs constantly. The problem has been unchanged. The pain is located in the Bilateral region. The pain does not radiate. The pain quality is not similar to prior headaches. The quality of the pain is described as aching and pulsating. The pain is at a severity of 8/10. The pain is severe. Associated symptoms include a fever. Pertinent negatives include no abdominal pain, abnormal behavior, anorexia, back pain, blurred vision, coughing, dizziness, drainage, ear pain, eye pain, eye redness, eye watering, facial sweating, hearing loss, insomnia, loss of balance, muscle aches, nausea, neck pain, numbness, phonophobia, photophobia, rhinorrhea, scalp tenderness, seizures, sinus pressure, sore throat, swollen glands, tingling, tinnitus, visual change, vomiting, weakness or weight loss. Nothing aggravates the symptoms. She has tried acetaminophen for the symptoms. The treatment provided mild relief. Her past medical history is significant for hypertension. There is no history of cancer, cluster headaches, immunosuppression, migraine headaches, migraines in the family, obesity, pseudotumor cerebri, recent head traumas, sinus disease or TMJ.       Constitution: Positive for fever.   HENT:  Negative for ear pain, tinnitus, hearing loss, sinus " pressure and sore throat.    Neck: Negative for neck pain.   Eyes:  Negative for eye pain, eye redness, photophobia and blurred vision.   Respiratory:  Negative for cough.    Gastrointestinal:  Negative for abdominal pain, nausea and vomiting.   Musculoskeletal:  Negative for back pain.   Neurological:  Positive for headaches. Negative for dizziness, loss of balance, history of migraines, numbness and seizures.   Psychiatric/Behavioral:  The patient does not have insomnia.       Objective:     Physical Exam   Constitutional: She is oriented to person, place, and time. She appears well-developed. She is cooperative.  Non-toxic appearance. She does not appear ill. No distress.      Comments:On portable oxygen       HENT:   Head: Normocephalic and atraumatic.   Ears:   Right Ear: Hearing, tympanic membrane, external ear and ear canal normal.   Left Ear: Hearing, tympanic membrane, external ear and ear canal normal.   Nose: Nose normal. No mucosal edema, rhinorrhea or nasal deformity. No epistaxis. Right sinus exhibits no maxillary sinus tenderness and no frontal sinus tenderness. Left sinus exhibits no maxillary sinus tenderness and no frontal sinus tenderness.   Mouth/Throat: Uvula is midline, oropharynx is clear and moist and mucous membranes are normal. No trismus in the jaw. Normal dentition. No uvula swelling. No posterior oropharyngeal erythema.   Eyes: Conjunctivae, EOM and lids are normal. Pupils are equal, round, and reactive to light. Right eye exhibits no discharge. Left eye exhibits no discharge. No scleral icterus.   Neck: Trachea normal and phonation normal. Neck supple.   Cardiovascular: Normal rate, regular rhythm, normal heart sounds and normal pulses.   Pulmonary/Chest: Effort normal and breath sounds normal. No respiratory distress.   Abdominal: Normal appearance and bowel sounds are normal. She exhibits no distension and no mass. Soft. There is no abdominal tenderness.   Musculoskeletal: Normal  range of motion.         General: No deformity. Normal range of motion.   Neurological: She is alert and oriented to person, place, and time. She exhibits normal muscle tone. Coordination normal.   Skin: Skin is warm, dry, intact, not diaphoretic and not pale.   Psychiatric: Her speech is normal and behavior is normal. Judgment and thought content normal.   Nursing note and vitals reviewed.      Assessment:     1. Acute nonintractable headache, unspecified headache type        Plan:       Acute nonintractable headache, unspecified headache type  -     SARS Coronavirus 2 Antigen, POCT Manual Read    Other orders  -     benzonatate (TESSALON PERLES) 100 MG capsule; Take 2 capsules (200 mg total) by mouth 3 (three) times daily as needed for Cough.  Dispense: 30 capsule; Refill: 1  -     loratadine (CLARITIN) 10 mg tablet; Take 1 tablet (10 mg total) by mouth once daily.  Dispense: 30 tablet; Refill: 2       Dictation #1  MRN:[id number]  CSN:[id number]          Results for orders placed or performed in visit on 08/10/24   SARS Coronavirus 2 Antigen, POCT Manual Read   Result Value Ref Range    SARS Coronavirus 2 Antigen Negative Negative     Acceptable Yes

## 2024-08-11 ENCOUNTER — PATIENT MESSAGE (OUTPATIENT)
Dept: INTERNAL MEDICINE | Facility: CLINIC | Age: 69
End: 2024-08-11
Payer: MEDICARE

## 2024-08-12 ENCOUNTER — HOSPITAL ENCOUNTER (EMERGENCY)
Facility: HOSPITAL | Age: 69
Discharge: HOME | End: 2024-08-12
Attending: EMERGENCY MEDICINE
Payer: MEDICARE

## 2024-08-12 VITALS
TEMPERATURE: 99 F | HEART RATE: 89 BPM | DIASTOLIC BLOOD PRESSURE: 73 MMHG | BODY MASS INDEX: 26.58 KG/M2 | SYSTOLIC BLOOD PRESSURE: 155 MMHG | WEIGHT: 150 LBS | RESPIRATION RATE: 20 BRPM | OXYGEN SATURATION: 90 % | HEIGHT: 63 IN

## 2024-08-12 DIAGNOSIS — R06.02 SHORTNESS OF BREATH: ICD-10-CM

## 2024-08-12 DIAGNOSIS — J98.01 BRONCHOSPASM, ACUTE: Chronic | ICD-10-CM

## 2024-08-12 DIAGNOSIS — J44.9 CHRONIC OBSTRUCTIVE PULMONARY DISEASE WITH HYPOXIA: ICD-10-CM

## 2024-08-12 DIAGNOSIS — J44.1 COPD EXACERBATION: Primary | ICD-10-CM

## 2024-08-12 LAB
ALBUMIN SERPL BCP-MCNC: 3.6 G/DL (ref 3.5–5.2)
ALLENS TEST: ABNORMAL
ALLENS TEST: ABNORMAL
ALP SERPL-CCNC: 58 U/L (ref 55–135)
ALT SERPL W/O P-5'-P-CCNC: 15 U/L (ref 10–44)
ANION GAP SERPL CALC-SCNC: 9 MMOL/L (ref 8–16)
AST SERPL-CCNC: 21 U/L (ref 10–40)
BASOPHILS # BLD AUTO: 0.02 K/UL (ref 0–0.2)
BASOPHILS NFR BLD: 0.2 % (ref 0–1.9)
BILIRUB SERPL-MCNC: 0.4 MG/DL (ref 0.1–1)
BNP SERPL-MCNC: 18 PG/ML (ref 0–99)
BUN SERPL-MCNC: 6 MG/DL (ref 8–23)
CALCIUM SERPL-MCNC: 9.5 MG/DL (ref 8.7–10.5)
CHLORIDE SERPL-SCNC: 91 MMOL/L (ref 95–110)
CO2 SERPL-SCNC: 40 MMOL/L (ref 23–29)
CREAT SERPL-MCNC: 0.7 MG/DL (ref 0.5–1.4)
DIFFERENTIAL METHOD BLD: ABNORMAL
EOSINOPHIL # BLD AUTO: 0.1 K/UL (ref 0–0.5)
EOSINOPHIL NFR BLD: 0.8 % (ref 0–8)
ERYTHROCYTE [DISTWIDTH] IN BLOOD BY AUTOMATED COUNT: 14.7 % (ref 11.5–14.5)
EST. GFR  (NO RACE VARIABLE): >60 ML/MIN/1.73 M^2
GLUCOSE SERPL-MCNC: 88 MG/DL (ref 70–110)
HCO3 UR-SCNC: 45.8 MMOL/L (ref 24–28)
HCT VFR BLD AUTO: 44.8 % (ref 37–48.5)
HGB BLD-MCNC: 12.9 G/DL (ref 12–16)
IMM GRANULOCYTES # BLD AUTO: 0.02 K/UL (ref 0–0.04)
IMM GRANULOCYTES NFR BLD AUTO: 0.2 % (ref 0–0.5)
LDH SERPL L TO P-CCNC: 0.49 MMOL/L (ref 0.5–2.2)
LYMPHOCYTES # BLD AUTO: 2.3 K/UL (ref 1–4.8)
LYMPHOCYTES NFR BLD: 27.2 % (ref 18–48)
MCH RBC QN AUTO: 27.6 PG (ref 27–31)
MCHC RBC AUTO-ENTMCNC: 28.8 G/DL (ref 32–36)
MCV RBC AUTO: 96 FL (ref 82–98)
MONOCYTES # BLD AUTO: 0.7 K/UL (ref 0.3–1)
MONOCYTES NFR BLD: 8.2 % (ref 4–15)
NEUTROPHILS # BLD AUTO: 5.4 K/UL (ref 1.8–7.7)
NEUTROPHILS NFR BLD: 63.4 % (ref 38–73)
NRBC BLD-RTO: 0 /100 WBC
OHS QRS DURATION: 78 MS
OHS QTC CALCULATION: 400 MS
PCO2 BLDA: 71.7 MMHG (ref 35–45)
PH SMN: 7.41 [PH] (ref 7.35–7.45)
PLATELET # BLD AUTO: 208 K/UL (ref 150–450)
PMV BLD AUTO: 12 FL (ref 9.2–12.9)
PO2 BLDA: 82 MMHG (ref 40–60)
POC BE: 21 MMOL/L
POC SATURATED O2: 95 % (ref 95–100)
POC TCO2: 48 MMOL/L (ref 24–29)
POTASSIUM SERPL-SCNC: 3.5 MMOL/L (ref 3.5–5.1)
PROT SERPL-MCNC: 7 G/DL (ref 6–8.4)
RBC # BLD AUTO: 4.68 M/UL (ref 4–5.4)
SAMPLE: ABNORMAL
SAMPLE: ABNORMAL
SARS-COV-2 RDRP RESP QL NAA+PROBE: NEGATIVE
SITE: ABNORMAL
SITE: ABNORMAL
SODIUM SERPL-SCNC: 140 MMOL/L (ref 136–145)
TROPONIN I SERPL DL<=0.01 NG/ML-MCNC: <0.006 NG/ML (ref 0–0.03)
WBC # BLD AUTO: 8.56 K/UL (ref 3.9–12.7)

## 2024-08-12 PROCEDURE — 27000221 HC OXYGEN, UP TO 24 HOURS: Mod: HCNC

## 2024-08-12 PROCEDURE — 25000003 PHARM REV CODE 250: Mod: HCNC | Performed by: EMERGENCY MEDICINE

## 2024-08-12 PROCEDURE — 63600175 PHARM REV CODE 636 W HCPCS: Mod: HCNC | Performed by: EMERGENCY MEDICINE

## 2024-08-12 PROCEDURE — 93010 ELECTROCARDIOGRAM REPORT: CPT | Mod: HCNC,,, | Performed by: INTERNAL MEDICINE

## 2024-08-12 PROCEDURE — 93005 ELECTROCARDIOGRAM TRACING: CPT | Mod: HCNC

## 2024-08-12 PROCEDURE — 99900035 HC TECH TIME PER 15 MIN (STAT): Mod: HCNC

## 2024-08-12 PROCEDURE — 96365 THER/PROPH/DIAG IV INF INIT: CPT | Mod: HCNC

## 2024-08-12 PROCEDURE — 82803 BLOOD GASES ANY COMBINATION: CPT | Mod: HCNC

## 2024-08-12 PROCEDURE — 99285 EMERGENCY DEPT VISIT HI MDM: CPT | Mod: 25,HCNC

## 2024-08-12 PROCEDURE — 96375 TX/PRO/DX INJ NEW DRUG ADDON: CPT | Mod: HCNC

## 2024-08-12 PROCEDURE — 83605 ASSAY OF LACTIC ACID: CPT | Mod: HCNC

## 2024-08-12 PROCEDURE — 85025 COMPLETE CBC W/AUTO DIFF WBC: CPT | Mod: HCNC | Performed by: EMERGENCY MEDICINE

## 2024-08-12 PROCEDURE — 83880 ASSAY OF NATRIURETIC PEPTIDE: CPT | Mod: HCNC

## 2024-08-12 PROCEDURE — 25000003 PHARM REV CODE 250: Mod: HCNC

## 2024-08-12 PROCEDURE — 94761 N-INVAS EAR/PLS OXIMETRY MLT: CPT | Mod: HCNC,XB

## 2024-08-12 PROCEDURE — 80053 COMPREHEN METABOLIC PANEL: CPT | Mod: HCNC | Performed by: EMERGENCY MEDICINE

## 2024-08-12 PROCEDURE — U0002 COVID-19 LAB TEST NON-CDC: HCPCS | Mod: HCNC | Performed by: EMERGENCY MEDICINE

## 2024-08-12 PROCEDURE — 84484 ASSAY OF TROPONIN QUANT: CPT | Mod: HCNC

## 2024-08-12 PROCEDURE — 94640 AIRWAY INHALATION TREATMENT: CPT | Mod: HCNC

## 2024-08-12 PROCEDURE — 25000242 PHARM REV CODE 250 ALT 637 W/ HCPCS: Mod: HCNC | Performed by: EMERGENCY MEDICINE

## 2024-08-12 RX ORDER — IPRATROPIUM BROMIDE AND ALBUTEROL SULFATE 2.5; .5 MG/3ML; MG/3ML
SOLUTION RESPIRATORY (INHALATION)
Status: DISCONTINUED
Start: 2024-08-12 | End: 2024-08-12

## 2024-08-12 RX ORDER — METHYLPREDNISOLONE SOD SUCC 125 MG
125 VIAL (EA) INJECTION
Status: COMPLETED | OUTPATIENT
Start: 2024-08-12 | End: 2024-08-12

## 2024-08-12 RX ORDER — IPRATROPIUM BROMIDE AND ALBUTEROL SULFATE 2.5; .5 MG/3ML; MG/3ML
3 SOLUTION RESPIRATORY (INHALATION)
Status: DISCONTINUED | OUTPATIENT
Start: 2024-08-12 | End: 2024-08-12

## 2024-08-12 RX ORDER — ACETAMINOPHEN 500 MG
500 TABLET ORAL ONCE
Status: COMPLETED | OUTPATIENT
Start: 2024-08-12 | End: 2024-08-12

## 2024-08-12 RX ORDER — PREDNISONE 50 MG/1
50 TABLET ORAL DAILY
Qty: 5 TABLET | Refills: 0 | Status: SHIPPED | OUTPATIENT
Start: 2024-08-12 | End: 2024-08-16

## 2024-08-12 RX ORDER — IPRATROPIUM BROMIDE AND ALBUTEROL SULFATE 2.5; .5 MG/3ML; MG/3ML
9 SOLUTION RESPIRATORY (INHALATION) ONCE
Status: COMPLETED | OUTPATIENT
Start: 2024-08-12 | End: 2024-08-12

## 2024-08-12 RX ORDER — AMOXICILLIN AND CLAVULANATE POTASSIUM 875; 125 MG/1; MG/1
1 TABLET, FILM COATED ORAL 2 TIMES DAILY
Qty: 10 TABLET | Refills: 0 | Status: SHIPPED | OUTPATIENT
Start: 2024-08-12 | End: 2024-08-16

## 2024-08-12 RX ADMIN — METHYLPREDNISOLONE SODIUM SUCCINATE 125 MG: 125 INJECTION, POWDER, FOR SOLUTION INTRAMUSCULAR; INTRAVENOUS at 03:08

## 2024-08-12 RX ADMIN — IPRATROPIUM BROMIDE AND ALBUTEROL SULFATE 9 ML: 2.5; .5 SOLUTION RESPIRATORY (INHALATION) at 02:08

## 2024-08-12 RX ADMIN — CEFTRIAXONE 1 G: 1 INJECTION, POWDER, FOR SOLUTION INTRAMUSCULAR; INTRAVENOUS at 03:08

## 2024-08-12 RX ADMIN — ACETAMINOPHEN 500 MG: 500 TABLET ORAL at 05:08

## 2024-08-12 NOTE — DISCHARGE INSTRUCTIONS
It was a pleasure taking care of you today!     Diagnosis: COPD Exacerbation    Home Care:   - Take Prednisone 50mg once daily for 5 days  - Take Augmentin twice daily for 5 days  - Use albuterol inhaler 4 puffs every 4 hours for the next 24-48 hours. Then, you may use it as needed every 4 hours  - Continue your inhaler use as prescribed    Follow-Up Plan:  - Follow-up with primary care doctor within 3 - 5 days to discuss your recent ER visit and any additional concerns that you may have.  - Additional testing and/or evaluation as directed by your primary doctor    Return to the Emergency Department for symptoms including but not limited to: persistence or worsening of symptoms, shortness of breath or chest pain, inability to drink without vomiting, passing out/fainting/ loss of consciousness, or if you have other concerns.

## 2024-08-12 NOTE — FIRST PROVIDER EVALUATION
Medical screening examination initiated.  I have conducted a focused provider triage encounter, findings are as follows:    Brief history of present illness:  shortness of breath, h/o COPD    There were no vitals filed for this visit.    Pertinent physical exam:  dyspneic    Brief workup plan:  labs, cxr, ED    Preliminary workup initiated; this workup will be continued and followed by the physician or advanced practice provider that is assigned to the patient when roomed.

## 2024-08-12 NOTE — TELEPHONE ENCOUNTER
No care due was identified.  [facility] Embedded Care Due Messages. Reference number: [id number].   8/12/2024 9:16:36 AM CDT

## 2024-08-12 NOTE — ED TRIAGE NOTES
Rochelle Espinoza, a 68 y.o. female presents to the ED w/ complaint of headache and shortness of breath that started on Wednesday.     Triage note:  Chief Complaint   Patient presents with    Shortness of Breath     On 3l nc hx copd, ha, fever on sat     Review of patient's allergies indicates:   Allergen Reactions    Doxycycline Other (See Comments)     Acid reflux    Orange juice      Swelling in pt tongue      8/23/23 - pt stated she had in hosp and it didn't effect her.     Past Medical History:   Diagnosis Date    CHF (congestive heart failure)     COPD (chronic obstructive pulmonary disease)     Herpes zoster without mention of complication 2/21/2011    Hypertension     Leg edema     Pulmonary hypertension     Sciatica

## 2024-08-12 NOTE — ED PROVIDER NOTES
Encounter Date: 2024       History     Chief Complaint   Patient presents with    Shortness of Breath     On 3l nc hx copd, ha, fever on sat     69 y/o F w/ hx of COPD, chronic hypoxemic respiratory failure (3L O2 baseline), diastolic heart failure (last EF 60-65%), and HTN who presents to the ED for evaluation of SOB that worsened this morning. Pt reports that she was recently exposed to someone with COVID this past Tuesday and went to an urgent care the subsequent Saturday after which her COVID test resulted negative. Since this past Wednesday her chronic cough has worsened and become productive (brown/yellow sputum). Pt reports that she has been experiencing PND, waking up w/ O2 sats in the mid 70's and 80's at times. Pt reports her SOB and coughing have been giving her a headache, light-headedness. Pt denies any fevers today (temp at home 98F), chest pain, leg swelling, hemoptysis, or any other sxs at this time. Pt further denies any recent surgery or immobilization, history of malignancy, prior DVT or PE.         Review of patient's allergies indicates:   Allergen Reactions    Doxycycline Other (See Comments)     Acid reflux    Orange juice      Swelling in pt tongue      23 - pt stated she had in hosp and it didn't effect her.     Past Medical History:   Diagnosis Date    CHF (congestive heart failure)     COPD (chronic obstructive pulmonary disease)     Herpes zoster without mention of complication 2011    Hypertension     Leg edema     Pulmonary hypertension     Sciatica      Past Surgical History:   Procedure Laterality Date    BREAST BIOPSY Right     core     SECTION      COLONOSCOPY N/A 2019    Procedure: COLONOSCOPY;  Surgeon: Rui Holder MD;  Location: [facility] ENDO ([address] [place] [address]);  Service: Endoscopy;  Laterality: N/A;    EPIDURAL STEROID INJECTION N/A 2020    Procedure: CERVICAL BLAKE;  Surgeon: Papito High MD;  Location: [facility] PAIN MGT;  Service: Pain Management;  Laterality:  N/A;  NEEDS CONSENT    ESOPHAGOGASTRODUODENOSCOPY N/A 12/19/2019    Procedure: EGD (ESOPHAGOGASTRODUODENOSCOPY);  Surgeon: Rui Holder MD;  Location: [facility] ([address] [place] [address]);  Service: Endoscopy;  Laterality: N/A;  2L continuous O2 via NC, COPD, pulm HTN    TRANSFORAMINAL EPIDURAL INJECTION OF STEROID Right 6/3/2021    Procedure: INJECTION, STEROID, EPIDURAL, TRANSFORAMINAL APPROACH, L4-L5;  Surgeon: Anibal Robles MD;  Location: [facility] PAIN MGT;  Service: Pain Management;  Laterality: Right;     Family History   Problem Relation Name Age of Onset    Heart disease Mother      Heart disease Paternal Uncle      Celiac disease Neg Hx      Colon cancer Neg Hx      Colon polyps Neg Hx      Crohn's disease Neg Hx      Cystic fibrosis Neg Hx      Esophageal cancer Neg Hx      Inflammatory bowel disease Neg Hx      Irritable bowel syndrome Neg Hx      Liver cancer Neg Hx      Liver disease Neg Hx      Rectal cancer Neg Hx      Stomach cancer Neg Hx      Ulcerative colitis Neg Hx       Social History     Tobacco Use    Smoking status: Every Day     Current packs/day: 0.10     Average packs/day: 1 pack/day for 52.6 years (51.1 ttl pk-yrs)     Types: Cigarettes     Start date: 1972    Smokeless tobacco: Never    Tobacco comments:     Quit over a month ago    Substance Use Topics    Alcohol use: Not Currently     Comment: beer daily 2-3 daily, none today    Drug use: No     Review of Systems    Physical Exam     Initial Vitals [08/12/24 1151]   BP Pulse Resp Temp SpO2   132/68 91 (!) 24 99.3 °F (37.4 °C) (!) 92 %      MAP       --         Physical Exam    Constitutional: She appears well-nourished. She is not diaphoretic. No distress.   HENT:   Head: Normocephalic and atraumatic.   Eyes: EOM are normal. Right eye exhibits no discharge. Left eye exhibits no discharge.   Neck: No JVD present.   Cardiovascular:  Normal rate and regular rhythm.     Exam reveals no friction rub.       No murmur heard.  Pulmonary/Chest: No accessory  muscle usage. No respiratory distress. She has wheezes (diffuse bilaterally). She has no rales.   Abdominal: Bowel sounds are normal. She exhibits no distension. There is no abdominal tenderness.   Musculoskeletal:         General: No edema (no edema in lower extremities bilaterally).     Neurological: She is alert and oriented to person, place, and time.   Psychiatric: She has a normal mood and affect. Thought content normal.         ED Course   Procedures  Labs Reviewed   CBC W/ AUTO DIFFERENTIAL - Abnormal       Result Value    WBC 8.56      RBC 4.68      Hemoglobin 12.9      Hematocrit 44.8      MCV 96      MCH 27.6      MCHC 28.8 (*)     RDW 14.7 (*)     Platelets 208      MPV 12.0      Immature Granulocytes 0.2      Gran # (ANC) 5.4      Immature Grans (Abs) 0.02      Lymph # 2.3      Mono # 0.7      Eos # 0.1      Baso # 0.02      nRBC 0      Gran % 63.4      Lymph % 27.2      Mono % 8.2      Eosinophil % 0.8      Basophil % 0.2      Differential Method Automated     COMPREHENSIVE METABOLIC PANEL - Abnormal    Sodium 140      Potassium 3.5      Chloride 91 (*)     CO2 40 (*)     Glucose 88      BUN 6 (*)     Creatinine 0.7      Calcium 9.5      Total Protein 7.0      Albumin 3.6      Total Bilirubin 0.4      Alkaline Phosphatase 58      AST 21      ALT 15      eGFR >60.0      Anion Gap 9     ISTAT LACTATE - Abnormal    POC Lactate 0.49 (*)     Sample VENOUS      Site Other      Allens Test N/A     ISTAT PROCEDURE - Abnormal    POC PH 7.413      POC PCO2 71.7 (*)     POC PO2 82 (*)     POC HCO3 45.8 (*)     POC BE 21 (*)     POC SATURATED O2 95      POC TCO2 48 (*)     Sample VENOUS      Site Other      Allens Test N/A     SARS-COV-2 RNA AMPLIFICATION, QUAL    SARS-CoV-2 RNA, Amplification, Qual Negative     TROPONIN I    Troponin I <0.006     B-TYPE NATRIURETIC PEPTIDE    BNP 18          ECG Results              EKG 12-lead (Final result)        Collection Time Result Time QRS Duration OHS QTC Calculation     08/12/24 11:57:21 08/12/24 16:36:17 78 400                     Final result by Interface, Lab In [facility] (08/12/24 16:36:24)                   Narrative:    Test Reason : R06.02,    Vent. Rate : 094 BPM     Atrial Rate : 094 BPM     P-R Int : 236 ms          QRS Dur : 078 ms      QT Int : 320 ms       P-R-T Axes : 064 000 027 degrees     QTc Int : 400 ms    Sinus rhythm with 1st degree A-V block  Possible Left atrial enlargement  Low septal forces ; Abnormal R wave progression in the precordial leads   -Probable  Anterior infarct ,age undetermined  Non-specific ST-t abn possibly due to ischemia  Abnormal ECG  When compared with ECG of 12-MAY-2024 06:50,  SD interval has increased  Confirmed by Yovany SCHULZ MD ([id number]) on 8/12/2024 4:36:12 PM    Referred By:             Confirmed By:Yovany SCHULZ MD                                  Imaging Results              X-Ray Chest AP Portable (Final result)  Result time 08/12/24 14:50:03      Final result by Rui Reilly MD (08/12/24 14:50:03)                   Impression:      See above      Electronically signed by: Rui Reilly MD  Date:    08/12/2024  Time:    14:50               Narrative:    EXAMINATION:  XR CHEST AP PORTABLE    CLINICAL HISTORY:  Shortness of breath    TECHNIQUE:  Single frontal view of the chest was performed.    COMPARISON:  N 05/12/2024 one    FINDINGS:  Heart size upper limi of normal.  No significant airspace consolidation or pleural effusion identified                                       Medications   methylPREDNISolone sodium succinate injection 125 mg (125 mg Intravenous Given 8/12/24 1545)   cefTRIAXone (Rocephin) 1 g in D5W 100 mL IVPB (MB+) (0 g Intravenous Stopped 8/12/24 1626)   albuterol-ipratropium 2.5 mg-0.5 mg/3 mL nebulizer solution 9 mL (9 mLs Nebulization Given 8/12/24 1437)   acetaminophen tablet 500 mg (500 mg Oral Given 8/12/24 1705)     Medical Decision Making  Patient's current constellation of symptoms are most consistent with  an acute COPD exacerbation. Will evaluate for alternate diagnosis such as pneumonia, pneumothorax although these may be less likely. Acute PE less likely as Well's criteria for PE 0. COVID negative. ACS less likely as no atypical or typical chest pain. Heart failure exacerbation less likely as patient presents with no crackles, JVD, leg swelling, abdominal distension. She reports that she has had no huge fluctuations in her weight and has been compliant with her diuretic regimen.     Chest XR w/ no acute process. CBC, CMP, lactate with no significant abnormalities, though bicarb elevated on CMP which is most likely compensatory given her COPD and chronic hypoxemic respiratory failure.     Started on albuterol-ipratropium nebs in the ED and steroids. Will reevaluate her response. In her chart there is an allergy to doxycyline, however she notes that her reaction was an upset stomach. She is amenable to trialing doxycycline if sent home with it.     Troponin, BNP normal. VBG within normal limits for her.     Patient's symptoms have improved with duonebs. Spoke with her about concerning signs and symptoms to look out for and when to seek medical help after discharge. She verbalized understanding.     Amount and/or Complexity of Data Reviewed  Labs: ordered.    Risk  OTC drugs.  Prescription drug management.               ED Course as of 08/12/24 1714   Mon Aug 12, 2024   1414 On 3LNC at baseline. Worsening SOB since waking this AM. H/o exposure to COVID with negative screening test 3 days ago. Symptoms progressed since w/ overnight hypoxia to 75% and PND. C/o cough productive of brown sputum [DS]      ED Course User Index  [DS] Sessions, Juan Miguel FOLEY MD                           Clinical Impression:  Final diagnoses:  [R06.02] Shortness of breath  [J44.1] COPD exacerbation (Primary)          ED Disposition Condition    Discharge Stable          ED Prescriptions       Medication Sig Dispense Start Date End Date Auth.  Provider    predniSONE (DELTASONE) 50 MG Tab Take 1 tablet (50 mg total) by mouth once daily. for 5 days 5 tablet 8/12/2024 8/17/2024 Figueroa Daniels DO    amoxicillin-clavulanate 875-125mg (AUGMENTIN) 875-125 mg per tablet Take 1 tablet by mouth 2 (two) times daily. for 5 days 10 tablet 8/12/2024 8/17/2024 Figueroa Daniels DO          Follow-up Information    None          Figueroa Daniels DO  Resident  08/12/24 [id number]

## 2024-08-13 ENCOUNTER — PATIENT OUTREACH (OUTPATIENT)
Dept: EMERGENCY MEDICINE | Facility: HOSPITAL | Age: 69
End: 2024-08-13
Payer: MEDICARE

## 2024-08-13 NOTE — TELEPHONE ENCOUNTER
Refill Routing Note   Medication(s) are not appropriate for processing by [facility] for the following reason(s):        ED/Hospital Visit since last OV with provider    [facility] action(s):  Defer        Medication Therapy Plan: Patient was seen in the ED  on 8/12/24 for COPD exacerbation. No follow up with PCP recommended by acute care provider      Appointments  past 12m or future 3m with PCP    Date Provider   Last Visit   7/2/2024 Yosi Samuels Jr., MD   Next Visit   10/7/2024 Yosi Samuels Jr., MD   ED visits in past 90 days: 1        Note composed:8:55 PM 08/12/2024

## 2024-08-14 RX ORDER — ALBUTEROL SULFATE 90 UG/1
2 INHALANT RESPIRATORY (INHALATION) EVERY 4 HOURS PRN
Qty: 20.1 G | Refills: 3 | Status: SHIPPED | OUTPATIENT
Start: 2024-08-14

## 2024-08-16 ENCOUNTER — HOSPITAL ENCOUNTER (OUTPATIENT)
Facility: HOSPITAL | Age: 69
Discharge: HOME | End: 2024-08-17
Attending: EMERGENCY MEDICINE | Admitting: HOSPITALIST
Payer: MEDICARE

## 2024-08-16 DIAGNOSIS — J98.4 CAVITARY LESION OF LUNG: ICD-10-CM

## 2024-08-16 DIAGNOSIS — R91.1 LUNG NODULE: ICD-10-CM

## 2024-08-16 DIAGNOSIS — J44.1 COPD EXACERBATION: Primary | ICD-10-CM

## 2024-08-16 DIAGNOSIS — R06.02 SHORTNESS OF BREATH: ICD-10-CM

## 2024-08-16 DIAGNOSIS — R07.9 CHEST PAIN: ICD-10-CM

## 2024-08-16 PROBLEM — J96.11 CHRONIC HYPOXEMIC RESPIRATORY FAILURE: Status: ACTIVE | Noted: 2024-08-16

## 2024-08-16 LAB
ALBUMIN SERPL BCP-MCNC: 3.8 G/DL (ref 3.5–5.2)
ALP SERPL-CCNC: 52 U/L (ref 55–135)
ALT SERPL W/O P-5'-P-CCNC: 23 U/L (ref 10–44)
ANION GAP SERPL CALC-SCNC: 11 MMOL/L (ref 8–16)
ANISOCYTOSIS BLD QL SMEAR: SLIGHT
AST SERPL-CCNC: 33 U/L (ref 10–40)
BASO STIPL BLD QL SMEAR: ABNORMAL
BASOPHILS # BLD AUTO: 0.01 K/UL (ref 0–0.2)
BASOPHILS NFR BLD: 0.1 % (ref 0–1.9)
BILIRUB SERPL-MCNC: 0.5 MG/DL (ref 0.1–1)
BILIRUB UR QL STRIP: NEGATIVE
BNP SERPL-MCNC: 14 PG/ML (ref 0–99)
BUN SERPL-MCNC: 6 MG/DL (ref 6–30)
BUN SERPL-MCNC: 7 MG/DL (ref 8–23)
CALCIUM SERPL-MCNC: 9.2 MG/DL (ref 8.7–10.5)
CHLORIDE SERPL-SCNC: 90 MMOL/L (ref 95–110)
CHLORIDE SERPL-SCNC: 92 MMOL/L (ref 95–110)
CLARITY UR REFRACT.AUTO: CLEAR
CO2 SERPL-SCNC: 35 MMOL/L (ref 23–29)
COLOR UR AUTO: COLORLESS
CREAT SERPL-MCNC: 0.6 MG/DL (ref 0.5–1.4)
CREAT SERPL-MCNC: 0.7 MG/DL (ref 0.5–1.4)
DIFFERENTIAL METHOD BLD: ABNORMAL
EOSINOPHIL # BLD AUTO: 0 K/UL (ref 0–0.5)
EOSINOPHIL NFR BLD: 0 % (ref 0–8)
ERYTHROCYTE [DISTWIDTH] IN BLOOD BY AUTOMATED COUNT: 14.9 % (ref 11.5–14.5)
EST. GFR  (NO RACE VARIABLE): >60 ML/MIN/1.73 M^2
GIANT PLATELETS BLD QL SMEAR: PRESENT
GLUCOSE SERPL-MCNC: 123 MG/DL (ref 70–110)
GLUCOSE SERPL-MCNC: 125 MG/DL (ref 70–110)
GLUCOSE UR QL STRIP: NEGATIVE
HCT VFR BLD AUTO: 42.5 % (ref 37–48.5)
HCT VFR BLD CALC: 45 %PCV (ref 36–54)
HGB BLD-MCNC: 13.1 G/DL (ref 12–16)
HGB UR QL STRIP: NEGATIVE
HYPOCHROMIA BLD QL SMEAR: ABNORMAL
IMM GRANULOCYTES # BLD AUTO: 0.02 K/UL (ref 0–0.04)
IMM GRANULOCYTES NFR BLD AUTO: 0.2 % (ref 0–0.5)
KETONES UR QL STRIP: NEGATIVE
LEUKOCYTE ESTERASE UR QL STRIP: NEGATIVE
LYMPHOCYTES # BLD AUTO: 0.8 K/UL (ref 1–4.8)
LYMPHOCYTES NFR BLD: 7.9 % (ref 18–48)
MCH RBC QN AUTO: 28.2 PG (ref 27–31)
MCHC RBC AUTO-ENTMCNC: 30.8 G/DL (ref 32–36)
MCV RBC AUTO: 91 FL (ref 82–98)
MONOCYTES # BLD AUTO: 0.1 K/UL (ref 0.3–1)
MONOCYTES NFR BLD: 1.2 % (ref 4–15)
MRSA SCREEN BY PCR: NOT DETECTED
NEUTROPHILS # BLD AUTO: 8.6 K/UL (ref 1.8–7.7)
NEUTROPHILS NFR BLD: 90.6 % (ref 38–73)
NITRITE UR QL STRIP: NEGATIVE
NRBC BLD-RTO: 0 /100 WBC
OHS QRS DURATION: 78 MS
OHS QTC CALCULATION: 438 MS
OVALOCYTES BLD QL SMEAR: ABNORMAL
PH UR STRIP: 8 [PH] (ref 5–8)
PLATELET # BLD AUTO: 191 K/UL (ref 150–450)
PLATELET BLD QL SMEAR: ABNORMAL
PMV BLD AUTO: 11.7 FL (ref 9.2–12.9)
POC IONIZED CALCIUM: 1.01 MMOL/L (ref 1.06–1.42)
POC TCO2 (MEASURED): 40 MMOL/L (ref 23–29)
POIKILOCYTOSIS BLD QL SMEAR: SLIGHT
POTASSIUM BLD-SCNC: 3.2 MMOL/L (ref 3.5–5.1)
POTASSIUM SERPL-SCNC: 3.8 MMOL/L (ref 3.5–5.1)
PROT SERPL-MCNC: 7.1 G/DL (ref 6–8.4)
PROT UR QL STRIP: NEGATIVE
RBC # BLD AUTO: 4.65 M/UL (ref 4–5.4)
SAMPLE: ABNORMAL
SARS-COV-2 RDRP RESP QL NAA+PROBE: NEGATIVE
SODIUM BLD-SCNC: 138 MMOL/L (ref 136–145)
SODIUM SERPL-SCNC: 138 MMOL/L (ref 136–145)
SP GR UR STRIP: 1 (ref 1–1.03)
TARGETS BLD QL SMEAR: ABNORMAL
TROPONIN I SERPL DL<=0.01 NG/ML-MCNC: 0.01 NG/ML (ref 0–0.03)
TROPONIN I SERPL DL<=0.01 NG/ML-MCNC: <0.006 NG/ML (ref 0–0.03)
URN SPEC COLLECT METH UR: ABNORMAL
WBC # BLD AUTO: 9.44 K/UL (ref 3.9–12.7)

## 2024-08-16 PROCEDURE — 27000221 HC OXYGEN, UP TO 24 HOURS: Mod: HCNC

## 2024-08-16 PROCEDURE — 25000003 PHARM REV CODE 250: Mod: HCNC | Performed by: INTERNAL MEDICINE

## 2024-08-16 PROCEDURE — 96365 THER/PROPH/DIAG IV INF INIT: CPT | Mod: HCNC

## 2024-08-16 PROCEDURE — 80047 BASIC METABLC PNL IONIZED CA: CPT | Mod: HCNC

## 2024-08-16 PROCEDURE — 99900035 HC TECH TIME PER 15 MIN (STAT): Mod: HCNC

## 2024-08-16 PROCEDURE — 25000242 PHARM REV CODE 250 ALT 637 W/ HCPCS: Mod: HCNC | Performed by: INTERNAL MEDICINE

## 2024-08-16 PROCEDURE — 83880 ASSAY OF NATRIURETIC PEPTIDE: CPT | Mod: HCNC | Performed by: PHYSICIAN ASSISTANT

## 2024-08-16 PROCEDURE — 81003 URINALYSIS AUTO W/O SCOPE: CPT | Mod: HCNC | Performed by: PHYSICIAN ASSISTANT

## 2024-08-16 PROCEDURE — 25000003 PHARM REV CODE 250: Mod: HCNC | Performed by: PHYSICIAN ASSISTANT

## 2024-08-16 PROCEDURE — 94640 AIRWAY INHALATION TREATMENT: CPT | Mod: HCNC

## 2024-08-16 PROCEDURE — 87206 SMEAR FLUORESCENT/ACID STAI: CPT | Mod: HCNC | Performed by: INTERNAL MEDICINE

## 2024-08-16 PROCEDURE — G0378 HOSPITAL OBSERVATION PER HR: HCPCS | Mod: HCNC

## 2024-08-16 PROCEDURE — 94761 N-INVAS EAR/PLS OXIMETRY MLT: CPT | Mod: HCNC

## 2024-08-16 PROCEDURE — 96367 TX/PROPH/DG ADDL SEQ IV INF: CPT | Mod: HCNC

## 2024-08-16 PROCEDURE — U0002 COVID-19 LAB TEST NON-CDC: HCPCS | Mod: HCNC | Performed by: PHYSICIAN ASSISTANT

## 2024-08-16 PROCEDURE — 96366 THER/PROPH/DIAG IV INF ADDON: CPT | Mod: HCNC

## 2024-08-16 PROCEDURE — 25000242 PHARM REV CODE 250 ALT 637 W/ HCPCS: Mod: HCNC | Performed by: PHYSICIAN ASSISTANT

## 2024-08-16 PROCEDURE — 84484 ASSAY OF TROPONIN QUANT: CPT | Mod: 91,HCNC | Performed by: PHYSICIAN ASSISTANT

## 2024-08-16 PROCEDURE — 99285 EMERGENCY DEPT VISIT HI MDM: CPT | Mod: 25,HCNC

## 2024-08-16 PROCEDURE — 87205 SMEAR GRAM STAIN: CPT | Mod: HCNC | Performed by: INTERNAL MEDICINE

## 2024-08-16 PROCEDURE — 87641 MR-STAPH DNA AMP PROBE: CPT | Mod: HCNC | Performed by: INTERNAL MEDICINE

## 2024-08-16 PROCEDURE — 96375 TX/PRO/DX INJ NEW DRUG ADDON: CPT | Mod: HCNC

## 2024-08-16 PROCEDURE — 63600175 PHARM REV CODE 636 W HCPCS: Mod: HCNC | Performed by: INTERNAL MEDICINE

## 2024-08-16 PROCEDURE — 94640 AIRWAY INHALATION TREATMENT: CPT | Mod: HCNC,XB

## 2024-08-16 PROCEDURE — 25500020 PHARM REV CODE 255: Mod: HCNC | Performed by: EMERGENCY MEDICINE

## 2024-08-16 PROCEDURE — 87070 CULTURE OTHR SPECIMN AEROBIC: CPT | Mod: HCNC | Performed by: INTERNAL MEDICINE

## 2024-08-16 PROCEDURE — 80053 COMPREHEN METABOLIC PANEL: CPT | Mod: HCNC | Performed by: PHYSICIAN ASSISTANT

## 2024-08-16 PROCEDURE — 85025 COMPLETE CBC W/AUTO DIFF WBC: CPT | Mod: HCNC | Performed by: PHYSICIAN ASSISTANT

## 2024-08-16 PROCEDURE — 82330 ASSAY OF CALCIUM: CPT

## 2024-08-16 PROCEDURE — 87116 MYCOBACTERIA CULTURE: CPT | Mod: HCNC | Performed by: INTERNAL MEDICINE

## 2024-08-16 PROCEDURE — 63600175 PHARM REV CODE 636 W HCPCS: Mod: HCNC | Performed by: PHYSICIAN ASSISTANT

## 2024-08-16 PROCEDURE — 87015 SPECIMEN INFECT AGNT CONCNTJ: CPT | Mod: HCNC | Performed by: INTERNAL MEDICINE

## 2024-08-16 RX ORDER — ENOXAPARIN SODIUM 100 MG/ML
40 INJECTION SUBCUTANEOUS EVERY 24 HOURS
Status: DISCONTINUED | OUTPATIENT
Start: 2024-08-17 | End: 2024-08-17 | Stop reason: HOSPADM

## 2024-08-16 RX ORDER — IPRATROPIUM BROMIDE AND ALBUTEROL SULFATE 2.5; .5 MG/3ML; MG/3ML
9 SOLUTION RESPIRATORY (INHALATION)
Status: DISCONTINUED | OUTPATIENT
Start: 2024-08-16 | End: 2024-08-16

## 2024-08-16 RX ORDER — DROPERIDOL 2.5 MG/ML
0.62 INJECTION, SOLUTION INTRAMUSCULAR; INTRAVENOUS ONCE
Status: COMPLETED | OUTPATIENT
Start: 2024-08-16 | End: 2024-08-16

## 2024-08-16 RX ORDER — PROCHLORPERAZINE EDISYLATE 5 MG/ML
5 INJECTION INTRAMUSCULAR; INTRAVENOUS EVERY 6 HOURS PRN
Status: DISCONTINUED | OUTPATIENT
Start: 2024-08-16 | End: 2024-08-17 | Stop reason: HOSPADM

## 2024-08-16 RX ORDER — MAGNESIUM SULFATE HEPTAHYDRATE 40 MG/ML
2 INJECTION, SOLUTION INTRAVENOUS
Status: COMPLETED | OUTPATIENT
Start: 2024-08-16 | End: 2024-08-16

## 2024-08-16 RX ORDER — GABAPENTIN 300 MG/1
300 CAPSULE ORAL 3 TIMES DAILY
Status: DISCONTINUED | OUTPATIENT
Start: 2024-08-16 | End: 2024-08-17 | Stop reason: HOSPADM

## 2024-08-16 RX ORDER — IPRATROPIUM BROMIDE AND ALBUTEROL SULFATE 2.5; .5 MG/3ML; MG/3ML
3 SOLUTION RESPIRATORY (INHALATION) ONCE
Status: COMPLETED | OUTPATIENT
Start: 2024-08-16 | End: 2024-08-16

## 2024-08-16 RX ORDER — IBUPROFEN 200 MG
16 TABLET ORAL
Status: DISCONTINUED | OUTPATIENT
Start: 2024-08-16 | End: 2024-08-17 | Stop reason: HOSPADM

## 2024-08-16 RX ORDER — POLYETHYLENE GLYCOL 3350 17 G/17G
17 POWDER, FOR SOLUTION ORAL 2 TIMES DAILY PRN
Status: DISCONTINUED | OUTPATIENT
Start: 2024-08-16 | End: 2024-08-17 | Stop reason: HOSPADM

## 2024-08-16 RX ORDER — CELECOXIB 200 MG/1
200 CAPSULE ORAL 2 TIMES DAILY
Status: DISCONTINUED | OUTPATIENT
Start: 2024-08-16 | End: 2024-08-17 | Stop reason: HOSPADM

## 2024-08-16 RX ORDER — LOSARTAN POTASSIUM 50 MG/1
100 TABLET ORAL DAILY
Status: DISCONTINUED | OUTPATIENT
Start: 2024-08-17 | End: 2024-08-17 | Stop reason: HOSPADM

## 2024-08-16 RX ORDER — ENOXAPARIN SODIUM 100 MG/ML
40 INJECTION SUBCUTANEOUS EVERY 12 HOURS
Status: DISCONTINUED | OUTPATIENT
Start: 2024-08-16 | End: 2024-08-16

## 2024-08-16 RX ORDER — BENZONATATE 100 MG/1
200 CAPSULE ORAL 3 TIMES DAILY PRN
Status: DISCONTINUED | OUTPATIENT
Start: 2024-08-16 | End: 2024-08-17 | Stop reason: HOSPADM

## 2024-08-16 RX ORDER — SODIUM CHLORIDE 0.9 % (FLUSH) 0.9 %
10 SYRINGE (ML) INJECTION EVERY 12 HOURS PRN
Status: DISCONTINUED | OUTPATIENT
Start: 2024-08-16 | End: 2024-08-17 | Stop reason: HOSPADM

## 2024-08-16 RX ORDER — PANTOPRAZOLE SODIUM 40 MG/1
40 TABLET, DELAYED RELEASE ORAL DAILY
Status: DISCONTINUED | OUTPATIENT
Start: 2024-08-17 | End: 2024-08-17 | Stop reason: HOSPADM

## 2024-08-16 RX ORDER — LEVOFLOXACIN 750 MG/1
750 TABLET ORAL DAILY
Status: DISCONTINUED | OUTPATIENT
Start: 2024-08-16 | End: 2024-08-17 | Stop reason: HOSPADM

## 2024-08-16 RX ORDER — ONDANSETRON 8 MG/1
8 TABLET, ORALLY DISINTEGRATING ORAL EVERY 8 HOURS PRN
Status: DISCONTINUED | OUTPATIENT
Start: 2024-08-16 | End: 2024-08-17 | Stop reason: HOSPADM

## 2024-08-16 RX ORDER — IPRATROPIUM BROMIDE AND ALBUTEROL SULFATE 2.5; .5 MG/3ML; MG/3ML
3 SOLUTION RESPIRATORY (INHALATION)
Status: DISCONTINUED | OUTPATIENT
Start: 2024-08-16 | End: 2024-08-17

## 2024-08-16 RX ORDER — SIMETHICONE 80 MG
1 TABLET,CHEWABLE ORAL 4 TIMES DAILY PRN
Status: DISCONTINUED | OUTPATIENT
Start: 2024-08-16 | End: 2024-08-17 | Stop reason: HOSPADM

## 2024-08-16 RX ORDER — FLUTICASONE FUROATE AND VILANTEROL 200; 25 UG/1; UG/1
1 POWDER RESPIRATORY (INHALATION) DAILY
Status: DISCONTINUED | OUTPATIENT
Start: 2024-08-17 | End: 2024-08-17 | Stop reason: HOSPADM

## 2024-08-16 RX ORDER — CETIRIZINE HYDROCHLORIDE 5 MG/1
10 TABLET ORAL DAILY
Status: DISCONTINUED | OUTPATIENT
Start: 2024-08-17 | End: 2024-08-17 | Stop reason: HOSPADM

## 2024-08-16 RX ORDER — FUROSEMIDE 20 MG/1
20 TABLET ORAL DAILY
Status: DISCONTINUED | OUTPATIENT
Start: 2024-08-17 | End: 2024-08-17 | Stop reason: HOSPADM

## 2024-08-16 RX ORDER — NALOXONE HCL 0.4 MG/ML
0.02 VIAL (ML) INJECTION
Status: DISCONTINUED | OUTPATIENT
Start: 2024-08-16 | End: 2024-08-17 | Stop reason: HOSPADM

## 2024-08-16 RX ORDER — GLUCAGON 1 MG
1 KIT INJECTION
Status: DISCONTINUED | OUTPATIENT
Start: 2024-08-16 | End: 2024-08-17 | Stop reason: HOSPADM

## 2024-08-16 RX ORDER — TALC
6 POWDER (GRAM) TOPICAL NIGHTLY PRN
Status: DISCONTINUED | OUTPATIENT
Start: 2024-08-16 | End: 2024-08-17 | Stop reason: HOSPADM

## 2024-08-16 RX ORDER — IBUPROFEN 200 MG
1 TABLET ORAL DAILY
Status: DISCONTINUED | OUTPATIENT
Start: 2024-08-17 | End: 2024-08-17 | Stop reason: HOSPADM

## 2024-08-16 RX ORDER — ACETAMINOPHEN 325 MG/1
650 TABLET ORAL EVERY 4 HOURS PRN
Status: DISCONTINUED | OUTPATIENT
Start: 2024-08-16 | End: 2024-08-17 | Stop reason: HOSPADM

## 2024-08-16 RX ORDER — IPRATROPIUM BROMIDE AND ALBUTEROL SULFATE 2.5; .5 MG/3ML; MG/3ML
9 SOLUTION RESPIRATORY (INHALATION) ONCE
Status: DISCONTINUED | OUTPATIENT
Start: 2024-08-16 | End: 2024-08-16

## 2024-08-16 RX ORDER — ATORVASTATIN CALCIUM 10 MG/1
10 TABLET, FILM COATED ORAL DAILY
Status: DISCONTINUED | OUTPATIENT
Start: 2024-08-17 | End: 2024-08-17 | Stop reason: HOSPADM

## 2024-08-16 RX ORDER — SODIUM CHLORIDE 0.9 % (FLUSH) 0.9 %
3 SYRINGE (ML) INJECTION
Status: DISCONTINUED | OUTPATIENT
Start: 2024-08-16 | End: 2024-08-17 | Stop reason: HOSPADM

## 2024-08-16 RX ORDER — FLUTICASONE PROPIONATE 50 MCG
1 SPRAY, SUSPENSION (ML) NASAL DAILY
Status: DISCONTINUED | OUTPATIENT
Start: 2024-08-17 | End: 2024-08-17 | Stop reason: HOSPADM

## 2024-08-16 RX ORDER — IBUPROFEN 200 MG
24 TABLET ORAL
Status: DISCONTINUED | OUTPATIENT
Start: 2024-08-16 | End: 2024-08-17 | Stop reason: HOSPADM

## 2024-08-16 RX ORDER — ACETAMINOPHEN 500 MG
1000 TABLET ORAL
Status: COMPLETED | OUTPATIENT
Start: 2024-08-16 | End: 2024-08-16

## 2024-08-16 RX ADMIN — GABAPENTIN 300 MG: 300 CAPSULE ORAL at 08:08

## 2024-08-16 RX ADMIN — IPRATROPIUM BROMIDE AND ALBUTEROL SULFATE 3 ML: 2.5; .5 SOLUTION RESPIRATORY (INHALATION) at 08:08

## 2024-08-16 RX ADMIN — IPRATROPIUM BROMIDE AND ALBUTEROL SULFATE 3 ML: 2.5; .5 SOLUTION RESPIRATORY (INHALATION) at 02:08

## 2024-08-16 RX ADMIN — VANCOMYCIN HYDROCHLORIDE 1250 MG: 1.25 INJECTION, POWDER, LYOPHILIZED, FOR SOLUTION INTRAVENOUS at 08:08

## 2024-08-16 RX ADMIN — METHYLPREDNISOLONE SODIUM SUCCINATE 80 MG: 40 INJECTION, POWDER, FOR SOLUTION INTRAMUSCULAR; INTRAVENOUS at 11:08

## 2024-08-16 RX ADMIN — MAGNESIUM SULFATE HEPTAHYDRATE 2 G: 40 INJECTION, SOLUTION INTRAVENOUS at 03:08

## 2024-08-16 RX ADMIN — ACETAMINOPHEN 1000 MG: 500 TABLET ORAL at 02:08

## 2024-08-16 RX ADMIN — IOHEXOL 75 ML: 350 INJECTION, SOLUTION INTRAVENOUS at 01:08

## 2024-08-16 RX ADMIN — CELECOXIB 200 MG: 200 CAPSULE ORAL at 08:08

## 2024-08-16 RX ADMIN — DROPERIDOL 0.62 MG: 2.5 INJECTION, SOLUTION INTRAMUSCULAR; INTRAVENOUS at 02:08

## 2024-08-16 RX ADMIN — LEVOFLOXACIN 750 MG: 750 TABLET, FILM COATED ORAL at 08:08

## 2024-08-16 NOTE — SUBJECTIVE & OBJECTIVE
Past Medical History:   Diagnosis Date    CHF (congestive heart failure)     COPD (chronic obstructive pulmonary disease)     Herpes zoster without mention of complication 2011    Hypertension     Leg edema     Pulmonary hypertension     Sciatica        Past Surgical History:   Procedure Laterality Date    BREAST BIOPSY Right     core     SECTION      COLONOSCOPY N/A 2019    Procedure: COLONOSCOPY;  Surgeon: Rui Holder MD;  Location: [facility] (2ND FLR);  Service: Endoscopy;  Laterality: N/A;    EPIDURAL STEROID INJECTION N/A 2020    Procedure: CERVICAL BLAKE;  Surgeon: Papito High MD;  Location: [facility]IN [facility];  Service: Pain Management;  Laterality: N/A;  NEEDS CONSENT    ESOPHAGOGASTRODUODENOSCOPY N/A 2019    Procedure: EGD (ESOPHAGOGASTRODUODENOSCOPY);  Surgeon: Rui Holder MD;  Location: [facility] (2ND FLR);  Service: Endoscopy;  Laterality: N/A;  2L continuous O2 via NC, COPD, pulm HTN    TRANSFORAMINAL EPIDURAL INJECTION OF STEROID Right 6/3/2021    Procedure: INJECTION, STEROID, EPIDURAL, TRANSFORAMINAL APPROACH, L4-L5;  Surgeon: Anibal Robles MD;  Location: [facility];  Service: Pain Management;  Laterality: Right;       Review of patient's allergies indicates:   Allergen Reactions    Doxycycline Other (See Comments)     Acid reflux    Orange juice      Swelling in pt tongue      23 - pt stated she had in hosp and it didn't effect her.       No current facility-administered medications on file prior to encounter.     Current Outpatient Medications on File Prior to Encounter   Medication Sig    albuterol (PROVENTIL/VENTOLIN HFA) 90 mcg/actuation inhaler Inhale 2 puffs into the lungs every 4 (four) hours as needed for Wheezing or Shortness of Breath. Rescue    albuterol-ipratropium (DUO-NEB) 2.5 mg-0.5 mg/3 mL nebulizer solution Take 3 mLs by nebulization every 4 (four) hours as needed for Wheezing or Shortness of Breath. Rescue    amoxicillin-clavulanate  875-125mg (AUGMENTIN) 875-125 mg per tablet Take 1 tablet by mouth 2 (two) times daily. for 5 days    ascorbic acid, vitamin C, (VITAMIN C) 500 MG tablet Take 500 mg by mouth once daily.    atorvastatin (LIPITOR) 10 MG tablet Take 1 tablet (10 mg total) by mouth once daily.    benzonatate (TESSALON PERLES) 100 MG capsule Take 2 capsules (200 mg total) by mouth 3 (three) times daily as needed for Cough.    buPROPion (WELLBUTRIN SR) 150 MG TBSR 12 hr tablet Take 1 tablet (150 mg total) by mouth 2 (two) times daily. (Patient not taking: Reported on 8/10/2024)    celecoxib (CELEBREX) 200 MG capsule TAKE 1 CAPSULE ONE TIME DAILY    cetirizine (ZYRTEC) 10 MG tablet Take 1 tablet (10 mg total) by mouth once daily.    cholecalciferol, vitamin D3, (VITAMIN D3) 25 mcg (1,000 unit) capsule Take 1 capsule (1,000 Units total) by mouth once daily.    fluticasone propionate (FLONASE) 50 mcg/actuation nasal spray SHAKE LIQUID AND USE 1 SPRAY (50MCG) IN EACH NOSTRIL TWICE DAILY    fluticasone-umeclidin-vilanter (TRELEGY ELLIPTA) 200-62.5-25 mcg inhaler Inhale 1 puff into the lungs once daily.    furosemide (LASIX) 20 MG tablet TAKE 1 TABLET ONE TIME DAILY, INCREASING TO 2 TABLETS FOR LEG SWELLING OR WEIGHT GAIN AS DIRECTED    gabapentin (NEURONTIN) 300 MG capsule TAKE 1 CAPSULE THREE TIMES DAILY    glycerin adult suppository Place 1 suppository rectally as needed for Constipation.    loratadine (CLARITIN) 10 mg tablet Take 1 tablet (10 mg total) by mouth once daily.    losartan (COZAAR) 100 MG tablet Take 1 tablet (100 mg total) by mouth once daily.    magnesium 250 mg Tab Take 250 mg by mouth once.    nicotine (NICODERM CQ) 14 mg/24 hr Place 1 patch onto the skin once daily. (Patient not taking: Reported on 8/10/2024)    pantoprazole (PROTONIX) 40 MG tablet Take 1 tablet (40 mg total) by mouth once daily.    predniSONE (DELTASONE) 50 MG Tab Take 1 tablet (50 mg total) by mouth once daily. for 5 days     Family History       Problem  Relation (Age of Onset)    Heart disease Mother, Paternal Uncle          Tobacco Use    Smoking status: Every Day     Current packs/day: 0.10     Average packs/day: 1 pack/day for 52.6 years (51.1 ttl pk-yrs)     Types: Cigarettes     Start date: 1972    Smokeless tobacco: Never    Tobacco comments:     Quit over a month ago    Substance and Sexual Activity    Alcohol use: Not Currently     Comment: beer daily 2-3 daily, none today    Drug use: No    Sexual activity: Not Currently     Birth control/protection: None     Review of Systems   Constitutional:  Positive for chills, fatigue and fever.   HENT:  Negative for congestion and dental problem.    Eyes:  Negative for photophobia and visual disturbance.   Respiratory:  Positive for cough, shortness of breath and wheezing. Negative for apnea and chest tightness.    Gastrointestinal:  Negative for anal bleeding, diarrhea, nausea and vomiting.   Genitourinary:  Negative for dysuria and frequency.   Musculoskeletal:  Negative for arthralgias, myalgias and neck pain.   Skin:  Negative for rash.   Neurological:  Positive for headaches. Negative for light-headedness and numbness.     Objective:     Vital Signs (Most Recent):  Temp: 99.2 °F (37.3 °C) (08/16/24 1320)  Pulse: 85 (08/16/24 1406)  Resp: (!) 24 (08/16/24 1406)  BP: 138/82 (08/16/24 1320)  SpO2: 95 % (08/16/24 1406) Vital Signs (24h Range):  Temp:  [99.2 °F (37.3 °C)-99.4 °F (37.4 °C)] 99.2 °F (37.3 °C)  Pulse:  [82-91] 85  Resp:  [16-24] 24  SpO2:  [90 %-95 %] 95 %  BP: (138-174)/(66-83) 138/82     Weight: 68 kg (150 lb)  Body mass index is 26.57 kg/m².     Physical Exam  Vitals and nursing note reviewed.   Constitutional:       Appearance: She is ill-appearing.   HENT:      Nose: Nose normal. No congestion or rhinorrhea.      Mouth/Throat:      Mouth: Mucous membranes are moist.      Pharynx: Oropharynx is clear.   Eyes:      General: No scleral icterus.     Conjunctiva/sclera: Conjunctivae normal.    Cardiovascular:      Rate and Rhythm: Normal rate and regular rhythm.      Pulses: Normal pulses.      Heart sounds: Normal heart sounds. No murmur heard.  Pulmonary:      Effort: Pulmonary effort is normal.      Breath sounds: Wheezing present.   Chest:      Chest wall: No tenderness.   Abdominal:      General: Abdomen is flat. There is no distension.      Palpations: Abdomen is soft.   Musculoskeletal:         General: Swelling present.      Cervical back: Normal range of motion. No rigidity.   Skin:     General: Skin is warm and dry.      Findings: No rash.   Neurological:      General: No focal deficit present.      Mental Status: She is alert. Mental status is at baseline.                Significant Labs: All pertinent labs within the past 24 hours have been reviewed.    Significant Imaging: I have reviewed all pertinent imaging results/findings within the past 24 hours.

## 2024-08-16 NOTE — ASSESSMENT & PLAN NOTE
Chronic, uncontrolled. Latest blood pressure and vitals reviewed-     Temp:  [99.2 °F (37.3 °C)-99.4 °F (37.4 °C)]   Pulse:  [82-91]   Resp:  [16-24]   BP: (138-174)/(66-83)   SpO2:  [90 %-95 %] .   Home meds for hypertension were reviewed and noted below.   Hypertension Medications               furosemide (LASIX) 20 MG tablet TAKE 1 TABLET ONE TIME DAILY, INCREASING TO 2 TABLETS FOR LEG SWELLING OR WEIGHT GAIN AS DIRECTED    losartan (COZAAR) 100 MG tablet Take 1 tablet (100 mg total) by mouth once daily.            While in the hospital, will manage blood pressure as follows; Continue home antihypertensive regimen    Will utilize p.r.n. blood pressure medication only if patient's blood pressure greater than 220/110 and she develops symptoms such as worsening chest pain or shortness of breath.

## 2024-08-16 NOTE — ED PROVIDER NOTES
Encounter Date: 2024       History     Chief Complaint   Patient presents with    Shortness of Breath     On 3 lpm nasal cannula for COPD CHF worsening shortness of breath      68-year-old female with past medical history of COPD (on 3 L via nasal cannula), CHF, hypertension, pulmonary hypertension presents ED for multiple complaints.  States for the past week has been having intermittent frontal headaches, shortness of breath, chest tightness.  Was seen at the ED 4 days ago diagnosed with COPD exacerbation and discharge with Augmentin and prednisone.  Patient still having productive cough.  Has not had increased oxygen requirement at home.  Notes she did increase her Lasix dosage once this week due to swelling in her right foot which has now resolved.  Denies any calf pain.  States since then she has been having frequent urination but denies any dysuria or hematuria.  Reports low-grade temp at home with T-max of 100.0°.  Also notes black stool yesterday.        Review of patient's allergies indicates:   Allergen Reactions    Doxycycline Other (See Comments)     Acid reflux    Orange juice      Swelling in pt tongue      23 - pt stated she had in hosp and it didn't effect her.     Past Medical History:   Diagnosis Date    CHF (congestive heart failure)     COPD (chronic obstructive pulmonary disease)     Herpes zoster without mention of complication 2011    Hypertension     Leg edema     Pulmonary hypertension     Sciatica      Past Surgical History:   Procedure Laterality Date    BREAST BIOPSY Right     core     SECTION      COLONOSCOPY N/A 2019    Procedure: COLONOSCOPY;  Surgeon: Rui Holder MD;  Location: [facility] ENDO [address]);  Service: Endoscopy;  Laterality: N/A;    EPIDURAL STEROID INJECTION N/A 2020    Procedure: CERVICAL BLAKE;  Surgeon: Papito High MD;  Location: [facility] PAIN T;  Service: Pain Management;  Laterality: N/A;  NEEDS CONSENT    ESOPHAGOGASTRODUODENOSCOPY N/A  12/19/2019    Procedure: EGD (ESOPHAGOGASTRODUODENOSCOPY);  Surgeon: Rui Holder MD;  Location: [facility] ENDO (2ND FLR);  Service: Endoscopy;  Laterality: N/A;  2L continuous O2 via NC, COPD, pulm HTN    TRANSFORAMINAL EPIDURAL INJECTION OF STEROID Right 6/3/2021    Procedure: INJECTION, STEROID, EPIDURAL, TRANSFORAMINAL APPROACH, L4-L5;  Surgeon: Anibal Robles MD;  Location: [facility] PAIN T;  Service: Pain Management;  Laterality: Right;     Family History   Problem Relation Name Age of Onset    Heart disease Mother      Heart disease Paternal Uncle      Celiac disease Neg Hx      Colon cancer Neg Hx      Colon polyps Neg Hx      Crohn's disease Neg Hx      Cystic fibrosis Neg Hx      Esophageal cancer Neg Hx      Inflammatory bowel disease Neg Hx      Irritable bowel syndrome Neg Hx      Liver cancer Neg Hx      Liver disease Neg Hx      Rectal cancer Neg Hx      Stomach cancer Neg Hx      Ulcerative colitis Neg Hx       Social History     Tobacco Use    Smoking status: Every Day     Current packs/day: 0.10     Average packs/day: 1 pack/day for 52.6 years (51.1 ttl pk-yrs)     Types: Cigarettes     Start date: 1972    Smokeless tobacco: Never    Tobacco comments:     Quit over a month ago    Substance Use Topics    Alcohol use: Not Currently     Comment: beer daily 2-3 daily, none today    Drug use: No     Review of Systems   Constitutional:  Positive for chills and fever.   HENT:  Negative for sore throat.    Respiratory:  Positive for cough, shortness of breath and wheezing.    Cardiovascular:  Positive for chest pain.   Gastrointestinal:  Negative for abdominal pain, nausea and vomiting.   Genitourinary:  Positive for frequency. Negative for difficulty urinating and dysuria.   Musculoskeletal: Negative.    Neurological:  Negative for weakness.   Psychiatric/Behavioral:  Negative for confusion.        Physical Exam     Initial Vitals [08/16/24 1227]   BP Pulse Resp Temp SpO2   (!) 174/83 91 16 99.4 °F (37.4  °C) (!) 90 %      MAP       --         Physical Exam    Nursing note and vitals reviewed.  Constitutional: She appears well-developed and well-nourished.   HENT:   Head: Normocephalic and atraumatic.   Eyes: Conjunctivae are normal. Pupils are equal, round, and reactive to light.   Neck: Neck supple.   Normal range of motion.  Cardiovascular:  Normal rate.           Pulmonary/Chest: She has wheezes.   Abdominal: Abdomen is soft. There is no abdominal tenderness.   Genitourinary: Rectum:      Guaiac result negative.   Guaiac negative stool.    Genitourinary Comments: Female chaperone present for digital rectal exam.  Light brown stool.  No melena.  No bright red blood.  Guaiac negative.     Musculoskeletal:         General: Normal range of motion.      Cervical back: Normal range of motion and neck supple.     Neurological: She is alert and oriented to person, place, and time. GCS score is 15. GCS eye subscore is 4. GCS verbal subscore is 5. GCS motor subscore is 6.   Skin: Skin is warm and dry.         ED Course   Procedures  Labs Reviewed   COMPREHENSIVE METABOLIC PANEL - Abnormal       Result Value    Sodium 138      Potassium 3.8      Chloride 92 (*)     CO2 35 (*)     Glucose 123 (*)     BUN 7 (*)     Creatinine 0.7      Calcium 9.2      Total Protein 7.1      Albumin 3.8      Total Bilirubin 0.5      Alkaline Phosphatase 52 (*)     AST 33      ALT 23      eGFR >60.0      Anion Gap 11     CBC W/ AUTO DIFFERENTIAL - Abnormal    WBC 9.44      RBC 4.65      Hemoglobin 13.1      Hematocrit 42.5      MCV 91      MCH 28.2      MCHC 30.8 (*)     RDW 14.9 (*)     Platelets 191      MPV 11.7      Immature Granulocytes 0.2      Gran # (ANC) 8.6 (*)     Immature Grans (Abs) 0.02      Lymph # 0.8 (*)     Mono # 0.1 (*)     Eos # 0.0      Baso # 0.01      nRBC 0      Gran % 90.6 (*)     Lymph % 7.9 (*)     Mono % 1.2 (*)     Eosinophil % 0.0      Basophil % 0.1      Platelet Estimate Appears normal      Aniso Slight       Poik Slight      Hypo Occasional      Ovalocytes Occasional      Target Cells Occasional      Basophilic Stippling Occasional      Large/Giant Platelets Present      Differential Method Automated     URINALYSIS, REFLEX TO URINE CULTURE - Abnormal    Specimen UA Urine, Clean Catch      Color, UA Colorless (*)     Appearance, UA Clear      pH, UA 8.0      Specific Gravity, UA 1.005      Protein, UA Negative      Glucose, UA Negative      Ketones, UA Negative      Bilirubin (UA) Negative      Occult Blood UA Negative      Nitrite, UA Negative      Leukocytes, UA Negative      Narrative:     Specimen Source->Urine   ISTAT PROCEDURE - Abnormal    POC Glucose 125 (*)     POC BUN 6      POC Creatinine 0.6      POC Sodium 138      POC Potassium 3.2 (*)     POC Chloride 90 (*)     POC TCO2 (MEASURED) 40 (*)     POC Ionized Calcium 1.01 (*)     POC Hematocrit 45      Sample LEROY     TROPONIN I    Troponin I <0.006     B-TYPE NATRIURETIC PEPTIDE    BNP 14     SARS-COV-2 RNA AMPLIFICATION, QUAL    SARS-CoV-2 RNA, Amplification, Qual Negative     QUANTIFERON GOLD TB   TROPONIN I   ISTAT CHEM8          Imaging Results               CTA Chest Non-Coronary (PE Studies) (Final result)  Result time 08/16/24 14:53:09      Final result by Ben Dietz MD (08/16/24 14:53:09)                   Impression:      This report was flagged in Epic as abnormal.    1. Allowing for motion artifact, no convincing pulmonary thromboembolism.  Correlation of these findings with D-dimer and/or lower extremity ultrasound.  2. Nodular focus within the left upper lobe, a portion of which appears to be cavitary.  This is new since the previous examination.  This potentially could reflect small focus of airway mucous plugging/mucous filled airway however given the size, short-term follow-up is recommended, no greater than 3 months as malignancy is not excluded.  Additional pulmonary nodules appear stable since the previous exam.  Please see that exam  for follow-up recommendations.  3. Please see above for several additional findings.      Electronically signed by: Ben Dietz MD  Date:    08/16/2024  Time:    14:53               Narrative:    EXAMINATION:  CTA CHEST NON CORONARY (PE STUDIES)    CLINICAL HISTORY:  Pulmonary embolism (PE) suspected, high prob;    TECHNIQUE:  Low dose axial images, sagittal and coronal reformations were obtained from the thoracic inlet to the lung bases following the IV administration of 75 mL of Omnipaque 350.  Contrast timing was optimized to evaluate the pulmonary arteries.  MIP images were performed.    COMPARISON:  CT chest 07/12/2023    FINDINGS:  The structures at the base of the neck are grossly unremarkable noting motion artifact limits evaluation.  The heart is not enlarged.  No significant mediastinal lymphadenopathy.  No pericardial effusion.  The thoracic aorta tapers normally noting atherosclerotic calcification along its course.  Allowing for motion artifact and bolus timing, the visualized portions the kidneys, adrenal glands, spleen, pancreas and liver are grossly unremarkable.  The stomach is decompressed without wall thickening.    Motion artifact limits evaluation of the airways and pulmonary parenchyma.  Allowing for this, the airways are patent.  There is pulmonary emphysematous change.  There is interlobular septal thickening, may reflect edema.  There is a pulmonary nodule along the medial aspect of the right upper lobe, series 2, image 206 measuring 2-3 mm, stable.  There is a 2 mm pulmonary nodule along the anterior aspect of the right middle lobe, series 2 image 333, stable.  There is bilateral basilar dependent atelectasis/scarring.  No pneumothorax.  No pleural effusion.  There is a lobular focus within the left upper lobe, new since the previous examination noting a portion of which is cavitary.  In conglomerate this measures 1.1 cm, not identified on the previous examination.    Bolus timing is  not optimal for evaluation of pulmonary thromboembolism.  Additionally, motion artifact further limits evaluation.  Allowing for this, no convincing pulmonary arterial filling defect to the level of the proximal segmental branches bilaterally to suggest pulmonary thromboembolism.  Correlation of these findings with D-dimer and/or lower extremity ultrasound as warranted.    There are degenerative changes of the spine.  No significant axillary lymphadenopathy.                                       X-Ray Chest AP Portable (Final result)  Result time 08/16/24 13:26:36      Final result by Atilio Carter MD (08/16/24 13:26:36)                   Impression:      No acute chest disease identified.      Electronically signed by: Atilio Carter MD  Date:    08/16/2024  Time:    13:26               Narrative:    EXAMINATION:  XR CHEST AP PORTABLE    CLINICAL HISTORY:  shortness of breath;    TECHNIQUE:  Single frontal view of the chest was performed.    COMPARISON:  08/12/2024.    FINDINGS:  The heart is not enlarged.  Superior mediastinal structures are unremarkable.  Pulmonary vasculature is within normal limits.  The lungs are well aerated and free of focal consolidations.  There is no evidence for pneumothorax or large pleural effusions.  Bony structures appear intact.                                       Medications   magnesium sulfate 2g in water 50mL IVPB (premix) (2 g Intravenous New Bag 8/16/24 1538)   albuterol-ipratropium 2.5 mg-0.5 mg/3 mL nebulizer solution 3 mL (3 mLs Nebulization Given 8/16/24 1406)   iohexoL (OMNIPAQUE 350) injection 75 mL (75 mLs Intravenous Given 8/16/24 1353)   acetaminophen tablet 1,000 mg (1,000 mg Oral Given 8/16/24 1440)   droPERidol injection 0.625 mg (0.625 mg Intravenous Given 8/16/24 1440)     Medical Decision Making  68-year-old female who presents ED with chest tightness shortness of breath, productive cough and headaches.  Recently seen 4 days ago for COPD exacerbation.   Persistent symptoms despite Augmentin and prednisone.    Differential includes but not limited to pneumonia, PE, TB, CBD exacerbation, viral syndrome    Patient has multiple complaints.  She also endorses black stools yesterday.  Light brown stool on rectal exam which was guaiac negative.  Stable hemoglobin low suspicion for melena.  She was a benign abdominal exam.  Given her persistent symptoms CTA PE study was obtained with no convincing pulmonary thromboembolism however there is a new left upper lobe cavitary lesion.  She denies any risk factors for TB however given the new cavitary lesion will admit to hospital medicine for TB rule out.  Placed in isolation.  Her cardiac workup has been negative with no signs of heart failure exacerbation.  No focal neurological deficits on exam.  Oxygen has been stable on her home 3 liters/minute.    Amount and/or Complexity of Data Reviewed  Labs: ordered. Decision-making details documented in ED Course.  Radiology: ordered.    Risk  OTC drugs.  Prescription drug management.               ED Course as of 08/16/24 1609   Fri Aug 16, 2024   1340 Urinalysis, Reflex to Urine Culture Urine, Clean Catch(!)  Unremarkable.  No signs of UTI. [HJ]   1340 WBC: 9.44  No leukocytosis [HJ]   1346 POC Hematocrit: 45 [HJ]   1359 BNP: 14 [HJ]   1414 Troponin I: <0.006  WNL [HJ]   1414 SARS-CoV-2 RNA, Amplification, Qual: Negative [HJ]   1415 Hemoglobin: 13.1  WNL [HJ]      ED Course User Index  [HJ] Otoniel Alaniz PA-C                             Clinical Impression:  Final diagnoses:  [R06.02] Shortness of breath  [J44.1] COPD exacerbation (Primary)  [J98.4] Cavitary lesion of lung          ED Disposition Condition    Observation Stable                Otoniel Alaniz PA-C  08/16/24 1609

## 2024-08-16 NOTE — H&P
[facility] - Emergency Dept  [facility]  History & Physical    Patient Name: Rochelle Espinoza  MRN: [id number]  Patient Class: OP- Observation  Admission Date: 2024  Attending Physician: Dolores Banks MD  Primary Care Provider: Yosi Samuels Jr., MD         Patient information was obtained from patient and ER records.     Subjective:     Principal Problem:Chronic obstructive pulmonary disease with acute exacerbation    Chief Complaint:   Chief Complaint   Patient presents with    Shortness of Breath     On 3 lpm nasal cannula for COPD CHF worsening shortness of breath         HPI: Ms. Espinoza is a 67yo woman with COPD, HTN, chronic hypoxemic respiratory failure on 3LPM supplemental O2, recently seen in the ED for COPD exacerbation and discharged with Augmentin/prednisone, here with persistent COPD symptoms. Reports since discharge she has had persistent dyspnea, cough productive of green/brown sputum, fatigue, and chills. Also reports headache which is responsive to acetaminophen. Fever with Tmax 100F at home. No night sweats, weight loss. No sick contacts. No recent hospitalizations. No chest pain, nausea, vomiting, diarrhea. Does report some mild increase in peripheral edema she attributes to her chronic heart failure, took extra lasix as recommended by her doctor with improvement.     In the ED, imaging was negative for PE but did show a small pumonary nodule in the apex with possible cavitation. 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     Past Medical History:   Diagnosis Date    CHF (congestive heart failure)     COPD (chronic obstructive pulmonary disease)     Herpes zoster without mention of complication 2011    Hypertension     Leg edema     Pulmonary hypertension     Sciatica        Past Surgical History:   Procedure Laterality Date    BREAST BIOPSY Right     core     SECTION      COLONOSCOPY N/A 2019     Procedure: COLONOSCOPY;  Surgeon: Rui Holder MD;  Location: [facility] (2ND FLR);  Service: Endoscopy;  Laterality: N/A;    EPIDURAL STEROID INJECTION N/A 1/2/2020    Procedure: CERVICAL BLAKE;  Surgeon: Papito High MD;  Location: [facility];  Service: Pain Management;  Laterality: N/A;  NEEDS CONSENT    ESOPHAGOGASTRODUODENOSCOPY N/A 12/19/2019    Procedure: EGD (ESOPHAGOGASTRODUODENOSCOPY);  Surgeon: Rui Holder MD;  Location: [facility] ENDO (2ND FLR);  Service: Endoscopy;  Laterality: N/A;  2L continuous O2 via NC, COPD, pulm HTN    TRANSFORAMINAL EPIDURAL INJECTION OF STEROID Right 6/3/2021    Procedure: INJECTION, STEROID, EPIDURAL, TRANSFORAMINAL APPROACH, L4-L5;  Surgeon: Anibal Robles MD;  Location: [facility];  Service: Pain Management;  Laterality: Right;       Review of patient's allergies indicates:   Allergen Reactions    Doxycycline Other (See Comments)     Acid reflux    Orange juice      Swelling in pt tongue      8/23/23 - pt stated she had in hosp and it didn't effect her.       No current facility-administered medications on file prior to encounter.     Current Outpatient Medications on File Prior to Encounter   Medication Sig    albuterol (PROVENTIL/VENTOLIN HFA) 90 mcg/actuation inhaler Inhale 2 puffs into the lungs every 4 (four) hours as needed for Wheezing or Shortness of Breath. Rescue    albuterol-ipratropium (DUO-NEB) 2.5 mg-0.5 mg/3 mL nebulizer solution Take 3 mLs by nebulization every 4 (four) hours as needed for Wheezing or Shortness of Breath. Rescue    amoxicillin-clavulanate 875-125mg (AUGMENTIN) 875-125 mg per tablet Take 1 tablet by mouth 2 (two) times daily. for 5 days    ascorbic acid, vitamin C, (VITAMIN C) 500 MG tablet Take 500 mg by mouth once daily.    atorvastatin (LIPITOR) 10 MG tablet Take 1 tablet (10 mg total) by mouth once daily.    benzonatate (TESSALON PERLES) 100 MG capsule Take 2 capsules (200 mg total) by mouth 3 (three) times daily as needed for  Cough.    buPROPion (WELLBUTRIN SR) 150 MG TBSR 12 hr tablet Take 1 tablet (150 mg total) by mouth 2 (two) times daily. (Patient not taking: Reported on 8/10/2024)    celecoxib (CELEBREX) 200 MG capsule TAKE 1 CAPSULE ONE TIME DAILY    cetirizine (ZYRTEC) 10 MG tablet Take 1 tablet (10 mg total) by mouth once daily.    cholecalciferol, vitamin D3, (VITAMIN D3) 25 mcg (1,000 unit) capsule Take 1 capsule (1,000 Units total) by mouth once daily.    fluticasone propionate (FLONASE) 50 mcg/actuation nasal spray SHAKE LIQUID AND USE 1 SPRAY (50MCG) IN EACH NOSTRIL TWICE DAILY    fluticasone-umeclidin-vilanter (TRELEGY ELLIPTA) 200-62.5-25 mcg inhaler Inhale 1 puff into the lungs once daily.    furosemide (LASIX) 20 MG tablet TAKE 1 TABLET ONE TIME DAILY, INCREASING TO 2 TABLETS FOR LEG SWELLING OR WEIGHT GAIN AS DIRECTED    gabapentin (NEURONTIN) 300 MG capsule TAKE 1 CAPSULE THREE TIMES DAILY    glycerin adult suppository Place 1 suppository rectally as needed for Constipation.    loratadine (CLARITIN) 10 mg tablet Take 1 tablet (10 mg total) by mouth once daily.    losartan (COZAAR) 100 MG tablet Take 1 tablet (100 mg total) by mouth once daily.    magnesium 250 mg Tab Take 250 mg by mouth once.    nicotine (NICODERM CQ) 14 mg/24 hr Place 1 patch onto the skin once daily. (Patient not taking: Reported on 8/10/2024)    pantoprazole (PROTONIX) 40 MG tablet Take 1 tablet (40 mg total) by mouth once daily.    predniSONE (DELTASONE) 50 MG Tab Take 1 tablet (50 mg total) by mouth once daily. for 5 days     Family History       Problem Relation (Age of Onset)    Heart disease Mother, Paternal Uncle          Tobacco Use    Smoking status: Every Day     Current packs/day: 0.10     Average packs/day: 1 pack/day for 52.6 years (51.1 ttl pk-yrs)     Types: Cigarettes     Start date: 1972    Smokeless tobacco: Never    Tobacco comments:     Quit over a month ago    Substance and Sexual Activity    Alcohol use: Not Currently      Comment: beer daily 2-3 daily, none today    Drug use: No    Sexual activity: Not Currently     Birth control/protection: None     Review of Systems   Constitutional:  Positive for chills, fatigue and fever.   HENT:  Negative for congestion and dental problem.    Eyes:  Negative for photophobia and visual disturbance.   Respiratory:  Positive for cough, shortness of breath and wheezing. Negative for apnea and chest tightness.    Gastrointestinal:  Negative for anal bleeding, diarrhea, nausea and vomiting.   Genitourinary:  Negative for dysuria and frequency.   Musculoskeletal:  Negative for arthralgias, myalgias and neck pain.   Skin:  Negative for rash.   Neurological:  Positive for headaches. Negative for light-headedness and numbness.     Objective:     Vital Signs (Most Recent):  Temp: 99.2 °F (37.3 °C) (08/16/24 1320)  Pulse: 85 (08/16/24 1406)  Resp: (!) 24 (08/16/24 1406)  BP: 138/82 (08/16/24 1320)  SpO2: 95 % (08/16/24 1406) Vital Signs (24h Range):  Temp:  [99.2 °F (37.3 °C)-99.4 °F (37.4 °C)] 99.2 °F (37.3 °C)  Pulse:  [82-91] 85  Resp:  [16-24] 24  SpO2:  [90 %-95 %] 95 %  BP: (138-174)/(66-83) 138/82     Weight: 68 kg (150 lb)  Body mass index is 26.57 kg/m².     Physical Exam  Vitals and nursing note reviewed.   Constitutional:       Appearance: She is ill-appearing.   HENT:      Nose: Nose normal. No congestion or rhinorrhea.      Mouth/Throat:      Mouth: Mucous membranes are moist.      Pharynx: Oropharynx is clear.   Eyes:      General: No scleral icterus.     Conjunctiva/sclera: Conjunctivae normal.   Cardiovascular:      Rate and Rhythm: Normal rate and regular rhythm.      Pulses: Normal pulses.      Heart sounds: Normal heart sounds. No murmur heard.  Pulmonary:      Effort: Pulmonary effort is normal.      Breath sounds: Wheezing present.   Chest:      Chest wall: No tenderness.   Abdominal:      General: Abdomen is flat. There is no distension.      Palpations: Abdomen is soft.    Musculoskeletal:         General: Swelling present.      Cervical back: Normal range of motion. No rigidity.   Skin:     General: Skin is warm and dry.      Findings: No rash.   Neurological:      General: No focal deficit present.      Mental Status: She is alert. Mental status is at baseline.                Significant Labs: All pertinent labs within the past 24 hours have been reviewed.    Significant Imaging: I have reviewed all pertinent imaging results/findings within the past 24 hours.  Assessment/Plan:     * Chronic obstructive pulmonary disease with acute exacerbation  Patient's COPD is with exacerbation noted by continued dyspnea and worsening of baseline hypoxia currently. With increased sputum production  Patient is currently on COPD Pathway. Continue scheduled inhalers Steroids, Antibiotics, and Supplemental oxygen and monitor respiratory status closely.   - IV methylprednisolone 80mg Q8hr since symptoms persist in spite of prednisone 50mg po daily  - IV vancomycin, levofloxacin for cavitary lung lesion suspect cavitary pneumonia  - home fluticasone-umeclidin-vilanterol will be transitioned to formulary fluticasone-salmeterol and tiotropium  - duonebs PRN  - sputum culture    Essential hypertension  Chronic, uncontrolled. Latest blood pressure and vitals reviewed-     Temp:  [99.2 °F (37.3 °C)-99.4 °F (37.4 °C)]   Pulse:  [82-91]   Resp:  [16-24]   BP: (138-174)/(66-83)   SpO2:  [90 %-95 %] .   Home meds for hypertension were reviewed and noted below.   Hypertension Medications               furosemide (LASIX) 20 MG tablet TAKE 1 TABLET ONE TIME DAILY, INCREASING TO 2 TABLETS FOR LEG SWELLING OR WEIGHT GAIN AS DIRECTED    losartan (COZAAR) 100 MG tablet Take 1 tablet (100 mg total) by mouth once daily.            While in the hospital, will manage blood pressure as follows; Continue home antihypertensive regimen    Will utilize p.r.n. blood pressure medication only if patient's blood pressure greater  than 220/110 and she develops symptoms such as worsening chest pain or shortness of breath.    Chronic hypoxemic respiratory failure  Patient with Hypoxic Respiratory failure which is Chronic.  she is on home oxygen at 3 LPM. Supplemental oxygen was provided and noted-      .   Signs/symptoms of respiratory failure include- tachypnea, increased work of breathing, and wheezing. Contributing diagnoses includes - COPD Labs and images were reviewed. Patient Has not had a recent ABG. Will treat underlying causes and adjust management of respiratory failure     Cavitary lesion of lung  New problem.   Noted on CT  Suspect possible cavitary bacterial infection versus TB which is much less likely though does have some exposure with working in the [employer] community. Also considering mass with cavitary component.   No WBC elevation  - start vancomycin, get MRSA nares, if negative will discontinue  - start levofloxacin  - TB rule out sputum ordered.   - bacterial sputum culture ordered  -Ambulatory referral to Pulmonology for nodule follow up      Chronic diastolic heart failure  Chronic condition.   Continue home medications furosemide 20mg po daily      Peripheral polyneuropathy  Chronic  - continue gabapentin 300mg po tid  - continue celecoxib      Nicotine dependence, cigarettes, uncomplicated   Dangers of cigarette smoking were reviewed with patient in detail. Patient was Counseled for 3-10 minutes. Nicotine replacement options were discussed. Nicotine replacement was discussed- prescribed  - nicotine replacement therapy       VTE Risk Mitigation (From admission, onward)           Ordered     enoxaparin injection 40 mg  Every 24 hours         08/16/24 1804     IP VTE HIGH RISK PATIENT  Once         08/16/24 1733     Place sequential compression device  Until discontinued         08/16/24 1733                       On 08/16/2024, patient should be placed in hospital observation services under my care.           Dolores  Jerome Banks MD, MPH, FACP  Senior Physician, Department of Hospital Medicine  [facility]  [address], [place], [place]

## 2024-08-16 NOTE — ASSESSMENT & PLAN NOTE
Patient with Hypoxic Respiratory failure which is Chronic.  she is on home oxygen at 3 LPM. Supplemental oxygen was provided and noted-      .   Signs/symptoms of respiratory failure include- tachypnea, increased work of breathing, and wheezing. Contributing diagnoses includes - COPD Labs and images were reviewed. Patient Has not had a recent ABG. Will treat underlying causes and adjust management of respiratory failure

## 2024-08-16 NOTE — ED NOTES
Rochelle Espinoza, a 68 y.o. female presents to the ED w/ complaint of shortness of breath. Recently seen in ED for same issue, states symptoms not getting any better.     Triage note:  Chief Complaint   Patient presents with    Shortness of Breath     On 3 lpm nasal cannula for COPD CHF worsening shortness of breath      Review of patient's allergies indicates:   Allergen Reactions    Doxycycline Other (See Comments)     Acid reflux    Orange juice      Swelling in pt tongue      8/23/23 - pt stated she had in hosp and it didn't effect her.     Past Medical History:   Diagnosis Date    CHF (congestive heart failure)     COPD (chronic obstructive pulmonary disease)     Herpes zoster without mention of complication 2/21/2011    Hypertension     Leg edema     Pulmonary hypertension     Sciatica

## 2024-08-16 NOTE — ASSESSMENT & PLAN NOTE
New problem.   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 Also considering mass with cavitary component.   No WBC elevation  - start vancomycin, get MRSA nares, if negative will discontinue  - start levofloxacin  - TB rule out sputum ordered.   - bacterial sputum culture ordered  -Ambulatory referral to Pulmonology for nodule follow up

## 2024-08-16 NOTE — ASSESSMENT & PLAN NOTE
Dangers of cigarette smoking were reviewed with patient in detail. Patient was Counseled for 3-10 minutes. Nicotine replacement options were discussed. Nicotine replacement was discussed- prescribed  - nicotine replacement therapy

## 2024-08-16 NOTE — HPI
Ms. Espinoza is a 69yo woman with COPD, HTN, chronic hypoxemic respiratory failure on 3LPM supplemental O2, recently seen in the ED for COPD exacerbation and discharged with Augmentin/prednisone, here with persistent COPD symptoms. Reports since discharge she has had persistent dyspnea, cough productive of green/brown sputum, fatigue, and chills. Also reports headache which is responsive to acetaminophen. Fever with Tmax 100F at home. No night sweats, weight loss. No sick contacts. No recent hospitalizations. No chest pain, nausea, vomiting, diarrhea. Does report some mild increase in peripheral edema she attributes to her chronic heart failure, took extra lasix as recommended by her doctor with improvement.     In the ED, imaging was negative for PE but did show a small pumonary nodule in the apex with possible cavitation. 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

## 2024-08-16 NOTE — ED NOTES
I-STAT Chem-8+ Results:   Value Reference Range   Sodium 138 136-145 mmol/L   Potassium  3.2 3.5-5.1 mmol/L   Chloride 90  mmol/L   Ionized Calcium 1.01 1.06-1.42 mmol/L   CO2 (measured) 40 23-29 mmol/L   Glucose 125  mg/dL   BUN 6 6-30 mg/dL   Creatinine 0.6 0.5-1.4 mg/dL   Hematocrit 45 36-54%

## 2024-08-17 VITALS
SYSTOLIC BLOOD PRESSURE: 134 MMHG | WEIGHT: 150 LBS | RESPIRATION RATE: 18 BRPM | OXYGEN SATURATION: 92 % | HEIGHT: 63 IN | TEMPERATURE: 98 F | DIASTOLIC BLOOD PRESSURE: 83 MMHG | BODY MASS INDEX: 26.58 KG/M2 | HEART RATE: 85 BPM

## 2024-08-17 LAB
ALBUMIN SERPL BCP-MCNC: 3.8 G/DL (ref 3.5–5.2)
ALP SERPL-CCNC: 54 U/L (ref 55–135)
ALT SERPL W/O P-5'-P-CCNC: 25 U/L (ref 10–44)
ANION GAP SERPL CALC-SCNC: 9 MMOL/L (ref 8–16)
ANISOCYTOSIS BLD QL SMEAR: SLIGHT
AST SERPL-CCNC: 25 U/L (ref 10–40)
BASOPHILS # BLD AUTO: 0.02 K/UL (ref 0–0.2)
BASOPHILS NFR BLD: 0.2 % (ref 0–1.9)
BILIRUB SERPL-MCNC: 0.4 MG/DL (ref 0.1–1)
BUN SERPL-MCNC: 5 MG/DL (ref 8–23)
CALCIUM SERPL-MCNC: 9.5 MG/DL (ref 8.7–10.5)
CHLORIDE SERPL-SCNC: 94 MMOL/L (ref 95–110)
CO2 SERPL-SCNC: 41 MMOL/L (ref 23–29)
CREAT SERPL-MCNC: 0.7 MG/DL (ref 0.5–1.4)
DIFFERENTIAL METHOD BLD: ABNORMAL
EOSINOPHIL # BLD AUTO: 0 K/UL (ref 0–0.5)
EOSINOPHIL NFR BLD: 0 % (ref 0–8)
ERYTHROCYTE [DISTWIDTH] IN BLOOD BY AUTOMATED COUNT: 15.4 % (ref 11.5–14.5)
EST. GFR  (NO RACE VARIABLE): >60 ML/MIN/1.73 M^2
GLUCOSE SERPL-MCNC: 96 MG/DL (ref 70–110)
HCT VFR BLD AUTO: 41.3 % (ref 37–48.5)
HGB BLD-MCNC: 13 G/DL (ref 12–16)
HYPOCHROMIA BLD QL SMEAR: ABNORMAL
IMM GRANULOCYTES # BLD AUTO: 0.03 K/UL (ref 0–0.04)
IMM GRANULOCYTES NFR BLD AUTO: 0.3 % (ref 0–0.5)
LYMPHOCYTES # BLD AUTO: 0.9 K/UL (ref 1–4.8)
LYMPHOCYTES NFR BLD: 7.9 % (ref 18–48)
MAGNESIUM SERPL-MCNC: 2.3 MG/DL (ref 1.6–2.6)
MCH RBC QN AUTO: 28.6 PG (ref 27–31)
MCHC RBC AUTO-ENTMCNC: 31.5 G/DL (ref 32–36)
MCV RBC AUTO: 91 FL (ref 82–98)
MONOCYTES # BLD AUTO: 0.2 K/UL (ref 0.3–1)
MONOCYTES NFR BLD: 1.5 % (ref 4–15)
NEUTROPHILS # BLD AUTO: 10.1 K/UL (ref 1.8–7.7)
NEUTROPHILS NFR BLD: 90.1 % (ref 38–73)
NRBC BLD-RTO: 0 /100 WBC
PHOSPHATE SERPL-MCNC: 4.4 MG/DL (ref 2.7–4.5)
PLATELET # BLD AUTO: 198 K/UL (ref 150–450)
PLATELET BLD QL SMEAR: ABNORMAL
PMV BLD AUTO: 12.1 FL (ref 9.2–12.9)
POTASSIUM SERPL-SCNC: 3.4 MMOL/L (ref 3.5–5.1)
PROT SERPL-MCNC: 7.1 G/DL (ref 6–8.4)
RBC # BLD AUTO: 4.55 M/UL (ref 4–5.4)
SODIUM SERPL-SCNC: 144 MMOL/L (ref 136–145)
WBC # BLD AUTO: 11.25 K/UL (ref 3.9–12.7)

## 2024-08-17 PROCEDURE — 85025 COMPLETE CBC W/AUTO DIFF WBC: CPT | Mod: HCNC | Performed by: INTERNAL MEDICINE

## 2024-08-17 PROCEDURE — 87116 MYCOBACTERIA CULTURE: CPT | Mod: 59,HCNC | Performed by: INTERNAL MEDICINE

## 2024-08-17 PROCEDURE — 83735 ASSAY OF MAGNESIUM: CPT | Mod: HCNC | Performed by: INTERNAL MEDICINE

## 2024-08-17 PROCEDURE — 80053 COMPREHEN METABOLIC PANEL: CPT | Mod: HCNC | Performed by: INTERNAL MEDICINE

## 2024-08-17 PROCEDURE — 99900035 HC TECH TIME PER 15 MIN (STAT): Mod: HCNC

## 2024-08-17 PROCEDURE — 87015 SPECIMEN INFECT AGNT CONCNTJ: CPT | Mod: HCNC | Performed by: INTERNAL MEDICINE

## 2024-08-17 PROCEDURE — 84100 ASSAY OF PHOSPHORUS: CPT | Mod: HCNC | Performed by: INTERNAL MEDICINE

## 2024-08-17 PROCEDURE — 25000003 PHARM REV CODE 250: Mod: HCNC | Performed by: INTERNAL MEDICINE

## 2024-08-17 PROCEDURE — S4991 NICOTINE PATCH NONLEGEND: HCPCS | Mod: HCNC | Performed by: INTERNAL MEDICINE

## 2024-08-17 PROCEDURE — 25000242 PHARM REV CODE 250 ALT 637 W/ HCPCS: Mod: HCNC | Performed by: INTERNAL MEDICINE

## 2024-08-17 PROCEDURE — 87206 SMEAR FLUORESCENT/ACID STAI: CPT | Mod: 91,HCNC | Performed by: INTERNAL MEDICINE

## 2024-08-17 PROCEDURE — 96376 TX/PRO/DX INJ SAME DRUG ADON: CPT | Mod: HCNC

## 2024-08-17 PROCEDURE — 63600175 PHARM REV CODE 636 W HCPCS: Mod: HCNC | Performed by: INTERNAL MEDICINE

## 2024-08-17 PROCEDURE — 94640 AIRWAY INHALATION TREATMENT: CPT | Mod: HCNC,XB

## 2024-08-17 PROCEDURE — G0378 HOSPITAL OBSERVATION PER HR: HCPCS | Mod: HCNC

## 2024-08-17 PROCEDURE — 27000221 HC OXYGEN, UP TO 24 HOURS: Mod: HCNC

## 2024-08-17 PROCEDURE — 94761 N-INVAS EAR/PLS OXIMETRY MLT: CPT | Mod: HCNC

## 2024-08-17 RX ORDER — POTASSIUM CHLORIDE 20 MEQ/1
40 TABLET, EXTENDED RELEASE ORAL ONCE
Status: COMPLETED | OUTPATIENT
Start: 2024-08-17 | End: 2024-08-17

## 2024-08-17 RX ORDER — ALBUTEROL SULFATE 90 UG/1
2 INHALANT RESPIRATORY (INHALATION) EVERY 6 HOURS PRN
Status: DISCONTINUED | OUTPATIENT
Start: 2024-08-17 | End: 2024-08-17 | Stop reason: HOSPADM

## 2024-08-17 RX ORDER — PREDNISONE 20 MG/1
40 TABLET ORAL DAILY
Qty: 10 TABLET | Refills: 0 | Status: SHIPPED | OUTPATIENT
Start: 2024-08-19 | End: 2024-08-24

## 2024-08-17 RX ORDER — LEVOFLOXACIN 750 MG/1
750 TABLET ORAL DAILY
Qty: 4 TABLET | Refills: 0 | Status: SHIPPED | OUTPATIENT
Start: 2024-08-18 | End: 2024-08-22

## 2024-08-17 RX ADMIN — IPRATROPIUM BROMIDE AND ALBUTEROL SULFATE 3 ML: 2.5; .5 SOLUTION RESPIRATORY (INHALATION) at 08:08

## 2024-08-17 RX ADMIN — LEVOFLOXACIN 750 MG: 750 TABLET, FILM COATED ORAL at 08:08

## 2024-08-17 RX ADMIN — FLUTICASONE PROPIONATE 50 MCG: 50 SPRAY, METERED NASAL at 08:08

## 2024-08-17 RX ADMIN — ATORVASTATIN CALCIUM 10 MG: 10 TABLET, FILM COATED ORAL at 08:08

## 2024-08-17 RX ADMIN — TIOTROPIUM BROMIDE INHALATION SPRAY 2 PUFF: 3.12 SPRAY, METERED RESPIRATORY (INHALATION) at 08:08

## 2024-08-17 RX ADMIN — METHYLPREDNISOLONE SODIUM SUCCINATE 80 MG: 40 INJECTION, POWDER, FOR SOLUTION INTRAMUSCULAR; INTRAVENOUS at 08:08

## 2024-08-17 RX ADMIN — ACETAMINOPHEN 650 MG: 325 TABLET ORAL at 08:08

## 2024-08-17 RX ADMIN — FUROSEMIDE 20 MG: 20 TABLET ORAL at 08:08

## 2024-08-17 RX ADMIN — LOSARTAN POTASSIUM 100 MG: 50 TABLET, FILM COATED ORAL at 08:08

## 2024-08-17 RX ADMIN — PANTOPRAZOLE SODIUM 40 MG: 40 TABLET, DELAYED RELEASE ORAL at 08:08

## 2024-08-17 RX ADMIN — CETIRIZINE HYDROCHLORIDE 10 MG: 5 TABLET, FILM COATED ORAL at 08:08

## 2024-08-17 RX ADMIN — GABAPENTIN 300 MG: 300 CAPSULE ORAL at 03:08

## 2024-08-17 RX ADMIN — METHYLPREDNISOLONE SODIUM SUCCINATE 80 MG: 40 INJECTION, POWDER, FOR SOLUTION INTRAMUSCULAR; INTRAVENOUS at 03:08

## 2024-08-17 RX ADMIN — GABAPENTIN 300 MG: 300 CAPSULE ORAL at 08:08

## 2024-08-17 RX ADMIN — FLUTICASONE FUROATE AND VILANTEROL TRIFENATATE 1 PUFF: 200; 25 POWDER RESPIRATORY (INHALATION) at 08:08

## 2024-08-17 RX ADMIN — NICOTINE 1 PATCH: 14 PATCH TRANSDERMAL at 08:08

## 2024-08-17 RX ADMIN — POTASSIUM CHLORIDE 40 MEQ: 1500 TABLET, EXTENDED RELEASE ORAL at 08:08

## 2024-08-17 RX ADMIN — CELECOXIB 200 MG: 200 CAPSULE ORAL at 08:08

## 2024-08-17 NOTE — ED NOTES
Assumed care of the patient. Report received from ALEM Nunez. Pt in hospital gown, side rails up X2, bed low and locked, and call light is placed within reach. No family/visitors at bedside at this time. Pt denies any complaints or needs. Droplet precautions in place.      LOC: The patient is awake, alert and aware of environment with an appropriate affect, the patient is oriented x 3 and speaking appropriately.   APPEARANCE: Patient appears comfortable and in no acute distress, patient is clean and well groomed.  SKIN: The skin is warm and dry, color consistent with ethnicity.   MUSCULOSKELETAL: Patient moving all extremities spontaneously, no swelling noted.  RESPIRATORY: Airway is open and patent, respirations are spontaneous, patient has a normal effort and rate, no accessory muscle use noted. On 3L supplemental O2 via NC at baseline.  CARDIAC: Patient has a normal rate and regular rhythm, no edema noted, capillary refill < 3 seconds.   GASTRO: Soft and non tender to palpation, no distention noted.   : Pt denies any pain or frequency with urination.  NEURO: Pt opens eyes spontaneously, behavior appropriate to situation, follows commands.

## 2024-08-17 NOTE — ED NOTES
Pt given  sterile container to obtain sputum speci. Airborne. Contact and  droplet precautions in place. Pt AAOx4, skin w/d, resp wnl. IV site asymptomatic. Offers no c/o's at this time

## 2024-08-17 NOTE — ED NOTES
Assumed care of the patient. Report received from ALEM Glover. Pt on continuous cardiac monitoring. Pt in hospital gown, side rails up X2, bed low and locked, and call light is placed within reach. No family/visitors at bedside at this time. Pt denies any complaints or needs.

## 2024-08-17 NOTE — PROGRESS NOTES
"Pharmacokinetic Initial Assessment & Plan: IV Vancomycin    IV Vancomycin 1250 mg x once given in the ED on 08/16 @ 2029  Then Vancomycin 750 mg every 12 hours,  Obtain a Vancomycin trough 30 mins prior to the 4 th dose on 08/18 @ 0800   Desired empiric serum trough concentration is 15 to 20 mcg/mL    Pharmacy will continue to follow and monitor vancomycin.    [id number] with any questions regarding this assessment.     Thank you for the consult,   Ciara Cooper         Patient brief summary:  Rochelle Espinoza is a 68 y.o. female initiated on antimicrobial therapy with IV Vancomycin for treatment of suspected  Pneumonia    Drug Allergies:   Review of patient's allergies indicates:   Allergen Reactions    Doxycycline Other (See Comments)     Acid reflux    Orange juice      Swelling in pt tongue      8/23/23 - pt stated she had in hosp and it didn't effect her.       Actual Body Weight:   68 kg    Renal Function:   Estimated Creatinine Clearance: 71.2 mL/min (based on SCr of 0.7 mg/dL).,     CBC (last 72 hours):  Recent Labs   Lab Result Units 08/16/24  1325   WBC K/uL 9.44   Hemoglobin g/dL 13.1   Hematocrit % 42.5   Platelets K/uL 191   Gran % % 90.6*   Lymph % % 7.9*   Mono % % 1.2*   Eosinophil % % 0.0   Basophil % % 0.1   Differential Method  Automated       Metabolic Panel (last 72 hours):  Recent Labs   Lab Result Units 08/16/24  1311 08/16/24  1325   Sodium mmol/L  --  138   Potassium mmol/L  --  3.8   Chloride mmol/L  --  92*   CO2 mmol/L  --  35*   Glucose mg/dL  --  123*   Glucose, UA  Negative  --    BUN mg/dL  --  7*   Creatinine mg/dL  --  0.7   Albumin g/dL  --  3.8   Total Bilirubin mg/dL  --  0.5   Alkaline Phosphatase U/L  --  52*   AST U/L  --  33   ALT U/L  --  23       Drug levels (last 3 results):  No results for input(s): "VANCOMYCINRA", "VANCORANDOM", "VANCOMYCINPE", "VANCOPEAK", "VANCOMYCINTR", "VANCOTROUGH" in the last 72 hours.    Microbiologic Results:  Microbiology Results (last 7 days)       " Procedure Component Value Units Date/Time    Culture, Respiratory with Gram Stain [[id number]] Collected: 08/16/24 2133    Order Status: Sent Specimen: Respiratory from Sputum, Expectorated     AFB Culture & Smear (Collect Today) [[id number]] Collected: 08/16/24 2132    Order Status: Sent Specimen: Respiratory from Sputum, Induced     MRSA Screen by PCR [[id number]] Collected: 08/16/24 2032    Order Status: Sent Specimen: Nasal Swab Updated: 08/16/24 2042

## 2024-08-17 NOTE — PROGRESS NOTES
VANCOMYCIN DOSING BY PHARMACY DISCONTINUATION NOTE    Rochelle Espinoza is a 69 y.o. female who had been consulted for vancomycin dosing.    The pharmacy consult for vancomycin dosing has been discontinued.     Vancomycin Dosing by Pharmacy Consult will sign-off. Please reconsult if necessary. Thank you for allowing us to participate in this patient's care.       LAZARA Harley, PharmD  [id number]

## 2024-08-19 LAB
ACID FAST MOD KINY STN SPEC: NORMAL
BACTERIA SPEC AEROBE CULT: NORMAL
BACTERIA SPEC AEROBE CULT: NORMAL
GRAM STN SPEC: NORMAL
MYCOBACTERIUM SPEC QL CULT: NORMAL

## 2024-08-19 NOTE — DISCHARGE SUMMARY
[facility] - Emergency Dept  Hospital Medicine  Discharge Summary      Patient Name: Rochelle Espinoza  MRN: [id number]  [facility]: [id number]  Patient Class: OP- Observation  Admission Date: 8/16/2024  Hospital Length of Stay: 0 days  Discharge Date and Time: 8/17/2024  4:51 PM  Attending Physician: Dolores Banks MD   Discharging Provider: Dolores Banks MD  Primary Care Provider: Yosi Samuels Jr., MD  Hospital Medicine Team: [facility] Dolores Banks MD  Primary Care Team: [facility]    HPI:   Ms. Espinoza is a 69yo woman with COPD, HTN, chronic hypoxemic respiratory failure on 3LPM supplemental O2, recently seen in the ED for COPD exacerbation and discharged with Augmentin/prednisone, here with persistent COPD symptoms. Reports since discharge she has had persistent dyspnea, cough productive of green/brown sputum, fatigue, and chills. Also reports headache which is responsive to acetaminophen. Fever with Tmax 100F at home. No night sweats, weight loss. No sick contacts. No recent hospitalizations. No chest pain, nausea, vomiting, diarrhea. Does report some mild increase in peripheral edema she attributes to her chronic heart failure, took extra lasix as recommended by her doctor with improvement.     In the ED, imaging was negative for PE but did show a small pumonary nodule in the apex with possible cavitation. 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     * No surgery found *      Hospital Course:   Ms. Espinoza was placed under observation in Hospital Medicine for COPD exacerbation after failing outpatient therapy with Augmentin and prednisone 50mg. She has chronic hypoxemic respiratory failure and utilizes 3LPM oxygen via nasal cannula. She had no increase in her O2 needs, but did experience increase in dyspnea, sputum production, fever, and night sweats. Labs without leukocytosis. CMP showed hypokalemia which was replaced. Respiratory culture  showed few gram positive cocci.     She was managed with IV methylprednisolone, bronchodilators, and treated empirically with IV vancomycin and levofloxacin. MRSA nares was negative, so she was transitioned to levofloxacin monotherapy.      Due to concerns for possible cavitary nature of a pulmonary lesion in the apex of her lung, she underwent TB rule out with 3x AFB sputum cultures. Imaging possibly consolidation vs mucus plugging, she is in agreement with Pulmonology follow up for further imaging if needed.     On the day of discharge, she reported significant improvement in her presenting symptoms. Supplemental oxygen remained at her home O2 level. Respiratory exam notable for good air movement throughout. She was counseled on the discharge plan, follow up recommendations, and return precautions. All questions were answered. She was provided with my business card for any follow up concerns. She will discharge to home.       - She will finish a course of prednisone 40mg and levofloxacin  - Ambulatory referral to Pulmonology placed for nodule follow up     Goals of Care Treatment Preferences:  Code Status: Full Code         Consults:     No new Assessment & Plan notes have been filed under this hospital service since the last note was generated.  Service: Hospital Medicine    Final Active Diagnoses:    Diagnosis Date Noted POA    PRINCIPAL PROBLEM:  Chronic obstructive pulmonary disease with acute exacerbation [J44.1] 12/30/2021 Yes    Cavitary lesion of lung [J98.4] 08/16/2024 Yes    Chronic hypoxemic respiratory failure [J96.11] 08/16/2024 Yes    Chronic diastolic heart failure [I50.32] 10/23/2022 Yes    Peripheral polyneuropathy [G62.9] 10/23/2022 Yes    Nicotine dependence, cigarettes, uncomplicated  [F17.210] 06/20/2022 Yes     Chronic    Essential hypertension [I10]  Yes      Problems Resolved During this Admission:       Discharged Condition: good    Disposition: Home or Self Care    Follow Up:   Follow-up  Information       [facility] 9th Fl Follow up.    Specialty: Pulmonology  Contact information:  [address]  [facility] [address] [phone]  Additional information:  Main Building, 9th Floor   Please park in [facility] and use Clinic elevator             Yosi Samuels Jr., MD Follow up.    Specialty: Internal Medicine  Contact information:  [address]  [facility] [address]  [phone]                           Patient Instructions:      Ambulatory referral/consult to Pulmonology   Standing Status: Future   Referral Priority: Routine Referral Type: Consultation   Referral Reason: Specialty Services Required   Requested Specialty: Pulmonary Disease   Number of Visits Requested: 1     Diet Adult Regular     Notify your health care provider if you experience any of the following:  temperature >100.4     Notify your health care provider if you experience any of the following:  difficulty breathing or increased cough     Activity as tolerated       Significant Diagnostic Studies:   BMP  Lab Results   Component Value Date     08/17/2024    K 3.4 (L) 08/17/2024    CL 94 (L) 08/17/2024    CO2 41 (HH) 08/17/2024    BUN 5 (L) 08/17/2024    CREATININE 0.7 08/17/2024    CALCIUM 9.5 08/17/2024    ANIONGAP 9 08/17/2024    EGFRNORACEVR >60.0 08/17/2024     Lab Results   Component Value Date    WBC 11.25 08/17/2024    HGB 13.0 08/17/2024    HCT 41.3 08/17/2024    MCV 91 08/17/2024     08/17/2024       Microbiology Results (last 7 days)       Procedure Component Value Units Date/Time    MRSA Screen by PCR [[id number]] Collected: 08/16/24 2032    Order Status: Completed Specimen: Nasal Swab Updated: 08/16/24 2235     MRSA SCREEN BY PCR Not Detected                Pending Diagnostic Studies:       None           Medications:  Reconciled Home Medications:      Medication List        START taking these medications      levoFLOXacin 750 MG tablet  Commonly known as:  LEVAQUIN  Take 1 tablet (750 mg total) by mouth once daily. for 4 days     predniSONE 20 MG tablet  Commonly known as: DELTASONE  Take 2 tablets (40 mg total) by mouth once daily. Start after finishing the two days of 50mg dose you already have for 5 days            CONTINUE taking these medications      albuterol 90 mcg/actuation inhaler  Commonly known as: PROVENTIL/VENTOLIN HFA  Inhale 2 puffs into the lungs every 4 (four) hours as needed for Wheezing or Shortness of Breath. Rescue     albuterol-ipratropium 2.5 mg-0.5 mg/3 mL nebulizer solution  Commonly known as: DUO-NEB  Take 3 mLs by nebulization every 4 (four) hours as needed for Wheezing or Shortness of Breath. Rescue     ascorbic acid (vitamin C) 500 MG tablet  Commonly known as: VITAMIN C  Take 500 mg by mouth once daily.     atorvastatin 10 MG tablet  Commonly known as: LIPITOR  Take 1 tablet (10 mg total) by mouth once daily.     benzonatate 100 MG capsule  Commonly known as: TESSALON PERLES  Take 2 capsules (200 mg total) by mouth 3 (three) times daily as needed for Cough.     celecoxib 200 MG capsule  Commonly known as: CeleBREX  TAKE 1 CAPSULE ONE TIME DAILY     cetirizine 10 MG tablet  Commonly known as: ZYRTEC  Take 1 tablet (10 mg total) by mouth once daily.     cholecalciferol (vitamin D3) 25 mcg (1,000 unit) capsule  Commonly known as: VITAMIN D3  Take 1 capsule (1,000 Units total) by mouth once daily.     fluticasone propionate 50 mcg/actuation nasal spray  Commonly known as: FLONASE  SHAKE LIQUID AND USE 1 SPRAY (50MCG) IN EACH NOSTRIL TWICE DAILY     furosemide 20 MG tablet  Commonly known as: LASIX  TAKE 1 TABLET ONE TIME DAILY, INCREASING TO 2 TABLETS FOR LEG SWELLING OR WEIGHT GAIN AS DIRECTED     gabapentin 300 MG capsule  Commonly known as: NEURONTIN  TAKE 1 CAPSULE THREE TIMES DAILY     glycerin adult suppository  Place 1 suppository rectally as needed for Constipation.     loratadine 10 mg tablet  Commonly known as: CLARITIN  Take 1  tablet (10 mg total) by mouth once daily.     losartan 100 MG tablet  Commonly known as: COZAAR  Take 1 tablet (100 mg total) by mouth once daily.     magnesium 250 mg Tab  Take 250 mg by mouth once.     nicotine 14 mg/24 hr  Commonly known as: NICODERM CQ  Place 1 patch onto the skin once daily.     pantoprazole 40 MG tablet  Commonly known as: PROTONIX  Take 1 tablet (40 mg total) by mouth once daily.     TRELEGY ELLIPTA 200-62.5-25 mcg inhaler  Generic drug: fluticasone-umeclidin-vilanter  Inhale 1 puff into the lungs once daily.            ASK your doctor about these medications      buPROPion 150 MG TBSR 12 hr tablet  Commonly known as: WELLBUTRIN SR  Take 1 tablet (150 mg total) by mouth 2 (two) times daily.            Imaging Results               CTA Chest Non-Coronary (PE Studies) (Final result)  Result time 08/16/24 14:53:09      Final result by Ben Dietz MD (08/16/24 14:53:09)                   Impression:      This report was flagged in Epic as abnormal.    1. Allowing for motion artifact, no convincing pulmonary thromboembolism.  Correlation of these findings with D-dimer and/or lower extremity ultrasound.  2. Nodular focus within the left upper lobe, a portion of which appears to be cavitary.  This is new since the previous examination.  This potentially could reflect small focus of airway mucous plugging/mucous filled airway however given the size, short-term follow-up is recommended, no greater than 3 months as malignancy is not excluded.  Additional pulmonary nodules appear stable since the previous exam.  Please see that exam for follow-up recommendations.  3. Please see above for several additional findings.      Electronically signed by: Ben Dietz MD  Date:    08/16/2024  Time:    14:53               Narrative:    EXAMINATION:  CTA CHEST NON CORONARY (PE STUDIES)    CLINICAL HISTORY:  Pulmonary embolism (PE) suspected, high prob;    TECHNIQUE:  Low dose axial images, sagittal and  coronal reformations were obtained from the thoracic inlet to the lung bases following the IV administration of 75 mL of Omnipaque 350.  Contrast timing was optimized to evaluate the pulmonary arteries.  MIP images were performed.    COMPARISON:  CT chest 07/12/2023    FINDINGS:  The structures at the base of the neck are grossly unremarkable noting motion artifact limits evaluation.  The heart is not enlarged.  No significant mediastinal lymphadenopathy.  No pericardial effusion.  The thoracic aorta tapers normally noting atherosclerotic calcification along its course.  Allowing for motion artifact and bolus timing, the visualized portions the kidneys, adrenal glands, spleen, pancreas and liver are grossly unremarkable.  The stomach is decompressed without wall thickening.    Motion artifact limits evaluation of the airways and pulmonary parenchyma.  Allowing for this, the airways are patent.  There is pulmonary emphysematous change.  There is interlobular septal thickening, may reflect edema.  There is a pulmonary nodule along the medial aspect of the right upper lobe, series 2, image 206 measuring 2-3 mm, stable.  There is a 2 mm pulmonary nodule along the anterior aspect of the right middle lobe, series 2 image 333, stable.  There is bilateral basilar dependent atelectasis/scarring.  No pneumothorax.  No pleural effusion.  There is a lobular focus within the left upper lobe, new since the previous examination noting a portion of which is cavitary.  In conglomerate this measures 1.1 cm, not identified on the previous examination.    Bolus timing is not optimal for evaluation of pulmonary thromboembolism.  Additionally, motion artifact further limits evaluation.  Allowing for this, no convincing pulmonary arterial filling defect to the level of the proximal segmental branches bilaterally to suggest pulmonary thromboembolism.  Correlation of these findings with D-dimer and/or lower extremity ultrasound as  warranted.    There are degenerative changes of the spine.  No significant axillary lymphadenopathy.                                       X-Ray Chest AP Portable (Final result)  Result time 08/16/24 13:26:36      Final result by Atilio Carter MD (08/16/24 13:26:36)                   Impression:      No acute chest disease identified.      Electronically signed by: Atilio Carter MD  Date:    08/16/2024  Time:    13:26               Narrative:    EXAMINATION:  XR CHEST AP PORTABLE    CLINICAL HISTORY:  shortness of breath;    TECHNIQUE:  Single frontal view of the chest was performed.    COMPARISON:  08/12/2024.    FINDINGS:  The heart is not enlarged.  Superior mediastinal structures are unremarkable.  Pulmonary vasculature is within normal limits.  The lungs are well aerated and free of focal consolidations.  There is no evidence for pneumothorax or large pleural effusions.  Bony structures appear intact.                                      Indwelling Lines/Drains at time of discharge:   Lines/Drains/Airways       None                   Time spent on the discharge of patient: 35 minutes       Dolores Banks MD, MPH, FACP  Senior Physician, Department of Hospital Medicine  [facility]  [address] [place], [place]

## 2024-08-19 NOTE — HOSPITAL COURSE
Ms. Espinoza was placed under observation in Hospital Medicine for COPD exacerbation after failing outpatient therapy with Augmentin and prednisone 50mg. She has chronic hypoxemic respiratory failure and utilizes 3LPM oxygen via nasal cannula. She had no increase in her O2 needs, but did experience increase in dyspnea, sputum production, fever, and night sweats. Labs without leukocytosis. CMP showed hypokalemia which was replaced. Respiratory culture showed few gram positive cocci.     She was managed with IV methylprednisolone, bronchodilators, and treated empirically with IV vancomycin and levofloxacin. MRSA nares was negative, so she was transitioned to levofloxacin monotherapy.      Due to concerns for possible cavitary nature of a pulmonary lesion in the apex of her lung, she underwent TB rule out with 3x AFB sputum cultures. Imaging possibly consolidation vs mucus plugging, she is in agreement with Pulmonology follow up for further imaging if needed.     On the day of discharge, she reported significant improvement in her presenting symptoms. Supplemental oxygen remained at her home O2 level. Respiratory exam notable for good air movement throughout. She was counseled on the discharge plan, follow up recommendations, and return precautions. All questions were answered. She was provided with my business card for any follow up concerns. She will discharge to home.       - She will finish a course of prednisone 40mg and levofloxacin  - Ambulatory referral to Pulmonology placed for nodule follow up

## 2024-08-20 ENCOUNTER — TELEPHONE (OUTPATIENT)
Dept: HEPATOLOGY | Facility: HOSPITAL | Age: 69
End: 2024-08-20
Payer: MEDICARE

## 2024-08-20 ENCOUNTER — PATIENT OUTREACH (OUTPATIENT)
Dept: ADMINISTRATIVE | Facility: CLINIC | Age: 69
End: 2024-08-20
Payer: MEDICARE

## 2024-08-20 NOTE — TELEPHONE ENCOUNTER
Hospital Medicine Discharge Follow Up    Called Ms. Espinoza to check in on any needs after her recent discharge from observation. No answer, left a voicemail with my name, number, and best wishes.       Dolores Banks MD, MPH, FACP  Senior Physician, Department of Hospital Medicine  [facility]  [address], [place], [place]

## 2024-08-21 RX ORDER — FLUTICASONE PROPIONATE 50 MCG
SPRAY, SUSPENSION (ML) NASAL
Qty: 48 G | Refills: 3 | Status: SHIPPED | OUTPATIENT
Start: 2024-08-21

## 2024-08-21 NOTE — TELEPHONE ENCOUNTER
No care due was identified.  [facility] Embedded Care Due Messages. Reference number: [id number].   8/21/2024 2:33:10 AM CDT

## 2024-08-29 ENCOUNTER — PATIENT MESSAGE (OUTPATIENT)
Dept: HEPATOLOGY | Facility: HOSPITAL | Age: 69
End: 2024-08-29
Payer: MEDICARE

## 2024-08-29 NOTE — TELEPHONE ENCOUNTER
Hospital Medicine Brief Note    Contacted by lab regarding patient's positive AFB. Discussed with PCP Dr. Samuels and Dr. Duong of Infectious Disease. Dr. Duong will send to ID scheduling for follow up. Called patient, no answer. Sent [employer] message to her.     Dolores Banks MD, MPH, FACP  Senior Physician, Department of Hospital Medicine  [facility]  [address] [place] [address]

## 2024-09-05 ENCOUNTER — CLINICAL SUPPORT (OUTPATIENT)
Dept: SMOKING CESSATION | Facility: CLINIC | Age: 69
End: 2024-09-05
Payer: COMMERCIAL

## 2024-09-05 DIAGNOSIS — F17.200 NICOTINE DEPENDENCE: Primary | ICD-10-CM

## 2024-09-05 PROCEDURE — 99407 BEHAV CHNG SMOKING > 10 MIN: CPT | Mod: S$GLB,,,

## 2024-09-05 NOTE — PROGRESS NOTES
Called pt to f/u on her 3 month smoking cessation quit status. Pt stated she had quit for 1 month but is now back to smoking 1/2 ppd. Informed her she has benefits available and is able to rejoin. Pt not ready to make appointment. She will call back when ready. Stated she has a lot of other appointments this month. Informed her of benefit period, phone follow ups, and contact information. Will complete smart form and will continue to follow up on quit #4 episode.

## 2024-09-09 ENCOUNTER — OFFICE VISIT (OUTPATIENT)
Dept: OPTOMETRY | Facility: CLINIC | Age: 69
End: 2024-09-09
Payer: MEDICARE

## 2024-09-09 DIAGNOSIS — H52.203 HYPEROPIA WITH ASTIGMATISM AND PRESBYOPIA, BILATERAL: ICD-10-CM

## 2024-09-09 DIAGNOSIS — H52.03 HYPEROPIA WITH ASTIGMATISM AND PRESBYOPIA, BILATERAL: ICD-10-CM

## 2024-09-09 DIAGNOSIS — H52.4 HYPEROPIA WITH ASTIGMATISM AND PRESBYOPIA, BILATERAL: ICD-10-CM

## 2024-09-09 DIAGNOSIS — H53.8 BLURRY VISION: ICD-10-CM

## 2024-09-09 DIAGNOSIS — H25.13 SENILE NUCLEAR SCLEROSIS, BILATERAL: ICD-10-CM

## 2024-09-09 DIAGNOSIS — H04.123 DRY EYE SYNDROME OF BOTH EYES: Primary | ICD-10-CM

## 2024-09-09 PROCEDURE — 1159F MED LIST DOCD IN RCRD: CPT | Mod: HCNC,CPTII,S$GLB, | Performed by: OPTOMETRIST

## 2024-09-09 PROCEDURE — 1101F PT FALLS ASSESS-DOCD LE1/YR: CPT | Mod: HCNC,CPTII,S$GLB, | Performed by: OPTOMETRIST

## 2024-09-09 PROCEDURE — 1160F RVW MEDS BY RX/DR IN RCRD: CPT | Mod: HCNC,CPTII,S$GLB, | Performed by: OPTOMETRIST

## 2024-09-09 PROCEDURE — 99999 PR PBB SHADOW E&M-EST. PATIENT-LVL III: CPT | Mod: PBBFAC,HCNC,, | Performed by: OPTOMETRIST

## 2024-09-09 PROCEDURE — 92004 COMPRE OPH EXAM NEW PT 1/>: CPT | Mod: HCNC,S$GLB,, | Performed by: OPTOMETRIST

## 2024-09-09 PROCEDURE — 92015 DETERMINE REFRACTIVE STATE: CPT | Mod: HCNC,S$GLB,, | Performed by: OPTOMETRIST

## 2024-09-09 PROCEDURE — 4010F ACE/ARB THERAPY RXD/TAKEN: CPT | Mod: HCNC,CPTII,S$GLB, | Performed by: OPTOMETRIST

## 2024-09-09 PROCEDURE — 1126F AMNT PAIN NOTED NONE PRSNT: CPT | Mod: HCNC,CPTII,S$GLB, | Performed by: OPTOMETRIST

## 2024-09-09 PROCEDURE — 3288F FALL RISK ASSESSMENT DOCD: CPT | Mod: HCNC,CPTII,S$GLB, | Performed by: OPTOMETRIST

## 2024-09-09 RX ORDER — NEOMYCIN SULFATE, POLYMYXIN B SULFATE AND DEXAMETHASONE 3.5; 10000; 1 MG/ML; [USP'U]/ML; MG/ML
1 SUSPENSION/ DROPS OPHTHALMIC 4 TIMES DAILY
Qty: 5 ML | Refills: 0 | Status: SHIPPED | OUTPATIENT
Start: 2024-09-09 | End: 2024-09-16

## 2024-09-09 NOTE — PROGRESS NOTES
"HPI    IRENE: about 7 yrs. Ago elsewhere  Chief complaint (CC): Patient is here for annual eye exam today. Patient   has had blurred vision at a distance and near fir the past several years.   Patient has a FBS and gets "crystals" in her eyes in the morning.  Glasses? + 7 yrs. Old  Contacts? -  H/o eye surgery, injections or laser: -  H/o eye injury: -  Known eye conditions? Shingles effected ON per pt. About 10 yrs. ago  Family h/o eye conditions? -  Eye gtts? AT's once a day      (-) Flashes (-)  Floaters (-) Mucous   (-)  Tearing (-) Itching (-) Burning   (-) Headaches (-) Eye Pain/discomfort (-) Irritation   (-)  Redness (-) Double vision (+) Blurry vision    Diabetic? -  A1c? -      Last edited by Kaya Sorto on 9/9/2024  8:29 AM.            Assessment /Plan     For exam results, see Encounter Report.      Dry eye syndrome of both eyes  -     neomycin-polymyxin-dexamethasone (MAXITROL) 3.5mg/mL-10,000 unit/mL-0.1 % DrpS; Place 1 drop into both eyes 4 (four) times daily. for 7 days  Dispense: 5 mL; Refill: 0  Blurry vision  Comments:  Sent in optometry referral  Orders:  -     Ambulatory referral/consult to Optometry  Rec increasing ATs to TID-QID Ou  Rx Maxitrol QID OU x 7 days.   Rec bedtime maurizio due to CPAP use at night.     Senile nuclear sclerosis, bilateral  Nuclear sclerotic cataract - not visually significant. Observe.    Hyperopia with astigmatism and presbyopia, bilateral  SRx released to patient. Patient educated on lens options. Normal ocular health. RTC 1 year for routine exam.                      "

## 2024-09-13 DIAGNOSIS — Z91.89 AT HIGH RISK FOR RESPIRATORY INFECTION: ICD-10-CM

## 2024-09-13 RX ORDER — IPRATROPIUM BROMIDE AND ALBUTEROL SULFATE 2.5; .5 MG/3ML; MG/3ML
3 SOLUTION RESPIRATORY (INHALATION) EVERY 4 HOURS PRN
Qty: 1080 ML | Refills: 3 | Status: SHIPPED | OUTPATIENT
Start: 2024-09-13

## 2024-09-13 NOTE — TELEPHONE ENCOUNTER
Care Due:                  Date            Visit Type   Department     Provider  --------------------------------------------------------------------------------                                EP -                              PRIMARY      [facility] INTERNAL  Last Visit: 07-      CARE ([facility])   ISIAH Samuels                              [facility]                              PRIMARY      [facility] INTERNAL  Next Visit: 10-      CARE ([facility])   [facility]       Yosi Samuels                                                            Last  Test          Frequency    Reason                     Performed    Due Date  --------------------------------------------------------------------------------    Lipid Panel.  12 months..  atorvastatin.............  01- 01-    Vitamin D...  12 months..  cholecalciferol,.........  Not Found    Overdue    [employer] Embedded Care Due Messages. Reference number: [id number].   9/13/2024 3:55:11 PM CDT

## 2024-09-13 NOTE — TELEPHONE ENCOUNTER
----- Message from Opal Loredo sent at 9/13/2024  3:51 PM CDT -----  Contact: [phone]  Requesting an RX refill or new RX.    Is this a refill or new RX: new     RX name and strength (copy/paste from chart):  albuterol-ipratropium (DUO-NEB) 2.5 mg-0.5 mg/3 mL nebulizer solution 1080 mL    Is this a 30 day or 90 day RX:     Pharmacy name and phone # (copy/paste from chart):      [facility] - [place], [place] - [address]  [address] [address]  Phone: [phone] Fax: [phone]

## 2024-09-27 ENCOUNTER — OFFICE VISIT (OUTPATIENT)
Dept: PULMONOLOGY | Facility: CLINIC | Age: 69
End: 2024-09-27
Payer: MEDICARE

## 2024-09-27 ENCOUNTER — TELEPHONE (OUTPATIENT)
Dept: PULMONOLOGY | Facility: CLINIC | Age: 69
End: 2024-09-27
Payer: MEDICARE

## 2024-09-27 VITALS
BODY MASS INDEX: 26.17 KG/M2 | SYSTOLIC BLOOD PRESSURE: 142 MMHG | WEIGHT: 147.69 LBS | HEART RATE: 103 BPM | HEIGHT: 63 IN | DIASTOLIC BLOOD PRESSURE: 82 MMHG | OXYGEN SATURATION: 92 %

## 2024-09-27 DIAGNOSIS — A31.0 MYCOBACTERIUM AVIUM COMPLEX: ICD-10-CM

## 2024-09-27 DIAGNOSIS — J96.11 CHRONIC HYPOXEMIC RESPIRATORY FAILURE: Primary | ICD-10-CM

## 2024-09-27 DIAGNOSIS — J98.4 CAVITARY LESION OF LUNG: ICD-10-CM

## 2024-09-27 DIAGNOSIS — J44.1 CHRONIC OBSTRUCTIVE PULMONARY DISEASE WITH ACUTE EXACERBATION: ICD-10-CM

## 2024-09-27 PROCEDURE — 99999 PR PBB SHADOW E&M-EST. PATIENT-LVL III: CPT | Mod: PBBFAC,HCNC,GC, | Performed by: STUDENT IN AN ORGANIZED HEALTH CARE EDUCATION/TRAINING PROGRAM

## 2024-09-27 RX ORDER — SODIUM CHLORIDE FOR INHALATION 3 %
4 VIAL, NEBULIZER (ML) INHALATION
Status: SHIPPED | OUTPATIENT
Start: 2024-09-27

## 2024-09-27 NOTE — PROGRESS NOTES
Pt is a 70 yo AAF pmh current tobacco use disorder, COPD, chronic respiratory failure with hypoxemia and hypercapnia presenting for evaluation of LIZ cavitary lesion noted on CTA chest 24 during hospitalization for COPD exacerbation. AFB cultures are showing MAC on multiple AFB cultures.     PFT: 22  FEV1: 0.57L (29.5%)  FVC: 0.96L (39.6%)  FEV1/FVC: 59  T.46L (93.6%)  RV: 177%  DLCO: unable to perform DLCO  % <6s indicating FVC is potentially underestimated.     - start 3% nebulizer hypertonic saline (may be difficult to get based on availability at pharmacies)  - Start flutter valve  - Reschedule appointment with ID to discuss possible treatment options.   - While being on ICS with MAC is not ideal, the severity of her COPD would preclude her of deescalating from Trelegy to LAMA/LABA    Ishan Caceres MD  [facility]

## 2024-09-27 NOTE — PROGRESS NOTES
"[facility]    Subjective:     Reason for visit: COPD follow up    Patient ID:  Rochelle Espinoza is a 69 y.o. female with COPD, chronic H/H RF on 3L home O2 and nightly BIPAP, tobacco abuse, HFpEF presenting for follow up.    Interval History:  She was hospitalized last month for COPDe at which time a CT chest revealed a LIZ cavitation, AFBs were obtained and PCR showed MAC. She was set up to see ID but missed that appointment s/t transportation issue. She has been having daily productive cough of thick sputum for months now. She denies fevers, chills, NS, weight loss. She stays SOB. She has been using her Trelegy, O2 and NIV nightly.       Objective:     Vitals:    09/27/24 1544   BP: (!) 142/82   BP Location: Right arm   Patient Position: Sitting   Pulse: 103   SpO2: (!) 92%  Comment: 3.0   Weight: 67 kg (147 lb 11.3 oz)   Height: 5' 3" (1.6 m)         Physical Exam  Constitutional:       General: She is not in acute distress.     Appearance: Normal appearance. She is ill-appearing.   HENT:      Head: Normocephalic and atraumatic.      Mouth/Throat:      Mouth: Mucous membranes are moist.   Eyes:      Extraocular Movements: Extraocular movements intact.   Cardiovascular:      Rate and Rhythm: Normal rate and regular rhythm.      Pulses: Normal pulses.      Heart sounds: No murmur heard.  Pulmonary:      Effort: No respiratory distress.      Breath sounds: Normal breath sounds.   Abdominal:      Palpations: Abdomen is soft.      Tenderness: There is no abdominal tenderness.   Musculoskeletal:      Right lower leg: Edema present.      Left lower leg: Edema present.   Skin:     General: Skin is warm and dry.   Neurological:      General: No focal deficit present.      Mental Status: She is alert.   Psychiatric:         Mood and Affect: Mood normal.         Behavior: Behavior normal.          Labs:  Lab Results   Component Value Date    WBC 11.25 08/17/2024    HGB 13.0 08/17/2024    HCT 41.3 " 08/17/2024    MCV 91 08/17/2024     08/17/2024     @IGJJOISUM50(GLU,NA,K,Cl,CO2,BUN,Creatinine,Calcium,MG)@  Lab Results   Component Value Date    INR 0.9 12/30/2021    INR 1.0 04/11/2018    INR 1.1 04/10/2018     Lab Results   Component Value Date    HGBA1C 5.4 01/27/2023         Pertinent Studies Reviewed & Interpreted:       CT Chest:  8/16/24  Impression:     This report was flagged in Epic as abnormal.     1. Allowing for motion artifact, no convincing pulmonary thromboembolism.  Correlation of these findings with D-dimer and/or lower extremity ultrasound.  2. Nodular focus within the left upper lobe, a portion of which appears to be cavitary.  This is new since the previous examination.  This potentially could reflect small focus of airway mucous plugging/mucous filled airway however given the size, short-term follow-up is recommended, no greater than 3 months as malignancy is not excluded.  Additional pulmonary nodules appear stable since the previous exam.  Please see that exam for follow-up recommendations.  3. Please see above for several additional findings.    Pulmonary Function Tests:   Date: 6/20/22  FEV1/FVC  59            FEV1          29%  FVC            39%            TLC            93%  RV/TLC      187%      DLCO         unable to perform      My interpretation (most recent): severe obstruction with hyperinflation.     6 Minute Walk Tests:     Echo:   Date: 8/6/23  NALINI: 21.9ml/m^2  LVEF >55%.   Chamber enlargement: none   Diastolic function: GII  Valves: normal  RV: normal systolic function  TAPSE: 2.9cm  Pericardial effusion: none        Micro:  Sputum Culture:   + AFB, PCR + MAC      Assessment & Plan:       Problem List Items Addressed This Visit          Pulmonary    Chronic obstructive pulmonary disease with acute exacerbation    Relevant Medications    (VFC) influenza (Afluria) 45 mcg/0.5 mL IM vaccine (> or = 36 mo) 0.5 mL    Other Relevant Orders    Complete PFT w/ bronchodilator     Six Minute Walk Test to qualify for Home Oxygen    Cavitary lesion of lung    Chronic hypoxemic respiratory failure - Primary    Relevant Medications    (VFC) influenza (Afluria) 45 mcg/0.5 mL IM vaccine (> or = 36 mo) 0.5 mL    Other Relevant Orders    Complete PFT w/ bronchodilator    Six Minute Walk Test to qualify for Home Oxygen     Other Visit Diagnoses       Mycobacterium avium complex        Relevant Medications    sodium chloride 3% nebulizer solution 4 mL (Start on 9/27/2024  4:45 PM)    mucus clearing device (ACAPELLA, FLUTTER)               Plan:  Cavitary MAC  Missed recent appointment to establish with ID s/t transportation issues. We are making her another appointment  She does not have classic BE on CT imaging but does have chronic mucous production so will start ACT with 3% saline nebs and flutter device therapy  COPD / chronic hypoxemic/hypercapenic RF  Continue trelegy (recognize issue with ICS and MAC but patient needs from symptomatic standpoint). Continue rescue inhaler/nebs  Continue nightly BiPAP  Updating PFTs, 6MWT prior to next visit  Flu shot today  Has done pulmonary rehab in the past, needs to re-establish  Tobacco abuse  Continues to smoke. Encouraged cessation. She is currently on Wellbutrin, failed chantix in the past. Is enrolled in counseling.     RETURN TO CLINIC IN 3 MONTHS       Fermin Mao MD  [facility]    Portions of the record may have been created with voice-recognition software. Occasional wrong-word or sound-a-like substitutions may have occurred due to the inherent limitations of voice-recognition software. Read the chart carefully and recognize, using context, where substitutions have occurred.

## 2024-09-28 ENCOUNTER — HOSPITAL ENCOUNTER (INPATIENT)
Facility: HOSPITAL | Age: 69
LOS: 6 days | Discharge: HOME | DRG: 189 | End: 2024-10-04
Attending: EMERGENCY MEDICINE | Admitting: INTERNAL MEDICINE
Payer: MEDICARE

## 2024-09-28 DIAGNOSIS — A31.0 MYCOBACTERIUM AVIUM COMPLEX: Primary | ICD-10-CM

## 2024-09-28 DIAGNOSIS — J44.1 CHRONIC OBSTRUCTIVE PULMONARY DISEASE WITH ACUTE EXACERBATION: ICD-10-CM

## 2024-09-28 DIAGNOSIS — R06.02 SOB (SHORTNESS OF BREATH): ICD-10-CM

## 2024-09-28 DIAGNOSIS — R06.02 SHORTNESS OF BREATH: ICD-10-CM

## 2024-09-28 LAB
ALBUMIN SERPL BCP-MCNC: 3.3 G/DL (ref 3.5–5.2)
ALLENS TEST: ABNORMAL
ALP SERPL-CCNC: 49 U/L (ref 55–135)
ALT SERPL W/O P-5'-P-CCNC: 11 U/L (ref 10–44)
ANION GAP SERPL CALC-SCNC: 7 MMOL/L (ref 8–16)
AST SERPL-CCNC: 20 U/L (ref 10–40)
BASOPHILS # BLD AUTO: 0.02 K/UL (ref 0–0.2)
BASOPHILS NFR BLD: 0.2 % (ref 0–1.9)
BILIRUB SERPL-MCNC: 0.2 MG/DL (ref 0.1–1)
BNP SERPL-MCNC: 15 PG/ML (ref 0–99)
BUN SERPL-MCNC: 4 MG/DL (ref 8–23)
CALCIUM SERPL-MCNC: 8.1 MG/DL (ref 8.7–10.5)
CHLORIDE SERPL-SCNC: 100 MMOL/L (ref 95–110)
CO2 SERPL-SCNC: 33 MMOL/L (ref 23–29)
CREAT SERPL-MCNC: 0.6 MG/DL (ref 0.5–1.4)
DELSYS: ABNORMAL
DELSYS: ABNORMAL
DIFFERENTIAL METHOD BLD: ABNORMAL
EOSINOPHIL # BLD AUTO: 0 K/UL (ref 0–0.5)
EOSINOPHIL NFR BLD: 0.1 % (ref 0–8)
EP: 6
ERYTHROCYTE [DISTWIDTH] IN BLOOD BY AUTOMATED COUNT: 15.5 % (ref 11.5–14.5)
EST. GFR  (NO RACE VARIABLE): >60 ML/MIN/1.73 M^2
FIO2: 40
GLUCOSE SERPL-MCNC: 86 MG/DL (ref 70–110)
HCO3 UR-SCNC: 41.3 MMOL/L (ref 24–28)
HCO3 UR-SCNC: 41.8 MMOL/L (ref 24–28)
HCO3 UR-SCNC: 46.8 MMOL/L (ref 24–28)
HCT VFR BLD AUTO: 42.6 % (ref 37–48.5)
HCT VFR BLD CALC: 45 %PCV (ref 36–54)
HGB BLD-MCNC: 13.3 G/DL (ref 12–16)
IMM GRANULOCYTES # BLD AUTO: 0.04 K/UL (ref 0–0.04)
IMM GRANULOCYTES NFR BLD AUTO: 0.5 % (ref 0–0.5)
IP: 14
LYMPHOCYTES # BLD AUTO: 0.9 K/UL (ref 1–4.8)
LYMPHOCYTES NFR BLD: 10.3 % (ref 18–48)
MCH RBC QN AUTO: 29.4 PG (ref 27–31)
MCHC RBC AUTO-ENTMCNC: 31.2 G/DL (ref 32–36)
MCV RBC AUTO: 94 FL (ref 82–98)
MODE: ABNORMAL
MODE: ABNORMAL
MONOCYTES # BLD AUTO: 0.8 K/UL (ref 0.3–1)
MONOCYTES NFR BLD: 8.6 % (ref 4–15)
NEUTROPHILS # BLD AUTO: 7.1 K/UL (ref 1.8–7.7)
NEUTROPHILS NFR BLD: 80.3 % (ref 38–73)
NRBC BLD-RTO: 0 /100 WBC
OHS QRS DURATION: 68 MS
OHS QRS DURATION: 72 MS
OHS QTC CALCULATION: 435 MS
OHS QTC CALCULATION: 439 MS
PCO2 BLDA: 101.1 MMHG (ref 35–45)
PCO2 BLDA: 91.2 MMHG (ref 35–45)
PCO2 BLDA: 92.5 MMHG (ref 35–45)
PH SMN: 7.26 [PH] (ref 7.35–7.45)
PH SMN: 7.27 [PH] (ref 7.35–7.45)
PH SMN: 7.27 [PH] (ref 7.35–7.45)
PLATELET # BLD AUTO: 205 K/UL (ref 150–450)
PMV BLD AUTO: 12.9 FL (ref 9.2–12.9)
PO2 BLDA: 60 MMHG (ref 40–60)
PO2 BLDA: 67 MMHG (ref 40–60)
PO2 BLDA: 72 MMHG (ref 80–100)
POC BE: 14 MMOL/L
POC BE: 15 MMOL/L
POC BE: 20 MMOL/L
POC IONIZED CALCIUM: 1.21 MMOL/L (ref 1.06–1.42)
POC SATURATED O2: 84 % (ref 95–100)
POC SATURATED O2: 88 % (ref 95–100)
POC SATURATED O2: 90 % (ref 95–100)
POC TCO2: 44 MMOL/L (ref 24–29)
POC TCO2: 45 MMOL/L (ref 23–27)
POC TCO2: 50 MMOL/L (ref 24–29)
POTASSIUM BLD-SCNC: 4 MMOL/L (ref 3.5–5.1)
POTASSIUM SERPL-SCNC: 3.5 MMOL/L (ref 3.5–5.1)
PROT SERPL-MCNC: 6.4 G/DL (ref 6–8.4)
RBC # BLD AUTO: 4.53 M/UL (ref 4–5.4)
SAMPLE: ABNORMAL
SITE: ABNORMAL
SODIUM BLD-SCNC: 137 MMOL/L (ref 136–145)
SODIUM SERPL-SCNC: 140 MMOL/L (ref 136–145)
TROPONIN I SERPL DL<=0.01 NG/ML-MCNC: <0.006 NG/ML (ref 0–0.03)
WBC # BLD AUTO: 8.83 K/UL (ref 3.9–12.7)

## 2024-09-28 PROCEDURE — 25000242 PHARM REV CODE 250 ALT 637 W/ HCPCS: Mod: HCNC | Performed by: EMERGENCY MEDICINE

## 2024-09-28 PROCEDURE — 94640 AIRWAY INHALATION TREATMENT: CPT | Mod: HCNC,XB

## 2024-09-28 PROCEDURE — 27000221 HC OXYGEN, UP TO 24 HOURS: Mod: HCNC

## 2024-09-28 PROCEDURE — 27100171 HC OXYGEN HIGH FLOW UP TO 24 HOURS: Mod: HCNC

## 2024-09-28 PROCEDURE — 20000000 HC ICU ROOM: Mod: HCNC

## 2024-09-28 PROCEDURE — 93010 ELECTROCARDIOGRAM REPORT: CPT | Mod: HCNC,,, | Performed by: INTERNAL MEDICINE

## 2024-09-28 PROCEDURE — 93010 ELECTROCARDIOGRAM REPORT: CPT | Mod: HCNC,76,, | Performed by: INTERNAL MEDICINE

## 2024-09-28 PROCEDURE — 94761 N-INVAS EAR/PLS OXIMETRY MLT: CPT | Mod: HCNC,XB

## 2024-09-28 PROCEDURE — 25000003 PHARM REV CODE 250: Mod: HCNC

## 2024-09-28 PROCEDURE — 93005 ELECTROCARDIOGRAM TRACING: CPT | Mod: HCNC

## 2024-09-28 PROCEDURE — 84484 ASSAY OF TROPONIN QUANT: CPT | Mod: HCNC | Performed by: EMERGENCY MEDICINE

## 2024-09-28 PROCEDURE — 85025 COMPLETE CBC W/AUTO DIFF WBC: CPT | Mod: HCNC | Performed by: EMERGENCY MEDICINE

## 2024-09-28 PROCEDURE — 5A09357 ASSISTANCE WITH RESPIRATORY VENTILATION, LESS THAN 24 CONSECUTIVE HOURS, CONTINUOUS POSITIVE AIRWAY PRESSURE: ICD-10-PCS | Performed by: INTERNAL MEDICINE

## 2024-09-28 PROCEDURE — 94660 CPAP INITIATION&MGMT: CPT | Mod: HCNC

## 2024-09-28 PROCEDURE — 63600175 PHARM REV CODE 636 W HCPCS: Mod: HCNC | Performed by: EMERGENCY MEDICINE

## 2024-09-28 PROCEDURE — 99291 CRITICAL CARE FIRST HOUR: CPT | Mod: HCNC,GC,, | Performed by: INTERNAL MEDICINE

## 2024-09-28 PROCEDURE — 94640 AIRWAY INHALATION TREATMENT: CPT | Mod: HCNC

## 2024-09-28 PROCEDURE — 96374 THER/PROPH/DIAG INJ IV PUSH: CPT | Mod: HCNC

## 2024-09-28 PROCEDURE — 99900035 HC TECH TIME PER 15 MIN (STAT): Mod: HCNC

## 2024-09-28 PROCEDURE — 80053 COMPREHEN METABOLIC PANEL: CPT | Mod: HCNC | Performed by: EMERGENCY MEDICINE

## 2024-09-28 PROCEDURE — 99285 EMERGENCY DEPT VISIT HI MDM: CPT | Mod: 25,HCNC

## 2024-09-28 PROCEDURE — 83880 ASSAY OF NATRIURETIC PEPTIDE: CPT | Mod: HCNC | Performed by: EMERGENCY MEDICINE

## 2024-09-28 PROCEDURE — 25000003 PHARM REV CODE 250: Mod: HCNC | Performed by: EMERGENCY MEDICINE

## 2024-09-28 PROCEDURE — 27000190 HC CPAP FULL FACE MASK W/VALVE: Mod: HCNC

## 2024-09-28 PROCEDURE — 36600 WITHDRAWAL OF ARTERIAL BLOOD: CPT | Mod: HCNC

## 2024-09-28 PROCEDURE — 25000242 PHARM REV CODE 250 ALT 637 W/ HCPCS: Mod: HCNC

## 2024-09-28 PROCEDURE — 82803 BLOOD GASES ANY COMBINATION: CPT | Mod: HCNC

## 2024-09-28 PROCEDURE — 63600175 PHARM REV CODE 636 W HCPCS: Mod: HCNC

## 2024-09-28 RX ORDER — LOSARTAN POTASSIUM 50 MG/1
100 TABLET ORAL DAILY
Status: DISCONTINUED | OUTPATIENT
Start: 2024-09-29 | End: 2024-10-04 | Stop reason: HOSPADM

## 2024-09-28 RX ORDER — CLARITHROMYCIN 500 MG/1
500 TABLET, FILM COATED ORAL
Status: COMPLETED | OUTPATIENT
Start: 2024-09-28 | End: 2024-09-28

## 2024-09-28 RX ORDER — ACETAMINOPHEN 325 MG/1
650 TABLET ORAL EVERY 4 HOURS PRN
Status: DISCONTINUED | OUTPATIENT
Start: 2024-09-28 | End: 2024-10-04 | Stop reason: HOSPADM

## 2024-09-28 RX ORDER — METHYLPREDNISOLONE SOD SUCC 125 MG
125 VIAL (EA) INJECTION
Status: COMPLETED | OUTPATIENT
Start: 2024-09-28 | End: 2024-09-28

## 2024-09-28 RX ORDER — ATORVASTATIN CALCIUM 10 MG/1
10 TABLET, FILM COATED ORAL DAILY
Status: DISCONTINUED | OUTPATIENT
Start: 2024-09-29 | End: 2024-10-04 | Stop reason: HOSPADM

## 2024-09-28 RX ORDER — SODIUM CHLORIDE 0.9 % (FLUSH) 0.9 %
10 SYRINGE (ML) INJECTION
Status: DISCONTINUED | OUTPATIENT
Start: 2024-09-28 | End: 2024-10-04 | Stop reason: HOSPADM

## 2024-09-28 RX ORDER — IPRATROPIUM BROMIDE AND ALBUTEROL SULFATE 2.5; .5 MG/3ML; MG/3ML
3 SOLUTION RESPIRATORY (INHALATION) ONCE
Status: COMPLETED | OUTPATIENT
Start: 2024-09-28 | End: 2024-09-28

## 2024-09-28 RX ORDER — IPRATROPIUM BROMIDE AND ALBUTEROL SULFATE 2.5; .5 MG/3ML; MG/3ML
3 SOLUTION RESPIRATORY (INHALATION)
Status: COMPLETED | OUTPATIENT
Start: 2024-09-28 | End: 2024-09-28

## 2024-09-28 RX ORDER — PANTOPRAZOLE SODIUM 40 MG/1
40 TABLET, DELAYED RELEASE ORAL DAILY
Status: DISCONTINUED | OUTPATIENT
Start: 2024-09-29 | End: 2024-10-04 | Stop reason: HOSPADM

## 2024-09-28 RX ORDER — NICOTINE 7MG/24HR
1 PATCH, TRANSDERMAL 24 HOURS TRANSDERMAL DAILY
Status: DISCONTINUED | OUTPATIENT
Start: 2024-09-29 | End: 2024-10-04 | Stop reason: HOSPADM

## 2024-09-28 RX ORDER — SODIUM CHLORIDE 0.9 % (FLUSH) 0.9 %
10 SYRINGE (ML) INJECTION
Status: DISCONTINUED | OUTPATIENT
Start: 2024-09-28 | End: 2024-09-28

## 2024-09-28 RX ORDER — IPRATROPIUM BROMIDE AND ALBUTEROL SULFATE 2.5; .5 MG/3ML; MG/3ML
3 SOLUTION RESPIRATORY (INHALATION) ONCE
Status: DISCONTINUED | OUTPATIENT
Start: 2024-09-28 | End: 2024-09-28

## 2024-09-28 RX ORDER — OXYCODONE HYDROCHLORIDE 5 MG/1
5 TABLET ORAL EVERY 4 HOURS PRN
Status: DISCONTINUED | OUTPATIENT
Start: 2024-09-28 | End: 2024-09-28

## 2024-09-28 RX ORDER — IPRATROPIUM BROMIDE AND ALBUTEROL SULFATE 2.5; .5 MG/3ML; MG/3ML
3 SOLUTION RESPIRATORY (INHALATION)
Status: DISCONTINUED | OUTPATIENT
Start: 2024-09-28 | End: 2024-10-04 | Stop reason: HOSPADM

## 2024-09-28 RX ADMIN — CLARITHROMYCIN 500 MG: 500 TABLET, FILM COATED ORAL at 07:09

## 2024-09-28 RX ADMIN — IPRATROPIUM BROMIDE AND ALBUTEROL SULFATE 3 ML: 2.5; .5 SOLUTION RESPIRATORY (INHALATION) at 07:09

## 2024-09-28 RX ADMIN — IPRATROPIUM BROMIDE AND ALBUTEROL SULFATE 3 ML: 2.5; .5 SOLUTION RESPIRATORY (INHALATION) at 08:09

## 2024-09-28 RX ADMIN — METHYLPREDNISOLONE SODIUM SUCCINATE 125 MG: 125 INJECTION, POWDER, FOR SOLUTION INTRAMUSCULAR; INTRAVENOUS at 07:09

## 2024-09-28 RX ADMIN — CEFTRIAXONE 1 G: 1 INJECTION, POWDER, FOR SOLUTION INTRAMUSCULAR; INTRAVENOUS at 05:09

## 2024-09-28 NOTE — PLAN OF CARE
Problem: Adult Inpatient Plan of Care  Goal: Plan of Care Review  Outcome: Progressing  Goal: Patient-Specific Goal (Individualized)  Outcome: Progressing  Goal: Absence of Hospital-Acquired Illness or Injury  Outcome: Progressing  Goal: Optimal Comfort and Wellbeing  Outcome: Progressing  Goal: Readiness for Transition of Care  Outcome: Progressing     Problem: Gas Exchange Impaired  Goal: Optimal Gas Exchange  Outcome: Progressing     Problem: Skin Injury Risk Increased  Goal: Skin Health and Integrity  Outcome: Progressing     Problem: Pain Acute  Goal: Optimal Pain Control and Function  Outcome: Progressing     Problem: Fall Injury Risk  Goal: Absence of Fall and Fall-Related Injury  Outcome: Progressing

## 2024-09-28 NOTE — ED NOTES
Received care handoff from ALEM Gallo.  Pt resting comfortably, without showing any signs of distress. Pt updated on plan of care. Pain currently 0/10. Comfort, positioning, and restroom needs addressed. Bed locked in lowest position, side rails up x2, call button within reach.

## 2024-09-28 NOTE — ED NOTES
Assumed care of the patient. Report received from ALEM Vanessa. Pt on continuous cardiac monitoring, continuous pulse oximetry, and automatic BP cuff cycling Q30 min. Pt in hospital gown, side rails up X2, bed low and locked, and call light is placed within reach. No family/visitors at bedside at this time. Pt denies any complaints or needs. Whiteboard updated with patient's plan of care.

## 2024-09-28 NOTE — NURSING
MICU DAILY GOALS     Family/Goals of care/Code Status   Code Status: Full Code    24H Vital Sign Range  Temp:  [98.8 °F (37.1 °C)-99.3 °F (37.4 °C)]   Pulse:  []   Resp:  [18-49]   BP: (108-164)/(58-83)   SpO2:  [90 %-100 %]      Shift Events (include procedures and significant events)   No acute events throughout shift; admit from ER arrived on 3 L nasal cannula; AAOx4; VSS. Ambulatory to bathroom. Possible stepdown tomorrow.     AWAKE RASS: Goal - RASS Goal: 0-->alert and calm  Actual - RASS (Singh Agitation-Sedation Scale): alert and calm    Restraint necessity: Not necessary   BREATHE SBT: Not intubated    Coordinate A & B, analgesics/sedatives Pain: managed   SAT: Not intubated   Delirium CAM-ICU: Overall CAM-ICU: Negative   Early(intubated/ Progressive (non-intubated) Mobility MOVE Screen (INTUBATED ONLY): Not intubated    Activity: Activity Management: Ambulated to bathroom - L4   Feeding/Nutrition Diet order: Diet/Nutrition Received: regular,     Thrombus DVT prophylaxis: VTE Required Core Measure: Pharmacological prophylaxis initiated/maintained   HOB Elevation Head of Bed (HOB) Positioning: HOB at 30-45 degrees   Ulcer Prophylaxis GI: yes   Glucose control managed     Skin Skin assessed during: Admit    Sacrum intact/not altered? Yes  Heels intact/not altered? Yes  Surgical wound? No    CHECK ONE!   (no altered skin or altered skin) and sub boxes:  [x] No Altered Skin Integrity Present    [x]Prevention Measures Documented    [] Altered Skin Integrity Present or Discovered   [] LDA present in EPIC, daily doc completed              [] LDA added if not in EPIC (describe wound).                    When describing wound, do not stage, use descriptive words only.    [] Wound Image Taken (required on admit,                   transfer/discharge and every Tuesday)    Wound Care Consulted? No    4 EYES:  Attending Nurse (1st set of eyes): Jackie GRAJEDA RN     Second RN/Staff Member (2nd set of eyes): Ishan MATA  RN    Bowel Function no issues    Indwelling Catheter Necessity            De-escalation Antibiotics No        VS and assessment per flow sheet, patient progressing towards goals as tolerated, plan of care reviewed with family, all concerns addressed, will continue to monitor.

## 2024-09-28 NOTE — ED PROVIDER NOTES
Encounter Date: 2024       History     Chief Complaint   Patient presents with    Shortness of Breath     Pt c/o SOB since Thursday, pt saw pulmonologist yesterday, oxygen was increased from 3L to 4L.     Patient is a 68 y/o female with PMHx of cor pulmonale, HTN, HLD, COPD, CHF, cavitary lesion of lung, chronic hypoxemic respiratory failure who arrives for evaluation of SOB. Patient states having seen pulmonologist on Thursday. Was referred to ID. Has AFB culture and smear showing MAC. Patient is currently on 5L of O2; saturating at 95%. Patient denies current lower extremity swelling, back pain, abdominal pain, dysuria, hematuria, nausea/vomiting, numbness, tingling.            Review of patient's allergies indicates:   Allergen Reactions    Doxycycline Other (See Comments)     Acid reflux    Orange juice      Swelling in pt tongue      23 - pt stated she had in hosp and it didn't effect her.     Past Medical History:   Diagnosis Date    CHF (congestive heart failure)     COPD (chronic obstructive pulmonary disease)     Herpes zoster without mention of complication 2011    Hypertension     Leg edema     Pulmonary hypertension     Sciatica      Past Surgical History:   Procedure Laterality Date    BREAST BIOPSY Right     core     SECTION      COLONOSCOPY N/A 2019    Procedure: COLONOSCOPY;  Surgeon: Rui Holder MD;  Location: [facility] ([address] [place] [address]);  Service: Endoscopy;  Laterality: N/A;    EPIDURAL STEROID INJECTION N/A 2020    Procedure: CERVICAL BLAKE;  Surgeon: Papito High MD;  Location: [facility];  Service: Pain Management;  Laterality: N/A;  NEEDS CONSENT    ESOPHAGOGASTRODUODENOSCOPY N/A 2019    Procedure: EGD (ESOPHAGOGASTRODUODENOSCOPY);  Surgeon: Rui Holder MD;  Location: [facility] ([address] [place] [address]);  Service: Endoscopy;  Laterality: N/A;  2L continuous O2 via NC, COPD, pulm HTN    TRANSFORAMINAL EPIDURAL INJECTION OF STEROID Right 6/3/2021    Procedure: INJECTION,  STEROID, EPIDURAL, TRANSFORAMINAL APPROACH, L4-L5;  Surgeon: Anibal Robles MD;  Location: [facility], [place], operated by [facility];  Service: Pain Management;  Laterality: Right;     Family History   Problem Relation Name Age of Onset    Heart disease Mother      Heart disease Paternal Uncle      Celiac disease Neg Hx      Colon cancer Neg Hx      Colon polyps Neg Hx      Crohn's disease Neg Hx      Cystic fibrosis Neg Hx      Esophageal cancer Neg Hx      Inflammatory bowel disease Neg Hx      Irritable bowel syndrome Neg Hx      Liver cancer Neg Hx      Liver disease Neg Hx      Rectal cancer Neg Hx      Stomach cancer Neg Hx      Ulcerative colitis Neg Hx       Social History     Tobacco Use    Smoking status: Every Day     Current packs/day: 0.10     Average packs/day: 1 pack/day for 52.7 years (51.1 ttl pk-yrs)     Types: Cigarettes     Start date: 1972    Smokeless tobacco: Never    Tobacco comments:     Quit over a month ago    Substance Use Topics    Alcohol use: Not Currently     Comment: beer daily 2-3 daily, none today    Drug use: No     Review of Systems    Physical Exam     Initial Vitals   BP Pulse Resp Temp SpO2   09/28/24 0606 09/28/24 0606 09/28/24 0606 09/28/24 0606 09/28/24 0639   133/64 110 (!) 26 98.9 °F (37.2 °C) 95 %      MAP       --                Physical Exam    Vitals reviewed.  Constitutional: She appears well-developed and well-nourished.   HENT:   Head: Normocephalic and atraumatic.   Cardiovascular:  Normal rate, regular rhythm and normal heart sounds.           Pulmonary/Chest: She has no wheezes. She exhibits no tenderness.   Sounds like velcro.     Neurological: She is alert.   Psychiatric: She has a normal mood and affect.         ED Course   Procedures  Labs Reviewed   CBC W/ AUTO DIFFERENTIAL - Abnormal       Result Value    WBC 8.83      RBC 4.53      Hemoglobin 13.3      Hematocrit 42.6      MCV 94      MCH 29.4      MCHC 31.2 (*)     RDW 15.5 (*)     Platelets 205      MPV 12.9      Immature  Granulocytes 0.5      Gran # (ANC) 7.1      Immature Grans (Abs) 0.04      Lymph # 0.9 (*)     Mono # 0.8      Eos # 0.0      Baso # 0.02      nRBC 0      Gran % 80.3 (*)     Lymph % 10.3 (*)     Mono % 8.6      Eosinophil % 0.1      Basophil % 0.2      Differential Method Automated     COMPREHENSIVE METABOLIC PANEL - Abnormal    Sodium 140      Potassium 3.5      Chloride 100      CO2 33 (*)     Glucose 86      BUN 4 (*)     Creatinine 0.6      Calcium 8.1 (*)     Total Protein 6.4      Albumin 3.3 (*)     Total Bilirubin 0.2      Alkaline Phosphatase 49 (*)     AST 20      ALT 11      eGFR >60.0      Anion Gap 7 (*)    ISTAT PROCEDURE - Abnormal    POC PH 7.273 (*)     POC PCO2 101.1 (*)     POC PO2 67 (*)     POC HCO3 46.8 (*)     POC BE 20 (*)     POC SATURATED O2 88      POC TCO2 50 (*)     Sample VENOUS      Site Other      Allens Test N/A     ISTAT PROCEDURE - Abnormal    POC PH 7.258 (*)     POC PCO2 92.5 (*)     POC PO2 60      POC HCO3 41.3 (*)     POC BE 14 (*)     POC SATURATED O2 84      POC TCO2 44 (*)     Sample VENOUS      Site Other      Allens Test N/A      DelSys Nasal Can      Mode SPONT     ISTAT PROCEDURE - Abnormal    POC PH 7.269 (*)     POC PCO2 91.2 (*)     POC PO2 72 (*)     POC HCO3 41.8 (*)     POC BE 15 (*)     POC SATURATED O2 90      POC Sodium 137      POC Potassium 4.0      POC TCO2 45 (*)     POC Ionized Calcium 1.21      POC Hematocrit 45      Sample ARTERIAL      Site RR      Allens Test Pass      DelSys CPAP/BiPAP      Mode BiPAP      FiO2 40      IP 14      EP 6     B-TYPE NATRIURETIC PEPTIDE    BNP 15     TROPONIN I    Troponin I <0.006          ECG Results              EKG 12-lead (Final result)        Collection Time Result Time QRS Duration OHS QTC Calculation    09/28/24 06:14:30 09/28/24 08:17:30 68 439                     Final result by Interface, Lab In [facility] (09/28/24 08:17:35)                   Narrative:    Test Reason : R06.02,    Vent. Rate : 109 BPM     Atrial  "Rate : 109 BPM     P-R Int : 186 ms          QRS Dur : 068 ms      QT Int : 326 ms       P-R-T Axes : 073 041 021 degrees     QTc Int : 439 ms    Sinus tachycardia  Low septal forces  Abnormal R wave progression in the precordial leads -Probable  Anterior  infarct (cited on or before 28-SEP-2024)  Abnormal ECG  When compared with ECG of 28-SEP-2024 06:13,  No significant change was found  Confirmed by Yovany SCHULZ MD ([id number]) on 9/28/2024 8:17:27 AM    Referred By: [email or web address]   SELF           Confirmed By:Yovany SCHULZ MD                                     EKG 12-lead (Final result)        Collection Time Result Time QRS Duration OHS QTC Calculation    09/28/24 06:13:29 09/28/24 08:18:02 72 435                     Final result by Interface, Lab In [facility] (09/28/24 08:18:08)                   Narrative:    Test Reason : R06.02,    Vent. Rate : 107 BPM     Atrial Rate : 107 BPM     P-R Int : 198 ms          QRS Dur : 072 ms      QT Int : 326 ms       P-R-T Axes : 066 041 015 degrees     QTc Int : 435 ms    Sinus tachycardia  Low septal forces ; Abnormal R wave progression in the precordial leads   -Probable  Anterior infarct (cited on or before 28-SEP-2024)  Abnormal ECG  When compared with ECG of 16-AUG-2024 12:30,  Nonspecific T wave abnormality has replaced inverted T waves in Anterior  leads  Confirmed by Yovany SCHULZ MD ([id number]) on 9/28/2024 8:17:59 AM    Referred By: [email or web address]   SELF           Confirmed By:Yovany SCHULZ MD                                  Imaging Results              X-Ray Chest AP Portable (Final result)  Result time 09/28/24 10:06:48      Final result by Aston White MD (09/28/24 10:06:48)                   Impression:      No acute cardiopulmonary finding identified on this single view.      Electronically signed by: Aston White MD  Date:    09/28/2024  Time:    10:06               Narrative:    EXAMINATION:  XR CHEST AP PORTABLE    CLINICAL HISTORY:  Provided history is "  Shortness of " "breath".    TECHNIQUE:  One view of the chest.    COMPARISON:  08/16/2024.    FINDINGS:  Cardiac wires overlie the chest.  Cardiomediastinal silhouette is not enlarged.  No confluent area of consolidation.  No large pleural effusion.  No pneumothorax.  No detrimental change when compared with the prior study.                                       Medications   sodium chloride 0.9% flush 10 mL (has no administration in time range)   acetaminophen tablet 650 mg (has no administration in time range)   cefTRIAXone (Rocephin) 1 g in D5W 100 mL IVPB (MB+) (has no administration in time range)   albuterol-ipratropium 2.5 mg-0.5 mg/3 mL nebulizer solution 3 mL (3 mLs Nebulization Given 9/28/24 0718)   methylPREDNISolone sodium succinate injection 125 mg (125 mg Intravenous Given 9/28/24 0728)   clarithromycin tablet 500 mg (500 mg Oral Given 9/28/24 0728)   albuterol-ipratropium 2.5 mg-0.5 mg/3 mL nebulizer solution 3 mL (3 mLs Nebulization Given 9/28/24 0812)     Medical Decision Making  Patient is a 70 y/o female with PMHx of cor pulmonale, HTN, HLD, COPD, CHF, cavitary lesion of lung, chronic hypoxemic respiratory failure who arrives for evaluation of SOB.     Differential diagnosis includes but is not limited to:    Pneumonia:  Patient has shortness of breath and positive AFB culture and smear for MAC.    COPD:  Patient has a history of COPD.  Wheezing upon examination of lungs.    Management:    I-STAT shows pH of 7.2 and CO2 of 92.5 along with bicarb of 41.3.  CMP shows no major electrolyte disturbance.  CBC within normal limits.  BNP and troponin within normal limits.  Portable chest x-ray shows no acute cardiopulmonary findings.  Patient treated with DuoNebs, clarithromycin, and methylprednisolone.  Critical care consulted.  Patient admitted.      Amount and/or Complexity of Data Reviewed  Labs: ordered.     Details: CBC CMP i-STAT procedure  Radiology: ordered.     Details: Portable chest " x-ray    Risk  Prescription drug management.  Decision regarding hospitalization.                                      Clinical Impression:  Final diagnoses:  [R06.02] SOB (shortness of breath)  [R06.02] Shortness of breath          ED Disposition Condition    Admit                 Calista Hughes MD  Resident  09/28/24 1640

## 2024-09-28 NOTE — HPI
68 y/o F with hx of COPD (on 3L O2 at baseline), chronic respiratory failure w/ hypoxemia and hypercapnia, pumonary HTN complicated by Rt-heart failure, chronic tobacco use, HTN, HLD who presented to the ED for SOB x 3 days. She was hospitalized around 8/16/24 for acute COPD exacerbation during which time a CT chest revealed a left upper lobe cavitary lesion. AFB cultures taken during that time resulted positive for MAC. She was scheduled to follow up with ID on 9/23 for an evaluation, however she had transportation issues and missed her appointment. Pt has no complaints at this time other than SOB.     In the ED her initial vitals were RR 26, O2 95% on 4L NC w/  and /64. Initial VBG w/ 7.273/101.1/67/46.8. Trop and BNP wnl. H/H 13.3/42.6. CMP notable for hypocalcemia and bicarb 33. She was given duonebs, clarithromycin, methylprednisolone, and started on bipap (inspiratory pressure 10/expiratory pressure 5).

## 2024-09-28 NOTE — ED TRIAGE NOTES
Rochelle Espinoza, a 69 y.o. female presents to the ED w/ complaint of SOB x 3 days. Pt seen yesterday by pulmonologist and referred pt to an infectious disease specialist but patient was unable to make it to appointment. Pt tested positive for mycobacterium species in her sputum.    Triage note:  Chief Complaint   Patient presents with    Shortness of Breath     Pt c/o SOB since Thursday, pt saw pulmonologist yesterday, oxygen was increased from 3L to 4L.     Review of patient's allergies indicates:   Allergen Reactions    Doxycycline Other (See Comments)     Acid reflux    Orange juice      Swelling in pt tongue      8/23/23 - pt stated she had in hosp and it didn't effect her.     Past Medical History:   Diagnosis Date    CHF (congestive heart failure)     COPD (chronic obstructive pulmonary disease)     Herpes zoster without mention of complication 2/21/2011    Hypertension     Leg edema     Pulmonary hypertension     Sciatica

## 2024-09-28 NOTE — NURSING
Nurses Note -- 4 Eyes      9/28/2024   6:12 PM      Skin assessed during: Admit      [x] No Altered Skin Integrity Present    [x]Prevention Measures Documented      [] Yes- Altered Skin Integrity Present or Discovered   [] LDA Added if Not in Epic (Describe Wound)   [] New Altered Skin Integrity was Present on Admit and Documented in LDA   [] Wound Image Taken    Wound Care Consulted? No    Attending Nurse:  Jackie Robles RN/Staff Member:  Ishan

## 2024-09-28 NOTE — ED NOTES
Patient identifiers for Rochelle Espinoza 69 y.o. female checked and correct.  Chief Complaint   Patient presents with    Shortness of Breath     Pt c/o SOB since Thursday, pt saw pulmonologist yesterday, oxygen was increased from 3L to 4L.     Past Medical History:   Diagnosis Date    CHF (congestive heart failure)     COPD (chronic obstructive pulmonary disease)     Herpes zoster without mention of complication 2/21/2011    Hypertension     Leg edema     Pulmonary hypertension     Sciatica      Allergies reported:   Review of patient's allergies indicates:   Allergen Reactions    Doxycycline Other (See Comments)     Acid reflux    Orange juice      Swelling in pt tongue      8/23/23 - pt stated she had in hosp and it didn't effect her.         HEENT: Denies vision changes. Denies ear drainage or hearing loss. No c/o nasal drainage. Denies dysphagia or voice changes.   Appearance: Pt awake, alert & oriented to person, place & time. Pt in no acute distress at present time. Pt is clean and well groomed with clothes appropriately fastened.   Skin: Skin warm, dry & intact. Color consistent with ethnicity. Mucous membranes moist. No breakdown or brusing noted.   Musculoskeletal: Patient moving all extremities well, no obvious swelling or deformities noted.   Respiratory: Respirations spontaneous and even but pt is labored with her breathing efforts.  Visible chest rise noted. Airway is open and patent. Pt endorses shortness of breath. Lungs sounds diminished bilaterally with fine crackles and wheezing noted.. Pt endorses a wet croupy productive cough.   Neurologic: Sensation is intact. Speech is clear and appropriate. Eyes open spontaneously, behavior appropriate to situation, follows commands, facial expression symmetrical, bilateral hand grasp equal and even, purposeful motor response noted.  Cardiac: All peripheral pulses present. No Bilateral lower extremity edema. Cap refill is <3 seconds.  Abdomen: Abdomen soft, non  distended, non tender to palpation.   : Pt voids independently, denies dysuria, hematuria, frequency.

## 2024-09-29 LAB
ALBUMIN SERPL BCP-MCNC: 3.3 G/DL (ref 3.5–5.2)
ALP SERPL-CCNC: 47 U/L (ref 55–135)
ALT SERPL W/O P-5'-P-CCNC: 12 U/L (ref 10–44)
ANION GAP SERPL CALC-SCNC: 9 MMOL/L (ref 8–16)
AST SERPL-CCNC: 22 U/L (ref 10–40)
BASOPHILS # BLD AUTO: 0 K/UL (ref 0–0.2)
BASOPHILS NFR BLD: 0 % (ref 0–1.9)
BILIRUB SERPL-MCNC: 0.2 MG/DL (ref 0.1–1)
BUN SERPL-MCNC: 8 MG/DL (ref 8–23)
CALCIUM SERPL-MCNC: 8.8 MG/DL (ref 8.7–10.5)
CHLORIDE SERPL-SCNC: 97 MMOL/L (ref 95–110)
CO2 SERPL-SCNC: 34 MMOL/L (ref 23–29)
CREAT SERPL-MCNC: 0.6 MG/DL (ref 0.5–1.4)
DIFFERENTIAL METHOD BLD: ABNORMAL
EOSINOPHIL # BLD AUTO: 0 K/UL (ref 0–0.5)
EOSINOPHIL NFR BLD: 0 % (ref 0–8)
ERYTHROCYTE [DISTWIDTH] IN BLOOD BY AUTOMATED COUNT: 14.8 % (ref 11.5–14.5)
EST. GFR  (NO RACE VARIABLE): >60 ML/MIN/1.73 M^2
GLUCOSE SERPL-MCNC: 113 MG/DL (ref 70–110)
HCT VFR BLD AUTO: 41.3 % (ref 37–48.5)
HGB BLD-MCNC: 12.2 G/DL (ref 12–16)
IMM GRANULOCYTES # BLD AUTO: 0.03 K/UL (ref 0–0.04)
IMM GRANULOCYTES NFR BLD AUTO: 0.5 % (ref 0–0.5)
LYMPHOCYTES # BLD AUTO: 0.9 K/UL (ref 1–4.8)
LYMPHOCYTES NFR BLD: 13.5 % (ref 18–48)
MAGNESIUM SERPL-MCNC: 1.9 MG/DL (ref 1.6–2.6)
MCH RBC QN AUTO: 28.8 PG (ref 27–31)
MCHC RBC AUTO-ENTMCNC: 29.5 G/DL (ref 32–36)
MCV RBC AUTO: 97 FL (ref 82–98)
MONOCYTES # BLD AUTO: 0.5 K/UL (ref 0.3–1)
MONOCYTES NFR BLD: 7.9 % (ref 4–15)
NEUTROPHILS # BLD AUTO: 5.1 K/UL (ref 1.8–7.7)
NEUTROPHILS NFR BLD: 78.1 % (ref 38–73)
NRBC BLD-RTO: 0 /100 WBC
PHOSPHATE SERPL-MCNC: 3.3 MG/DL (ref 2.7–4.5)
PLATELET # BLD AUTO: 156 K/UL (ref 150–450)
PMV BLD AUTO: 11.9 FL (ref 9.2–12.9)
POTASSIUM SERPL-SCNC: 4.7 MMOL/L (ref 3.5–5.1)
PROT SERPL-MCNC: 6.6 G/DL (ref 6–8.4)
RBC # BLD AUTO: 4.24 M/UL (ref 4–5.4)
SODIUM SERPL-SCNC: 140 MMOL/L (ref 136–145)
WBC # BLD AUTO: 6.57 K/UL (ref 3.9–12.7)

## 2024-09-29 PROCEDURE — 80053 COMPREHEN METABOLIC PANEL: CPT | Mod: HCNC

## 2024-09-29 PROCEDURE — 94640 AIRWAY INHALATION TREATMENT: CPT | Mod: HCNC

## 2024-09-29 PROCEDURE — 82803 BLOOD GASES ANY COMBINATION: CPT | Mod: HCNC

## 2024-09-29 PROCEDURE — 85025 COMPLETE CBC W/AUTO DIFF WBC: CPT | Mod: HCNC

## 2024-09-29 PROCEDURE — 99900035 HC TECH TIME PER 15 MIN (STAT): Mod: HCNC

## 2024-09-29 PROCEDURE — S4991 NICOTINE PATCH NONLEGEND: HCPCS | Mod: HCNC

## 2024-09-29 PROCEDURE — 20600001 HC STEP DOWN PRIVATE ROOM: Mod: HCNC

## 2024-09-29 PROCEDURE — 25000242 PHARM REV CODE 250 ALT 637 W/ HCPCS: Mod: HCNC

## 2024-09-29 PROCEDURE — 25000003 PHARM REV CODE 250: Mod: HCNC

## 2024-09-29 PROCEDURE — 99233 SBSQ HOSP IP/OBS HIGH 50: CPT | Mod: HCNC,GC,, | Performed by: INTERNAL MEDICINE

## 2024-09-29 PROCEDURE — 25000003 PHARM REV CODE 250: Mod: HCNC | Performed by: INTERNAL MEDICINE

## 2024-09-29 PROCEDURE — 83735 ASSAY OF MAGNESIUM: CPT | Mod: HCNC

## 2024-09-29 PROCEDURE — 84100 ASSAY OF PHOSPHORUS: CPT | Mod: HCNC

## 2024-09-29 PROCEDURE — 94660 CPAP INITIATION&MGMT: CPT | Mod: HCNC

## 2024-09-29 PROCEDURE — 27000221 HC OXYGEN, UP TO 24 HOURS: Mod: HCNC

## 2024-09-29 PROCEDURE — 94761 N-INVAS EAR/PLS OXIMETRY MLT: CPT | Mod: HCNC

## 2024-09-29 PROCEDURE — 63600175 PHARM REV CODE 636 W HCPCS: Mod: HCNC

## 2024-09-29 RX ORDER — HYDROXYZINE HYDROCHLORIDE 25 MG/1
25 TABLET, FILM COATED ORAL ONCE
Status: COMPLETED | OUTPATIENT
Start: 2024-09-29 | End: 2024-09-29

## 2024-09-29 RX ORDER — PREDNISONE 20 MG/1
40 TABLET ORAL DAILY
Status: DISCONTINUED | OUTPATIENT
Start: 2024-09-29 | End: 2024-10-04 | Stop reason: HOSPADM

## 2024-09-29 RX ORDER — ENOXAPARIN SODIUM 100 MG/ML
40 INJECTION SUBCUTANEOUS EVERY 24 HOURS
Status: DISCONTINUED | OUTPATIENT
Start: 2024-09-29 | End: 2024-10-04 | Stop reason: HOSPADM

## 2024-09-29 RX ORDER — ENOXAPARIN SODIUM 100 MG/ML
40 INJECTION SUBCUTANEOUS EVERY 24 HOURS
Status: DISCONTINUED | OUTPATIENT
Start: 2024-09-29 | End: 2024-09-29

## 2024-09-29 RX ORDER — MUPIROCIN 20 MG/G
OINTMENT TOPICAL 2 TIMES DAILY
Status: COMPLETED | OUTPATIENT
Start: 2024-09-29 | End: 2024-10-04

## 2024-09-29 RX ORDER — BENZONATATE 100 MG/1
100 CAPSULE ORAL ONCE
Status: DISCONTINUED | OUTPATIENT
Start: 2024-09-29 | End: 2024-10-04 | Stop reason: HOSPADM

## 2024-09-29 RX ORDER — AMOXICILLIN AND CLAVULANATE POTASSIUM 875; 125 MG/1; MG/1
1 TABLET, FILM COATED ORAL EVERY 12 HOURS
Status: DISCONTINUED | OUTPATIENT
Start: 2024-09-29 | End: 2024-10-04 | Stop reason: HOSPADM

## 2024-09-29 RX ADMIN — HYDROXYZINE HYDROCHLORIDE 25 MG: 25 TABLET, FILM COATED ORAL at 12:09

## 2024-09-29 RX ADMIN — NICOTINE 1 PATCH: 7 PATCH, EXTENDED RELEASE TRANSDERMAL at 08:09

## 2024-09-29 RX ADMIN — IPRATROPIUM BROMIDE AND ALBUTEROL SULFATE 3 ML: 2.5; .5 SOLUTION RESPIRATORY (INHALATION) at 11:09

## 2024-09-29 RX ADMIN — PANTOPRAZOLE SODIUM 40 MG: 40 TABLET, DELAYED RELEASE ORAL at 08:09

## 2024-09-29 RX ADMIN — ATORVASTATIN CALCIUM 10 MG: 10 TABLET, FILM COATED ORAL at 08:09

## 2024-09-29 RX ADMIN — LOSARTAN POTASSIUM 100 MG: 50 TABLET, FILM COATED ORAL at 08:09

## 2024-09-29 RX ADMIN — IPRATROPIUM BROMIDE AND ALBUTEROL SULFATE 3 ML: 2.5; .5 SOLUTION RESPIRATORY (INHALATION) at 07:09

## 2024-09-29 RX ADMIN — PREDNISONE 40 MG: 20 TABLET ORAL at 08:09

## 2024-09-29 RX ADMIN — IPRATROPIUM BROMIDE AND ALBUTEROL SULFATE 3 ML: 2.5; .5 SOLUTION RESPIRATORY (INHALATION) at 03:09

## 2024-09-29 RX ADMIN — IPRATROPIUM BROMIDE AND ALBUTEROL SULFATE 3 ML: 2.5; .5 SOLUTION RESPIRATORY (INHALATION) at 08:09

## 2024-09-29 RX ADMIN — ENOXAPARIN SODIUM 40 MG: 40 INJECTION SUBCUTANEOUS at 09:09

## 2024-09-29 RX ADMIN — MUPIROCIN: 20 OINTMENT TOPICAL at 08:09

## 2024-09-29 RX ADMIN — AMOXICILLIN AND CLAVULANATE POTASSIUM 1 TABLET: 875; 125 TABLET, FILM COATED ORAL at 08:09

## 2024-09-29 NOTE — H&P
Critical Care Medicine    Please see progress note dated 9/28/24.    Gino De Jesus MD  Department of Internal Medicine PGY 3  8:39 PM   09/28/2024

## 2024-09-29 NOTE — PROGRESS NOTES
[facility] - Medical [facility]  Critical Care Medicine  Progress Note    Patient Name: Rochelle Espinoza  MRN: [id number]  Admission Date: 2024  Hospital Length of Stay: 0 days  Code Status: Full Code  Attending Provider: Maine Cespedes MD  Primary Care Provider: Yosi Samuels Jr., MD   Principal Problem: <principal problem not specified>    Subjective:     HPI:  68 y/o F with hx of COPD (on 3L O2 at baseline), chronic respiratory failure w/ hypoxemia and hypercapnia, pumonary HTN complicated by Rt-heart failure, chronic tobacco use, HTN, HLD who presented to the ED for SOB x 3 days. She was hospitalized around 24 for acute COPD exacerbation during which time a CT chest revealed a left upper lobe cavitary lesion. AFB cultures taken during that time resulted positive for MAC. She was scheduled to follow up with ID on  for an evaluation, however she had transportation issues and missed her appointment. Pt has no complaints at this time other than SOB.     In the ED her initial vitals were RR 26, O2 95% on 4L NC w/  and /64. Initial VBG w/ 7.273/101.1/67/46.8. Trop and BNP wnl. H/H 13.3/42.6. CMP notable for hypocalcemia and bicarb 33. She was given duonebs, clarithromycin, methylprednisolone, and started on bipap (inspiratory pressure 10/expiratory pressure 5).           Hospital/ICU Course:  No notes on file    Past Medical History:   Diagnosis Date    CHF (congestive heart failure)     COPD (chronic obstructive pulmonary disease)     Herpes zoster without mention of complication 2011    Hypertension     Leg edema     Pulmonary hypertension     Sciatica        Past Surgical History:   Procedure Laterality Date    BREAST BIOPSY Right     core     SECTION      COLONOSCOPY N/A 2019    Procedure: COLONOSCOPY;  Surgeon: Rui Holder MD;  Location: [facility] ([address] [place] [address]);  Service: Endoscopy;  Laterality: N/A;    EPIDURAL STEROID INJECTION N/A 2020    Procedure: CERVICAL BLAKE;  Surgeon:  Papito High MD;  Location: [facility];  Service: Pain Management;  Laterality: N/A;  NEEDS CONSENT    ESOPHAGOGASTRODUODENOSCOPY N/A 12/19/2019    Procedure: EGD (ESOPHAGOGASTRODUODENOSCOPY);  Surgeon: Rui Holder MD;  Location: [facility] (2ND FLR);  Service: Endoscopy;  Laterality: N/A;  2L continuous O2 via NC, COPD, pulm HTN    TRANSFORAMINAL EPIDURAL INJECTION OF STEROID Right 6/3/2021    Procedure: INJECTION, STEROID, EPIDURAL, TRANSFORAMINAL APPROACH, L4-L5;  Surgeon: Anibal Robles MD;  Location: [facility];  Service: Pain Management;  Laterality: Right;       Review of patient's allergies indicates:   Allergen Reactions    Doxycycline Other (See Comments)     Acid reflux    Orange juice      Swelling in pt tongue      8/23/23 - pt stated she had in hosp and it didn't effect her.       Family History       Problem Relation (Age of Onset)    Heart disease Mother, Paternal Uncle          Tobacco Use    Smoking status: Every Day     Current packs/day: 0.10     Average packs/day: 1 pack/day for 52.7 years (51.1 ttl pk-yrs)     Types: Cigarettes     Start date: 1972    Smokeless tobacco: Never    Tobacco comments:     Quit over a month ago    Substance and Sexual Activity    Alcohol use: Not Currently     Comment: beer daily 2-3 daily, none today    Drug use: No    Sexual activity: Not Currently     Birth control/protection: None      Review of Systems   Respiratory:  Positive for cough and shortness of breath.    Cardiovascular:  Negative for chest pain.   Gastrointestinal:  Negative for abdominal pain, nausea and vomiting.     Objective:     Vital Signs (Most Recent):  Temp: 99.3 °F (37.4 °C) (09/28/24 1709)  Pulse: 89 (09/28/24 1830)  Resp: (!) 28 (09/28/24 1830)  BP: (!) 152/75 (09/28/24 1830)  SpO2: (!) 94 % (09/28/24 1830) Vital Signs (24h Range):  Temp:  [98.8 °F (37.1 °C)-99.3 °F (37.4 °C)] 99.3 °F (37.4 °C)  Pulse:  [] 89  Resp:  [18-49] 28  SpO2:  [90 %-100 %] 94 %  BP:  (108-164)/(58-83) 152/75   Weight: 68 kg (149 lb 14.6 oz)  Body mass index is 26.56 kg/m².      Intake/Output Summary (Last 24 hours) at 9/28/2024 1951  Last data filed at 9/28/2024 1813  Gross per 24 hour   Intake 108.2 ml   Output --   Net 108.2 ml          Physical Exam  Vitals and nursing note reviewed.   Constitutional:       General: She is not in acute distress.     Comments: On BiPAP   HENT:      Head: Normocephalic and atraumatic.      Right Ear: External ear normal.      Left Ear: External ear normal.   Cardiovascular:      Rate and Rhythm: Normal rate and regular rhythm.      Pulses: Normal pulses.      Heart sounds: Normal heart sounds.   Pulmonary:      Effort: Pulmonary effort is normal.      Comments: Rhonchorous with faint wheezing bilaterally   Abdominal:      General: There is no distension.      Palpations: Abdomen is soft.      Tenderness: There is no abdominal tenderness.   Musculoskeletal:      Right lower leg: No edema.      Left lower leg: No edema.   Neurological:      General: No focal deficit present.      Mental Status: She is alert.      Comments: Alert and oriented to name and location            Vents:     Lines/Drains/Airways       Peripheral Intravenous Line  Duration                  Peripheral IV - Single Lumen 09/28/24 0650 20 G Left Antecubital <1 day         Peripheral IV - Single Lumen 09/28/24 1500 20 G Anterior;Right Forearm <1 day                  Significant Labs:    CBC/Anemia Profile:  Recent Labs   Lab 09/28/24  0728 09/28/24  1335   WBC 8.83  --    HGB 13.3  --    HCT 42.6 45     --    MCV 94  --    RDW 15.5*  --         Chemistries:  Recent Labs   Lab 09/28/24  0836      K 3.5      CO2 33*   BUN 4*   CREATININE 0.6   CALCIUM 8.1*   ALBUMIN 3.3*   PROT 6.4   BILITOT 0.2   ALKPHOS 49*   ALT 11   AST 20       All pertinent labs within the past 24 hours have been reviewed.    Significant Imaging: I have reviewed all pertinent imaging results/findings  within the past 24 hours.    ABG  Recent Labs   Lab 09/28/24  1335   PH 7.269*   PO2 72*   PCO2 91.2*   HCO3 41.8*   BE 15*     Assessment/Plan:     Pulmonary  Chronic hypoxemic respiratory failure  See COPD    Chronic obstructive pulmonary disease with acute exacerbation  - LIZ cavitary lesion and recent diagnosis of MAC on cultures   - Gold grade 4, group E COPD per last FEV1 of 29.5% and greater than 2 admissions for exacerbation   - VBG w/ 7.27/101.1/67/46.8 in the ED  - placed on bipap, but has transitioned to 3L NC     Plan  - continue albuterol-ipratropium nebs  - will hold steroids at this time given her recent diagnosis of MAC   - goal SpO2: 88-92%  - will begin treatment for MAC with ceftriaxone, 9/28 is day 1    Cardiac/Vascular  Chronic diastolic heart failure  - has hx of HFpEF though no evidence of volume overload this admission    Plan   - consider adding home lasix PRN  - monitor fluid status daily  - cardiac diet with 2g Na restriction   - record daily standing weights   - strict I/Os   - DVT ppx   - avoid CCB, NSAIDs, flecainide       Mixed hyperlipidemia  - will continue home statin     Cor pulmonale, chronic  Most likely 2/2 to COPD, continue to monitor     Essential hypertension  - will continue home losartan 100mg daily   - consider PRN medication if SBP > 180 or DBP > 110    Other  Nicotine dependence, cigarettes, uncomplicated   - will continue with nicotine dermal patch         Critical secondary to Patient has a condition that poses threat to life and bodily function: Severe Respiratory Distress      Critical care was time spent personally by me on the following activities: development of treatment plan with patient or surrogate and bedside caregivers, discussions with consultants, evaluation of patient's response to treatment, examination of patient, ordering and performing treatments and interventions, ordering and review of laboratory studies, ordering and review of radiographic studies,  pulse oximetry, re-evaluation of patient's condition. This critical care time did not overlap with that of any other provider or involve time for any procedures.     Nilson Chahal MD  Critical Care Medicine  [facility] - Medical ICU

## 2024-09-29 NOTE — ASSESSMENT & PLAN NOTE
- has hx of HFpEF though no evidence of volume overload this admission    Plan   - consider adding home lasix PRN  - monitor fluid status daily  - cardiac diet with 2g Na restriction   - record daily standing weights   - strict I/Os   - DVT ppx   - avoid CCB, NSAIDs, flecainide

## 2024-09-29 NOTE — ASSESSMENT & PLAN NOTE
- LIZ cavitary lesion and recent diagnosis of MAC on cultures   - Gold grade 4, group E COPD per last FEV1 of 29.5% and greater than 2 admissions for exacerbation   - VBG w/ 7.27/101.1/67/46.8 in the ED  - placed on bipap, but has transitioned to 3L NC   - Continues to do well on 3L of NC  - Concern for malignancy as etiology of the upper lobe lesion - will follow up outpatient in pulmonology clinic    Plan  - continue albuterol-ipratropium nebs and steroids  - goal SpO2: 88-92%  - ceftriaxone, 9/28 - transitioned to Augmentin 9/29 for 7 day course

## 2024-09-29 NOTE — PLAN OF CARE
MICU DAILY GOALS     Family/Goals of care/Code Status   Code Status: Full Code    24H Vital Sign Range  Temp:  [96 °F (35.6 °C)-99.3 °F (37.4 °C)]   Pulse:  []   Resp:  [18-50]   BP: (108-164)/(58-91)   SpO2:  [90 %-100 %]      Shift Events (include procedures and significant events)   No acute events throughout shift. VSS.    AWAKE RASS: Goal - RASS Goal: 0-->alert and calm  Actual - RASS (Singh Agitation-Sedation Scale): alert and calm    Restraint necessity: Not necessary   BREATHE SBT: Not intubated    Coordinate A & B, analgesics/sedatives Pain: managed   SAT: Not intubated   Delirium CAM-ICU: Overall CAM-ICU: Negative   Early(intubated/ Progressive (non-intubated) Mobility MOVE Screen (INTUBATED ONLY): Not intubated    Activity: Activity Management: Arm raise - L1   Feeding/Nutrition Diet order: Diet/Nutrition Received: regular,     Thrombus DVT prophylaxis: VTE Required Core Measure: Pharmacological prophylaxis initiated/maintained   HOB Elevation Head of Bed (HOB) Positioning: HOB at 60 degrees   Ulcer Prophylaxis GI: no   Glucose control managed     Skin Skin assessed during: Daily Assessment    Sacrum intact/not altered? Yes  Heels intact/not altered? Yes  Surgical wound? No    CHECK ONE!   (no altered skin or altered skin) and sub boxes:  [x] No Altered Skin Integrity Present    [x]Prevention Measures Documented    [] Altered Skin Integrity Present or Discovered   [] LDA present in EPIC, daily doc completed              [] LDA added if not in EPIC (describe wound).                    When describing wound, do not stage, use descriptive words only.    [] Wound Image Taken (required on admit,                   transfer/discharge and every Tuesday)    Wound Care Consulted? No    4 EYES:  Attending Nurse (1st set of eyes): Daya Anderson RN    Second RN/Staff Member (2nd set of eyes): ALEM Rose   Bowel Function no issues    Indwelling Catheter Necessity            De-escalation Antibiotics No         VS and assessment per flow sheet, patient progressing towards goals as tolerated, plan of care reviewed with  the patient , all concerns addressed, will continue to monitor.

## 2024-09-29 NOTE — HOSPITAL COURSE
Hx of COPD on 3L NC and CPAP at night at home who presented to the ED yesterday with acute on chronic hypoxic/hypercapneic respiratory failure. She was initially a bit confused and hypercapnic and was placed on BiPAP. She has been getting nebs, steroids, and antibiotics to treat superimposed bacterial infection. She was on BiPAP overnight and has been on 4L NC since then, satting above 90%. She is still hypercapnic but her mentation has significantly improved and she is not in respiratory distress. Plan to continue BiPAP at night, antibiotics, duonebs, and steroids.

## 2024-09-29 NOTE — NURSING
MICU DAILY GOALS     Family/Goals of care/Code Status   Code Status: Full Code    24H Vital Sign Range  Temp:  [96 °F (35.6 °C)-98.2 °F (36.8 °C)]   Pulse:  [77-98]   Resp:  [20-50]   BP: (113-164)/(64-91)   SpO2:  [88 %-100 %]      Shift Events (include procedures and significant events)   No acute events throughout shift    AWAKE RASS: Goal - RASS Goal: 0-->alert and calm  Actual - RASS (Singh Agitation-Sedation Scale): alert and calm    Restraint necessity: Not necessary   BREATHE SBT: Not intubated    Coordinate A & B, analgesics/sedatives Pain: managed   SAT: Not intubated   Delirium CAM-ICU: Overall CAM-ICU: Negative   Early(intubated/ Progressive (non-intubated) Mobility MOVE Screen (INTUBATED ONLY): Not intubated    Activity: Activity Management: Arm raise - L1   Feeding/Nutrition Diet order: Diet/Nutrition Received: regular,     Thrombus DVT prophylaxis: VTE Required Core Measure: Pharmacological prophylaxis initiated/maintained   HOB Elevation Head of Bed (HOB) Positioning: HOB at 60 degrees   Ulcer Prophylaxis GI: yes   Glucose control managed     Skin Skin assessed during: Q Shift Change    Sacrum intact/not altered? Yes  Heels intact/not altered? Yes  Surgical wound? No    CHECK ONE!   (no altered skin or altered skin) and sub boxes:  [x] No Altered Skin Integrity Present    [x]Prevention Measures Documented    [] Altered Skin Integrity Present or Discovered   [] LDA present in EPIC, daily doc completed              [] LDA added if not in EPIC (describe wound).                    When describing wound, do not stage, use descriptive words only.    [] Wound Image Taken (required on admit,                   transfer/discharge and every Tuesday)    Wound Care Consulted? No    4 EYES:  Attending Nurse (1st set of eyes):   Jackie Pate RN  Second RN/Staff Member (2nd set of eyes):   Ishan Rucker RN   Bowel Function no issues    Indwelling Catheter Necessity         NA   De-escalation Antibiotics No         VS and assessment per flow sheet, patient progressing towards goals as tolerated, plan of care reviewed with patient, all concerns addressed, will continue to monitor.

## 2024-09-29 NOTE — CONSULTS
Brief Consult Acceptance Note    Consult received. Patient to be admitted to the MICU. See progress note dated 9/28/24.    Gino De Jesus MD  Critical Care Medicine  9/28/2024   8:37 PM

## 2024-09-29 NOTE — SUBJECTIVE & OBJECTIVE
Past Medical History:   Diagnosis Date    CHF (congestive heart failure)     COPD (chronic obstructive pulmonary disease)     Herpes zoster without mention of complication 2011    Hypertension     Leg edema     Pulmonary hypertension     Sciatica        Past Surgical History:   Procedure Laterality Date    BREAST BIOPSY Right     core     SECTION      COLONOSCOPY N/A 2019    Procedure: COLONOSCOPY;  Surgeon: Rui Holder MD;  Location: [facility] (2ND FLR);  Service: Endoscopy;  Laterality: N/A;    EPIDURAL STEROID INJECTION N/A 2020    Procedure: CERVICAL BLAKE;  Surgeon: Papito High MD;  Location: [facility];  Service: Pain Management;  Laterality: N/A;  NEEDS CONSENT    ESOPHAGOGASTRODUODENOSCOPY N/A 2019    Procedure: EGD (ESOPHAGOGASTRODUODENOSCOPY);  Surgeon: Rui Holder MD;  Location: [facility] (2ND FLR);  Service: Endoscopy;  Laterality: N/A;  2L continuous O2 via NC, COPD, pulm HTN    TRANSFORAMINAL EPIDURAL INJECTION OF STEROID Right 6/3/2021    Procedure: INJECTION, STEROID, EPIDURAL, TRANSFORAMINAL APPROACH, L4-L5;  Surgeon: Anibal Robles MD;  Location: [facility];  Service: Pain Management;  Laterality: Right;       Review of patient's allergies indicates:   Allergen Reactions    Doxycycline Other (See Comments)     Acid reflux    Orange juice      Swelling in pt tongue      23 - pt stated she had in hosp and it didn't effect her.       Family History       Problem Relation (Age of Onset)    Heart disease Mother, Paternal Uncle          Tobacco Use    Smoking status: Every Day     Current packs/day: 0.10     Average packs/day: 1 pack/day for 52.7 years (51.1 ttl pk-yrs)     Types: Cigarettes     Start date:     Smokeless tobacco: Never    Tobacco comments:     Quit over a month ago    Substance and Sexual Activity    Alcohol use: Not Currently     Comment: beer daily 2-3 daily, none today    Drug use: No    Sexual activity: Not Currently     Birth  control/protection: None      Review of Systems   Respiratory:  Positive for cough and shortness of breath.    Cardiovascular:  Negative for chest pain.   Gastrointestinal:  Negative for abdominal pain, nausea and vomiting.     Objective:     Vital Signs (Most Recent):  Temp: 99.3 °F (37.4 °C) (09/28/24 1709)  Pulse: 89 (09/28/24 1830)  Resp: (!) 28 (09/28/24 1830)  BP: (!) 152/75 (09/28/24 1830)  SpO2: (!) 94 % (09/28/24 1830) Vital Signs (24h Range):  Temp:  [98.8 °F (37.1 °C)-99.3 °F (37.4 °C)] 99.3 °F (37.4 °C)  Pulse:  [] 89  Resp:  [18-49] 28  SpO2:  [90 %-100 %] 94 %  BP: (108-164)/(58-83) 152/75   Weight: 68 kg (149 lb 14.6 oz)  Body mass index is 26.56 kg/m².      Intake/Output Summary (Last 24 hours) at 9/28/2024 1951  Last data filed at 9/28/2024 1813  Gross per 24 hour   Intake 108.2 ml   Output --   Net 108.2 ml          Physical Exam  Vitals and nursing note reviewed.   Constitutional:       General: She is not in acute distress.     Comments: On BiPAP   HENT:      Head: Normocephalic and atraumatic.      Right Ear: External ear normal.      Left Ear: External ear normal.   Cardiovascular:      Rate and Rhythm: Normal rate and regular rhythm.      Pulses: Normal pulses.      Heart sounds: Normal heart sounds.   Pulmonary:      Effort: Pulmonary effort is normal.      Comments: Rhonchorous with faint wheezing bilaterally   Abdominal:      General: There is no distension.      Palpations: Abdomen is soft.      Tenderness: There is no abdominal tenderness.   Musculoskeletal:      Right lower leg: No edema.      Left lower leg: No edema.   Neurological:      General: No focal deficit present.      Mental Status: She is alert.      Comments: Alert and oriented to name and location            Vents:     Lines/Drains/Airways       Peripheral Intravenous Line  Duration                  Peripheral IV - Single Lumen 09/28/24 0650 20 G Left Antecubital <1 day         Peripheral IV - Single Lumen 09/28/24  1500 20 G Anterior;Right Forearm <1 day                  Significant Labs:    CBC/Anemia Profile:  Recent Labs   Lab 09/28/24  0728 09/28/24  1335   WBC 8.83  --    HGB 13.3  --    HCT 42.6 45     --    MCV 94  --    RDW 15.5*  --         Chemistries:  Recent Labs   Lab 09/28/24  0836      K 3.5      CO2 33*   BUN 4*   CREATININE 0.6   CALCIUM 8.1*   ALBUMIN 3.3*   PROT 6.4   BILITOT 0.2   ALKPHOS 49*   ALT 11   AST 20       All pertinent labs within the past 24 hours have been reviewed.    Significant Imaging: I have reviewed all pertinent imaging results/findings within the past 24 hours.

## 2024-09-29 NOTE — ASSESSMENT & PLAN NOTE
- LIZ cavitary lesion and recent diagnosis of MAC on cultures   - Gold grade 4, group E COPD per last FEV1 of 29.5% and greater than 2 admissions for exacerbation   - VBG w/ 7.27/101.1/67/46.8 in the ED  - placed on bipap, but has transitioned to 3L NC     Plan  - continue albuterol-ipratropium nebs  - will hold steroids at this time given her recent diagnosis of MAC   - goal SpO2: 88-92%  - will begin treatment for MAC with ceftriaxone, 9/28 is day 1

## 2024-09-29 NOTE — SUBJECTIVE & OBJECTIVE
Interval History/Significant Events:   On BiPAP until 0300 today.     Review of Systems   Constitutional:  Negative for fever.   Respiratory:  Positive for cough. Negative for shortness of breath.    Cardiovascular:  Negative for chest pain.   Gastrointestinal:  Negative for abdominal pain, nausea and vomiting.     Objective:     Vital Signs (Most Recent):  Temp: 97.8 °F (36.6 °C) (09/29/24 1105)  Pulse: 90 (09/29/24 1330)  Resp: (!) 36 (09/29/24 1330)  BP: (!) 141/79 (09/29/24 1330)  SpO2: (!) 92 % (09/29/24 1330) Vital Signs (24h Range):  Temp:  [96 °F (35.6 °C)-99.3 °F (37.4 °C)] 97.8 °F (36.6 °C)  Pulse:  [77-98] 90  Resp:  [20-50] 36  SpO2:  [88 %-100 %] 92 %  BP: (113-164)/(64-91) 141/79   Weight: 68 kg (149 lb 14.6 oz)  Body mass index is 26.56 kg/m².      Intake/Output Summary (Last 24 hours) at 9/29/2024 1425  Last data filed at 9/29/2024 1305  Gross per 24 hour   Intake 758.2 ml   Output 1550 ml   Net -791.8 ml          Physical Exam  Vitals and nursing note reviewed.   Constitutional:       General: She is not in acute distress.  HENT:      Head: Normocephalic and atraumatic.      Right Ear: External ear normal.      Left Ear: External ear normal.   Cardiovascular:      Rate and Rhythm: Normal rate and regular rhythm.      Pulses: Normal pulses.      Heart sounds: Normal heart sounds.   Pulmonary:      Comments: Mildly tachypneic, decreased air movement. No audible crackles or rhonchi  Abdominal:      General: There is no distension.      Palpations: Abdomen is soft.      Tenderness: There is no abdominal tenderness.   Musculoskeletal:      Cervical back: Neck supple.      Right lower leg: No edema.      Left lower leg: No edema.   Neurological:      Mental Status: She is alert and oriented to person, place, and time.      Cranial Nerves: No cranial nerve deficit.      Comments: MAEW            Vents:     Lines/Drains/Airways       Drain  Duration             Female External Urinary Catheter w/ Suction  09/28/24 2000 <1 day              Peripheral Intravenous Line  Duration                  Peripheral IV - Single Lumen 09/28/24 0650 20 G Left Antecubital 1 day         Peripheral IV - Single Lumen 09/28/24 1500 20 G Anterior;Right Forearm <1 day                  Significant Labs:    CBC/Anemia Profile:  Recent Labs   Lab 09/28/24  0728 09/28/24  1335 09/29/24  0317   WBC 8.83  --  6.57   HGB 13.3  --  12.2   HCT 42.6 45 41.3     --  156   MCV 94  --  97   RDW 15.5*  --  14.8*        Chemistries:  Recent Labs   Lab 09/28/24  0836 09/29/24  0317    140   K 3.5 4.7    97   CO2 33* 34*   BUN 4* 8   CREATININE 0.6 0.6   CALCIUM 8.1* 8.8   ALBUMIN 3.3* 3.3*   PROT 6.4 6.6   BILITOT 0.2 0.2   ALKPHOS 49* 47*   ALT 11 12   AST 20 22   MG  --  1.9   PHOS  --  3.3       All pertinent labs within the past 24 hours have been reviewed.    Significant Imaging:  I have reviewed all pertinent imaging results/findings within the past 24 hours.

## 2024-09-29 NOTE — PROGRESS NOTES
[facility] - Medical [facility]  Critical Care Medicine  Progress Note    Patient Name: Rochelle Espinoza  MRN: [id number]  Admission Date: 9/28/2024  Hospital Length of Stay: 1 days  Code Status: Full Code  Attending Provider: Maine Cespedes MD  Primary Care Provider: Yosi Samuels Jr., MD   Principal Problem: Chronic obstructive pulmonary disease with acute exacerbation    Subjective:     HPI:  70 y/o F with hx of COPD (on 3L O2 at baseline), chronic respiratory failure w/ hypoxemia and hypercapnia, pumonary HTN complicated by Rt-heart failure, chronic tobacco use, HTN, HLD who presented to the ED for SOB x 3 days. She was hospitalized around 8/16/24 for acute COPD exacerbation during which time a CT chest revealed a left upper lobe cavitary lesion. AFB cultures taken during that time resulted positive for MAC. She was scheduled to follow up with ID on 9/23 for an evaluation, however she had transportation issues and missed her appointment. Pt has no complaints at this time other than SOB.     In the ED her initial vitals were RR 26, O2 95% on 4L NC w/  and /64. Initial VBG w/ 7.273/101.1/67/46.8. Trop and BNP wnl. H/H 13.3/42.6. CMP notable for hypocalcemia and bicarb 33. She was given duonebs, clarithromycin, methylprednisolone, and started on bipap (inspiratory pressure 10/expiratory pressure 5).           Hospital/ICU Course:  Hx of COPD on 3L NC and CPAP at night at home who presented to the ED yesterday with acute on chronic hypoxic/hypercapneic respiratory failure. She was initially a bit confused and hypercapnic and was placed on BiPAP. She has been getting nebs, steroids, and antibiotics to treat superimposed bacterial infection. She was on BiPAP overnight and has been on 4L NC since then, satting above 90%. She is still hypercapnic but her mentation has significantly improved and she is not in respiratory distress. Plan to continue BiPAP at night, antibiotics, duonebs, and steroids.     Interval  History/Significant Events:   On BiPAP until 0300 today.     Review of Systems   Constitutional:  Negative for fever.   Respiratory:  Positive for cough. Negative for shortness of breath.    Cardiovascular:  Negative for chest pain.   Gastrointestinal:  Negative for abdominal pain, nausea and vomiting.     Objective:     Vital Signs (Most Recent):  Temp: 97.8 °F (36.6 °C) (09/29/24 1105)  Pulse: 90 (09/29/24 1330)  Resp: (!) 36 (09/29/24 1330)  BP: (!) 141/79 (09/29/24 1330)  SpO2: (!) 92 % (09/29/24 1330) Vital Signs (24h Range):  Temp:  [96 °F (35.6 °C)-99.3 °F (37.4 °C)] 97.8 °F (36.6 °C)  Pulse:  [77-98] 90  Resp:  [20-50] 36  SpO2:  [88 %-100 %] 92 %  BP: (113-164)/(64-91) 141/79   Weight: 68 kg (149 lb 14.6 oz)  Body mass index is 26.56 kg/m².      Intake/Output Summary (Last 24 hours) at 9/29/2024 1425  Last data filed at 9/29/2024 1305  Gross per 24 hour   Intake 758.2 ml   Output 1550 ml   Net -791.8 ml          Physical Exam  Vitals and nursing note reviewed.   Constitutional:       General: She is not in acute distress.  HENT:      Head: Normocephalic and atraumatic.      Right Ear: External ear normal.      Left Ear: External ear normal.   Cardiovascular:      Rate and Rhythm: Normal rate and regular rhythm.      Pulses: Normal pulses.      Heart sounds: Normal heart sounds.   Pulmonary:      Comments: Mildly tachypneic, decreased air movement. No audible crackles or rhonchi  Abdominal:      General: There is no distension.      Palpations: Abdomen is soft.      Tenderness: There is no abdominal tenderness.   Musculoskeletal:      Cervical back: Neck supple.      Right lower leg: No edema.      Left lower leg: No edema.   Neurological:      Mental Status: She is alert and oriented to person, place, and time.      Cranial Nerves: No cranial nerve deficit.      Comments: MAEW            Vents:     Lines/Drains/Airways       Drain  Duration             Female External Urinary Catheter w/ Suction 09/28/24  2000 <1 day              Peripheral Intravenous Line  Duration                  Peripheral IV - Single Lumen 09/28/24 0650 20 G Left Antecubital 1 day         Peripheral IV - Single Lumen 09/28/24 1500 20 G Anterior;Right Forearm <1 day                  Significant Labs:    CBC/Anemia Profile:  Recent Labs   Lab 09/28/24  0728 09/28/24  1335 09/29/24  0317   WBC 8.83  --  6.57   HGB 13.3  --  12.2   HCT 42.6 45 41.3     --  156   MCV 94  --  97   RDW 15.5*  --  14.8*        Chemistries:  Recent Labs   Lab 09/28/24  0836 09/29/24  0317    140   K 3.5 4.7    97   CO2 33* 34*   BUN 4* 8   CREATININE 0.6 0.6   CALCIUM 8.1* 8.8   ALBUMIN 3.3* 3.3*   PROT 6.4 6.6   BILITOT 0.2 0.2   ALKPHOS 49* 47*   ALT 11 12   AST 20 22   MG  --  1.9   PHOS  --  3.3       All pertinent labs within the past 24 hours have been reviewed.    Significant Imaging:  I have reviewed all pertinent imaging results/findings within the past 24 hours.    ABG  Recent Labs   Lab 09/28/24  1335   PH 7.269*   PO2 72*   PCO2 91.2*   HCO3 41.8*   BE 15*     Assessment/Plan:     Pulmonary  * Chronic obstructive pulmonary disease with acute exacerbation  - LIZ cavitary lesion and recent diagnosis of MAC on cultures   - Gold grade 4, group E COPD per last FEV1 of 29.5% and greater than 2 admissions for exacerbation   - VBG w/ 7.27/101.1/67/46.8 in the ED  - placed on bipap, but has transitioned to 3L NC   - Continues to do well on 3L of NC  - Concern for malignancy as etiology of the upper lobe lesion - will follow up outpatient in pulmonology clinic    Plan  - continue albuterol-ipratropium nebs and steroids  - goal SpO2: 88-92%  - ceftriaxone, 9/28 - transitioned to Augmentin 9/29 for 7 day course    Chronic hypoxemic respiratory failure  See COPD    Cardiac/Vascular  Chronic diastolic heart failure  - has hx of HFpEF though no evidence of volume overload this admission    Plan   - consider adding home lasix PRN  - monitor fluid status  daily  - cardiac diet with 2g Na restriction   - record daily standing weights   - strict I/Os   - DVT ppx   - avoid CCB, NSAIDs, flecainide       Mixed hyperlipidemia  - will continue home statin     Cor pulmonale, chronic  Most likely 2/2 to COPD, continue to monitor     Essential hypertension  - will continue home losartan 100mg daily   - consider PRN medication if SBP > 180 or DBP > 110    Other  Nicotine dependence, cigarettes, uncomplicated   - will continue with nicotine dermal patch       Stepdown to the floor        Critical care was time spent personally by me on the following activities: development of treatment plan with patient or surrogate and bedside caregivers, discussions with consultants, evaluation of patient's response to treatment, examination of patient, ordering and performing treatments and interventions, ordering and review of laboratory studies, ordering and review of radiographic studies, pulse oximetry, re-evaluation of patient's condition. This critical care time did not overlap with that of any other provider or involve time for any procedures.     Nilson Chahal MD  Critical Care Medicine  [facility] - Medical ICU

## 2024-09-30 LAB
ALBUMIN SERPL BCP-MCNC: 3.3 G/DL (ref 3.5–5.2)
ALLENS TEST: ABNORMAL
ALP SERPL-CCNC: 48 U/L (ref 55–135)
ALT SERPL W/O P-5'-P-CCNC: 13 U/L (ref 10–44)
ANION GAP SERPL CALC-SCNC: 6 MMOL/L (ref 8–16)
AST SERPL-CCNC: 19 U/L (ref 10–40)
BASOPHILS # BLD AUTO: 0.02 K/UL (ref 0–0.2)
BASOPHILS NFR BLD: 0.2 % (ref 0–1.9)
BILIRUB SERPL-MCNC: 0.2 MG/DL (ref 0.1–1)
BUN SERPL-MCNC: 10 MG/DL (ref 8–23)
CALCIUM SERPL-MCNC: 9.2 MG/DL (ref 8.7–10.5)
CHLORIDE SERPL-SCNC: 97 MMOL/L (ref 95–110)
CO2 SERPL-SCNC: 40 MMOL/L (ref 23–29)
CREAT SERPL-MCNC: 0.7 MG/DL (ref 0.5–1.4)
DELSYS: ABNORMAL
DIFFERENTIAL METHOD BLD: ABNORMAL
EOSINOPHIL # BLD AUTO: 0 K/UL (ref 0–0.5)
EOSINOPHIL NFR BLD: 0 % (ref 0–8)
ERYTHROCYTE [DISTWIDTH] IN BLOOD BY AUTOMATED COUNT: 15.1 % (ref 11.5–14.5)
EST. GFR  (NO RACE VARIABLE): >60 ML/MIN/1.73 M^2
FLOW: 3
GLUCOSE SERPL-MCNC: 83 MG/DL (ref 70–110)
HCO3 UR-SCNC: 49.5 MMOL/L (ref 24–28)
HCT VFR BLD AUTO: 44.6 % (ref 37–48.5)
HGB BLD-MCNC: 13.3 G/DL (ref 12–16)
IMM GRANULOCYTES # BLD AUTO: 0.02 K/UL (ref 0–0.04)
IMM GRANULOCYTES NFR BLD AUTO: 0.2 % (ref 0–0.5)
LYMPHOCYTES # BLD AUTO: 2.5 K/UL (ref 1–4.8)
LYMPHOCYTES NFR BLD: 25.2 % (ref 18–48)
MAGNESIUM SERPL-MCNC: 1.9 MG/DL (ref 1.6–2.6)
MCH RBC QN AUTO: 28.2 PG (ref 27–31)
MCHC RBC AUTO-ENTMCNC: 29.8 G/DL (ref 32–36)
MCV RBC AUTO: 95 FL (ref 82–98)
MODE: ABNORMAL
MONOCYTES # BLD AUTO: 0.9 K/UL (ref 0.3–1)
MONOCYTES NFR BLD: 9 % (ref 4–15)
NEUTROPHILS # BLD AUTO: 6.5 K/UL (ref 1.8–7.7)
NEUTROPHILS NFR BLD: 65.4 % (ref 38–73)
NRBC BLD-RTO: 0 /100 WBC
PCO2 BLDA: 102.5 MMHG (ref 35–45)
PH SMN: 7.29 [PH] (ref 7.35–7.45)
PHOSPHATE SERPL-MCNC: 3 MG/DL (ref 2.7–4.5)
PLATELET # BLD AUTO: 167 K/UL (ref 150–450)
PMV BLD AUTO: 12.4 FL (ref 9.2–12.9)
PO2 BLDA: 31 MMHG (ref 40–60)
POC BE: 23 MMOL/L
POC SATURATED O2: 46 % (ref 95–100)
POC TCO2: >50 MMOL/L (ref 24–29)
POTASSIUM SERPL-SCNC: 3.8 MMOL/L (ref 3.5–5.1)
PROT SERPL-MCNC: 6.6 G/DL (ref 6–8.4)
RBC # BLD AUTO: 4.71 M/UL (ref 4–5.4)
SAMPLE: ABNORMAL
SITE: ABNORMAL
SODIUM SERPL-SCNC: 143 MMOL/L (ref 136–145)
WBC # BLD AUTO: 10 K/UL (ref 3.9–12.7)

## 2024-09-30 PROCEDURE — 20600001 HC STEP DOWN PRIVATE ROOM: Mod: HCNC

## 2024-09-30 PROCEDURE — 94660 CPAP INITIATION&MGMT: CPT | Mod: HCNC

## 2024-09-30 PROCEDURE — 94761 N-INVAS EAR/PLS OXIMETRY MLT: CPT | Mod: HCNC

## 2024-09-30 PROCEDURE — 25000242 PHARM REV CODE 250 ALT 637 W/ HCPCS: Mod: HCNC

## 2024-09-30 PROCEDURE — 83735 ASSAY OF MAGNESIUM: CPT | Mod: HCNC

## 2024-09-30 PROCEDURE — 25000003 PHARM REV CODE 250: Mod: HCNC | Performed by: INTERNAL MEDICINE

## 2024-09-30 PROCEDURE — 63600175 PHARM REV CODE 636 W HCPCS: Mod: HCNC

## 2024-09-30 PROCEDURE — 99900035 HC TECH TIME PER 15 MIN (STAT): Mod: HCNC

## 2024-09-30 PROCEDURE — 85025 COMPLETE CBC W/AUTO DIFF WBC: CPT | Mod: HCNC

## 2024-09-30 PROCEDURE — 25000003 PHARM REV CODE 250: Mod: HCNC

## 2024-09-30 PROCEDURE — 27100171 HC OXYGEN HIGH FLOW UP TO 24 HOURS: Mod: HCNC

## 2024-09-30 PROCEDURE — 36415 COLL VENOUS BLD VENIPUNCTURE: CPT | Mod: HCNC

## 2024-09-30 PROCEDURE — 80053 COMPREHEN METABOLIC PANEL: CPT | Mod: HCNC

## 2024-09-30 PROCEDURE — 84100 ASSAY OF PHOSPHORUS: CPT | Mod: HCNC

## 2024-09-30 PROCEDURE — 94640 AIRWAY INHALATION TREATMENT: CPT | Mod: HCNC

## 2024-09-30 PROCEDURE — S4991 NICOTINE PATCH NONLEGEND: HCPCS | Mod: HCNC

## 2024-09-30 RX ORDER — FLUTICASONE FUROATE AND VILANTEROL 100; 25 UG/1; UG/1
1 POWDER RESPIRATORY (INHALATION) DAILY
Status: DISCONTINUED | OUTPATIENT
Start: 2024-09-30 | End: 2024-09-30

## 2024-09-30 RX ORDER — TALC
6 POWDER (GRAM) TOPICAL NIGHTLY PRN
Status: DISCONTINUED | OUTPATIENT
Start: 2024-09-30 | End: 2024-10-01

## 2024-09-30 RX ADMIN — IPRATROPIUM BROMIDE AND ALBUTEROL SULFATE 3 ML: 2.5; .5 SOLUTION RESPIRATORY (INHALATION) at 07:09

## 2024-09-30 RX ADMIN — Medication 6 MG: at 09:09

## 2024-09-30 RX ADMIN — AMOXICILLIN AND CLAVULANATE POTASSIUM 1 TABLET: 875; 125 TABLET, FILM COATED ORAL at 08:09

## 2024-09-30 RX ADMIN — IPRATROPIUM BROMIDE AND ALBUTEROL SULFATE 3 ML: 2.5; .5 SOLUTION RESPIRATORY (INHALATION) at 11:09

## 2024-09-30 RX ADMIN — IPRATROPIUM BROMIDE AND ALBUTEROL SULFATE 3 ML: 2.5; .5 SOLUTION RESPIRATORY (INHALATION) at 08:09

## 2024-09-30 RX ADMIN — ENOXAPARIN SODIUM 40 MG: 40 INJECTION SUBCUTANEOUS at 04:09

## 2024-09-30 RX ADMIN — LOSARTAN POTASSIUM 100 MG: 50 TABLET, FILM COATED ORAL at 08:09

## 2024-09-30 RX ADMIN — PREDNISONE 40 MG: 20 TABLET ORAL at 08:09

## 2024-09-30 RX ADMIN — MUPIROCIN: 20 OINTMENT TOPICAL at 09:09

## 2024-09-30 RX ADMIN — ATORVASTATIN CALCIUM 10 MG: 10 TABLET, FILM COATED ORAL at 08:09

## 2024-09-30 RX ADMIN — PANTOPRAZOLE SODIUM 40 MG: 40 TABLET, DELAYED RELEASE ORAL at 08:09

## 2024-09-30 RX ADMIN — IPRATROPIUM BROMIDE AND ALBUTEROL SULFATE 3 ML: 2.5; .5 SOLUTION RESPIRATORY (INHALATION) at 03:09

## 2024-09-30 RX ADMIN — MUPIROCIN: 20 OINTMENT TOPICAL at 08:09

## 2024-09-30 RX ADMIN — NICOTINE 1 PATCH: 7 PATCH, EXTENDED RELEASE TRANSDERMAL at 08:09

## 2024-09-30 RX ADMIN — AMOXICILLIN AND CLAVULANATE POTASSIUM 1 TABLET: 875; 125 TABLET, FILM COATED ORAL at 09:09

## 2024-09-30 NOTE — PLAN OF CARE
PT alert and oriented x 4. Able to voice all wants and needs. All needs met.  Bipap worn over night. No significant events noted this shift.  She has remained free of falls and injuries. Safety eduction and plan of care reviewed with PT and she voiced understanding. Bed is low and locked with call light with in easy reach.  Bed alarm set with sound audible.

## 2024-09-30 NOTE — PLAN OF CARE
Patient received on 3L humidified O2. Patient seated up in bed. No distress noted. Patient voiced no complaints. Continue to monitor and observe for any changes.

## 2024-09-30 NOTE — NURSING TRANSFER
Nursing Transfer Note      9/30/2024   12:12 AM    Nurse giving handoff: ALEM Stapleton  Nurse receiving handoff: ALEM Austin 8R WT Nurse    Reason patient is being transferred: Step down    Transfer To: 8R WT MTSD from: 7R WT MICU    Transfer via bed    Transfer with O2    Transported by ALEM Stapleton & ALEM Quintero    Transfer Vital Signs:  Blood Pressure:114/62  Heart Rate:90  O2:93% on 3 L NC  Temperature:98.6   Respirations:27    Order for Tele Monitor? No    Additional Lines: Oxygen    4eyes on Skin: yes    Medicines sent: Yes; mupirocin     Any special needs or follow-up needed: No    Patient belongings transferred with patient: Yes    Chart send with patient: Yes    Notified: N/A    Patient reassessed at: 9/29 @ 9804 (date, time)  1  Upon arrival to floor: cardiac monitor applied, patient oriented to room, call bell in reach, and bed in lowest position

## 2024-09-30 NOTE — NURSING
Nurses Note -- 4 Eyes      9/30/2024   1:00 AM      Skin assessed during: Transfer      [x] No Altered Skin Integrity Present    []Prevention Measures Documented      [] Yes- Altered Skin Integrity Present or Discovered   [] LDA Added if Not in Epic (Describe Wound)   [] New Altered Skin Integrity was Present on Admit and Documented in LDA   [] Wound Image Taken    Wound Care Consulted? No    Attending Nurse:  ALEM Austin    Second RN/Staff Member: ALEM Carreno

## 2024-09-30 NOTE — PROGRESS NOTES
[facility] Tele[facility] Medicine  Progress Note    Patient Name: Rochelle Espinoza  MRN: [id number]  Patient Class: IP- Inpatient   Admission Date: 9/28/2024  Length of Stay: 2 days  Attending Physician: Rey Ashley MD  Primary Care Provider: Yosi Samuels Jr., MD        Subjective:     Principal Problem:Chronic obstructive pulmonary disease with acute exacerbation        HPI:  No notes on file    Overview/Hospital Course:  No notes on file    Interval History: ICU transfer, looking better, may dc home tomorrow, on ome 02    Review of Systems  Objective:     Vital Signs (Most Recent):  Temp: 98.3 °F (36.8 °C) (09/30/24 0744)  Pulse: 87 (09/30/24 0814)  Resp: (!) 30 (09/30/24 0814)  BP: (!) 143/80 (09/30/24 0744)  SpO2: (!) 93 % (09/30/24 0814) Vital Signs (24h Range):  Temp:  [97.6 °F (36.4 °C)-98.9 °F (37.2 °C)] 98.3 °F (36.8 °C)  Pulse:  [] 87  Resp:  [18-50] 30  SpO2:  [88 %-96 %] 93 %  BP: (109-160)/(65-98) 143/80     Weight: 68 kg (149 lb 14.6 oz)  Body mass index is 26.56 kg/m².    Intake/Output Summary (Last 24 hours) at 9/30/2024 0908  Last data filed at 9/30/2024 0458  Gross per 24 hour   Intake 620 ml   Output 2400 ml   Net -1780 ml         Physical Exam  Vitals and nursing note reviewed.   Constitutional:       General: She is not in acute distress.  HENT:      Head: Normocephalic and atraumatic.      Right Ear: External ear normal.      Left Ear: External ear normal.   Cardiovascular:      Rate and Rhythm: Normal rate and regular rhythm.      Pulses: Normal pulses.      Heart sounds: Normal heart sounds.   Pulmonary:      Comments: Mildly tachypneic, decreased air movement. No audible crackles or rhonchi  Abdominal:      General: There is no distension.      Palpations: Abdomen is soft.      Tenderness: There is no abdominal tenderness.   Musculoskeletal:      Cervical back: Neck supple.      Right lower leg: No edema.      Left lower leg: No edema.   Neurological:      Mental  Status: She is alert and oriented to person, place, and time.      Cranial Nerves: No cranial nerve deficit.      Comments: QUEENIE             Significant Labs: All pertinent labs within the past 24 hours have been reviewed.  BMP:   Recent Labs   Lab 09/30/24  0707   GLU 83      K 3.8   CL 97   CO2 40*   BUN 10   CREATININE 0.7   CALCIUM 9.2   MG 1.9       Significant Imaging: I have reviewed all pertinent imaging results/findings within the past 24 hours.    Assessment/Plan:      * Chronic obstructive pulmonary disease with acute exacerbation  - LIZ cavitary lesion and recent diagnosis of MAC on cultures   - Gold grade 4, group E COPD per last FEV1 of 29.5% and greater than 2 admissions for exacerbation   - VBG w/ 7.27/101.1/67/46.8 in the ED  - placed on bipap, but has transitioned to 3L NC      Plan  - continue albuterol-ipratropium nebs  - will hold steroids at this time given her recent diagnosis of MAC   - goal SpO2: 88-92%  - will begin treatment for MAC with ceftriaxone, 9/28 is day 1                    Chronic diastolic heart failure  - has hx of HFpEF though no evidence of volume overload this admission     Plan   - consider adding home lasix PRN  - monitor fluid status daily  - cardiac diet with 2g Na restriction   - record daily standing weights   - strict I/Os   - DVT ppx   - avoid CCB, NSAIDs, flecainide            VTE Risk Mitigation (From admission, onward)           Ordered     enoxaparin injection 40 mg  Daily         09/29/24 0922     Place sequential compression device  Until discontinued         09/28/24 1328     IP VTE HIGH RISK PATIENT  Once         09/28/24 1328                    Discharge Planning   ELIZABETH:      Code Status: Full Code   Is the patient medically ready for discharge?:     Reason for patient still in hospital (select all that apply): Patient unstable                     Rey Ashley MD  Department of Hospital Medicine   [facility] - Telemetry Stepdown

## 2024-09-30 NOTE — PLAN OF CARE
[facility] - Tele[facility]  Initial Discharge Assessment       Primary Care Provider: Yosi Samuels Jr., MD    Admission Diagnosis: Shortness of breath [R06.02]  SOB (shortness of breath) [R06.02]    Admission Date: 9/28/2024  Expected Discharge Date: 10/3/2024              CM met with patient in room for Discharge Planning Assessment.  Patient able to answer questions.  Per patient, Hipolito Espinoza (daughter) [phone]  lives with patient in a townhouse with 14 steps to upstairs bedroom, and 1 step(s) to enter residence.   Per patient, she was independent with ADLS and used no DME for ambulation. Per patient, she is not on dialysis, and does not take Coumadin. PCP and pharmacy verified. Patient does not habe home health, and has not been hospitalized in past 30 days.  Patient will need Lyft ride upon discharge.   Discharge Planning discussed with patient in room.  All questions addressed.  CM will follow for needs.    Discharge Plan A and Plan B have been determined by review of patient's clinical status, future medical and therapeutic needs, and coverage/benefits for post-acute care in coordination with multidisciplinary team members.      Transition of Care Barriers: None    Payor: HU[employer]NA MANAGED MEDICARE / Plan: HUMANA MEDICARE HMO / Product Type: Capitation /     Extended Emergency Contact Information  Primary Emergency Contact: Hipolito Espinoza  Address: [address] [place] [id number] [facility]  Mobile Phone: [phone]  Relation: Daughter    Discharge Plan A: Home with family  Discharge Plan B: Home with family      [id number] [place] [address] [address] [facility] [address]  [address] [facility] [address]  Phone: [phone] Fax: [phone]    [facility] - [place], [place] - [address] [facility]  [address]  [facility] [address]  Phone: [phone] Fax:  [phone]      Initial Assessment (most recent)       Adult Discharge Assessment - 09/30/24 1634          Discharge Assessment    Assessment Type Discharge Planning Assessment     Confirmed/corrected address, phone number and insurance Yes     Confirmed Demographics Correct on Facesheet     Source of Information patient     When was your last doctors appointment? 09/27/24     Does patient/caregiver understand observation status Yes     Communicated ELIZABETH with patient/caregiver Date not available/Unable to determine     Reason For Admission Chronic obstructive pulmonary disease with acute exacerbation     People in Home child(marylu), adult     Facility Arrived From: Home     Do you expect to return to your current living situation? Yes     Do you have help at home or someone to help you manage your care at home? Yes     Who are your caregiver(s) and their phone number(s)? Hipolito Espinoza (daughter) [phone]     Prior to hospitilization cognitive status: Alert/Oriented     Current cognitive status: Alert/Oriented     Walking or Climbing Stairs Difficulty no     Dressing/Bathing Difficulty no     Equipment Currently Used at Home cane, straight   never uses    Readmission within 30 days? No     Patient currently being followed by outpatient case management? No     Do you currently have service(s) that help you manage your care at home? No     Do you take prescription medications? Yes     Do you have prescription coverage? Yes     Coverage HU[employer] MANAGED MEDICARE - HUMANA MEDICARE HMO - CAPITATED     Do you have any problems affording any of your prescribed medications? TBD     Is the patient taking medications as prescribed? yes     Who is going to help you get home at discharge? Patient will need Lyft ride home     How do you get to doctors appointments? family or friend will provide     Are you on dialysis? No     Do you take coumadin? No     Discharge Plan A Home with family     Discharge Plan B Home with family      DME Needed Upon Discharge  other (see comments)   TBD    Discharge Plan discussed with: Patient     Transition of Care Barriers None        Physical Activity    On average, how many days per week do you engage in moderate to strenuous exercise (like a brisk walk)? 0 days     On average, how many minutes do you engage in exercise at this level? 0 min        Financial Resource Strain    How hard is it for you to pay for the very basics like food, housing, medical care, and heating? Somewhat hard        Housing Stability    In the last 12 months, was there a time when you were not able to pay the mortgage or rent on time? No     At any time in the past 12 months, were you homeless or living in a shelter (including now)? No        Transportation Needs    Has the lack of transportation kept you from medical appointments, meetings, work or from getting things needed for daily living? No        Food Insecurity    Within the past 12 months, you worried that your food would run out before you got the money to buy more. Sometimes true     Within the past 12 months, the food you bought just didn't last and you didn't have money to get more. Sometimes true        Stress    Do you feel stress - tense, restless, nervous, or anxious, or unable to sleep at night because your mind is troubled all the time - these days? To some extent        Social Isolation    How often do you feel lonely or isolated from those around you?  Rarely        Alcohol Use    Q1: How often do you have a drink containing alcohol? Monthly or less     Q2: How many drinks containing alcohol do you have on a typical day when you are drinking? 1 or 2     Q3: How often do you have six or more drinks on one occasion? Never        Utilities    In the past 12 months has the electric, gas, oil, or water company threatened to shut off services in your home? No        Health Literacy    How often do you need to have someone help you when you read instructions,  pamphlets, or other written material from your doctor or pharmacy? Rarely        OTHER    Name(s) of People in Home Hipolito Espinoza (daughter) [phone]              Jennifer Baig RN     [phone]

## 2024-09-30 NOTE — SUBJECTIVE & OBJECTIVE
Interval History: ICU transfer, looking better, may dc home tomorrow, on ome 02    Review of Systems  Objective:     Vital Signs (Most Recent):  Temp: 98.3 °F (36.8 °C) (09/30/24 0744)  Pulse: 87 (09/30/24 0814)  Resp: (!) 30 (09/30/24 0814)  BP: (!) 143/80 (09/30/24 0744)  SpO2: (!) 93 % (09/30/24 0814) Vital Signs (24h Range):  Temp:  [97.6 °F (36.4 °C)-98.9 °F (37.2 °C)] 98.3 °F (36.8 °C)  Pulse:  [] 87  Resp:  [18-50] 30  SpO2:  [88 %-96 %] 93 %  BP: (109-160)/(65-98) 143/80     Weight: 68 kg (149 lb 14.6 oz)  Body mass index is 26.56 kg/m².    Intake/Output Summary (Last 24 hours) at 9/30/2024 0908  Last data filed at 9/30/2024 0458  Gross per 24 hour   Intake 620 ml   Output 2400 ml   Net -1780 ml         Physical Exam  Vitals and nursing note reviewed.   Constitutional:       General: She is not in acute distress.  HENT:      Head: Normocephalic and atraumatic.      Right Ear: External ear normal.      Left Ear: External ear normal.   Cardiovascular:      Rate and Rhythm: Normal rate and regular rhythm.      Pulses: Normal pulses.      Heart sounds: Normal heart sounds.   Pulmonary:      Comments: Mildly tachypneic, decreased air movement. No audible crackles or rhonchi  Abdominal:      General: There is no distension.      Palpations: Abdomen is soft.      Tenderness: There is no abdominal tenderness.   Musculoskeletal:      Cervical back: Neck supple.      Right lower leg: No edema.      Left lower leg: No edema.   Neurological:      Mental Status: She is alert and oriented to person, place, and time.      Cranial Nerves: No cranial nerve deficit.      Comments: QUEENIE             Significant Labs: All pertinent labs within the past 24 hours have been reviewed.  BMP:   Recent Labs   Lab 09/30/24  0707   GLU 83      K 3.8   CL 97   CO2 40*   BUN 10   CREATININE 0.7   CALCIUM 9.2   MG 1.9       Significant Imaging: I have reviewed all pertinent imaging results/findings within the past 24 hours.

## 2024-10-01 LAB
ALBUMIN SERPL BCP-MCNC: 3.2 G/DL (ref 3.5–5.2)
ALP SERPL-CCNC: 44 U/L (ref 55–135)
ALT SERPL W/O P-5'-P-CCNC: 15 U/L (ref 10–44)
ANION GAP SERPL CALC-SCNC: 6 MMOL/L (ref 8–16)
AST SERPL-CCNC: 18 U/L (ref 10–40)
BASOPHILS # BLD AUTO: 0.01 K/UL (ref 0–0.2)
BASOPHILS NFR BLD: 0.1 % (ref 0–1.9)
BILIRUB SERPL-MCNC: 0.2 MG/DL (ref 0.1–1)
BUN SERPL-MCNC: 9 MG/DL (ref 8–23)
CALCIUM SERPL-MCNC: 9.2 MG/DL (ref 8.7–10.5)
CHLORIDE SERPL-SCNC: 100 MMOL/L (ref 95–110)
CO2 SERPL-SCNC: 35 MMOL/L (ref 23–29)
CREAT SERPL-MCNC: 0.6 MG/DL (ref 0.5–1.4)
DIFFERENTIAL METHOD BLD: ABNORMAL
EOSINOPHIL # BLD AUTO: 0 K/UL (ref 0–0.5)
EOSINOPHIL NFR BLD: 0 % (ref 0–8)
ERYTHROCYTE [DISTWIDTH] IN BLOOD BY AUTOMATED COUNT: 15.3 % (ref 11.5–14.5)
EST. GFR  (NO RACE VARIABLE): >60 ML/MIN/1.73 M^2
GLUCOSE SERPL-MCNC: 81 MG/DL (ref 70–110)
HCT VFR BLD AUTO: 44.1 % (ref 37–48.5)
HGB BLD-MCNC: 13.1 G/DL (ref 12–16)
IMM GRANULOCYTES # BLD AUTO: 0.03 K/UL (ref 0–0.04)
IMM GRANULOCYTES NFR BLD AUTO: 0.3 % (ref 0–0.5)
LYMPHOCYTES # BLD AUTO: 2.6 K/UL (ref 1–4.8)
LYMPHOCYTES NFR BLD: 25.5 % (ref 18–48)
MAGNESIUM SERPL-MCNC: 1.7 MG/DL (ref 1.6–2.6)
MCH RBC QN AUTO: 28.2 PG (ref 27–31)
MCHC RBC AUTO-ENTMCNC: 29.7 G/DL (ref 32–36)
MCV RBC AUTO: 95 FL (ref 82–98)
MONOCYTES # BLD AUTO: 0.8 K/UL (ref 0.3–1)
MONOCYTES NFR BLD: 7.6 % (ref 4–15)
NEUTROPHILS # BLD AUTO: 6.8 K/UL (ref 1.8–7.7)
NEUTROPHILS NFR BLD: 66.5 % (ref 38–73)
NRBC BLD-RTO: 0 /100 WBC
PHOSPHATE SERPL-MCNC: 4.1 MG/DL (ref 2.7–4.5)
PLATELET # BLD AUTO: 164 K/UL (ref 150–450)
PMV BLD AUTO: 12.8 FL (ref 9.2–12.9)
POTASSIUM SERPL-SCNC: 4.1 MMOL/L (ref 3.5–5.1)
PROT SERPL-MCNC: 6.3 G/DL (ref 6–8.4)
RBC # BLD AUTO: 4.65 M/UL (ref 4–5.4)
SODIUM SERPL-SCNC: 141 MMOL/L (ref 136–145)
WBC # BLD AUTO: 10.15 K/UL (ref 3.9–12.7)

## 2024-10-01 PROCEDURE — 94660 CPAP INITIATION&MGMT: CPT | Mod: HCNC

## 2024-10-01 PROCEDURE — 36415 COLL VENOUS BLD VENIPUNCTURE: CPT | Mod: HCNC

## 2024-10-01 PROCEDURE — 94761 N-INVAS EAR/PLS OXIMETRY MLT: CPT | Mod: HCNC

## 2024-10-01 PROCEDURE — 27000221 HC OXYGEN, UP TO 24 HOURS: Mod: HCNC

## 2024-10-01 PROCEDURE — 84100 ASSAY OF PHOSPHORUS: CPT | Mod: HCNC

## 2024-10-01 PROCEDURE — 99900035 HC TECH TIME PER 15 MIN (STAT): Mod: HCNC

## 2024-10-01 PROCEDURE — 25000003 PHARM REV CODE 250: Mod: HCNC

## 2024-10-01 PROCEDURE — 20600001 HC STEP DOWN PRIVATE ROOM: Mod: HCNC

## 2024-10-01 PROCEDURE — 25000003 PHARM REV CODE 250: Mod: HCNC | Performed by: INTERNAL MEDICINE

## 2024-10-01 PROCEDURE — 85025 COMPLETE CBC W/AUTO DIFF WBC: CPT | Mod: HCNC

## 2024-10-01 PROCEDURE — 63600175 PHARM REV CODE 636 W HCPCS: Mod: HCNC

## 2024-10-01 PROCEDURE — 25000242 PHARM REV CODE 250 ALT 637 W/ HCPCS: Mod: HCNC

## 2024-10-01 PROCEDURE — 80053 COMPREHEN METABOLIC PANEL: CPT | Mod: HCNC

## 2024-10-01 PROCEDURE — 94640 AIRWAY INHALATION TREATMENT: CPT | Mod: HCNC

## 2024-10-01 PROCEDURE — 83735 ASSAY OF MAGNESIUM: CPT | Mod: HCNC

## 2024-10-01 PROCEDURE — S4991 NICOTINE PATCH NONLEGEND: HCPCS | Mod: HCNC

## 2024-10-01 RX ORDER — GUAIFENESIN 100 MG/5ML
200 SOLUTION ORAL EVERY 4 HOURS PRN
Status: DISCONTINUED | OUTPATIENT
Start: 2024-10-01 | End: 2024-10-04 | Stop reason: HOSPADM

## 2024-10-01 RX ADMIN — NICOTINE 1 PATCH: 7 PATCH, EXTENDED RELEASE TRANSDERMAL at 08:10

## 2024-10-01 RX ADMIN — IPRATROPIUM BROMIDE AND ALBUTEROL SULFATE 3 ML: 2.5; .5 SOLUTION RESPIRATORY (INHALATION) at 08:10

## 2024-10-01 RX ADMIN — GUAIFENESIN 200 MG: 200 SOLUTION ORAL at 05:10

## 2024-10-01 RX ADMIN — AMOXICILLIN AND CLAVULANATE POTASSIUM 1 TABLET: 875; 125 TABLET, FILM COATED ORAL at 08:10

## 2024-10-01 RX ADMIN — MUPIROCIN: 20 OINTMENT TOPICAL at 09:10

## 2024-10-01 RX ADMIN — ATORVASTATIN CALCIUM 10 MG: 10 TABLET, FILM COATED ORAL at 08:10

## 2024-10-01 RX ADMIN — IPRATROPIUM BROMIDE AND ALBUTEROL SULFATE 3 ML: 2.5; .5 SOLUTION RESPIRATORY (INHALATION) at 03:10

## 2024-10-01 RX ADMIN — TRAZODONE HYDROCHLORIDE 25 MG: 50 TABLET ORAL at 09:10

## 2024-10-01 RX ADMIN — ENOXAPARIN SODIUM 40 MG: 40 INJECTION SUBCUTANEOUS at 04:10

## 2024-10-01 RX ADMIN — AMOXICILLIN AND CLAVULANATE POTASSIUM 1 TABLET: 875; 125 TABLET, FILM COATED ORAL at 09:10

## 2024-10-01 RX ADMIN — LOSARTAN POTASSIUM 100 MG: 50 TABLET, FILM COATED ORAL at 08:10

## 2024-10-01 RX ADMIN — PANTOPRAZOLE SODIUM 40 MG: 40 TABLET, DELAYED RELEASE ORAL at 08:10

## 2024-10-01 RX ADMIN — IPRATROPIUM BROMIDE AND ALBUTEROL SULFATE 3 ML: 2.5; .5 SOLUTION RESPIRATORY (INHALATION) at 11:10

## 2024-10-01 RX ADMIN — PREDNISONE 40 MG: 20 TABLET ORAL at 08:10

## 2024-10-01 NOTE — PLAN OF CARE
SUSANA made aware by MD team that patients supplies for her BiPAP does not fit her machine.     SUSANA called [facility] at [phone] and spoke with Tracey and explained the patients situation. Due to the information needed, SUSANA gave Tracey the patients daughters phone number. Tracey stated she will call patients daughter.    Goal: patient to receive correct equipment before discharge to prevent re-hospitalization.    KG Latif  [facility]   Ext: [phone]

## 2024-10-01 NOTE — SUBJECTIVE & OBJECTIVE
Interval History: Did not sleep well and seems a little more SOB today    Review of Systems  Objective:     Vital Signs (Most Recent):  Temp: 98.4 °F (36.9 °C) (10/01/24 0733)  Pulse: 95 (10/01/24 0808)  Resp: (!) 26 (10/01/24 0808)  BP: (!) 157/83 (10/01/24 0733)  SpO2: (!) 94 % (10/01/24 0808) Vital Signs (24h Range):  Temp:  [97.7 °F (36.5 °C)-98.7 °F (37.1 °C)] 98.4 °F (36.9 °C)  Pulse:  [] 95  Resp:  [14-45] 26  SpO2:  [91 %-98 %] 94 %  BP: (115-157)/(66-83) 157/83     Weight: 68 kg (149 lb 14.6 oz)  Body mass index is 26.56 kg/m².    Intake/Output Summary (Last 24 hours) at 10/1/2024 1005  Last data filed at 10/1/2024 0909  Gross per 24 hour   Intake 360 ml   Output 700 ml   Net -340 ml         Physical Exam  Vitals and nursing note reviewed.   Constitutional:       General: She is not in acute distress.  HENT:      Head: Normocephalic and atraumatic.      Right Ear: External ear normal.      Left Ear: External ear normal.   Cardiovascular:      Rate and Rhythm: Normal rate and regular rhythm.      Pulses: Normal pulses.      Heart sounds: Normal heart sounds.   Pulmonary:      Comments: Mildly tachypneic, decreased air movement. No audible crackles or rhonchi  Abdominal:      General: There is no distension.      Palpations: Abdomen is soft.      Tenderness: There is no abdominal tenderness.   Musculoskeletal:      Cervical back: Neck supple.      Right lower leg: No edema.      Left lower leg: No edema.   Neurological:      Mental Status: She is alert and oriented to person, place, and time.      Cranial Nerves: No cranial nerve deficit.      Comments: QUEENIE             Significant Labs: All pertinent labs within the past 24 hours have been reviewed.  BMP:   Recent Labs   Lab 10/01/24  0230   GLU 81      K 4.1      CO2 35*   BUN 9   CREATININE 0.6   CALCIUM 9.2   MG 1.7       Significant Imaging: I have reviewed all pertinent imaging results/findings within the past 24 hours.

## 2024-10-01 NOTE — PLAN OF CARE
Problem: Adult Inpatient Plan of Care  Goal: Plan of Care Review  Outcome: Progressing  Goal: Patient-Specific Goal (Individualized)  Outcome: Progressing  Goal: Absence of Hospital-Acquired Illness or Injury  Outcome: Progressing  Goal: Optimal Comfort and Wellbeing  Outcome: Progressing  Goal: Readiness for Transition of Care  Outcome: Progressing     Problem: Gas Exchange Impaired  Goal: Optimal Gas Exchange  Outcome: Progressing     Problem: Skin Injury Risk Increased  Goal: Skin Health and Integrity  Outcome: Progressing     Problem: Pain Acute  Goal: Optimal Pain Control and Function  Outcome: Progressing     Problem: Fall Injury Risk  Goal: Absence of Fall and Fall-Related Injury  Outcome: Progressing  Intervention: Promote Injury-Free Environment  Flowsheets (Taken 10/1/2024 1726)  Safety Promotion/Fall Prevention:   assistive device/personal item within reach   bed alarm set   Fall Risk reviewed with patient/family   Fall Risk signage in place   nonskid shoes/socks when out of bed   side rails raised x 3     POC in progress. Address questions and concerns. AAOX4. VVS. Remain Oxygen @3l Sat>90-92%.  Prn for cough. No problems voiced. Call light in reach. Frequent safety checks performed.

## 2024-10-01 NOTE — CARE UPDATE
I have reviewed the chart of Rochelle Espinoza and collaborated with Rey Ashley MD in the care of the patient who is hospitalized for the following:    Active Hospital Problems    Diagnosis    *Chronic obstructive pulmonary disease with acute exacerbation    Cavitary lesion of lung    Chronic hypoxemic respiratory failure    Chronic diastolic heart failure    Nicotine dependence, cigarettes, uncomplicated     Mixed hyperlipidemia    Cor pulmonale, chronic    Acute on chronic respiratory failure with hypoxia and hypercapnia    Essential hypertension          I have reviewed Rochelle Espinoza with the multidisciplinary team during discharge huddle.       Kassy Logan PA-C  Unit Based [facility]

## 2024-10-01 NOTE — PLAN OF CARE
PT alert and oriented x 4. Able to voice all wants and needs. All needs met.  She has remained free of falls and injuries. Safety eduction and plan of care reviewed with PT and she voiced understanding. Bed is low and locked with call light with in easy reach.  Bed alarm set and sound audible.

## 2024-10-01 NOTE — PROGRESS NOTES
[facility] - Telemetry [facility] Medicine  Progress Note    Patient Name: Rochelle Espinoza  MRN: [id number]  Patient Class: IP- Inpatient   Admission Date: 9/28/2024  Length of Stay: 3 days  Attending Physician: Rey Ashley MD  Primary Care Provider: Yosi Samuels Jr., MD        Subjective:     Principal Problem:Chronic obstructive pulmonary disease with acute exacerbation        HPI:  No notes on file    Overview/Hospital Course:  No notes on file    Interval History: Did not sleep well and seems a little more SOB today    Review of Systems  Objective:     Vital Signs (Most Recent):  Temp: 98.4 °F (36.9 °C) (10/01/24 0733)  Pulse: 95 (10/01/24 0808)  Resp: (!) 26 (10/01/24 0808)  BP: (!) 157/83 (10/01/24 0733)  SpO2: (!) 94 % (10/01/24 0808) Vital Signs (24h Range):  Temp:  [97.7 °F (36.5 °C)-98.7 °F (37.1 °C)] 98.4 °F (36.9 °C)  Pulse:  [] 95  Resp:  [14-45] 26  SpO2:  [91 %-98 %] 94 %  BP: (115-157)/(66-83) 157/83     Weight: 68 kg (149 lb 14.6 oz)  Body mass index is 26.56 kg/m².    Intake/Output Summary (Last 24 hours) at 10/1/2024 1005  Last data filed at 10/1/2024 0909  Gross per 24 hour   Intake 360 ml   Output 700 ml   Net -340 ml         Physical Exam  Vitals and nursing note reviewed.   Constitutional:       General: She is not in acute distress.  HENT:      Head: Normocephalic and atraumatic.      Right Ear: External ear normal.      Left Ear: External ear normal.   Cardiovascular:      Rate and Rhythm: Normal rate and regular rhythm.      Pulses: Normal pulses.      Heart sounds: Normal heart sounds.   Pulmonary:      Comments: Mildly tachypneic, decreased air movement. No audible crackles or rhonchi  Abdominal:      General: There is no distension.      Palpations: Abdomen is soft.      Tenderness: There is no abdominal tenderness.   Musculoskeletal:      Cervical back: Neck supple.      Right lower leg: No edema.      Left lower leg: No edema.   Neurological:      Mental Status: She is  alert and oriented to person, place, and time.      Cranial Nerves: No cranial nerve deficit.      Comments: QUEENIE             Significant Labs: All pertinent labs within the past 24 hours have been reviewed.  BMP:   Recent Labs   Lab 10/01/24  0230   GLU 81      K 4.1      CO2 35*   BUN 9   CREATININE 0.6   CALCIUM 9.2   MG 1.7       Significant Imaging: I have reviewed all pertinent imaging results/findings within the past 24 hours.    Assessment/Plan:      * Chronic obstructive pulmonary disease with acute exacerbation  - LIZ cavitary lesion and recent diagnosis of MAC on cultures   - Gold grade 4, group E COPD per last FEV1 of 29.5% and greater than 2 admissions for exacerbation   - VBG w/ 7.27/101.1/67/46.8 in the ED  - placed on bipap, but has transitioned to 3L NC      Plan  - continue albuterol-ipratropium nebs  - will hold steroids at this time given her recent diagnosis of MAC   - goal SpO2: 88-92%  - will begin treatment for MAC with ceftriaxone, 9/28 is day 1                    Chronic diastolic heart failure  - has hx of HFpEF though no evidence of volume overload this admission     Plan   - consider adding home lasix PRN  - monitor fluid status daily  - cardiac diet with 2g Na restriction   - record daily standing weights   - strict I/Os   - DVT ppx   - avoid CCB, NSAIDs, flecainide            VTE Risk Mitigation (From admission, onward)           Ordered     enoxaparin injection 40 mg  Daily         09/29/24 0922     Place sequential compression device  Until discontinued         09/28/24 1328     IP VTE HIGH RISK PATIENT  Once         09/28/24 1328                    Discharge Planning   ELIZABETH: 10/3/2024     Code Status: Full Code   Is the patient medically ready for discharge?:     Reason for patient still in hospital (select all that apply): Patient unstable  Discharge Plan A: Home with family                  Rey Ashley MD  Department of Hospital Medicine   [facility] - Telemetry  Stepdown

## 2024-10-02 PROBLEM — A31.0 MYCOBACTERIUM AVIUM COMPLEX: Status: ACTIVE | Noted: 2024-10-02

## 2024-10-02 LAB
ALBUMIN SERPL BCP-MCNC: 3.4 G/DL (ref 3.5–5.2)
ALP SERPL-CCNC: 44 U/L (ref 55–135)
ALT SERPL W/O P-5'-P-CCNC: 14 U/L (ref 10–44)
ANION GAP SERPL CALC-SCNC: 6 MMOL/L (ref 8–16)
AST SERPL-CCNC: 16 U/L (ref 10–40)
BASOPHILS # BLD AUTO: 0.02 K/UL (ref 0–0.2)
BASOPHILS NFR BLD: 0.2 % (ref 0–1.9)
BILIRUB SERPL-MCNC: 0.5 MG/DL (ref 0.1–1)
BUN SERPL-MCNC: 7 MG/DL (ref 8–23)
CALCIUM SERPL-MCNC: 9.6 MG/DL (ref 8.7–10.5)
CHLORIDE SERPL-SCNC: 97 MMOL/L (ref 95–110)
CO2 SERPL-SCNC: 37 MMOL/L (ref 23–29)
CREAT SERPL-MCNC: 0.7 MG/DL (ref 0.5–1.4)
DIFFERENTIAL METHOD BLD: ABNORMAL
EOSINOPHIL # BLD AUTO: 0 K/UL (ref 0–0.5)
EOSINOPHIL NFR BLD: 0.2 % (ref 0–8)
ERYTHROCYTE [DISTWIDTH] IN BLOOD BY AUTOMATED COUNT: 15.3 % (ref 11.5–14.5)
EST. GFR  (NO RACE VARIABLE): >60 ML/MIN/1.73 M^2
GLUCOSE SERPL-MCNC: 81 MG/DL (ref 70–110)
HCT VFR BLD AUTO: 44.7 % (ref 37–48.5)
HGB BLD-MCNC: 13.7 G/DL (ref 12–16)
IMM GRANULOCYTES # BLD AUTO: 0.02 K/UL (ref 0–0.04)
IMM GRANULOCYTES NFR BLD AUTO: 0.2 % (ref 0–0.5)
LYMPHOCYTES # BLD AUTO: 3.4 K/UL (ref 1–4.8)
LYMPHOCYTES NFR BLD: 34.8 % (ref 18–48)
MAGNESIUM SERPL-MCNC: 1.8 MG/DL (ref 1.6–2.6)
MCH RBC QN AUTO: 28.1 PG (ref 27–31)
MCHC RBC AUTO-ENTMCNC: 30.6 G/DL (ref 32–36)
MCV RBC AUTO: 92 FL (ref 82–98)
MONOCYTES # BLD AUTO: 0.8 K/UL (ref 0.3–1)
MONOCYTES NFR BLD: 8.2 % (ref 4–15)
NEUTROPHILS # BLD AUTO: 5.6 K/UL (ref 1.8–7.7)
NEUTROPHILS NFR BLD: 56.4 % (ref 38–73)
NRBC BLD-RTO: 0 /100 WBC
PHOSPHATE SERPL-MCNC: 4.1 MG/DL (ref 2.7–4.5)
PLATELET # BLD AUTO: 158 K/UL (ref 150–450)
PMV BLD AUTO: 12.8 FL (ref 9.2–12.9)
POTASSIUM SERPL-SCNC: 3.6 MMOL/L (ref 3.5–5.1)
PROT SERPL-MCNC: 6.5 G/DL (ref 6–8.4)
RBC # BLD AUTO: 4.88 M/UL (ref 4–5.4)
SODIUM SERPL-SCNC: 140 MMOL/L (ref 136–145)
WBC # BLD AUTO: 9.88 K/UL (ref 3.9–12.7)

## 2024-10-02 PROCEDURE — 94761 N-INVAS EAR/PLS OXIMETRY MLT: CPT | Mod: HCNC

## 2024-10-02 PROCEDURE — 83735 ASSAY OF MAGNESIUM: CPT | Mod: HCNC

## 2024-10-02 PROCEDURE — 85025 COMPLETE CBC W/AUTO DIFF WBC: CPT | Mod: HCNC

## 2024-10-02 PROCEDURE — 94640 AIRWAY INHALATION TREATMENT: CPT | Mod: HCNC

## 2024-10-02 PROCEDURE — 63600175 PHARM REV CODE 636 W HCPCS: Mod: HCNC

## 2024-10-02 PROCEDURE — 27100171 HC OXYGEN HIGH FLOW UP TO 24 HOURS: Mod: HCNC

## 2024-10-02 PROCEDURE — 36415 COLL VENOUS BLD VENIPUNCTURE: CPT | Mod: HCNC

## 2024-10-02 PROCEDURE — 25000003 PHARM REV CODE 250: Mod: HCNC

## 2024-10-02 PROCEDURE — 99900035 HC TECH TIME PER 15 MIN (STAT): Mod: HCNC

## 2024-10-02 PROCEDURE — 25000242 PHARM REV CODE 250 ALT 637 W/ HCPCS: Mod: HCNC

## 2024-10-02 PROCEDURE — 25000003 PHARM REV CODE 250: Mod: HCNC | Performed by: INTERNAL MEDICINE

## 2024-10-02 PROCEDURE — S4991 NICOTINE PATCH NONLEGEND: HCPCS | Mod: HCNC

## 2024-10-02 PROCEDURE — 94660 CPAP INITIATION&MGMT: CPT | Mod: HCNC

## 2024-10-02 PROCEDURE — 80053 COMPREHEN METABOLIC PANEL: CPT | Mod: HCNC

## 2024-10-02 PROCEDURE — 84100 ASSAY OF PHOSPHORUS: CPT | Mod: HCNC

## 2024-10-02 PROCEDURE — 20600001 HC STEP DOWN PRIVATE ROOM: Mod: HCNC

## 2024-10-02 RX ORDER — FUROSEMIDE 20 MG/1
20 TABLET ORAL DAILY
Status: DISCONTINUED | OUTPATIENT
Start: 2024-10-02 | End: 2024-10-04 | Stop reason: HOSPADM

## 2024-10-02 RX ADMIN — AMOXICILLIN AND CLAVULANATE POTASSIUM 1 TABLET: 875; 125 TABLET, FILM COATED ORAL at 09:10

## 2024-10-02 RX ADMIN — PREDNISONE 40 MG: 20 TABLET ORAL at 09:10

## 2024-10-02 RX ADMIN — MUPIROCIN: 20 OINTMENT TOPICAL at 09:10

## 2024-10-02 RX ADMIN — LOSARTAN POTASSIUM 100 MG: 50 TABLET, FILM COATED ORAL at 09:10

## 2024-10-02 RX ADMIN — FUROSEMIDE 20 MG: 20 TABLET ORAL at 09:10

## 2024-10-02 RX ADMIN — ATORVASTATIN CALCIUM 10 MG: 10 TABLET, FILM COATED ORAL at 09:10

## 2024-10-02 RX ADMIN — IPRATROPIUM BROMIDE AND ALBUTEROL SULFATE 3 ML: 2.5; .5 SOLUTION RESPIRATORY (INHALATION) at 03:10

## 2024-10-02 RX ADMIN — IPRATROPIUM BROMIDE AND ALBUTEROL SULFATE 3 ML: 2.5; .5 SOLUTION RESPIRATORY (INHALATION) at 08:10

## 2024-10-02 RX ADMIN — GUAIFENESIN 200 MG: 200 SOLUTION ORAL at 09:10

## 2024-10-02 RX ADMIN — ENOXAPARIN SODIUM 40 MG: 40 INJECTION SUBCUTANEOUS at 05:10

## 2024-10-02 RX ADMIN — PANTOPRAZOLE SODIUM 40 MG: 40 TABLET, DELAYED RELEASE ORAL at 09:10

## 2024-10-02 RX ADMIN — TRAZODONE HYDROCHLORIDE 25 MG: 50 TABLET ORAL at 09:10

## 2024-10-02 RX ADMIN — NICOTINE 1 PATCH: 7 PATCH, EXTENDED RELEASE TRANSDERMAL at 09:10

## 2024-10-02 NOTE — SUBJECTIVE & OBJECTIVE
Interval History: Doing well, would like to stay in the hospital one more day, is alone most of the day, near resp baseline. Lasix restarted    Review of Systems  Objective:     Vital Signs (Most Recent):  Temp: 98.3 °F (36.8 °C) (10/02/24 0731)  Pulse: 91 (10/02/24 0822)  Resp: (!) 22 (10/02/24 0822)  BP: 127/79 (10/02/24 0731)  SpO2: (!) 94 % (10/02/24 0822) Vital Signs (24h Range):  Temp:  [97.4 °F (36.3 °C)-99 °F (37.2 °C)] 98.3 °F (36.8 °C)  Pulse:  [75-91] 91  Resp:  [18-29] 22  SpO2:  [91 %-98 %] 94 %  BP: (121-144)/(72-84) 127/79     Weight: 68 kg (149 lb 14.6 oz)  Body mass index is 26.56 kg/m².    Intake/Output Summary (Last 24 hours) at 10/2/2024 0921  Last data filed at 10/2/2024 0911  Gross per 24 hour   Intake 840 ml   Output 800 ml   Net 40 ml         Physical Exam  Vitals and nursing note reviewed.   Constitutional:       General: She is not in acute distress.  HENT:      Head: Normocephalic and atraumatic.      Right Ear: External ear normal.      Left Ear: External ear normal.   Cardiovascular:      Rate and Rhythm: Normal rate and regular rhythm.      Pulses: Normal pulses.      Heart sounds: Normal heart sounds.   Pulmonary:      Comments: Mildly tachypneic, decreased air movement. No audible crackles or rhonchi  Abdominal:      General: There is no distension.      Palpations: Abdomen is soft.      Tenderness: There is no abdominal tenderness.   Musculoskeletal:      Cervical back: Neck supple.      Right lower leg: No edema.      Left lower leg: No edema.   Neurological:      Mental Status: She is alert and oriented to person, place, and time.      Cranial Nerves: No cranial nerve deficit.      Comments: MAEW             Significant Labs: All pertinent labs within the past 24 hours have been reviewed.  BMP:   Recent Labs   Lab 10/02/24  0717   GLU 81      K 3.6   CL 97   CO2 37*   BUN 7*   CREATININE 0.7   CALCIUM 9.6   MG 1.8       Significant Imaging: I have reviewed all pertinent  imaging results/findings within the past 24 hours.

## 2024-10-02 NOTE — PROGRESS NOTES
[facility] - Telemetry [facility] Medicine  Progress Note    Patient Name: Rochelle Espinoza  MRN: [id number]  Patient Class: IP- Inpatient   Admission Date: 9/28/2024  Length of Stay: 4 days  Attending Physician: Rey Ashley MD  Primary Care Provider: Yosi Samuels Jr., MD        Subjective:     Principal Problem:Chronic obstructive pulmonary disease with acute exacerbation        HPI:  No notes on file    Overview/Hospital Course:  No notes on file    Interval History: Doing well, would like to stay in the hospital one more day, is alone most of the day, near resp baseline. Lasix restarted    Review of Systems  Objective:     Vital Signs (Most Recent):  Temp: 98.3 °F (36.8 °C) (10/02/24 0731)  Pulse: 91 (10/02/24 0822)  Resp: (!) 22 (10/02/24 0822)  BP: 127/79 (10/02/24 0731)  SpO2: (!) 94 % (10/02/24 0822) Vital Signs (24h Range):  Temp:  [97.4 °F (36.3 °C)-99 °F (37.2 °C)] 98.3 °F (36.8 °C)  Pulse:  [75-91] 91  Resp:  [18-29] 22  SpO2:  [91 %-98 %] 94 %  BP: (121-144)/(72-84) 127/79     Weight: 68 kg (149 lb 14.6 oz)  Body mass index is 26.56 kg/m².    Intake/Output Summary (Last 24 hours) at 10/2/2024 0921  Last data filed at 10/2/2024 0911  Gross per 24 hour   Intake 840 ml   Output 800 ml   Net 40 ml         Physical Exam  Vitals and nursing note reviewed.   Constitutional:       General: She is not in acute distress.  HENT:      Head: Normocephalic and atraumatic.      Right Ear: External ear normal.      Left Ear: External ear normal.   Cardiovascular:      Rate and Rhythm: Normal rate and regular rhythm.      Pulses: Normal pulses.      Heart sounds: Normal heart sounds.   Pulmonary:      Comments: Mildly tachypneic, decreased air movement. No audible crackles or rhonchi  Abdominal:      General: There is no distension.      Palpations: Abdomen is soft.      Tenderness: There is no abdominal tenderness.   Musculoskeletal:      Cervical back: Neck supple.      Right lower leg: No edema.      Left  lower leg: No edema.   Neurological:      Mental Status: She is alert and oriented to person, place, and time.      Cranial Nerves: No cranial nerve deficit.      Comments: MAEW             Significant Labs: All pertinent labs within the past 24 hours have been reviewed.  BMP:   Recent Labs   Lab 10/02/24  0717   GLU 81      K 3.6   CL 97   CO2 37*   BUN 7*   CREATININE 0.7   CALCIUM 9.6   MG 1.8       Significant Imaging: I have reviewed all pertinent imaging results/findings within the past 24 hours.    Assessment/Plan:      * Chronic obstructive pulmonary disease with acute exacerbation  - LIZ cavitary lesion and recent diagnosis of MAC on cultures   - Gold grade 4, group E COPD per last FEV1 of 29.5% and greater than 2 admissions for exacerbation   - VBG w/ 7.27/101.1/67/46.8 in the ED  - placed on bipap, but has transitioned to 3L NC      Plan  - continue albuterol-ipratropium nebs  - will hold steroids at this time given her recent diagnosis of MAC   - goal SpO2: 88-92%  - will begin treatment for MAC with ceftriaxone, 9/28 is day 1                    Chronic diastolic heart failure  - has hx of HFpEF though no evidence of volume overload this admission     Plan   - consider adding home lasix PRN  - monitor fluid status daily  - cardiac diet with 2g Na restriction   - record daily standing weights   - strict I/Os   - DVT ppx   - avoid CCB, NSAIDs, flecainide            VTE Risk Mitigation (From admission, onward)           Ordered     enoxaparin injection 40 mg  Daily         09/29/24 0922     Place sequential compression device  Until discontinued         09/28/24 1328     IP VTE HIGH RISK PATIENT  Once         09/28/24 1328                    Discharge Planning   ELIZABETH: 10/3/2024     Code Status: Full Code   Is the patient medically ready for discharge?:     Reason for patient still in hospital (select all that apply): Patient unstable  Discharge Plan A: Home with family                  Rey Ashley,  MD  Department of Hospital Medicine   [facility] - Telemetry Stepdown

## 2024-10-02 NOTE — PLAN OF CARE
CM left message for [employer] regarding patient's home bipap supplies. CM to follow for call back.      Jennifer Baig RN     [phone]

## 2024-10-02 NOTE — HOSPITAL COURSE
Hx of COPD on 3L NC and CPAP at night at home who presented to the ED yesterday with acute on chronic hypoxic/hypercapneic respiratory failure. She was initially a bit confused and hypercapnic and was placed on BiPAP. She has been getting nebs, steroids, and antibiotics to treat superimposed bacterial infection. She was on BiPAP overnight and has been on 4L NC since then, satting above 90%. She is still hypercapnic but her mentation has significantly improved and she is not in respiratory distress. Plan to continue BiPAP at night, antibiotics, duonebs, and steroids.     10/02 Nestor be ready fro dc to [facility] tomorrow, lasix restarted. LIZ nodule with cavitation and cultures positive for MAC. -treatment to begin as out patient after f/u. Is on home BIpap  and 02.

## 2024-10-02 NOTE — PLAN OF CARE
Problem: Adult Inpatient Plan of Care  Goal: Plan of Care Review  Outcome: Progressing  Goal: Patient-Specific Goal (Individualized)  Outcome: Progressing  Goal: Absence of Hospital-Acquired Illness or Injury  Outcome: Progressing  Goal: Optimal Comfort and Wellbeing  Outcome: Progressing  Goal: Readiness for Transition of Care  Outcome: Progressing     Problem: Gas Exchange Impaired  Goal: Optimal Gas Exchange  Outcome: Progressing     Problem: Skin Injury Risk Increased  Goal: Skin Health and Integrity  Outcome: Progressing  Problem: Fall Injury Risk  Goal: Absence of Fall and Fall-Related Injury  Outcome: Progressing     Goal: Optimal Pain Control and Function  Outcome: Progressing     POC in progress. No acute changes. Remain Oxygen @4l. Up to BSC without difficulty. Free from falls or injuries. Bed in lowest position.

## 2024-10-02 NOTE — PLAN OF CARE
CM escalated to Leadership after not getting call back from [employer]. Supervisor Sophie reached out to [employer] administration and they are contacting the group that is covering patient's bipap, to bring correct supplies that fit her bipap at home. [employer] admin will update with progress.      Jennifer Baig RN     [phone]

## 2024-10-03 PROBLEM — R06.02 SOB (SHORTNESS OF BREATH): Status: ACTIVE | Noted: 2024-10-03

## 2024-10-03 LAB
ALBUMIN SERPL BCP-MCNC: 3.1 G/DL (ref 3.5–5.2)
ALP SERPL-CCNC: 46 U/L (ref 55–135)
ALT SERPL W/O P-5'-P-CCNC: 13 U/L (ref 10–44)
ANION GAP SERPL CALC-SCNC: 8 MMOL/L (ref 8–16)
AST SERPL-CCNC: 15 U/L (ref 10–40)
BASOPHILS # BLD AUTO: 0.01 K/UL (ref 0–0.2)
BASOPHILS NFR BLD: 0.1 % (ref 0–1.9)
BILIRUB SERPL-MCNC: 0.3 MG/DL (ref 0.1–1)
BUN SERPL-MCNC: 10 MG/DL (ref 8–23)
CALCIUM SERPL-MCNC: 9 MG/DL (ref 8.7–10.5)
CHLORIDE SERPL-SCNC: 98 MMOL/L (ref 95–110)
CO2 SERPL-SCNC: 34 MMOL/L (ref 23–29)
CREAT SERPL-MCNC: 0.6 MG/DL (ref 0.5–1.4)
DIFFERENTIAL METHOD BLD: ABNORMAL
EOSINOPHIL # BLD AUTO: 0 K/UL (ref 0–0.5)
EOSINOPHIL NFR BLD: 0.1 % (ref 0–8)
ERYTHROCYTE [DISTWIDTH] IN BLOOD BY AUTOMATED COUNT: 15.1 % (ref 11.5–14.5)
EST. GFR  (NO RACE VARIABLE): >60 ML/MIN/1.73 M^2
GLUCOSE SERPL-MCNC: 93 MG/DL (ref 70–110)
HCT VFR BLD AUTO: 43.2 % (ref 37–48.5)
HGB BLD-MCNC: 12.9 G/DL (ref 12–16)
IMM GRANULOCYTES # BLD AUTO: 0.02 K/UL (ref 0–0.04)
IMM GRANULOCYTES NFR BLD AUTO: 0.2 % (ref 0–0.5)
LYMPHOCYTES # BLD AUTO: 3 K/UL (ref 1–4.8)
LYMPHOCYTES NFR BLD: 27.1 % (ref 18–48)
MAGNESIUM SERPL-MCNC: 1.9 MG/DL (ref 1.6–2.6)
MCH RBC QN AUTO: 27.8 PG (ref 27–31)
MCHC RBC AUTO-ENTMCNC: 29.9 G/DL (ref 32–36)
MCV RBC AUTO: 93 FL (ref 82–98)
MONOCYTES # BLD AUTO: 0.9 K/UL (ref 0.3–1)
MONOCYTES NFR BLD: 8 % (ref 4–15)
NEUTROPHILS # BLD AUTO: 7.1 K/UL (ref 1.8–7.7)
NEUTROPHILS NFR BLD: 64.5 % (ref 38–73)
NRBC BLD-RTO: 0 /100 WBC
PHOSPHATE SERPL-MCNC: 4.3 MG/DL (ref 2.7–4.5)
PLATELET # BLD AUTO: 149 K/UL (ref 150–450)
PMV BLD AUTO: 12.5 FL (ref 9.2–12.9)
POTASSIUM SERPL-SCNC: 3.8 MMOL/L (ref 3.5–5.1)
PROT SERPL-MCNC: 6.3 G/DL (ref 6–8.4)
RBC # BLD AUTO: 4.64 M/UL (ref 4–5.4)
SODIUM SERPL-SCNC: 140 MMOL/L (ref 136–145)
WBC # BLD AUTO: 11.06 K/UL (ref 3.9–12.7)

## 2024-10-03 PROCEDURE — 99900035 HC TECH TIME PER 15 MIN (STAT): Mod: HCNC

## 2024-10-03 PROCEDURE — 36415 COLL VENOUS BLD VENIPUNCTURE: CPT | Mod: HCNC

## 2024-10-03 PROCEDURE — 84100 ASSAY OF PHOSPHORUS: CPT | Mod: HCNC

## 2024-10-03 PROCEDURE — 20600001 HC STEP DOWN PRIVATE ROOM: Mod: HCNC

## 2024-10-03 PROCEDURE — 94799 UNLISTED PULMONARY SVC/PX: CPT | Mod: HCNC

## 2024-10-03 PROCEDURE — 63600175 PHARM REV CODE 636 W HCPCS: Mod: HCNC

## 2024-10-03 PROCEDURE — S4991 NICOTINE PATCH NONLEGEND: HCPCS | Mod: HCNC

## 2024-10-03 PROCEDURE — 25000242 PHARM REV CODE 250 ALT 637 W/ HCPCS: Mod: HCNC

## 2024-10-03 PROCEDURE — 25000003 PHARM REV CODE 250: Mod: HCNC

## 2024-10-03 PROCEDURE — 27100171 HC OXYGEN HIGH FLOW UP TO 24 HOURS: Mod: HCNC

## 2024-10-03 PROCEDURE — 83735 ASSAY OF MAGNESIUM: CPT | Mod: HCNC

## 2024-10-03 PROCEDURE — 94660 CPAP INITIATION&MGMT: CPT | Mod: HCNC

## 2024-10-03 PROCEDURE — 85025 COMPLETE CBC W/AUTO DIFF WBC: CPT | Mod: HCNC

## 2024-10-03 PROCEDURE — 94640 AIRWAY INHALATION TREATMENT: CPT | Mod: HCNC

## 2024-10-03 PROCEDURE — 94761 N-INVAS EAR/PLS OXIMETRY MLT: CPT | Mod: HCNC

## 2024-10-03 PROCEDURE — 25000003 PHARM REV CODE 250: Mod: HCNC | Performed by: INTERNAL MEDICINE

## 2024-10-03 PROCEDURE — 80053 COMPREHEN METABOLIC PANEL: CPT | Mod: HCNC

## 2024-10-03 RX ORDER — AMOXICILLIN AND CLAVULANATE POTASSIUM 875; 125 MG/1; MG/1
1 TABLET, FILM COATED ORAL EVERY 12 HOURS
Qty: 14 TABLET | Refills: 0 | Status: SHIPPED | OUTPATIENT
Start: 2024-10-03 | End: 2024-10-10

## 2024-10-03 RX ORDER — TRAZODONE HYDROCHLORIDE 50 MG/1
25 TABLET ORAL NIGHTLY PRN
Qty: 30 TABLET | Refills: 3 | Status: SHIPPED | OUTPATIENT
Start: 2024-10-03 | End: 2024-12-02

## 2024-10-03 RX ORDER — GUAIFENESIN 100 MG/5ML
200 SOLUTION ORAL EVERY 4 HOURS PRN
Qty: 180 ML | Refills: 3 | Status: SHIPPED | OUTPATIENT
Start: 2024-10-03 | End: 2024-10-13

## 2024-10-03 RX ORDER — PREDNISONE 20 MG/1
40 TABLET ORAL DAILY
Qty: 14 TABLET | Refills: 0 | Status: SHIPPED | OUTPATIENT
Start: 2024-10-04 | End: 2024-10-11

## 2024-10-03 RX ADMIN — AMOXICILLIN AND CLAVULANATE POTASSIUM 1 TABLET: 875; 125 TABLET, FILM COATED ORAL at 08:10

## 2024-10-03 RX ADMIN — PANTOPRAZOLE SODIUM 40 MG: 40 TABLET, DELAYED RELEASE ORAL at 08:10

## 2024-10-03 RX ADMIN — ATORVASTATIN CALCIUM 10 MG: 10 TABLET, FILM COATED ORAL at 08:10

## 2024-10-03 RX ADMIN — GUAIFENESIN 200 MG: 200 SOLUTION ORAL at 09:10

## 2024-10-03 RX ADMIN — ENOXAPARIN SODIUM 40 MG: 40 INJECTION SUBCUTANEOUS at 06:10

## 2024-10-03 RX ADMIN — FUROSEMIDE 20 MG: 20 TABLET ORAL at 08:10

## 2024-10-03 RX ADMIN — IPRATROPIUM BROMIDE AND ALBUTEROL SULFATE 3 ML: 2.5; .5 SOLUTION RESPIRATORY (INHALATION) at 12:10

## 2024-10-03 RX ADMIN — GUAIFENESIN 200 MG: 200 SOLUTION ORAL at 08:10

## 2024-10-03 RX ADMIN — IPRATROPIUM BROMIDE AND ALBUTEROL SULFATE 3 ML: 2.5; .5 SOLUTION RESPIRATORY (INHALATION) at 08:10

## 2024-10-03 RX ADMIN — IPRATROPIUM BROMIDE AND ALBUTEROL SULFATE 3 ML: 2.5; .5 SOLUTION RESPIRATORY (INHALATION) at 09:10

## 2024-10-03 RX ADMIN — TRAZODONE HYDROCHLORIDE 25 MG: 50 TABLET ORAL at 09:10

## 2024-10-03 RX ADMIN — MUPIROCIN: 20 OINTMENT TOPICAL at 08:10

## 2024-10-03 RX ADMIN — IPRATROPIUM BROMIDE AND ALBUTEROL SULFATE 3 ML: 2.5; .5 SOLUTION RESPIRATORY (INHALATION) at 03:10

## 2024-10-03 RX ADMIN — NICOTINE 1 PATCH: 7 PATCH, EXTENDED RELEASE TRANSDERMAL at 08:10

## 2024-10-03 RX ADMIN — MUPIROCIN: 20 OINTMENT TOPICAL at 09:10

## 2024-10-03 RX ADMIN — PREDNISONE 40 MG: 20 TABLET ORAL at 08:10

## 2024-10-03 NOTE — ASSESSMENT & PLAN NOTE
- LIZ cavitary lesion and recent diagnosis of MAC on cultures   - Gold grade 4, group E COPD per last FEV1 of 29.5% and greater than 2 admissions for exacerbation   - VBG w/ 7.27/101.1/67/46.8 in the ED  - placed on bipap, but has transitioned to 3L NC      Plan  - continue albuterol-ipratropium nebs  - prednisone x 7 days.   - goal SpO2: 88-92%  - will begin treatment for MAC with ceftriaxone, 9/28 is day 1

## 2024-10-03 NOTE — PLAN OF CARE
Problem: Adult Inpatient Plan of Care  Goal: Plan of Care Review  Outcome: Progressing  Goal: Patient-Specific Goal (Individualized)  Outcome: Progressing  Goal: Absence of Hospital-Acquired Illness or Injury  Outcome: Progressing  Goal: Optimal Comfort and Wellbeing  Outcome: Progressing  Goal: Readiness for Transition of Care  Outcome: Progressing     Problem: Gas Exchange Impaired  Goal: Optimal Gas Exchange  Outcome: Progressing     Problem: Skin Injury Risk Increased  Goal: Skin Health and Integrity  Outcome: Progressing     Problem: Pain Acute  Goal: Optimal Pain Control and Function  Outcome: Progressing     Problem: Fall Injury Risk  Goal: Absence of Fall and Fall-Related Injury  Outcome: Progressing  Intervention: Identify and Manage Contributors  Flowsheets (Taken 10/3/2024 0258)  Self-Care Promotion:   independence encouraged   BADL personal objects within reach   BADL personal routines maintained     POC in progress. No problems. Medications delivered from bedside pharmacy. Remain oxygen @3l. Discharge tomorrow. Frequent safety checks.

## 2024-10-03 NOTE — ASSESSMENT & PLAN NOTE
Outpatient followup for known MAC  Completed planned 7 day course of ABX for possible overlay bacterial infection.

## 2024-10-03 NOTE — PLAN OF CARE
[facility] - Telemetry Stepdown      HOME HEALTH ORDERS  FACE TO FACE ENCOUNTER    Patient Name: Rochelle Espinoza  YOB: 1955    PCP: Yosi Samuels Jr., MD   PCP Address: [address] [facility] [address]  PCP Phone Number: [phone]  PCP Fax: [phone]    Encounter Date: 9/28/24    Admit to Home Health    Diagnoses:  Active Hospital Problems    Diagnosis  POA    *Chronic obstructive pulmonary disease with acute exacerbation [J44.1]  Yes    Mycobacterium avium complex [A31.0]  Yes    Cavitary lesion of lung [J98.4]  Yes    Chronic hypoxemic respiratory failure [J96.11]  Yes    Chronic diastolic heart failure [I50.32]  Yes    Nicotine dependence, cigarettes, uncomplicated  [F17.210]  Yes     Chronic    Mixed hyperlipidemia [E78.2]  Yes    Cor pulmonale, chronic [I27.81]  Yes     Chronic    Acute on chronic respiratory failure with hypoxia and hypercapnia [J96.21, J96.22]  Yes    Essential hypertension [I10]  Yes      Resolved Hospital Problems   No resolved problems to display.       Follow Up Appointments:  Future Appointments   Date Time Provider Department Center   10/7/2024  8:40 AM Yosi Samuels Jr., MD [facility] IM [facility] PCW   1/3/2025 11:30 AM PULMONARY FUNCTION [facility] PULMLAB [facility]   1/3/2025 12:40 PM SIX, MINUTE WALK [facility] PUL WLK [facility]   1/3/2025  1:00 PM Juan Miguel Mao MD [facility] PULMSVC [facility]       Allergies:  Review of patient's allergies indicates:   Allergen Reactions    Doxycycline Other (See Comments)     Acid reflux    Orange juice      Swelling in pt tongue      8/23/23 - pt stated she had in hosp and it didn't effect her.       Medications: Review discharge medications with patient and family and provide education.    Current Facility-Administered Medications   Medication Dose Route Frequency Provider Last Rate Last Admin    acetaminophen tablet 650 mg  650 mg Oral Q4H PRN Nilson Chahal MD        albuterol-ipratropium 2.5 mg-0.5 mg/3 mL nebulizer solution 3 mL  3  mL Nebulization Q4H WAKE Nilson Chahal MD   3 mL at 10/03/24 0902    amoxicillin-clavulanate 875-125mg per tablet 1 tablet  1 tablet Oral Q12H Nilson Chahal MD   1 tablet at 10/03/24 0825    atorvastatin tablet 10 mg  10 mg Oral Daily Figueroa Daniels DO   10 mg at 10/03/24 0824    benzonatate capsule 100 mg  100 mg Oral Once Nilson Chahal MD        enoxaparin injection 40 mg  40 mg Subcutaneous Daily Nilson Chahal MD   40 mg at 10/02/24 1753    furosemide tablet 20 mg  20 mg Oral Daily Rey Ashley MD   20 mg at 10/03/24 0824    guaiFENesin 100 mg/5 ml syrup 200 mg  200 mg Oral Q4H PRN Rey Ashley MD   200 mg at 10/03/24 0825    losartan tablet 100 mg  100 mg Oral Daily Figueroa Daniels DO   100 mg at 10/02/24 0909    mupirocin 2 % ointment   Nasal BID Maine Cespedes MD   Given at 10/03/24 0826    nicotine 7 mg/24 hr 1 patch  1 patch Transdermal Daily Nilson Chahal MD   1 patch at 10/03/24 0825    pantoprazole EC tablet 40 mg  40 mg Oral Daily Figueroa Daniels DO   40 mg at 10/03/24 0824    predniSONE tablet 40 mg  40 mg Oral Daily Nilson Chahal MD   40 mg at 10/03/24 0824    sodium chloride 0.9% flush 10 mL  10 mL Intravenous PRN Nilson Chahal MD        trazodone split tablet 25 mg  25 mg Oral Nightly PRN Rey Ashley MD   25 mg at 10/02/24 2148        Medication List        START taking these medications      amoxicillin-clavulanate 875-125mg 875-125 mg per tablet  Commonly known as: AUGMENTIN  Take 1 tablet by mouth every 12 (twelve) hours. for 7 days     predniSONE 20 MG tablet  Commonly known as: DELTASONE  Take 2 tablets (40 mg total) by mouth once daily. for 7 days  Start taking on: October 4, 2024            CONTINUE taking these medications      albuterol 90 mcg/actuation inhaler  Commonly known as: PROVENTIL/VENTOLIN HFA  Inhale 2 puffs into the lungs every 4 (four) hours as needed for Wheezing or Shortness of Breath. Rescue     albuterol-ipratropium 2.5 mg-0.5  mg/3 mL nebulizer solution  Commonly known as: DUO-NEB  Take 3 mLs by nebulization every 4 (four) hours as needed for Wheezing or Shortness of Breath. Rescue     ascorbic acid (vitamin C) 500 MG tablet  Commonly known as: VITAMIN C  Take 500 mg by mouth once daily.     atorvastatin 10 MG tablet  Commonly known as: LIPITOR  Take 1 tablet (10 mg total) by mouth once daily.     benzonatate 100 MG capsule  Commonly known as: TESSALON PERLES  Take 2 capsules (200 mg total) by mouth 3 (three) times daily as needed for Cough.     buPROPion 150 MG TBSR 12 hr tablet  Commonly known as: WELLBUTRIN SR  Take 1 tablet (150 mg total) by mouth 2 (two) times daily.     celecoxib 200 MG capsule  Commonly known as: CeleBREX  TAKE 1 CAPSULE ONE TIME DAILY     cetirizine 10 MG tablet  Commonly known as: ZYRTEC  Take 1 tablet (10 mg total) by mouth once daily.     cholecalciferol (vitamin D3) 25 mcg (1,000 unit) capsule  Commonly known as: VITAMIN D3  Take 1 capsule (1,000 Units total) by mouth once daily.     fluticasone propionate 50 mcg/actuation nasal spray  Commonly known as: FLONASE  SHAKE LIQUID AND USE 1 SPRAY (50MCG) IN EACH NOSTRIL TWICE DAILY     furosemide 20 MG tablet  Commonly known as: LASIX  TAKE 1 TABLET ONE TIME DAILY, INCREASING TO 2 TABLETS FOR LEG SWELLING OR WEIGHT GAIN AS DIRECTED     gabapentin 300 MG capsule  Commonly known as: NEURONTIN  TAKE 1 CAPSULE THREE TIMES DAILY     glycerin adult suppository  Place 1 suppository rectally as needed for Constipation.     losartan 100 MG tablet  Commonly known as: COZAAR  Take 1 tablet (100 mg total) by mouth once daily.     mucus clearing device  Commonly known as: ACAPELLA, FLUTTER  by Misc.(Non-Drug; Combo Route) route 4 (four) times daily.     nicotine 14 mg/24 hr  Commonly known as: NICODERM CQ  Place 1 patch onto the skin once daily.     pantoprazole 40 MG tablet  Commonly known as: PROTONIX  Take 1 tablet (40 mg total) by mouth once daily.     TRELEGY ELLIPTA  200-62.5-25 mcg inhaler  Generic drug: fluticasone-umeclidin-vilanter  Inhale 1 puff into the lungs once daily.            STOP taking these medications      loratadine 10 mg tablet  Commonly known as: CLARITIN     magnesium 250 mg Tab                I have seen and examined this patient within the last 30 days. My clinical findings that support the need for the home health skilled services and home bound status are the following:no   Weakness/numbness causing balance and gait disturbance due to COPD Exacerbation making it taxing to leave home.     Diet:   cardiac diet    Labs:  none    Referrals/ Consults  Physical Therapy to evaluate and treat. Evaluate for home safety and equipment needs; Establish/upgrade home exercise program. Perform / instruct on therapeutic exercises, gait training, transfer training, and Range of Motion.  Occupational Therapy to evaluate and treat. Evaluate home environment for safety and equipment needs. Perform/Instruct on transfers, ADL training, ROM, and therapeutic exercises.   to evaluate for community resources/long-range planning.  Aide to provide assistance with personal care, ADLs, and vital signs.    Activities:   activity as tolerated    Nursing:   Agency to admit patient within 24 hours of hospital discharge unless specified on physician order or at patient request    SN to complete comprehensive assessment including routine vital signs. Instruct on disease process and s/s of complications to report to MD. Review/verify medication list sent home with the patient at time of discharge  and instruct patient/caregiver as needed. Frequency may be adjusted depending on start of care date.     Skilled nurse to perform up to 3 visits PRN for symptoms related to diagnosis    Notify MD if SBP > 160 or < 90; DBP > 90 or < 50; HR > 120 or < 50; Temp > 101; O2 < 88%; Other:       Ok to schedule additional visits based on staff availability and patient request on consecutive  days within the home health episode.    When multiple disciplines ordered:    Start of Care occurs on Sunday - Wednesday schedule remaining discipline evaluations as ordered on separate consecutive days following the start of care.    Thursday SOC -schedule subsequent evaluations Friday and Monday the following week.     Friday - Saturday SOC - schedule subsequent discipline evaluations on consecutive days starting Monday of the following week.    For all post-discharge communication and subsequent orders please contact patient's primary care physician. If unable to reach primary care physician or do not receive response within 30 minutes, please contact [phone] for clinical staff order clarification    Miscellaneous   Routine Skin for Bedridden Patients: Instruct patient/caregiver to apply moisture barrier cream to all skin folds and wet areas in perineal area daily and after baths and all bowel movements.  Home Oxygen:  Oxygen at 4 L/min nasal canula to be used:  Continuously., Assess oxygen saturation via pulse oximeter as needed for increase in SOB., Notify physician if oxygen saturation less than 88%, and Consult respiratory therapy for instruction on home oxygen use    BIPA QHS and naps  Inspiratory 10  Expiratory 5      Home Health Aide:  Nursing Three times weekly, Physical Therapy Three times weekly, Occupational Therapy Three times weekly, Medical Social Work Three times weekly, Respiratory Therapy Three times weekly, and Home Health Aide Three times weekly    Wound Care Orders  no    I certify that this patient is confined to her home and needs intermittent skilled nursing care, physical therapy, and occupational therapy.

## 2024-10-03 NOTE — SUBJECTIVE & OBJECTIVE
Interval History:  10/3: patient feels much better today. Medically ready for discharge.     Review of Systems   Constitutional:  Negative for fever.   Respiratory:  Positive for cough. Negative for shortness of breath.    Cardiovascular:  Negative for chest pain.   Gastrointestinal:  Negative for abdominal pain, nausea and vomiting.     Objective:     Vital Signs (Most Recent):  Temp: 99.4 °F (37.4 °C) (10/03/24 1207)  Pulse: 93 (10/03/24 1519)  Resp: (!) 24 (10/03/24 1519)  BP: 122/78 (10/03/24 1207)  SpO2: (!) 91 % (10/03/24 1516) Vital Signs (24h Range):  Temp:  [98 °F (36.7 °C)-99.4 °F (37.4 °C)] 99.4 °F (37.4 °C)  Pulse:  [] 93  Resp:  [17-25] 24  SpO2:  [90 %-98 %] 91 %  BP: (111-135)/(65-83) 122/78     Weight: 68 kg (149 lb 14.6 oz)  Body mass index is 26.56 kg/m².    Intake/Output Summary (Last 24 hours) at 10/3/2024 1557  Last data filed at 10/2/2024 1753  Gross per 24 hour   Intake 360 ml   Output --   Net 360 ml         Physical Exam  Vitals and nursing note reviewed.   Constitutional:       General: She is not in acute distress.  HENT:      Head: Normocephalic and atraumatic.      Right Ear: External ear normal.      Left Ear: External ear normal.   Cardiovascular:      Rate and Rhythm: Normal rate and regular rhythm.      Pulses: Normal pulses.      Heart sounds: Normal heart sounds.   Pulmonary:      Comments: Mildly tachypneic, decreased air movement. No audible crackles or rhonchi  Abdominal:      General: There is no distension.      Palpations: Abdomen is soft.      Tenderness: There is no abdominal tenderness.   Musculoskeletal:      Cervical back: Neck supple.      Right lower leg: No edema.      Left lower leg: No edema.   Neurological:      Mental Status: She is alert and oriented to person, place, and time.      Cranial Nerves: No cranial nerve deficit.      Comments: MAEW             Significant Labs: All pertinent labs within the past 24 hours have been reviewed.    Significant  Imaging: I have reviewed all pertinent imaging results/findings within the past 24 hours.

## 2024-10-03 NOTE — ASSESSMENT & PLAN NOTE
- has hx of HFpEF though no evidence of volume overload this admission   appears euvolemic  Continue to monitor.

## 2024-10-03 NOTE — PROGRESS NOTES
[facility] - Telemetry [facility]  Progress Note    Patient Name: Rochelle Espinoza  MRN: [id number]  Patient Class: IP- Inpatient   Admission Date: 9/28/2024  Length of Stay: 5 days  Attending Physician: Tiffanie Marrero MD  Primary Care Provider: Yosi Samuels Jr., MD        Subjective:     Principal Problem:Chronic obstructive pulmonary disease with acute exacerbation        HPI:  No notes on file    Overview/Hospital Course:  Hx of COPD on 3L NC and CPAP at night at home who presented to the ED yesterday with acute on chronic hypoxic/hypercapneic respiratory failure. She was initially a bit confused and hypercapnic and was placed on BiPAP. She has been getting nebs, steroids, and antibiotics to treat superimposed bacterial infection. She was on BiPAP overnight and has been on 4L NC since then, satting above 90%. She is still hypercapnic but her mentation has significantly improved and she is not in respiratory distress. Plan to continue BiPAP at night, antibiotics, duonebs, and steroids.     10/02 Shoud be ready fro dc to [facility] tomorrow, lasix restarted. LIZ nodule with cavitation and cultures positive for MAC. -treatment to begin as out patient after f/u. Is on home BIpap  and 02.     Interval History:  10/3: patient feels much better today. Medically ready for discharge.     Review of Systems   Constitutional:  Negative for fever.   Respiratory:  Positive for cough. Negative for shortness of breath.    Cardiovascular:  Negative for chest pain.   Gastrointestinal:  Negative for abdominal pain, nausea and vomiting.     Objective:     Vital Signs (Most Recent):  Temp: 99.4 °F (37.4 °C) (10/03/24 1207)  Pulse: 93 (10/03/24 1519)  Resp: (!) 24 (10/03/24 1519)  BP: 122/78 (10/03/24 1207)  SpO2: (!) 91 % (10/03/24 1516) Vital Signs (24h Range):  Temp:  [98 °F (36.7 °C)-99.4 °F (37.4 °C)] 99.4 °F (37.4 °C)  Pulse:  [] 93  Resp:  [17-25] 24  SpO2:  [90 %-98 %] 91 %  BP: (111-135)/(65-83) 122/78      Weight: 68 kg (149 lb 14.6 oz)  Body mass index is 26.56 kg/m².    Intake/Output Summary (Last 24 hours) at 10/3/2024 1557  Last data filed at 10/2/2024 1753  Gross per 24 hour   Intake 360 ml   Output --   Net 360 ml         Physical Exam  Vitals and nursing note reviewed.   Constitutional:       General: She is not in acute distress.  HENT:      Head: Normocephalic and atraumatic.      Right Ear: External ear normal.      Left Ear: External ear normal.   Cardiovascular:      Rate and Rhythm: Normal rate and regular rhythm.      Pulses: Normal pulses.      Heart sounds: Normal heart sounds.   Pulmonary:      Comments: Mildly tachypneic, decreased air movement. No audible crackles or rhonchi  Abdominal:      General: There is no distension.      Palpations: Abdomen is soft.      Tenderness: There is no abdominal tenderness.   Musculoskeletal:      Cervical back: Neck supple.      Right lower leg: No edema.      Left lower leg: No edema.   Neurological:      Mental Status: She is alert and oriented to person, place, and time.      Cranial Nerves: No cranial nerve deficit.      Comments: QUEENIE             Significant Labs: All pertinent labs within the past 24 hours have been reviewed.    Significant Imaging: I have reviewed all pertinent imaging results/findings within the past 24 hours.    Assessment/Plan:      * Chronic obstructive pulmonary disease with acute exacerbation  - LIZ cavitary lesion and recent diagnosis of MAC on cultures   - Gold grade 4, group E COPD per last FEV1 of 29.5% and greater than 2 admissions for exacerbation   - VBG w/ 7.27/101.1/67/46.8 in the ED  - placed on bipap, but has transitioned to 3L NC      Plan  - continue albuterol-ipratropium nebs  - prednisone x 7 days.   - goal SpO2: 88-92%  - will begin treatment for MAC with ceftriaxone, 9/28 is day 1                    Chronic hypoxemic respiratory failure  At baseline oxygen needs  Continue medical management for maintenance of PERLA  and COPD    Cavitary lesion of lung  Outpatient followup for known MAC  Completed planned 7 day course of ABX for possible overlay bacterial infection.       Chronic diastolic heart failure  - has hx of HFpEF though no evidence of volume overload this admission   appears euvolemic  Continue to monitor.     Cor pulmonale, chronic        Acute on chronic respiratory failure with hypoxia and hypercapnia  Secondary to COPD and PERLA      VTE Risk Mitigation (From admission, onward)           Ordered     enoxaparin injection 40 mg  Daily         09/29/24 0922     Place sequential compression device  Until discontinued         09/28/24 1328     IP VTE HIGH RISK PATIENT  Once         09/28/24 1328                    Discharge Planning   ELIZABETH: 10/3/2024     Code Status: Full Code   Is the patient medically ready for discharge?:     Reason for patient still in hospital (select all that apply): Patient trending condition  Discharge Plan A: Home with family                  Tiffanie Marrero MD  Department of Hospital Medicine   [facility] - Telemetry Stepdown

## 2024-10-03 NOTE — PLAN OF CARE
Patient was to discharge today home.Patient's discharge was postponed till tomorrow due patient not being discharged in time to meet [employer] respiratory therapist at home to reset up patient's home bipap. Patient needs the bipap at night , if not she will wind up back in the hospital.      Jennifer Baig RN     [phone]

## 2024-10-04 VITALS
DIASTOLIC BLOOD PRESSURE: 60 MMHG | RESPIRATION RATE: 31 BRPM | HEIGHT: 63 IN | SYSTOLIC BLOOD PRESSURE: 112 MMHG | TEMPERATURE: 100 F | BODY MASS INDEX: 26.57 KG/M2 | HEART RATE: 82 BPM | OXYGEN SATURATION: 97 % | WEIGHT: 149.94 LBS

## 2024-10-04 LAB
ALBUMIN SERPL BCP-MCNC: 3.1 G/DL (ref 3.5–5.2)
ALP SERPL-CCNC: 44 U/L (ref 55–135)
ALT SERPL W/O P-5'-P-CCNC: 13 U/L (ref 10–44)
ANION GAP SERPL CALC-SCNC: 7 MMOL/L (ref 8–16)
AST SERPL-CCNC: 13 U/L (ref 10–40)
BASOPHILS # BLD AUTO: 0.01 K/UL (ref 0–0.2)
BASOPHILS NFR BLD: 0.1 % (ref 0–1.9)
BILIRUB SERPL-MCNC: 0.4 MG/DL (ref 0.1–1)
BUN SERPL-MCNC: 10 MG/DL (ref 8–23)
CALCIUM SERPL-MCNC: 9.1 MG/DL (ref 8.7–10.5)
CHLORIDE SERPL-SCNC: 99 MMOL/L (ref 95–110)
CO2 SERPL-SCNC: 35 MMOL/L (ref 23–29)
CREAT SERPL-MCNC: 0.7 MG/DL (ref 0.5–1.4)
DIFFERENTIAL METHOD BLD: ABNORMAL
EOSINOPHIL # BLD AUTO: 0 K/UL (ref 0–0.5)
EOSINOPHIL NFR BLD: 0.2 % (ref 0–8)
ERYTHROCYTE [DISTWIDTH] IN BLOOD BY AUTOMATED COUNT: 15.1 % (ref 11.5–14.5)
EST. GFR  (NO RACE VARIABLE): >60 ML/MIN/1.73 M^2
GLUCOSE SERPL-MCNC: 86 MG/DL (ref 70–110)
HCT VFR BLD AUTO: 42.3 % (ref 37–48.5)
HGB BLD-MCNC: 12.5 G/DL (ref 12–16)
IMM GRANULOCYTES # BLD AUTO: 0.04 K/UL (ref 0–0.04)
IMM GRANULOCYTES NFR BLD AUTO: 0.3 % (ref 0–0.5)
LYMPHOCYTES # BLD AUTO: 3.9 K/UL (ref 1–4.8)
LYMPHOCYTES NFR BLD: 32.2 % (ref 18–48)
MAGNESIUM SERPL-MCNC: 1.8 MG/DL (ref 1.6–2.6)
MCH RBC QN AUTO: 27.7 PG (ref 27–31)
MCHC RBC AUTO-ENTMCNC: 29.6 G/DL (ref 32–36)
MCV RBC AUTO: 94 FL (ref 82–98)
MONOCYTES # BLD AUTO: 0.8 K/UL (ref 0.3–1)
MONOCYTES NFR BLD: 6.4 % (ref 4–15)
NEUTROPHILS # BLD AUTO: 7.3 K/UL (ref 1.8–7.7)
NEUTROPHILS NFR BLD: 60.8 % (ref 38–73)
NRBC BLD-RTO: 0 /100 WBC
PHOSPHATE SERPL-MCNC: 4 MG/DL (ref 2.7–4.5)
PLATELET # BLD AUTO: 161 K/UL (ref 150–450)
PMV BLD AUTO: 12.2 FL (ref 9.2–12.9)
POTASSIUM SERPL-SCNC: 3.6 MMOL/L (ref 3.5–5.1)
PROT SERPL-MCNC: 6.2 G/DL (ref 6–8.4)
RBC # BLD AUTO: 4.52 M/UL (ref 4–5.4)
SODIUM SERPL-SCNC: 141 MMOL/L (ref 136–145)
WBC # BLD AUTO: 12.02 K/UL (ref 3.9–12.7)

## 2024-10-04 PROCEDURE — 36415 COLL VENOUS BLD VENIPUNCTURE: CPT | Mod: HCNC

## 2024-10-04 PROCEDURE — 25000003 PHARM REV CODE 250: Mod: HCNC

## 2024-10-04 PROCEDURE — 80053 COMPREHEN METABOLIC PANEL: CPT | Mod: HCNC

## 2024-10-04 PROCEDURE — 25000003 PHARM REV CODE 250: Mod: HCNC | Performed by: INTERNAL MEDICINE

## 2024-10-04 PROCEDURE — S4991 NICOTINE PATCH NONLEGEND: HCPCS | Mod: HCNC

## 2024-10-04 PROCEDURE — 27100171 HC OXYGEN HIGH FLOW UP TO 24 HOURS: Mod: HCNC

## 2024-10-04 PROCEDURE — 94660 CPAP INITIATION&MGMT: CPT | Mod: HCNC

## 2024-10-04 PROCEDURE — 83735 ASSAY OF MAGNESIUM: CPT | Mod: HCNC

## 2024-10-04 PROCEDURE — 99900035 HC TECH TIME PER 15 MIN (STAT): Mod: HCNC

## 2024-10-04 PROCEDURE — 25000242 PHARM REV CODE 250 ALT 637 W/ HCPCS: Mod: HCNC

## 2024-10-04 PROCEDURE — 63600175 PHARM REV CODE 636 W HCPCS: Mod: HCNC

## 2024-10-04 PROCEDURE — 94761 N-INVAS EAR/PLS OXIMETRY MLT: CPT | Mod: HCNC

## 2024-10-04 PROCEDURE — 84100 ASSAY OF PHOSPHORUS: CPT | Mod: HCNC

## 2024-10-04 PROCEDURE — 94640 AIRWAY INHALATION TREATMENT: CPT | Mod: HCNC

## 2024-10-04 PROCEDURE — 85025 COMPLETE CBC W/AUTO DIFF WBC: CPT | Mod: HCNC

## 2024-10-04 RX ADMIN — FUROSEMIDE 20 MG: 20 TABLET ORAL at 09:10

## 2024-10-04 RX ADMIN — PREDNISONE 40 MG: 20 TABLET ORAL at 09:10

## 2024-10-04 RX ADMIN — IPRATROPIUM BROMIDE AND ALBUTEROL SULFATE 3 ML: 2.5; .5 SOLUTION RESPIRATORY (INHALATION) at 12:10

## 2024-10-04 RX ADMIN — NICOTINE 1 PATCH: 7 PATCH, EXTENDED RELEASE TRANSDERMAL at 09:10

## 2024-10-04 RX ADMIN — LOSARTAN POTASSIUM 100 MG: 50 TABLET, FILM COATED ORAL at 09:10

## 2024-10-04 RX ADMIN — MUPIROCIN: 20 OINTMENT TOPICAL at 09:10

## 2024-10-04 RX ADMIN — AMOXICILLIN AND CLAVULANATE POTASSIUM 1 TABLET: 875; 125 TABLET, FILM COATED ORAL at 09:10

## 2024-10-04 RX ADMIN — PANTOPRAZOLE SODIUM 40 MG: 40 TABLET, DELAYED RELEASE ORAL at 09:10

## 2024-10-04 RX ADMIN — GUAIFENESIN 200 MG: 200 SOLUTION ORAL at 09:10

## 2024-10-04 RX ADMIN — ATORVASTATIN CALCIUM 10 MG: 10 TABLET, FILM COATED ORAL at 09:10

## 2024-10-04 RX ADMIN — IPRATROPIUM BROMIDE AND ALBUTEROL SULFATE 3 ML: 2.5; .5 SOLUTION RESPIRATORY (INHALATION) at 09:10

## 2024-10-04 RX ADMIN — ACETAMINOPHEN 650 MG: 325 TABLET ORAL at 11:10

## 2024-10-04 NOTE — PLAN OF CARE
[facility] set up [facility] in [place] Lic# [id number].    Jennifer Baig RN     [phone]

## 2024-10-04 NOTE — PLAN OF CARE
Plan of care for BIPAP    Settings:   Inspiratory of 10  Expiratory of 5      Oxygen continuous at 3-4L NC    Tiffanie Marrero

## 2024-10-07 ENCOUNTER — TELEPHONE (OUTPATIENT)
Dept: INTERNAL MEDICINE | Facility: CLINIC | Age: 69
End: 2024-10-07
Payer: MEDICARE

## 2024-10-07 NOTE — PLAN OF CARE
[facility] - Telemetry Stepdown  Discharge Final Note    Primary Care Provider: Yosi Samuels Jr., MD    Expected Discharge Date: 10/4/2024    Patient discharged home with no needs. Lyft ride was provided.    Future Appointments   Date Time Provider Department Center   10/15/2024 11:30 AM Rey Keller NP [facility] IM [facility] PCW   1/3/2025 11:30 AM PULMONARY FUNCTION [facility] PULMLAB [facility]   1/3/2025 12:40 PM SIX, MINUTE WALK [facility] PUL WLK [facility]   1/3/2025  1:00 PM Juan Miguel Mao MD [facility] PULMSVC [facility]         Final Discharge Note (most recent)       Final Note - 10/07/24 1455          Final Note    Assessment Type Final Discharge Note     Anticipated Discharge Disposition Home or Self Care     Hospital Resources/Appts/Education Provided Appointments scheduled and added to AVS        Post-Acute Status    Discharge Delays None known at this time                     Important Message from Medicare  Important Message from Medicare regarding Discharge Appeal Rights: Given to patient/caregiver, Explained to patient/caregiver, Signed/date by patient/caregiver     Date IMM was signed: 10/03/24  Time IMM was signed: 1321    Contact Info       Yosi Samuels Jr., MD   Specialty: Internal Medicine   Relationship: PCP - General  Hyperlipidemia Digital Medicine Responsible Provider    [address]  [place] [address]   Phone: [phone]       Next Steps: Schedule an appointment as soon as possible for a visit    [facility] - Pulmonary Svcs 9th Fl   Specialty: Pulmonology    [address]  [place] [address]   Phone: [phone]       Next Steps: Schedule an appointment as soon as possible for a visit    [facility] - Infectious Disease 1st Fl   Specialty: Infectious Diseases    [address]  [place] [address]   Phone: [phone]       Next Steps: Schedule an appointment as soon as possible for a visit          Jennifer Baig RN     [phone]

## 2024-10-07 NOTE — TELEPHONE ENCOUNTER
----- Message from Georgina sent at 10/5/2024  8:49 AM CDT -----  Contact: Pt  [phone]  Caller is requesting an earlier appointment then we can schedule.  Caller is requesting a message be sent to the provider.    If this is for urgent care symptoms, did you offer other providers at this location, providers at other locations, or [facility]? (yes, no, n/a):      If this is for the patients physical, did you offer to schedule next available and put on wait list, or to see NP or PA for their physical?  (yes, no, n/a):      When is the next available appointment with their provider:  12/31/24    Reason for the appointment: Hospital f/u lung infection pt was discharged 10/04/24    Patient preference of timeframe to be scheduled:  within 7 days     Would the patient like a call back, or a response through their [facility] portal?:   Callback    Comments:

## 2024-10-08 ENCOUNTER — PATIENT OUTREACH (OUTPATIENT)
Dept: ADMINISTRATIVE | Facility: CLINIC | Age: 69
End: 2024-10-08
Payer: MEDICARE

## 2024-10-08 NOTE — PROGRESS NOTES
C3 nurse attempted to contact Rochelle Espinoza and patient's daughter, Hipolito, for a TCC post hospital discharge follow up call. No answer. Left voicemail with callback information. The patient has a scheduled HOSFU appointment with Rey Keller NP on 10/15/2024 at 11:30 AM.

## 2024-10-08 NOTE — PROGRESS NOTES
C3 nurse spoke with Rochelle Espinoza  for a TCC post hospital discharge follow up call. The patient has a scheduled HOSFU appointment with Rey Keller NP on 10/15/2024 at 11:30 AM.

## 2024-10-15 ENCOUNTER — OFFICE VISIT (OUTPATIENT)
Dept: INTERNAL MEDICINE | Facility: CLINIC | Age: 69
End: 2024-10-15
Payer: MEDICARE

## 2024-10-15 VITALS
OXYGEN SATURATION: 94 % | DIASTOLIC BLOOD PRESSURE: 64 MMHG | HEIGHT: 63 IN | WEIGHT: 150.81 LBS | HEART RATE: 70 BPM | SYSTOLIC BLOOD PRESSURE: 100 MMHG | BODY MASS INDEX: 26.72 KG/M2

## 2024-10-15 DIAGNOSIS — J44.9 CHRONIC OBSTRUCTIVE PULMONARY DISEASE WITH HYPOXIA: Primary | ICD-10-CM

## 2024-10-15 DIAGNOSIS — K21.9 GASTROESOPHAGEAL REFLUX DISEASE, UNSPECIFIED WHETHER ESOPHAGITIS PRESENT: ICD-10-CM

## 2024-10-15 DIAGNOSIS — A31.0 PULMONARY INFECTION DUE TO MYCOBACTERIUM AVIUM: ICD-10-CM

## 2024-10-15 DIAGNOSIS — G62.9 PERIPHERAL POLYNEUROPATHY: ICD-10-CM

## 2024-10-15 PROCEDURE — 99999 PR PBB SHADOW E&M-EST. PATIENT-LVL V: CPT | Mod: PBBFAC,HCNC,, | Performed by: NURSE PRACTITIONER

## 2024-10-15 RX ORDER — GABAPENTIN 300 MG/1
300 CAPSULE ORAL 3 TIMES DAILY
Qty: 270 CAPSULE | Refills: 3 | Status: SHIPPED | OUTPATIENT
Start: 2024-10-15 | End: 2024-10-18

## 2024-10-15 RX ORDER — PANTOPRAZOLE SODIUM 40 MG/1
40 TABLET, DELAYED RELEASE ORAL DAILY
Qty: 90 TABLET | Refills: 3 | Status: SHIPPED | OUTPATIENT
Start: 2024-10-15

## 2024-10-15 NOTE — PROGRESS NOTES
"Transitional Care Note  Subjective:       Patient ID: Rochelle Espinoza is a 69 y.o. female.  Chief Complaint: Follow-up    Family and/or Caretaker present at visit?  No.  Diagnostic tests reviewed/disposition: I have reviewed all completed as well as pending diagnostic tests at the time of discharge.  Disease/illness education:   Home health/community services discussion/referrals: Patient does not have home health established from hospital visit.  They do not need home health.  If needed, we will set up home health for the patient.   Establishment or re-establishment of referral orders for community resources: No other necessary community resources.   Discussion with other health care providers: No discussion with other health care providers necessary.   Presenting for hospital follow up for recent COPD exacerbation. AFB/cultures preliminary, but concerning for MAC. There was also concern for possible overlaying respiratory infection. She was positive for MAC back in August but has yet to see ID. Has pulmonology appointment for January of 2025. She has not smoked for 2 weeks and does not plan to pick it back up. She is doing well today and has completed her prednisone and Augmentin. Denies SOB, CP, chest tightness, cough, and fever.       "...Principal Problem:Chronic obstructive pulmonary disease with acute exacerbation           HPI:  No notes on file     Overview/Hospital Course:  Hx of COPD on 3L NC and CPAP at night at home who presented to the ED yesterday with acute on chronic hypoxic/hypercapneic respiratory failure. She was initially a bit confused and hypercapnic and was placed on BiPAP. She has been getting nebs, steroids, and antibiotics to treat superimposed bacterial infection. She was on BiPAP overnight and has been on 4L NC since then, satting above 90%. She is still hypercapnic but her mentation has significantly improved and she is not in respiratory distress. Plan to continue BiPAP at night, " "antibiotics, duonebs, and steroids.      10/02 Shoud be ready fro dc to [facility] tomorrow, lasix restarted. LIZ nodule with cavitation and cultures positive for MAC. -treatment to begin as out patient after f/u. Is on home BIpap  and 02.      Interval History:  10/3: patient feels much better today. Medically ready for discharge.     Inpatient plan as below:   "...* Chronic obstructive pulmonary disease with acute exacerbation  - LIZ cavitary lesion and recent diagnosis of MAC on cultures   - Gold grade 4, group E COPD per last FEV1 of 29.5% and greater than 2 admissions for exacerbation   - VBG w/ 7.27/101.1/67/46.8 in the ED  - placed on bipap, but has transitioned to 3L NC      Plan  - continue albuterol-ipratropium nebs  - prednisone x 7 days.   - goal SpO2: 88-92%  - will begin treatment for MAC with ceftriaxone, 9/28 is day 1..."                        Review of Systems   Constitutional:  Negative for fever.   Respiratory: Negative.     All other systems reviewed and are negative.      Objective:      Physical Exam  Constitutional:       General: She is not in acute distress.     Appearance: Normal appearance. She is not ill-appearing, toxic-appearing or diaphoretic.      Comments: On home O2   HENT:      Head: Normocephalic and atraumatic.      Nose: Nose normal.      Mouth/Throat:      Mouth: Mucous membranes are moist.      Pharynx: Oropharynx is clear.   Eyes:      Pupils: Pupils are equal, round, and reactive to light.   Cardiovascular:      Rate and Rhythm: Normal rate and regular rhythm.   Pulmonary:      Effort: Pulmonary effort is normal.      Breath sounds: Normal breath sounds.   Abdominal:      General: Abdomen is flat.   Skin:     General: Skin is warm and dry.   Neurological:      General: No focal deficit present.      Mental Status: She is alert and oriented to person, place, and time.   Psychiatric:         Mood and Affect: Mood normal.         Behavior: Behavior normal.         Assessment:     "   1. Chronic obstructive pulmonary disease with hypoxia    2. 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 Peripheral polyneuropathy    4. Gastroesophageal reflux disease, unspecified whether esophagitis present        Plan:         Chronic obstructive pulmonary disease with hypoxia; controlled and no longer in acute exacerbation. Will need follow up with pulmonology and ID for MAC. Otherwise, continue current management     Pulmonary infection due to Mycobacterium avium; patient has yet to see ID. Diagnosed with MAC in August of 24. Consult placed to ID  -     Ambulatory referral/consult to Infectious Disease; Future    Peripheral polyneuropathy; symptoms controlled with gabapentin, refills provided   -     gabapentin (NEURONTIN) 300 MG capsule; Take 1 capsule (300 mg total) by mouth 3 (three) times daily.    Gastroesophageal reflux disease, unspecified whether esophagitis present; symptoms controlled with protonix, refills provided   -     pantoprazole (PROTONIX) 40 MG tablet; Take 1 tablet (40 mg total) by mouth once daily.     RTC prn and if symptoms worsen or do not improve.

## 2024-10-18 DIAGNOSIS — G62.9 PERIPHERAL POLYNEUROPATHY: ICD-10-CM

## 2024-10-18 RX ORDER — GABAPENTIN 300 MG/1
300 CAPSULE ORAL 3 TIMES DAILY
Qty: 270 CAPSULE | Refills: 3 | Status: SHIPPED | OUTPATIENT
Start: 2024-10-18

## 2024-10-18 NOTE — TELEPHONE ENCOUNTER
No care due was identified.  Health [facility] Embedded Care Due Messages. Reference number: [id number].   10/18/2024 11:51:42 AM CDT

## 2024-11-08 ENCOUNTER — HOSPITAL ENCOUNTER (OUTPATIENT)
Dept: PULMONOLOGY | Facility: OTHER | Age: 69
Discharge: HOME | End: 2024-11-08
Attending: INTERNAL MEDICINE
Payer: MEDICARE

## 2024-11-08 VITALS — BODY MASS INDEX: 26.58 KG/M2 | WEIGHT: 150 LBS | HEIGHT: 63 IN

## 2024-11-08 DIAGNOSIS — J44.9 CHRONIC OBSTRUCTIVE PULMONARY DISEASE WITH HYPOXIA: ICD-10-CM

## 2024-11-08 DIAGNOSIS — J44.9 COPD (CHRONIC OBSTRUCTIVE PULMONARY DISEASE): Primary | ICD-10-CM

## 2024-11-08 PROCEDURE — 94618 PULMONARY STRESS TESTING: CPT | Mod: HCNC

## 2024-11-08 PROCEDURE — 27000221 HC OXYGEN, UP TO 24 HOURS: Mod: HCNC

## 2024-11-08 NOTE — PROGRESS NOTES
"[facility]hab  Six Minute Walk     SUMMARY     Ordering Provider: Shima Paulino      Performing nurse/tech/RT: ABY Nicholson RRT  Diagnosis: COPD  Height: 5' 3" (160 cm)  Weight: 68 kg (150 lb)  BMI (Calculated): 26.6                Phase Oxygen Assessment Supplemental O2 Heart   Rate Blood Pressure Molina Dyspnea Scale Rating   Resting 93 % 3 L/M 76 bpm 128/73 2   Exercise        Minute        1           2           3           4           5           6  89 % 3 L/M 104 bpm 150/76 5-6   Recovery        Minute        1           2           3           4 96 % 3 L/M 77 bpm 128/70 2     Six Minute Walk Summary  6MWT Status: completed without stopping  Patient Reported: No complaints  Distance: 1050 ft  No previous walk available.     Interpretation:                                                     Predicted Distance Meters (Calculated): 449.97 meters                        "

## 2024-11-12 ENCOUNTER — TELEPHONE (OUTPATIENT)
Dept: INFECTIOUS DISEASES | Facility: CLINIC | Age: 69
End: 2024-11-12
Payer: MEDICARE

## 2024-11-12 ENCOUNTER — OFFICE VISIT (OUTPATIENT)
Dept: INFECTIOUS DISEASES | Facility: CLINIC | Age: 69
End: 2024-11-12
Payer: MEDICARE

## 2024-11-12 ENCOUNTER — LAB VISIT (OUTPATIENT)
Dept: LAB | Facility: HOSPITAL | Age: 69
End: 2024-11-12
Attending: INTERNAL MEDICINE
Payer: MEDICARE

## 2024-11-12 VITALS
BODY MASS INDEX: 26.25 KG/M2 | SYSTOLIC BLOOD PRESSURE: 148 MMHG | WEIGHT: 148.13 LBS | TEMPERATURE: 99 F | HEIGHT: 63 IN | DIASTOLIC BLOOD PRESSURE: 90 MMHG | HEART RATE: 93 BPM

## 2024-11-12 DIAGNOSIS — A31.0 PULMONARY INFECTION DUE TO MYCOBACTERIUM AVIUM: ICD-10-CM

## 2024-11-12 LAB
ALBUMIN SERPL BCP-MCNC: 3.9 G/DL (ref 3.5–5.2)
ALP SERPL-CCNC: 63 U/L (ref 40–150)
ALT SERPL W/O P-5'-P-CCNC: 13 U/L (ref 10–44)
ANION GAP SERPL CALC-SCNC: 7 MMOL/L (ref 8–16)
AST SERPL-CCNC: 17 U/L (ref 10–40)
BASOPHILS # BLD AUTO: 0.03 K/UL (ref 0–0.2)
BASOPHILS NFR BLD: 0.3 % (ref 0–1.9)
BILIRUB SERPL-MCNC: 0.5 MG/DL (ref 0.1–1)
BUN SERPL-MCNC: 11 MG/DL (ref 8–23)
CALCIUM SERPL-MCNC: 9.6 MG/DL (ref 8.7–10.5)
CHLORIDE SERPL-SCNC: 102 MMOL/L (ref 95–110)
CO2 SERPL-SCNC: 32 MMOL/L (ref 23–29)
CREAT SERPL-MCNC: 0.7 MG/DL (ref 0.5–1.4)
DIFFERENTIAL METHOD BLD: ABNORMAL
EOSINOPHIL # BLD AUTO: 0.1 K/UL (ref 0–0.5)
EOSINOPHIL NFR BLD: 0.5 % (ref 0–8)
ERYTHROCYTE [DISTWIDTH] IN BLOOD BY AUTOMATED COUNT: 14.8 % (ref 11.5–14.5)
EST. GFR  (NO RACE VARIABLE): >60 ML/MIN/1.73 M^2
GLUCOSE SERPL-MCNC: 72 MG/DL (ref 70–110)
HCT VFR BLD AUTO: 43.3 % (ref 37–48.5)
HGB BLD-MCNC: 13.2 G/DL (ref 12–16)
IMM GRANULOCYTES # BLD AUTO: 0.04 K/UL (ref 0–0.04)
IMM GRANULOCYTES NFR BLD AUTO: 0.4 % (ref 0–0.5)
LYMPHOCYTES # BLD AUTO: 3.3 K/UL (ref 1–4.8)
LYMPHOCYTES NFR BLD: 29.9 % (ref 18–48)
MCH RBC QN AUTO: 28.2 PG (ref 27–31)
MCHC RBC AUTO-ENTMCNC: 30.5 G/DL (ref 32–36)
MCV RBC AUTO: 93 FL (ref 82–98)
MONOCYTES # BLD AUTO: 0.7 K/UL (ref 0.3–1)
MONOCYTES NFR BLD: 6.3 % (ref 4–15)
NEUTROPHILS # BLD AUTO: 7 K/UL (ref 1.8–7.7)
NEUTROPHILS NFR BLD: 62.6 % (ref 38–73)
NRBC BLD-RTO: 0 /100 WBC
PLATELET # BLD AUTO: 262 K/UL (ref 150–450)
PMV BLD AUTO: 12 FL (ref 9.2–12.9)
POTASSIUM SERPL-SCNC: 3.3 MMOL/L (ref 3.5–5.1)
PROT SERPL-MCNC: 7.7 G/DL (ref 6–8.4)
RBC # BLD AUTO: 4.68 M/UL (ref 4–5.4)
SODIUM SERPL-SCNC: 141 MMOL/L (ref 136–145)
WBC # BLD AUTO: 11.08 K/UL (ref 3.9–12.7)

## 2024-11-12 PROCEDURE — 1101F PT FALLS ASSESS-DOCD LE1/YR: CPT | Mod: HCNC,CPTII,S$GLB, | Performed by: INTERNAL MEDICINE

## 2024-11-12 PROCEDURE — 85025 COMPLETE CBC W/AUTO DIFF WBC: CPT | Mod: HCNC | Performed by: INTERNAL MEDICINE

## 2024-11-12 PROCEDURE — 1126F AMNT PAIN NOTED NONE PRSNT: CPT | Mod: HCNC,CPTII,S$GLB, | Performed by: INTERNAL MEDICINE

## 2024-11-12 PROCEDURE — 1159F MED LIST DOCD IN RCRD: CPT | Mod: HCNC,CPTII,S$GLB, | Performed by: INTERNAL MEDICINE

## 2024-11-12 PROCEDURE — 3288F FALL RISK ASSESSMENT DOCD: CPT | Mod: HCNC,CPTII,S$GLB, | Performed by: INTERNAL MEDICINE

## 2024-11-12 PROCEDURE — 4010F ACE/ARB THERAPY RXD/TAKEN: CPT | Mod: HCNC,CPTII,S$GLB, | Performed by: INTERNAL MEDICINE

## 2024-11-12 PROCEDURE — 3008F BODY MASS INDEX DOCD: CPT | Mod: HCNC,CPTII,S$GLB, | Performed by: INTERNAL MEDICINE

## 2024-11-12 PROCEDURE — 80053 COMPREHEN METABOLIC PANEL: CPT | Mod: HCNC | Performed by: INTERNAL MEDICINE

## 2024-11-12 PROCEDURE — 99203 OFFICE O/P NEW LOW 30 MIN: CPT | Mod: HCNC,S$GLB,, | Performed by: INTERNAL MEDICINE

## 2024-11-12 PROCEDURE — 99999 PR PBB SHADOW E&M-EST. PATIENT-LVL IV: CPT | Mod: PBBFAC,HCNC,, | Performed by: INTERNAL MEDICINE

## 2024-11-12 PROCEDURE — 3077F SYST BP >= 140 MM HG: CPT | Mod: HCNC,CPTII,S$GLB, | Performed by: INTERNAL MEDICINE

## 2024-11-12 PROCEDURE — 3080F DIAST BP >= 90 MM HG: CPT | Mod: HCNC,CPTII,S$GLB, | Performed by: INTERNAL MEDICINE

## 2024-11-12 PROCEDURE — 36415 COLL VENOUS BLD VENIPUNCTURE: CPT | Mod: HCNC | Performed by: INTERNAL MEDICINE

## 2024-11-12 PROCEDURE — 86480 TB TEST CELL IMMUN MEASURE: CPT | Mod: HCNC | Performed by: INTERNAL MEDICINE

## 2024-11-12 RX ORDER — AZITHROMYCIN 500 MG/1
500 TABLET, FILM COATED ORAL DAILY
Qty: 30 TABLET | Refills: 11 | Status: SHIPPED | OUTPATIENT
Start: 2024-11-12 | End: 2025-11-12

## 2024-11-12 RX ORDER — ETHAMBUTOL HYDROCHLORIDE 100 MG/1
200 TABLET, FILM COATED ORAL DAILY
Qty: 60 TABLET | Refills: 11 | Status: SHIPPED | OUTPATIENT
Start: 2024-11-12 | End: 2025-11-12

## 2024-11-12 RX ORDER — SODIUM CHLORIDE FOR INHALATION 3 %
4 VIAL, NEBULIZER (ML) INHALATION 2 TIMES DAILY
Qty: 240 ML | Refills: 11 | Status: SHIPPED | OUTPATIENT
Start: 2024-11-12 | End: 2025-11-12

## 2024-11-12 RX ORDER — ETHAMBUTOL HYDROCHLORIDE 400 MG/1
800 TABLET, FILM COATED ORAL DAILY
Qty: 60 TABLET | Refills: 11 | Status: SHIPPED | OUTPATIENT
Start: 2024-11-12 | End: 2025-11-12

## 2024-11-12 RX ORDER — RIFAMPIN 300 MG/1
600 CAPSULE ORAL DAILY
Qty: 60 CAPSULE | Refills: 11 | Status: SHIPPED | OUTPATIENT
Start: 2024-11-12 | End: 2025-11-12

## 2024-11-12 NOTE — TELEPHONE ENCOUNTER
Tried to call pt back to let her know I will leave her on the schedule for 2pm.      ----- Message from Marissa sent at 11/12/2024 10:29 AM CST -----  Regarding: Appointment  Contact: [phone]  Patient calling requesting a callback from nurse or provider. She said please disregard the first message she found a ride and will be there.  Please call back as soon as possible.

## 2024-11-12 NOTE — PROGRESS NOTES
Infectious Disease Clinic Note  11/12/2024       Subjective:       Patient ID: Rochelle Espinoza is a 69 y.o. female being seen for an new visit.    Chief Complaint: Follow-up    HPI  68y/o F with hx of COPD,chronic respiratory failure with hypoxemia and hypercapnia on 3L NC at home, CHF, HTN and HLD who presents for pulmonary MAC infection.     Quit smoking 1 month ago. Used to see Dr. Paulino, now follows with Dr. Keller.     AFB sputum cultures positive for MAC on 8/16, 8/17 (x2).     Pt denies having a cough. Notes it resolved since her last hospital admission. Denies hx of hemoptysis. Has been on 3L NC for about 2 yrs now.  Denies fevers, chills, nightsweats, unintentional weight loss.     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  No foreign travel  Hiv neg 5/12/24      Family History   Problem Relation Name Age of Onset    Heart disease Mother      Heart disease Paternal Uncle      Celiac disease Neg Hx      Colon cancer Neg Hx      Colon polyps Neg Hx      Crohn's disease Neg Hx      Cystic fibrosis Neg Hx      Esophageal cancer Neg Hx      Inflammatory bowel disease Neg Hx      Irritable bowel syndrome Neg Hx      Liver cancer Neg Hx      Liver disease Neg Hx      Rectal cancer Neg Hx      Stomach cancer Neg Hx      Ulcerative colitis Neg Hx       Social History     Socioeconomic History    Marital status:    Occupational History    Occupation:    Tobacco Use    Smoking status: Former     Current packs/day: 0.10     Average packs/day: 1 pack/day for 52.9 years (51.1 ttl pk-yrs)     Types: Cigarettes     Start date: 1972    Smokeless tobacco: Never    Tobacco comments:     Quit over a month ago    Substance and Sexual Activity    Alcohol use: Not Currently     Comment: beer daily 2-3 daily, none today    Drug use: No    Sexual activity: Not Currently     Birth control/protection: None     Social Drivers of Health     Financial Resource  Strain: Medium Risk (2024)    Overall Financial Resource Strain (CARDIA)     Difficulty of Paying Living Expenses: Somewhat hard   Food Insecurity: Food Insecurity Present (2024)    Hunger Vital Sign     Worried About Running Out of Food in the Last Year: Sometimes true     Ran Out of Food in the Last Year: Sometimes true   Transportation Needs: No Transportation Needs (2024)    TRANSPORTATION NEEDS     Transportation : No   Physical Activity: Inactive (2024)    Exercise Vital Sign     Days of Exercise per Week: 0 days     Minutes of Exercise per Session: 0 min   Stress: Stress Concern Present (2024)    [employer] [place] [employer] - Occupational Stress Questionnaire     Feeling of Stress : To some extent   Housing Stability: Low Risk  (2024)    Housing Stability Vital Sign     Unable to Pay for Housing in the Last Year: No     Homeless in the Last Year: No     Past Surgical History:   Procedure Laterality Date    BREAST BIOPSY Right     core     SECTION      COLONOSCOPY N/A 2019    Procedure: COLONOSCOPY;  Surgeon: Rui Holder MD;  Location: [facility] ([address] [place] [address]);  Service: Endoscopy;  Laterality: N/A;    EPIDURAL STEROID INJECTION N/A 2020    Procedure: CERVICAL BLAKE;  Surgeon: Papito High MD;  Location: [facility];  Service: Pain Management;  Laterality: N/A;  NEEDS CONSENT    ESOPHAGOGASTRODUODENOSCOPY N/A 2019    Procedure: EGD (ESOPHAGOGASTRODUODENOSCOPY);  Surgeon: Rui Holder MD;  Location: [facility] ([address] [place] [address]);  Service: Endoscopy;  Laterality: N/A;  2L continuous O2 via NC, COPD, pulm HTN    TRANSFORAMINAL EPIDURAL INJECTION OF STEROID Right 6/3/2021    Procedure: INJECTION, STEROID, EPIDURAL, TRANSFORAMINAL APPROACH, L4-L5;  Surgeon: Anibal Robles MD;  Location: [facility]IN [facility];  Service: Pain Management;  Laterality: Right;       Patient's Medications   New Prescriptions    No medications on file   Previous Medications     ALBUTEROL (PROVENTIL/VENTOLIN HFA) 90 MCG/ACTUATION INHALER    Inhale 2 puffs into the lungs every 4 (four) hours as needed for Wheezing or Shortness of Breath. Rescue    ALBUTEROL-IPRATROPIUM (DUO-NEB) 2.5 MG-0.5 MG/3 ML NEBULIZER SOLUTION    Take 3 mLs by nebulization every 4 (four) hours as needed for Wheezing or Shortness of Breath. Rescue    ASCORBIC ACID, VITAMIN C, (VITAMIN C) 500 MG TABLET    Take 500 mg by mouth once daily.    ATORVASTATIN (LIPITOR) 10 MG TABLET    Take 1 tablet (10 mg total) by mouth once daily.    BENZONATATE (TESSALON PERLES) 100 MG CAPSULE    Take 2 capsules (200 mg total) by mouth 3 (three) times daily as needed for Cough.    BUPROPION (WELLBUTRIN SR) 150 MG TBSR 12 HR TABLET    Take 1 tablet (150 mg total) by mouth 2 (two) times daily.    CELECOXIB (CELEBREX) 200 MG CAPSULE    TAKE 1 CAPSULE ONE TIME DAILY    CETIRIZINE (ZYRTEC) 10 MG TABLET    Take 1 tablet (10 mg total) by mouth once daily.    CHOLECALCIFEROL, VITAMIN D3, (VITAMIN D3) 25 MCG (1,000 UNIT) CAPSULE    Take 1 capsule (1,000 Units total) by mouth once daily.    FLUTICASONE PROPIONATE (FLONASE) 50 MCG/ACTUATION NASAL SPRAY    SHAKE LIQUID AND USE 1 SPRAY (50MCG) IN EACH NOSTRIL TWICE DAILY    FLUTICASONE-UMECLIDIN-VILANTER (TRELEGY ELLIPTA) 200-62.5-25 MCG INHALER    Inhale 1 puff into the lungs once daily.    FUROSEMIDE (LASIX) 20 MG TABLET    TAKE 1 TABLET ONE TIME DAILY, INCREASING TO 2 TABLETS FOR LEG SWELLING OR WEIGHT GAIN AS DIRECTED    GABAPENTIN (NEURONTIN) 300 MG CAPSULE    TAKE 1 CAPSULE THREE TIMES DAILY    GLYCERIN ADULT SUPPOSITORY    Place 1 suppository rectally as needed for Constipation.    LOSARTAN (COZAAR) 100 MG TABLET    Take 1 tablet (100 mg total) by mouth once daily.    MUCUS CLEARING DEVICE (ACAPELLA, FLUTTER)    by Misc.(Non-Drug; Combo Route) route 4 (four) times daily.    NICOTINE (NICODERM CQ) 14 MG/24 HR    Place 1 patch onto the skin once daily.    PANTOPRAZOLE (PROTONIX) 40 MG TABLET     Take 1 tablet (40 mg total) by mouth once daily.    TRAZODONE (DESYREL) 50 MG TABLET    Take 0.5 tablets (25 mg total) by mouth nightly as needed for Insomnia.   Modified Medications    No medications on file   Discontinued Medications    No medications on file       Patient Active Problem List    Diagnosis Date Noted    Chronic obstructive pulmonary disease with hypoxia 11/08/2024    SOB (shortness of breath) 10/03/2024    Mycobacterium avium complex 10/02/2024    Cavitary lesion of lung 08/16/2024    Chronic hypoxemic respiratory failure 08/16/2024    Chronic bilateral low back pain with bilateral sciatica 07/10/2024    Decreased ROM of lumbar spine 07/10/2024    Weakness of both lower extremities 07/10/2024    Lightheadedness 05/14/2024    Major depressive disorder 08/06/2023    Peripheral polyneuropathy 10/23/2022    Chronic diastolic heart failure 10/23/2022    Vitamin D deficiency 10/23/2022    Recurrent major depressive disorder, in partial remission 10/23/2022    Nicotine dependence, cigarettes, uncomplicated  06/20/2022    Mixed hyperlipidemia 12/30/2021    Chronic obstructive pulmonary disease with acute exacerbation 12/30/2021    Myalgia 04/29/2021    Aortic calcification 03/22/2021     CT chest 9/30/20      Cervical myelopathy 03/04/2021    Sciatica of left side 01/26/2021    Cervical radiculopathy 01/02/2020    Cor pulmonale, chronic 04/10/2018    Gout involving toe of left foot 12/09/2017    Acute on chronic respiratory failure with hypoxia and hypercapnia 02/07/2016    Lumbar radiculopathy 03/10/2015    Essential hypertension     Hallux valgus (acquired) 09/25/2011     Dx updated per 2019 IMO Load      Adenomatous polyp of colon 05/20/2011    Primary localized osteoarthrosis, lower leg 04/17/2011       Review of Systems   Review of Systems   Constitutional:  Negative for chills, fever and malaise/fatigue.   HENT:  Negative for congestion and sore throat.    Eyes:  Negative for blurred vision and  "double vision.   Respiratory:  Positive for shortness of breath (stable). Negative for cough.    Cardiovascular:  Negative for chest pain and palpitations.   Gastrointestinal:  Negative for diarrhea and vomiting.   Musculoskeletal:  Negative for myalgias and neck pain.   Skin:  Negative for itching and rash.   Neurological:  Negative for dizziness and headaches.   Psychiatric/Behavioral:  Negative for substance abuse. The patient is not nervous/anxious.    All other systems reviewed and are negative.          Objective:      BP (!) 148/90 (BP Location: Left arm, Patient Position: Sitting)   Pulse 93   Temp 99 °F (37.2 °C) (Oral)   Ht 5' 3" (1.6 m)   Wt 67.2 kg (148 lb 2.4 oz)   LMP 11/21/2013   BMI 26.24 kg/m²   Estimated body mass index is 26.24 kg/m² as calculated from the following:    Height as of this encounter: 5' 3" (1.6 m).    Weight as of this encounter: 67.2 kg (148 lb 2.4 oz).    Physical Exam  Constitutional:       General: She is not in acute distress.     Appearance: She is well-developed.   HENT:      Head: Normocephalic and atraumatic.   Eyes:      Conjunctiva/sclera: Conjunctivae normal.      Pupils: Pupils are equal, round, and reactive to light.   Cardiovascular:      Rate and Rhythm: Normal rate and regular rhythm.      Heart sounds: Normal heart sounds.   Pulmonary:      Effort: Pulmonary effort is normal. No respiratory distress.      Breath sounds: Normal breath sounds.      Comments: NC in place  Abdominal:      General: Bowel sounds are normal. There is no distension.      Palpations: Abdomen is soft.   Musculoskeletal:         General: Normal range of motion.      Cervical back: Normal range of motion and neck supple.   Skin:     General: Skin is warm and dry.   Neurological:      General: No focal deficit present.      Mental Status: She is alert and oriented to person, place, and time.      Cranial Nerves: No cranial nerve deficit.   Psychiatric:         Mood and Affect: Mood " normal.         Behavior: Behavior normal.         Assessment:         1. 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 Has variable resistance, so may not be of benefit.  -Plan for daily triple therapy rif/ azithro/ ethambutol  Just had an eye exam within the last month. Recommended daily self vision exams while on ethambutol. Pt to hold med if experiences blurry vision for 2 consecutive days.  Discussed importance of airway clearance  Aerobika device- see instructional video on you tube  Hypertonic nebs bid  -monitor for drug toxicity with monthly cbc, cmp  -monitor for response with monthly afb sputum cx and CT q 6 mths  -quant gold    RTC in 2 mths  Lora Pacheco MD  Infectious Disease     Total professional time spent for the encounter: 45 minutes  Time was spent preparing to see the patient, reviewing results of prior testing, obtaining and/or reviewing separately obtained history, performing a medically appropriate examination and interview, counseling and educating the patient/family, ordering medications/tests/procedures, referring and communicating with other health care professionals, documenting clinical information in the electronic health record, and independently interpreting results.

## 2024-11-13 ENCOUNTER — HOSPITAL ENCOUNTER (OUTPATIENT)
Dept: PULMONOLOGY | Facility: OTHER | Age: 69
Discharge: HOME | End: 2024-11-13
Attending: INTERNAL MEDICINE
Payer: MEDICARE

## 2024-11-13 VITALS
DIASTOLIC BLOOD PRESSURE: 81 MMHG | BODY MASS INDEX: 26.4 KG/M2 | WEIGHT: 149.06 LBS | SYSTOLIC BLOOD PRESSURE: 133 MMHG | HEART RATE: 81 BPM

## 2024-11-13 LAB
GAMMA INTERFERON BACKGROUND BLD IA-ACNC: 0.05 IU/ML
M TB IFN-G CD4+ BCKGRND COR BLD-ACNC: 0.02 IU/ML
M TB IFN-G CD4+ BCKGRND COR BLD-ACNC: 0.03 IU/ML
MITOGEN IGNF BCKGRD COR BLD-ACNC: 9.96 IU/ML
TB GOLD PLUS: NEGATIVE

## 2024-11-13 PROCEDURE — 27000221 HC OXYGEN, UP TO 24 HOURS: Mod: HCNC

## 2024-11-13 PROCEDURE — 94626 PHY/QHP OP PULM RHB W/MNTR: CPT | Mod: HCNC

## 2024-11-17 ENCOUNTER — PATIENT MESSAGE (OUTPATIENT)
Dept: INFECTIOUS DISEASES | Facility: CLINIC | Age: 69
End: 2024-11-17
Payer: MEDICARE

## 2024-11-18 ENCOUNTER — TELEPHONE (OUTPATIENT)
Dept: INFECTIOUS DISEASES | Facility: CLINIC | Age: 69
End: 2024-11-18
Payer: MEDICARE

## 2024-11-20 ENCOUNTER — TELEPHONE (OUTPATIENT)
Dept: INFECTIOUS DISEASES | Facility: CLINIC | Age: 69
End: 2024-11-20
Payer: MEDICARE

## 2024-11-20 NOTE — TELEPHONE ENCOUNTER
Left voice message to let pt know  would like for me to schedule monthly labs starting mid December.      ----- Message from Lora Pacheco MD sent at 11/19/2024  5:50 PM CST -----  Mid decemeber. thanks  ----- Message -----  From: Chelsea Michel  Sent: 11/19/2024  10:14 AM CST  To: Lora Pacheco MD    Starting when?  ----- Message -----  From: Chelsea Michel  Sent: 11/18/2024   4:38 PM CST  To: Lora Correia MD Watkins, Ashley  F/u with me in 2 mths. Can't remember if I asked you to schedule you monthly cbc, cmp for her. thanks

## 2024-12-02 ENCOUNTER — CLINICAL SUPPORT (OUTPATIENT)
Dept: SMOKING CESSATION | Facility: CLINIC | Age: 69
End: 2024-12-02
Payer: COMMERCIAL

## 2024-12-02 ENCOUNTER — TELEPHONE (OUTPATIENT)
Dept: SMOKING CESSATION | Facility: CLINIC | Age: 69
End: 2024-12-02
Payer: MEDICARE

## 2024-12-02 DIAGNOSIS — F17.200 NICOTINE DEPENDENCE: Primary | ICD-10-CM

## 2024-12-02 PROCEDURE — 99407 BEHAV CHNG SMOKING > 10 MIN: CPT | Mod: S$GLB,,,

## 2024-12-02 NOTE — PROGRESS NOTES
Spoke with patient today in regard to smoking cessation progress for 6-month telephone follow up.  Patient states that she is tobacco free.  Patient states her last cigarette was 9/27/24 before she was hospitalized.  Patient states she still has occasional urges, but will not go back to smoking due to her poor pulmonary health.  Commended patient on their quit.  Informed patient of benefit period, future follow up, and contact information if any further help or support is needed.  Will complete smart form for 6 month follow up on Quit attempt #4.

## 2025-01-13 ENCOUNTER — LAB VISIT (OUTPATIENT)
Dept: LAB | Facility: HOSPITAL | Age: 70
End: 2025-01-13
Attending: INTERNAL MEDICINE
Payer: MEDICARE

## 2025-01-13 ENCOUNTER — HOSPITAL ENCOUNTER (OUTPATIENT)
Dept: PULMONOLOGY | Facility: CLINIC | Age: 70
Discharge: HOME | End: 2025-01-13
Payer: MEDICARE

## 2025-01-13 VITALS — BODY MASS INDEX: 26.93 KG/M2 | HEIGHT: 63 IN | WEIGHT: 152 LBS

## 2025-01-13 DIAGNOSIS — J96.11 CHRONIC HYPOXEMIC RESPIRATORY FAILURE: ICD-10-CM

## 2025-01-13 DIAGNOSIS — Z00.00 ENCOUNTER FOR MEDICARE ANNUAL WELLNESS EXAM: ICD-10-CM

## 2025-01-13 DIAGNOSIS — J44.1 CHRONIC OBSTRUCTIVE PULMONARY DISEASE WITH ACUTE EXACERBATION: ICD-10-CM

## 2025-01-13 DIAGNOSIS — A31.0 PULMONARY INFECTION DUE TO MYCOBACTERIUM AVIUM: ICD-10-CM

## 2025-01-13 PROCEDURE — 94618 PULMONARY STRESS TESTING: CPT | Mod: HCNC,S$GLB,, | Performed by: INTERNAL MEDICINE

## 2025-01-13 PROCEDURE — 87206 SMEAR FLUORESCENT/ACID STAI: CPT | Mod: HCNC | Performed by: INTERNAL MEDICINE

## 2025-01-13 PROCEDURE — 87015 SPECIMEN INFECT AGNT CONCNTJ: CPT | Mod: HCNC | Performed by: INTERNAL MEDICINE

## 2025-01-13 PROCEDURE — 87116 MYCOBACTERIA CULTURE: CPT | Mod: HCNC | Performed by: INTERNAL MEDICINE

## 2025-01-13 NOTE — PROCEDURES
Rochelle Espinoza is a 69 y.o.   female patient, who presents for a 6 minute walk test ordered by MD Mayco.  The diagnosis is Qualify for Oxygen; COPD.  The patient's BMI is 26.9 kg/m2. Predicted distance (lower limit of normal) is 307.87 meters.    Test Results:    The test was completed without stopping.  The total time walked was 360 seconds.  During walking, the patient reported:  Dyspnea, Lightheadedness, Calf pain, Other (Comment) (chest tightness).  The patient used no assistive devices during testing.     01/13/2025---------Distance: 335.28 meters (1100 feet)     O2 Sat % Supplemental Oxygen Heart Rate Blood Pressure Molina Scale   Pre-exercise  (Resting) 92 % Room Air 87 bpm 138/75 mmHg 3   During Exercise 70 % Room Air 120 bpm 141/72 mmHg 9   Post-exercise   92 % Room Air  93 bpm       Recovery Time: 146 seconds    Oxygen Qualification:     O2 Sat % Supplemental Oxygen Heart Rate Blood Pressure Molina Scale   Pre-exercise  (Resting) 95 % 3 L/M  89 bpm  107/74 mmHg 3    During Exercise 96 %  3 L/M  93 bpm  119/79 mmHg 2    Post-exercise   96 %  3 L/M  88 bpm        Performing nurse/tech: PAM Ricardo      PREVIOUS STUDY:   03/07/2018---------Distance: 426.72 meters (1400 feet)       O2 Sat % Supplemental Oxygen Heart Rate Blood Pressure Molina Scale   Pre-exercise  (Resting) 81 % Room Air 98 bpm 107/65 mmHg 0   During Exercise 65 % Room Air 127 bpm 160/84 mmHg 5-6   Post-exercise    82 % Room Air  100 bpm 148/77 mmHg         CLINICAL INTERPRETATION:  Six minute walk distance is 335.28 meters (1100 feet) with very, very heavy dyspnea.  During exercise, there was significant desaturation while breathing room air.  Blood pressure remained stable and Heart rate increased significantly with walking.  The patient reported non-pulmonary symptoms during exercise.  The patient may benefit from using supplemental oxygen during exertion.  Since the previous study in March 2018, exercise capacity is significantly worse.  Based  upon age and body mass index, exercise capacity is normal.   Oxygen saturation did improve while breathing supplemental oxygen.

## 2025-01-14 DIAGNOSIS — J96.11 CHRONIC HYPOXEMIC RESPIRATORY FAILURE: Primary | ICD-10-CM

## 2025-01-24 LAB
DLCO SINGLE BREATH LLN: 15.08
DLCO SINGLE BREATH PRE REF: 32.1 %
DLCO SINGLE BREATH REF: 20.81
DLCOC SBVA LLN: 2.86
DLCOC SBVA REF: 4.36
DLCOC SINGLE BREATH LLN: 15.08
DLCOC SINGLE BREATH REF: 20.81
DLCOCSBVAULN: 5.87
DLCOCSINGLEBREATHULN: 26.54
DLCOSINGLEBREATHULN: 26.54
DLCOSINGLEBREATHZSCORE: -4.05
DLCOVA LLN: 2.86
DLCOVA PRE REF: 59.4 %
DLCOVA PRE: 2.59 ML/(MIN*MMHG*L) (ref 2.86–5.87)
DLCOVA REF: 4.36
DLCOVAULN: 5.87
ERV LLN: -16449.35
ERV PRE REF: 77.5 %
ERV REF: 0.65
ERVULN: ABNORMAL
FEF 25 75 LLN: 1.18
FEF 25 75 PRE REF: 10 %
FEF 25 75 REF: 2.57
FEV05 LLN: 0.81
FEV05 REF: 1.67
FEV1 FVC LLN: 66
FEV1 FVC PRE REF: 71.2 %
FEV1 FVC REF: 79
FEV1 LLN: 1.3
FEV1 PRE REF: 30.2 %
FEV1 REF: 1.86
FEV1FVCZSCORE: -2.71
FEV1ZSCORE: -3.66
FRCPLETH LLN: 1.83
FRCPLETH PREREF: 141.6 %
FRCPLETH REF: 2.65
FRCPLETHULN: 3.48
FVC LLN: 1.68
FVC PRE REF: 42.2 %
FVC REF: 2.37
FVCZSCORE: -3.4
IVC PRE: 1.27 L (ref 1.68–3.09)
IVC SINGLE BREATH LLN: 1.68
IVC SINGLE BREATH PRE REF: 53.5 %
IVC SINGLE BREATH REF: 2.37
IVCSINGLEBREATHULN: 3.09
LLN IC: -16448.13
PEF LLN: 2.83
PEF PRE REF: 42.2 %
PEF REF: 4.77
PHYSICIAN COMMENT: ABNORMAL
PRE DLCO: 6.69 ML/(MIN*MMHG) (ref 15.08–26.54)
PRE ERV: 0.51 L (ref -16449.35–16450.65)
PRE FEF 25 75: 0.26 L/S (ref 1.18–3.97)
PRE FET 100: 6.16 SEC
PRE FEV05 REF: 22.9 %
PRE FEV1 FVC: 56.09 % (ref 65.92–89.86)
PRE FEV1: 0.56 L (ref 1.3–2.39)
PRE FEV5: 0.38 L (ref 0.81–2.52)
PRE FRC PL: 3.76 L (ref 1.83–3.48)
PRE FVC: 1 L (ref 1.68–3.09)
PRE IC: 0.83 L (ref -16448.13–16451.87)
PRE PEF: 2.01 L/S (ref 2.83–6.71)
PRE REF IC: 44.6 %
PRE RV: 3.25 L (ref 1.42–2.58)
PRE TLC: 4.59 L (ref 3.78–5.76)
RAW PRE REF: 170.2 %
RAW PRE: 5.21 CMH2O*S/L (ref 3.06–3.06)
RAW REF: 3.06
REF IC: 1.87
RV LLN: 1.42
RV PRE REF: 162.6 %
RV REF: 2
RVTLC LLN: 33
RVTLC PRE REF: 166.9 %
RVTLC PRE: 70.82 % (ref 32.83–52.01)
RVTLC REF: 42
RVTLCULN: 52
RVULN: 2.58
SGAW PRE REF: 47.4 %
SGAW PRE: 0.05 1/(CMH2O*S) (ref 0.1–0.1)
SGAW REF: 0.1
TLC LLN: 3.78
TLC PRE REF: 96.2 %
TLC REF: 4.77
TLC ULN: 5.76
TLCZSCORE: -0.3
ULN IC: ABNORMAL
VA PRE: 2.58 L (ref 4.62–4.62)
VA SINGLE BREATH LLN: 4.62
VA SINGLE BREATH PRE REF: 55.9 %
VA SINGLE BREATH REF: 4.62
VASINGLEBREATHULN: 4.62
VC LLN: 1.68
VC PRE REF: 56.6 %
VC PRE: 1.34 L (ref 1.68–3.09)
VC REF: 2.37
VC ULN: 3.09

## 2025-01-24 PROCEDURE — 94726 PLETHYSMOGRAPHY LUNG VOLUMES: CPT | Mod: HCNC,S$GLB,, | Performed by: INTERNAL MEDICINE

## 2025-01-24 PROCEDURE — 94010 BREATHING CAPACITY TEST: CPT | Mod: HCNC,S$GLB,, | Performed by: INTERNAL MEDICINE

## 2025-01-24 PROCEDURE — 94729 DIFFUSING CAPACITY: CPT | Mod: HCNC,S$GLB,, | Performed by: INTERNAL MEDICINE

## 2025-01-30 LAB
ACID FAST MOD KINY STN SPEC: NORMAL
MYCOBACTERIUM SPEC QL CULT: NORMAL
MYCOBACTERIUM SPEC QL CULT: NORMAL

## 2025-02-04 ENCOUNTER — OFFICE VISIT (OUTPATIENT)
Dept: INTERNAL MEDICINE | Facility: CLINIC | Age: 70
End: 2025-02-04
Payer: MEDICARE

## 2025-02-04 VITALS
BODY MASS INDEX: 26.91 KG/M2 | DIASTOLIC BLOOD PRESSURE: 76 MMHG | HEIGHT: 63 IN | WEIGHT: 151.88 LBS | SYSTOLIC BLOOD PRESSURE: 120 MMHG | HEART RATE: 80 BPM | OXYGEN SATURATION: 95 %

## 2025-02-04 DIAGNOSIS — M54.2 ACUTE NECK PAIN: Primary | ICD-10-CM

## 2025-02-04 DIAGNOSIS — Z59.82 TRANSPORTATION INSECURITY: ICD-10-CM

## 2025-02-04 DIAGNOSIS — R51.9 NONINTRACTABLE HEADACHE, UNSPECIFIED CHRONICITY PATTERN, UNSPECIFIED HEADACHE TYPE: ICD-10-CM

## 2025-02-04 DIAGNOSIS — Z60.8 OTHER PROBLEMS RELATED TO SOCIAL ENVIRONMENT: ICD-10-CM

## 2025-02-04 PROCEDURE — 1101F PT FALLS ASSESS-DOCD LE1/YR: CPT | Mod: HCNC,CPTII,S$GLB, | Performed by: NURSE PRACTITIONER

## 2025-02-04 PROCEDURE — 1159F MED LIST DOCD IN RCRD: CPT | Mod: HCNC,CPTII,S$GLB, | Performed by: NURSE PRACTITIONER

## 2025-02-04 PROCEDURE — 3078F DIAST BP <80 MM HG: CPT | Mod: HCNC,CPTII,S$GLB, | Performed by: NURSE PRACTITIONER

## 2025-02-04 PROCEDURE — 3288F FALL RISK ASSESSMENT DOCD: CPT | Mod: HCNC,CPTII,S$GLB, | Performed by: NURSE PRACTITIONER

## 2025-02-04 PROCEDURE — 3074F SYST BP LT 130 MM HG: CPT | Mod: HCNC,CPTII,S$GLB, | Performed by: NURSE PRACTITIONER

## 2025-02-04 PROCEDURE — 4010F ACE/ARB THERAPY RXD/TAKEN: CPT | Mod: HCNC,CPTII,S$GLB, | Performed by: NURSE PRACTITIONER

## 2025-02-04 PROCEDURE — 99999 PR PBB SHADOW E&M-EST. PATIENT-LVL V: CPT | Mod: PBBFAC,HCNC,, | Performed by: NURSE PRACTITIONER

## 2025-02-04 PROCEDURE — 3008F BODY MASS INDEX DOCD: CPT | Mod: HCNC,CPTII,S$GLB, | Performed by: NURSE PRACTITIONER

## 2025-02-04 PROCEDURE — 1126F AMNT PAIN NOTED NONE PRSNT: CPT | Mod: HCNC,CPTII,S$GLB, | Performed by: NURSE PRACTITIONER

## 2025-02-04 PROCEDURE — 99214 OFFICE O/P EST MOD 30 MIN: CPT | Mod: HCNC,S$GLB,, | Performed by: NURSE PRACTITIONER

## 2025-02-04 RX ORDER — TIZANIDINE 2 MG/1
4 TABLET ORAL EVERY 8 HOURS PRN
Qty: 30 TABLET | Refills: 1 | Status: SHIPPED | OUTPATIENT
Start: 2025-02-04

## 2025-02-04 SDOH — SOCIAL DETERMINANTS OF HEALTH (SDOH): OTHER PROBLEMS RELATED TO SOCIAL ENVIRONMENT: Z60.8

## 2025-02-04 SDOH — SOCIAL DETERMINANTS OF HEALTH (SDOH): TRANSPORTATION INSECURITY: Z59.82

## 2025-02-04 NOTE — PROGRESS NOTES
"Subjective     Patient ID: Rochelle Espinoza is a 69 y.o. female.  /76 (BP Location: Left arm, Patient Position: Sitting)   Pulse 80   Ht 5' 3" (1.6 m)   Wt 68.9 kg (151 lb 14.4 oz)   LMP 11/21/2013   SpO2 95%   BMI 26.91 kg/m²      Chief Complaint: Headache (Pt states she has been having the headaches for a month.  Also would like a checkup.)    Presenting with 1 month of acute neck pain. Patient denies injury or notable precipitating event prior to onset of symptoms. Neck pain is worse with tilting head forward and side to side (more pronounced when turned to R). She has history of cervical radiculopathy and myelopathy. She takes gabapentin and prn Celebrex, but this hasn't helped her current neck pain much. She endorses pain to bilateral temples that follows the neck pain, but that it only lasts about 5 minutes. She also notes having difficulty with transportation to and from appointments. She no longer has a car due to the financial impact from her ongoing medical conditions. She states she often misses appointments because of this.       Patient Active Problem List   Diagnosis    Essential hypertension    Adenomatous polyp of colon    Hallux valgus (acquired)    Primary localized osteoarthrosis, lower leg    Lumbar radiculopathy    Acute on chronic respiratory failure with hypoxia and hypercapnia    Gout involving toe of left foot    Cor pulmonale, chronic    Cervical radiculopathy    Sciatica of left side    Cervical myelopathy    Aortic calcification    Myalgia    Mixed hyperlipidemia    Chronic obstructive pulmonary disease with acute exacerbation    Nicotine dependence, cigarettes, uncomplicated     Peripheral polyneuropathy    Chronic diastolic heart failure    Vitamin D deficiency    Recurrent major depressive disorder, in partial remission    Major depressive disorder    Lightheadedness    Chronic bilateral low back pain with bilateral sciatica    Decreased ROM of lumbar " spine    Weakness of both lower extremities    Cavitary lesion of lung    Chronic hypoxemic respiratory failure    Mycobacterium avium complex    SOB (shortness of breath)    Chronic obstructive pulmonary disease with hypoxia    Transportation insecurity        Current Outpatient Medications   Medication Sig Dispense Refill    albuterol (PROVENTIL/VENTOLIN HFA) 90 mcg/actuation inhaler Inhale 2 puffs into the lungs every 4 (four) hours as needed for Wheezing or Shortness of Breath. Rescue 20.1 g 3    albuterol-ipratropium (DUO-NEB) 2.5 mg-0.5 mg/3 mL nebulizer solution Take 3 mLs by nebulization every 4 (four) hours as needed for Wheezing or Shortness of Breath. Rescue 1080 mL 3    atorvastatin (LIPITOR) 10 MG tablet Take 1 tablet (10 mg total) by mouth once daily. 90 tablet 3    azithromycin (ZITHROMAX) 500 MG tablet Take 1 tablet (500 mg total) by mouth once daily. 30 tablet 11    benzonatate (TESSALON PERLES) 100 MG capsule Take 2 capsules (200 mg total) by mouth 3 (three) times daily as needed for Cough. 30 capsule 1    cholecalciferol, vitamin D3, (VITAMIN D3) 25 mcg (1,000 unit) capsule Take 1 capsule (1,000 Units total) by mouth once daily. 90 capsule 3    ethambutoL (MYAMBUTOL) 100 MG Tab Take 2 tablets (200 mg total) by mouth once daily. Take two 100mg tablets PLUS two 400mg tablets for a total 1,000mg. 60 tablet 11    ethambutoL (MYAMBUTOL) 400 MG Tab Take 2 tablets (800 mg total) by mouth once daily. Take two 100mg tablets PLUS two 400mg tablets for a total 1,000mg. 60 tablet 11    fluticasone propionate (FLONASE) 50 mcg/actuation nasal spray SHAKE LIQUID AND USE 1 SPRAY (50MCG) IN EACH NOSTRIL TWICE DAILY 48 g 3    fluticasone-umeclidin-vilanter (TRELEGY ELLIPTA) 200-62.5-25 mcg inhaler Inhale 1 puff into the lungs once daily. 3 each 4    furosemide (LASIX) 20 MG tablet TAKE 1 TABLET ONE TIME DAILY, INCREASING TO 2 TABLETS FOR LEG SWELLING OR WEIGHT GAIN AS DIRECTED 90 tablet 3     gabapentin (NEURONTIN) 300 MG capsule TAKE 1 CAPSULE THREE TIMES DAILY 270 capsule 3    losartan (COZAAR) 100 MG tablet Take 1 tablet (100 mg total) by mouth once daily. 90 tablet 3    pantoprazole (PROTONIX) 40 MG tablet Take 1 tablet (40 mg total) by mouth once daily. 90 tablet 3    rifAMpin (RIFADIN) 300 MG capsule Take 2 capsules (600 mg total) by mouth once daily. 60 capsule 11    sodium chloride 3% 3 % nebulizer solution Take 4 mLs by nebulization 2 (two) times a day. 240 mL 11    ascorbic acid, vitamin C, (VITAMIN C) 500 MG tablet Take 500 mg by mouth once daily. (Patient not taking: Reported on 2/4/2025)      buPROPion (WELLBUTRIN SR) 150 MG TBSR 12 hr tablet Take 1 tablet (150 mg total) by mouth 2 (two) times daily. (Patient not taking: Reported on 10/8/2024) 180 tablet 4    celecoxib (CELEBREX) 200 MG capsule TAKE 1 CAPSULE ONE TIME DAILY (Patient not taking: Reported on 2/4/2025) 30 capsule 11    cetirizine (ZYRTEC) 10 MG tablet Take 1 tablet (10 mg total) by mouth once daily. 90 tablet 3    glycerin adult suppository Place 1 suppository rectally as needed for Constipation.      mucus clearing device (ACAPELLA, FLUTTER) by Misc.(Non-Drug; Combo Route) route 4 (four) times daily. (Patient not taking: Reported on 2/4/2025) 1 each 0    nicotine (NICODERM CQ) 14 mg/24 hr Place 1 patch onto the skin once daily. (Patient not taking: Reported on 10/8/2024) 14 patch 0    tiZANidine (ZANAFLEX) 2 MG tablet Take 2 tablets (4 mg total) by mouth every 8 (eight) hours as needed (muscle spasm). 30 tablet 1     Current Facility-Administered Medications   Medication Dose Route Frequency Provider Last Rate Last Admin    sodium chloride 3% nebulizer solution 4 mL  4 mL Nebulization 1 time in Clinic/HOD             Review of Systems   HENT:  Negative for trouble swallowing.    Respiratory:  Negative for chest tightness and wheezing.    Musculoskeletal:  Positive for arthralgias and neck pain.          Objective      Physical Exam  Constitutional:       General: She is not in acute distress.     Appearance: Normal appearance. She is not ill-appearing, toxic-appearing or diaphoretic.   HENT:      Head: Normocephalic and atraumatic.   Neck:      Vascular: No carotid bruit.      Comments: Decreased ROM due to pain   Cardiovascular:      Rate and Rhythm: Normal rate.   Pulmonary:      Effort: Pulmonary effort is normal.      Comments: On home O2  Abdominal:      General: Abdomen is flat.   Musculoskeletal:      Cervical back: No rigidity or tenderness (no bony tenderness).   Lymphadenopathy:      Cervical: No cervical adenopathy.   Skin:     General: Skin is warm and dry.   Neurological:      Mental Status: She is alert and oriented to person, place, and time.   Psychiatric:         Mood and Affect: Mood normal.         Behavior: Behavior normal.        Assessment and Plan     1. Acute neck pain; new onset neck pain x 1 month. Decreased ROM due to pain. Will provide symptomatic relief with muscle relaxer.  Suspect she would benefit most from PT for this. Referral placed to PT for further evaluation and treatment.  -     tiZANidine (ZANAFLEX) 2 MG tablet; Take 2 tablets (4 mg total) by mouth every 8 (eight) hours as needed (muscle spasm).  Dispense: 30 tablet; Refill: 1  -     Ambulatory Referral/Consult to Physical/Occupational Therapy; Future; Expected date: 02/11/2025    2. Nonintractable headache, unspecified chronicity pattern, unspecified headache type; likely related to neck pain. Will treat neck pain as underlying cause. See above     3. Transportation insecurity; Patient having difficulty with transportation to and from appointments. She no longer has a car due to the financial impact from her ongoing medical conditions. She states she often misses appointments because of this. Due to the very potential negative impacts on her health this could have, I have placed a social work consult to see if we can help find her  consistent transportation to and from appointments.   -     Ambulatory referral/consult to Social Work; Future; Expected date: 02/11/2025    4. Other problems related to social environment; Patient having difficulty with transportation to and from appointments. She no longer has a car due to the financial impact from her ongoing medical conditions. She states she often misses appointments because of this. Due to the very potential negative impacts on her health this could have, I have placed a social work consult to see if we can help find her consistent transportation to and from appointments.   -     Ambulatory referral/consult to Social Work; Future; Expected date: 02/11/2025          Rey Keller NP   Internal Medicine           Follow up if symptoms worsen or fail to improve.

## 2025-02-11 ENCOUNTER — OFFICE VISIT (OUTPATIENT)
Dept: INFECTIOUS DISEASES | Facility: CLINIC | Age: 70
End: 2025-02-11
Payer: MEDICARE

## 2025-02-11 ENCOUNTER — OFFICE VISIT (OUTPATIENT)
Dept: INTERNAL MEDICINE | Facility: CLINIC | Age: 70
End: 2025-02-11
Payer: MEDICARE

## 2025-02-11 ENCOUNTER — LAB VISIT (OUTPATIENT)
Dept: LAB | Facility: HOSPITAL | Age: 70
End: 2025-02-11
Attending: INTERNAL MEDICINE
Payer: MEDICARE

## 2025-02-11 VITALS
TEMPERATURE: 99 F | HEIGHT: 63 IN | SYSTOLIC BLOOD PRESSURE: 144 MMHG | HEART RATE: 69 BPM | BODY MASS INDEX: 27.7 KG/M2 | WEIGHT: 156.31 LBS | DIASTOLIC BLOOD PRESSURE: 83 MMHG

## 2025-02-11 VITALS
OXYGEN SATURATION: 98 % | HEIGHT: 63 IN | BODY MASS INDEX: 27.5 KG/M2 | DIASTOLIC BLOOD PRESSURE: 72 MMHG | WEIGHT: 155.19 LBS | HEART RATE: 72 BPM | SYSTOLIC BLOOD PRESSURE: 126 MMHG

## 2025-02-11 DIAGNOSIS — I50.32 CHRONIC DIASTOLIC HEART FAILURE: ICD-10-CM

## 2025-02-11 DIAGNOSIS — Z00.00 ENCOUNTER FOR MEDICARE ANNUAL WELLNESS EXAM: Primary | ICD-10-CM

## 2025-02-11 DIAGNOSIS — A31.0 PULMONARY INFECTION DUE TO MYCOBACTERIUM AVIUM: ICD-10-CM

## 2025-02-11 DIAGNOSIS — Z78.0 ASYMPTOMATIC MENOPAUSE: ICD-10-CM

## 2025-02-11 DIAGNOSIS — I10 ESSENTIAL HYPERTENSION: ICD-10-CM

## 2025-02-11 DIAGNOSIS — A31.0 MYCOBACTERIUM AVIUM COMPLEX: ICD-10-CM

## 2025-02-11 DIAGNOSIS — J44.9 CHRONIC OBSTRUCTIVE PULMONARY DISEASE WITH HYPOXIA: ICD-10-CM

## 2025-02-11 DIAGNOSIS — A31.0 MYCOBACTERIUM AVIUM COMPLEX: Primary | ICD-10-CM

## 2025-02-11 DIAGNOSIS — E78.2 MIXED HYPERLIPIDEMIA: ICD-10-CM

## 2025-02-11 DIAGNOSIS — J96.11 CHRONIC HYPOXEMIC RESPIRATORY FAILURE: ICD-10-CM

## 2025-02-11 DIAGNOSIS — Z99.81 DEPENDENCE ON SUPPLEMENTAL OXYGEN: ICD-10-CM

## 2025-02-11 DIAGNOSIS — G95.9 CERVICAL MYELOPATHY: ICD-10-CM

## 2025-02-11 DIAGNOSIS — R73.09 ELEVATED GLUCOSE: ICD-10-CM

## 2025-02-11 DIAGNOSIS — F33.41 RECURRENT MAJOR DEPRESSIVE DISORDER, IN PARTIAL REMISSION: ICD-10-CM

## 2025-02-11 PROBLEM — Z87.891 HISTORY OF NICOTINE DEPENDENCE: Status: ACTIVE | Noted: 2022-06-20

## 2025-02-11 PROBLEM — J44.1 CHRONIC OBSTRUCTIVE PULMONARY DISEASE WITH ACUTE EXACERBATION: Status: RESOLVED | Noted: 2021-12-30 | Resolved: 2025-02-11

## 2025-02-11 PROBLEM — F32.9 MAJOR DEPRESSIVE DISORDER: Status: RESOLVED | Noted: 2023-08-06 | Resolved: 2025-02-11

## 2025-02-11 PROCEDURE — 87206 SMEAR FLUORESCENT/ACID STAI: CPT | Performed by: INTERNAL MEDICINE

## 2025-02-11 PROCEDURE — 99999 PR PBB SHADOW E&M-EST. PATIENT-LVL IV: CPT | Mod: PBBFAC,HCNC,, | Performed by: INTERNAL MEDICINE

## 2025-02-11 PROCEDURE — 87116 MYCOBACTERIA CULTURE: CPT | Performed by: INTERNAL MEDICINE

## 2025-02-11 PROCEDURE — 3079F DIAST BP 80-89 MM HG: CPT | Mod: CPTII,S$GLB,, | Performed by: INTERNAL MEDICINE

## 2025-02-11 PROCEDURE — 3288F FALL RISK ASSESSMENT DOCD: CPT | Mod: CPTII,S$GLB,, | Performed by: INTERNAL MEDICINE

## 2025-02-11 PROCEDURE — 87015 SPECIMEN INFECT AGNT CONCNTJ: CPT | Performed by: INTERNAL MEDICINE

## 2025-02-11 PROCEDURE — 3008F BODY MASS INDEX DOCD: CPT | Mod: CPTII,S$GLB,, | Performed by: INTERNAL MEDICINE

## 2025-02-11 PROCEDURE — 4010F ACE/ARB THERAPY RXD/TAKEN: CPT | Mod: CPTII,S$GLB,, | Performed by: INTERNAL MEDICINE

## 2025-02-11 PROCEDURE — 3077F SYST BP >= 140 MM HG: CPT | Mod: CPTII,S$GLB,, | Performed by: INTERNAL MEDICINE

## 2025-02-11 PROCEDURE — 1158F ADVNC CARE PLAN TLK DOCD: CPT | Mod: CPTII,S$GLB,, | Performed by: INTERNAL MEDICINE

## 2025-02-11 PROCEDURE — 1159F MED LIST DOCD IN RCRD: CPT | Mod: CPTII,S$GLB,, | Performed by: INTERNAL MEDICINE

## 2025-02-11 PROCEDURE — 99214 OFFICE O/P EST MOD 30 MIN: CPT | Mod: S$GLB,,, | Performed by: INTERNAL MEDICINE

## 2025-02-11 PROCEDURE — 99999 PR PBB SHADOW E&M-EST. PATIENT-LVL V: CPT | Mod: PBBFAC,HCNC,, | Performed by: PHYSICIAN ASSISTANT

## 2025-02-11 PROCEDURE — 1101F PT FALLS ASSESS-DOCD LE1/YR: CPT | Mod: CPTII,S$GLB,, | Performed by: INTERNAL MEDICINE

## 2025-02-11 PROCEDURE — 1126F AMNT PAIN NOTED NONE PRSNT: CPT | Mod: CPTII,S$GLB,, | Performed by: INTERNAL MEDICINE

## 2025-02-11 NOTE — PROGRESS NOTES
"  Rochelle Espinoza presented for a follow-up Medicare AWV today. The following components were reviewed and updated:    Medical history  Family History  Social history  Allergies and Current Medications  Health Risk Assessment  Health Maintenance  Care Team    **See Completed Assessments for Annual Wellness visit with in the encounter summary    The following assessments were completed:  Depression Screening  Cognitive function Screening    Timed Get Up Test  Whisper Test      Opioid documentation:      Patient does not have a current opioid prescription.          Vitals:    02/11/25 0846   BP: 126/72   BP Location: Right arm   Patient Position: Sitting   Pulse: 72   SpO2: 98%   Weight: 70.4 kg (155 lb 3.3 oz)   Height: 5' 3" (1.6 m)     Body mass index is 27.49 kg/m².       Physical Exam  Constitutional:       General: She is not in acute distress.     Appearance: She is not ill-appearing.      Interventions: Nasal cannula in place.   HENT:      Head: Normocephalic and atraumatic.      Right Ear: Tympanic membrane, ear canal and external ear normal.      Left Ear: Tympanic membrane, ear canal and external ear normal.   Eyes:      Conjunctiva/sclera: Conjunctivae normal.   Cardiovascular:      Rate and Rhythm: Normal rate and regular rhythm.   Pulmonary:      Effort: Pulmonary effort is normal. No respiratory distress.      Breath sounds: No wheezing.   Musculoskeletal:      Right lower leg: No edema.      Left lower leg: No edema.   Lymphadenopathy:      Cervical: No cervical adenopathy.   Skin:     Findings: No rash.   Neurological:      Mental Status: She is alert.   Psychiatric:         Mood and Affect: Mood normal.           Diagnoses and health risks identified today and associated recommendations/orders:  1. Encounter for Medicare annual wellness exam  Exam and Assessments performed  Chart Review Complete  Health Maintenance Reviewed and updated  - Ambulatory Referral/Consult to Enhanced Annual Wellness Visit " (eAWV)    2. Chronic diastolic heart failure  Stable  Last Echo 8/2013  -Grade II diastolic dysfunction   Followed by PCP    3. Chronic hypoxemic respiratory failure  Stable on home oxygen  Followed closely by Pulm     4. Chronic obstructive pulmonary disease with hypoxia  Stable on home oxygen and current inhalers  Followed closely by Pulm    5. Mycobacterium avium complex  Stable on current abx therapy  Followed by Infectious Disease    6. Cervical myelopathy  Stable with current meds as needed  Hx of BLAKE  Followed by PCP    7. Mixed hyperlipidemia  Stable on statin therapy, continue  - Lipid Panel; Future  Followed by PCP    8. Essential hypertension  Stable on current meds continue  Followed by PCP    9. Recurrent major depressive disorder, in partial remission  Stable  In a support group  Followed by PCP    10. Asymptomatic menopause  Repeat dexa due  - DXA Bone Density Axial Skeleton 1 or more sites; Future  On Vitamin D  Advised on fall precautions and weight bearing exercises as tolerated    11. Elevated glucose  Noted on prior lab  - Hemoglobin A1C; Future    12. Dependence on supplemental oxygen  Stable on home oxygen  Followed closely by Pulm       Provided Rochelle with a 5-10 year written screening schedule and personal prevention plan. Recommendations were developed using the USPSTF age appropriate recommendations. Education, counseling, and referrals were provided as needed.  After Visit Summary printed and given to patient which includes a list of additional screenings\tests needed.    Follow up for next available for PCP follow up and 1 year for next Medicare AWV.      Lora Sheldon PA-C    Future Appointments   Date Time Provider Department Center   2/11/2025 11:00 AM Lora Pacheco MD [facility] ID [facility]   3/18/2025  9:00 AM [facility], DEXA1 NOM BMD [facility]   3/21/2025  8:40 AM Yosi Samuels Jr., MD [facility] IM [facility] PCW   3/21/2025  1:00 PM Juan Miguel Mao MD [facility] PULMSVC [facility]

## 2025-02-11 NOTE — PATIENT INSTRUCTIONS
Counseling and Referral of Other Preventative  (Italic type indicates deductible and co-insurance are waived)    Patient Name: Rochelle Espinoza  Today's Date: 2/11/2025    Health Maintenance       Date Due Completion Date    DEXA Scan 03/17/2025 3/17/2023    COVID-19 Vaccine (4 - 2024-25 season) 02/11/2026 (Originally 9/1/2024) 4/5/2022    Mammogram 06/18/2025 6/18/2024    LDCT Lung Screen 08/16/2025 8/16/2024    Hemoglobin A1c (Diabetic Prevention Screening) 01/27/2026 1/27/2023    Lipid Panel 01/27/2028 1/27/2023    TETANUS VACCINE 03/16/2029 3/16/2019    Colorectal Cancer Screening 12/19/2029 12/19/2019        No orders of the defined types were placed in this encounter.    The following information is provided to all patients.  This information is to help you find resources for any of the problems found today that may be affecting your health:                  Living healthy guide: www.[facility].[place].gov      Understanding Diabetes: www.diabetes.org      Eating healthy: www.cdc.gov/healthyweight      [facility] home safety checklist: www.cdc.gov/steadi/patient.html      Agency on Aging: www.goea.[place].gov      Alcoholics anonymous (AA): www.aa.org      Physical Activity: www.margy.nih.gov/yq2oucw      Tobacco use: www.quitwithusla.org

## 2025-02-11 NOTE — PROGRESS NOTES
Infectious Disease Clinic Note  2025       Subjective:       Patient ID: Rochelle Espinoza is a 69 y.o. female being seen for a follow-up visit.    Chief Complaint: Follow-up    HPI  68y/o F with hx of COPD, chronic respiratory failure with hypoxemia and hypercapnia on 3L NC at home, CHF, HTN and HLD who presents for pulmonary MAC infection.     AFB sputum cultures positive for MAC on ,  (x2). Started on triple therapy on 24. Tolerating therapy. Urine has become discolored with rifampin.  Report she does not cough regularly. When she comes off bipap in the morning she does cough up mucus.     She uses duo-nebs and is also doing hypertonic saline nebs. Uses distilled water with bipap. Bought a filter for the bipap. Has joined an online MAC support group.    Denies fevers, chills, nightsweats, unintentional weight loss. No hemoptysis reported.     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 Used to see Dr. Paulino, now follows with Dr. Keller.   Family History   Problem Relation Name Age of Onset    Heart disease Mother      Heart disease Paternal Uncle      Celiac disease Neg Hx      Colon cancer Neg Hx      Colon polyps Neg Hx      Crohn's disease Neg Hx      Cystic fibrosis Neg Hx      Esophageal cancer Neg Hx      Inflammatory bowel disease Neg Hx      Irritable bowel syndrome Neg Hx      Liver cancer Neg Hx      Liver disease Neg Hx      Rectal cancer Neg Hx      Stomach cancer Neg Hx      Ulcerative colitis Neg Hx       Social History     Socioeconomic History    Marital status:    Occupational History    Occupation:    Tobacco Use    Smoking status: Former     Current packs/day: 0.00     Average packs/day: 1 pack/day for 52.7 years (51.1 ttl pk-yrs)     Types: Cigarettes     Start date:      Quit date: 2024     Years since quittin.3    Smokeless tobacco:  Never    Tobacco comments:     Quit over a month ago    Substance and Sexual Activity    Alcohol use: Not Currently     Comment: beer daily 2-3 daily, none today    Drug use: No    Sexual activity: Not Currently     Birth control/protection: None     Social Drivers of Health     Financial Resource Strain: Medium Risk (2025)    Overall Financial Resource Strain (CARDIA)     Difficulty of Paying Living Expenses: Somewhat hard   Food Insecurity: Food Insecurity Present (2025)    Hunger Vital Sign     Worried About Running Out of Food in the Last Year: Sometimes true     Ran Out of Food in the Last Year: Sometimes true   Transportation Needs: Unmet Transportation Needs (2025)    PRAPARE - Transportation     Lack of Transportation (Medical): Yes     Lack of Transportation (Non-Medical): Yes   Physical Activity: Inactive (2025)    Exercise Vital Sign     Days of Exercise per Week: 0 days     Minutes of Exercise per Session: 0 min   Stress: Stress Concern Present (2025)    [employer] [place] [employer] - Occupational Stress Questionnaire     Feeling of Stress : To some extent   Housing Stability: Low Risk  (2025)    Housing Stability Vital Sign     Unable to Pay for Housing in the Last Year: No     Homeless in the Last Year: No     Past Surgical History:   Procedure Laterality Date    BREAST BIOPSY Right     core     SECTION      COLONOSCOPY N/A 2019    Procedure: COLONOSCOPY;  Surgeon: Rui Holder MD;  Location: [address]);  Service: Endoscopy;  Laterality: N/A;    EPIDURAL STEROID INJECTION N/A 2020    Procedure: CERVICAL BLAKE;  Surgeon: Papito High MD;  Location: [facility];  Service: Pain Management;  Laterality: N/A;  NEEDS CONSENT    ESOPHAGOGASTRODUODENOSCOPY N/A 2019    Procedure: EGD (ESOPHAGOGASTRODUODENOSCOPY);  Surgeon: Rui Holder MD;  Location: [address]);  Service: Endoscopy;  Laterality: N/A;  2L continuous O2 via  NC, COPD, pulm HTN    TRANSFORAMINAL EPIDURAL INJECTION OF STEROID Right 6/3/2021    Procedure: INJECTION, STEROID, EPIDURAL, TRANSFORAMINAL APPROACH, L4-L5;  Surgeon: Anibal Robles MD;  Location: [facility];  Service: Pain Management;  Laterality: Right;       Patient's Medications   New Prescriptions    No medications on file   Previous Medications    ALBUTEROL (PROVENTIL/VENTOLIN HFA) 90 MCG/ACTUATION INHALER    Inhale 2 puffs into the lungs every 4 (four) hours as needed for Wheezing or Shortness of Breath. Rescue    ALBUTEROL-IPRATROPIUM (DUO-NEB) 2.5 MG-0.5 MG/3 ML NEBULIZER SOLUTION    Take 3 mLs by nebulization every 4 (four) hours as needed for Wheezing or Shortness of Breath. Rescue    ATORVASTATIN (LIPITOR) 10 MG TABLET    Take 1 tablet (10 mg total) by mouth once daily.    AZITHROMYCIN (ZITHROMAX) 500 MG TABLET    Take 1 tablet (500 mg total) by mouth once daily.    BENZONATATE (TESSALON PERLES) 100 MG CAPSULE    Take 2 capsules (200 mg total) by mouth 3 (three) times daily as needed for Cough.    CELECOXIB (CELEBREX) 200 MG CAPSULE    TAKE 1 CAPSULE ONE TIME DAILY    CHOLECALCIFEROL, VITAMIN D3, (VITAMIN D3) 25 MCG (1,000 UNIT) CAPSULE    Take 1 capsule (1,000 Units total) by mouth once daily.    ETHAMBUTOL (MYAMBUTOL) 100 MG TAB    Take 2 tablets (200 mg total) by mouth once daily. Take two 100mg tablets PLUS two 400mg tablets for a total 1,000mg.    ETHAMBUTOL (MYAMBUTOL) 400 MG TAB    Take 2 tablets (800 mg total) by mouth once daily. Take two 100mg tablets PLUS two 400mg tablets for a total 1,000mg.    FLUTICASONE PROPIONATE (FLONASE) 50 MCG/ACTUATION NASAL SPRAY    SHAKE LIQUID AND USE 1 SPRAY (50MCG) IN EACH NOSTRIL TWICE DAILY    FLUTICASONE-UMECLIDIN-VILANTER (TRELEGY ELLIPTA) 200-62.5-25 MCG INHALER    Inhale 1 puff into the lungs once daily.    FUROSEMIDE (LASIX) 20 MG TABLET    TAKE 1 TABLET ONE TIME DAILY, INCREASING TO 2 TABLETS FOR LEG SWELLING OR WEIGHT GAIN AS DIRECTED     GABAPENTIN (NEURONTIN) 300 MG CAPSULE    TAKE 1 CAPSULE THREE TIMES DAILY    LOSARTAN (COZAAR) 100 MG TABLET    Take 1 tablet (100 mg total) by mouth once daily.    MUCUS CLEARING DEVICE (ACAPELLA, FLUTTER)    by Misc.(Non-Drug; Combo Route) route 4 (four) times daily.    PANTOPRAZOLE (PROTONIX) 40 MG TABLET    Take 1 tablet (40 mg total) by mouth once daily.    RIFAMPIN (RIFADIN) 300 MG CAPSULE    Take 2 capsules (600 mg total) by mouth once daily.    SODIUM CHLORIDE 3% 3 % NEBULIZER SOLUTION    Take 4 mLs by nebulization 2 (two) times a day.    TIZANIDINE (ZANAFLEX) 2 MG TABLET    Take 2 tablets (4 mg total) by mouth every 8 (eight) hours as needed (muscle spasm).   Modified Medications    No medications on file   Discontinued Medications    No medications on file       Patient Active Problem List    Diagnosis Date Noted    Transportation insecurity 02/04/2025    Chronic obstructive pulmonary disease with hypoxia 11/08/2024    SOB (shortness of breath) 10/03/2024    Mycobacterium avium complex 10/02/2024    Cavitary lesion of lung 08/16/2024    Chronic hypoxemic respiratory failure 08/16/2024    Chronic bilateral low back pain with bilateral sciatica 07/10/2024    Decreased ROM of lumbar spine 07/10/2024    Weakness of both lower extremities 07/10/2024    Lightheadedness 05/14/2024    Peripheral polyneuropathy 10/23/2022    Chronic diastolic heart failure 10/23/2022    Vitamin D deficiency 10/23/2022    Recurrent major depressive disorder, in partial remission 10/23/2022    History of nicotine dependence (former smoker) 06/20/2022    Mixed hyperlipidemia 12/30/2021    Myalgia 04/29/2021    Aortic calcification 03/22/2021     CT chest 9/30/20      Cervical myelopathy 03/04/2021    Sciatica of left side 01/26/2021    Cervical radiculopathy 01/02/2020    Cor pulmonale, chronic 04/10/2018    Gout involving toe of left foot 12/09/2017    Lumbar radiculopathy 03/10/2015    Essential hypertension     Hallux valgus  "(acquired) 09/25/2011     Dx updated per 2019 IMO Load      Adenomatous polyp of colon 05/20/2011    Primary localized osteoarthrosis, lower leg 04/17/2011       Review of Systems   Review of Systems   Constitutional:  Negative for chills, fever and malaise/fatigue.   HENT:  Negative for congestion and sore throat.    Eyes:  Negative for blurred vision and double vision.   Respiratory:  Positive for shortness of breath (stable). Negative for cough.    Cardiovascular:  Negative for chest pain and palpitations.   Gastrointestinal:  Negative for diarrhea and vomiting.   Musculoskeletal:  Negative for myalgias and neck pain.   Skin:  Negative for itching and rash.   Neurological:  Negative for dizziness and headaches.   Psychiatric/Behavioral:  Negative for substance abuse. The patient is not nervous/anxious.    All other systems reviewed and are negative.          Objective:      BP (!) 144/83 (BP Location: Left arm, Patient Position: Sitting)   Pulse 69   Temp 98.9 °F (37.2 °C) (Oral)   Ht 5' 3" (1.6 m)   Wt 70.9 kg (156 lb 4.9 oz)   LMP 11/21/2013   BMI 27.69 kg/m²   Estimated body mass index is 27.69 kg/m² as calculated from the following:    Height as of this encounter: 5' 3" (1.6 m).    Weight as of this encounter: 70.9 kg (156 lb 4.9 oz).    Physical Exam  Constitutional:       General: She is not in acute distress.     Appearance: She is well-developed.   HENT:      Head: Normocephalic and atraumatic.   Eyes:      Conjunctiva/sclera: Conjunctivae normal.      Pupils: Pupils are equal, round, and reactive to light.   Cardiovascular:      Rate and Rhythm: Normal rate and regular rhythm.      Heart sounds: Normal heart sounds.   Pulmonary:      Effort: Pulmonary effort is normal. No respiratory distress.      Breath sounds: Normal breath sounds.      Comments: NC in place  Abdominal:      General: Bowel sounds are normal. There is no distension.      Palpations: Abdomen is soft.   Musculoskeletal:         " General: Normal range of motion.      Cervical back: Normal range of motion and neck supple.   Skin:     General: Skin is warm and dry.   Neurological:      General: No focal deficit present.      Mental Status: She is alert and oriented to person, place, and time.      Cranial Nerves: No cranial nerve deficit.   Psychiatric:         Mood and Affect: Mood normal.         Behavior: Behavior normal.         Assessment:         1. Mycobacterium avium complex  CBC Auto Differential    Comprehensive Metabolic Panel    CT Chest Without Contrast              Plan:       Rochelle was seen today for follow-up.    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 Has variable resistance, so may not be of benefit.    Plan:  -continue daily triple therapy rif/ azithro/ ethambutol  Just had an eye exam within the last month. Recommended daily self vision exams while on ethambutol. Pt to hold med if experiences blurry vision for 2 consecutive days.  Discussed importance of airway clearance  Aerobika device- see instructional video on you tube  Hypertonic nebs bid  -monitor for drug toxicity with monthly cbc, cmp- labs today  -f/u afb cx (1/13 is positive)  -repeat CT chest      RTC in 2-3 mths  Lora Pacheco MD  Infectious Disease     Total professional time spent for the encounter: 30 minutes  Time was spent preparing to see the patient, reviewing results of prior testing, obtaining and/or reviewing separately obtained history, performing a medically appropriate examination and interview, counseling and educating the patient/family, ordering medications/tests/procedures, referring and communicating with other health care professionals,  documenting clinical information in the electronic health record, and independently interpreting results.

## 2025-02-12 LAB
ACID FAST MOD KINY STN SPEC: NORMAL
MYCOBACTERIUM SPEC QL CULT: NORMAL

## 2025-02-12 RX ORDER — ETHAMBUTOL HYDROCHLORIDE 100 MG/1
200 TABLET, FILM COATED ORAL DAILY
Qty: 60 TABLET | Refills: 11 | Status: SHIPPED | OUTPATIENT
Start: 2025-02-12 | End: 2026-02-12

## 2025-02-12 RX ORDER — RIFAMPIN 300 MG/1
600 CAPSULE ORAL DAILY
Qty: 60 CAPSULE | Refills: 11 | Status: SHIPPED | OUTPATIENT
Start: 2025-02-12 | End: 2026-02-12

## 2025-02-12 RX ORDER — ETHAMBUTOL HYDROCHLORIDE 400 MG/1
800 TABLET, FILM COATED ORAL DAILY
Qty: 60 TABLET | Refills: 11 | Status: SHIPPED | OUTPATIENT
Start: 2025-02-12 | End: 2026-02-12

## 2025-02-12 RX ORDER — AZITHROMYCIN 500 MG/1
500 TABLET, FILM COATED ORAL DAILY
Qty: 30 TABLET | Refills: 11 | Status: SHIPPED | OUTPATIENT
Start: 2025-02-12 | End: 2026-02-12

## 2025-03-05 ENCOUNTER — RESULTS FOLLOW-UP (OUTPATIENT)
Dept: INFECTIOUS DISEASES | Facility: HOSPITAL | Age: 70
End: 2025-03-05

## 2025-03-10 ENCOUNTER — HOSPITAL ENCOUNTER (OUTPATIENT)
Dept: RADIOLOGY | Facility: HOSPITAL | Age: 70
Discharge: HOME | End: 2025-03-10
Attending: INTERNAL MEDICINE
Payer: MEDICARE

## 2025-03-10 DIAGNOSIS — A31.0 MYCOBACTERIUM AVIUM COMPLEX: ICD-10-CM

## 2025-03-10 PROCEDURE — 71250 CT THORAX DX C-: CPT | Mod: 26,HCNC,, | Performed by: RADIOLOGY

## 2025-03-10 PROCEDURE — 71250 CT THORAX DX C-: CPT | Mod: TC,HCNC

## 2025-03-12 ENCOUNTER — PATIENT MESSAGE (OUTPATIENT)
Dept: INTERNAL MEDICINE | Facility: CLINIC | Age: 70
End: 2025-03-12
Payer: MEDICARE

## 2025-03-12 ENCOUNTER — TELEPHONE (OUTPATIENT)
Dept: INFECTIOUS DISEASES | Facility: CLINIC | Age: 70
End: 2025-03-12
Payer: MEDICARE

## 2025-03-12 ENCOUNTER — PATIENT MESSAGE (OUTPATIENT)
Dept: PULMONOLOGY | Facility: CLINIC | Age: 70
End: 2025-03-12
Payer: MEDICARE

## 2025-03-12 NOTE — TELEPHONE ENCOUNTER
"Called patient and discussed CT findings concerning for interval increase in " a slightly spiculated cavitary nodule" as well as new LIZ nodules. Suspect new nodules may be due to NTM infection, however, recommend she discuss finding of  "spiculated" nodule with pulm as this may need further eval to r/o malignancy.  Has f/u with pulm on 3/21.    "

## 2025-03-13 ENCOUNTER — TELEPHONE (OUTPATIENT)
Dept: PULMONOLOGY | Facility: CLINIC | Age: 70
End: 2025-03-13
Payer: MEDICARE

## 2025-03-13 NOTE — TELEPHONE ENCOUNTER
Spoke to patient stated that Dr Pacheco from Infectious Disease informed her that Ct-scan results showed some concerns that a biopsy is needed. Patient states she is concerned and would like to speak with Dr Mao. I advised patient that I will forward her message to [place].

## 2025-03-13 NOTE — TELEPHONE ENCOUNTER
----- Message from Christa sent at 3/13/2025 10:35 AM CDT -----  Consult/AdvisoryName Of Caller:Rochelle Contact Preference:[phone] of call: Pt called stating she had a ct scan past Monday she stated a nodule was shown her right lung showing it was cancer. She stated she was told she needs to have a biopsy she is asking for a call back to see about being scheduled

## 2025-03-14 ENCOUNTER — TELEPHONE (OUTPATIENT)
Dept: PULMONOLOGY | Facility: CLINIC | Age: 70
End: 2025-03-14
Payer: MEDICARE

## 2025-03-14 NOTE — TELEPHONE ENCOUNTER
Reviewed most recent chest CT images obtained by ID. Findings most consistent with her known NTM. Patient reported she is not having much effect with airway clearance from her 3% nebs and just started the aerobika device. I think the fact that her nodules are small, bilateral and UL predominant makes it much more likely they represent infection rather than malignancy. I see her next week in clinic at which point we can come up with a plan for effective airway clearance. She is also relatively recently started on MAC therapy and her most recent AFB is still positive. I suspect that once she has more effective airway clearance and her MAC is controlled, these will improve. Will plan to repeat CT scan in 3 months and re-assess at that time. I informed the patient of this plan and answered all questions.       Fermin Mao MD  Pulmonary/Critical Care Fellow

## 2025-03-24 RX ORDER — LOSARTAN POTASSIUM 100 MG/1
100 TABLET ORAL
Qty: 90 TABLET | Refills: 3 | Status: SHIPPED | OUTPATIENT
Start: 2025-03-24

## 2025-03-24 NOTE — TELEPHONE ENCOUNTER
Care Due:                  Date            Visit Type   Department     Provider  --------------------------------------------------------------------------------                                EP -                              PRIMARY      [facility] INTERNAL  Last Visit: 07-      CARE ([facility])   ISIAH Samuels                              [facility]                              PRIMARY      [facility] INTERNAL  Next Visit: 05-      CARE ([facility])   [facility]       Yosi Samuels                                                            Last  Test          Frequency    Reason                     Performed    Due Date  --------------------------------------------------------------------------------    Lipid Panel.  12 months..  atorvastatin.............  01- 01-    Vitamin D...  12 months..  cholecalciferol,.........  Not Found    Overdue    [employer] Embedded Care Due Messages. Reference number: [id number].   3/24/2025 1:57:36 AM CDT

## 2025-04-10 ENCOUNTER — OFFICE VISIT (OUTPATIENT)
Dept: INFECTIOUS DISEASES | Facility: CLINIC | Age: 70
End: 2025-04-10
Payer: MEDICARE

## 2025-04-10 ENCOUNTER — TELEPHONE (OUTPATIENT)
Dept: INFECTIOUS DISEASES | Facility: CLINIC | Age: 70
End: 2025-04-10

## 2025-04-10 DIAGNOSIS — A31.0 MYCOBACTERIUM AVIUM COMPLEX: Primary | ICD-10-CM

## 2025-04-10 DIAGNOSIS — J98.4 CAVITARY LESION OF LUNG: ICD-10-CM

## 2025-04-10 NOTE — Clinical Note
Please schedule monthly cbc and cmp - will go in next tues. F/u with me when I'm back in sept. thanks

## 2025-04-10 NOTE — PROGRESS NOTES
"Infectious Disease Clinic Note  04/10/2025       Subjective:       Patient ID: Rochelle Espinoza is a 69 y.o. female being seen for a follow-up visit.    Chief Complaint: No chief complaint on file.    HPI  70y/o F with hx of COPD, chronic respiratory failure with hypoxemia and hypercapnia on 3L NC at home, CHF, HTN and HLD who presents for f/u of  pulmonary MAC infection.     Reports feeling "ok." Has been coughing up a little more mucus. Describes it as clear colored. Usually coughs in the morning when she comes off the bipap machine. Still on the same 3L NC. No recent fevers, chills, night sweats, diarrhea.    Says the saline soln in the nebulizer constricts her breathing. Only using ipratropium.     Discussed recent CT 3/10/25 findings with pulm Dr. Mao who believes lesions are likely infectious in etiology: "...based on the proximity of the lesions to the original and that they are bilateral, they are very likely to be infectious. Would recommend repeating the CT in 3 months and re-assess. In the meantime, she needs very aggressive airway clearance." Appreciate input. Pulm discussed findings and plan with patient as well.    Additional hx:  AFB sputum cultures positive for MAC on 8/16, 8/17 (x2). Started on triple therapy on 11/18/24. Symptoms stable.      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 Used to see Dr. Paulino, now follows with Dr. Keller.   Family History   Problem Relation Name Age of Onset    Heart disease Mother      Heart disease Paternal Uncle      Celiac disease Neg Hx      Colon cancer Neg Hx      Colon polyps Neg Hx      Crohn's disease Neg Hx      Cystic fibrosis Neg Hx      Esophageal cancer Neg Hx      Inflammatory bowel disease Neg Hx      Irritable bowel syndrome Neg Hx      Liver cancer Neg Hx      Liver disease Neg Hx      Rectal cancer Neg Hx      Stomach cancer Neg " Hx      Ulcerative colitis Neg Hx       Social History     Socioeconomic History    Marital status:    Occupational History    Occupation:    Tobacco Use    Smoking status: Former     Current packs/day: 0.00     Average packs/day: 1 pack/day for 52.7 years (51.1 ttl pk-yrs)     Types: Cigarettes     Start date:      Quit date: 2024     Years since quittin.5    Smokeless tobacco: Never    Tobacco comments:     Quit over a month ago    Substance and Sexual Activity    Alcohol use: Not Currently     Comment: beer daily 2-3 daily, none today    Drug use: No    Sexual activity: Not Currently     Birth control/protection: None     Social Drivers of Health     Financial Resource Strain: Medium Risk (2025)    Overall Financial Resource Strain (CARDIA)     Difficulty of Paying Living Expenses: Somewhat hard   Food Insecurity: Food Insecurity Present (2025)    Hunger Vital Sign     Worried About Running Out of Food in the Last Year: Sometimes true     Ran Out of Food in the Last Year: Sometimes true   Transportation Needs: Unmet Transportation Needs (2025)    PRAPARE - Transportation     Lack of Transportation (Medical): Yes     Lack of Transportation (Non-Medical): Yes   Physical Activity: Inactive (2025)    Exercise Vital Sign     Days of Exercise per Week: 0 days     Minutes of Exercise per Session: 0 min   Stress: Stress Concern Present (2025)    [employer] [place] [employer] - Occupational Stress Questionnaire     Feeling of Stress : To some extent   Housing Stability: Unknown (2025)    Housing Stability Vital Sign     Unable to Pay for Housing in the Last Year: No     Homeless in the Last Year: No     Past Surgical History:   Procedure Laterality Date    BREAST BIOPSY Right     core     SECTION      COLONOSCOPY N/A 2019    Procedure: COLONOSCOPY;  Surgeon: Rui Holder MD;  Location: [facility] ([address] [place] [address]);  Service: Endoscopy;   Laterality: N/A;    EPIDURAL STEROID INJECTION N/A 1/2/2020    Procedure: CERVICAL BLAKE;  Surgeon: Papito High MD;  Location: [facility] PAIN MGT;  Service: Pain Management;  Laterality: N/A;  NEEDS CONSENT    ESOPHAGOGASTRODUODENOSCOPY N/A 12/19/2019    Procedure: EGD (ESOPHAGOGASTRODUODENOSCOPY);  Surgeon: Rui Holder MD;  Location: [facility] ENDO (2ND FLR);  Service: Endoscopy;  Laterality: N/A;  2L continuous O2 via NC, COPD, pulm HTN    TRANSFORAMINAL EPIDURAL INJECTION OF STEROID Right 6/3/2021    Procedure: INJECTION, STEROID, EPIDURAL, TRANSFORAMINAL APPROACH, L4-L5;  Surgeon: Anibal Robles MD;  Location: [facility] PAIN MGT;  Service: Pain Management;  Laterality: Right;       Patient's Medications   New Prescriptions    No medications on file   Previous Medications    ALBUTEROL (PROVENTIL/VENTOLIN HFA) 90 MCG/ACTUATION INHALER    Inhale 2 puffs into the lungs every 4 (four) hours as needed for Wheezing or Shortness of Breath. Rescue    ALBUTEROL-IPRATROPIUM (DUO-NEB) 2.5 MG-0.5 MG/3 ML NEBULIZER SOLUTION    Take 3 mLs by nebulization every 4 (four) hours as needed for Wheezing or Shortness of Breath. Rescue    ATORVASTATIN (LIPITOR) 10 MG TABLET    Take 1 tablet (10 mg total) by mouth once daily.    AZITHROMYCIN (ZITHROMAX) 500 MG TABLET    Take 1 tablet (500 mg total) by mouth once daily.    BENZONATATE (TESSALON PERLES) 100 MG CAPSULE    Take 2 capsules (200 mg total) by mouth 3 (three) times daily as needed for Cough.    CELECOXIB (CELEBREX) 200 MG CAPSULE    TAKE 1 CAPSULE ONE TIME DAILY    CHOLECALCIFEROL, VITAMIN D3, (VITAMIN D3) 25 MCG (1,000 UNIT) CAPSULE    Take 1 capsule (1,000 Units total) by mouth once daily.    ETHAMBUTOL (MYAMBUTOL) 100 MG TAB    Take 2 tablets (200 mg total) by mouth once daily. Take two 100mg tablets PLUS two 400mg tablets for a total 1,000mg.    ETHAMBUTOL (MYAMBUTOL) 400 MG TAB    Take 2 tablets (800 mg total) by mouth once daily. Take two 100mg tablets PLUS two 400mg tablets for  a total 1,000mg.    FLUTICASONE PROPIONATE (FLONASE) 50 MCG/ACTUATION NASAL SPRAY    SHAKE LIQUID AND USE 1 SPRAY (50MCG) IN EACH NOSTRIL TWICE DAILY    FLUTICASONE-UMECLIDIN-VILANTER (TRELEGY ELLIPTA) 200-62.5-25 MCG INHALER    Inhale 1 puff into the lungs once daily.    FUROSEMIDE (LASIX) 20 MG TABLET    TAKE 1 TABLET ONE TIME DAILY, INCREASING TO 2 TABLETS FOR LEG SWELLING OR WEIGHT GAIN AS DIRECTED    GABAPENTIN (NEURONTIN) 300 MG CAPSULE    TAKE 1 CAPSULE THREE TIMES DAILY    LOSARTAN (COZAAR) 100 MG TABLET    TAKE 1 TABLET ONE TIME DAILY    MUCUS CLEARING DEVICE (ACAPELLA, FLUTTER)    by Misc.(Non-Drug; Combo Route) route 4 (four) times daily.    PANTOPRAZOLE (PROTONIX) 40 MG TABLET    Take 1 tablet (40 mg total) by mouth once daily.    RIFAMPIN (RIFADIN) 300 MG CAPSULE    Take 2 capsules (600 mg total) by mouth once daily.    SODIUM CHLORIDE 3% 3 % NEBULIZER SOLUTION    Take 4 mLs by nebulization 2 (two) times a day.    TIZANIDINE (ZANAFLEX) 2 MG TABLET    Take 2 tablets (4 mg total) by mouth every 8 (eight) hours as needed (muscle spasm).   Modified Medications    No medications on file   Discontinued Medications    No medications on file       Patient Active Problem List    Diagnosis Date Noted    Transportation insecurity 02/04/2025    Chronic obstructive pulmonary disease with hypoxia 11/08/2024    SOB (shortness of breath) 10/03/2024    Mycobacterium avium complex 10/02/2024    Cavitary lesion of lung 08/16/2024    Chronic hypoxemic respiratory failure 08/16/2024    Chronic bilateral low back pain with bilateral sciatica 07/10/2024    Decreased ROM of lumbar spine 07/10/2024    Weakness of both lower extremities 07/10/2024    Lightheadedness 05/14/2024    Peripheral polyneuropathy 10/23/2022    Chronic diastolic heart failure 10/23/2022    Vitamin D deficiency 10/23/2022    Recurrent major depressive disorder, in partial remission 10/23/2022    History of nicotine dependence (former smoker) 06/20/2022     "Mixed hyperlipidemia 12/30/2021    Myalgia 04/29/2021    Aortic calcification 03/22/2021     CT chest 9/30/20      Cervical myelopathy 03/04/2021    Sciatica of left side 01/26/2021    Cervical radiculopathy 01/02/2020    Cor pulmonale, chronic 04/10/2018    Gout involving toe of left foot 12/09/2017    Lumbar radiculopathy 03/10/2015    Essential hypertension     Hallux valgus (acquired) 09/25/2011     Dx updated per 2019 IMO Load      Adenomatous polyp of colon 05/20/2011    Primary localized osteoarthrosis, lower leg 04/17/2011       Review of Systems   Review of Systems   Constitutional:  Negative for chills, fever and malaise/fatigue.   HENT:  Negative for congestion and sore throat.    Eyes:  Negative for blurred vision and double vision.   Respiratory:  Positive for shortness of breath (stable). Negative for cough.    Cardiovascular:  Negative for chest pain and palpitations.   Gastrointestinal:  Negative for diarrhea and vomiting.   Musculoskeletal:  Negative for myalgias and neck pain.   Skin:  Negative for itching and rash.   Neurological:  Negative for dizziness and headaches.   Psychiatric/Behavioral:  Negative for substance abuse. The patient is not nervous/anxious.    All other systems reviewed and are negative.          Objective:      LMP 11/21/2013   Estimated body mass index is 27.69 kg/m² as calculated from the following:    Height as of 2/11/25: 5' 3" (1.6 m).    Weight as of 2/11/25: 70.9 kg (156 lb 4.9 oz).    Physical Exam  Constitutional:       General: She is not in acute distress.     Appearance: She is well-developed.   HENT:      Head: Normocephalic and atraumatic.   Eyes:      Conjunctiva/sclera: Conjunctivae normal.      Pupils: Pupils are equal, round, and reactive to light.   Cardiovascular:      Rate and Rhythm: Normal rate and regular rhythm.      Heart sounds: Normal heart sounds.   Pulmonary:      Effort: Pulmonary effort is normal. No respiratory distress.      Breath sounds: " Normal breath sounds.      Comments: NC in place  Abdominal:      General: Bowel sounds are normal. There is no distension.      Palpations: Abdomen is soft.   Musculoskeletal:         General: Normal range of motion.      Cervical back: Normal range of motion and neck supple.   Skin:     General: Skin is warm and dry.   Neurological:      General: No focal deficit present.      Mental Status: She is alert and oriented to person, place, and time.      Cranial Nerves: No cranial nerve deficit.   Psychiatric:         Mood and Affect: Mood normal.         Behavior: Behavior normal.         Assessment:         1. Mycobacterium avium complex  CBC Auto Differential    Comprehensive Metabolic Panel      2. Cavitary lesion of lung  CBC Auto Differential    Comprehensive Metabolic Panel                Plan:       Rochelle was seen today for follow-up.    Diagnoses and all orders for this visit:    Pulmonary infection due to Mycobacterium avium  Long term antibiotic use  -Pipe meets IDSA diagnosis criteria  -CT with LIZ cavity and nodular disease, symptomatic and with atleast 2 positive afb cx  -Tolerating triple therapy. duration 12 mths from culture clearance (afb cx negative on 2/11/25).  -Pt opting against IV amikacin. Has variable resistance, so may not be of benefit. Last cx was R.    Plan:  -continue daily triple therapy rif/ azithro/ ethambutol  -Recommended daily self vision exams while on ethambutol. Pt to hold med if experiences blurry vision for 2 consecutive days.  -Discussed importance of airway clearance  Aerobika device-    Hypertonic nebs bid- no longer doing do to not being able to breathe after  -monitor for drug toxicity with monthly cbc, cmp- labs next tues and monthly thereafter  -repeat afb sputum culture  -repeat CT chest in 3 mths per pulm recs  -f/u with pulm 4/25      Cavitary lung lesion  High risk for lung ca given smoking history. Pulm suspects likely MAC infection.   --repeat CT in 3  mths.    RTC in 4 mths  Lora Pacheco MD  Infectious Disease     Total professional time spent for the encounter: 30 minutes  Time was spent preparing to see the patient, reviewing results of prior testing, obtaining and/or reviewing separately obtained history, performing a medically appropriate examination and interview, counseling and educating the patient/family, ordering medications/tests/procedures, referring and communicating with other health care professionals, documenting clinical information in the electronic health record, and independently interpreting results.     Visit today included increased complexity associated with the care of the episodic problem long term antibiotic use addressed and managing the longitudinal care of the patient due to the serious and/or complex managed problem(s) pulmonary MAC infection.

## 2025-04-10 NOTE — TELEPHONE ENCOUNTER
Spoke to pt, scheduled her monthly labs and I put in a 5 month recall.    ----- Message from Lora Pacheco MD sent at 4/10/2025 11:23 AM CDT -----  Please schedule monthly cbc and cmp - will go in next tues. F/u with me when I'm back in sept. thanks

## 2025-04-11 ENCOUNTER — PATIENT MESSAGE (OUTPATIENT)
Dept: INFECTIOUS DISEASES | Facility: CLINIC | Age: 70
End: 2025-04-11
Payer: MEDICARE

## 2025-04-15 ENCOUNTER — HOSPITAL ENCOUNTER (OUTPATIENT)
Dept: RADIOLOGY | Facility: CLINIC | Age: 70
Discharge: HOME | End: 2025-04-15
Attending: PHYSICIAN ASSISTANT
Payer: MEDICARE

## 2025-04-15 ENCOUNTER — RESULTS FOLLOW-UP (OUTPATIENT)
Dept: INFECTIOUS DISEASES | Facility: HOSPITAL | Age: 70
End: 2025-04-15

## 2025-04-15 DIAGNOSIS — Z78.0 ASYMPTOMATIC MENOPAUSE: ICD-10-CM

## 2025-04-15 PROCEDURE — 77080 DXA BONE DENSITY AXIAL: CPT | Mod: TC,HCNC

## 2025-04-15 PROCEDURE — 77092 TBS I&R FX RSK QHP: CPT | Mod: HCNC,S$GLB,, | Performed by: INTERNAL MEDICINE

## 2025-04-15 PROCEDURE — 77080 DXA BONE DENSITY AXIAL: CPT | Mod: 26,HCNC,, | Performed by: INTERNAL MEDICINE

## 2025-04-17 ENCOUNTER — RESULTS FOLLOW-UP (OUTPATIENT)
Dept: INTERNAL MEDICINE | Facility: CLINIC | Age: 70
End: 2025-04-17

## 2025-04-21 DIAGNOSIS — Z76.0 ENCOUNTER FOR MEDICATION REFILL: ICD-10-CM

## 2025-04-21 RX ORDER — FUROSEMIDE 20 MG/1
TABLET ORAL
Qty: 180 TABLET | Refills: 3 | Status: SHIPPED | OUTPATIENT
Start: 2025-04-21

## 2025-04-21 NOTE — TELEPHONE ENCOUNTER
No care due was identified.  [facility] Embedded Care Due Messages. Reference number: [id number].   4/21/2025 3:29:48 AM CDT

## 2025-04-22 ENCOUNTER — OFFICE VISIT (OUTPATIENT)
Dept: URGENT CARE | Facility: CLINIC | Age: 70
End: 2025-04-22
Payer: MEDICARE

## 2025-04-22 VITALS
RESPIRATION RATE: 18 BRPM | WEIGHT: 156 LBS | DIASTOLIC BLOOD PRESSURE: 73 MMHG | HEART RATE: 71 BPM | HEIGHT: 63 IN | SYSTOLIC BLOOD PRESSURE: 118 MMHG | TEMPERATURE: 99 F | BODY MASS INDEX: 27.64 KG/M2 | OXYGEN SATURATION: 95 %

## 2025-04-22 DIAGNOSIS — R51.9 NONINTRACTABLE EPISODIC HEADACHE, UNSPECIFIED HEADACHE TYPE: ICD-10-CM

## 2025-04-22 DIAGNOSIS — J32.9 RHINOSINUSITIS: Primary | ICD-10-CM

## 2025-04-22 DIAGNOSIS — Z91.89 AT HIGH RISK FOR RESPIRATORY INFECTION: ICD-10-CM

## 2025-04-22 DIAGNOSIS — R09.81 NASAL CONGESTION: ICD-10-CM

## 2025-04-22 DIAGNOSIS — J30.2 SEASONAL ALLERGIC RHINITIS, UNSPECIFIED TRIGGER: ICD-10-CM

## 2025-04-22 LAB
CTP QC/QA: YES
SARS CORONAVIRUS 2 ANTIGEN: NEGATIVE

## 2025-04-22 PROCEDURE — 99214 OFFICE O/P EST MOD 30 MIN: CPT | Mod: 25,S$GLB,, | Performed by: PHYSICIAN ASSISTANT

## 2025-04-22 PROCEDURE — 87811 SARS-COV-2 COVID19 W/OPTIC: CPT | Mod: QW,S$GLB,, | Performed by: PHYSICIAN ASSISTANT

## 2025-04-22 PROCEDURE — 96372 THER/PROPH/DIAG INJ SC/IM: CPT | Mod: S$GLB,,, | Performed by: PHYSICIAN ASSISTANT

## 2025-04-22 RX ORDER — KETOROLAC TROMETHAMINE 30 MG/ML
30 INJECTION, SOLUTION INTRAMUSCULAR; INTRAVENOUS
Status: COMPLETED | OUTPATIENT
Start: 2025-04-22 | End: 2025-04-22

## 2025-04-22 RX ORDER — PREDNISONE 20 MG/1
TABLET ORAL
Qty: 4 TABLET | Refills: 0 | Status: SHIPPED | OUTPATIENT
Start: 2025-04-22 | End: 2025-04-25

## 2025-04-22 RX ORDER — AZELASTINE 1 MG/ML
1 SPRAY, METERED NASAL 2 TIMES DAILY
Qty: 30 ML | Refills: 0 | Status: SHIPPED | OUTPATIENT
Start: 2025-04-22 | End: 2025-05-22

## 2025-04-22 RX ADMIN — KETOROLAC TROMETHAMINE 30 MG: 30 INJECTION, SOLUTION INTRAMUSCULAR; INTRAVENOUS at 01:04

## 2025-04-22 NOTE — PATIENT INSTRUCTIONS
- Rest.    - Drink plenty of fluids.    - you received a shot of toradol in clinic today, so do not take ibuprofen or celebrex until at least 12 hours from now.       - You can take over-the-counter claritin, zyrtec, allegra, or xyzal as directed. These are antihistamines that can help with runny nose, nasal congestion, sneezing, and helps to dry up post-nasal drip, which usually causes sore throat and cough.    - You can use Flonase (fluticasone) nasal spray as directed for sinus congestion and postnasal drip. This is a steroid nasal spray that works locally over time to decrease the inflammation in your nose/sinuses and help with allergic symptoms. This is not an quick- relief spray like afrin, but it works well if used daily.  Discontinue if you develop nose bleed  - use OTC nasal saline prior to Flonase.  - you can use OTC nasal saline such as Ocean Spray Nasal Saline 1-3 puffs each nostril every 2-3 hours then blow out onto tissue. This is to irrigate the nasal passage way to clear the sinus openings. Use until sinus problem resolved.    -azelastine spray is a nasal antihistamine spray that helps with sinus issues and post nasal drip.     - You received a steroid (prednisone) today.  This can elevate your blood pressure, elevate your blood sugar, water weight gain, nervous energy, redness to the face and dimpling of the skin where the shot goes in.   - Do not use steroids more than 3 times per year.   - If you have diabetes, please check you blood sugar frequently.  - If you have high blood pressure, please check your blood pressure frequently.     - Follow up with your PCP or specialty clinic as directed in the next 1-2 weeks if not improved or as needed.  You can call [phone] to schedule an appointment with the appropriate provider.      - Go to the ER if you develop new or worsening symptoms.     - You must understand that you have received an Urgent Care treatment only and that you may be released  before all of your medical problems are known or treated.   - You, the patient, will arrange for follow up care as instructed.   - If your condition worsens or fails to improve we recommend that you receive another evaluation at the ER immediately or contact your PCP to discuss your concerns or return here.

## 2025-04-22 NOTE — PROGRESS NOTES
"Subjective:      Patient ID: Rochelle Espinoza is a 69 y.o. female.    Vitals:  height is 5' 3" (1.6 m) and weight is 70.8 kg (156 lb). Her oral temperature is 98.8 °F (37.1 °C). Her blood pressure is 118/73 and her pulse is 71. Her respiration is 18 and oxygen saturation is 95%.     Chief Complaint: Nasal Congestion    This is a 69 y.o. female who presents today with a chief complaint of nasal congestion, rhinorrhea, sinus pressure, headaches that began 2 days ago.  No known fever.  No coughing out of the ordinary, she does have history of COPD and MAC, being treated with daily azithromycin and ethambutol.  no head injury or trauma..  No other neurological symptoms.  Taking allergy pill and Flonase.  Patient states that her sinuses feel swollen.  Patient wants to rule out COVID as well.    Headache   This is a new problem. The current episode started in the past 7 days. The problem occurs constantly. The problem has been unchanged. The pain is located in the Frontal (different locations) region. The pain does not radiate. The pain quality is similar to prior headaches. The quality of the pain is described as squeezing and throbbing. The pain is at a severity of 8/10. The pain is moderate. Associated symptoms include muscle aches and sinus pressure. Pertinent negatives include no abdominal pain, abnormal behavior, anorexia, blurred vision, coughing, dizziness, drainage, ear pain, eye pain, eye redness, eye watering, facial sweating, fever, hearing loss, insomnia, loss of balance, nausea, neck pain, numbness, phonophobia, rhinorrhea, scalp tenderness, seizures, sore throat, swollen glands, tingling, tinnitus, visual change, vomiting, weakness or weight loss. Nothing aggravates the symptoms. She has tried nothing for the symptoms. The treatment provided no relief. Her past medical history is significant for hypertension.       Constitution: Positive for fatigue. Negative for chills, sweating and fever.   HENT:  Positive " for congestion, postnasal drip and sinus pressure. Negative for ear pain, tinnitus, hearing loss and sore throat.    Neck: Negative for neck pain and neck stiffness.   Cardiovascular:  Negative for chest pain, leg swelling and palpitations.   Eyes:  Negative for eye itching, eye pain, eye redness and blurred vision.   Respiratory:  Negative for cough, sputum production and shortness of breath.    Gastrointestinal:  Negative for abdominal pain, nausea, vomiting and diarrhea.   Genitourinary:  Negative for dysuria, frequency, urgency and flank pain.   Musculoskeletal:  Negative for pain and joint swelling.   Skin:  Negative for color change and rash.   Neurological:  Positive for headaches. Negative for dizziness, light-headedness, passing out, facial drooping, speech difficulty, coordination disturbances, loss of balance, disorientation, altered mental status, loss of consciousness, numbness, tingling, seizures and tremors.   Psychiatric/Behavioral:  Negative for altered mental status and disorientation. The patient does not have insomnia.       Objective:     Physical Exam   Constitutional: She is oriented to person, place, and time. She appears well-developed. She is cooperative.  Non-toxic appearance. She does not appear ill. No distress.   HENT:   Head: Normocephalic and atraumatic.   Ears:   Right Ear: Hearing, tympanic membrane, external ear and ear canal normal.   Left Ear: Hearing, tympanic membrane, external ear and ear canal normal.   Nose: Mucosal edema present. No rhinorrhea or nasal deformity. No epistaxis. Right sinus exhibits maxillary sinus tenderness. Right sinus exhibits no frontal sinus tenderness. Left sinus exhibits maxillary sinus tenderness. Left sinus exhibits no frontal sinus tenderness.   Mouth/Throat: Uvula is midline, oropharynx is clear and moist and mucous membranes are normal. She does not have dentures. No trismus in the jaw. Normal dentition. No uvula swelling. No oropharyngeal  exudate, posterior oropharyngeal edema or posterior oropharyngeal erythema. No tonsillar exudate.   Eyes: Conjunctivae, EOM and lids are normal. Pupils are equal, round, and reactive to light. No scleral icterus.   Neck: Trachea normal and phonation normal. Neck supple. No edema present. No erythema present. No neck rigidity present.   Cardiovascular: Normal rate, regular rhythm, normal heart sounds and normal pulses.   Pulmonary/Chest: Effort normal and breath sounds normal. No accessory muscle usage or stridor. No tachypnea and no bradypnea. No respiratory distress. She has no decreased breath sounds. She has no wheezes. She has no rhonchi. She has no rales.   Abdominal: Normal appearance and bowel sounds are normal. She exhibits no distension. Soft. There is no abdominal tenderness.   Musculoskeletal: Normal range of motion.         General: No deformity. Normal range of motion.   Lymphadenopathy:     She has no cervical adenopathy.   Neurological: no focal deficit. She is alert and oriented to person, place, and time. She has normal motor skills, normal sensation and intact cranial nerves (2-12). She displays no weakness, no atrophy, no tremor, facial symmetry and no dysarthria. No cranial nerve deficit or sensory deficit. She exhibits normal muscle tone. She has a normal Finger-Nose-Finger Test. Coordination: Rapid alternating movements normal. She displays no seizure activity. Gait and coordination normal. Coordination and gait normal. GCS eye subscore is 4. GCS verbal subscore is 5. GCS motor subscore is 6.      Comments: Alert, oriented x 3. EOMI, PERRLA. Cranial nerves intact: facial expressions (smile, raising eyebrows, shutting eyes, pursed lips) symmetric. Shoulder shrug str.5/5 bilaterally. Jaw is midline without deviation. Tongue protrudes at midline without fasciculations.  Uvula rises at midline. Sensation to face in distribution of CN V1, V2, and V3 intact. Sensation to upper and lower extremities  intact. Finger to nose, rapid rhythmic alternating movements are intact and smooth bilaterally. Patient ambulates unassisted without rigidity or ataxia. Voice quality, comprehension, articulation, coherence assessed as appropriate.   Bilateral shoulders, elbows, wrists, knees exhibit full range of motion and 5/5 strength.   strength 5/5 bilaterally.     Skin: Skin is warm, dry, intact, not diaphoretic and not pale.   Psychiatric: Her speech is normal and behavior is normal. Judgment and thought content normal.   Nursing note and vitals reviewed.    Results for orders placed or performed in visit on 04/22/25   SARS Coronavirus 2 Antigen, POCT Manual Read    Collection Time: 04/22/25 12:32 PM   Result Value Ref Range    SARS Coronavirus 2 Antigen Negative Negative, Presumptive Negative     Acceptable Yes      *Note: Due to a large number of results and/or encounters for the requested time period, some results have not been displayed. A complete set of results can be found in Results Review.         Assessment:     1. Rhinosinusitis    2. Nasal congestion    3. Nonintractable episodic headache, unspecified headache type    4. Seasonal allergic rhinitis, unspecified trigger        Plan:       Rhinosinusitis  -     predniSONE (DELTASONE) 20 MG tablet; Take 2 tablets (40 mg total) by mouth once daily for 1 day, THEN 1 tablet (20 mg total) once daily for 2 days.  Dispense: 4 tablet; Refill: 0  -     azelastine (ASTELIN) 137 mcg (0.1 %) nasal spray; 1 spray (137 mcg total) by Nasal route 2 (two) times daily.  Dispense: 30 mL; Refill: 0    Nasal congestion  -     SARS Coronavirus 2 Antigen, POCT Manual Read    Nonintractable episodic headache, unspecified headache type  -     ketorolac injection 30 mg    Seasonal allergic rhinitis, unspecified trigger        Patient would like Toradol shot for headaches.  She does not take Celebrex every day, only p.r.n..  She has not taken any today.  Patient experiencing  headaches associated with sinus/allergy symptoms, likely sinus headaches.  Due to age, have done neurological exam, no red flag S/S.  Close follow up with PCP, follow up precautions sooner p.r.n. for new or worsening symptoms. Discussed ddx, home care, tx options, and given follow up precautions.  I have reviewed the patient's chart to view previous visits, labs, and imaging to assess PMH and look for any trends or previous treatments.

## 2025-04-23 RX ORDER — IPRATROPIUM BROMIDE AND ALBUTEROL SULFATE 2.5; .5 MG/3ML; MG/3ML
SOLUTION RESPIRATORY (INHALATION)
Qty: 1080 ML | Refills: 5 | Status: SHIPPED | OUTPATIENT
Start: 2025-04-23

## 2025-04-25 ENCOUNTER — OFFICE VISIT (OUTPATIENT)
Dept: PULMONOLOGY | Facility: CLINIC | Age: 70
End: 2025-04-25
Payer: MEDICARE

## 2025-04-25 ENCOUNTER — TELEPHONE (OUTPATIENT)
Dept: PULMONOLOGY | Facility: CLINIC | Age: 70
End: 2025-04-25

## 2025-04-25 VITALS
OXYGEN SATURATION: 90 % | BODY MASS INDEX: 28.32 KG/M2 | SYSTOLIC BLOOD PRESSURE: 116 MMHG | HEIGHT: 63 IN | HEART RATE: 90 BPM | DIASTOLIC BLOOD PRESSURE: 62 MMHG | WEIGHT: 159.81 LBS

## 2025-04-25 DIAGNOSIS — J96.11 CHRONIC RESPIRATORY FAILURE WITH HYPOXIA AND HYPERCAPNIA: ICD-10-CM

## 2025-04-25 DIAGNOSIS — J96.11 CHRONIC HYPOXEMIC RESPIRATORY FAILURE: ICD-10-CM

## 2025-04-25 DIAGNOSIS — J98.4 CAVITARY LESION OF LUNG: Primary | ICD-10-CM

## 2025-04-25 DIAGNOSIS — J96.12 CHRONIC RESPIRATORY FAILURE WITH HYPOXIA AND HYPERCAPNIA: ICD-10-CM

## 2025-04-25 DIAGNOSIS — A31.0 MYCOBACTERIUM AVIUM COMPLEX: ICD-10-CM

## 2025-04-25 DIAGNOSIS — J44.9 CHRONIC OBSTRUCTIVE PULMONARY DISEASE WITH HYPOXIA: ICD-10-CM

## 2025-04-25 PROCEDURE — 99999 PR PBB SHADOW E&M-EST. PATIENT-LVL IV: CPT | Mod: PBBFAC,HCNC,GC, | Performed by: STUDENT IN AN ORGANIZED HEALTH CARE EDUCATION/TRAINING PROGRAM

## 2025-04-25 RX ORDER — FLUTICASONE FUROATE, UMECLIDINIUM BROMIDE AND VILANTEROL TRIFENATATE 200; 62.5; 25 UG/1; UG/1; UG/1
1 POWDER RESPIRATORY (INHALATION) DAILY
Qty: 3 EACH | Refills: 4 | Status: SHIPPED | OUTPATIENT
Start: 2025-04-25

## 2025-04-25 NOTE — TELEPHONE ENCOUNTER
Left message on pt voicemail, informing her that I have received her message. Pt states that she will be here for her appointment at 4pm or five minutes after 4pm. I verbalized to pt that I understand.

## 2025-04-25 NOTE — TELEPHONE ENCOUNTER
----- Message from Christa sent at 4/25/2025  3:28 PM CDT -----  Consult/AdvisoryName Of Caller:Rochelle Contact Preference:[phone] of call: Pt called she stated she may be running a lil behind  ----- Message -----  From: Christa Tavera  Sent: 4/25/2025   3:30 PM CDT  [facility] [place] [place] [facility]    Consult/AdvisoryName Of Caller:Rochelle Contact Preference:[phone] of call: Pt called to reschedule her appt for today nothing is showing avail for Dr Mao she also stated she will like a morning appt

## 2025-04-25 NOTE — PROGRESS NOTES
"[facility]    Subjective:     Reason for visit: COPD follow up    Patient ID:  Rochelle Espinoza is a 69 y.o. female with COPD, chronic H/H RF on 3L home O2 and nightly BIPAP, tobacco abuse, HFpEF presenting for follow up.    Interval History:  Seen by ID 4/10. Continuing on MAC therapy. She has stopped smoking!!! 7 months now. She feels better since stopping smoking. Has not needed as much rescue inhalers. No using flutter valve. She stopped 3% nebs, feels as though she can't breath.       Objective:     Vitals:    04/25/25 1602   BP: 116/62   BP Location: Right arm   Patient Position: Sitting   Pulse: 90   SpO2: (!) 90%   Weight: 72.5 kg (159 lb 13.3 oz)   Height: 5' 3" (1.6 m)           Physical Exam  Constitutional:       General: She is not in acute distress.     Appearance: Normal appearance. She is ill-appearing.   HENT:      Head: Normocephalic and atraumatic.      Mouth/Throat:      Mouth: Mucous membranes are moist.   Eyes:      Extraocular Movements: Extraocular movements intact.   Cardiovascular:      Rate and Rhythm: Normal rate and regular rhythm.      Pulses: Normal pulses.      Heart sounds: No murmur heard.  Pulmonary:      Effort: No respiratory distress.      Breath sounds: Normal breath sounds.   Abdominal:      Palpations: Abdomen is soft.      Tenderness: There is no abdominal tenderness.   Musculoskeletal:      Right lower leg: Edema present.      Left lower leg: Edema present.   Skin:     General: Skin is warm and dry.   Neurological:      General: No focal deficit present.      Mental Status: She is alert.   Psychiatric:         Mood and Affect: Mood normal.         Behavior: Behavior normal.          Labs:  Lab Results   Component Value Date    WBC 6.66 04/15/2025    HGB 11.5 (L) 04/15/2025    HCT 36.7 (L) 04/15/2025    MCV 88 04/15/2025     04/15/2025     @[id number](GLU,NA,K,Cl,CO2,BUN,Creatinine,Calcium,MG)@  Lab Results   Component Value Date    INR 0.9 " 12/30/2021    INR 1.0 04/11/2018    INR 1.1 04/10/2018     Lab Results   Component Value Date    HGBA1C 5.4 01/27/2023         Pertinent Studies Reviewed & Interpreted:       CT Chest:  8/16/24  Impression:     This report was flagged in Epic as abnormal.     1. Allowing for motion artifact, no convincing pulmonary thromboembolism.  Correlation of these findings with D-dimer and/or lower extremity ultrasound.  2. Nodular focus within the left upper lobe, a portion of which appears to be cavitary.  This is new since the previous examination.  This potentially could reflect small focus of airway mucous plugging/mucous filled airway however given the size, short-term follow-up is recommended, no greater than 3 months as malignancy is not excluded.  Additional pulmonary nodules appear stable since the previous exam.  Please see that exam for follow-up recommendations.  3. Please see above for several additional findings.    Pulmonary Function Tests:   Date: 6/20/22  FEV1/FVC  59            FEV1          29%  FVC            39%            TLC            93%  RV/TLC      187%      DLCO         unable to perform     Date: 4/25/25  FEV1/FVC 56            FEV1          30%  FVC            42%            TLC           96%  RV/TLC      166%      DLCO         32  VA:             56  Kco:            59    6 Minute Walk Tests:     Echo:   Date: 8/6/23  NALINI: 21.9ml/m^2  LVEF >55%.   Chamber enlargement: none   Diastolic function: GII  Valves: normal  RV: normal systolic function  TAPSE: 2.9cm  Pericardial effusion: none        Micro:  Sputum Culture:   + AFB, PCR + MAC      Assessment & Plan:       Problem List Items Addressed This Visit          Pulmonary    Cavitary lesion of lung - Primary    Relevant Orders    CT Chest Without Contrast    Chronic hypoxemic respiratory failure    Chronic obstructive pulmonary disease with hypoxia       ID    Mycobacterium avium complex    Relevant Orders    CT Chest Without Contrast             Plan:  Cavitary MAC  Missed recent appointment to establish with ID s/t transportation issues. We are making her another appointment  She does not have classic BE on CT imaging but does have chronic mucous production so will start ACT with 3% saline nebs and flutter device therapy. Advised on the importance of using these tools.   Repeat CT chest ordered for 3 months (June 2025)  COPD / chronic hypoxemic/hypercapenic RF  Continue trelegy (recognize issue with ICS and MAC but patient needs from symptomatic standpoint). Continue rescue inhaler/nebs  Continue daytime oxygen with nightly BiPAP   Updating PFTs, 6MWT prior to next visit  Has done pulmonary rehab in the past, needs to re-establish but struggling with transportation  UTD on RSV, flu Vx  Tobacco abuse  Congratulated on cessation, advised on continued abstinence     RETURN TO CLINIC IN 3 MONTHS       Fermin Mao MD  [facility]    Portions of the record may have been created with voice-recognition software. Occasional wrong-word or sound-a-like substitutions may have occurred due to the inherent limitations of voice-recognition software. Read the chart carefully and recognize, using context, where substitutions have occurred.

## 2025-04-29 ENCOUNTER — OFFICE VISIT (OUTPATIENT)
Dept: INTERNAL MEDICINE | Facility: CLINIC | Age: 70
End: 2025-04-29
Payer: MEDICARE

## 2025-04-29 VITALS
HEART RATE: 82 BPM | HEIGHT: 63 IN | OXYGEN SATURATION: 93 % | SYSTOLIC BLOOD PRESSURE: 114 MMHG | WEIGHT: 160.06 LBS | BODY MASS INDEX: 28.36 KG/M2 | DIASTOLIC BLOOD PRESSURE: 68 MMHG

## 2025-04-29 DIAGNOSIS — I27.81 COR PULMONALE, CHRONIC: Chronic | ICD-10-CM

## 2025-04-29 DIAGNOSIS — A31.0 MYCOBACTERIUM AVIUM COMPLEX: ICD-10-CM

## 2025-04-29 DIAGNOSIS — M25.561 RIGHT KNEE PAIN, UNSPECIFIED CHRONICITY: Primary | ICD-10-CM

## 2025-04-29 DIAGNOSIS — J44.9 CHRONIC OBSTRUCTIVE PULMONARY DISEASE WITH HYPOXIA: ICD-10-CM

## 2025-04-29 DIAGNOSIS — Z01.419 WELL WOMAN EXAM: ICD-10-CM

## 2025-04-29 PROCEDURE — 4010F ACE/ARB THERAPY RXD/TAKEN: CPT | Mod: CPTII,HCNC,S$GLB, | Performed by: INTERNAL MEDICINE

## 2025-04-29 PROCEDURE — 3288F FALL RISK ASSESSMENT DOCD: CPT | Mod: CPTII,HCNC,S$GLB, | Performed by: INTERNAL MEDICINE

## 2025-04-29 PROCEDURE — 99214 OFFICE O/P EST MOD 30 MIN: CPT | Mod: HCNC,S$GLB,, | Performed by: INTERNAL MEDICINE

## 2025-04-29 PROCEDURE — 1126F AMNT PAIN NOTED NONE PRSNT: CPT | Mod: CPTII,HCNC,S$GLB, | Performed by: INTERNAL MEDICINE

## 2025-04-29 PROCEDURE — 3074F SYST BP LT 130 MM HG: CPT | Mod: CPTII,HCNC,S$GLB, | Performed by: INTERNAL MEDICINE

## 2025-04-29 PROCEDURE — 99999 PR PBB SHADOW E&M-EST. PATIENT-LVL V: CPT | Mod: PBBFAC,HCNC,, | Performed by: INTERNAL MEDICINE

## 2025-04-29 PROCEDURE — 1101F PT FALLS ASSESS-DOCD LE1/YR: CPT | Mod: CPTII,HCNC,S$GLB, | Performed by: INTERNAL MEDICINE

## 2025-04-29 PROCEDURE — 3008F BODY MASS INDEX DOCD: CPT | Mod: CPTII,HCNC,S$GLB, | Performed by: INTERNAL MEDICINE

## 2025-04-29 PROCEDURE — 1159F MED LIST DOCD IN RCRD: CPT | Mod: CPTII,HCNC,S$GLB, | Performed by: INTERNAL MEDICINE

## 2025-04-29 PROCEDURE — 3078F DIAST BP <80 MM HG: CPT | Mod: CPTII,HCNC,S$GLB, | Performed by: INTERNAL MEDICINE

## 2025-04-29 NOTE — PROGRESS NOTES
INTERNAL MEDICINE CLINIC    Rochelle Espinoza    1955    HPI:PMHx COPD,  chronic hypoxemic respiratory failure on 3 L nasal cannula at baseline, hyperlipidemia, diastolic heart failure, nicotine dependence, hypertension, MAC infection receiving Tx who presents to clinic for\ follow up.      MAC infection- On HEMALATHA tx. Following with ID And Pulm.     She complains of body aches- from back down leg and arms have been stiff.  For about 2 weeks-- heat did not help but ice has helped.       GERD - Controlled on pantoprazole.      COPD - tolerating bipap at night. Wears her bipap for 6 hrs. Continues tregelly ellipta- .  \     CHF - takes lasix 20mg as needed. Denies leg swelling, cp, palpitations. States she is breathing well.      HTN - 114/68   today. Still on losartan. Denies HA.      HLD - on atorvastatin     Arthritis - b/l knee and low back pain. Difficulty moving around with some stiffness.      Anxiety - previously on wellbutrin. No longer taking. Wants to know options medication options since she has been feeling down and non productive since her recent lifestyle change due to her conditions.     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     Sciatica        Past Surgical History:   Procedure Laterality Date    BREAST BIOPSY Right     core     SECTION      COLONOSCOPY N/A 2019    Procedure: COLONOSCOPY;  Surgeon: Rui Holder MD;  Location: [address]);  Service: Endoscopy;  Laterality: N/A;    EPIDURAL STEROID INJECTION N/A 2020    Procedure: CERVICAL BLAKE;  Surgeon: Papito High MD;  Location: [facility] PAIN T;  Service: Pain Management;  Laterality: N/A;  NEEDS CONSENT    ESOPHAGOGASTRODUODENOSCOPY N/A 2019    Procedure: EGD  (ESOPHAGOGASTRODUODENOSCOPY);  Surgeon: Rui Holder MD;  Location: [facility] ENDO (2ND FLR);  Service: Endoscopy;  Laterality: N/A;  2L continuous O2 via NC, COPD, pulm HTN    TRANSFORAMINAL EPIDURAL INJECTION OF STEROID Right 6/3/2021    Procedure: INJECTION, STEROID, EPIDURAL, TRANSFORAMINAL APPROACH, L4-L5;  Surgeon: Anibal Robles MD;  Location: [facility] PAIN MGT;  Service: Pain Management;  Laterality: Right;     Family History   Problem Relation Name Age of Onset    Heart disease Mother      Heart disease Paternal Uncle      Celiac disease Neg Hx      Colon cancer Neg Hx      Colon polyps Neg Hx      Crohn's disease Neg Hx      Cystic fibrosis Neg Hx      Esophageal cancer Neg Hx      Inflammatory bowel disease Neg Hx      Irritable bowel syndrome Neg Hx      Liver cancer Neg Hx      Liver disease Neg Hx      Rectal cancer Neg Hx      Stomach cancer Neg Hx      Ulcerative colitis Neg Hx       Social History     Socioeconomic History    Marital status:    Occupational History    Occupation:    Tobacco Use    Smoking status: Former     Current packs/day: 0.00     Average packs/day: 1 pack/day for 52.7 years (51.1 ttl pk-yrs)     Types: Cigarettes     Start date:      Quit date: 2024     Years since quittin.5     Passive exposure: Past    Smokeless tobacco: Never    Tobacco comments:     Quit over a month ago    Substance and Sexual Activity    Alcohol use: Not Currently     Comment: beer daily 2-3 daily, none today    Drug use: No    Sexual activity: Not Currently     Birth control/protection: None     Social Drivers of Health     Financial Resource Strain: Medium Risk (2025)    Overall Financial Resource Strain (CARDIA)     Difficulty of Paying Living Expenses: Somewhat hard   Food Insecurity: Food Insecurity Present (2025)    Hunger Vital Sign     Worried About Running Out of Food in the Last Year: Sometimes true     Ran Out of Food in the Last Year: Sometimes true  "  Transportation Needs: Unmet Transportation Needs (2/11/2025)    PRAPARE - Transportation     Lack of Transportation (Medical): Yes     Lack of Transportation (Non-Medical): Yes   Physical Activity: Inactive (2/11/2025)    Exercise Vital Sign     Days of Exercise per Week: 0 days     Minutes of Exercise per Session: 0 min   Stress: Stress Concern Present (2/11/2025)    [employer] - Occupational Stress Questionnaire     Feeling of Stress : To some extent   Housing Stability: Unknown (2/11/2025)    Housing Stability Vital Sign     Unable to Pay for Housing in the Last Year: No     Homeless in the Last Year: No     Review of patient's allergies indicates:   Allergen Reactions    Doxycycline Other (See Comments)     Acid reflux    Orange juice      Swelling in pt tongue      8/23/23 - pt stated she had in hosp and it didn't effect her.     Review of Systems   Constitutional:  Negative for fever and malaise/fatigue.   Eyes:  Negative for blurred vision and double vision.   Respiratory:  Negative for cough and hemoptysis.    Cardiovascular:  Negative for chest pain and palpitations.   Gastrointestinal:  Negative for heartburn and nausea.   Genitourinary:  Negative for dysuria and urgency.   Musculoskeletal:  Positive for back pain, joint pain and myalgias. Negative for neck pain.       /68 (BP Location: Right arm, Patient Position: Sitting)   Pulse 82   Ht 5' 3" (1.6 m)   Wt 72.6 kg (160 lb 0.9 oz)   LMP 11/21/2013   SpO2 (!) 93%   BMI 28.35 kg/m²     Physical Exam  Constitutional:       General: She is not in acute distress.     Appearance: She is not ill-appearing.      Interventions: Nasal cannula in place.   HENT:      Head: Normocephalic and atraumatic.      Right Ear: Tympanic membrane, ear canal and external ear normal.      Left Ear: Tympanic membrane, ear canal and external ear normal.   Eyes:      Conjunctiva/sclera: Conjunctivae normal.   Cardiovascular:      Rate and " Rhythm: Normal rate and regular rhythm.      Pulses: Normal pulses.      Heart sounds: Normal heart sounds.   Pulmonary:      Effort: Pulmonary effort is normal. No respiratory distress.      Breath sounds: Rales present. No wheezing.      Comments: 3 L NC   Musculoskeletal:      Right lower leg: No edema.      Left lower leg: No edema.   Lymphadenopathy:      Cervical: No cervical adenopathy.   Skin:     Findings: No rash.   Neurological:      Mental Status: She is alert.   Psychiatric:         Mood and Affect: Mood normal.       Assesment and Plan:    COPD- 3 L NC - continue trigelly elipta follows with Pulm  MAC infx- continues HEMALATHA - follows with ID and pulm  CHF- controlled symptoms. Continue lasix prn.  HTN- well controlled no changes to tx.  HLD- controlled continue lipitor  Osteoarthritis- with intermittent right knee swelling. Referral to Ortho for steroid injection  Gyn referral-   GERD- stable continue pantoprazole  Discussed with Dr. Samuels

## 2025-04-29 NOTE — PROGRESS NOTES
"  I have personally taken the history and examined this patient and agree with the resident's note as stated above with the following thoughts:/68 (BP Location: Right arm, Patient Position: Sitting)   Pulse 82   Ht 5' 3" (1.6 m)   Wt 72.6 kg (160 lb 0.9 oz)   LMP 11/21/2013   SpO2 (!) 93%   BMI 28.35 kg/m²       Pulmonary  and ID are following and treating Her MAC.   Wearing 3 liter O2-- still on Bipap at night.   Heart failure symptoms are stable  She is 7 months off cigarettes.  Gained 10 # since coming off cigarettes.  For her right knee pain will send her to orthopedics and she request visit to gyn  .  She needs to continue using her oxygen and also use her BiPAP at night.  I will see her back again in 6 months.      "

## 2025-05-09 ENCOUNTER — TELEPHONE (OUTPATIENT)
Dept: INFECTIOUS DISEASES | Facility: CLINIC | Age: 70
End: 2025-05-09
Payer: MEDICARE

## 2025-05-09 DIAGNOSIS — A31.0 MYCOBACTERIUM AVIUM COMPLEX: Primary | ICD-10-CM

## 2025-05-09 NOTE — TELEPHONE ENCOUNTER
Spoke to pt to let her know  put in new orders for AFB Cultures, pt confirmed that she will go do it on  the day of her lab appt.    ----- Message from Lora Pacheco MD sent at 2025 11:10 AM CDT -----  Regarding: RE: Lab Client Services  Contact: [phone]  Ok I put in a new order. thanks      ----- Message -----  From: Chelsea Michel  Sent: 2025   9:20 AM CDT  To: Lora Pacheco MD  Subject: FW: Lab Client Services                          Spoke to someone from lab she said that they canceled patient AFB culture because the order .      ----- Message -----  From: Cortney Santa  Sent: 2025   2:25 AM CDT  To: Lora Pacheco MD; Enrique  Subject: Lab Client Services                              Good Morning, My name is Cortney Santa, I work in the Lab Client Services. We had a problem with some lab work on this patient. If someone from your office could call us at [phone] or ext. [phone] that would be great. Anyone in my department can help.  Thank you,

## 2025-05-19 ENCOUNTER — OFFICE VISIT (OUTPATIENT)
Dept: ORTHOPEDICS | Facility: CLINIC | Age: 70
End: 2025-05-19
Payer: MEDICARE

## 2025-05-19 ENCOUNTER — HOSPITAL ENCOUNTER (OUTPATIENT)
Dept: RADIOLOGY | Facility: HOSPITAL | Age: 70
Discharge: HOME | End: 2025-05-19
Attending: PHYSICIAN ASSISTANT
Payer: MEDICARE

## 2025-05-19 DIAGNOSIS — M25.561 RIGHT KNEE PAIN, UNSPECIFIED CHRONICITY: ICD-10-CM

## 2025-05-19 DIAGNOSIS — M17.11 PRIMARY OSTEOARTHRITIS OF RIGHT KNEE: Primary | ICD-10-CM

## 2025-05-19 PROCEDURE — 73562 X-RAY EXAM OF KNEE 3: CPT | Mod: TC,50,HCNC

## 2025-05-19 PROCEDURE — 3288F FALL RISK ASSESSMENT DOCD: CPT | Mod: CPTII,HCNC,S$GLB, | Performed by: PHYSICIAN ASSISTANT

## 2025-05-19 PROCEDURE — 1159F MED LIST DOCD IN RCRD: CPT | Mod: CPTII,HCNC,S$GLB, | Performed by: PHYSICIAN ASSISTANT

## 2025-05-19 PROCEDURE — 4010F ACE/ARB THERAPY RXD/TAKEN: CPT | Mod: CPTII,HCNC,S$GLB, | Performed by: PHYSICIAN ASSISTANT

## 2025-05-19 PROCEDURE — 1101F PT FALLS ASSESS-DOCD LE1/YR: CPT | Mod: CPTII,HCNC,S$GLB, | Performed by: PHYSICIAN ASSISTANT

## 2025-05-19 PROCEDURE — 73562 X-RAY EXAM OF KNEE 3: CPT | Mod: 26,50,HCNC, | Performed by: RADIOLOGY

## 2025-05-19 PROCEDURE — 99213 OFFICE O/P EST LOW 20 MIN: CPT | Mod: HCNC,25,S$GLB, | Performed by: PHYSICIAN ASSISTANT

## 2025-05-19 PROCEDURE — 20610 DRAIN/INJ JOINT/BURSA W/O US: CPT | Mod: HCNC,RT,S$GLB, | Performed by: PHYSICIAN ASSISTANT

## 2025-05-19 PROCEDURE — 99999 PR PBB SHADOW E&M-EST. PATIENT-LVL IV: CPT | Mod: PBBFAC,HCNC,, | Performed by: PHYSICIAN ASSISTANT

## 2025-05-19 PROCEDURE — 1125F AMNT PAIN NOTED PAIN PRSNT: CPT | Mod: CPTII,HCNC,S$GLB, | Performed by: PHYSICIAN ASSISTANT

## 2025-05-19 RX ORDER — TRIAMCINOLONE ACETONIDE 40 MG/ML
40 INJECTION, SUSPENSION INTRA-ARTICULAR; INTRAMUSCULAR
Status: DISCONTINUED | OUTPATIENT
Start: 2025-05-19 | End: 2025-05-19 | Stop reason: HOSPADM

## 2025-05-19 RX ORDER — LIDOCAINE HYDROCHLORIDE 10 MG/ML
4 INJECTION, SOLUTION INFILTRATION; PERINEURAL
Status: DISCONTINUED | OUTPATIENT
Start: 2025-05-19 | End: 2025-05-19 | Stop reason: HOSPADM

## 2025-05-19 RX ADMIN — LIDOCAINE HYDROCHLORIDE 4 ML: 10 INJECTION, SOLUTION INFILTRATION; PERINEURAL at 02:05

## 2025-05-19 RX ADMIN — TRIAMCINOLONE ACETONIDE 40 MG: 40 INJECTION, SUSPENSION INTRA-ARTICULAR; INTRAMUSCULAR at 02:05

## 2025-05-19 NOTE — PROGRESS NOTES
[facility]  Orthopedic Surgery  Clinic Note      Subjective:   History of Present Illness    CHIEF COMPLAINT:  Patient presents today for right knee pain and swelling.    KNEE PAIN:  She reports bilateral knee symptoms with right knee being more severe. Right knee pain has been present for 4-6 months, rated 8/10 in severity, described as an aching sensation similar to a toothache and particularly bothersome at night. She manages pain with a roll-on pain reliever and alternates between heat and ice therapy, with heat providing better relief. She denies any history of knee injections. Left knee exhibits swelling and sensation of kneecap slipping back and forth.    MEDICAL HISTORY:  History significant for COPD on oxygen, emphysema, mycobacteria infection, and early onset osteoporosis. She has previously received sciatic injections.    FAMILY HISTORY:  Family history significant for arthritis in grandmother and father.    MEDICATIONS:  She takes calcium and vitamin D3 supplements due to bone density findings.      ROS:  Musculoskeletal: +joint pain, -muscle pain, +joint swelling, +nightime pain  Neurological: -tingling          Medications: I have reviewed medication list in the chart at the time of this encounter.     Review of patient's allergies indicates:   Allergen Reactions    Doxycycline Other (See Comments)     Acid reflux    Orange juice      Swelling in pt tongue      23 - pt stated she had in hosp and it didn't effect her.      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     Sciatica      Past Surgical History:   Procedure Laterality Date    BREAST BIOPSY Right     core     SECTION      COLONOSCOPY N/A 2019    Procedure: COLONOSCOPY;   Surgeon: Rui Holder MD;  Location: [facility] ENDO (2ND FLR);  Service: Endoscopy;  Laterality: N/A;    EPIDURAL STEROID INJECTION N/A 1/2/2020    Procedure: CERVICAL BLAKE;  Surgeon: Papito High MD;  Location: [facility];  Service: Pain Management;  Laterality: N/A;  NEEDS CONSENT    ESOPHAGOGASTRODUODENOSCOPY N/A 12/19/2019    Procedure: EGD (ESOPHAGOGASTRODUODENOSCOPY);  Surgeon: Rui Holder MD;  Location: [facility] ENDO (2ND FLR);  Service: Endoscopy;  Laterality: N/A;  2L continuous O2 via NC, COPD, pulm HTN    TRANSFORAMINAL EPIDURAL INJECTION OF STEROID Right 6/3/2021    Procedure: INJECTION, STEROID, EPIDURAL, TRANSFORAMINAL APPROACH, L4-L5;  Surgeon: Anibal Robles MD;  Location: [facility];  Service: Pain Management;  Laterality: Right;       Objective:     Physical Exam  Constitutional:       Interventions: Nasal cannula in place.      Comments: On oxygen.    Musculoskeletal:      Right hip: No bony tenderness.      Left hip: No bony tenderness.      Right ankle: No swelling, deformity or ecchymosis. No tenderness.      Left ankle: No swelling, deformity or ecchymosis. No tenderness.      Comments: No unilateral leg swelling, peripheral edema, or calf tenderness.         Right knee:  normal gait.  150/180° flexion.  0/0° extension.  Negative varus stress test.   Negative valgus stress test.   Negative lateral Renny's test.   Negative medial Renny's test.   Negative anterior drawer test at 90°.   Negative posterior drawer test at 90°.   Negative Hoffa's test.  No tenderness at pes anserine bursa.  No patella laxity.     Left knee:  normal gait.  150/180° flexion.  0/0° extension.  Negative varus stress test.   Negative valgus stress test.   Negative lateral Renny's test.   Negative medial Renny's test.   Negative anterior drawer test at 90°.   Negative posterior drawer test at 90°.   Negative Hoffa's test.  No tenderness at pes anserine bursa.  No patella laxity.         Imaging:  I have  independently interpreted and reviewed XR of odessa knees obtained today in clinic with patient.    Assessment:       1. Primary osteoarthritis of right knee    2. Right knee pain, unspecified chronicity       Plan:       Orders Placed This Encounter    Large Joint Aspiration/Injection: R knee    X-ray Knee Ortho Bilateral    Ambulatory Referral/Consult to Physical Therapy        Assessment & Plan    IMPRESSION:  - Diagnosed early osteoarthritis (grade 2) in the right knee based on XR Right Knee findings, including osteophytes, bone cysts, and narrowed joint spaces.  - Pain is likely due to arthritic changes, no effusion. Negative provacative testing.   - Recommend conservative management with lifestyle modifications, medications, and physical therapy. She would like to try CSI today. Considered potential for future gel injection if steroid injection ineffective.    PATIENT EDUCATION:  - Discussed osteoarthritis progression and importance of lifestyle modifications in slowing disease advancement.  - Educated on anti-inflammatory diet benefits for arthritis management.    ACTION ITEMS/LIFESTYLE:  - Patient to adopt an anti-inflammatory diet high in vegetables, lean meats (chicken, turkey), and fish (salmon, tuna, anchovies, sardines). Patient to avoid processed and sugary foods.  - Recommend engaging in daily exercise to maintain muscle mass, strength, flexibility, and mobility.  - Patient to continue using the stepper machine for cardio exercise.  - Recommend focusing on strengthening quadriceps and hamstring muscles.    MEDICATIONS:  - Administered intra-articular corticosteroid injection to right knee for pain relief, with immediate numbing effect from lidocaine and delayed onset of steroid action.  - Started Tylenol (acetaminophen) 650 mg every 6 hours or 1,000 mg every 8 hours as needed for arthritis pain, not to exceed 3,000 mg daily.    REFERRALS:  - Referred to physical therapy at [facility] in [place]  for strengthening exercises and arthritis management. Fax via epic sent.    FOLLOW UP:  - Follow up in 6 weeks if not improving.  - Contact the office if needing another steroid injection (can be done every 3 months) or to discuss gel injection option if steroid injection is ineffective.         Future Appointments   Date Time Provider Department Center   6/16/2025 12:00 PM LAB, APPOINTMENT [place] [facility]   6/18/2025  9:15 AM Ev Iyer MD [id number]   6/30/2025  2:30 PM Lexie Henson PA-C [facility]THO [facility]   7/15/2025 12:00 PM LAB, APPOINTMENT [place] [facility]   8/18/2025 12:00 PM LAB, [facility]POINTMENT [place] [facility]   9/15/2025 12:00 PM LAB, APPOINTMENT [place] [facility]   10/14/2025 12:00 PM LAB, APPOINTMENT [place] [facility]   11/3/2025  9:40 AM Yosi Samuels Jr., MD [facility]   11/17/2025 12:00 PM LAB, APPOINTMENT [place] [facility]   12/15/2025 12:00 PM LAB, [facility]POINTMENT [place] [facility]   1/20/2026 12:00 PM LAB, APPOINTMENT [place] [facility]   2/16/2026 12:00 PM LAB, APPOINTMENT [place] [facility]   3/16/2026 12:00 PM LAB, APPOINTMENT [place] [facility]       Lexie Henson PA-C  Orthopedic Surgery  [facility]

## 2025-05-19 NOTE — PROCEDURES
Large Joint Aspiration/Injection: R knee    Date/Time: 5/19/2025 2:00 PM    Performed by: Lexie Henson PA-C  Authorized by: Lexie Henson PA-C    Consent Done?:  Yes (Verbal)  Indications:  Arthritis and pain  Prep: patient was prepped and draped in usual sterile fashion      Local anesthesia used?: Yes    Local anesthetic:  Topical anesthetic    Details:  Needle Size:  22 G  Approach:  Medial  Location:  Knee  Site:  R knee  Medications:  4 mL LIDOcaine HCL 10 mg/ml (1%) 10 mg/mL (1 %); 40 mg triamcinolone acetonide 40 mg/mL  Patient tolerance:  Patient tolerated the procedure well with no immediate complications

## 2025-05-20 ENCOUNTER — RESULTS FOLLOW-UP (OUTPATIENT)
Dept: INFECTIOUS DISEASES | Facility: CLINIC | Age: 70
End: 2025-05-20

## 2025-05-21 ENCOUNTER — PATIENT MESSAGE (OUTPATIENT)
Dept: ORTHOPEDICS | Facility: CLINIC | Age: 70
End: 2025-05-21
Payer: MEDICARE

## 2025-05-21 DIAGNOSIS — M25.561 RIGHT KNEE PAIN, UNSPECIFIED CHRONICITY: ICD-10-CM

## 2025-05-21 DIAGNOSIS — M17.11 PRIMARY OSTEOARTHRITIS OF RIGHT KNEE: Primary | ICD-10-CM

## 2025-05-21 NOTE — TELEPHONE ENCOUNTER
Ms. Byrd, [facility] near your home is out of network with your insurance. I was place another PT referral to [facility] and they will call and see which locations you can make it to.     Sorry about the inconvenience,   Lexie Henson

## 2025-05-29 ENCOUNTER — CLINICAL SUPPORT (OUTPATIENT)
Dept: SMOKING CESSATION | Facility: CLINIC | Age: 70
End: 2025-05-29
Payer: COMMERCIAL

## 2025-05-29 DIAGNOSIS — F17.200 NICOTINE DEPENDENCE: Primary | ICD-10-CM

## 2025-05-29 PROCEDURE — 99407 BEHAV CHNG SMOKING > 10 MIN: CPT | Mod: S$GLB,,,

## 2025-06-05 RX ORDER — FLUTICASONE PROPIONATE 50 MCG
SPRAY, SUSPENSION (ML) NASAL
Qty: 48 G | Refills: 3 | Status: SHIPPED | OUTPATIENT
Start: 2025-06-05

## 2025-06-09 ENCOUNTER — CLINICAL SUPPORT (OUTPATIENT)
Dept: REHABILITATION | Facility: HOSPITAL | Age: 70
End: 2025-06-09
Payer: MEDICARE

## 2025-06-09 DIAGNOSIS — R53.1 WEAKNESS: ICD-10-CM

## 2025-06-09 DIAGNOSIS — M25.561 CHRONIC PAIN OF RIGHT KNEE: Primary | ICD-10-CM

## 2025-06-09 DIAGNOSIS — M17.11 PRIMARY OSTEOARTHRITIS OF RIGHT KNEE: ICD-10-CM

## 2025-06-09 DIAGNOSIS — G89.29 CHRONIC PAIN OF RIGHT KNEE: Primary | ICD-10-CM

## 2025-06-09 PROCEDURE — 97161 PT EVAL LOW COMPLEX 20 MIN: CPT

## 2025-06-09 PROCEDURE — 97110 THERAPEUTIC EXERCISES: CPT

## 2025-06-09 NOTE — PROGRESS NOTES
"  Outpatient Rehab    Physical Therapy Evaluation    Patient Name: Rochelel Espinoza  MRN: [id number]  YOB: 1955  Encounter Date: 6/9/2025    Therapy Diagnosis:   Encounter Diagnoses   Name Primary?    Primary osteoarthritis of right knee     Chronic pain of right knee Yes    Weakness      Physician: Lexie Henson PA-C    Physician Orders: Eval and Treat  Medical Diagnosis: Primary osteoarthritis of right knee  Surgical Diagnosis: Not applicable for this Episode   Surgical Date: Not applicable for this Episode    Visit # / Visits Authorized:  1 / 1  Insurance Authorization Period: 5/21/2025 to 5/21/2026  Date of Evaluation: 6/9/2025  Plan of Care Certification: 6/9/2025 to 9/9/2025     Time In: 1103   Time Out: 1145  Total Time (in minutes): 42   Total Billable Time (in minutes): 42    Intake Outcome Measure for FOTO Survey    Therapist reviewed FOTO scores for Rochelle Espinoza on 6/9/2025.   FOTO report - see Media section or FOTO account episode details.     Intake Score: 36%    Precautions:       Subjective       Date of onset: 6 months     History of current condition - Pt reports: she has pain and swelling in R knee. She had an injection in the R knee that helped temporarily. She gets popping in the knee. The pain is mostly in the lower front of the R knee. The left knee gets very seldom. Laying with leg straight out in front of her will hurt if she isn't positioned in the right way. It feels like a tooth ache. Yesterday was when the pain and popping started back. Denies N/T. No KELLY. She doesn't exercises. Before the shot she was going up and down the stairs 1 at a time. Now she is able to go up and down reciprocally. She has gained 30lbs since she stopped smoking and thinks it could be contributing to the knee pain.     Falls: none recently    Imaging: X-ray: Per EMR "FINDINGS:  Ortho bilateral.     Right knee: No fracture dislocation bone destruction or OCD seen.     Left knee: No fracture dislocation " "bone destruction or OCD seen."    Prior Therapy: yes for back  Social History: lives in town house with 14 steps to second  Occupation: retired  Prior Level of Function: independent with 3L O2 via NC  Current Level of Function: Mod I with 3L O2 via NC; unable to kneel; unable to do stairs reciprocally; inc'd knee pain; difficulty sleeping    Pain:  Current 6/10, worst 10/10, best 0/10   Location: right knee   Description: Aching and Sharp  Aggravating Factors: Walking, Night Time, and kneeling and stairs  Easing Factors: heating pad and rest    Patients goals: to get the ache to go away      Past Medical History/Physical Systems Review:   Rochelle Espinoza  has a past medical history of CHF (congestive heart failure), COPD (chronic obstructive pulmonary disease), Herpes zoster without mention of complication, Hypertension, Leg edema, Pulmonary hypertension, Pulmonary infection due to Mycobacterium avium, and Sciatica.    Rochelle Espinoza  has a past surgical history that includes  section; Breast biopsy (Right); Esophagogastroduodenoscopy (N/A, 2019); Colonoscopy (N/A, 2019); Epidural steroid injection (N/A, 2020); and Transforaminal epidural injection of steroid (Right, 6/3/2021).    Rochelle has a current medication list which includes the following prescription(s): albuterol, albuterol-ipratropium, atorvastatin, azelastine, azithromycin, benzonatate, celecoxib, ethambutol, ethambutol, fluticasone propionate, trelegy ellipta, furosemide, gabapentin, losartan, mucus clearing device, pantoprazole, rifampin, sodium chloride 3%, and tizanidine, and the following Facility-Administered Medications: sodium chloride 3%.    Review of patient's allergies indicates:   Allergen Reactions    Doxycycline Other (See Comments)     Acid reflux    Orange juice      Swelling in pt tongue      23 - pt stated she had in hosp and it didn't effect her.        Objective          L/E Strength w/ MicroFET Muscle Vasquez " "Dynamometer Right Left Pain/Dysfunction with Movement   (approx 4 sec hold w/ max contraction)   Hip Flexion 12.2 kg  12.1 kg     Quadriceps 12.6 kg  17.6 kg     Hamstrings 6.4 kg ! 9.9 kg          Right Left Comment: ! = pain   Hip ABD: 3+/5 3+/5    Hip ADD: 3+/5 3+/5    Hip IR: 3+/5 3+/5    Hip ER: 3/5! 3+/5        Knee Right Right Left Left Pain/Dysfunction with Movement    AROM PROM AROM PROM    Flexion 137 140 142 142 Tightness on R for AROM and PROM   Extension 0 0 0 0      Ankle Right Right Left Left Pain/Dysfunction with Movement    AROM MMT AROM MMT    Plantarflexion WFL 3+/5 WFL 3+/5    Dorsiflexion NT 4/5 NT 4/5        Valgus/Varus Stress Test (0 and 30 degree): negative    Anterior Drawer: negative     Joint mobility: WFL    Patellar Apprehension (Improves with medial glide): negative    Palpation: no TTP    Quad set: fair to good    30" sit to stand Cutoff Scores: x8 without UE use (c/o SOB)        Treatment:       Time Entry(in minutes):  PT Evaluation (Low) Time Entry: 34  Therapeutic Exercise Time Entry: 8therapeutic exercises to develop strength, endurance, ROM, and flexibility including:  Review of HEP including: QS 3x10, 3s;   seated vs supine clams 3x10, 3s blue theraband  Education discussed below    Next session:  Nustep vs recumbent; laq, saq, slr, side hip abd, standing hip abd/ext, sit to stand, side stepping, seated hamstring curls, TKE into SB    Assessment & Plan   Assessment  Rochelle presents with a condition of Low complexity.   Presentation of Symptoms: Stable  Will Comorbidities Impact Care: Yes  COPD, see history for further details    Functional Limitations: Activity tolerance, Carrying objects, Participating in sports, Participating in leisure activities, Range of motion, Ambulating on uneven surfaces, Decreased ambulation distance/endurance, Gait limitations, Painful locomotion/ambulation, Squatting, Standing tolerance  Impairments: Abnormal or restricted range of motion, Impaired " physical strength, Pain with functional activity, Abnormal gait, Weight-bearing intolerance  Personal Factors Affecting Prognosis: Pain    Prognosis: Good  Assessment Details: Pt presents with Symptoms consistent with medical diagnoses and impairments that are effecting their daily life. She has very significant weakness in LEs as noted by MMT and dynamometer testing. She also presents with mild impaired R knee ROM. Pt's 30s sit to stand score also indicated impaired functional strength. She would benefit from skilled PT services to address impairments and return to PLOF.     Plan  From a physical therapy perspective, the patient would benefit from: Skilled Rehab Services    Planned therapy interventions include: Therapeutic activities, Therapeutic exercise, Neuromuscular re-education, Manual therapy, and Gait training.    Planned modalities to include: Cryotherapy (cold pack), Electrical stimulation - passive/unattended, Mechanical traction, and Thermotherapy (hot pack).        Visit Frequency: 2 times Per Week for 12 Weeks.       This plan was discussed with Patient.   Discussion participants: Agreed Upon Plan of Care             The patient's spiritual, cultural, and educational needs were considered, and the patient is agreeable to the plan of care and goals.     Education  Education was done with Patient. The patient's learning style includes Demonstration, Listening, and Pictures/video. The patient Demonstrates understanding and Verbalizes understanding.         Education on nature of condition, role of PT, importance of physical activity, and PT POC provided.       Goals:   Active       LTG       Pt will improve FOTO score to >/= 57% limited to decrease perceived limitation with maintaining/changing body position.         Start:  06/09/25    Expected End:  09/09/25            Pt will improve LE MMT by 1 grade to improve strength for functional activities.          Start:  06/09/25    Expected End:  09/09/25             Pt will improve LE dynamometer testing by 8 kgs to improve strength for functional activities.         Start:  06/09/25    Expected End:  09/09/25            Pt will perform 30s sit to stand with x15 reps to meet age-related normal value and indicate improvement in activity tolerance and functional strength.        Start:  06/09/25    Expected End:  09/09/25               STG       Pt will be compliant with HEP to supplement PT in decreasing pain with functional mobility.       Start:  06/09/25    Expected End:  07/09/25            Pt will improve LE dynamometer testing by 4 kgs to improve strength for functional activities.         Start:  06/09/25    Expected End:  07/09/25            Pt will improve LE MMT by 1/2 grade to improve strength for functional activities.         Start:  06/09/25    Expected End:  07/09/25            Pt will improve 30s sit to stand to x12 to improve functional strength of LE.        Start:  06/09/25    Expected End:  07/09/25                Rashel Huang, PT, DPT

## 2025-06-17 ENCOUNTER — CLINICAL SUPPORT (OUTPATIENT)
Dept: REHABILITATION | Facility: HOSPITAL | Age: 70
End: 2025-06-17

## 2025-06-17 DIAGNOSIS — M25.561 CHRONIC PAIN OF RIGHT KNEE: ICD-10-CM

## 2025-06-17 DIAGNOSIS — R53.1 WEAKNESS: ICD-10-CM

## 2025-06-17 DIAGNOSIS — M17.11 PRIMARY OSTEOARTHRITIS OF RIGHT KNEE: Primary | ICD-10-CM

## 2025-06-17 DIAGNOSIS — G89.29 CHRONIC PAIN OF RIGHT KNEE: ICD-10-CM

## 2025-06-17 PROCEDURE — 97112 NEUROMUSCULAR REEDUCATION: CPT | Mod: CQ

## 2025-06-17 NOTE — PROGRESS NOTES
"  Outpatient Rehab    Physical Therapy Visit  Patient Name: Rochelle Espinoza  MRN: [id number]  YOB: 1955  Encounter Date: 6/17/2025    Therapy Diagnosis:   Encounter Diagnoses   Name Primary?    Primary osteoarthritis of right knee Yes    Chronic pain of right knee     Weakness      Physician: Lexie Henson PA-C    Physician Orders: Eval and Treat  Medical Diagnosis: Primary osteoarthritis of right knee  Surgical Diagnosis: Not applicable for this Episode   Surgical Date: Not applicable for this Episode  Days Since Last Surgery: Not applicable for this Episode    Visit # / Visits Authorized:  1 / 10  Insurance Authorization Period: 1/1/2025 to 8/10/2025  Date of Evaluation: 6/9/2025  Plan of Care Certification: 6/9/2025 to 9/9/2025      PT/PTA: PTA   Number of PTA visits since last PT visit:1  Time In: 1100   Time Out: 1151  Total Time (in minutes): 51   Total Billable Time (in minutes): 30    FOTO:  Intake Score: 36%  Survey Score 2:  %  Survey Score 3:  %    Precautions:       Subjective   Pt reports having popping in R knee without pain..  Pain reported as 5/10.      Objective            Treatment:     THERAPEUTIC EXERCISES to develop strength, endurance, ROM, and flexibility for 20 minutes including    Recumbent bike x 12 min  SLR B 2x10  B hamstring stretch 4x20"    MANUAL THERAPY TECHNIQUES were applied to  for 0 minutes including:        NEUROMUSCULAR RE-EDUCATION ACTIVITIES to improve Balance, Coordination, Kinesthetic, Sense, Proprioception, and Posture for 31 minutes.  The following were included:    Quad sets B 3x10  LAQ B 2# 3x10  SAQ B 2# 3x10  Pilates ring abd 3" 3x10  Clams RTB B 3x10  Hip add ball 3x10 3"      THERAPEUTIC ACTIVITIES to improve dynamic and functional performance for 0 minutes including      Time Entry(in minutes):       Assessment & Plan   Assessment: Pt tolerated first full treatment of PT without any adverse reactions. Pt did verbalize mild discomfort with hip add and " fatigue towards end of session. Will monitor and progress as tolerated.  Evaluation/Treatment Tolerance: Patient tolerated treatment well    The patient will continue to benefit from skilled outpatient physical therapy in order to address the deficits listed in the problem list on the initial evaluation, provide patient and family education, and maximize the patients level of independence in the home and community environments.     The patient's spiritual, cultural, and educational needs were considered, and the patient is agreeable to the plan of care and goals.           Plan: PT/PTA met face to face to discuss pt's treatment plan and progress towards established goals. Pt will be seen by a physical therapist minimally every 6th visit or every 30 days. Continue POC.    Goals:   Active       LTG       Pt will improve FOTO score to >/= 57% limited to decrease perceived limitation with maintaining/changing body position.   (Progressing)       Start:  06/09/25    Expected End:  09/09/25            Pt will improve LE MMT by 1 grade to improve strength for functional activities.    (Progressing)       Start:  06/09/25    Expected End:  09/09/25            Pt will improve LE dynamometer testing by 8 kgs to improve strength for functional activities.   (Progressing)       Start:  06/09/25    Expected End:  09/09/25            Pt will perform 30s sit to stand with x15 reps to meet age-related normal value and indicate improvement in activity tolerance and functional strength.  (Progressing)       Start:  06/09/25    Expected End:  09/09/25               STG       Pt will be compliant with HEP to supplement PT in decreasing pain with functional mobility. (Progressing)       Start:  06/09/25    Expected End:  07/09/25            Pt will improve LE dynamometer testing by 4 kgs to improve strength for functional activities.   (Progressing)       Start:  06/09/25    Expected End:  07/09/25            Pt will improve LE MMT by  1/2 grade to improve strength for functional activities.   (Progressing)       Start:  06/09/25    Expected End:  07/09/25            Pt will improve 30s sit to stand to x12 to improve functional strength of LE.  (Progressing)       Start:  06/09/25    Expected End:  07/09/25                Figueroa Mccoy, SAMEERA

## 2025-06-20 DIAGNOSIS — J44.9 CHRONIC OBSTRUCTIVE PULMONARY DISEASE WITH HYPOXIA: ICD-10-CM

## 2025-06-20 DIAGNOSIS — J98.01 BRONCHOSPASM, ACUTE: Chronic | ICD-10-CM

## 2025-06-20 RX ORDER — ALBUTEROL SULFATE 90 UG/1
2 INHALANT RESPIRATORY (INHALATION) EVERY 4 HOURS PRN
Qty: 20.1 G | Refills: 3 | Status: SHIPPED | OUTPATIENT
Start: 2025-06-20

## 2025-06-20 NOTE — TELEPHONE ENCOUNTER
No care due was identified.  [facility] Embedded Care Due Messages. Reference number: [id number].   6/20/2025 9:33:33 AM CDT

## 2025-06-20 NOTE — TELEPHONE ENCOUNTER
Refill Decision Note   Rochelle Alexis  is requesting a refill authorization.  Brief Assessment and Rationale for Refill:  Approve     Medication Therapy Plan:        Comments:     Note composed:4:17 PM 06/20/2025

## 2025-06-23 ENCOUNTER — HOSPITAL ENCOUNTER (OUTPATIENT)
Dept: RADIOLOGY | Facility: HOSPITAL | Age: 70
Discharge: HOME | End: 2025-06-23
Attending: STUDENT IN AN ORGANIZED HEALTH CARE EDUCATION/TRAINING PROGRAM
Payer: MEDICARE

## 2025-06-23 DIAGNOSIS — J98.4 CAVITARY LESION OF LUNG: ICD-10-CM

## 2025-06-23 DIAGNOSIS — A31.0 MYCOBACTERIUM AVIUM COMPLEX: ICD-10-CM

## 2025-06-23 PROCEDURE — 71250 CT THORAX DX C-: CPT | Mod: 26,HCNC,, | Performed by: STUDENT IN AN ORGANIZED HEALTH CARE EDUCATION/TRAINING PROGRAM

## 2025-06-23 PROCEDURE — 71250 CT THORAX DX C-: CPT | Mod: TC,HCNC

## 2025-06-25 NOTE — TELEPHONE ENCOUNTER
Refill Routing Note   Medication(s) are not appropriate for processing by [facility] for the following reason(s):        Required labs outdated    [facility] action(s):  Defer             Appointments  past 12m or future 3m with PCP    Date Provider   Last Visit   4/29/2025 Yosi Samuels Jr., MD   Next Visit   11/3/2025 Yosi Samuels Jr., MD   ED visits in past 90 days: 0        Note composed:12:05 PM 06/25/2025

## 2025-06-25 NOTE — TELEPHONE ENCOUNTER
No care due was identified.  [facility] Embedded Care Due Messages. Reference number: [id number].   6/25/2025 12:03:47 PM CDT

## 2025-06-26 RX ORDER — ATORVASTATIN CALCIUM 10 MG/1
10 TABLET, FILM COATED ORAL
Qty: 90 TABLET | Refills: 3 | Status: SHIPPED | OUTPATIENT
Start: 2025-06-26

## 2025-06-30 ENCOUNTER — RESULTS FOLLOW-UP (OUTPATIENT)
Dept: PULMONOLOGY | Facility: CLINIC | Age: 70
End: 2025-06-30

## 2025-07-01 ENCOUNTER — PATIENT MESSAGE (OUTPATIENT)
Dept: PULMONOLOGY | Facility: CLINIC | Age: 70
End: 2025-07-01
Payer: MEDICARE

## 2025-07-01 DIAGNOSIS — J98.4 CAVITARY LESION OF LUNG: Primary | ICD-10-CM

## 2025-07-07 NOTE — TELEPHONE ENCOUNTER
Refill Routing Note   Medication(s) are not appropriate for processing by [facility] for the following reason(s):        Outside of protocol    [facility] action(s):  Route             Appointments  past 12m or future 3m with PCP    Date Provider   Last Visit   4/29/2025 Yosi Samuels Jr., MD   Next Visit   11/3/2025 Yosi Samuels Jr., MD   ED visits in past 90 days: 0        Note composed:6:00 PM 07/07/2025

## 2025-07-09 RX ORDER — BENZONATATE 100 MG/1
100 CAPSULE ORAL 3 TIMES DAILY PRN
Qty: 45 CAPSULE | Refills: 0 | Status: SHIPPED | OUTPATIENT
Start: 2025-07-09

## 2025-07-13 DIAGNOSIS — J96.11 CHRONIC RESPIRATORY FAILURE WITH HYPOXIA AND HYPERCAPNIA: ICD-10-CM

## 2025-07-13 DIAGNOSIS — J44.9 CHRONIC OBSTRUCTIVE PULMONARY DISEASE WITH HYPOXIA: ICD-10-CM

## 2025-07-13 DIAGNOSIS — J96.12 CHRONIC RESPIRATORY FAILURE WITH HYPOXIA AND HYPERCAPNIA: ICD-10-CM

## 2025-07-13 DIAGNOSIS — J98.01 BRONCHOSPASM, ACUTE: Chronic | ICD-10-CM

## 2025-07-14 RX ORDER — FLUTICASONE FUROATE, UMECLIDINIUM BROMIDE AND VILANTEROL TRIFENATATE 200; 62.5; 25 UG/1; UG/1; UG/1
1 POWDER RESPIRATORY (INHALATION) DAILY
Qty: 3 EACH | Refills: 4 | Status: SHIPPED | OUTPATIENT
Start: 2025-07-14

## 2025-07-14 RX ORDER — ALBUTEROL SULFATE 90 UG/1
2 INHALANT RESPIRATORY (INHALATION) EVERY 4 HOURS PRN
Qty: 20.1 G | Refills: 3 | Status: SHIPPED | OUTPATIENT
Start: 2025-07-14

## 2025-07-14 NOTE — TELEPHONE ENCOUNTER
No care due was identified.  [facility] Embedded Care Due Messages. Reference number: [id number].   7/13/2025 8:35:54 PM CDT

## 2025-07-22 ENCOUNTER — LAB VISIT (OUTPATIENT)
Dept: LAB | Facility: HOSPITAL | Age: 70
End: 2025-07-22
Payer: MEDICARE

## 2025-07-22 DIAGNOSIS — J98.4 CAVITARY LESION OF LUNG: ICD-10-CM

## 2025-07-22 DIAGNOSIS — A31.0 MYCOBACTERIUM AVIUM COMPLEX: ICD-10-CM

## 2025-07-22 LAB
ABSOLUTE EOSINOPHIL (OHS): 0.08 K/UL
ABSOLUTE MONOCYTE (OHS): 0.7 K/UL (ref 0.3–1)
ABSOLUTE NEUTROPHIL COUNT (OHS): 3.21 K/UL (ref 1.8–7.7)
ALBUMIN SERPL BCP-MCNC: 3.8 G/DL (ref 3.5–5.2)
ALP SERPL-CCNC: 54 UNIT/L (ref 40–150)
ALT SERPL W/O P-5'-P-CCNC: 13 UNIT/L (ref 10–44)
ANION GAP (OHS): 8 MMOL/L (ref 8–16)
AST SERPL-CCNC: 17 UNIT/L (ref 11–45)
BASOPHILS # BLD AUTO: 0.03 K/UL
BASOPHILS NFR BLD AUTO: 0.4 %
BILIRUB SERPL-MCNC: 0.3 MG/DL (ref 0.1–1)
BUN SERPL-MCNC: 7 MG/DL (ref 8–23)
CALCIUM SERPL-MCNC: 8.8 MG/DL (ref 8.7–10.5)
CHLORIDE SERPL-SCNC: 103 MMOL/L (ref 95–110)
CO2 SERPL-SCNC: 30 MMOL/L (ref 23–29)
CREAT SERPL-MCNC: 0.7 MG/DL (ref 0.5–1.4)
ERYTHROCYTE [DISTWIDTH] IN BLOOD BY AUTOMATED COUNT: 14 % (ref 11.5–14.5)
GFR SERPLBLD CREATININE-BSD FMLA CKD-EPI: >60 ML/MIN/1.73/M2
GLUCOSE SERPL-MCNC: 75 MG/DL (ref 70–110)
HCT VFR BLD AUTO: 36.6 % (ref 37–48.5)
HGB BLD-MCNC: 11.5 GM/DL (ref 12–16)
IMM GRANULOCYTES # BLD AUTO: 0.02 K/UL (ref 0–0.04)
IMM GRANULOCYTES NFR BLD AUTO: 0.3 % (ref 0–0.5)
LYMPHOCYTES # BLD AUTO: 2.84 K/UL (ref 1–4.8)
MCH RBC QN AUTO: 28.3 PG (ref 27–31)
MCHC RBC AUTO-ENTMCNC: 31.4 G/DL (ref 32–36)
MCV RBC AUTO: 90 FL (ref 82–98)
NUCLEATED RBC (/100WBC) (OHS): 0 /100 WBC
PLATELET # BLD AUTO: 235 K/UL (ref 150–450)
PMV BLD AUTO: 11.6 FL (ref 9.2–12.9)
POTASSIUM SERPL-SCNC: 3.6 MMOL/L (ref 3.5–5.1)
PROT SERPL-MCNC: 6.6 GM/DL (ref 6–8.4)
RBC # BLD AUTO: 4.07 M/UL (ref 4–5.4)
RELATIVE EOSINOPHIL (OHS): 1.2 %
RELATIVE LYMPHOCYTE (OHS): 41.3 % (ref 18–48)
RELATIVE MONOCYTE (OHS): 10.2 % (ref 4–15)
RELATIVE NEUTROPHIL (OHS): 46.6 % (ref 38–73)
SODIUM SERPL-SCNC: 141 MMOL/L (ref 136–145)
WBC # BLD AUTO: 6.88 K/UL (ref 3.9–12.7)

## 2025-07-22 PROCEDURE — 36415 COLL VENOUS BLD VENIPUNCTURE: CPT | Mod: HCNC

## 2025-07-22 PROCEDURE — 80053 COMPREHEN METABOLIC PANEL: CPT | Mod: HCNC

## 2025-07-22 PROCEDURE — 85025 COMPLETE CBC W/AUTO DIFF WBC: CPT | Mod: HCNC

## 2025-07-26 PROCEDURE — 99283 EMERGENCY DEPT VISIT LOW MDM: CPT | Mod: HCNC

## 2025-07-27 ENCOUNTER — HOSPITAL ENCOUNTER (EMERGENCY)
Facility: HOSPITAL | Age: 70
Discharge: HOME | End: 2025-07-27
Attending: EMERGENCY MEDICINE
Payer: MEDICARE

## 2025-07-27 VITALS
HEIGHT: 63 IN | SYSTOLIC BLOOD PRESSURE: 111 MMHG | BODY MASS INDEX: 29.23 KG/M2 | TEMPERATURE: 98 F | OXYGEN SATURATION: 95 % | RESPIRATION RATE: 20 BRPM | HEART RATE: 99 BPM | WEIGHT: 165 LBS | DIASTOLIC BLOOD PRESSURE: 64 MMHG

## 2025-07-27 DIAGNOSIS — L50.9 URTICARIA: Primary | ICD-10-CM

## 2025-07-27 PROCEDURE — 63600175 PHARM REV CODE 636 W HCPCS: Mod: HCNC | Performed by: NURSE PRACTITIONER

## 2025-07-27 PROCEDURE — 25000003 PHARM REV CODE 250: Mod: HCNC | Performed by: NURSE PRACTITIONER

## 2025-07-27 RX ORDER — CETIRIZINE HYDROCHLORIDE 5 MG/1
10 TABLET ORAL
Status: COMPLETED | OUTPATIENT
Start: 2025-07-27 | End: 2025-07-27

## 2025-07-27 RX ORDER — PREDNISONE 20 MG/1
40 TABLET ORAL DAILY
Qty: 10 TABLET | Refills: 0 | Status: SHIPPED | OUTPATIENT
Start: 2025-07-27 | End: 2025-08-01

## 2025-07-27 RX ORDER — FAMOTIDINE 20 MG/1
40 TABLET, FILM COATED ORAL
Status: COMPLETED | OUTPATIENT
Start: 2025-07-27 | End: 2025-07-27

## 2025-07-27 RX ORDER — PREDNISONE 20 MG/1
40 TABLET ORAL
Status: COMPLETED | OUTPATIENT
Start: 2025-07-27 | End: 2025-07-27

## 2025-07-27 RX ADMIN — PREDNISONE 40 MG: 20 TABLET ORAL at 12:07

## 2025-07-27 RX ADMIN — FAMOTIDINE 40 MG: 20 TABLET, FILM COATED ORAL at 12:07

## 2025-07-27 RX ADMIN — CETIRIZINE HYDROCHLORIDE 10 MG: 5 TABLET, FILM COATED ORAL at 12:07

## 2025-07-27 NOTE — DISCHARGE INSTRUCTIONS
Steroids as ordered  Over the counter as directed on package x7 days:  Famotidine  Benadryl or zyrtec    Return to the Emergency Department for any worsening, change in condition, or any emergent concerns.

## 2025-07-27 NOTE — ED TRIAGE NOTES
Rochelle Espinoza, a 69 y.o. female presents to the ED w/ complaint of hives and itching after eating cherries yesterday    Triage note:  Chief Complaint   Patient presents with    Urticaria     Ate cherries yesterday and had hives today feels like her face in swelling and itchy. Took benadryl today as well     Review of patient's allergies indicates:   Allergen Reactions    Doxycycline Other (See Comments)     Acid reflux    Orange juice      Swelling in pt tongue      8/23/23 - pt stated she had in hosp and it didn't effect her.     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

## 2025-07-27 NOTE — ED PROVIDER NOTES
Encounter Date: 2025       History     Chief Complaint   Patient presents with    Urticaria     Ate cherries yesterday and had hives today feels like her face in swelling and itchy. Took benadryl today as well     Patient is a 69-year-old female who presents to the emergency department with complaint of hives.  She states she has been eating fresh cherries recently and since starting to eat it she is having swelling of her face and an itchy rash around her face and neck.  She denies swelling of the lips or mouth.  Denies nausea vomiting or diarrhea.  She has use Benadryl and hydrocortisone cream with limited relief.    The history is provided by the patient. No  was used.     Review of patient's allergies indicates:   Allergen Reactions    Doxycycline Other (See Comments)     Acid reflux    Orange juice      Swelling in pt tongue      23 - pt stated she had in hosp and it didn't effect her.     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     Sciatica      Past Surgical History:   Procedure Laterality Date    BREAST BIOPSY Right     core     SECTION      COLONOSCOPY N/A 2019    Procedure: COLONOSCOPY;  Surgeon: Rui Holder MD;  Location: [facility] ([address] [place] [address]);  Service: Endoscopy;  Laterality: N/A;    EPIDURAL STEROID INJECTION N/A 2020    Procedure: CERVICAL BLAKE;  Surgeon: Papito High MD;  Location: [facility] PAIN MGT;  Service: Pain Management;  Laterality: N/A;  NEEDS CONSENT    ESOPHAGOGASTRODUODENOSCOPY N/A 2019    Procedure: EGD (ESOPHAGOGASTRODUODENOSCOPY);  Surgeon: Rui Holder MD;  [facility]  Service: Endoscopy;  Laterality: N/A;  2L continuous O2 via NC, COPD, pulm HTN    TRANSFORAMINAL  EPIDURAL INJECTION OF STEROID Right 6/3/2021    Procedure: INJECTION, STEROID, EPIDURAL, TRANSFORAMINAL APPROACH, L4-L5;  Surgeon: Anibal Robles MD;  Location: [facility];  Service: Pain Management;  Laterality: Right;     Family History   Problem Relation Name Age of Onset    Heart disease Mother      Heart disease Paternal Uncle      Celiac disease Neg Hx      Colon cancer Neg Hx      Colon polyps Neg Hx      Crohn's disease Neg Hx      Cystic fibrosis Neg Hx      Esophageal cancer Neg Hx      Inflammatory bowel disease Neg Hx      Irritable bowel syndrome Neg Hx      Liver cancer Neg Hx      Liver disease Neg Hx      Rectal cancer Neg Hx      Stomach cancer Neg Hx      Ulcerative colitis Neg Hx       Social History[1]  Review of Systems   Skin:  Positive for rash.       Physical Exam     Initial Vitals [07/26/25 2337]   BP Pulse Resp Temp SpO2   110/62 105 20 98.2 °F (36.8 °C) (!) 93 %      MAP       --         Physical Exam    Nursing note and vitals reviewed.  Constitutional: She appears well-developed and well-nourished.   HENT:   Head: Normocephalic and atraumatic.   Right Ear: External ear normal.   Left Ear: External ear normal.   Nose: Nose normal.   No swelling of the lips or mouth.  Currently on oxygen with concentrator.   Eyes: Conjunctivae and EOM are normal. Pupils are equal, round, and reactive to light. Right eye exhibits no discharge. Left eye exhibits no discharge.   Neck:   Normal range of motion.  Cardiovascular:  Regular rhythm, S1 normal, S2 normal and normal heart sounds.     Exam reveals no gallop.       No murmur heard.  Pulmonary/Chest: Effort normal and breath sounds normal. No respiratory distress. She has no decreased breath sounds. She has no wheezes. She has no rhonchi. She has no rales.   Abdominal: She exhibits no distension.   Musculoskeletal:         General: Normal range of motion.      Cervical back: Normal range of motion.     Neurological: She is alert and oriented to  "person, place, and time.   Skin: Skin is dry. Capillary refill takes less than 2 seconds.         ED Course   Procedures  Labs Reviewed - No data to display       Imaging Results    None          Medications   cetirizine tablet 10 mg (10 mg Oral Given 7/27/25 0046)   famotidine tablet 40 mg (40 mg Oral Given 7/27/25 0046)   predniSONE tablet 40 mg (40 mg Oral Given 7/27/25 0046)     Medical Decision Making  Patient is a 69-year-old female who presents to the emergency department with complaint of hives.  She states she has been eating fresh cherries recently and since starting to eat it she is having swelling of her face and an itchy rash around her face and neck.  She denies swelling of the lips or mouth.  Denies nausea vomiting or diarrhea.  She has use Benadryl and hydrocortisone cream with limited relief.    The patient reports itching and a rash however I did not detect or rash.  She stated her face was swollen when she removed her nasal cannula there were impressions left by the tubing however this may be from it being present for an extended period of time.  There was definitely 0 swelling of the lips or mouth.  Breath sounds are clear to auscultation.  Patient was in no distress.      Differential diagnosis includes dermatitis, urticaria, anaphylaxis    Problems Addressed:  Urticaria: acute illness or injury     Details: Steroids and histamine blockers administered in the ED the patient was discharged home on same.    Amount and/or Complexity of Data Reviewed  Discussion of management or test interpretation with external provider(s): Vital signs at the time of disposition were:  /64   Pulse 99   Temp 98.1 °F (36.7 °C)   Resp 20   Ht 5' 3" (1.6 m)   Wt 74.8 kg (165 lb)   LMP 11/21/2013   SpO2 95%   BMI 29.23 kg/m²       See AVS for additional recommendations. Medications listed herein were prescribed after reviewing the patient's allergies, medication list, history, most recent laboratories as " available.  Referrals below were provided after reviewing the patient's previous medical providers. She understands she  should return for any worsening or changes in condition.  Prior to discharge the patient was asked if she  had any additional concerns or complaints and she declined. The patient was given an opportunity to ask questions and all were answered to her satisfaction.      Risk  OTC drugs.  Prescription drug management.  Diagnosis or treatment significantly limited by social determinants of health.               ED Course as of 25 0214   Sun  BP: 110/62 [VC]   0015 Temp: 98.2 °F (36.8 °C) [VC]   0015 Temp Source: Oral [VC]   0015 Pulse: 105 [VC]   0015 Resp: 20 [VC]   0015 SpO2(!): 93 % [VC]      ED Course User Index  [VC] Tyson Hernandez DNP                               Clinical Impression:  Final diagnoses:  [L50.9] Urticaria (Primary)          ED Disposition Condition    Discharge           ED Prescriptions       Medication Sig Dispense Start Date End Date Auth. Provider    predniSONE (DELTASONE) 20 MG tablet Take 2 tablets (40 mg total) by mouth once daily. for 5 days 10 tablet 2025 Tyson Hernandez DNP          Follow-up Information       Follow up With Specialties Details Why Contact Info    Yosi Samuels Jr., MD Internal Medicine Schedule an appointment as soon as possible for a visit   [address] [place] [address]  [phone]                     [1]   Social History  Tobacco Use    Smoking status: Former     Current packs/day: 0.00     Average packs/day: 1 pack/day for 52.7 years (51.1 ttl pk-yrs)     Types: Cigarettes     Start date:      Quit date: 2024     Years since quittin.8     Passive exposure: Past    Smokeless tobacco: Never    Tobacco comments:     Quit over a month ago    Substance Use Topics    Alcohol use: Not Currently     Comment: beer daily 2-3 daily, none today    Drug use: No        Champagne, Tyson  EMERY, [facility]  07/27/25 0214

## 2025-07-28 ENCOUNTER — PATIENT OUTREACH (OUTPATIENT)
Facility: OTHER | Age: 70
End: 2025-07-28
Payer: MEDICARE

## 2025-07-29 NOTE — PROGRESS NOTES
Patient was seen in the ED on 7/28/25. Phoned patient on 2 separate occasions to assist with Post ED Discharge Navigation. Patient was unavailable. Encounter closed.  Driss Mckeon

## 2025-08-03 DIAGNOSIS — K21.9 GASTROESOPHAGEAL REFLUX DISEASE, UNSPECIFIED WHETHER ESOPHAGITIS PRESENT: ICD-10-CM

## 2025-08-04 RX ORDER — PANTOPRAZOLE SODIUM 40 MG/1
40 TABLET, DELAYED RELEASE ORAL
Qty: 90 TABLET | Refills: 3 | Status: SHIPPED | OUTPATIENT
Start: 2025-08-04

## 2025-08-18 ENCOUNTER — LAB VISIT (OUTPATIENT)
Dept: LAB | Facility: HOSPITAL | Age: 70
End: 2025-08-18
Attending: INTERNAL MEDICINE
Payer: MEDICARE

## 2025-08-18 DIAGNOSIS — J98.4 CAVITARY LESION OF LUNG: ICD-10-CM

## 2025-08-18 DIAGNOSIS — A31.0 MYCOBACTERIUM AVIUM COMPLEX: ICD-10-CM

## 2025-08-18 LAB
ABSOLUTE EOSINOPHIL (OHS): 0.09 K/UL
ABSOLUTE MONOCYTE (OHS): 0.83 K/UL (ref 0.3–1)
ABSOLUTE NEUTROPHIL COUNT (OHS): 3.45 K/UL (ref 1.8–7.7)
ALBUMIN SERPL BCP-MCNC: 3.8 G/DL (ref 3.5–5.2)
ALP SERPL-CCNC: 58 UNIT/L (ref 40–150)
ALT SERPL W/O P-5'-P-CCNC: 15 UNIT/L (ref 0–55)
ANION GAP (OHS): 8 MMOL/L (ref 8–16)
AST SERPL-CCNC: 22 UNIT/L (ref 0–50)
BASOPHILS # BLD AUTO: 0.02 K/UL
BASOPHILS NFR BLD AUTO: 0.3 %
BILIRUB SERPL-MCNC: 0.5 MG/DL (ref 0.1–1)
BUN SERPL-MCNC: 10 MG/DL (ref 8–23)
CALCIUM SERPL-MCNC: 9.1 MG/DL (ref 8.7–10.5)
CHLORIDE SERPL-SCNC: 104 MMOL/L (ref 95–110)
CO2 SERPL-SCNC: 30 MMOL/L (ref 23–29)
CREAT SERPL-MCNC: 0.8 MG/DL (ref 0.5–1.4)
ERYTHROCYTE [DISTWIDTH] IN BLOOD BY AUTOMATED COUNT: 13.6 % (ref 11.5–14.5)
GFR SERPLBLD CREATININE-BSD FMLA CKD-EPI: >60 ML/MIN/1.73/M2
GLUCOSE SERPL-MCNC: 72 MG/DL (ref 70–110)
HCT VFR BLD AUTO: 38.2 % (ref 37–48.5)
HGB BLD-MCNC: 12.1 GM/DL (ref 12–16)
IMM GRANULOCYTES # BLD AUTO: 0.02 K/UL (ref 0–0.04)
IMM GRANULOCYTES NFR BLD AUTO: 0.3 % (ref 0–0.5)
LYMPHOCYTES # BLD AUTO: 3.06 K/UL (ref 1–4.8)
MCH RBC QN AUTO: 28.3 PG (ref 27–31)
MCHC RBC AUTO-ENTMCNC: 31.7 G/DL (ref 32–36)
MCV RBC AUTO: 89 FL (ref 82–98)
NUCLEATED RBC (/100WBC) (OHS): 0 /100 WBC
PLATELET # BLD AUTO: 217 K/UL (ref 150–450)
PMV BLD AUTO: 11.5 FL (ref 9.2–12.9)
POTASSIUM SERPL-SCNC: 3.5 MMOL/L (ref 3.5–5.1)
PROT SERPL-MCNC: 6.8 GM/DL (ref 6–8.4)
RBC # BLD AUTO: 4.28 M/UL (ref 4–5.4)
RELATIVE EOSINOPHIL (OHS): 1.2 %
RELATIVE LYMPHOCYTE (OHS): 41 % (ref 18–48)
RELATIVE MONOCYTE (OHS): 11.1 % (ref 4–15)
RELATIVE NEUTROPHIL (OHS): 46.1 % (ref 38–73)
SODIUM SERPL-SCNC: 142 MMOL/L (ref 136–145)
WBC # BLD AUTO: 7.47 K/UL (ref 3.9–12.7)

## 2025-08-18 PROCEDURE — 85025 COMPLETE CBC W/AUTO DIFF WBC: CPT | Mod: HCNC

## 2025-08-18 PROCEDURE — 84460 ALANINE AMINO (ALT) (SGPT): CPT | Mod: HCNC

## 2025-08-18 PROCEDURE — 36415 COLL VENOUS BLD VENIPUNCTURE: CPT | Mod: HCNC

## 2025-09-04 ENCOUNTER — HOSPITAL ENCOUNTER (OUTPATIENT)
Dept: RADIOLOGY | Facility: HOSPITAL | Age: 70
Discharge: HOME | End: 2025-09-04
Attending: INTERNAL MEDICINE
Payer: MEDICARE

## 2025-09-04 ENCOUNTER — OFFICE VISIT (OUTPATIENT)
Dept: PULMONOLOGY | Facility: CLINIC | Age: 70
End: 2025-09-04
Payer: MEDICARE

## 2025-09-04 VITALS
SYSTOLIC BLOOD PRESSURE: 116 MMHG | HEART RATE: 84 BPM | BODY MASS INDEX: 29.41 KG/M2 | DIASTOLIC BLOOD PRESSURE: 86 MMHG | OXYGEN SATURATION: 94 % | WEIGHT: 166 LBS | HEIGHT: 63 IN

## 2025-09-04 DIAGNOSIS — J44.9 CHRONIC OBSTRUCTIVE PULMONARY DISEASE, UNSPECIFIED COPD TYPE: Primary | ICD-10-CM

## 2025-09-04 DIAGNOSIS — J96.12 CHRONIC RESPIRATORY FAILURE WITH HYPOXIA AND HYPERCAPNIA: ICD-10-CM

## 2025-09-04 DIAGNOSIS — R91.1 PULMONARY NODULE 1 CM OR GREATER IN DIAMETER: ICD-10-CM

## 2025-09-04 DIAGNOSIS — A31.0 MAI (MYCOBACTERIUM AVIUM-INTRACELLULARE): ICD-10-CM

## 2025-09-04 DIAGNOSIS — J96.11 CHRONIC RESPIRATORY FAILURE WITH HYPOXIA AND HYPERCAPNIA: ICD-10-CM

## 2025-09-04 DIAGNOSIS — Z12.31 ENCOUNTER FOR SCREENING MAMMOGRAM FOR BREAST CANCER: ICD-10-CM

## 2025-09-04 DIAGNOSIS — Z87.891 HISTORY OF TOBACCO USE: ICD-10-CM

## 2025-09-04 PROCEDURE — 99999 PR PBB SHADOW E&M-EST. PATIENT-LVL V: CPT | Mod: PBBFAC,HCNC,, | Performed by: EMERGENCY MEDICINE

## 2025-09-04 PROCEDURE — 77067 SCR MAMMO BI INCL CAD: CPT | Mod: TC

## (undated) DEVICE — DRESSING LEUKOPLAST FLEX 1X3IN